# Patient Record
Sex: MALE | Race: WHITE | NOT HISPANIC OR LATINO | URBAN - METROPOLITAN AREA
[De-identification: names, ages, dates, MRNs, and addresses within clinical notes are randomized per-mention and may not be internally consistent; named-entity substitution may affect disease eponyms.]

---

## 2018-03-20 ENCOUNTER — OFFICE VISIT CONVERTED (OUTPATIENT)
Dept: INTERNAL MEDICINE | Facility: CLINIC | Age: 63
End: 2018-03-20
Attending: INTERNAL MEDICINE

## 2018-07-17 ENCOUNTER — OFFICE VISIT CONVERTED (OUTPATIENT)
Dept: INTERNAL MEDICINE | Facility: CLINIC | Age: 63
End: 2018-07-17
Attending: PHYSICIAN ASSISTANT

## 2019-02-21 ENCOUNTER — CONVERSION ENCOUNTER (OUTPATIENT)
Dept: GASTROENTEROLOGY | Facility: CLINIC | Age: 64
End: 2019-02-21

## 2019-02-21 ENCOUNTER — OFFICE VISIT CONVERTED (OUTPATIENT)
Dept: GASTROENTEROLOGY | Facility: CLINIC | Age: 64
End: 2019-02-21
Attending: NURSE PRACTITIONER

## 2019-03-07 ENCOUNTER — HOSPITAL ENCOUNTER (OUTPATIENT)
Dept: LAB | Facility: HOSPITAL | Age: 64
Discharge: HOME | End: 2019-03-07

## 2019-03-07 LAB
ALBUMIN SERPL-MCNC: 3.9 G/DL (ref 3.5–5)
ALBUMIN/GLOB SERPL: 0.9 {RATIO} (ref 1.4–2.6)
ALP SERPL-CCNC: 101 U/L (ref 56–155)
ALT SERPL-CCNC: 8 U/L (ref 10–40)
ANION GAP SERPL CALC-SCNC: 16 MMOL/L (ref 8–19)
AST SERPL-CCNC: 11 U/L (ref 15–50)
BASOPHILS # BLD AUTO: 0.07 10*3/UL (ref 0–0.2)
BASOPHILS NFR BLD AUTO: 0.9 % (ref 0–3)
BILIRUB SERPL-MCNC: 0.27 MG/DL (ref 0.2–1.3)
BUN SERPL-MCNC: 11 MG/DL (ref 5–25)
BUN/CREAT SERPL: 10 {RATIO} (ref 6–20)
CALCIUM SERPL-MCNC: 9.8 MG/DL (ref 8.7–10.4)
CHLORIDE SERPL-SCNC: 101 MMOL/L (ref 99–111)
CONV ABS IMM GRAN: 0.03 10*3/UL (ref 0–0.2)
CONV CO2: 27 MMOL/L (ref 22–32)
CONV IMMATURE GRAN: 0.4 % (ref 0–1.8)
CONV TOTAL PROTEIN: 8.2 G/DL (ref 6.3–8.2)
CREAT UR-MCNC: 1.05 MG/DL (ref 0.7–1.2)
DEPRECATED RDW RBC AUTO: 44 FL (ref 35.1–43.9)
EOSINOPHIL # BLD AUTO: 0.2 10*3/UL (ref 0–0.7)
EOSINOPHIL # BLD AUTO: 2.7 % (ref 0–7)
ERYTHROCYTE [DISTWIDTH] IN BLOOD BY AUTOMATED COUNT: 13.3 % (ref 11.6–14.4)
FOLATE SERPL-MCNC: 4.3 NG/ML (ref 4.8–20)
GFR SERPLBLD BASED ON 1.73 SQ M-ARVRAT: >60 ML/MIN/{1.73_M2}
GLOBULIN UR ELPH-MCNC: 4.3 G/DL (ref 2–3.5)
GLUCOSE SERPL-MCNC: 91 MG/DL (ref 70–99)
HBA1C MFR BLD: 14.8 G/DL (ref 14–18)
HCT VFR BLD AUTO: 46.9 % (ref 42–52)
INR PPP: 0.91 (ref 2–3)
IRON SATN MFR SERPL: 19 % (ref 20–55)
IRON SERPL-MCNC: 54 UG/DL (ref 70–180)
LYMPHOCYTES # BLD AUTO: 1.78 10*3/UL (ref 1–5)
MCH RBC QN AUTO: 28.3 PG (ref 27–31)
MCHC RBC AUTO-ENTMCNC: 31.6 G/DL (ref 33–37)
MCV RBC AUTO: 89.7 FL (ref 80–96)
MONOCYTES # BLD AUTO: 0.53 10*3/UL (ref 0.2–1.2)
MONOCYTES NFR BLD AUTO: 7.1 % (ref 3–10)
NEUTROPHILS # BLD AUTO: 4.9 10*3/UL (ref 2–8)
NEUTROPHILS NFR BLD AUTO: 65.2 % (ref 30–85)
NRBC CBCN: 0 % (ref 0–0.7)
OSMOLALITY SERPL CALC.SUM OF ELEC: 289 MOSM/KG (ref 273–304)
PLATELET # BLD AUTO: 283 10*3/UL (ref 130–400)
PMV BLD AUTO: 10.4 FL (ref 9.4–12.4)
POTASSIUM SERPL-SCNC: 4.3 MMOL/L (ref 3.5–5.3)
PROTHROMBIN TIME: 9.6 S (ref 9.4–12)
RBC # BLD AUTO: 5.23 10*6/UL (ref 4.7–6.1)
SODIUM SERPL-SCNC: 140 MMOL/L (ref 135–147)
TIBC SERPL-MCNC: 290 UG/DL (ref 245–450)
TRANSFERRIN SERPL-MCNC: 203 MG/DL (ref 215–365)
VARIANT LYMPHS NFR BLD MANUAL: 23.7 % (ref 20–45)
VIT B12 SERPL-MCNC: 311 PG/ML (ref 211–911)
WBC # BLD AUTO: 7.51 10*3/UL (ref 4.8–10.8)

## 2019-04-04 ENCOUNTER — HOSPITAL ENCOUNTER (OUTPATIENT)
Dept: GENERAL RADIOLOGY | Facility: HOSPITAL | Age: 64
Discharge: HOME | End: 2019-04-04
Attending: FAMILY MEDICINE

## 2021-05-15 VITALS
DIASTOLIC BLOOD PRESSURE: 85 MMHG | TEMPERATURE: 98 F | WEIGHT: 225 LBS | HEART RATE: 83 BPM | HEIGHT: 75 IN | BODY MASS INDEX: 27.98 KG/M2 | SYSTOLIC BLOOD PRESSURE: 149 MMHG

## 2021-05-16 VITALS
SYSTOLIC BLOOD PRESSURE: 122 MMHG | RESPIRATION RATE: 16 BRPM | HEART RATE: 108 BPM | BODY MASS INDEX: 30.23 KG/M2 | HEIGHT: 75 IN | OXYGEN SATURATION: 96 % | DIASTOLIC BLOOD PRESSURE: 82 MMHG | WEIGHT: 243.12 LBS | TEMPERATURE: 97.4 F

## 2021-05-16 VITALS
RESPIRATION RATE: 16 BRPM | HEART RATE: 100 BPM | TEMPERATURE: 98.1 F | SYSTOLIC BLOOD PRESSURE: 124 MMHG | OXYGEN SATURATION: 96 % | BODY MASS INDEX: 27.98 KG/M2 | HEIGHT: 75 IN | WEIGHT: 225 LBS | DIASTOLIC BLOOD PRESSURE: 76 MMHG

## 2021-10-12 ENCOUNTER — HOSPITAL ENCOUNTER (EMERGENCY)
Facility: HOSPITAL | Age: 66
Discharge: LEFT BEFORE DISCHARGE | End: 2021-10-12

## 2021-10-12 PROCEDURE — 99211 OFF/OP EST MAY X REQ PHY/QHP: CPT

## 2021-10-12 NOTE — ED NOTES
Patient arrived with EMS. Upon arrival patient is refusing treatment. Pt is alert and oriented x4.  Pt left without being assessed or seen by provider.       Anil Mejia RN  10/12/21 [id number]

## 2022-03-24 ENCOUNTER — LAB REQUISITION (OUTPATIENT)
Dept: LAB | Facility: HOSPITAL | Age: 67
End: 2022-03-24

## 2022-03-24 DIAGNOSIS — E55.9 VITAMIN D DEFICIENCY, UNSPECIFIED: ICD-10-CM

## 2022-03-24 DIAGNOSIS — E11.9 TYPE 2 DIABETES MELLITUS WITHOUT COMPLICATIONS: ICD-10-CM

## 2022-03-24 DIAGNOSIS — R53.1 WEAKNESS: ICD-10-CM

## 2022-03-24 LAB
25(OH)D3 SERPL-MCNC: 19.7 NG/ML (ref 30–100)
ALBUMIN SERPL-MCNC: 3.8 G/DL (ref 3.5–5.2)
ALBUMIN/GLOB SERPL: 1 G/DL
ALP SERPL-CCNC: 113 U/L (ref 39–117)
ALT SERPL W P-5'-P-CCNC: 8 U/L (ref 1–41)
ANION GAP SERPL CALCULATED.3IONS-SCNC: 10 MMOL/L (ref 5–15)
AST SERPL-CCNC: 11 U/L (ref 1–40)
BASOPHILS # BLD AUTO: 0.03 10*3/MM3 (ref 0–0.2)
BASOPHILS NFR BLD AUTO: 0.5 % (ref 0–1.5)
BILIRUB SERPL-MCNC: 0.4 MG/DL (ref 0–1.2)
BUN SERPL-MCNC: 13 MG/DL (ref 8–23)
BUN/CREAT SERPL: 14.3 (ref 7–25)
CALCIUM SPEC-SCNC: 9.1 MG/DL (ref 8.6–10.5)
CHLORIDE SERPL-SCNC: 103 MMOL/L (ref 98–107)
CHOLEST SERPL-MCNC: 141 MG/DL (ref 0–200)
CO2 SERPL-SCNC: 24 MMOL/L (ref 22–29)
CREAT SERPL-MCNC: 0.91 MG/DL (ref 0.76–1.27)
DEPRECATED RDW RBC AUTO: 41 FL (ref 37–54)
EGFRCR SERPLBLD CKD-EPI 2021: 93.5 ML/MIN/1.73
EOSINOPHIL # BLD AUTO: 0.18 10*3/MM3 (ref 0–0.4)
EOSINOPHIL NFR BLD AUTO: 2.9 % (ref 0.3–6.2)
ERYTHROCYTE [DISTWIDTH] IN BLOOD BY AUTOMATED COUNT: 14 % (ref 12.3–15.4)
FOLATE SERPL-MCNC: 5.22 NG/ML (ref 4.78–24.2)
GLOBULIN UR ELPH-MCNC: 3.7 GM/DL
GLUCOSE SERPL-MCNC: 134 MG/DL (ref 65–99)
HBA1C MFR BLD: 7.6 % (ref 4.8–5.6)
HCT VFR BLD AUTO: 46 % (ref 37.5–51)
HDLC SERPL-MCNC: 33 MG/DL (ref 40–60)
HGB BLD-MCNC: 15.2 G/DL (ref 13–17.7)
IMM GRANULOCYTES # BLD AUTO: 0.01 10*3/MM3 (ref 0–0.05)
IMM GRANULOCYTES NFR BLD AUTO: 0.2 % (ref 0–0.5)
LDLC SERPL CALC-MCNC: 69 MG/DL (ref 0–100)
LDLC/HDLC SERPL: 1.82 {RATIO}
LYMPHOCYTES # BLD AUTO: 2.62 10*3/MM3 (ref 0.7–3.1)
LYMPHOCYTES NFR BLD AUTO: 41.7 % (ref 19.6–45.3)
MAGNESIUM SERPL-MCNC: 1.9 MG/DL (ref 1.6–2.4)
MCH RBC QN AUTO: 27.1 PG (ref 26.6–33)
MCHC RBC AUTO-ENTMCNC: 33 G/DL (ref 31.5–35.7)
MCV RBC AUTO: 82.1 FL (ref 79–97)
MONOCYTES # BLD AUTO: 0.43 10*3/MM3 (ref 0.1–0.9)
MONOCYTES NFR BLD AUTO: 6.8 % (ref 5–12)
NEUTROPHILS NFR BLD AUTO: 3.02 10*3/MM3 (ref 1.7–7)
NEUTROPHILS NFR BLD AUTO: 47.9 % (ref 42.7–76)
NRBC BLD AUTO-RTO: 0 /100 WBC (ref 0–0.2)
PLATELET # BLD AUTO: 221 10*3/MM3 (ref 140–450)
PMV BLD AUTO: 10.3 FL (ref 6–12)
POTASSIUM SERPL-SCNC: 3.9 MMOL/L (ref 3.5–5.2)
PROT SERPL-MCNC: 7.5 G/DL (ref 6–8.5)
RBC # BLD AUTO: 5.6 10*6/MM3 (ref 4.14–5.8)
SODIUM SERPL-SCNC: 137 MMOL/L (ref 136–145)
TRIGL SERPL-MCNC: 240 MG/DL (ref 0–150)
VIT B12 BLD-MCNC: 265 PG/ML (ref 211–946)
VLDLC SERPL-MCNC: 39 MG/DL (ref 5–40)
WBC NRBC COR # BLD: 6.29 10*3/MM3 (ref 3.4–10.8)

## 2022-03-24 PROCEDURE — 85025 COMPLETE CBC W/AUTO DIFF WBC: CPT | Performed by: NURSE PRACTITIONER

## 2022-03-24 PROCEDURE — 80061 LIPID PANEL: CPT | Performed by: NURSE PRACTITIONER

## 2022-03-24 PROCEDURE — 80053 COMPREHEN METABOLIC PANEL: CPT | Performed by: NURSE PRACTITIONER

## 2022-03-24 PROCEDURE — 83036 HEMOGLOBIN GLYCOSYLATED A1C: CPT | Performed by: NURSE PRACTITIONER

## 2022-03-24 PROCEDURE — 82607 VITAMIN B-12: CPT | Performed by: NURSE PRACTITIONER

## 2022-03-24 PROCEDURE — 82746 ASSAY OF FOLIC ACID SERUM: CPT | Performed by: NURSE PRACTITIONER

## 2022-03-24 PROCEDURE — 82306 VITAMIN D 25 HYDROXY: CPT | Performed by: NURSE PRACTITIONER

## 2022-03-24 PROCEDURE — 83735 ASSAY OF MAGNESIUM: CPT | Performed by: NURSE PRACTITIONER

## 2023-07-18 PROBLEM — M25.551 HIP PAIN, BILATERAL: Status: ACTIVE | Noted: 2023-07-18

## 2023-07-18 PROBLEM — N28.9 KIDNEY DISORDER: Status: ACTIVE | Noted: 2023-07-18

## 2023-07-18 PROBLEM — I10 ESSENTIAL HYPERTENSION: Status: ACTIVE | Noted: 2023-07-18

## 2023-07-18 PROBLEM — E53.8 VITAMIN B12 DEFICIENCY: Status: ACTIVE | Noted: 2017-07-10

## 2023-07-18 PROBLEM — M25.552 HIP PAIN, BILATERAL: Status: ACTIVE | Noted: 2023-07-18

## 2023-07-18 PROBLEM — E78.00 HIGH CHOLESTEROL: Status: ACTIVE | Noted: 2023-07-18

## 2023-08-04 ENCOUNTER — TELEPHONE (OUTPATIENT)
Dept: FAMILY MEDICINE CLINIC | Facility: CLINIC | Age: 68
End: 2023-08-04
Payer: MEDICARE

## 2023-08-08 ENCOUNTER — OFFICE VISIT (OUTPATIENT)
Dept: UROLOGY | Facility: CLINIC | Age: 68
End: 2023-08-08
Payer: MEDICARE

## 2023-08-08 VITALS
SYSTOLIC BLOOD PRESSURE: 125 MMHG | HEIGHT: 75 IN | WEIGHT: 206 LBS | DIASTOLIC BLOOD PRESSURE: 77 MMHG | TEMPERATURE: 98.6 F | HEART RATE: 121 BPM | BODY MASS INDEX: 25.61 KG/M2

## 2023-08-08 DIAGNOSIS — N30.00 ACUTE CYSTITIS WITHOUT HEMATURIA: Primary | ICD-10-CM

## 2023-08-08 DIAGNOSIS — R32 URINARY INCONTINENCE, UNSPECIFIED TYPE: ICD-10-CM

## 2023-08-08 DIAGNOSIS — Z86.73 H/O: STROKE: ICD-10-CM

## 2023-08-08 DIAGNOSIS — N13.8 BPH WITH OBSTRUCTION/LOWER URINARY TRACT SYMPTOMS: ICD-10-CM

## 2023-08-08 DIAGNOSIS — N40.1 BPH WITH OBSTRUCTION/LOWER URINARY TRACT SYMPTOMS: ICD-10-CM

## 2023-08-08 LAB
BILIRUB BLD-MCNC: NEGATIVE MG/DL
CLARITY, POC: ABNORMAL
COLOR UR: YELLOW
EXPIRATION DATE: ABNORMAL
GLUCOSE UR STRIP-MCNC: ABNORMAL MG/DL
KETONES UR QL: NEGATIVE
LEUKOCYTE EST, POC: ABNORMAL
Lab: ABNORMAL
NITRITE UR-MCNC: POSITIVE MG/ML
PH UR: 6 [PH] (ref 5–8)
PROT UR STRIP-MCNC: ABNORMAL MG/DL
RBC # UR STRIP: ABNORMAL /UL
SP GR UR: 1.03 (ref 1–1.03)
UROBILINOGEN UR QL: ABNORMAL

## 2023-08-08 PROCEDURE — 84153 ASSAY OF PSA TOTAL: CPT | Performed by: UROLOGY

## 2023-08-08 PROCEDURE — 87086 URINE CULTURE/COLONY COUNT: CPT | Performed by: UROLOGY

## 2023-08-08 PROCEDURE — 87186 SC STD MICRODIL/AGAR DIL: CPT | Performed by: UROLOGY

## 2023-08-08 RX ORDER — CEPHALEXIN 250 MG/1
250 CAPSULE ORAL 4 TIMES DAILY
Qty: 28 CAPSULE | Refills: 0 | Status: SHIPPED | OUTPATIENT
Start: 2023-08-08 | End: 2023-08-15

## 2023-08-08 NOTE — PROGRESS NOTES
"Chief Complaint  Urinary Incontinence    BPH    History of stroke on the right side 6 years ago    Subjective patient is weak        Brian Mehta presents to [facility] UROLOGY  History of Present Illness    Patient has urinary incontinence for last 2 to 3 years.  He has no dysuria or gross hematuria.  He is not sure if his father had prostate cancer.  Patient has used diaper all the time.  Patient had a right-sided stroke 6 years ago and still has not recovered completely and has weakness of right  upper and lower extremities.    Patient has history of kidney stone and got septic with urinary tract infection about 5 years ago.    Patient continues to smoke and smokes cessation is discussed    Objective no acute distress  Vital Signs:   /77   Pulse (!) 121   Temp 98.6 øF (37 øC)   Ht 190.5 cm (75\")   Wt 93.4 kg (206 lb)   BMI 25.75 kg/mý     No Known Allergies   Past medical history:  has a past medical history of Diabetes mellitus and Hyperlipidemia.   Past surgical history:  has a past surgical history that includes Eye surgery and Appendectomy.  Personal history: family history includes No Known Problems in his father and mother.  Social history:  reports that he has been smoking cigarettes. He started smoking about 51 years ago. He has a 127.50 pack-year smoking history. He has been exposed to tobacco smoke. He has never used smokeless tobacco. He reports current alcohol use. He reports that he does not use drugs.    Review of Systems    Please see past medical and surgical history and rest of the system is negative    Physical Exam  Constitutional:       General: He is not in acute distress.     Appearance: He is normal weight. He is not ill-appearing or toxic-appearing.      Comments: Difficulty with ambulation because of weakness of upper and lower right extremities   HENT:      Head: Normocephalic and atraumatic.      Ears:      Comments: No loss of hearing  Cardiovascular:      " Rate and Rhythm: Tachycardia present.      Pulses: Normal pulses.      Heart sounds: Normal heart sounds. No murmur heard.  Pulmonary:      Effort: Pulmonary effort is normal. No respiratory distress.      Breath sounds: Normal breath sounds. No rhonchi or rales.   Musculoskeletal:      Cervical back: Normal range of motion and neck supple. No rigidity or tenderness.   Lymphadenopathy:      Cervical: No cervical adenopathy.   Neurological:      Mental Status: He is alert.      Result Review :                 Assessment and Plan    Diagnoses and all orders for this visit:    1. Acute cystitis without hematuria (Primary)  -     cephalexin (KEFLEX) 250 MG capsule; Take 1 capsule by mouth 4 (Four) Times a Day for 7 days.  Dispense: 28 capsule; Refill: 0    2. Urinary incontinence, unspecified type  -     POC Urinalysis Dipstick, Automated  -     PSA DIAGNOSTIC  -     cephalexin (KEFLEX) 250 MG capsule; Take 1 capsule by mouth 4 (Four) Times a Day for 7 days.  Dispense: 28 capsule; Refill: 0    3. BPH with obstruction/lower urinary tract symptoms  -     cephalexin (KEFLEX) 250 MG capsule; Take 1 capsule by mouth 4 (Four) Times a Day for 7 days.  Dispense: 28 capsule; Refill: 0    4. H/O: stroke    Ultrasound of bladder.  3.5 MHz transducer was applied in the suprapubic area and volume of urine in the urinary bladder and bladder was calculated and was 25.5 cubic cm.  Procedure was performed by me.    Patient has obvious urinary tract infection on urinalysis and I am going to urine culture on the patient and start him on Keflex 250 mg 4 times a day and see him next week for cystoscopy.  Brief Urine Lab Results  (Last result in the past 365 days)        Color   Clarity   Blood   Leuk Est   Nitrite   Protein   CREAT   Urine HCG        08/08/23 1404 Yellow   Cloudy   Moderate   Trace   Positive   100 mg/dL                    Follow Up   No follow-ups on file.  Patient was given instructions and counseling regarding his  condition or for health maintenance advice. Please see specific information pulled into the AVS if appropriate.     Gino Gil MD

## 2023-08-09 LAB — PSA SERPL-MCNC: 0.99 NG/ML (ref 0–4)

## 2023-08-11 ENCOUNTER — HOSPITAL ENCOUNTER (OUTPATIENT)
Dept: CT IMAGING | Facility: HOSPITAL | Age: 68
Discharge: HOME | End: 2023-08-11
Admitting: NURSE PRACTITIONER
Payer: MEDICARE

## 2023-08-11 DIAGNOSIS — F17.218 CIGARETTE NICOTINE DEPENDENCE WITH OTHER NICOTINE-INDUCED DISORDER: ICD-10-CM

## 2023-08-11 LAB — BACTERIA SPEC AEROBE CULT: ABNORMAL

## 2023-08-11 PROCEDURE — 71271 CT THORAX LUNG CANCER SCR C-: CPT

## 2023-08-15 ENCOUNTER — TELEPHONE (OUTPATIENT)
Dept: UROLOGY | Facility: CLINIC | Age: 68
End: 2023-08-15

## 2023-08-15 NOTE — TELEPHONE ENCOUNTER
Hub staff attempted to follow warm transfer process and was unsuccessful     Caller: FERDINAND CATALAN    Relationship to patient: Emergency Contact    Best call back number: [phone]    Patient is needing: RECEIVED A CALL FROM PT'S WIFE. SHE CALLED TO CANCEL AND R/S CYSTO. PT IS NOT FEELING WELL.

## 2023-08-16 ENCOUNTER — TELEPHONE (OUTPATIENT)
Dept: FAMILY MEDICINE CLINIC | Facility: CLINIC | Age: 68
End: 2023-08-16

## 2023-08-16 NOTE — TELEPHONE ENCOUNTER
Caller: FERDINAND CATALAN    Relationship: Emergency Contact    Best call back number: [phone]    What is the best time to reach you: ANYTIME    Who are you requesting to speak with (clinical staff, provider,  specific staff member): CLINICAL        What was the call regarding:          THE PATIENT IS CALLNG TO SEE IF PCP FAWAD RECEIVED ANY REFERRAL INFORMATON FROM THE OTHER PROVIDER FOR THE PATIENT  TO GO TO [address]     THE PATIENT IS REQUESTING A CALL BACK

## 2023-09-06 ENCOUNTER — TELEPHONE (OUTPATIENT)
Dept: FAMILY MEDICINE CLINIC | Facility: CLINIC | Age: 68
End: 2023-09-06
Payer: MEDICARE

## 2023-09-06 NOTE — TELEPHONE ENCOUNTER
Bharat w/ [facility] states that he was at patients home today for routine visit and wanted to let you know that patients heart rate at rest was elevated at 114 (jumped to 125 after strenuous activity).  Patient states that he had not taken his medication yet today.  Bharat also notes that patient was not experiencing any other alarming symptoms such as chest pain, SOA, etc.

## 2023-09-12 ENCOUNTER — TELEPHONE (OUTPATIENT)
Dept: FAMILY MEDICINE CLINIC | Facility: CLINIC | Age: 68
End: 2023-09-12
Payer: MEDICARE

## 2023-09-12 NOTE — TELEPHONE ENCOUNTER
[facility] calls to see if you could write patient orders for:    1. LT ankle wound care (had a small open wound of unknown cause on LT ankle.  Was evaluated in ER and diagnosed w/ cellulitis and started on antibiotic)  2. Social Work to come out to evaluate patient for community resources

## 2023-09-13 ENCOUNTER — APPOINTMENT (OUTPATIENT)
Dept: GENERAL RADIOLOGY | Facility: HOSPITAL | Age: 68
DRG: 194 | End: 2023-09-13
Payer: MEDICARE

## 2023-09-13 ENCOUNTER — HOSPITAL ENCOUNTER (INPATIENT)
Facility: HOSPITAL | Age: 68
LOS: 2 days | Discharge: HOME | DRG: 194 | End: 2023-09-16
Attending: EMERGENCY MEDICINE | Admitting: FAMILY MEDICINE
Payer: MEDICARE

## 2023-09-13 DIAGNOSIS — N39.0 URINARY TRACT INFECTION WITH HEMATURIA, SITE UNSPECIFIED: ICD-10-CM

## 2023-09-13 DIAGNOSIS — R31.9 URINARY TRACT INFECTION WITH HEMATURIA, SITE UNSPECIFIED: ICD-10-CM

## 2023-09-13 DIAGNOSIS — I50.23 SYSTOLIC CHF, ACUTE ON CHRONIC: ICD-10-CM

## 2023-09-13 DIAGNOSIS — Z78.9 DECREASED ACTIVITIES OF DAILY LIVING (ADL): ICD-10-CM

## 2023-09-13 DIAGNOSIS — A41.9 SEPSIS, DUE TO UNSPECIFIED ORGANISM, UNSPECIFIED WHETHER ACUTE ORGAN DYSFUNCTION PRESENT: Primary | ICD-10-CM

## 2023-09-13 DIAGNOSIS — L03.116 CELLULITIS OF LEFT LOWER EXTREMITY: ICD-10-CM

## 2023-09-13 DIAGNOSIS — J18.9 PNEUMONIA DUE TO INFECTIOUS ORGANISM, UNSPECIFIED LATERALITY, UNSPECIFIED PART OF LUNG: ICD-10-CM

## 2023-09-13 DIAGNOSIS — R26.2 DIFFICULTY WALKING: ICD-10-CM

## 2023-09-13 LAB
ALBUMIN SERPL-MCNC: 3.7 G/DL (ref 3.5–5.2)
ALBUMIN/GLOB SERPL: 1 G/DL
ALP SERPL-CCNC: 107 U/L (ref 39–117)
ALT SERPL W P-5'-P-CCNC: 5 U/L (ref 1–41)
ANION GAP SERPL CALCULATED.3IONS-SCNC: 9.1 MMOL/L (ref 5–15)
AST SERPL-CCNC: 8 U/L (ref 1–40)
BASOPHILS # BLD AUTO: 0.03 10*3/MM3 (ref 0–0.2)
BASOPHILS NFR BLD AUTO: 0.4 % (ref 0–1.5)
BILIRUB SERPL-MCNC: 0.5 MG/DL (ref 0–1.2)
BUN SERPL-MCNC: 13 MG/DL (ref 8–23)
BUN/CREAT SERPL: 15.5 (ref 7–25)
CALCIUM SPEC-SCNC: 8.7 MG/DL (ref 8.6–10.5)
CHLORIDE SERPL-SCNC: 101 MMOL/L (ref 98–107)
CO2 SERPL-SCNC: 25.9 MMOL/L (ref 22–29)
CREAT SERPL-MCNC: 0.84 MG/DL (ref 0.76–1.27)
D-LACTATE SERPL-SCNC: 1.2 MMOL/L (ref 0.5–2)
DEPRECATED RDW RBC AUTO: 45.6 FL (ref 37–54)
EGFRCR SERPLBLD CKD-EPI 2021: 96.2 ML/MIN/1.73
EOSINOPHIL # BLD AUTO: 0.04 10*3/MM3 (ref 0–0.4)
EOSINOPHIL NFR BLD AUTO: 0.6 % (ref 0.3–6.2)
ERYTHROCYTE [DISTWIDTH] IN BLOOD BY AUTOMATED COUNT: 16.3 % (ref 12.3–15.4)
GLOBULIN UR ELPH-MCNC: 3.6 GM/DL
GLUCOSE SERPL-MCNC: 170 MG/DL (ref 65–99)
HCT VFR BLD AUTO: 43.5 % (ref 37.5–51)
HGB BLD-MCNC: 13.6 G/DL (ref 13–17.7)
HOLD SPECIMEN: NORMAL
HOLD SPECIMEN: NORMAL
IMM GRANULOCYTES # BLD AUTO: 0.02 10*3/MM3 (ref 0–0.05)
IMM GRANULOCYTES NFR BLD AUTO: 0.3 % (ref 0–0.5)
LYMPHOCYTES # BLD AUTO: 2.07 10*3/MM3 (ref 0.7–3.1)
LYMPHOCYTES NFR BLD AUTO: 30 % (ref 19.6–45.3)
MCH RBC QN AUTO: 24.3 PG (ref 26.6–33)
MCHC RBC AUTO-ENTMCNC: 31.3 G/DL (ref 31.5–35.7)
MCV RBC AUTO: 77.7 FL (ref 79–97)
MONOCYTES # BLD AUTO: 0.61 10*3/MM3 (ref 0.1–0.9)
MONOCYTES NFR BLD AUTO: 8.9 % (ref 5–12)
NEUTROPHILS NFR BLD AUTO: 4.12 10*3/MM3 (ref 1.7–7)
NEUTROPHILS NFR BLD AUTO: 59.8 % (ref 42.7–76)
NRBC BLD AUTO-RTO: 0 /100 WBC (ref 0–0.2)
NT-PROBNP SERPL-MCNC: 495.7 PG/ML (ref 0–900)
PLATELET # BLD AUTO: 238 10*3/MM3 (ref 140–450)
PMV BLD AUTO: 9.6 FL (ref 6–12)
POTASSIUM SERPL-SCNC: 4.1 MMOL/L (ref 3.5–5.2)
PROT SERPL-MCNC: 7.3 G/DL (ref 6–8.5)
RBC # BLD AUTO: 5.6 10*6/MM3 (ref 4.14–5.8)
SODIUM SERPL-SCNC: 136 MMOL/L (ref 136–145)
WBC NRBC COR # BLD: 6.89 10*3/MM3 (ref 3.4–10.8)
WHOLE BLOOD HOLD COAG: NORMAL
WHOLE BLOOD HOLD SPECIMEN: NORMAL

## 2023-09-13 PROCEDURE — 0 CEFEPIME PER 500 MG: Performed by: EMERGENCY MEDICINE

## 2023-09-13 PROCEDURE — 80053 COMPREHEN METABOLIC PANEL: CPT | Performed by: EMERGENCY MEDICINE

## 2023-09-13 PROCEDURE — 36415 COLL VENOUS BLD VENIPUNCTURE: CPT

## 2023-09-13 PROCEDURE — 83880 ASSAY OF NATRIURETIC PEPTIDE: CPT | Performed by: EMERGENCY MEDICINE

## 2023-09-13 PROCEDURE — 87040 BLOOD CULTURE FOR BACTERIA: CPT

## 2023-09-13 PROCEDURE — 94640 AIRWAY INHALATION TREATMENT: CPT

## 2023-09-13 PROCEDURE — 73590 X-RAY EXAM OF LOWER LEG: CPT

## 2023-09-13 PROCEDURE — 73630 X-RAY EXAM OF FOOT: CPT

## 2023-09-13 PROCEDURE — 71045 X-RAY EXAM CHEST 1 VIEW: CPT

## 2023-09-13 PROCEDURE — 85025 COMPLETE CBC W/AUTO DIFF WBC: CPT

## 2023-09-13 PROCEDURE — 93005 ELECTROCARDIOGRAM TRACING: CPT | Performed by: EMERGENCY MEDICINE

## 2023-09-13 PROCEDURE — 83605 ASSAY OF LACTIC ACID: CPT | Performed by: EMERGENCY MEDICINE

## 2023-09-13 PROCEDURE — 86738 MYCOPLASMA ANTIBODY: CPT | Performed by: PHYSICIAN ASSISTANT

## 2023-09-13 PROCEDURE — 87635 SARS-COV-2 COVID-19 AMP PRB: CPT | Performed by: EMERGENCY MEDICINE

## 2023-09-13 PROCEDURE — 99285 EMERGENCY DEPT VISIT HI MDM: CPT

## 2023-09-13 PROCEDURE — 87804 INFLUENZA ASSAY W/OPTIC: CPT | Performed by: EMERGENCY MEDICINE

## 2023-09-13 PROCEDURE — 94799 UNLISTED PULMONARY SVC/PX: CPT

## 2023-09-13 RX ORDER — SODIUM CHLORIDE 0.9 % (FLUSH) 0.9 %
10 SYRINGE (ML) INJECTION AS NEEDED
Status: DISCONTINUED | OUTPATIENT
Start: 2023-09-13 | End: 2023-09-16 | Stop reason: HOSPADM

## 2023-09-13 RX ORDER — ACETAMINOPHEN 325 MG/1
975 TABLET ORAL ONCE
Status: COMPLETED | OUTPATIENT
Start: 2023-09-13 | End: 2023-09-13

## 2023-09-13 RX ORDER — DOXYCYCLINE HYCLATE 100 MG/1
100 CAPSULE ORAL 2 TIMES DAILY
COMMUNITY
End: 2023-09-16 | Stop reason: HOSPADM

## 2023-09-13 RX ORDER — SODIUM CHLORIDE 0.9 % (FLUSH) 0.9 %
10 SYRINGE (ML) INJECTION AS NEEDED
Status: DISCONTINUED | OUTPATIENT
Start: 2023-09-13 | End: 2023-09-14

## 2023-09-13 RX ORDER — CEPHALEXIN 500 MG/1
500 CAPSULE ORAL 4 TIMES DAILY
COMMUNITY
End: 2023-09-16 | Stop reason: HOSPADM

## 2023-09-13 RX ORDER — VANCOMYCIN 2 GRAM/500 ML IN 0.9 % SODIUM CHLORIDE INTRAVENOUS
20 ONCE
Status: COMPLETED | OUTPATIENT
Start: 2023-09-14 | End: 2023-09-14

## 2023-09-13 RX ORDER — IPRATROPIUM BROMIDE AND ALBUTEROL SULFATE 2.5; .5 MG/3ML; MG/3ML
3 SOLUTION RESPIRATORY (INHALATION) ONCE
Status: COMPLETED | OUTPATIENT
Start: 2023-09-14 | End: 2023-09-13

## 2023-09-13 RX ADMIN — IPRATROPIUM BROMIDE AND ALBUTEROL SULFATE 3 ML: .5; 3 SOLUTION RESPIRATORY (INHALATION) at 23:53

## 2023-09-13 RX ADMIN — CEFEPIME 2000 MG: 2 INJECTION, POWDER, FOR SOLUTION INTRAVENOUS at 23:38

## 2023-09-13 RX ADMIN — ACETAMINOPHEN 975 MG: 325 TABLET ORAL at 23:32

## 2023-09-13 RX ADMIN — SODIUM CHLORIDE 500 ML: 9 INJECTION, SOLUTION INTRAVENOUS at 23:38

## 2023-09-14 ENCOUNTER — APPOINTMENT (OUTPATIENT)
Dept: CARDIOLOGY | Facility: HOSPITAL | Age: 68
DRG: 194 | End: 2023-09-14
Payer: MEDICARE

## 2023-09-14 PROBLEM — A41.9 SEPSIS: Status: ACTIVE | Noted: 2023-09-14

## 2023-09-14 LAB
ANION GAP SERPL CALCULATED.3IONS-SCNC: 9 MMOL/L (ref 5–15)
ASCENDING AORTA: 3.8 CM
BACTERIA UR QL AUTO: ABNORMAL /HPF
BASOPHILS # BLD AUTO: 0.02 10*3/MM3 (ref 0–0.2)
BASOPHILS NFR BLD AUTO: 0.3 % (ref 0–1.5)
BH CV ECHO MEAS - AO MAX PG: 7 MMHG
BH CV ECHO MEAS - AO MEAN PG: 4 MMHG
BH CV ECHO MEAS - AO ROOT DIAM: 3.5 CM
BH CV ECHO MEAS - AO V2 MAX: 127 CM/SEC
BH CV ECHO MEAS - AO V2 VTI: 23.9 CM
BH CV ECHO MEAS - AVA(I,D): 2.05 CM2
BH CV ECHO MEAS - EDV(CUBED): 205.4 ML
BH CV ECHO MEAS - EDV(MOD-SP2): 112 ML
BH CV ECHO MEAS - EDV(MOD-SP4): 117 ML
BH CV ECHO MEAS - EF(MOD-SP2): 25.7 %
BH CV ECHO MEAS - EF(MOD-SP4): 43.4 %
BH CV ECHO MEAS - ESV(CUBED): 113.4 ML
BH CV ECHO MEAS - ESV(MOD-SP2): 83.2 ML
BH CV ECHO MEAS - ESV(MOD-SP4): 66.2 ML
BH CV ECHO MEAS - FS: 18 %
BH CV ECHO MEAS - IVS/LVPW: 0.91 CM
BH CV ECHO MEAS - IVSD: 1.2 CM
BH CV ECHO MEAS - LA DIMENSION: 3.8 CM
BH CV ECHO MEAS - LAT PEAK E' VEL: 9.1 CM/SEC
BH CV ECHO MEAS - LV DIASTOLIC VOL/BSA (35-75): 51 CM2
BH CV ECHO MEAS - LV MASS(C)D: 326.4 GRAMS
BH CV ECHO MEAS - LV MAX PG: 3.5 MMHG
BH CV ECHO MEAS - LV MEAN PG: 2 MMHG
BH CV ECHO MEAS - LV SYSTOLIC VOL/BSA (12-30): 28.9 CM2
BH CV ECHO MEAS - LV V1 MAX: 93.1 CM/SEC
BH CV ECHO MEAS - LV V1 VTI: 15.6 CM
BH CV ECHO MEAS - LVIDD: 5.9 CM
BH CV ECHO MEAS - LVIDS: 4.8 CM
BH CV ECHO MEAS - LVOT AREA: 3.1 CM2
BH CV ECHO MEAS - LVOT DIAM: 2 CM
BH CV ECHO MEAS - LVPWD: 1.32 CM
BH CV ECHO MEAS - MED PEAK E' VEL: 8.1 CM/SEC
BH CV ECHO MEAS - MV A MAX VEL: 91.9 CM/SEC
BH CV ECHO MEAS - MV DEC SLOPE: 452 CM/SEC2
BH CV ECHO MEAS - MV DEC TIME: 0.2 MSEC
BH CV ECHO MEAS - MV E MAX VEL: 70.6 CM/SEC
BH CV ECHO MEAS - MV E/A: 0.77
BH CV ECHO MEAS - MV MAX PG: 5 MMHG
BH CV ECHO MEAS - MV MEAN PG: 2 MMHG
BH CV ECHO MEAS - MV P1/2T: 54.9 MSEC
BH CV ECHO MEAS - MV V2 VTI: 15.8 CM
BH CV ECHO MEAS - MVA(P1/2T): 4 CM2
BH CV ECHO MEAS - MVA(VTI): 3.1 CM2
BH CV ECHO MEAS - RVDD: 2.6 CM
BH CV ECHO MEAS - SI(MOD-SP2): 12.6 ML/M2
BH CV ECHO MEAS - SI(MOD-SP4): 22.1 ML/M2
BH CV ECHO MEAS - SV(LVOT): 49 ML
BH CV ECHO MEAS - SV(MOD-SP2): 28.8 ML
BH CV ECHO MEAS - SV(MOD-SP4): 50.8 ML
BH CV ECHO MEASUREMENTS AVERAGE E/E' RATIO: 8.21
BILIRUB UR QL STRIP: NEGATIVE
BUN SERPL-MCNC: 10 MG/DL (ref 8–23)
BUN/CREAT SERPL: 14.3 (ref 7–25)
CALCIUM SPEC-SCNC: 8.6 MG/DL (ref 8.6–10.5)
CHLORIDE SERPL-SCNC: 103 MMOL/L (ref 98–107)
CLARITY UR: CLEAR
CO2 SERPL-SCNC: 24 MMOL/L (ref 22–29)
COLOR UR: YELLOW
CREAT SERPL-MCNC: 0.7 MG/DL (ref 0.76–1.27)
DEPRECATED RDW RBC AUTO: 45.8 FL (ref 37–54)
EGFRCR SERPLBLD CKD-EPI 2021: 101.6 ML/MIN/1.73
EOSINOPHIL # BLD AUTO: 0.04 10*3/MM3 (ref 0–0.4)
EOSINOPHIL NFR BLD AUTO: 0.7 % (ref 0.3–6.2)
ERYTHROCYTE [DISTWIDTH] IN BLOOD BY AUTOMATED COUNT: 16.1 % (ref 12.3–15.4)
FLUAV AG NPH QL: NEGATIVE
FLUBV AG NPH QL IA: NEGATIVE
GEN 5 2HR TROPONIN T REFLEX: 9 NG/L
GLUCOSE BLDC GLUCOMTR-MCNC: 136 MG/DL (ref 70–99)
GLUCOSE BLDC GLUCOMTR-MCNC: 157 MG/DL (ref 70–99)
GLUCOSE SERPL-MCNC: 156 MG/DL (ref 65–99)
GLUCOSE UR STRIP-MCNC: NEGATIVE MG/DL
HCT VFR BLD AUTO: 41.5 % (ref 37.5–51)
HGB BLD-MCNC: 12.9 G/DL (ref 13–17.7)
HGB UR QL STRIP.AUTO: NEGATIVE
HYALINE CASTS UR QL AUTO: ABNORMAL /LPF
IMM GRANULOCYTES # BLD AUTO: 0.02 10*3/MM3 (ref 0–0.05)
IMM GRANULOCYTES NFR BLD AUTO: 0.3 % (ref 0–0.5)
KETONES UR QL STRIP: NEGATIVE
L PNEUMO1 AG UR QL IA: NEGATIVE
LEFT ATRIUM VOLUME INDEX: 32.2 ML/M2
LEUKOCYTE ESTERASE UR QL STRIP.AUTO: ABNORMAL
LYMPHOCYTES # BLD AUTO: 1.97 10*3/MM3 (ref 0.7–3.1)
LYMPHOCYTES NFR BLD AUTO: 32.8 % (ref 19.6–45.3)
M PNEUMO IGM SER QL: NEGATIVE
MAGNESIUM SERPL-MCNC: 1.8 MG/DL (ref 1.6–2.4)
MCH RBC QN AUTO: 24.3 PG (ref 26.6–33)
MCHC RBC AUTO-ENTMCNC: 31.1 G/DL (ref 31.5–35.7)
MCV RBC AUTO: 78.3 FL (ref 79–97)
MONOCYTES # BLD AUTO: 0.52 10*3/MM3 (ref 0.1–0.9)
MONOCYTES NFR BLD AUTO: 8.7 % (ref 5–12)
NEUTROPHILS NFR BLD AUTO: 3.44 10*3/MM3 (ref 1.7–7)
NEUTROPHILS NFR BLD AUTO: 57.2 % (ref 42.7–76)
NITRITE UR QL STRIP: POSITIVE
NRBC BLD AUTO-RTO: 0 /100 WBC (ref 0–0.2)
PH UR STRIP.AUTO: 6 [PH] (ref 5–8)
PHOSPHATE SERPL-MCNC: 2.6 MG/DL (ref 2.5–4.5)
PLATELET # BLD AUTO: 221 10*3/MM3 (ref 140–450)
PMV BLD AUTO: 9.8 FL (ref 6–12)
POTASSIUM SERPL-SCNC: 4 MMOL/L (ref 3.5–5.2)
PROCALCITONIN SERPL-MCNC: 0.16 NG/ML (ref 0–0.25)
PROT UR QL STRIP: ABNORMAL
RBC # BLD AUTO: 5.3 10*6/MM3 (ref 4.14–5.8)
RBC # UR STRIP: ABNORMAL /HPF
REF LAB TEST METHOD: ABNORMAL
S PNEUM AG SPEC QL LA: NEGATIVE
SARS-COV-2 RNA RESP QL NAA+PROBE: NOT DETECTED
SODIUM SERPL-SCNC: 136 MMOL/L (ref 136–145)
SP GR UR STRIP: 1.02 (ref 1–1.03)
SQUAMOUS #/AREA URNS HPF: ABNORMAL /HPF
TROPONIN T DELTA: 1 NG/L
TROPONIN T SERPL HS-MCNC: 8 NG/L
UROBILINOGEN UR QL STRIP: ABNORMAL
WBC # UR STRIP: ABNORMAL /HPF
WBC NRBC COR # BLD: 6.01 10*3/MM3 (ref 3.4–10.8)

## 2023-09-14 PROCEDURE — 93306 TTE W/DOPPLER COMPLETE: CPT | Performed by: INTERNAL MEDICINE

## 2023-09-14 PROCEDURE — 84484 ASSAY OF TROPONIN QUANT: CPT | Performed by: EMERGENCY MEDICINE

## 2023-09-14 PROCEDURE — 93971 EXTREMITY STUDY: CPT

## 2023-09-14 PROCEDURE — 82948 REAGENT STRIP/BLOOD GLUCOSE: CPT

## 2023-09-14 PROCEDURE — 25010000002 ENOXAPARIN PER 10 MG: Performed by: FAMILY MEDICINE

## 2023-09-14 PROCEDURE — 81001 URINALYSIS AUTO W/SCOPE: CPT | Performed by: EMERGENCY MEDICINE

## 2023-09-14 PROCEDURE — 25010000002 VANCOMYCIN 5 G RECONSTITUTED SOLUTION: Performed by: EMERGENCY MEDICINE

## 2023-09-14 PROCEDURE — 87086 URINE CULTURE/COLONY COUNT: CPT | Performed by: EMERGENCY MEDICINE

## 2023-09-14 PROCEDURE — 87449 NOS EACH ORGANISM AG IA: CPT | Performed by: PHYSICIAN ASSISTANT

## 2023-09-14 PROCEDURE — 83735 ASSAY OF MAGNESIUM: CPT | Performed by: FAMILY MEDICINE

## 2023-09-14 PROCEDURE — 25010000002 AZITHROMYCIN PER 500 MG: Performed by: FAMILY MEDICINE

## 2023-09-14 PROCEDURE — 99223 1ST HOSP IP/OBS HIGH 75: CPT | Performed by: FAMILY MEDICINE

## 2023-09-14 PROCEDURE — 87070 CULTURE OTHR SPECIMN AEROBIC: CPT | Performed by: PHYSICIAN ASSISTANT

## 2023-09-14 PROCEDURE — 93010 ELECTROCARDIOGRAM REPORT: CPT | Performed by: INTERNAL MEDICINE

## 2023-09-14 PROCEDURE — 25010000002 CEFTRIAXONE PER 250 MG: Performed by: FAMILY MEDICINE

## 2023-09-14 PROCEDURE — 25010000002 FUROSEMIDE PER 20 MG: Performed by: PHYSICIAN ASSISTANT

## 2023-09-14 PROCEDURE — 85025 COMPLETE CBC W/AUTO DIFF WBC: CPT | Performed by: FAMILY MEDICINE

## 2023-09-14 PROCEDURE — 87205 SMEAR GRAM STAIN: CPT | Performed by: PHYSICIAN ASSISTANT

## 2023-09-14 PROCEDURE — 87899 AGENT NOS ASSAY W/OPTIC: CPT | Performed by: PHYSICIAN ASSISTANT

## 2023-09-14 PROCEDURE — 93971 EXTREMITY STUDY: CPT | Performed by: SURGERY

## 2023-09-14 PROCEDURE — 84145 PROCALCITONIN (PCT): CPT | Performed by: FAMILY MEDICINE

## 2023-09-14 PROCEDURE — 94799 UNLISTED PULMONARY SVC/PX: CPT

## 2023-09-14 PROCEDURE — 93306 TTE W/DOPPLER COMPLETE: CPT

## 2023-09-14 PROCEDURE — 63710000001 INSULIN LISPRO (HUMAN) PER 5 UNITS: Performed by: PHYSICIAN ASSISTANT

## 2023-09-14 PROCEDURE — 80048 BASIC METABOLIC PNL TOTAL CA: CPT | Performed by: FAMILY MEDICINE

## 2023-09-14 PROCEDURE — 84100 ASSAY OF PHOSPHORUS: CPT | Performed by: FAMILY MEDICINE

## 2023-09-14 RX ORDER — FAMOTIDINE 40 MG/1
40 TABLET, FILM COATED ORAL 2 TIMES DAILY
COMMUNITY

## 2023-09-14 RX ORDER — BUDESONIDE 0.5 MG/2ML
0.5 INHALANT ORAL
Status: DISCONTINUED | OUTPATIENT
Start: 2023-09-14 | End: 2023-09-16 | Stop reason: HOSPADM

## 2023-09-14 RX ORDER — DOXYCYCLINE 100 MG/1
100 CAPSULE ORAL EVERY 12 HOURS SCHEDULED
Status: DISCONTINUED | OUTPATIENT
Start: 2023-09-14 | End: 2023-09-16 | Stop reason: HOSPADM

## 2023-09-14 RX ORDER — CEFTRIAXONE SODIUM 1 G/50ML
1 INJECTION, SOLUTION INTRAVENOUS EVERY 24 HOURS
Status: DISCONTINUED | OUTPATIENT
Start: 2023-09-14 | End: 2023-09-15

## 2023-09-14 RX ORDER — BISACODYL 5 MG/1
5 TABLET, DELAYED RELEASE ORAL DAILY PRN
Status: DISCONTINUED | OUTPATIENT
Start: 2023-09-14 | End: 2023-09-16 | Stop reason: HOSPADM

## 2023-09-14 RX ORDER — BISACODYL 10 MG
10 SUPPOSITORY, RECTAL RECTAL DAILY PRN
Status: DISCONTINUED | OUTPATIENT
Start: 2023-09-14 | End: 2023-09-16 | Stop reason: HOSPADM

## 2023-09-14 RX ORDER — POLYETHYLENE GLYCOL 3350 17 G/17G
17 POWDER, FOR SOLUTION ORAL DAILY PRN
Status: DISCONTINUED | OUTPATIENT
Start: 2023-09-14 | End: 2023-09-16 | Stop reason: HOSPADM

## 2023-09-14 RX ORDER — ENOXAPARIN SODIUM 100 MG/ML
40 INJECTION SUBCUTANEOUS DAILY
Status: DISCONTINUED | OUTPATIENT
Start: 2023-09-14 | End: 2023-09-16 | Stop reason: HOSPADM

## 2023-09-14 RX ORDER — DEXTROSE MONOHYDRATE 25 G/50ML
25 INJECTION, SOLUTION INTRAVENOUS
Status: DISCONTINUED | OUTPATIENT
Start: 2023-09-14 | End: 2023-09-16 | Stop reason: HOSPADM

## 2023-09-14 RX ORDER — SODIUM CHLORIDE 0.9 % (FLUSH) 0.9 %
10 SYRINGE (ML) INJECTION AS NEEDED
Status: DISCONTINUED | OUTPATIENT
Start: 2023-09-14 | End: 2023-09-16 | Stop reason: HOSPADM

## 2023-09-14 RX ORDER — FUROSEMIDE 10 MG/ML
20 INJECTION INTRAMUSCULAR; INTRAVENOUS ONCE
Status: COMPLETED | OUTPATIENT
Start: 2023-09-14 | End: 2023-09-14

## 2023-09-14 RX ORDER — ACETAMINOPHEN 325 MG/1
650 TABLET ORAL EVERY 6 HOURS PRN
Status: DISCONTINUED | OUTPATIENT
Start: 2023-09-14 | End: 2023-09-16 | Stop reason: HOSPADM

## 2023-09-14 RX ORDER — IPRATROPIUM BROMIDE AND ALBUTEROL 20; 100 UG/1; UG/1
1 SPRAY, METERED RESPIRATORY (INHALATION)
COMMUNITY

## 2023-09-14 RX ORDER — NITROGLYCERIN 0.4 MG/1
0.4 TABLET SUBLINGUAL
Status: DISCONTINUED | OUTPATIENT
Start: 2023-09-14 | End: 2023-09-16 | Stop reason: HOSPADM

## 2023-09-14 RX ORDER — IPRATROPIUM BROMIDE AND ALBUTEROL SULFATE 2.5; .5 MG/3ML; MG/3ML
3 SOLUTION RESPIRATORY (INHALATION) EVERY 4 HOURS PRN
Status: DISCONTINUED | OUTPATIENT
Start: 2023-09-14 | End: 2023-09-16 | Stop reason: HOSPADM

## 2023-09-14 RX ORDER — SODIUM CHLORIDE 9 MG/ML
40 INJECTION, SOLUTION INTRAVENOUS AS NEEDED
Status: DISCONTINUED | OUTPATIENT
Start: 2023-09-14 | End: 2023-09-16 | Stop reason: HOSPADM

## 2023-09-14 RX ORDER — ARFORMOTEROL TARTRATE 15 UG/2ML
15 SOLUTION RESPIRATORY (INHALATION)
Status: DISCONTINUED | OUTPATIENT
Start: 2023-09-14 | End: 2023-09-16 | Stop reason: HOSPADM

## 2023-09-14 RX ORDER — INSULIN LISPRO 100 [IU]/ML
2-7 INJECTION, SOLUTION INTRAVENOUS; SUBCUTANEOUS
Status: DISCONTINUED | OUTPATIENT
Start: 2023-09-14 | End: 2023-09-16 | Stop reason: HOSPADM

## 2023-09-14 RX ORDER — HYDROCHLOROTHIAZIDE 12.5 MG/1
12.5 CAPSULE, GELATIN COATED ORAL DAILY PRN
COMMUNITY
Start: 2023-03-27 | End: 2023-09-16 | Stop reason: HOSPADM

## 2023-09-14 RX ORDER — AMOXICILLIN 250 MG
2 CAPSULE ORAL 2 TIMES DAILY
Status: DISCONTINUED | OUTPATIENT
Start: 2023-09-14 | End: 2023-09-16 | Stop reason: HOSPADM

## 2023-09-14 RX ORDER — NICOTINE POLACRILEX 4 MG
15 LOZENGE BUCCAL
Status: DISCONTINUED | OUTPATIENT
Start: 2023-09-14 | End: 2023-09-16 | Stop reason: HOSPADM

## 2023-09-14 RX ORDER — SODIUM CHLORIDE 0.9 % (FLUSH) 0.9 %
10 SYRINGE (ML) INJECTION EVERY 12 HOURS SCHEDULED
Status: DISCONTINUED | OUTPATIENT
Start: 2023-09-14 | End: 2023-09-16 | Stop reason: HOSPADM

## 2023-09-14 RX ADMIN — ENOXAPARIN SODIUM 40 MG: 100 INJECTION SUBCUTANEOUS at 09:06

## 2023-09-14 RX ADMIN — DOXYCYCLINE 100 MG: 100 CAPSULE ORAL at 10:36

## 2023-09-14 RX ADMIN — ACETAMINOPHEN 650 MG: 325 TABLET ORAL at 18:01

## 2023-09-14 RX ADMIN — SODIUM CHLORIDE 1000 ML: 9 INJECTION, SOLUTION INTRAVENOUS at 01:05

## 2023-09-14 RX ADMIN — Medication 10 ML: at 21:35

## 2023-09-14 RX ADMIN — VANCOMYCIN HYDROCHLORIDE 2000 MG: 5 INJECTION, POWDER, LYOPHILIZED, FOR SOLUTION INTRAVENOUS at 00:52

## 2023-09-14 RX ADMIN — BUDESONIDE 0.5 MG: 0.5 INHALANT RESPIRATORY (INHALATION) at 18:23

## 2023-09-14 RX ADMIN — ARFORMOTEROL TARTRATE 15 MCG: 15 SOLUTION RESPIRATORY (INHALATION) at 11:37

## 2023-09-14 RX ADMIN — FUROSEMIDE 20 MG: 10 INJECTION, SOLUTION INTRAMUSCULAR; INTRAVENOUS at 10:35

## 2023-09-14 RX ADMIN — CEFTRIAXONE SODIUM 1 G: 1 INJECTION, SOLUTION INTRAVENOUS at 09:07

## 2023-09-14 RX ADMIN — AZITHROMYCIN 500 MG: 500 INJECTION, POWDER, LYOPHILIZED, FOR SOLUTION INTRAVENOUS at 12:19

## 2023-09-14 RX ADMIN — INSULIN LISPRO 2 UNITS: 100 INJECTION, SOLUTION INTRAVENOUS; SUBCUTANEOUS at 18:00

## 2023-09-14 RX ADMIN — WHITE PETROLATUM 1 APPLICATION: 1.75 OINTMENT TOPICAL at 21:34

## 2023-09-14 RX ADMIN — BUDESONIDE 0.5 MG: 0.5 INHALANT RESPIRATORY (INHALATION) at 11:37

## 2023-09-14 RX ADMIN — DOXYCYCLINE 100 MG: 100 CAPSULE ORAL at 21:34

## 2023-09-14 RX ADMIN — ARFORMOTEROL TARTRATE 15 MCG: 15 SOLUTION RESPIRATORY (INHALATION) at 18:23

## 2023-09-14 NOTE — ED NOTES
Pt experienced spider bite x 5 days ago on left ankle. Pt c/o weakness x 1 wk. Pt has pitting pedal edema in both feet and ankles, worse in the left.

## 2023-09-14 NOTE — SIGNIFICANT NOTE
Wound Eval / Progress Noted    AFSHAN Funez     Patient Name: Brian Mehta  : 1955  MRN: [id number]  Today's Date: 2023                 Admit Date: 2023    Visit Dx:    ICD-10-CM ICD-9-CM   1. Sepsis, due to unspecified organism, unspecified whether acute organ dysfunction present  A41.9 038.9     995.91   2. Urinary tract infection with hematuria, site unspecified  N39.0 599.0    R31.9 599.70   3. Pneumonia due to infectious organism, unspecified laterality, unspecified part of lung  J18.9 486   4. Cellulitis of left lower extremity  L03.116 682.6         Sepsis        Past Medical History:   Diagnosis Date    Diabetes mellitus     Hyperlipidemia         Past Surgical History:   Procedure Laterality Date    APPENDECTOMY      EYE SURGERY           Physical Assessment:  Wound 23 Left anterior ankle (Active)   Wound Image   23 1104   Dressing Appearance open to air 23 1104   Closure None 23 1104   Base moist;red;yellow 23 1104   Periwound dry;intact;edematous;redness 23 1104   Periwound Temperature warm 23 1104   Periwound Skin Turgor soft 23 1104   Edges open 23 1104   Wound Length (cm) 0.2 cm 23 1104   Wound Width (cm) 0.3 cm 23 1104   Wound Depth (cm) 0.3 cm 23 1104   Wound Surface Area (cm^2) 0.06 cm^2 23 1104   Wound Volume (cm^3) 0.018 cm^3 23 1104   Drainage Characteristics/Odor serous 23 1104   Drainage Amount scant 23 1104   Care, Wound cleansed with;sterile normal saline 23 1104   Dressing Care dressing applied;hydrofiber;silver impregnated;silicone;border dressing 23 1104   Periwound Care absorptive dressing applied 23 1104      Wound Check / Follow-up:  Patient seen today for wound consult. Patient reports a bite to left lower leg/ankle. He suspects it was a spider bite. Edema and redness noted to this area with small open wound with moist yellow tissue. No drainage expressed  upon palpation. Cleansed with normal saline and gauze. Applied silver impregnated hydrofiber and secured with silicone border dressing. Recommending daily dressing changes. Bilateral lower legs and feet with dry, scaly skin. Recommending topical treatment with application of Aquaphor. Patient denies any other skin issues or wounds and the need for further assessment at this time.      Impression: Wound to left lower leg/ankle. Dry, scaly skin.      Short term goals: Regain skin integrity, daily dressing changes, skin care, topical treatment.      Alanna Morris RN    9/14/2023    12:58 EDT

## 2023-09-14 NOTE — PROGRESS NOTES
" [facility]   Hospitalist Progress Note  Date: 2023  Patient Name: Brian Mehta  : 1955  MRN: [id number]  Date of admission: 2023      Subjective   Subjective     Chief Complaint: Fever weakness leg pain    Summary: Brian Mehta is a 68 y.o. male brought to the emergency department for evaluation of fever, chills, cough, bilateral lower extremity edema and swelling.  Patient first reported pain in his left lower extremity on 2023, he stated that he was \"bit by something\".  Patient went to an urgent care, was given oral Keflex and doxycycline.  Patient taking those as prescribed, however they did not improve the swelling nor the erythema.  Patient reports swelling and erythema have progressively worsened.  Patient has worsening cough and congestion.  Patient reports a history of COPD, but does not require oxygen at home.  Patient states due to the swelling and pain of his lower extremities has been having difficulties ambulating.  Patient denies any recent sick contacts, recent travel history.     Interval Followup: Patient seen and examined resting comfortably complains of continued left lower extremity pain and swelling reports recent insect bite reports recently being placed on antibiotics because of this.  Respiratory status is improved fever curve improved continue with antibiotics we will give IV Lasix x1 check venous Doppler consult wound care team    Review of systems: All systems reviewed and negative except the following: Patient complains of generalized weakness fatigue complains of left lower extremity pain and swelling.    Objective   Objective     Vitals:   Temp:  [98.6 °F (37 °C)-103.1 °F (39.5 °C)] 98.6 °F (37 °C)  Heart Rate:  [] 108  Resp:  [16-26] 16  BP: (127-178)/(62-92) 133/85  Flow (L/min):  [2] 2    Physical Exam   Constitutional: Awake alert oriented no acute distress  Respiratory: Diminished  Cardiovascular: RRR  GI: Abdomen soft nontender bowel sounds " present  Extremities: Bilateral lower extremity swelling left greater than right small erythematous wound with necrotic center on left lower extremity    Result Review    Result Review:  I have personally reviewed the results from the time of this admission to 9/14/2023 14:33 EDT and agree with these findings:  []  Laboratory  []  Microbiology  []  Radiology  []  EKG/Telemetry   []  Cardiology/Vascular   []  Pathology  []  Old records  []  Other:    Assessment & Plan   Assessment / Plan   Assessment:    Community-acquired pneumonia  Acute exacerbation of COPD  Concern for underlying CHF with possible exacerbation  Left lower extremity wound/cellulitis  Lower extremity edema  Ongoing tobacco abuse with cigarettes  Diabetes  Hypertension  Hyperlipidemia    Plan:    Continue with antibiotics  IV Lasix x1  Check 2D echocardiogram  Check venous Doppler  Consult wound care team  Consult PT and OT  Nebulizer treatments  SSI per protocol  Discussed with RN     DVT prophylaxis:  Medical DVT prophylaxis orders are present.    CODE STATUS:   Level Of Support Discussed With: Patient  Code Status (Patient has no pulse and is not breathing): CPR (Attempt to Resuscitate)  Medical Interventions (Patient has pulse or is breathing): Full Support        Electronically signed by ASHLEY Santamaria, 09/14/23, 2:33 PM EDT.

## 2023-09-14 NOTE — ED PROVIDER NOTES
Time: 12:15 AM EDT  Date of encounter:  9/13/2023  Independent Historian/Clinical History and Information was obtained by:   Patient and Family    History is limited by: N/A    Chief Complaint: Weakness, fever, cough, leg pain      History of Present Illness:  Patient is a 68 y.o. year old male who presents to the emergency department for evaluation of Fever, chills, cough, left lower extremity pain and swelling.  Patient first noted pain and swelling to left lower extremity on 9/8/2023.  He was seen at urgent care and was given prescription for cephalexin and doxycycline.  Despite antibiotics erythema and swelling has gotten worse.  Patient also has worsening cough and congestion.  Family said he is so weak that he is unable to ambulate.  Does have a history of COPD as well as diabetes.    HPI    Patient Care Team  Primary Care Provider: Sophie Capps APRN    Past Medical History:     No Known Allergies  Past Medical History:   Diagnosis Date    Diabetes mellitus     Hyperlipidemia      Past Surgical History:   Procedure Laterality Date    APPENDECTOMY      EYE SURGERY       Family History   Problem Relation Age of Onset    No Known Problems Mother     No Known Problems Father        Home Medications:  Prior to Admission medications    Medication Sig Start Date End Date Taking? Authorizing Provider   aspirin 81 MG chewable tablet Chew 1 tablet Daily. 7/18/23   Sophie Capps APRN   atorvastatin (LIPITOR) 20 MG tablet Take 1 tablet by mouth Every Night. 7/18/23   Sophie Capps APRN   cephalexin (KEFLEX) 500 MG capsule Take 1 capsule by mouth 4 (Four) Times a Day.    Provider, MD Henrry   doxycycline (VIBRAMYCIN) 100 MG capsule Take 1 capsule by mouth 2 (Two) Times a Day.    Provider, MD Henrry   Jardiance 25 MG tablet tablet Take 1 tablet by mouth Daily. 7/18/23   Sophie Capps APRN   losartan (COZAAR) 25 MG tablet Take 1 tablet by mouth Daily. 7/18/23   Sophie Capps APRN   Symbicort  "160-4.5 MCG/ACT inhaler Inhale 2 puffs 2 (Two) Times a Day. 6/10/23   Provider, Henrry, MD        Social History:   Social History     Tobacco Use    Smoking status: Every Day     Packs/day: 2.50     Years: 51.00     Pack years: 127.50     Types: Cigarettes     Start date: 1972     Passive exposure: Current    Smokeless tobacco: Never   Vaping Use    Vaping Use: Never used   Substance Use Topics    Alcohol use: Yes    Drug use: Never         Review of Systems:  Review of Systems   Constitutional:  Positive for chills and fever.   HENT:  Negative for congestion, ear pain and sore throat.    Eyes:  Negative for pain.   Respiratory:  Positive for cough, shortness of breath and wheezing. Negative for chest tightness.    Cardiovascular:  Positive for leg swelling. Negative for chest pain.   Gastrointestinal:  Negative for abdominal pain, diarrhea, nausea and vomiting.   Genitourinary:  Negative for flank pain and hematuria.   Musculoskeletal:  Negative for joint swelling.   Skin:  Positive for color change and wound. Negative for pallor.   Neurological:  Positive for weakness. Negative for seizures and headaches.   All other systems reviewed and are negative.     Physical Exam:  /89   Pulse 111   Temp 99 °F (37.2 °C) (Oral)   Resp 26   Ht 190.5 cm (75\")   Wt 101 kg (222 lb 0.1 oz)   SpO2 93%   BMI 27.75 kg/m²     Physical Exam  Constitutional:       Appearance: Normal appearance. He is ill-appearing.   HENT:      Head: Normocephalic and atraumatic.      Nose: Nose normal.      Mouth/Throat:      Mouth: Mucous membranes are moist.   Eyes:      Extraocular Movements: Extraocular movements intact.      Conjunctiva/sclera: Conjunctivae normal.      Pupils: Pupils are equal, round, and reactive to light.   Cardiovascular:      Rate and Rhythm: Regular rhythm. Tachycardia present.      Pulses: Normal pulses.      Heart sounds: Normal heart sounds.   Pulmonary:      Effort: Pulmonary effort is normal.      " Breath sounds: Wheezing and rhonchi present.   Abdominal:      General: There is no distension.      Palpations: Abdomen is soft.      Tenderness: There is no abdominal tenderness.   Musculoskeletal:         General: Swelling present. Normal range of motion.      Cervical back: Normal range of motion.   Skin:     General: Skin is warm and dry.      Capillary Refill: Capillary refill takes less than 2 seconds.      Comments: Bilateral lower extreme edema left worse than right with erythema extending up to mid lower leg.  Patient also has a small wound to mid lower leg.   Neurological:      General: No focal deficit present.      Mental Status: He is alert and oriented to person, place, and time. Mental status is at baseline.   Psychiatric:         Mood and Affect: Mood normal.         Behavior: Behavior normal.                Procedures:  Procedures      Medical Decision Making:      Comorbidities that affect care:    COPD, Diabetes, Hypertension, Smoking    External Notes reviewed:    Previous Clinic Note: She was seen by urology on 8/8/2023 for urinary incontinence, acute cystitis with out hematuria, BPH.      The following orders were placed and all results were independently analyzed by me:  Orders Placed This Encounter   Procedures    Blood Culture - Blood,    Blood Culture - Blood,    COVID PRE-OP / PRE-PROCEDURE SCREENING ORDER (NO ISOLATION) - Swab, Nasopharynx    Influenza Antigen, Rapid - Swab, Nasopharynx    COVID-19,CEPHEID/HANS,COR/TRENTON/PAD/FLORES/MAD IN-HOUSE(OR EMERGENT/ADD-ON),NP SWAB IN TRANSPORT MEDIA 3-4 HR TAT, RT-PCR - Swab, Nasopharynx    Urine Culture - Urine,    XR Foot 3+ View Left    XR Tibia Fibula 2 View Left    XR Chest 1 View    Comprehensive Metabolic Panel    Lactic Acid, Plasma    Pointblank Draw    CBC Auto Differential    Urinalysis With Culture If Indicated - Urine, Clean Catch    BNP    High Sensitivity Troponin T    High Sensitivity Troponin T 2Hr    Urinalysis, Microscopic Only -  Urine, Clean Catch    Undress & Gown    Vital Signs    Undress & Gown    Continuous Pulse Oximetry    Vital Signs    Code Status and Medical Interventions:    Inpatient Hospitalist Consult    Oxygen Therapy- Nasal Cannula; Titrate 1-6 LPM Per SpO2; 90 - 95%    Oxygen Therapy- Nasal Cannula; Titrate 1-6 LPM Per SpO2; 90 - 95%    ECG 12 Lead Dyspnea    Insert Peripheral IV    Insert Peripheral IV    Inpatient Admission    CBC & Differential    Green Top (Gel)    Lavender Top    Gold Top - SST    Light Blue Top       Medications Given in the Emergency Department:  Medications   sodium chloride 0.9 % flush 10 mL (has no administration in time range)   sodium chloride 0.9 % flush 10 mL (has no administration in time range)   acetaminophen (TYLENOL) tablet 975 mg (975 mg Oral Given 9/13/23 2332)   vancomycin IVPB 2000 mg in 0.9% Sodium Chloride 500 mL (2,000 mg Intravenous New Bag 9/14/23 0052)   cefepime (MAXIPIME) 2000 mg/100 mL 0.9% NS (mbp) (0 mg Intravenous Stopped 9/14/23 0042)   sodium chloride 0.9 % bolus 500 mL (0 mL Intravenous Stopped 9/14/23 0042)   ipratropium-albuterol (DUO-NEB) nebulizer solution 3 mL (3 mL Nebulization Given 9/13/23 2353)   sodium chloride 0.9 % bolus 1,000 mL (0 mL Intravenous Stopped 9/14/23 0346)        ED Course:    ED Course as of 09/14/23 0419   u Sep 14, 2023   0022 ECG 12 Lead Dyspnea  Sinus tachycardia with rate of 119.  No acute ST elevation.  Normal MI and QTc interval.  EKG interpreted by me. [LD]      ED Course User Index  [LD] Nita Dukes MD       Labs:    Lab Results (last 24 hours)       Procedure Component Value Units Date/Time    CBC & Differential [[id number]]  (Abnormal) Collected: 09/13/23 2153    Specimen: Blood Updated: 09/13/23 2202    Narrative:      The following orders were created for panel order CBC & Differential.  Procedure                               Abnormality         Status                     ---------                                -----------         ------                     CBC Auto Differential[[id number]]        Abnormal            Final result                 Please view results for these tests on the individual orders.    Comprehensive Metabolic Panel [[id number]]  (Abnormal) Collected: 09/13/23 2153    Specimen: Blood Updated: 09/13/23 2225     Glucose 170 mg/dL      BUN 13 mg/dL      Creatinine 0.84 mg/dL      Sodium 136 mmol/L      Potassium 4.1 mmol/L      Chloride 101 mmol/L      CO2 25.9 mmol/L      Calcium 8.7 mg/dL      Total Protein 7.3 g/dL      Albumin 3.7 g/dL      ALT (SGPT) 5 U/L      AST (SGOT) 8 U/L      Alkaline Phosphatase 107 U/L      Total Bilirubin 0.5 mg/dL      Globulin 3.6 gm/dL      A/G Ratio 1.0 g/dL      BUN/Creatinine Ratio 15.5     Anion Gap 9.1 mmol/L      eGFR 96.2 mL/min/1.73     Narrative:      GFR Normal >60  Chronic Kidney Disease <60  Kidney Failure <15      Lactic Acid, Plasma [[id number]]  (Normal) Collected: 09/13/23 2153    Specimen: Blood Updated: 09/13/23 2221     Lactate 1.2 mmol/L     CBC Auto Differential [[id number]]  (Abnormal) Collected: 09/13/23 2153    Specimen: Blood Updated: 09/13/23 2202     WBC 6.89 10*3/mm3      RBC 5.60 10*6/mm3      Hemoglobin 13.6 g/dL      Hematocrit 43.5 %      MCV 77.7 fL      MCH 24.3 pg      MCHC 31.3 g/dL      RDW 16.3 %      RDW-SD 45.6 fl      MPV 9.6 fL      Platelets 238 10*3/mm3      Neutrophil % 59.8 %      Lymphocyte % 30.0 %      Monocyte % 8.9 %      Eosinophil % 0.6 %      Basophil % 0.4 %      Immature Grans % 0.3 %      Neutrophils, Absolute 4.12 10*3/mm3      Lymphocytes, Absolute 2.07 10*3/mm3      Monocytes, Absolute 0.61 10*3/mm3      Eosinophils, Absolute 0.04 10*3/mm3      Basophils, Absolute 0.03 10*3/mm3      Immature Grans, Absolute 0.02 10*3/mm3      nRBC 0.0 /100 WBC     Blood Culture - Blood, Arm, Left [[id number]] Collected: 09/13/23 2153    Specimen: Blood from Arm, Left Updated: 09/13/23 2158    Blood Culture - Blood, Arm, Left  [[id number]] Collected: 09/13/23 2153    Specimen: Blood from Arm, Left Updated: 09/13/23 2158    BNP [[id number]]  (Normal) Collected: 09/13/23 2153    Specimen: Blood Updated: 09/13/23 2347     proBNP 495.7 pg/mL     Narrative:      Among patients with dyspnea, NT-proBNP is highly sensitive for the detection of acute congestive heart failure. In addition NT-proBNP of <300 pg/ml effectively rules out acute congestive heart failure with 99% negative predictive value.      COVID PRE-OP / PRE-PROCEDURE SCREENING ORDER (NO ISOLATION) - Swab, Nasopharynx [[id number]]  (Normal) Collected: 09/13/23 2339    Specimen: Swab from Nasopharynx Updated: 09/14/23 0144    Narrative:      The following orders were created for panel order COVID PRE-OP / PRE-PROCEDURE SCREENING ORDER (NO ISOLATION) - Swab, Nasopharynx.  Procedure                               Abnormality         Status                     ---------                               -----------         ------                     COVID-19,CEPHEID/HANS,CO...[[id number]]  Normal              Final result                 Please view results for these tests on the individual orders.    Influenza Antigen, Rapid - Swab, Nasopharynx [[id number]]  (Normal) Collected: 09/13/23 2339    Specimen: Swab from Nasopharynx Updated: 09/14/23 0018     Influenza A Ag, EIA Negative     Influenza B Ag, EIA Negative    COVID-19,CEPHEID/HANS,COR/TRENTON/PAD/FLORES/MAD IN-HOUSE(OR EMERGENT/ADD-ON),NP SWAB IN TRANSPORT MEDIA 3-4 HR TAT, RT-PCR - Swab, Nasopharynx [[id number]]  (Normal) Collected: 09/13/23 2339    Specimen: Swab from Nasopharynx Updated: 09/14/23 0144     COVID19 Not Detected    Narrative:      Fact sheet for providers: https://www.fda.gov/media/398234/download     Fact sheet for patients: https://www.fda.gov/media/390618/download  Fact sheet for providers: https://www.fda.gov/media/055907/download     Fact sheet for patients: https://www.fda.gov/media/631943/download    High Sensitivity  Troponin T [[id number]]  (Normal) Collected: 09/14/23 0019    Specimen: Blood Updated: 09/14/23 0043     HS Troponin T 8 ng/L      Comment: Specimen hemolyzed.  Results may be affected.       Narrative:      High Sensitive Troponin T Reference Range:  <10.0 ng/L- Negative Female for AMI  <15.0 ng/L- Negative Male for AMI  >=10 - Abnormal Female indicating possible myocardial injury.  >=15 - Abnormal Male indicating possible myocardial injury.   Clinicians would have to utilize clinical acumen, EKG, Troponin, and serial changes to determine if it is an Acute Myocardial Infarction or myocardial injury due to an underlying chronic condition.         Urinalysis With Culture If Indicated - Urine, Clean Catch [[id number]]  (Abnormal) Collected: 09/14/23 0043    Specimen: Urine, Clean Catch Updated: 09/14/23 0056     Color, UA Yellow     Appearance, UA Clear     pH, UA 6.0     Specific Gravity, UA 1.017     Glucose, UA Negative     Ketones, UA Negative     Bilirubin, UA Negative     Blood, UA Negative     Protein, UA 30 mg/dL (1+)     Leuk Esterase, UA Moderate (2+)     Nitrite, UA Positive     Urobilinogen, UA 1.0 E.U./dL    Narrative:      In absence of clinical symptoms, the presence of pyuria, bacteria, and/or nitrites on the urinalysis result does not correlate with infection.    Urinalysis, Microscopic Only - Urine, Clean Catch [[id number]]  (Abnormal) Collected: 09/14/23 0043    Specimen: Urine, Clean Catch Updated: 09/14/23 0056     RBC, UA 3-5 /HPF      WBC, UA Too Numerous to Count /HPF      Bacteria, UA None Seen /HPF      Squamous Epithelial Cells, UA 0-2 /HPF      Hyaline Casts, UA 3-6 /LPF      Methodology Automated Microscopy    Urine Culture - Urine, Urine, Clean Catch [[id number]] Collected: 09/14/23 0043    Specimen: Urine, Clean Catch Updated: 09/14/23 0056    High Sensitivity Troponin T 2Hr [[id number]]  (Normal) Collected: 09/14/23 0239    Specimen: Blood Updated: 09/14/23 0306     HS Troponin T 9 ng/L       Troponin T Delta 1 ng/L     Narrative:      High Sensitive Troponin T Reference Range:  <10.0 ng/L- Negative Female for AMI  <15.0 ng/L- Negative Male for AMI  >=10 - Abnormal Female indicating possible myocardial injury.  >=15 - Abnormal Male indicating possible myocardial injury.   Clinicians would have to utilize clinical acumen, EKG, Troponin, and serial changes to determine if it is an Acute Myocardial Infarction or myocardial injury due to an underlying chronic condition.                  Imaging:    XR Tibia Fibula 2 View Left    Result Date: 9/14/2023  PROCEDURE: XR TIBIA FIBULA 2 VW LEFT  COMPARISON: None  INDICATIONS: LEFT LOWER LEG SWELLING/REDNESS NO KNOWN INJURY  FINDINGS: There is no evidence for displaced fracture or dislocation. No focal osseous abnormalities are seen.  Soft tissue swelling is noted surrounding the ankle.        1. No evidence for displaced fracture or dislocation. 2. Nonspecific soft tissue swelling is noted surrounding the ankle.      AYESHA FIGUEROA MD       Electronically Signed and Approved By: AYESHA FIGUEROA MD on 9/14/2023 at 0:02             XR Foot 3+ View Left    Result Date: 9/14/2023  PROCEDURE: XR FOOT 3+ VW LEFT  COMPARISON: None  INDICATIONS: LEFT FOOT SWELLING/REDNESS NO KNOWN INJURY  FINDINGS: There is no evidence for displaced fracture or dislocation. No focal osseous abnormalities are seen.  Diffuse nonspecific soft tissue swelling is observed.        1. No evidence for displaced fracture or dislocation. 2. Diffuse nonspecific soft tissue swelling is seen throughout the foot.  The findings may relate to vascular or inflammatory etiologies.  Soft tissue cellulitis could also be considered.     AYESHA FIGUEROA MD       Electronically Signed and Approved By: AYESHA FIGUEROA MD on 9/14/2023 at 0:03             XR Chest 1 View    Result Date: 9/14/2023  PROCEDURE: XR CHEST 1 VW  COMPARISON: [facility], CR, CXR PORTABLE, 11/26/2018, 9:15.  INDICATIONS: cough   FINDINGS:  There are ill-defined airspace opacities throughout the lungs bilaterally with associated interstitial changes.  There is evidence for left pleural effusion.  The cardiac silhouette and mediastinum are stable.  No acute osseous abnormalities are identified.        Ill-defined multifocal airspace infiltrates bilaterally with associated interstitial changes suggesting findings related to atypical/viral infection or multifocal pneumonia.  Changes of pulmonary edema could also be considered.  Recommend follow-up to ensure improvement/resolution.      AYESHA FIGUEROA MD       Electronically Signed and Approved By: AYESHA FIGUEROA MD on 9/14/2023 at 0:01                Differential Diagnosis and Discussion:    Cough: Differential diagnosis includes but is not limited to pneumonia, acute bronchitis, upper respiratory infection, ACE inhibitor use, allergic reaction, epiglottitis, seasonal allergies, chemical irritants, exercise-induced asthma, viral syndrome.  Dyspnea: Differential diagnosis includes but is not limited to metabolic acidosis, neurological disorders, psychogenic, asthma, pneumothorax, upper airway obstruction, COPD, pneumonia, noncardiogenic pulmonary edema, interstitial lung disease, anemia, congestive heart failure, and pulmonary embolism  Extremity Pain: Differential diagnosis includes but is not limited to soft tissue sprain, tendonitis, tendon injury, dislocation, fracture, deep vein thrombosis, arterial insufficiency, osteoarthritis, bursitis, and ligamentous damage.  Fever: Based on the complaint of fever, differential diagnosis includes but is not limited to meningitis, pneumonia, pyelonephritis, acute uti,  systemic immune response syndrome, sepsis, viral syndrome, fungal infection, tick born illness and other bacterial infections.    All labs were reviewed and interpreted by me.  All X-rays impressions were independently interpreted by me.  EKG was interpreted by me.      Sepsis criteria  was met in the emergency department and the Sepsis protocol (including antibiotic administration) was initiated.      SIRS criteria considered:   1.  Temperature > 100.4 or <98.6    2.  Heart Rate > 90    3.  Respiratory Rate > 22    4.  WBC > 12K or <4K.             Severe Sepsis:     Respiratory: Mechanical Ventilation or Bipap  Hypotension: SBP > 90 or MAP < 65  Renal: Creatinine > 2  Metabolic: Lactic Acid > 2  Hematologic: Platelets < 100K or INR > 1.5  Hepatic: BILI  >  2  CNS: Sudden AMS     Septic Shock:     Severe Sepsis + Persistent hypotension or Lactic Acid > 4     Normal saline bolus, Antibiotics, and final disposition was based on these definitions.        Sepsis was recognized at 2320    Antibiotics were ordered.     30 cc/kg bolus was not indicated.     Total Critical Care time of 35 minutes. Total critical care time documented does not include time spent on separately billed procedures for services of nurses or physician assistants. I personally saw and examined the patient. I have reviewed all diagnostic interpretations and treatment plans as written. I was present for the key portions of any procedures performed and the inclusive time noted in any critical care statement. Critical care time includes patient management by me, time spent at the patients bedside,  time to review lab and imaging results, discussing patient care, documentation in the medical record, and time spent with family or caregiver.     MDM  Number of Diagnoses or Management Options  Diagnosis management comments: Patient presents to the emergency department with cough, fever, left leg pain and swelling.  On arrival patient is tachycardic.  Febrile with temp 103.1.  Patient was given Tylenol.  He was given IV fluids.  He also reports cough and congestion.  Chest x-ray showed bilateral infiltrates.  O2 sat is dropping to mid to upper 80s and patient had to be placed on supplemental O2. Patient also has erythema of left lower  extremity concerning for cellulitis.  Patient is ill-appearing.  He was started on broad-spectrum antibiotics.  He was swabbed for COVID.  Flu was negative.  UA consistent with UTI. Discussed patient with hospitalist and he will be admitted for further care.       Amount and/or Complexity of Data Reviewed  Clinical lab tests: reviewed  Tests in the radiology section of CPT®: reviewed  Review and summarize past medical records: yes  Independent visualization of images, tracings, or specimens: yes    Risk of Complications, Morbidity, and/or Mortality  Presenting problems: moderate  Management options: moderate             Patient Care Considerations:    CT CHEST: I considered ordering a CT scan of the chest, however Patient alert oriented no focal deficits and no reported head trauma.      Consultants/Shared Management Plan:    Hospitalist: I have discussed the case with Dr Power who agrees to accept the patient for admission.    Social Determinants of Health:    Patient is independent, reliable, and has access to care.       Disposition and Care Coordination:    Admit:   Through independent evaluation of the patient's history, physical, and imperical data, the patient meets criteria for observation/admission to the hospital.        Final diagnoses:   Sepsis, due to unspecified organism, unspecified whether acute organ dysfunction present   Urinary tract infection with hematuria, site unspecified   Pneumonia due to infectious organism, unspecified laterality, unspecified part of lung   Cellulitis of left lower extremity        ED Disposition       ED Disposition   Decision to Admit    Condition   --    Comment   Level of Care: Telemetry [5]   Diagnosis: Sepsis [[id number]]   Admitting Physician: LIZZ POWER [[id number]]   Attending Physician: LIZZ POWER [[id number]]   Certification: I Certify That Inpatient Hospital Services Are Medically Necessary For Greater Than 2 Midnights                 This medical record created  using voice recognition software.             Nita Dukes MD  09/14/23 2718

## 2023-09-14 NOTE — H&P
"[facility] HISTORY AND PHYSICAL  Date: 2023   Patient Name: Brian Mehta  : 1955  MRN: [id number]  Primary Care Physician:  Sophie Capps APRN  Date of admission: 2023    Subjective   Subjective     Chief Complaint: Fever, weakness, leg pain    HPI:    Brian Mehta is a 68 y.o. male brought to the emergency department for evaluation of fever, chills, cough, bilateral lower extremity edema and swelling.  Patient first reported pain in his left lower extremity on 2023, he stated that he was \"bit by something\".  Patient went to an urgent care, was given oral Keflex and doxycycline.  Patient taking those as prescribed, however they did not improve the swelling nor the erythema.  Patient reports swelling and erythema have progressively worsened.  Patient has worsening cough and congestion.  Patient reports a history of COPD, but does not require oxygen at home.  Patient states due to the swelling and pain of his lower extremities has been having difficulties ambulating.  Patient denies any recent sick contacts, recent travel history.      Personal History     Past Medical History:  Past Medical History:   Diagnosis Date   • Diabetes mellitus    • Hyperlipidemia          Past Surgical History:  Past Surgical History:   Procedure Laterality Date   • APPENDECTOMY     • EYE SURGERY           Family History:   Family History   Problem Relation Age of Onset   • No Known Problems Mother    • No Known Problems Father          Social History:   Social History     Socioeconomic History   • Marital status:    Tobacco Use   • Smoking status: Every Day     Packs/day: 2.50     Years: 51.00     Pack years: 127.50     Types: Cigarettes     Start date:      Passive exposure: Current   • Smokeless tobacco: Never   Vaping Use   • Vaping Use: Never used   Substance and Sexual Activity   • Alcohol use: Yes   • Drug use: Never   • Sexual activity: Defer         Home " Medications:  aspirin, atorvastatin, budesonide-formoterol, cephalexin, doxycycline, empagliflozin, and losartan    Allergies:  No Known Allergies    Review of Systems   All systems were reviewed and negative except for: Fever, cough, progressive weakness, bilateral lower extremity edema,     Objective   Objective     Vitals:   Temp:  [103.1 °F (39.5 °C)] 103.1 °F (39.5 °C)  Heart Rate:  [108-131] 108  Resp:  [16-26] 26  BP: (127-155)/(62-90) 130/88  Flow (L/min):  [2] 2    Physical Exam    Constitutional: Awake, alert, no acute distress, chronically ill-appearing.   Eyes: Pupils equal, sclerae anicteric, no conjunctival injection   HENT: NCAT, mucous membranes moist   Neck: Supple, no thyromegaly, no lymphadenopathy, trachea midline   Respiratory: Diffuse wheezing, minimal rhonchi bilaterally, nonlabored respirations    Cardiovascular: RRR, no murmurs, rubs, or gallops, palpable pedal pulses bilaterally   Gastrointestinal: Positive bowel sounds, soft, nontender, nondistended   Musculoskeletal: No bilateral ankle edema, no clubbing or cyanosis to extremities   Psychiatric: Appropriate affect, cooperative   Neurologic: Oriented x 3, strength symmetric in all extremities, Cranial Nerves grossly intact to confrontation, speech clear   Skin: No rashes     Result Review    Result Review:  I have personally reviewed the results from the time of this admission to 9/14/2023 02:21 EDT and agree with these findings:  [x]  Laboratory  []  Microbiology  [x]  Radiology  [x]  EKG/Telemetry   []  Cardiology/Vascular   []  Pathology  [x]  Old records  []  Other:      Assessment & Plan   Assessment / Plan     Assessment/Plan:   Sepsis secondary to community-acquired pneumonia-IV Rocephin, IV doxycycline.  Patient has met sepsis criteria with tachycardia, tachypnea.  Source of infection pneumonia, additionally left lower extremity cellulitis.  30 mL/kg IV fluids will be held due to concern for fluid overload, rule out underlying CHF.   Supportive oxygen.  Left lower extremity cellulitis-plan as above.  Bilateral lower extremity edema-2D echo.  Diuresis.      DVT prophylaxis: Lovenox    CODE STATUS: Full code    Admission Status:  I believe this patient meets inpatient status.    Electronically signed by Durga Power DO, 09/14/23, 2:21 AM EDT.

## 2023-09-15 LAB
ALBUMIN SERPL-MCNC: 2.7 G/DL (ref 3.5–5.2)
ANION GAP SERPL CALCULATED.3IONS-SCNC: 9.1 MMOL/L (ref 5–15)
BACTERIA SPEC AEROBE CULT: NO GROWTH
BASOPHILS # BLD AUTO: 0.03 10*3/MM3 (ref 0–0.2)
BASOPHILS NFR BLD AUTO: 0.5 % (ref 0–1.5)
BH CV LOWER VASCULAR LEFT COMMON FEMORAL AUGMENT: NORMAL
BH CV LOWER VASCULAR LEFT COMMON FEMORAL COMPETENT: NORMAL
BH CV LOWER VASCULAR LEFT COMMON FEMORAL COMPRESS: NORMAL
BH CV LOWER VASCULAR LEFT COMMON FEMORAL PHASIC: NORMAL
BH CV LOWER VASCULAR LEFT COMMON FEMORAL SPONT: NORMAL
BH CV LOWER VASCULAR LEFT DISTAL FEMORAL AUGMENT: NORMAL
BH CV LOWER VASCULAR LEFT DISTAL FEMORAL COMPETENT: NORMAL
BH CV LOWER VASCULAR LEFT DISTAL FEMORAL COMPRESS: NORMAL
BH CV LOWER VASCULAR LEFT DISTAL FEMORAL PHASIC: NORMAL
BH CV LOWER VASCULAR LEFT DISTAL FEMORAL SPONT: NORMAL
BH CV LOWER VASCULAR LEFT GASTRONEMIUS COMPRESS: NORMAL
BH CV LOWER VASCULAR LEFT GREATER SAPH AK COMPRESS: NORMAL
BH CV LOWER VASCULAR LEFT GREATER SAPH BK COMPRESS: NORMAL
BH CV LOWER VASCULAR LEFT LESSER SAPH COMPRESS: NORMAL
BH CV LOWER VASCULAR LEFT MID FEMORAL COMPRESS: NORMAL
BH CV LOWER VASCULAR LEFT PERONEAL COMPRESS: NORMAL
BH CV LOWER VASCULAR LEFT POPLITEAL AUGMENT: NORMAL
BH CV LOWER VASCULAR LEFT POPLITEAL COMPETENT: NORMAL
BH CV LOWER VASCULAR LEFT POPLITEAL COMPRESS: NORMAL
BH CV LOWER VASCULAR LEFT POPLITEAL PHASIC: NORMAL
BH CV LOWER VASCULAR LEFT POPLITEAL SPONT: NORMAL
BH CV LOWER VASCULAR LEFT POSTERIOR TIBIAL AUGMENT: NORMAL
BH CV LOWER VASCULAR LEFT POSTERIOR TIBIAL COMPRESS: NORMAL
BH CV LOWER VASCULAR LEFT PROXIMAL FEMORAL COMPRESS: NORMAL
BH CV LOWER VASCULAR RIGHT COMMON FEMORAL AUGMENT: NORMAL
BH CV LOWER VASCULAR RIGHT COMMON FEMORAL COMPETENT: NORMAL
BH CV LOWER VASCULAR RIGHT COMMON FEMORAL COMPRESS: NORMAL
BH CV LOWER VASCULAR RIGHT COMMON FEMORAL PHASIC: NORMAL
BH CV LOWER VASCULAR RIGHT COMMON FEMORAL SPONT: NORMAL
BUN SERPL-MCNC: 11 MG/DL (ref 8–23)
BUN/CREAT SERPL: 13.4 (ref 7–25)
CALCIUM SPEC-SCNC: 8.5 MG/DL (ref 8.6–10.5)
CHLORIDE SERPL-SCNC: 103 MMOL/L (ref 98–107)
CO2 SERPL-SCNC: 24.9 MMOL/L (ref 22–29)
CREAT SERPL-MCNC: 0.82 MG/DL (ref 0.76–1.27)
DEPRECATED RDW RBC AUTO: 46 FL (ref 37–54)
EGFRCR SERPLBLD CKD-EPI 2021: 96.9 ML/MIN/1.73
EOSINOPHIL # BLD AUTO: 0.13 10*3/MM3 (ref 0–0.4)
EOSINOPHIL NFR BLD AUTO: 2.3 % (ref 0.3–6.2)
ERYTHROCYTE [DISTWIDTH] IN BLOOD BY AUTOMATED COUNT: 16.1 % (ref 12.3–15.4)
GLUCOSE BLDC GLUCOMTR-MCNC: 108 MG/DL (ref 70–99)
GLUCOSE BLDC GLUCOMTR-MCNC: 139 MG/DL (ref 70–99)
GLUCOSE BLDC GLUCOMTR-MCNC: 186 MG/DL (ref 70–99)
GLUCOSE BLDC GLUCOMTR-MCNC: 243 MG/DL (ref 70–99)
GLUCOSE SERPL-MCNC: 152 MG/DL (ref 65–99)
HCT VFR BLD AUTO: 39.4 % (ref 37.5–51)
HGB BLD-MCNC: 12 G/DL (ref 13–17.7)
IMM GRANULOCYTES # BLD AUTO: 0.01 10*3/MM3 (ref 0–0.05)
IMM GRANULOCYTES NFR BLD AUTO: 0.2 % (ref 0–0.5)
LYMPHOCYTES # BLD AUTO: 2.11 10*3/MM3 (ref 0.7–3.1)
LYMPHOCYTES NFR BLD AUTO: 37.5 % (ref 19.6–45.3)
MAGNESIUM SERPL-MCNC: 2.3 MG/DL (ref 1.6–2.4)
MCH RBC QN AUTO: 24 PG (ref 26.6–33)
MCHC RBC AUTO-ENTMCNC: 30.5 G/DL (ref 31.5–35.7)
MCV RBC AUTO: 79 FL (ref 79–97)
MONOCYTES # BLD AUTO: 0.48 10*3/MM3 (ref 0.1–0.9)
MONOCYTES NFR BLD AUTO: 8.5 % (ref 5–12)
NEUTROPHILS NFR BLD AUTO: 2.87 10*3/MM3 (ref 1.7–7)
NEUTROPHILS NFR BLD AUTO: 51 % (ref 42.7–76)
NRBC BLD AUTO-RTO: 0 /100 WBC (ref 0–0.2)
PHOSPHATE SERPL-MCNC: 3.1 MG/DL (ref 2.5–4.5)
PLATELET # BLD AUTO: 226 10*3/MM3 (ref 140–450)
PMV BLD AUTO: 9.4 FL (ref 6–12)
POTASSIUM SERPL-SCNC: 3.6 MMOL/L (ref 3.5–5.2)
RBC # BLD AUTO: 4.99 10*6/MM3 (ref 4.14–5.8)
SODIUM SERPL-SCNC: 137 MMOL/L (ref 136–145)
WBC NRBC COR # BLD: 5.63 10*3/MM3 (ref 3.4–10.8)

## 2023-09-15 PROCEDURE — 25010000002 ENOXAPARIN PER 10 MG: Performed by: FAMILY MEDICINE

## 2023-09-15 PROCEDURE — 94664 DEMO&/EVAL PT USE INHALER: CPT

## 2023-09-15 PROCEDURE — 85025 COMPLETE CBC W/AUTO DIFF WBC: CPT | Performed by: FAMILY MEDICINE

## 2023-09-15 PROCEDURE — 99233 SBSQ HOSP IP/OBS HIGH 50: CPT | Performed by: INTERNAL MEDICINE

## 2023-09-15 PROCEDURE — 80069 RENAL FUNCTION PANEL: CPT | Performed by: PHYSICIAN ASSISTANT

## 2023-09-15 PROCEDURE — 94799 UNLISTED PULMONARY SVC/PX: CPT

## 2023-09-15 PROCEDURE — 25010000002 AZITHROMYCIN PER 500 MG: Performed by: FAMILY MEDICINE

## 2023-09-15 PROCEDURE — 97166 OT EVAL MOD COMPLEX 45 MIN: CPT

## 2023-09-15 PROCEDURE — 63710000001 INSULIN LISPRO (HUMAN) PER 5 UNITS: Performed by: PHYSICIAN ASSISTANT

## 2023-09-15 PROCEDURE — 25010000002 CEFTRIAXONE PER 250 MG: Performed by: FAMILY MEDICINE

## 2023-09-15 PROCEDURE — 99222 1ST HOSP IP/OBS MODERATE 55: CPT | Performed by: INTERNAL MEDICINE

## 2023-09-15 PROCEDURE — 82948 REAGENT STRIP/BLOOD GLUCOSE: CPT

## 2023-09-15 PROCEDURE — 97161 PT EVAL LOW COMPLEX 20 MIN: CPT

## 2023-09-15 PROCEDURE — 83735 ASSAY OF MAGNESIUM: CPT | Performed by: FAMILY MEDICINE

## 2023-09-15 RX ORDER — METOPROLOL SUCCINATE 25 MG/1
12.5 TABLET, EXTENDED RELEASE ORAL
Status: DISCONTINUED | OUTPATIENT
Start: 2023-09-15 | End: 2023-09-16

## 2023-09-15 RX ORDER — AMOXICILLIN AND CLAVULANATE POTASSIUM 875; 125 MG/1; MG/1
1 TABLET, FILM COATED ORAL EVERY 12 HOURS SCHEDULED
Status: DISCONTINUED | OUTPATIENT
Start: 2023-09-16 | End: 2023-09-16 | Stop reason: HOSPADM

## 2023-09-15 RX ORDER — FUROSEMIDE 20 MG/1
20 TABLET ORAL DAILY
Status: DISCONTINUED | OUTPATIENT
Start: 2023-09-15 | End: 2023-09-16 | Stop reason: HOSPADM

## 2023-09-15 RX ORDER — FUROSEMIDE 10 MG/ML
20 INJECTION INTRAMUSCULAR; INTRAVENOUS
Status: DISCONTINUED | OUTPATIENT
Start: 2023-09-15 | End: 2023-09-15

## 2023-09-15 RX ADMIN — CEFTRIAXONE SODIUM 1 G: 1 INJECTION, SOLUTION INTRAVENOUS at 06:06

## 2023-09-15 RX ADMIN — ARFORMOTEROL TARTRATE 15 MCG: 15 SOLUTION RESPIRATORY (INHALATION) at 18:26

## 2023-09-15 RX ADMIN — WHITE PETROLATUM 1 APPLICATION: 1.75 OINTMENT TOPICAL at 11:02

## 2023-09-15 RX ADMIN — BUDESONIDE 0.5 MG: 0.5 INHALANT RESPIRATORY (INHALATION) at 06:41

## 2023-09-15 RX ADMIN — ACETAMINOPHEN 650 MG: 325 TABLET ORAL at 21:37

## 2023-09-15 RX ADMIN — EMPAGLIFLOZIN 10 MG: 10 TABLET, FILM COATED ORAL at 11:02

## 2023-09-15 RX ADMIN — INSULIN LISPRO 2 UNITS: 100 INJECTION, SOLUTION INTRAVENOUS; SUBCUTANEOUS at 21:36

## 2023-09-15 RX ADMIN — ARFORMOTEROL TARTRATE 15 MCG: 15 SOLUTION RESPIRATORY (INHALATION) at 06:41

## 2023-09-15 RX ADMIN — FUROSEMIDE 20 MG: 20 TABLET ORAL at 11:02

## 2023-09-15 RX ADMIN — WHITE PETROLATUM 1 APPLICATION: 1.75 OINTMENT TOPICAL at 21:37

## 2023-09-15 RX ADMIN — Medication 10 ML: at 11:02

## 2023-09-15 RX ADMIN — INSULIN LISPRO 3 UNITS: 100 INJECTION, SOLUTION INTRAVENOUS; SUBCUTANEOUS at 11:54

## 2023-09-15 RX ADMIN — SENNOSIDES AND DOCUSATE SODIUM 2 TABLET: 50; 8.6 TABLET ORAL at 11:02

## 2023-09-15 RX ADMIN — DOXYCYCLINE 100 MG: 100 CAPSULE ORAL at 11:02

## 2023-09-15 RX ADMIN — ENOXAPARIN SODIUM 40 MG: 100 INJECTION SUBCUTANEOUS at 11:02

## 2023-09-15 RX ADMIN — AZITHROMYCIN 500 MG: 500 INJECTION, POWDER, LYOPHILIZED, FOR SOLUTION INTRAVENOUS at 11:02

## 2023-09-15 RX ADMIN — BUDESONIDE 0.5 MG: 0.5 INHALANT RESPIRATORY (INHALATION) at 18:26

## 2023-09-15 RX ADMIN — METOPROLOL SUCCINATE 12.5 MG: 25 TABLET, EXTENDED RELEASE ORAL at 11:02

## 2023-09-15 NOTE — PLAN OF CARE
Goal Outcome Evaluation:  Plan of Care Reviewed With: patient        Progress: no change  Outcome Evaluation: Pt presents with decreased balance, strength, and activity tolerance decreasing independence and safety performing transfers and ambulation. Pt will benefit from PT services to address these impairments. Pt will benefit from rehab upon d/c for increased independence with functional mobility before returning home to decrease burden on spouse.      Anticipated Discharge Disposition (PT): sub acute care setting

## 2023-09-15 NOTE — PLAN OF CARE
Goal Outcome Evaluation:  Plan of Care Reviewed With: patient, spouse        Progress: no change (first session: evaluation)  Outcome Evaluation: Patient presents with limitations of balance, strength and endurance/ activity tolerance which are impacting ADL/ transfer independence. Skilled OT is indicated to remediate/compensate for deficits to maximize independence and safety with functional tasks.      Anticipated Discharge Disposition (OT): inpatient rehabilitation facility

## 2023-09-15 NOTE — THERAPY EVALUATION
Acute Care - Physical Therapy Initial Evaluation   [place]     Patient Name: Brian Mehta  : 1955  MRN: [id number]  Today's Date: 9/15/2023      Visit Dx:     ICD-10-CM ICD-9-CM   1. Sepsis, due to unspecified organism, unspecified whether acute organ dysfunction present  A41.9 038.9     995.91   2. Urinary tract infection with hematuria, site unspecified  N39.0 599.0    R31.9 599.70   3. Pneumonia due to infectious organism, unspecified laterality, unspecified part of lung  J18.9 486   4. Cellulitis of left lower extremity  L03.116 682.6   5. Decreased activities of daily living (ADL)  Z78.9 V49.89   6. Difficulty walking  R26.2 719.7     Patient Active Problem List   Diagnosis    CVA (cerebral vascular accident)    Essential hypertension    High cholesterol    Hip pain, bilateral    Right knee pain    Vitamin B12 deficiency    Kidney disorder    Chronic pain of both knees    Sepsis     Past Medical History:   Diagnosis Date    Diabetes mellitus     Hyperlipidemia      Past Surgical History:   Procedure Laterality Date    APPENDECTOMY      EYE SURGERY       PT Assessment (last 12 hours)       PT Evaluation and Treatment       Row Name 09/15/23 1100          Physical Therapy Time and Intention    Subjective Information complains of;weakness  -DP     Document Type evaluation  -DP     Mode of Treatment individual therapy;physical therapy  -DP     Patient Effort good  -DP     Symptoms Noted During/After Treatment none  -DP       Row Name 09/15/23 1100          General Information    Patient Profile Reviewed yes  -DP     Patient Observations alert;cooperative;agree to therapy  -DP     Prior Level of Function independent:;gait;transfer;bed mobility  Pt reports needing assistance from wife to perform sponge baths, dressing, cooking and cleaning  -DP     Equipment Currently Used at Home cane, quad;walker, rolling  cane primarily, Rw when needed  -DP     Existing Precautions/Restrictions no known  precautions/restrictions  -DP     Barriers to Rehab previous functional deficit  -DP       Row Name 09/15/23 1100          Living Environment    Current Living Arrangements home  -DP     Home Accessibility stairs to enter home  -DP     People in Home spouse  -DP     Primary Care Provided by self;spouse/significant other  -DP       Row Name 09/15/23 1100          Home Main Entrance    Number of Stairs, Main Entrance four  -DP     Landing, Stairs, Main Entrance railings present  -DP       Row Name 09/15/23 1100          Home Use of Assistive/Adaptive Equipment    Equipment Currently Used at Home cane, quad;walker, rolling  -DP       Row Name 09/15/23 1100          Cognition    Orientation Status (Cognition) oriented x 3  -DP       Row Name 09/15/23 1100          Range of Motion (ROM)    Range of Motion bilateral lower extremities;ROM is WFL  -DP       Row Name 09/15/23 1100          Strength (Manual Muscle Testing)    Strength (Manual Muscle Testing) bilateral lower extremities  R LE 3+/4, L LE 4-/5  -DP       Row Name 09/15/23 1100          Bed Mobility    Bed Mobility supine-sit;sit-supine  -DP     Supine-Sit Foard (Bed Mobility) minimum assist (75% patient effort)  -DP     Sit-Supine Foard (Bed Mobility) minimum assist (75% patient effort)  -DP     Assistive Device (Bed Mobility) head of bed elevated;bed rails  -DP       Row Name 09/15/23 1100          Transfers    Transfers sit-stand transfer;stand-sit transfer  -DP       Row Name 09/15/23 1100          Sit-Stand Transfer    Sit-Stand Foard (Transfers) minimum assist (75% patient effort)  -DP     Assistive Device (Sit-Stand Transfers) walker, front-wheeled  -DP       Row Name 09/15/23 1100          Stand-Sit Transfer    Stand-Sit [place] (Transfers) minimum assist (75% patient effort)  -DP     Assistive Device (Stand-Sit Transfers) walker, front-wheeled  -DP       Row Name 09/15/23 1100          Gait/Stairs (Locomotion)    Gait/Stairs  Locomotion gait/ambulation assistive device  -DP     [place] Level (Gait) contact guard  -DP     Assistive Device (Gait) walker, front-wheeled  -DP     Patient was able to Ambulate yes  -DP     Distance in Feet (Gait) 26  -DP     Deviations/Abnormal Patterns (Gait) chirag decreased;stride length decreased  -DP     Comment, (Gait/Stairs) Pt unsteady with ambulation  -DP       Row Name 09/15/23 1100          Safety Issues, Functional Mobility    Impairments Affecting Function (Mobility) balance;endurance/activity tolerance;strength  -DP       Row Name 09/15/23 1100          Balance    Balance Assessment standing dynamic balance  -DP     Dynamic Standing Balance contact guard  -DP     Position/Device Used, Standing Balance supported;walker, rolling  -DP       Row Name             Wound 09/14/23 Left anterior ankle    Wound - Properties Group Placement Date: 09/14/23  -NH Side: Left  -NH Orientation: anterior  -NH Location: ankle  -NH    Retired Wound - Properties Group Placement Date: 09/14/23  -NH Side: Left  -NH Orientation: anterior  -NH Location: ankle  -NH    Retired Wound - Properties Group Date first assessed: 09/14/23  -NH Side: Left  -NH Location: ankle  -NH      Row Name 09/15/23 1100          Plan of Care Review    Plan of Care Reviewed With patient  -DP     Progress no change  -DP     Outcome Evaluation Pt presents with decreased balance, strength, and activity tolerance decreasing independence and safety performing transfers and ambulation. Pt will benefit from PT services to address these impairments. Pt will benefit from rehab upon d/c for increased independence with functional mobility before returning home to decrease burden on spouse.  -DP       Row Name 09/15/23 1100          Therapy Assessment/Plan (PT)    Rehab Potential (PT) good, to achieve stated therapy goals  -DP     Criteria for Skilled Interventions Met (PT) yes;meets criteria  -DP     Therapy Frequency (PT) daily  -DP     Problem List  (PT) problems related to;balance;mobility;strength  -DP     Activity Limitations Related to Problem List (PT) unable to ambulate safely;unable to transfer safely  -DP       Row Name 09/15/23 1100          PT Evaluation Complexity    History, PT Evaluation Complexity no personal factors and/or comorbidities  -DP     Examination of Body Systems (PT Eval Complexity) total of 4 or more elements  -DP     Clinical Presentation (PT Evaluation Complexity) stable  -DP     Clinical Decision Making (PT Evaluation Complexity) low complexity  -DP     Overall Complexity (PT Evaluation Complexity) low complexity  -DP       Row Name 09/15/23 1100          Physical Therapy Goals    Bed Mobility Goal Selection (PT) bed mobility, PT goal 1  -DP     Transfer Goal Selection (PT) transfer, PT goal 1  -DP     Gait Training Goal Selection (PT) gait training, PT goal 1  -DP       Row Name 09/15/23 1100          Bed Mobility Goal 1 (PT)    Activity/Assistive Device (Bed Mobility Goal 1, PT) sit to supine;supine to sit  -DP     [place] Level/Cues Needed (Bed Mobility Goal 1, PT) contact guard required  -DP     Time Frame (Bed Mobility Goal 1, PT) 10 days  -DP       Row Name 09/15/23 1100          Transfer Goal 1 (PT)    Activity/Assistive Device (Transfer Goal 1, PT) sit-to-stand/stand-to-sit  -DP     [place] Level/Cues Needed (Transfer Goal 1, PT) contact guard required  -DP     Time Frame (Transfer Goal 1, PT) 10 days  -DP       Row Name 09/15/23 1100          Gait Training Goal 1 (PT)    Activity/Assistive Device (Gait Training Goal 1, PT) gait (walking locomotion);assistive device use;walker, rolling  -DP     [place] Level (Gait Training Goal 1, PT) standby assist  -DP     Distance (Gait Training Goal 1, PT) 150  -DP     Time Frame (Gait Training Goal 1, PT) 10 days  -DP               User Key  (r) = Recorded By, (t) = Taken By, (c) = Cosigned By      Initials Name Provider Type    Alanna Hernandez RN Registered Nurse     Cori Guillermo, PT Physical Therapist                      PT Recommendation and Plan  Anticipated Discharge Disposition (PT): sub acute care setting  Planned Therapy Interventions (PT): balance training, bed mobility training, gait training, neuromuscular re-education, stair training, strengthening, transfer training  Therapy Frequency (PT): daily  Plan of Care Reviewed With: patient  Progress: no change  Outcome Evaluation: Pt presents with decreased balance, strength, and activity tolerance decreasing independence and safety performing transfers and ambulation. Pt will benefit from PT services to address these impairments. Pt will benefit from rehab upon d/c for increased independence with functional mobility before returning home to decrease burden on spouse.   Outcome Measures       Row Name 09/15/23 1126             How much help from another person do you currently need...    Turning from your back to your side while in flat bed without using bedrails? 3  -DP      Moving from lying on back to sitting on the side of a flat bed without bedrails? 2  -DP      Moving to and from a bed to a chair (including a wheelchair)? 2  -DP      Standing up from a chair using your arms (e.g., wheelchair, bedside chair)? 2  -DP      Climbing 3-5 steps with a railing? 2  -DP      To walk in hospital room? 2  -DP      AM-PAC 6 Clicks Score (PT) 13  -DP                User Key  (r) = Recorded By, (t) = Taken By, (c) = Cosigned By      Initials Name Provider Type    Cori Guillermo, PT Physical Therapist                     Time Calculation:    PT Charges       Row Name 09/15/23 1132             Time Calculation    PT Received On 09/15/23  -DP      PT Goal Re-Cert Due Date 09/24/23  -DP         Untimed Charges    PT Eval/Re-eval Minutes 50  -DP         Total Minutes    Untimed Charges Total Minutes 50  -DP       Total Minutes 50  -DP                User Key  (r) = Recorded By, (t) = Taken By, (c) = Cosigned By       Initials Name Provider Type    DP Cori Martinez, PT Physical Therapist                  Therapy Charges for Today       Code Description Service Date Service Provider Modifiers Qty    72659665736 HC PT EVAL LOW COMPLEXITY 4 9/15/2023 Cori Martinez, PT GP 1            PT G-Codes  Outcome Measure Options: AM-PAC 6 Clicks Daily Activity (OT), Optimal Instrument  AM-PAC 6 Clicks Score (PT): 13  AM-PAC 6 Clicks Score (OT): 17    Cori Martinez, ROLAND  9/15/2023

## 2023-09-15 NOTE — PLAN OF CARE
Goal Outcome Evaluation:              Outcome Evaluation: Pt vss. Pt resting well. Wound care completed. Continue plan of care.

## 2023-09-15 NOTE — PROGRESS NOTES
" [facility]   Hospitalist Progress Note  Date: 9/15/2023  Patient Name: Brian Mehta  : 1955  MRN: [id number]  Date of admission: 2023      Subjective   Subjective     Chief Complaint: Fever weakness leg pain    Summary: Brian Mehta is a 68 y.o. male brought to the emergency department for evaluation of fever, chills, cough, bilateral lower extremity edema and swelling.  Patient first reported pain in his left lower extremity on 2023, he stated that he was \"bit by something\".  Patient went to an urgent care, was given oral Keflex and doxycycline.  Patient taking those as prescribed, however they did not improve the swelling nor the erythema.  Patient reports swelling and erythema have progressively worsened.  Patient has worsening cough and congestion.  Patient reports a history of COPD, but does not require oxygen at home.  Patient states due to the swelling and pain of his lower extremities has been having difficulties ambulating.  Patient denies any recent sick contacts, recent travel history.     Interval Followup: Patient seen and examined resting comfortably feeling better respiratory status improved lower extremity edema improving 2D echocardiogram reviewed estimated ejection fraction 40 to 45% with mild global hypokinesis adding beta-blocker adding Jardiance scheduling Lasix if creatinine stable tomorrow we will add ACE inhibitor/ARB patient will likely need to follow-up with cardiology outpatient setting.   Venous Doppler left lower extremity pending continuing antibiotics and breathing treatments.      Review of systems: All systems reviewed and negative except the following: Patient complains of generalized weakness fatigue complains of left lower extremity pain and swelling.    Objective   Objective     Vitals:   Temp:  [98 °F (36.7 °C)-99.2 °F (37.3 °C)] 99.1 °F (37.3 °C)  Heart Rate:  [80-97] 92  Resp:  [18-20] 18  BP: (136-153)/(67-77) 153/73    Physical " Exam   Constitutional: Awake alert oriented no acute distress  Respiratory: Diminished  Cardiovascular: RRR  GI: Abdomen soft nontender bowel sounds present  Extremities: Bilateral lower extremity swelling left greater than right small erythematous wound with necrotic center on left lower extremity    Result Review    Result Review:  I have reviewed the following:  [x]  Laboratory  CBC          9/13/2023    21:53 9/14/2023    06:30 9/15/2023    04:41   CBC   WBC 6.89  6.01  5.63    RBC 5.60  5.30  4.99    Hemoglobin 13.6  12.9  12.0    Hematocrit 43.5  41.5  39.4    MCV 77.7  78.3  79.0    MCH 24.3  24.3  24.0    MCHC 31.3  31.1  30.5    RDW 16.3  16.1  16.1    Platelets 238  221  226      CMP          9/13/2023    21:53 9/14/2023    06:30 9/15/2023    04:41   CMP   Glucose 170  156  152    BUN 13  10  11    Creatinine 0.84  0.70  0.82    EGFR 96.2  101.6  96.9    Sodium 136  136  137    Potassium 4.1  4.0  3.6    Chloride 101  103  103    Calcium 8.7  8.6  8.5    Total Protein 7.3      Albumin 3.7   2.7    Globulin 3.6      Total Bilirubin 0.5      Alkaline Phosphatase 107      AST (SGOT) 8      ALT (SGPT) 5      Albumin/Globulin Ratio 1.0      BUN/Creatinine Ratio 15.5  14.3  13.4    Anion Gap 9.1  9.0  9.1          []  Microbiology  []  Radiology  []  EKG/Telemetry   []  Cardiology/Vascular   []  Pathology  []  Old records  []  Other:    Assessment & Plan   Assessment / Plan   Assessment:    Community-acquired pneumonia  Acute exacerbation of COPD  Acute on chronic systolic congestive heart failure with ejection fraction 40 to 45%  Left lower extremity wound/cellulitis  Lower extremity edema  Ongoing tobacco abuse with cigarettes  Diabetes  Hypertension  Hyperlipidemia    Plan:    Continue with antibiotics  Start beta-blocker start Jardiance start Lasix  Will likely add ACE inhibitor or ARB tomorrow if serum creatinine stable  2D echocardiogram reviewed  Venous Doppler pending  Continue wound care  PT OT  consultations  Repeat a.m. labs  Will need cardiology consultation can likely be seen outpatient for ischemic work-up  Discussed with RN and wife at bedside     DVT prophylaxis:  Medical DVT prophylaxis orders are present.    CODE STATUS:   Level Of Support Discussed With: Patient  Code Status (Patient has no pulse and is not breathing): CPR (Attempt to Resuscitate)  Medical Interventions (Patient has pulse or is breathing): Full Support      Electronically signed by ASHLEY Santamaria, 09/15/23, 12:40 PM EDT.    Attending Documentation:  Patient independently seen and evaluated, above documentation reflects plan put forth during bedside rounds.  More than 51% of the time of this patient encounter was performed by me. I discussed the care plan with MERCEDES Duran PA-C, I agree with his findings and plan as documented, what I have added to the care plan and modified is as follows in my documentation and my medical decision making; 68-year-old male with history of stroke, diabetes hyperlipidemia, smoking, presented with pneumonia, possible bug bite on his leg.  Has leg swelling, did a 2D echocardiogram, does have systolic heart failure with EF of 40 to 45%.  Consulted cardiology.  He will need an ischemic work-up but this could be done as an outpatient.  Counseled on smoking cessation.  Patient says that his mother and father both  from smoking and he's not going to stop.  Advised he stop.  Electronically signed by Rodolfo Perez MD, 09/15/23, 6:20 PM EDT.

## 2023-09-15 NOTE — PLAN OF CARE
Problem: Adult Inpatient Plan of Care  Goal: Plan of Care Review  Outcome: Ongoing, Progressing  Goal: Patient-Specific Goal (Individualized)  Outcome: Ongoing, Progressing  Goal: Absence of Hospital-Acquired Illness or Injury  Outcome: Ongoing, Progressing  Intervention: Identify and Manage Fall Risk  Recent Flowsheet Documentation  Taken 9/15/2023 0400 by Delilah Hernandez RN  Safety Promotion/Fall Prevention: safety round/check completed  Taken 9/15/2023 0300 by Delilah Hernandez RN  Safety Promotion/Fall Prevention: safety round/check completed  Taken 9/15/2023 0200 by Delilah Hernandez RN  Safety Promotion/Fall Prevention: safety round/check completed  Taken 9/15/2023 0100 by Delilah Hernandez RN  Safety Promotion/Fall Prevention: safety round/check completed  Taken 9/15/2023 0000 by Delilah Hernandez RN  Safety Promotion/Fall Prevention: safety round/check completed  Taken 9/14/2023 2200 by Delilah Hernandez RN  Safety Promotion/Fall Prevention: safety round/check completed  Taken 9/14/2023 2000 by Delilah Hernandez RN  Safety Promotion/Fall Prevention: safety round/check completed  Taken 9/14/2023 1940 by Delilah Hernandez RN  Safety Promotion/Fall Prevention: safety round/check completed  Intervention: Prevent Skin Injury  Recent Flowsheet Documentation  Taken 9/15/2023 0400 by Delilah Hernandez RN  Body Position: position changed independently  Taken 9/15/2023 0300 by Delilah Hernandez RN  Body Position: position changed independently  Taken 9/15/2023 0200 by Delilah Hernandez RN  Body Position: position changed independently  Taken 9/15/2023 0100 by Delilah Hernandez RN  Body Position: position changed independently  Taken 9/15/2023 0000 by Delilah Hernandez RN  Body Position: position changed independently  Goal: Optimal Comfort and Wellbeing  Outcome: Ongoing, Progressing  Goal: Readiness for Transition of Care  Outcome: Ongoing, Progressing     Problem: Skin Injury Risk Increased  Goal: Skin Health and Integrity  Outcome: Ongoing,  Progressing  Intervention: Optimize Skin Protection  Recent Flowsheet Documentation  Taken 9/15/2023 0400 by Delilah Hernandez RN  Head of Bed (HOB) Positioning: HOB elevated  Taken 9/15/2023 0300 by Delilah Hernandez RN  Head of Bed (HOB) Positioning: HOB elevated  Taken 9/15/2023 0200 by Delilah Hernandez RN  Head of Bed (HOB) Positioning: HOB elevated  Taken 9/15/2023 0100 by Delilah Hernandez RN  Head of Bed (HOB) Positioning: HOB lowered     Problem: Adjustment to Illness (Sepsis/Septic Shock)  Goal: Optimal Coping  Outcome: Ongoing, Progressing     Problem: Bleeding (Sepsis/Septic Shock)  Goal: Absence of Bleeding  Outcome: Ongoing, Progressing     Problem: Glycemic Control Impaired (Sepsis/Septic Shock)  Goal: Blood Glucose Level Within Desired Range  Outcome: Ongoing, Progressing     Problem: Infection Progression (Sepsis/Septic Shock)  Goal: Absence of Infection Signs and Symptoms  Outcome: Ongoing, Progressing     Problem: Nutrition Impaired (Sepsis/Septic Shock)  Goal: Optimal Nutrition Intake  Outcome: Ongoing, Progressing   Goal Outcome Evaluation:      No change in status all vitals are with in normal limits patient doing well at this time

## 2023-09-15 NOTE — CONSULTS
Referring Provider: Rodolfo Perez MD    Reason for Consultation: Heart failure with preserved ejection fraction/heart failure with midrange ejection fraction.      Patient Care Team:  Sophie Capps APRN as PCP - General (Nurse Practitioner)      SUBJECTIVE     Chief Complaint: Fever, weakness and leg pain    History of present illness:  Brian Mehta is a 68 y.o. male with hypertension, hyperlipidemia, diabetes who presented to the hospital with fever, chills, cough and was diagnosed with community-acquired pneumonia.  He was also noted to have COPD exacerbation in addition to lower extremity edema.  An echocardiogram was obtained with concerns for heart failure.  Cardiology has been consulted due to abnormal echocardiogram with EF of 40 to 45%.  He currently denies any shortness of breath but does report chest tightness especially with stress.  His main concern is leg swelling and he thinks he has infection/cellulitis.  He would like to go home soon.  He is upset at his sister.  He is accompanied by his wife who tells me that patient had a stroke many years ago.  He currently smokes 2-1/2 packs/day.        Review of systems:    Constitutional: No weakness, fatigue, fever, rigors, chills   Eyes: No vision changes, eye pain   ENT/oropharynx: No difficulty swallowing, sore throat, epistaxis, changes in hearing   Cardiovascular: +chest pain, chest tightness, palpitations, paroxysmal nocturnal dyspnea, orthopnea, diaphoresis, dizziness / syncopal episode   Respiratory: No shortness of breath, dyspnea on exertion, cough, wheezing, hemoptysis   Gastrointestinal: No abdominal pain, nausea, vomiting, diarrhea, bloody stools   Genitourinary: No hematuria, dysuria   Neurological: No headache, tremors, numbness, one-sided weakness    Musculoskeletal: No cramps, myalgias, joint pain, joint swelling   Integument: No rash, cellulitis of lower extremity with edema left more than right.        Personal History:      Past  Medical History:   Diagnosis Date    Diabetes mellitus     Hyperlipidemia        Past Surgical History:   Procedure Laterality Date    APPENDECTOMY      EYE SURGERY         Family History   Problem Relation Age of Onset    No Known Problems Mother     No Known Problems Father        Social History     Tobacco Use    Smoking status: Every Day     Packs/day: 2.50     Years: 51.00     Pack years: 127.50     Types: Cigarettes     Start date: 1972     Passive exposure: Current    Smokeless tobacco: Never   Vaping Use    Vaping Use: Never used   Substance Use Topics    Alcohol use: Yes    Drug use: Never        Home meds:  Prior to Admission medications    Medication Sig Start Date End Date Taking? Authorizing Provider   aspirin 81 MG chewable tablet Chew 1 tablet Daily. 7/18/23  Yes Sophie Capps APRN   atorvastatin (LIPITOR) 20 MG tablet Take 1 tablet by mouth Every Night. 7/18/23  Yes Sophie Capps APRN   cephalexin (KEFLEX) 500 MG capsule Take 1 capsule by mouth 4 (Four) Times a Day.   Yes Henrry Camarillo MD   doxycycline (VIBRAMYCIN) 100 MG capsule Take 1 capsule by mouth 2 (Two) Times a Day.   Yes Henrry Camarillo MD   hydroCHLOROthiazide (MICROZIDE) 12.5 MG capsule Take 1 capsule by mouth Daily As Needed. 3/27/23  Yes Henrry Camarillo MD   Jardiance 25 MG tablet tablet Take 1 tablet by mouth Daily. 7/18/23  Yes Sophie Capps APRN   famotidine (PEPCID) 40 MG tablet Take 1 tablet by mouth 2 (Two) Times a Day.    Henrry Camarillo MD   ipratropium-albuterol (Combivent Respimat)  MCG/ACT inhaler Inhale 1 puff 4 (Four) Times a Day.    Henrry Camarillo MD   losartan (COZAAR) 25 MG tablet Take 1 tablet by mouth Daily. 7/18/23   Sophie Capps APRN   Symbicort 160-4.5 MCG/ACT inhaler Inhale 2 puffs 2 (Two) Times a Day. 6/10/23   Henrry Camarillo MD       Allergies:     Patient has no known allergies.    Scheduled Meds:[START ON 9/16/2023] amoxicillin-clavulanate, 1 tablet,  "Oral, Q12H  arformoterol, 15 mcg, Nebulization, BID - RT  budesonide, 0.5 mg, Nebulization, BID - RT  doxycycline, 100 mg, Oral, Q12H  empagliflozin, 10 mg, Oral, Daily  enoxaparin, 40 mg, Subcutaneous, Daily  furosemide, 20 mg, Oral, Daily  insulin lispro, 2-7 Units, Subcutaneous, 4x Daily AC & at Bedtime  metoprolol succinate XL, 12.5 mg, Oral, Q24H  mineral oil-hydrophilic petrolatum, 1 application , Topical, BID  senna-docusate sodium, 2 tablet, Oral, BID  sodium chloride, 10 mL, Intravenous, Q12H      Continuous Infusions:   PRN Meds:  acetaminophen    senna-docusate sodium **AND** polyethylene glycol **AND** bisacodyl **AND** bisacodyl    dextrose    dextrose    glucagon (human recombinant)    ipratropium-albuterol    nitroglycerin    sodium chloride    sodium chloride    sodium chloride      OBJECTIVE    Vital Signs  Vitals:    09/15/23 0646 09/15/23 0700 09/15/23 1100 09/15/23 1500   BP:  143/67 153/73    BP Location:  Right arm Left arm    Patient Position:  Lying Lying    Pulse: 90 93 92    Resp: 18 18 18    Temp:  98.9 °F (37.2 °C) 99.1 °F (37.3 °C) 99.5 °F (37.5 °C)   TempSrc:  Oral Oral Oral   SpO2: 97% 91% 91%    Weight:       Height:           Flowsheet Rows      Flowsheet Row First Filed Value   Admission Height 190.5 cm (75\") Documented at 09/13/2023 2144   Admission Weight 101 kg (222 lb 0.1 oz) Documented at 09/13/2023 2144              Intake/Output Summary (Last 24 hours) at 9/15/2023 1607  Last data filed at 9/15/2023 1405  Gross per 24 hour   Intake 240 ml   Output 600 ml   Net -360 ml        Telemetry: Sinus rhythm    Physical Exam:  The patient is alert, oriented and in no distress.  Vital signs as noted above.  Head and neck revealed no carotid bruits or jugular venous distention.  No thyromegaly or lymphadenopathy is present  Lungs clear.  No wheezing.  Breath sounds are normal bilaterally.  Heart: Normal first and second heart sounds. No murmur.  No precordial rub is present.  No gallop " is present.  Abdomen: Soft and nontender.  No organomegaly is present.  Extremities with good peripheral pulses with left more than right edema and cellulitis.  Skin: Warm and dry.  Cellulitis of the left lower extremity.  Musculoskeletal system is grossly normal.  CNS grossly normal.       Results Review:  I have personally reviewed the results from the time of this admission to 9/15/2023 16:07 EDT and agree with these findings:  []  Laboratory  []  Microbiology  []  Radiology  []  EKG/Telemetry   []  Cardiology/Vascular   []  Pathology  []  Old records  []  Other:    Most notable findings include:     Lab Results (last 24 hours)       Procedure Component Value Units Date/Time    Respiratory Culture - Sputum, Cough [[id number]] Collected: 09/14/23 1653    Specimen: Sputum from Cough Updated: 09/15/23 1258     Respiratory Culture Rare The culture consists of normal respiratory lita. This is a preliminary report; final report to follow.     Gram Stain Occasional Gram positive cocci in pairs      Moderate (3+) WBCs seen      Moderate (3+) Mucous strands      Rare (1+) Epithelial cells per low power field    POC Glucose Once [[id number]]  (Abnormal) Collected: 09/15/23 1127    Specimen: Blood Updated: 09/15/23 1129     Glucose 243 mg/dL      Comment: Serial Number: [id number]:  [id number]       Urine Culture - Urine, Urine, Clean Catch [[id number]]  (Normal) Collected: 09/14/23 0043    Specimen: Urine, Clean Catch Updated: 09/15/23 0857     Urine Culture No growth    POC Glucose Once [[id number]]  (Abnormal) Collected: 09/15/23 0743    Specimen: Blood Updated: 09/15/23 0744     Glucose 139 mg/dL      Comment: Serial Number: [id number]:  [id number]       Magnesium [[id number]]  (Normal) Collected: 09/15/23 0441    Specimen: Blood from Hand, Right Updated: 09/15/23 0534     Magnesium 2.3 mg/dL     Renal Function Panel [[id number]]  (Abnormal) Collected: 09/15/23 0441    Specimen: Blood from Hand, Right Updated:  09/15/23 0532     Glucose 152 mg/dL      BUN 11 mg/dL      Creatinine 0.82 mg/dL      Sodium 137 mmol/L      Potassium 3.6 mmol/L      Chloride 103 mmol/L      CO2 24.9 mmol/L      Calcium 8.5 mg/dL      Albumin 2.7 g/dL      Phosphorus 3.1 mg/dL      Anion Gap 9.1 mmol/L      BUN/Creatinine Ratio 13.4     eGFR 96.9 mL/min/1.73     Narrative:      GFR Normal >60  Chronic Kidney Disease <60  Kidney Failure <15      CBC & Differential [[id number]]  (Abnormal) Collected: 09/15/23 0441    Specimen: Blood from Hand, Right Updated: 09/15/23 0501    Narrative:      The following orders were created for panel order CBC & Differential.  Procedure                               Abnormality         Status                     ---------                               -----------         ------                     CBC Auto Differential[[id number]]        Abnormal            Final result                 Please view results for these tests on the individual orders.    CBC Auto Differential [[id number]]  (Abnormal) Collected: 09/15/23 0441    Specimen: Blood from Hand, Right Updated: 09/15/23 0501     WBC 5.63 10*3/mm3      RBC 4.99 10*6/mm3      Hemoglobin 12.0 g/dL      Hematocrit 39.4 %      MCV 79.0 fL      MCH 24.0 pg      MCHC 30.5 g/dL      RDW 16.1 %      RDW-SD 46.0 fl      MPV 9.4 fL      Platelets 226 10*3/mm3      Neutrophil % 51.0 %      Lymphocyte % 37.5 %      Monocyte % 8.5 %      Eosinophil % 2.3 %      Basophil % 0.5 %      Immature Grans % 0.2 %      Neutrophils, Absolute 2.87 10*3/mm3      Lymphocytes, Absolute 2.11 10*3/mm3      Monocytes, Absolute 0.48 10*3/mm3      Eosinophils, Absolute 0.13 10*3/mm3      Basophils, Absolute 0.03 10*3/mm3      Immature Grans, Absolute 0.01 10*3/mm3      nRBC 0.0 /100 WBC     Blood Culture - Blood, Arm, Left [[id number]]  (Normal) Collected: 09/13/23 2153    Specimen: Blood from Arm, Left Updated: 09/14/23 2200     Blood Culture No growth at 24 hours    Blood Culture - Blood, Arm,  Left [[id number]]  (Normal) Collected: 09/13/23 2153    Specimen: Blood from Arm, Left Updated: 09/14/23 2200     Blood Culture No growth at 24 hours    POC Glucose Once [[id number]]  (Abnormal) Collected: 09/14/23 1922    Specimen: Blood Updated: 09/14/23 1924     Glucose 136 mg/dL      Comment: Serial Number: [id number]:  [id number]       POC Glucose Once [[id number]]  (Abnormal) Collected: 09/14/23 1647    Specimen: Blood Updated: 09/14/23 1649     Glucose 157 mg/dL      Comment: Serial Number: [id number]:  [id number]               Imaging Results (Last 24 Hours)       ** No results found for the last 24 hours. **            LAB RESULTS (LAST 7 DAYS)    CBC  Results from last 7 days   Lab Units 09/15/23  0441 09/14/23  0630 09/13/23  2153   WBC 10*3/mm3 5.63 6.01 6.89   RBC 10*6/mm3 4.99 5.30 5.60   HEMOGLOBIN g/dL 12.0* 12.9* 13.6   HEMATOCRIT % 39.4 41.5 43.5   MCV fL 79.0 78.3* 77.7*   PLATELETS 10*3/mm3 226 221 238       BMP  Results from last 7 days   Lab Units 09/15/23  0441 09/14/23  0630 09/13/23  2153   SODIUM mmol/L 137 136 136   POTASSIUM mmol/L 3.6 4.0 4.1   CHLORIDE mmol/L 103 103 101   CO2 mmol/L 24.9 24.0 25.9   BUN mg/dL 11 10 13   CREATININE mg/dL 0.82 0.70* 0.84   GLUCOSE mg/dL 152* 156* 170*   MAGNESIUM mg/dL 2.3 1.8  --    PHOSPHORUS mg/dL 3.1 2.6  --        CMP   Results from last 7 days   Lab Units 09/15/23  0441 09/14/23  0630 09/13/23  2153   SODIUM mmol/L 137 136 136   POTASSIUM mmol/L 3.6 4.0 4.1   CHLORIDE mmol/L 103 103 101   CO2 mmol/L 24.9 24.0 25.9   BUN mg/dL 11 10 13   CREATININE mg/dL 0.82 0.70* 0.84   GLUCOSE mg/dL 152* 156* 170*   ALBUMIN g/dL 2.7*  --  3.7   BILIRUBIN mg/dL  --   --  0.5   ALK PHOS U/L  --   --  107   AST (SGOT) U/L  --   --  8   ALT (SGPT) U/L  --   --  5       BNP        TROPONIN  Results from last 7 days   Lab Units 09/14/23  0239   HSTROP T ng/L 9       CoAg        Creatinine Clearance  Estimated Creatinine Clearance: 126.6 mL/min (by C-G formula  based on SCr of 0.82 mg/dL).    ABG          Radiology  XR Tibia Fibula 2 View Left    Result Date: 9/14/2023    1. No evidence for displaced fracture or dislocation. 2. Nonspecific soft tissue swelling is noted surrounding the ankle.      AYESHA FIGUEROA MD       Electronically Signed and Approved By: AYESHA FIGUEROA MD on 9/14/2023 at 0:02             XR Foot 3+ View Left    Result Date: 9/14/2023    1. No evidence for displaced fracture or dislocation. 2. Diffuse nonspecific soft tissue swelling is seen throughout the foot.  The findings may relate to vascular or inflammatory etiologies.  Soft tissue cellulitis could also be considered.     AYESHA FIGUEROA MD       Electronically Signed and Approved By: AYESHA FIGUEROA MD on 9/14/2023 at 0:03             XR Chest 1 View    Result Date: 9/14/2023    Ill-defined multifocal airspace infiltrates bilaterally with associated interstitial changes suggesting findings related to atypical/viral infection or multifocal pneumonia.  Changes of pulmonary edema could also be considered.  Recommend follow-up to ensure improvement/resolution.      AYESHA FIGUEROA MD       Electronically Signed and Approved By: AYESHA FIGUEROA MD on 9/14/2023 at 0:01                EKG  I personally viewed and interpreted the patient's EKG/Telemetry data:  ECG 12 Lead Dyspnea   Preliminary Result   HEART RATE= 119  bpm   RR Interval= 504  ms   GA Interval= 142  ms   P Horizontal Axis=   deg   P Front Axis= 75  deg   QRSD Interval= 81  ms   QT Interval= 337  ms   QTcB= 475  ms   QRS Axis= 4  deg   T Wave Axis= 25  deg   - BORDERLINE ECG -   Sinus tachycardia   Probable left atrial enlargement   Electronically Signed By:    Date and Time of Study: 2023-09-14 00:07:41            Echocardiogram:    Results for orders placed during the hospital encounter of 09/13/23    Adult Transthoracic Echo Complete w/ Color, Spectral and Contrast if necessary per protocol    Interpretation Summary    There is mild global  hypokinesis of the left ventricle.  Estimated LV ejection fraction is 40 to 45%.    Left ventricular wall thickness is consistent with mild concentric hypertrophy.    Left ventricular diastolic function is consistent with (grade I) impaired relaxation.    There are no significant valvular abnormalities.    Estimated right ventricular systolic pressure from tricuspid regurgitation is normal (<35 mmHg).        Stress Test:        Cardiac Catheterization:  No results found for this or any previous visit.        Other:      ASSESSMENT & PLAN:    Principal Problem:    Sepsis    HFpEF  He has heart failure with midrange ejection fraction 40 to 45%.  He also has grade 1 diastolic dysfunction with no significant valvular abnormalities.  Of note the proBNP and troponin are normal.  He is currently on Jardiance and diuretics.  He is on a beta-blocker.  Consider starting ARB  Consider adding Aldactone if blood pressure allows  Closely monitor I's and O's and replace electrolytes as needed.  Given multiple cardiovascular risk factors including hypertension, hyperlipidemia, diabetes, previous stroke and ongoing smoking he should undergo elective ischemic work-up.  A nuclear stress test or coronary CTA would be a good first option for risk stratification.  Ischemic work-up can be performed as an outpatient.  His wife is a patient of Dr. Najera and he would like to see him as well.    Hypertension  Blood pressures currently elevated.  Consider resuming ARB and add Aldactone  He is on Toprol-XL    Hyperlipidemia  Lipid panel shows LDL 81, HDL 34, triglyceride 250 and total cholesterol 156.  He is on high intensity statin.  Goal LDL is less than 70    Diabetes  He needs improved diabetes control.  Most recent A1c 7.7    COPD exacerbation  He has community-acquired pneumonia.  Continue antibiotics    Smoking  Currently smokes 2-1/2 packs/day.  Smoking cessation counseling provided to the patient      Watson Rios MD  09/15/23  16:08  EDT

## 2023-09-15 NOTE — THERAPY EVALUATION
Patient Name: Brian Mehta  : 1955    MRN: [id number]                              Today's Date: 9/15/2023       Admit Date: 2023    Visit Dx:     ICD-10-CM ICD-9-CM   1. Sepsis, due to unspecified organism, unspecified whether acute organ dysfunction present  A41.9 038.9     995.91   2. Urinary tract infection with hematuria, site unspecified  N39.0 599.0    R31.9 599.70   3. Pneumonia due to infectious organism, unspecified laterality, unspecified part of lung  J18.9 486   4. Cellulitis of left lower extremity  L03.116 682.6   5. Decreased activities of daily living (ADL)  Z78.9 V49.89     Patient Active Problem List   Diagnosis    CVA (cerebral vascular accident)    Essential hypertension    High cholesterol    Hip pain, bilateral    Right knee pain    Vitamin B12 deficiency    Kidney disorder    Chronic pain of both knees    Sepsis     Past Medical History:   Diagnosis Date    Diabetes mellitus     Hyperlipidemia      Past Surgical History:   Procedure Laterality Date    APPENDECTOMY      EYE SURGERY        General Information       Row Name 09/15/23 1116          OT Time and Intention    Document Type evaluation  -AV     Mode of Treatment individual therapy;occupational therapy  -AV       Row Name 09/15/23 1116          General Information    Patient Profile Reviewed yes  -AV     Prior Level of Function independent:;min assist:;ADL's;transfer;all household mobility  Primarily independent with occasional assist for ADLs. both sponge bathes and uses tub chair. stands at sink to groom. uses standard commode as well as 3-1 commdoe. ambulates with RW. no home O2.  -AV     Existing Precautions/Restrictions oxygen therapy device and L/min  impaired skin integrity LLE  -AV     Barriers to Rehab none identified  -AV       Row Name 09/15/23 1116          Occupational Profile    Reason for Services/Referral (Occupational Profile) Patient is a 68 year old male admitted to [facility] on 23 with  fever, weakness and leg pain. He is currently on 3W/ on room air. OT consulted due to recent decline in ADL/transfer independence. No previous OT services for current condition.  -       Row Name 09/15/23 1116          Living Environment    People in Home spouse  -AV       [facility] Name 09/15/23 1116          Home Main Entrance    Number of Stairs, Main Entrance four  -AV       [facility] Name 09/15/23 1116          Stairs Within Home, Primary    Number of Stairs, Within Home, Primary none  -[facility] Name 09/15/23 1116          Cognition    Orientation Status (Cognition) --  alert, pleasant and cooperative. able to retain information and follow commands.  -       Row Name 09/15/23 1116          Safety Issues, Functional Mobility    Impairments Affecting Function (Mobility) balance;endurance/activity tolerance;strength  -AV               User Key  (r) = Recorded By, (t) = Taken By, (c) = Cosigned By      Initials Name Provider Type    Robert Ramirez, OT Occupational Therapist                     Mobility/ADL's       Row Name 09/15/23 1119          Transfers    Comment, (Transfers) assist 2-3 per nursing  -[facility] Name 09/15/23 1119          Activities of Daily Living    BADL Assessment/Intervention --  Independent feeding. Min assist grooming with setup in bed. Mod assist bathing and dressing. Min assist toilet hygiene (urinal in bed, BSC).  -AV               User Key  (r) = Recorded By, (t) = Taken By, (c) = Cosigned By      Initials Name Provider Type    Robert Ramirez, OT Occupational Therapist                   Obj/Interventions       Row Name 09/15/23 1120          Sensory Assessment (Somatosensory)    Sensory Assessment (Somatosensory) UE sensation intact  -[facility] Name 09/15/23 1120          Vision Assessment/Intervention    Visual Impairment/Limitations WFL;corrective lenses for reading  -[facility] Name 09/15/23 1120          Range of Motion Comprehensive    General Range of Motion upper  extremity range of motion deficits identified  -AV     Comment, General Range of Motion left active shoulder flexion~90. further BUE AROM WFL.  -AV       Row Name 09/15/23 1120          Strength Comprehensive (MMT)    Comment, General Manual Muscle Testing (MMT) Assessment 4/5 bilateral biceps, triceps and   -AV       Row Name 09/15/23 1120          Motor Skills    Motor Skills coordination;functional endurance  -AV     Coordination WFL  left dominant  -AV     Functional Endurance poor plus  -AV       Row Name 09/15/23 1120          Balance    Comment, Balance impaired  -AV               User Key  (r) = Recorded By, (t) = Taken By, (c) = Cosigned By      Initials Name Provider Type    AV Robert Urrutia OT Occupational Therapist                   Goals/Plan       Row Name 09/15/23 1123          Transfer Goal 1 (OT)    Activity/Assistive Device (Transfer Goal 1, OT) transfers, all;walker, rolling  -AV     [place] Level/Cues Needed (Transfer Goal 1, OT) modified independence  -AV     Time Frame (Transfer Goal 1, OT) long term goal (LTG);10 days  -AV       Row Name 09/15/23 1123          Bathing Goal 1 (OT)    Activity/Device (Bathing Goal 1, OT) bathing skills, all  -AV     [place] Level/Cues Needed (Bathing Goal 1, OT) modified independence;minimum assist (75% or more patient effort);set-up required;verbal cues required  -AV     Time Frame (Bathing Goal 1, OT) long term goal (LTG);10 days  -AV       Row Name 09/15/23 1123          Dressing Goal 1 (OT)    Activity/Device (Dressing Goal 1, OT) dressing skills, all  -AV     [place]/Cues Needed (Dressing Goal 1, OT) modified independence;minimum assist (75% or more patient effort);set-up required;verbal cues required  -AV     Time Frame (Dressing Goal 1, OT) long term goal (LTG);10 days  -AV       Row Name 09/15/23 1123          Toileting Goal 1 (OT)    Activity/Device (Toileting Goal 1, OT) toileting skills, all;raised toilet seat  -AV      [place] Level/Cues Needed (Toileting Goal 1, OT) modified independence;set-up required;verbal cues required  -AV     Time Frame (Toileting Goal 1, OT) long term goal (LTG);10 days  -AV       Row Name 09/15/23 1123          Grooming Goal 1 (OT)    Activity/Device (Grooming Goal 1, OT) grooming skills, all  -AV     [place] (Grooming Goal 1, OT) modified independence  standing at sink  -AV     Time Frame (Grooming Goal 1, OT) long term goal (LTG);10 days  -AV       Row Name 09/15/23 1123          Strength Goal 1 (OT)    Strength Goal 1 (OT) Patient will demonstrate 4+/5 bilateral upper extremities to increase ADL/ transfer independence.  -AV     Time Frame (Strength Goal 1, OT) long term goal (LTG);10 days  -AV       Row Name 09/15/23 1123          Problem Specific Goal 1 (OT)    Problem Specific Goal 1 (OT) Patient will demonstrate fair plus endurance/ activity tolerance needed to support ADLs.  -AV     Time Frame (Problem Specific Goal 1, OT) long term goal (LTG);10 days  -AV       Row Name 09/15/23 1123          Therapy Assessment/Plan (OT)    Planned Therapy Interventions (OT) activity tolerance training;BADL retraining;functional balance retraining;occupation/activity based interventions;patient/caregiver education/training;strengthening exercise;transfer/mobility retraining  -AV               User Key  (r) = Recorded By, (t) = Taken By, (c) = Cosigned By      Initials Name Provider Type    AV Robert Urrutia OT Occupational Therapist                   Clinical Impression       Row Name 09/15/23 1121          Pain Assessment    Additional Documentation Pain Scale: FACES Pre/Post-Treatment (Group)  -AV       Row Name 09/15/23 1121          Pain Scale: FACES Pre/Post-Treatment    Pain: FACES Scale, Pretreatment 2-->hurts little bit  -AV     Posttreatment Pain Rating 2-->hurts little bit  -AV       Row Name 09/15/23 1121          Plan of Care Review    Plan of Care Reviewed With patient;spouse  -AV      Progress no change  first session: evaluation  -AV     Outcome Evaluation Patient presents with limitations of balance, strength and endurance/ activity tolerance which are impacting ADL/ transfer independence. Skilled OT is indicated to remediate/compensate for deficits to maximize independence and safety with functional tasks.  -AV       Row Name 09/15/23 1121          Therapy Assessment/Plan (OT)    Patient/Family Therapy Goal Statement (OT) regain independence  -AV     Rehab Potential (OT) good, to achieve stated therapy goals  -AV     Criteria for Skilled Therapeutic Interventions Met (OT) yes;meets criteria;skilled treatment is necessary  -AV     Therapy Frequency (OT) 5 times/wk  -AV       Row Name 09/15/23 1121          Therapy Plan Review/Discharge Plan (OT)    Anticipated Discharge Disposition (OT) inpatient rehabilitation facility  -AV       Row Name 09/15/23 1121          Vital Signs    O2 Delivery Pre Treatment room air  -AV     O2 Delivery Intra Treatment room air  -AV     O2 Delivery Post Treatment room air  -AV               User Key  (r) = Recorded By, (t) = Taken By, (c) = Cosigned By      Initials Name Provider Type    Robert Ramirez, OT Occupational Therapist                   Outcome Measures       Row Name 09/15/23 1126          How much help from another is currently needed...    Putting on and taking off regular lower body clothing? 2  -AV     Bathing (including washing, rinsing, and drying) 2  -AV     Toileting (which includes using toilet bed pan or urinal) 3  -AV     Putting on and taking off regular upper body clothing 3  -AV     Taking care of personal grooming (such as brushing teeth) 3  -AV     Eating meals 4  -AV     AM-PAC 6 Clicks Score (OT) 17  -AV       Row Name 09/15/23 1126          Functional Assessment    Outcome Measure Options AM-PAC 6 Clicks Daily Activity (OT);Optimal Instrument  -AV       Row Name 09/15/23 1126          Optimal Instrument    Optimal Instrument Optimal  - 3  -AV     Bending/Stooping 3  -AV     Standing 4  -AV     Reaching 2  -AV     From the list, choose the 3 activities you would most like to be able to do without any difficulty Bending/stooping;Standing;Reaching  -AV     Total Score Optimal - 3 9  -AV               User Key  (r) = Recorded By, (t) = Taken By, (c) = Cosigned By      Initials Name Provider Type    Robert Ramirez OT Occupational Therapist                    Occupational Therapy Education       Title: PT OT SLP Therapies (Done)       Topic: Occupational Therapy (Done)       Point: ADL training (Done)       Description:   Instruct learner(s) on proper safety adaptation and remediation techniques during self care or transfers.   Instruct in proper use of assistive devices.                  Learning Progress Summary             Patient Acceptance, E, VU by AV at 9/15/2023 1127   Significant Other Acceptance, E, VU by AV at 9/15/2023 1127                         Point: Home exercise program (Done)       Description:   Instruct learner(s) on appropriate technique for monitoring, assisting and/or progressing therapeutic exercises/activities.                  Learning Progress Summary             Patient Acceptance, E, VU by AV at 9/15/2023 1127   Significant Other Acceptance, E, VU by AV at 9/15/2023 1127                         Point: Precautions (Done)       Description:   Instruct learner(s) on prescribed precautions during self-care and functional transfers.                  Learning Progress Summary             Patient Acceptance, E, VU by AV at 9/15/2023 1127   Significant Other Acceptance, E, VU by AV at 9/15/2023 1127                         Point: Body mechanics (Done)       Description:   Instruct learner(s) on proper positioning and spine alignment during self-care, functional mobility activities and/or exercises.                  Learning Progress Summary             Patient Acceptance, E, VU by AV at 9/15/2023 1127   Significant Other  Acceptance, E, VU by AV at 9/15/2023 1127                                         User Key       Initials Effective Dates Name Provider Type Discipline     06/16/21 -  Robert Urrutia OT Occupational Therapist OT                  OT Recommendation and Plan  Planned Therapy Interventions (OT): activity tolerance training, BADL retraining, functional balance retraining, occupation/activity based interventions, patient/caregiver education/training, strengthening exercise, transfer/mobility retraining  Therapy Frequency (OT): 5 times/wk  Plan of Care Review  Plan of Care Reviewed With: patient, spouse  Progress: no change (first session: evaluation)  Outcome Evaluation: Patient presents with limitations of balance, strength and endurance/ activity tolerance which are impacting ADL/ transfer independence. Skilled OT is indicated to remediate/compensate for deficits to maximize independence and safety with functional tasks.     Time Calculation:   Evaluation Complexity (OT)  Review Occupational Profile/Medical/Therapy History Complexity: expanded/moderate complexity  Assessment, Occupational Performance/Identification of Deficit Complexity: 3-5 performance deficits  Clinical Decision Making Complexity (OT): detailed assessment/moderate complexity  Overall Complexity of Evaluation (OT): moderate complexity     Time Calculation- OT       Row Name 09/15/23 1127             Time Calculation- OT    OT Received On 09/15/23  -AV      OT Goal Re-Cert Due Date 09/24/23  -AV         Untimed Charges    OT Eval/Re-eval Minutes 35  -AV         Total Minutes    Untimed Charges Total Minutes 35  -AV       Total Minutes 35  -AV                User Key  (r) = Recorded By, (t) = Taken By, (c) = Cosigned By      Initials Name Provider Type    AV Robert Urrutia OT Occupational Therapist                  Therapy Charges for Today       Code Description Service Date Service Provider Modifiers Qty    02359627570 HC OT EVAL MOD COMPLEXITY 3  9/15/2023 Robert Urrutia, OT GO 1                 Robert Urrutia, OT  9/15/2023

## 2023-09-16 ENCOUNTER — READMISSION MANAGEMENT (OUTPATIENT)
Dept: CALL CENTER | Facility: HOSPITAL | Age: 68
End: 2023-09-16
Payer: MEDICARE

## 2023-09-16 VITALS
TEMPERATURE: 97.2 F | RESPIRATION RATE: 18 BRPM | BODY MASS INDEX: 27.6 KG/M2 | HEART RATE: 80 BPM | HEIGHT: 75 IN | WEIGHT: 222 LBS | SYSTOLIC BLOOD PRESSURE: 152 MMHG | DIASTOLIC BLOOD PRESSURE: 77 MMHG | OXYGEN SATURATION: 95 %

## 2023-09-16 LAB
ANION GAP SERPL CALCULATED.3IONS-SCNC: 9.5 MMOL/L (ref 5–15)
BACTERIA SPEC RESP CULT: NORMAL
BASOPHILS # BLD AUTO: 0.03 10*3/MM3 (ref 0–0.2)
BASOPHILS NFR BLD AUTO: 0.6 % (ref 0–1.5)
BUN SERPL-MCNC: 10 MG/DL (ref 8–23)
BUN/CREAT SERPL: 12.5 (ref 7–25)
CALCIUM SPEC-SCNC: 9.1 MG/DL (ref 8.6–10.5)
CHLORIDE SERPL-SCNC: 104 MMOL/L (ref 98–107)
CO2 SERPL-SCNC: 22.5 MMOL/L (ref 22–29)
CREAT SERPL-MCNC: 0.8 MG/DL (ref 0.76–1.27)
DEPRECATED RDW RBC AUTO: 45.5 FL (ref 37–54)
EGFRCR SERPLBLD CKD-EPI 2021: 97.6 ML/MIN/1.73
EOSINOPHIL # BLD AUTO: 0.1 10*3/MM3 (ref 0–0.4)
EOSINOPHIL NFR BLD AUTO: 1.8 % (ref 0.3–6.2)
ERYTHROCYTE [DISTWIDTH] IN BLOOD BY AUTOMATED COUNT: 15.9 % (ref 12.3–15.4)
GLUCOSE BLDC GLUCOMTR-MCNC: 123 MG/DL (ref 70–99)
GLUCOSE BLDC GLUCOMTR-MCNC: 190 MG/DL (ref 70–99)
GLUCOSE SERPL-MCNC: 113 MG/DL (ref 65–99)
GRAM STN SPEC: NORMAL
HCT VFR BLD AUTO: 42.5 % (ref 37.5–51)
HGB BLD-MCNC: 13.1 G/DL (ref 13–17.7)
IMM GRANULOCYTES # BLD AUTO: 0.01 10*3/MM3 (ref 0–0.05)
IMM GRANULOCYTES NFR BLD AUTO: 0.2 % (ref 0–0.5)
LYMPHOCYTES # BLD AUTO: 2.27 10*3/MM3 (ref 0.7–3.1)
LYMPHOCYTES NFR BLD AUTO: 41.7 % (ref 19.6–45.3)
MAGNESIUM SERPL-MCNC: 2 MG/DL (ref 1.6–2.4)
MCH RBC QN AUTO: 24.1 PG (ref 26.6–33)
MCHC RBC AUTO-ENTMCNC: 30.8 G/DL (ref 31.5–35.7)
MCV RBC AUTO: 78.3 FL (ref 79–97)
MONOCYTES # BLD AUTO: 0.43 10*3/MM3 (ref 0.1–0.9)
MONOCYTES NFR BLD AUTO: 7.9 % (ref 5–12)
NEUTROPHILS NFR BLD AUTO: 2.61 10*3/MM3 (ref 1.7–7)
NEUTROPHILS NFR BLD AUTO: 47.8 % (ref 42.7–76)
NRBC BLD AUTO-RTO: 0 /100 WBC (ref 0–0.2)
PHOSPHATE SERPL-MCNC: 3.9 MG/DL (ref 2.5–4.5)
PLATELET # BLD AUTO: 287 10*3/MM3 (ref 140–450)
PMV BLD AUTO: 9.8 FL (ref 6–12)
POTASSIUM SERPL-SCNC: 3.8 MMOL/L (ref 3.5–5.2)
RBC # BLD AUTO: 5.43 10*6/MM3 (ref 4.14–5.8)
SODIUM SERPL-SCNC: 136 MMOL/L (ref 136–145)
WBC NRBC COR # BLD: 5.45 10*3/MM3 (ref 3.4–10.8)

## 2023-09-16 PROCEDURE — 99239 HOSP IP/OBS DSCHRG MGMT >30: CPT | Performed by: INTERNAL MEDICINE

## 2023-09-16 PROCEDURE — 25010000002 ENOXAPARIN PER 10 MG: Performed by: FAMILY MEDICINE

## 2023-09-16 PROCEDURE — 83735 ASSAY OF MAGNESIUM: CPT | Performed by: FAMILY MEDICINE

## 2023-09-16 PROCEDURE — 85025 COMPLETE CBC W/AUTO DIFF WBC: CPT | Performed by: FAMILY MEDICINE

## 2023-09-16 PROCEDURE — 63710000001 INSULIN LISPRO (HUMAN) PER 5 UNITS: Performed by: PHYSICIAN ASSISTANT

## 2023-09-16 PROCEDURE — 80048 BASIC METABOLIC PNL TOTAL CA: CPT | Performed by: FAMILY MEDICINE

## 2023-09-16 PROCEDURE — 82948 REAGENT STRIP/BLOOD GLUCOSE: CPT

## 2023-09-16 PROCEDURE — 84100 ASSAY OF PHOSPHORUS: CPT | Performed by: FAMILY MEDICINE

## 2023-09-16 PROCEDURE — 94664 DEMO&/EVAL PT USE INHALER: CPT

## 2023-09-16 PROCEDURE — 99232 SBSQ HOSP IP/OBS MODERATE 35: CPT | Performed by: INTERNAL MEDICINE

## 2023-09-16 PROCEDURE — 94799 UNLISTED PULMONARY SVC/PX: CPT

## 2023-09-16 RX ORDER — METOPROLOL SUCCINATE 25 MG/1
25 TABLET, EXTENDED RELEASE ORAL
Qty: 30 TABLET | Refills: 0 | Status: SHIPPED | OUTPATIENT
Start: 2023-09-17 | End: 2023-10-17

## 2023-09-16 RX ORDER — AMOXICILLIN AND CLAVULANATE POTASSIUM 875; 125 MG/1; MG/1
1 TABLET, FILM COATED ORAL EVERY 12 HOURS SCHEDULED
Qty: 5 TABLET | Refills: 0 | Status: SHIPPED | OUTPATIENT
Start: 2023-09-16 | End: 2023-09-16 | Stop reason: SDUPTHER

## 2023-09-16 RX ORDER — METOPROLOL SUCCINATE 25 MG/1
25 TABLET, EXTENDED RELEASE ORAL
Qty: 30 TABLET | Refills: 0 | Status: SHIPPED | OUTPATIENT
Start: 2023-09-17 | End: 2023-09-16 | Stop reason: SDUPTHER

## 2023-09-16 RX ORDER — SACCHAROMYCES BOULARDII 250 MG
250 CAPSULE ORAL 2 TIMES DAILY
Qty: 20 CAPSULE | Refills: 0 | Status: SHIPPED | OUTPATIENT
Start: 2023-09-16 | End: 2023-09-26

## 2023-09-16 RX ORDER — FUROSEMIDE 20 MG/1
20 TABLET ORAL DAILY
Qty: 30 TABLET | Refills: 0 | Status: SHIPPED | OUTPATIENT
Start: 2023-09-17 | End: 2023-10-17

## 2023-09-16 RX ORDER — FUROSEMIDE 20 MG/1
20 TABLET ORAL DAILY
Qty: 30 TABLET | Refills: 0 | Status: SHIPPED | OUTPATIENT
Start: 2023-09-17 | End: 2023-09-16 | Stop reason: SDUPTHER

## 2023-09-16 RX ORDER — DOXYCYCLINE 100 MG/1
100 CAPSULE ORAL EVERY 12 HOURS SCHEDULED
Qty: 5 CAPSULE | Refills: 0 | Status: SHIPPED | OUTPATIENT
Start: 2023-09-16 | End: 2023-09-16 | Stop reason: SDUPTHER

## 2023-09-16 RX ORDER — METOPROLOL SUCCINATE 25 MG/1
25 TABLET, EXTENDED RELEASE ORAL
Status: DISCONTINUED | OUTPATIENT
Start: 2023-09-16 | End: 2023-09-16 | Stop reason: HOSPADM

## 2023-09-16 RX ORDER — DOXYCYCLINE 100 MG/1
100 CAPSULE ORAL EVERY 12 HOURS SCHEDULED
Qty: 5 CAPSULE | Refills: 0 | Status: SHIPPED | OUTPATIENT
Start: 2023-09-16 | End: 2023-09-19

## 2023-09-16 RX ORDER — SACCHAROMYCES BOULARDII 250 MG
250 CAPSULE ORAL 2 TIMES DAILY
Qty: 20 CAPSULE | Refills: 0 | Status: SHIPPED | OUTPATIENT
Start: 2023-09-16 | End: 2023-09-16 | Stop reason: SDUPTHER

## 2023-09-16 RX ORDER — AMOXICILLIN AND CLAVULANATE POTASSIUM 875; 125 MG/1; MG/1
1 TABLET, FILM COATED ORAL EVERY 12 HOURS SCHEDULED
Qty: 5 TABLET | Refills: 0 | Status: SHIPPED | OUTPATIENT
Start: 2023-09-16 | End: 2023-09-19

## 2023-09-16 RX ADMIN — WHITE PETROLATUM 1 APPLICATION: 1.75 OINTMENT TOPICAL at 08:12

## 2023-09-16 RX ADMIN — ARFORMOTEROL TARTRATE 15 MCG: 15 SOLUTION RESPIRATORY (INHALATION) at 06:25

## 2023-09-16 RX ADMIN — EMPAGLIFLOZIN 10 MG: 10 TABLET, FILM COATED ORAL at 08:12

## 2023-09-16 RX ADMIN — DOXYCYCLINE 100 MG: 100 CAPSULE ORAL at 08:12

## 2023-09-16 RX ADMIN — BUDESONIDE 0.5 MG: 0.5 INHALANT RESPIRATORY (INHALATION) at 06:25

## 2023-09-16 RX ADMIN — Medication 10 ML: at 08:13

## 2023-09-16 RX ADMIN — METOPROLOL SUCCINATE 25 MG: 25 TABLET, EXTENDED RELEASE ORAL at 08:12

## 2023-09-16 RX ADMIN — AMOXICILLIN AND CLAVULANATE POTASSIUM 1 TABLET: 875; 125 TABLET, FILM COATED ORAL at 08:12

## 2023-09-16 RX ADMIN — INSULIN LISPRO 2 UNITS: 100 INJECTION, SOLUTION INTRAVENOUS; SUBCUTANEOUS at 08:11

## 2023-09-16 RX ADMIN — FUROSEMIDE 20 MG: 20 TABLET ORAL at 08:12

## 2023-09-16 RX ADMIN — ACETAMINOPHEN 650 MG: 325 TABLET ORAL at 07:27

## 2023-09-16 RX ADMIN — ENOXAPARIN SODIUM 40 MG: 100 INJECTION SUBCUTANEOUS at 08:12

## 2023-09-16 NOTE — OUTREACH NOTE
Prep Survey      Flowsheet Row Responses   [facility] patient discharged from? [facility]   Is LACE score < 7 ? No   Eligibility [facility] Rodolfo Bailey MD  Last attending  Treatment team   Sepsis  Principal problem   Date of Admission 09/13/23   Date of Discharge 09/16/23   Discharge Disposition Home or Self Care   Discharge diagnosis Community-acquired pneumoniaAcute exacerbation of COPD/chf/Sepsis   Does the patient have one of the following disease processes/diagnoses(primary or secondary)? Sepsis   Does the patient have Home health ordered? No   Is there a DME ordered? No   Prep survey completed? Yes            ZHENG JACOBO - Registered Nurse

## 2023-09-16 NOTE — PLAN OF CARE
Problem: Adult Inpatient Plan of Care  Goal: Plan of Care Review  Outcome: Ongoing, Progressing  Goal: Patient-Specific Goal (Individualized)  Outcome: Ongoing, Progressing  Goal: Absence of Hospital-Acquired Illness or Injury  Outcome: Ongoing, Progressing  Intervention: Identify and Manage Fall Risk  Recent Flowsheet Documentation  Taken 9/16/2023 0100 by Delilah Hernandez RN  Safety Promotion/Fall Prevention: safety round/check completed  Taken 9/15/2023 2200 by Delilah Hernandez RN  Safety Promotion/Fall Prevention: safety round/check completed  Taken 9/15/2023 2000 by Delilah Hernandez RN  Safety Promotion/Fall Prevention: safety round/check completed  Intervention: Prevent Skin Injury  Recent Flowsheet Documentation  Taken 9/15/2023 2300 by Delilah Hernandez RN  Body Position: position changed independently  Taken 9/15/2023 2200 by Delilah Hernandez RN  Body Position: position changed independently  Taken 9/15/2023 2100 by Delilah Hernandez RN  Body Position: position changed independently  Taken 9/15/2023 2000 by Delilah Hernandez RN  Body Position: position changed independently  Intervention: Prevent and Manage VTE (Venous Thromboembolism) Risk  Recent Flowsheet Documentation  Taken 9/15/2023 2000 by Delilah Hernandez RN  Activity Management: activity encouraged  VTE Prevention/Management: bilateral  Intervention: Prevent Infection  Recent Flowsheet Documentation  Taken 9/15/2023 2000 by Delilah Hernandez RN  Infection Prevention:   other (see comments)   visitors restricted/screened   single patient room provided   rest/sleep promoted   personal protective equipment utilized   hand hygiene promoted  Goal: Optimal Comfort and Wellbeing  Outcome: Ongoing, Progressing  Intervention: Monitor Pain and Promote Comfort  Recent Flowsheet Documentation  Taken 9/15/2023 2000 by Delilah Hernandez RN  Pain Management Interventions:   see MAR   quiet environment facilitated   position adjusted   pillow support provided  Intervention: Provide  Person-Centered Care  Recent Flowsheet Documentation  Taken 9/15/2023 2000 by Delilah Hernandez RN  Trust Relationship/Rapport:   care explained   choices provided   emotional support provided   empathic listening provided   questions answered   questions encouraged   reassurance provided   thoughts/feelings acknowledged  Goal: Readiness for Transition of Care  Outcome: Ongoing, Progressing     Problem: Skin Injury Risk Increased  Goal: Skin Health and Integrity  Outcome: Ongoing, Progressing  Intervention: Optimize Skin Protection  Recent Flowsheet Documentation  Taken 9/15/2023 2300 by Delilah Hernandez RN  Head of Bed (HOB) Positioning: HOB elevated  Taken 9/15/2023 2200 by Delilah Hernandez RN  Head of Bed (HOB) Positioning: HOB elevated  Taken 9/15/2023 2100 by Delilah Hernandez RN  Head of Bed (HOB) Positioning: HOB elevated  Taken 9/15/2023 2000 by Delilah Hernandez RN  Head of Bed (HOB) Positioning: HOB elevated     Problem: Adjustment to Illness (Sepsis/Septic Shock)  Goal: Optimal Coping  Outcome: Ongoing, Progressing  Intervention: Optimize Psychosocial Adjustment to Illness  Recent Flowsheet Documentation  Taken 9/15/2023 2000 by Delilah Hernandez RN  Family/Support System Care: support provided     Problem: Bleeding (Sepsis/Septic Shock)  Goal: Absence of Bleeding  Outcome: Ongoing, Progressing     Problem: Glycemic Control Impaired (Sepsis/Septic Shock)  Goal: Blood Glucose Level Within Desired Range  Outcome: Ongoing, Progressing  Intervention: Optimize Glycemic Control  Recent Flowsheet Documentation  Taken 9/15/2023 2000 by Delilah Hernandez RN  Glycemic Management: blood glucose monitored     Problem: Infection Progression (Sepsis/Septic Shock)  Goal: Absence of Infection Signs and Symptoms  Outcome: Ongoing, Progressing  Intervention: Initiate Sepsis Management  Recent Flowsheet Documentation  Taken 9/15/2023 2000 by Delilah Hernandez RN  Infection Prevention:   other (see comments)   visitors restricted/screened   single  patient room provided   rest/sleep promoted   personal protective equipment utilized   hand hygiene promoted  Intervention: Promote Recovery  Recent Flowsheet Documentation  Taken 9/15/2023 2000 by Delilah Hernandez, RN  Activity Management: activity encouraged     Problem: Nutrition Impaired (Sepsis/Septic Shock)  Goal: Optimal Nutrition Intake  Outcome: Ongoing, Progressing   Goal Outcome Evaluation:         Patient doing well at this time

## 2023-09-16 NOTE — PROGRESS NOTES
Referring Provider: Rodolfo Perez MD    Reason for follow-up: HFpEF, heart failure with midrange ejection fraction     Patient Care Team:  Sophie Capps APRN as PCP - General (Nurse Practitioner)      SUBJECTIVE    Resting comfortably in bed.  Wants to go home.     ROS  Review of all systems negative except as indicated.    Since I have last seen, the patient has been without any chest discomfort, shortness of breath, palpitations, dizziness or syncope.  Denies having any headache, abdominal pain, nausea, vomiting, diarrhea, constipation, loss of weight or loss of appetite.  Denies having any excessive bruising, hematuria or blood in the stool.  ROS      Personal History:    Past Medical History:   Diagnosis Date    Diabetes mellitus     Hyperlipidemia        Past Surgical History:   Procedure Laterality Date    APPENDECTOMY      EYE SURGERY         Family History   Problem Relation Age of Onset    No Known Problems Mother     No Known Problems Father        Social History     Tobacco Use    Smoking status: Every Day     Packs/day: 2.50     Years: 51.00     Pack years: 127.50     Types: Cigarettes     Start date: 1972     Passive exposure: Current    Smokeless tobacco: Never   Vaping Use    Vaping Use: Never used   Substance Use Topics    Alcohol use: Yes    Drug use: Never        Home meds:  Prior to Admission medications    Medication Sig Start Date End Date Taking? Authorizing Provider   aspirin 81 MG chewable tablet Chew 1 tablet Daily. 7/18/23  Yes Sophie Capps APRN   atorvastatin (LIPITOR) 20 MG tablet Take 1 tablet by mouth Every Night. 7/18/23  Yes Sophie Capps APRN   cephalexin (KEFLEX) 500 MG capsule Take 1 capsule by mouth 4 (Four) Times a Day.   Yes Provider, MD Henrry   doxycycline (VIBRAMYCIN) 100 MG capsule Take 1 capsule by mouth 2 (Two) Times a Day.   Yes ProviderHenrry MD   hydroCHLOROthiazide (MICROZIDE) 12.5 MG capsule Take 1 capsule by mouth Daily As Needed. 3/27/23   "Yes Henrry Camarillo MD   Jardiance 25 MG tablet tablet Take 1 tablet by mouth Daily. 7/18/23  Yes Sophie Capps APRN   famotidine (PEPCID) 40 MG tablet Take 1 tablet by mouth 2 (Two) Times a Day.    Henrry Camarillo MD   ipratropium-albuterol (Combivent Respimat)  MCG/ACT inhaler Inhale 1 puff 4 (Four) Times a Day.    Henrry Camarillo MD   losartan (COZAAR) 25 MG tablet Take 1 tablet by mouth Daily. 7/18/23   Sophie Capps APRN   Symbicort 160-4.5 MCG/ACT inhaler Inhale 2 puffs 2 (Two) Times a Day. 6/10/23   Henrry Camarillo MD       Allergies:  Patient has no known allergies.    Scheduled Meds:amoxicillin-clavulanate, 1 tablet, Oral, Q12H  arformoterol, 15 mcg, Nebulization, BID - RT  budesonide, 0.5 mg, Nebulization, BID - RT  doxycycline, 100 mg, Oral, Q12H  empagliflozin, 10 mg, Oral, Daily  enoxaparin, 40 mg, Subcutaneous, Daily  furosemide, 20 mg, Oral, Daily  insulin lispro, 2-7 Units, Subcutaneous, 4x Daily AC & at Bedtime  metoprolol succinate XL, 25 mg, Oral, Q24H  mineral oil-hydrophilic petrolatum, 1 application , Topical, BID  senna-docusate sodium, 2 tablet, Oral, BID  sodium chloride, 10 mL, Intravenous, Q12H      Continuous Infusions:   PRN Meds:.  acetaminophen    senna-docusate sodium **AND** polyethylene glycol **AND** bisacodyl **AND** bisacodyl    dextrose    dextrose    glucagon (human recombinant)    ipratropium-albuterol    nitroglycerin    sodium chloride    sodium chloride    sodium chloride      OBJECTIVE    Vital Signs  Vitals:    09/15/23 2300 09/16/23 0300 09/16/23 0625 09/16/23 0718   BP: 140/77 157/96  149/77   BP Location: Right arm Right arm  Right arm   Patient Position: Lying Lying  Lying   Pulse: 88 93 92 90   Resp: 15 15 16 18   Temp:    98.1 °F (36.7 °C)   TempSrc:    Oral   SpO2: 94% 92% 93% 95%   Weight:       Height:           Flowsheet Rows      Flowsheet Row First Filed Value   Admission Height 190.5 cm (75\") Documented at 09/13/2023 2144 "   Admission Weight 101 kg (222 lb 0.1 oz) Documented at 09/13/2023 2144              Intake/Output Summary (Last 24 hours) at 9/16/2023 0859  Last data filed at 9/15/2023 1405  Gross per 24 hour   Intake 240 ml   Output 400 ml   Net -160 ml          Telemetry: Sinus rhythm    Physical Exam:  The patient is alert, oriented and in no distress.  Vital signs as noted above.  Head and neck revealed no carotid bruits or jugular venous distention.  No thyromegaly or lymphadenopathy is present  Lungs clear.  No wheezing.  Breath sounds are normal bilaterally.  Heart: Normal first and second heart sounds. No murmur.  No precordial rub is present.  No gallop is present.  Abdomen: Soft and nontender.  No organomegaly is present.  Extremities with good peripheral pulses with left more than right edema and cellulitis.  Skin: Warm and dry.  Cellulitis of the left lower extremity.  Musculoskeletal system is grossly normal.  CNS grossly normal.       Results Review:  I have personally reviewed the results from the time of this admission to 9/16/2023 08:59 EDT and agree with these findings:  []  Laboratory  []  Microbiology  []  Radiology  []  EKG/Telemetry   []  Cardiology/Vascular   []  Pathology  []  Old records  []  Other:    Most notable findings include:    Lab Results (last 24 hours)       Procedure Component Value Units Date/Time    POC Glucose Once [[id number]]  (Abnormal) Collected: 09/16/23 0740    Specimen: Blood Updated: 09/16/23 0741     Glucose 190 mg/dL      Comment: Serial Number: [id number]:  [id number]       Phosphorus [[id number]]  (Normal) Collected: 09/16/23 0514    Specimen: Blood from Arm, Left Updated: 09/16/23 0651     Phosphorus 3.9 mg/dL     Magnesium [[id number]]  (Normal) Collected: 09/16/23 0514    Specimen: Blood from Arm, Left Updated: 09/16/23 0640     Magnesium 2.0 mg/dL     Basic Metabolic Panel [[id number]]  (Abnormal) Collected: 09/16/23 0514    Specimen: Blood from Arm, Left Updated: 09/16/23  0640     Glucose 113 mg/dL      BUN 10 mg/dL      Creatinine 0.80 mg/dL      Sodium 136 mmol/L      Potassium 3.8 mmol/L      Chloride 104 mmol/L      CO2 22.5 mmol/L      Calcium 9.1 mg/dL      BUN/Creatinine Ratio 12.5     Anion Gap 9.5 mmol/L      eGFR 97.6 mL/min/1.73     Narrative:      GFR Normal >60  Chronic Kidney Disease <60  Kidney Failure <15      CBC & Differential [[id number]]  (Abnormal) Collected: 09/16/23 0514    Specimen: Blood from Arm, Left Updated: 09/16/23 0618    Narrative:      The following orders were created for panel order CBC & Differential.  Procedure                               Abnormality         Status                     ---------                               -----------         ------                     CBC Auto Differential[[id number]]        Abnormal            Final result                 Please view results for these tests on the individual orders.    CBC Auto Differential [[id number]]  (Abnormal) Collected: 09/16/23 0514    Specimen: Blood from Arm, Left Updated: 09/16/23 0618     WBC 5.45 10*3/mm3      RBC 5.43 10*6/mm3      Hemoglobin 13.1 g/dL      Hematocrit 42.5 %      MCV 78.3 fL      MCH 24.1 pg      MCHC 30.8 g/dL      RDW 15.9 %      RDW-SD 45.5 fl      MPV 9.8 fL      Platelets 287 10*3/mm3      Neutrophil % 47.8 %      Lymphocyte % 41.7 %      Monocyte % 7.9 %      Eosinophil % 1.8 %      Basophil % 0.6 %      Immature Grans % 0.2 %      Neutrophils, Absolute 2.61 10*3/mm3      Lymphocytes, Absolute 2.27 10*3/mm3      Monocytes, Absolute 0.43 10*3/mm3      Eosinophils, Absolute 0.10 10*3/mm3      Basophils, Absolute 0.03 10*3/mm3      Immature Grans, Absolute 0.01 10*3/mm3      nRBC 0.0 /100 WBC     Blood Culture - Blood, Arm, Left [[id number]]  (Normal) Collected: 09/13/23 2153    Specimen: Blood from Arm, Left Updated: 09/15/23 2200     Blood Culture No growth at 2 days    Blood Culture - Blood, Arm, Left [[id number]]  (Normal) Collected: 09/13/23 2153    Specimen:  Blood from Arm, Left Updated: 09/15/23 2200     Blood Culture No growth at 2 days    POC Glucose Once [[id number]]  (Abnormal) Collected: 09/15/23 1851    Specimen: Blood Updated: 09/15/23 1853     Glucose 186 mg/dL      Comment: Serial Number: [id number]:  [id number]       POC Glucose Once [[id number]]  (Abnormal) Collected: 09/15/23 1711    Specimen: Blood Updated: 09/15/23 1712     Glucose 108 mg/dL      Comment: Serial Number: [id number]:  [id number]       Respiratory Culture - Sputum, Cough [[id number]] Collected: 09/14/23 1653    Specimen: Sputum from Cough Updated: 09/15/23 1258     Respiratory Culture Rare The culture consists of normal respiratory lita. This is a preliminary report; final report to follow.     Gram Stain Occasional Gram positive cocci in pairs      Moderate (3+) WBCs seen      Moderate (3+) Mucous strands      Rare (1+) Epithelial cells per low power field    POC Glucose Once [[id number]]  (Abnormal) Collected: 09/15/23 1127    Specimen: Blood Updated: 09/15/23 1129     Glucose 243 mg/dL      Comment: Serial Number: [id number]:  [id number]               Imaging Results (Last 24 Hours)       ** No results found for the last 24 hours. **            LAB RESULTS (LAST 7 DAYS)    CBC  Results from last 7 days   Lab Units 09/16/23  0514 09/15/23  0441 09/14/23  0630 09/13/23  2153   WBC 10*3/mm3 5.45 5.63 6.01 6.89   RBC 10*6/mm3 5.43 4.99 5.30 5.60   HEMOGLOBIN g/dL 13.1 12.0* 12.9* 13.6   HEMATOCRIT % 42.5 39.4 41.5 43.5   MCV fL 78.3* 79.0 78.3* 77.7*   PLATELETS 10*3/mm3 287 226 221 238       BMP  Results from last 7 days   Lab Units 09/16/23  0514 09/15/23  0441 09/14/23  0630 09/13/23  2153   SODIUM mmol/L 136 137 136 136   POTASSIUM mmol/L 3.8 3.6 4.0 4.1   CHLORIDE mmol/L 104 103 103 101   CO2 mmol/L 22.5 24.9 24.0 25.9   BUN mg/dL 10 11 10 13   CREATININE mg/dL 0.80 0.82 0.70* 0.84   GLUCOSE mg/dL 113* 152* 156* 170*   MAGNESIUM mg/dL 2.0 2.3 1.8  --    PHOSPHORUS mg/dL  3.9 3.1 2.6  --        CMP   Results from last 7 days   Lab Units 09/16/23  0514 09/15/23  0441 09/14/23  0630 09/13/23  2153   SODIUM mmol/L 136 137 136 136   POTASSIUM mmol/L 3.8 3.6 4.0 4.1   CHLORIDE mmol/L 104 103 103 101   CO2 mmol/L 22.5 24.9 24.0 25.9   BUN mg/dL 10 11 10 13   CREATININE mg/dL 0.80 0.82 0.70* 0.84   GLUCOSE mg/dL 113* 152* 156* 170*   ALBUMIN g/dL  --  2.7*  --  3.7   BILIRUBIN mg/dL  --   --   --  0.5   ALK PHOS U/L  --   --   --  107   AST (SGOT) U/L  --   --   --  8   ALT (SGPT) U/L  --   --   --  5       BNP        TROPONIN  Results from last 7 days   Lab Units 09/14/23  0239   HSTROP T ng/L 9       CoAg        Creatinine Clearance  Estimated Creatinine Clearance: 129.8 mL/min (by C-G formula based on SCr of 0.8 mg/dL).    ABG        Radiology  No radiology results for the last day      EKG  I personally viewed and interpreted the patient's EKG/Telemetry data:  ECG 12 Lead Dyspnea   Preliminary Result   HEART RATE= 119  bpm   RR Interval= 504  ms   ME Interval= 142  ms   P Horizontal Axis=   deg   P Front Axis= 75  deg   QRSD Interval= 81  ms   QT Interval= 337  ms   QTcB= 475  ms   QRS Axis= 4  deg   T Wave Axis= 25  deg   - BORDERLINE ECG -   Sinus tachycardia   Probable left atrial enlargement   Electronically Signed By:    Date and Time of Study: 2023-09-14 00:07:41            Echocardiogram:    Results for orders placed during the hospital encounter of 09/13/23    Adult Transthoracic Echo Complete w/ Color, Spectral and Contrast if necessary per protocol    Interpretation Summary    There is mild global hypokinesis of the left ventricle.  Estimated LV ejection fraction is 40 to 45%.    Left ventricular wall thickness is consistent with mild concentric hypertrophy.    Left ventricular diastolic function is consistent with (grade I) impaired relaxation.    There are no significant valvular abnormalities.    Estimated right ventricular systolic pressure from tricuspid regurgitation is  normal (<35 mmHg).        Stress Test:         Cardiac Catheterization:  No results found for this or any previous visit.         Other:         ASSESSMENT & PLAN:    Principal Problem:    Sepsis      HFpEF  He has heart failure with midrange ejection fraction 40 to 45%.  He also has grade 1 diastolic dysfunction with no significant valvular abnormalities.  Of note the proBNP and troponin are normal.  He is currently on Jardiance and diuretics.  He is on a beta-blocker.  Consider starting ARB  Aldactone can be added if blood pressure allows  Closely monitor I's and O's and replace electrolytes as needed.  Given multiple cardiovascular risk factors including hypertension, hyperlipidemia, diabetes, previous stroke and ongoing smoking he should undergo elective ischemic work-up.  A nuclear stress test or coronary CTA would be a good first option for risk stratification.  Ischemic work-up can be performed as an outpatient.  His wife is a patient of Dr. Najera and he would like to see him as well.  Patient is reluctant to undergo stress testing during this hospital stay.     Hypertension  Blood pressures currently elevated.  Consider resuming ARB and add Aldactone.  Renal function is normal  He is on Toprol-XL     Hyperlipidemia  Lipid panel shows LDL 81, HDL 34, triglyceride 250 and total cholesterol 156.  He is on high intensity statin.  Goal LDL is less than 70     Diabetes  He needs improved diabetes control.  Most recent A1c 7.7     COPD exacerbation  He has community-acquired pneumonia.  Continue antibiotics     Smoking  Currently smokes 2-1/2 packs/day.  Smoking cessation counseling provided to the patient.    Okay to discharge from cardiac standpoint with plans to follow-up with cardiology, Dr. Najera as an outpatient.    Watson Rios MD  09/16/23  08:59 EDT

## 2023-09-16 NOTE — PLAN OF CARE
Goal Outcome Evaluation:  Plan of Care Reviewed With: patient, spouse        Progress: improving  Outcome Evaluation: Patient discharging home with self care. discharge education complete.

## 2023-09-16 NOTE — DISCHARGE SUMMARY
[facility]         HOSPITALIST  DISCHARGE SUMMARY    Patient Name: Brian Mehta  : 1955  MRN: [id number]    Date of Admission: 2023  Date of Discharge: 2023  Primary Care Physician: Sophie Capps APRN  Reason for admission:  Shortness of air fevers    Final diagnosis:  Community-acquired pneumonia  Acute exacerbation of COPD  Acute on chronic systolic congestive heart failure with ejection fraction 40 to 45%  Left lower extremity wound/cellulitis  Lower extremity edema  Ongoing tobacco abuse with cigarettes  Diabetes  Hypertension  Hyperlipidemia    Consults       Date and Time Order Name Status Description    9/15/2023  3:57 PM Inpatient Cardiology Consult Completed     2023 12:15 AM Inpatient Hospitalist Consult              Active and Resolved Hospital Problems:  Active Hospital Problems    Diagnosis POA   • **Sepsis [A41.9] Yes      Resolved Hospital Problems   No resolved problems to display.       Hospital Course     Hospital Course:  Brian Mehta is a 68 y.o. male multiple medical problems as documented above who presents emergency department with chief complaint of shortness of air and recent leg wound patient evaluated emergency department was noted to have radiologic evidence of pneumonia also concern for underlying CHF patient was admitted to the hospitalist service.  Patient started on IV antibiotics nebulizer treatments Spot dosed IV Lasix 2D echocardiogram obtained was noted to have ejection fraction of 40 to 45% consistent with new onset congestive heart failure cardiology consulted have recommended ischemic work-up to be done in the outpatient setting goal-directed therapy initiated.  Patient currently hemodynamically stable with improved respiratory status will be discharged home with orders to follow-up with his PCP in 1 week would recommend repeat chemistries at that time additionally patient will follow-up with cardiology outpatient for new onset CHF      DISCHARGE Follow Up Recommendations for labs and diagnostics: As above      Day of Discharge     Vital Signs:  Temp:  [97.2 °F (36.2 °C)-99.5 °F (37.5 °C)] 97.2 °F (36.2 °C)  Heart Rate:  [] 80  Resp:  [15-18] 18  BP: (140-157)/(77-96) 152/77  Physical Exam:   Constitutional: Awake alert oriented  Respiratory: Clear  Cardiovascular: RRR  GI: Abdomen soft nontender bowel sounds present      Discharge Details        Discharge Medications        New Medications        Instructions Start Date   amoxicillin-clavulanate 875-125 MG per tablet  Commonly known as: AUGMENTIN   1 tablet, Oral, Every 12 Hours Scheduled      doxycycline 100 MG capsule  Commonly known as: MONODOX   100 mg, Oral, Every 12 Hours Scheduled      furosemide 20 MG tablet  Commonly known as: LASIX   20 mg, Oral, Daily   Start Date: September 17, 2023     metoprolol succinate XL 25 MG 24 hr tablet  Commonly known as: TOPROL-XL   25 mg, Oral, Every 24 Hours Scheduled   Start Date: September 17, 2023     saccharomyces boulardii 250 MG capsule  Commonly known as: Florastor   250 mg, Oral, 2 Times Daily             Continue These Medications        Instructions Start Date   aspirin 81 MG chewable tablet   81 mg, Oral, Daily      atorvastatin 20 MG tablet  Commonly known as: LIPITOR   20 mg, Oral, Nightly      Combivent Respimat  MCG/ACT inhaler  Generic drug: ipratropium-albuterol   1 puff, Inhalation, 4 Times Daily - RT      famotidine 40 MG tablet  Commonly known as: PEPCID   40 mg, Oral, 2 Times Daily      Jardiance 25 MG tablet tablet  Generic drug: empagliflozin   25 mg, Oral, Daily      losartan 25 MG tablet  Commonly known as: COZAAR   25 mg, Oral, Daily      Symbicort 160-4.5 MCG/ACT inhaler  Generic drug: budesonide-formoterol   2 puffs, Inhalation, 2 Times Daily - RT             Stop These Medications      cephalexin 500 MG capsule  Commonly known as: KEFLEX     doxycycline 100 MG capsule  Commonly known as: VIBRAMYCIN      hydroCHLOROthiazide 12.5 MG capsule  Commonly known as: MICROZIDE              No Known Allergies    Discharge Disposition:  Home or Self Care    Diet:  Hospital:  Diet Order   Procedures   • Diet: Cardiac Diets; Healthy Heart (2-3 Na+); Texture: Regular Texture (IDDSI 7); Fluid Consistency: Thin (IDDSI 0)       Discharge Activity: Slowly as tolerated      CODE STATUS:  Code Status and Medical Interventions:   Ordered at: 09/14/23 0230     Level Of Support Discussed With:    Patient     Code Status (Patient has no pulse and is not breathing):    CPR (Attempt to Resuscitate)     Medical Interventions (Patient has pulse or is breathing):    Full Support         Future Appointments   Date Time Provider Department Center   10/18/2023  2:15 PM Sophie Capps APRN [facility]       Additional Instructions for the Follow-ups that You Need to Schedule       Discharge Follow-up with PCP   As directed       Currently Documented PCP:    Sophie Capps APRN    PCP Phone Number:    [phone]     Follow Up Details: one week                Pertinent  and/or Most Recent Results     PROCEDURES:   None    LAB RESULTS:      Lab 09/16/23  0514 09/15/23  0441 09/14/23  0630 09/13/23  2153   WBC 5.45 5.63 6.01 6.89   HEMOGLOBIN 13.1 12.0* 12.9* 13.6   HEMATOCRIT 42.5 39.4 41.5 43.5   PLATELETS 287 226 221 238   NEUTROS ABS 2.61 2.87 3.44 4.12   IMMATURE GRANS (ABS) 0.01 0.01 0.02 0.02   LYMPHS ABS 2.27 2.11 1.97 2.07   MONOS ABS 0.43 0.48 0.52 0.61   EOS ABS 0.10 0.13 0.04 0.04   MCV 78.3* 79.0 78.3* 77.7*   PROCALCITONIN  --   --  0.16  --    LACTATE  --   --   --  1.2         Lab 09/16/23  0514 09/15/23  0441 09/14/23  0630 09/13/23  2153   SODIUM 136 137 136 136   POTASSIUM 3.8 3.6 4.0 4.1   CHLORIDE 104 103 103 101   CO2 22.5 24.9 24.0 25.9   ANION GAP 9.5 9.1 9.0 9.1   BUN 10 11 10 13   CREATININE 0.80 0.82 0.70* 0.84   EGFR 97.6 96.9 101.6 96.2   GLUCOSE 113* 152* 156* 170*   CALCIUM 9.1 8.5* 8.6 8.7   MAGNESIUM 2.0  2.3 1.8  --    PHOSPHORUS 3.9 3.1 2.6  --          Lab 09/15/23  0441 09/13/23 2153   TOTAL PROTEIN  --  7.3   ALBUMIN 2.7* 3.7   GLOBULIN  --  3.6   ALT (SGPT)  --  5   AST (SGOT)  --  8   BILIRUBIN  --  0.5   ALK PHOS  --  107         Lab 09/14/23  0239 09/14/23  0019 09/13/23 2153   PROBNP  --   --  495.7   HSTROP T 9 8  --                  Brief Urine Lab Results  (Last result in the past 365 days)        Color   Clarity   Blood   Leuk Est   Nitrite   Protein   CREAT   Urine HCG        09/14/23 0043 Yellow   Clear   Negative   Moderate (2+)   Positive   30 mg/dL (1+)                 Microbiology Results (last 10 days)       Procedure Component Value - Date/Time    Respiratory Culture - Sputum, Cough [[id number]] Collected: 09/14/23 1653    Lab Status: Edited Result - FINAL Specimen: Sputum from Cough Updated: 09/16/23 0932     Respiratory Culture Rare Normal respiratory lita. No S. aureus or Pseudomonas aeruginosa detected. Final report.     Comment:   Corrected result. Previously Reported Organism Changed. Previous result was The culture consists of normal respiratory lita. This is a preliminary report; final report to follow. on 9/16/2023 at 0931 EDT.        Gram Stain Occasional Gram positive cocci in pairs      Moderate (3+) WBCs seen      Moderate (3+) Mucous strands      Rare (1+) Epithelial cells per low power field    Urine Culture - Urine, Urine, Clean Catch [[id number]]  (Normal) Collected: 09/14/23 0043    Lab Status: Final result Specimen: Urine, Clean Catch Updated: 09/15/23 0857     Urine Culture No growth    S. Pneumo Ag Urine or CSF - Urine, Urine, Clean Catch [[id number]]  (Normal) Collected: 09/14/23 0043    Lab Status: Final result Specimen: Urine, Clean Catch Updated: 09/14/23 0944     Strep Pneumo Ag Negative    Legionella Antigen, Urine - Urine, Urine, Clean Catch [[id number]]  (Normal) Collected: 09/14/23 0043    Lab Status: Final result Specimen: Urine, Clean Catch Updated: 09/14/23 0945      LEGIONELLA ANTIGEN, URINE Negative    COVID PRE-OP / PRE-PROCEDURE SCREENING ORDER (NO ISOLATION) - Swab, Nasopharynx [[id number]]  (Normal) Collected: 09/13/23 2339    Lab Status: Final result Specimen: Swab from Nasopharynx Updated: 09/14/23 0144    Narrative:      The following orders were created for panel order COVID PRE-OP / PRE-PROCEDURE SCREENING ORDER (NO ISOLATION) - Swab, Nasopharynx.  Procedure                               Abnormality         Status                     ---------                               -----------         ------                     COVID-19,CEPHEID/HANS,CO...[[id number]]  Normal              Final result                 Please view results for these tests on the individual orders.    Influenza Antigen, Rapid - Swab, Nasopharynx [[id number]]  (Normal) Collected: 09/13/23 2339    Lab Status: Final result Specimen: Swab from Nasopharynx Updated: 09/14/23 0018     Influenza A Ag, EIA Negative     Influenza B Ag, EIA Negative    COVID-19,CEPHEID/HANS,COR/[facility]/PAD/[facility]/MAD IN-HOUSE(OR EMERGENT/ADD-ON),NP SWAB IN TRANSPORT MEDIA 3-4 HR TAT, RT-PCR - Swab, Nasopharynx [[id number]]  (Normal) Collected: 09/13/23 2339    Lab Status: Final result Specimen: Swab from Nasopharynx Updated: 09/14/23 0144     COVID19 Not Detected    Narrative:      Fact sheet for providers: https://www.fda.gov/media/676481/download     Fact sheet for patients: https://www.fda.gov/media/709242/download  Fact sheet for providers: https://www.fda.gov/media/509795/download     Fact sheet for patients: https://www.fda.gov/media/536599/download    Blood Culture - Blood, Arm, Left [[id number]]  (Normal) Collected: 09/13/23 2153    Lab Status: Preliminary result Specimen: Blood from Arm, Left Updated: 09/15/23 2200     Blood Culture No growth at 2 days    Blood Culture - Blood, Arm, Left [[id number]]  (Normal) Collected: 09/13/23 2153    Lab Status: Preliminary result Specimen: Blood from Arm, Left Updated: 09/15/23 3759      Blood Culture No growth at 2 days    Mycoplasma Pneumoniae Antibody, IgM - Blood, [[id number]]  (Normal) Collected: 09/13/23 2153    Lab Status: Final result Specimen: Blood Updated: 09/14/23 0922     Mycoplasma pneumo IgM Negative            XR Tibia Fibula 2 View Left    Result Date: 9/14/2023  Impression:   1. No evidence for displaced fracture or dislocation. 2. Nonspecific soft tissue swelling is noted surrounding the ankle.      AYESHA FIGUEROA MD       Electronically Signed and Approved By: AYESHA FIGUEROA MD on 9/14/2023 at 0:02             XR Foot 3+ View Left    Result Date: 9/14/2023  Impression:   1. No evidence for displaced fracture or dislocation. 2. Diffuse nonspecific soft tissue swelling is seen throughout the foot.  The findings may relate to vascular or inflammatory etiologies.  Soft tissue cellulitis could also be considered.     AYESHA FIGUEROA MD       Electronically Signed and Approved By: AYESHA FIGUEROA MD on 9/14/2023 at 0:03             XR Chest 1 View    Result Date: 9/14/2023  Impression:   Ill-defined multifocal airspace infiltrates bilaterally with associated interstitial changes suggesting findings related to atypical/viral infection or multifocal pneumonia.  Changes of pulmonary edema could also be considered.  Recommend follow-up to ensure improvement/resolution.      AYESHA FIGUEROA MD       Electronically Signed and Approved By: AYESHA FIGUEROA MD on 9/14/2023 at 0:01              Results for orders placed during the hospital encounter of 09/13/23    Duplex Venous Lower Extremity - Left CV-READ    Interpretation Summary  •  Normal left lower extremity venous duplex scan.      Results for orders placed during the hospital encounter of 09/13/23    Duplex Venous Lower Extremity - Left CV-READ    Interpretation Summary  •  Normal left lower extremity venous duplex scan.      Results for orders placed during the hospital encounter of 09/13/23    Adult Transthoracic Echo Complete w/ Color,  Spectral and Contrast if necessary per protocol    Interpretation Summary  •  There is mild global hypokinesis of the left ventricle.  Estimated LV ejection fraction is 40 to 45%.  •  Left ventricular wall thickness is consistent with mild concentric hypertrophy.  •  Left ventricular diastolic function is consistent with (grade I) impaired relaxation.  •  There are no significant valvular abnormalities.  •  Estimated right ventricular systolic pressure from tricuspid regurgitation is normal (<35 mmHg).      Labs Pending at Discharge:  Pending Labs       Order Current Status    Blood Culture - Blood, Arm, Left Preliminary result    Blood Culture - Blood, Arm, Left Preliminary result              Time spent on Discharge including face to face service: 35 minutes    Electronically signed by ASHLEY Santamaria, 09/16/23, 12:57 PM EDT.    Attending Documentation:  Patient independently seen and evaluated, above documentation reflects plan put forth during bedside rounds.  More than 51% of the time of this patient encounter was performed by me. I discussed the care plan with MERCEDES Duran PA-C, I agree with his findings and plan as documented, what I have added to the care plan and modified is as follows in my documentation and my medical decision making; 68-year-old male presented with shortness of breath, pneumonia.  Diagnosed with new systolic heart failure, ejection 40 to 45%.  Seen by cardiology.  Patient prefers to do outpatient work-up.  He will discharge home today on cardiac medications and will see cardiology as an outpatient.  Electronically signed by Rodolfo Perez MD, 09/16/23, 1:34 PM EDT.

## 2023-09-17 LAB
QT INTERVAL: 337 MS
QTC INTERVAL: 475 MS

## 2023-09-18 ENCOUNTER — TRANSITIONAL CARE MANAGEMENT TELEPHONE ENCOUNTER (OUTPATIENT)
Dept: CALL CENTER | Facility: HOSPITAL | Age: 68
End: 2023-09-18
Payer: MEDICARE

## 2023-09-18 ENCOUNTER — PATIENT OUTREACH (OUTPATIENT)
Dept: CASE MANAGEMENT | Facility: OTHER | Age: 68
End: 2023-09-18
Payer: MEDICARE

## 2023-09-18 DIAGNOSIS — I10 ESSENTIAL HYPERTENSION: Primary | ICD-10-CM

## 2023-09-18 DIAGNOSIS — I63.9 CEREBROVASCULAR ACCIDENT (CVA), UNSPECIFIED MECHANISM: ICD-10-CM

## 2023-09-18 LAB
BACTERIA SPEC AEROBE CULT: NORMAL
BACTERIA SPEC AEROBE CULT: NORMAL

## 2023-09-18 NOTE — OUTREACH NOTE
Call Center TCM Note      Flowsheet Row Responses   [facility] patient discharged from? [facility]   Does the patient have one of the following disease processes/diagnoses(primary or secondary)? Sepsis   TCM attempt successful? Yes   Call start time 1432   Call end time 1443   Discharge diagnosis Community-acquired pneumoniaAcute exacerbation of COPD/chf/Sepsis   Person spoke with today (if not patient) and relationship patient   Meds reviewed with patient/caregiver? Yes   Is the patient having any side effects they believe may be caused by any medication additions or changes? Yes   Side effects comments  diarrhea--from abx.   Does the patient have all medications related to this admission filled (includes all antibiotics, inhalers, nebulizers,steroids,etc.) Yes   Is the patient taking all medications as directed (includes completed medication regime)? Yes   Comments PCP hospital f/u appt on 9/21/23 at 3pm with MORRO Guevara   Does the patient have an appointment with their PCP within 7-14 days of discharge? Yes   Has home health visited the patient within 72 hours of discharge? N/A   Psychosocial issues? No   Comments wife states pt is doing better. swelling is down in legs, no shortness of breath, no fevers.   Did the patient receive a copy of their discharge instructions? Yes   Nursing interventions Reviewed instructions with patient   What is the patient's perception of their health status since discharge? Improving   Nursing interventions Nurse provided patient education   Is the patient/caregiver able to teach back TIME? T emperature - higher or lower than normal, I nfection - may have signs and symptoms of an infection, M ental Decline - confused, sleepy, difficult to arouse, E xtremely Ill - severe pain, discomfort, shortness of breath   Nursing interventions Nurse provided reassurance to patient, Nurse provided patient education   Is patient/caregiver able to teach back steps to recovery at home?  Set small, achievable goals for return to baseline health, Rest and regain strength, Eat a balanced diet, Make a list of questions for PCP appoinment   Is the patient/caregiver able to teach back signs and symptoms of worsening condition: Fever, Hyperthermia, Rapid heart rate (>90), Shortness of breath/rapid respiratory rate, Altered mental status(confusion/coma), Edema   Is the patient/caregiver able to teach back the hierarchy of who to call/visit for symptoms/problems? PCP, Specialist, Home health nurse, Urgent Care, ED, 911 Yes   TCM call completed? Yes   Wrap up additional comments wife states no concerns or issues   Call end time 1443   Would this patient benefit from a Referral to Amb Social Work? No   Is the patient interested in additional calls from an ambulatory ? No            Cady Llanos RN    9/18/2023, 14:44 EDT

## 2023-09-18 NOTE — OUTREACH NOTE
AMBULATORY CASE MANAGEMENT NOTE    Name and Relationship of Patient/Support Person: JUMANA CATALAN - Emergency Contact    CCM Interim Update    Spoke to patients spouse, Jumana, and patient while he was on speaker phone. He is interested in CCM services. Informed verbal consent received with the knowledge that co-pay could be possible and that they can withdrawal consent for services at any time.    Patient recently discharged from hospital for pneumonia, new onset CHF, COPD exacerbation, and lower extremity cellulitis.     Goals of obtaining hospital bed for CHF and dyspnea and mobility difficulties set. Patient with appointment on 9/21 with PCP. Will request PCP to add required documentation for hospital bed and coordinate with DME once signed.        Education Documentation  No documentation found.        YANDEL GONZALEZ  Ambulatory Case Management    9/18/2023, 15:28 EDT

## 2023-09-21 ENCOUNTER — OFFICE VISIT (OUTPATIENT)
Dept: FAMILY MEDICINE CLINIC | Facility: CLINIC | Age: 68
End: 2023-09-21
Payer: MEDICARE

## 2023-09-21 VITALS
OXYGEN SATURATION: 98 % | DIASTOLIC BLOOD PRESSURE: 76 MMHG | SYSTOLIC BLOOD PRESSURE: 124 MMHG | TEMPERATURE: 97.8 F | HEART RATE: 105 BPM

## 2023-09-21 DIAGNOSIS — E11.9 ENCOUNTER FOR DIABETIC FOOT EXAM: ICD-10-CM

## 2023-09-21 DIAGNOSIS — E11.9 TYPE 2 DIABETES MELLITUS WITHOUT COMPLICATION, WITHOUT LONG-TERM CURRENT USE OF INSULIN: ICD-10-CM

## 2023-09-21 DIAGNOSIS — Z09 HOSPITAL DISCHARGE FOLLOW-UP: Primary | ICD-10-CM

## 2023-09-21 DIAGNOSIS — I50.9 CONGESTIVE HEART FAILURE, UNSPECIFIED HF CHRONICITY, UNSPECIFIED HEART FAILURE TYPE: ICD-10-CM

## 2023-09-21 DIAGNOSIS — A41.9 SEPSIS WITHOUT ACUTE ORGAN DYSFUNCTION, DUE TO UNSPECIFIED ORGANISM: ICD-10-CM

## 2023-09-21 DIAGNOSIS — R60.9 PERIPHERAL EDEMA: ICD-10-CM

## 2023-09-21 LAB
ALBUMIN SERPL-MCNC: 3.3 G/DL (ref 3.5–5.2)
ALBUMIN/GLOB SERPL: 0.8 G/DL
ALP SERPL-CCNC: 99 U/L (ref 39–117)
ALT SERPL W P-5'-P-CCNC: 9 U/L (ref 1–41)
ANION GAP SERPL CALCULATED.3IONS-SCNC: 10 MMOL/L (ref 5–15)
AST SERPL-CCNC: 12 U/L (ref 1–40)
BILIRUB SERPL-MCNC: 0.2 MG/DL (ref 0–1.2)
BUN SERPL-MCNC: 13 MG/DL (ref 8–23)
BUN/CREAT SERPL: 16.9 (ref 7–25)
CALCIUM SPEC-SCNC: 9.1 MG/DL (ref 8.6–10.5)
CHLORIDE SERPL-SCNC: 100 MMOL/L (ref 98–107)
CO2 SERPL-SCNC: 28 MMOL/L (ref 22–29)
CREAT SERPL-MCNC: 0.77 MG/DL (ref 0.76–1.27)
EGFRCR SERPLBLD CKD-EPI 2021: 98.7 ML/MIN/1.73
GLOBULIN UR ELPH-MCNC: 4.2 GM/DL
GLUCOSE SERPL-MCNC: 129 MG/DL (ref 65–99)
POTASSIUM SERPL-SCNC: 4.4 MMOL/L (ref 3.5–5.2)
PROT SERPL-MCNC: 7.5 G/DL (ref 6–8.5)
SODIUM SERPL-SCNC: 138 MMOL/L (ref 136–145)

## 2023-09-21 PROCEDURE — 80053 COMPREHEN METABOLIC PANEL: CPT | Performed by: NURSE PRACTITIONER

## 2023-09-21 RX ORDER — FUROSEMIDE 20 MG/1
20 TABLET ORAL DAILY
Qty: 30 TABLET | Refills: 0 | Status: SHIPPED | OUTPATIENT
Start: 2023-09-21 | End: 2023-10-21

## 2023-09-21 NOTE — PROGRESS NOTES
Transitional Care Follow Up Visit  Subjective     Brian Mehta is a 68 y.o. male who presents for a transitional care management visit.    Within 48 business hours after discharge our office contacted him via telephone to coordinate his care and needs.      I reviewed and discussed the details of that call along with the discharge summary, hospital problems, inpatient lab results, inpatient diagnostic studies, and consultation reports with Brian.     Current outpatient and discharge medications have been reconciled for the patient.  Reviewed by: MORRO Vasquez          9/16/2023     3:50 PM   Date of TCM Phone Call   [facility]  Last attending  Treatment team   Sepsis  Principal problem   Date of Admission 9/13/2023   Date of Discharge 9/16/2023   Discharge Disposition Home or Self Care     Risk for Readmission (LACE) Score: 7 (9/16/2023  6:00 AM)      History of Present Illness   Course During Hospital Stay:  pt states he was bite by a spider and went to  and given abx and cleaned the wound then then a couple days later he was very weak and EMS was called to transport to hospital. He was admitted from the ER for pneumonia with sepsis and diagnosed with CHF. He was told he had EF of 40 and want to do a stress test on him. Bite is healing well according to pt and wife. Home health is coming out and he is getting a standard wheelchair. He is having a regular bed delivered tomorrow. He does not want a hospital bed. Pt notes he is feeling a lot better since being in the hospital. He noted some diarrhea while in the hospital and an episode this morning.     He is smoking a little less than 2.5ppd currently.     Pt states his glucose was fluctuating while in the hospital. Does not monitor glucose at home.      The following portions of the patient's history were reviewed and updated as appropriate: allergies, current medications, past family history, past medical history,  past social history, past surgical history, and problem list.    Review of Systems   Respiratory:  Positive for cough (with smoking). Negative for shortness of breath and wheezing.    Cardiovascular:  Positive for leg swelling. Negative for chest pain and palpitations.   Neurological:  Positive for dizziness (when got out of van). Negative for headaches.     Objective   Vitals:    23 1459   BP: 124/76   Pulse: 105   Temp: 97.8 °F (36.6 °C)   SpO2: 98%        Physical Exam  Vitals reviewed.   Constitutional:       General: He is not in acute distress.     Appearance: Normal appearance. He is well-developed.   HENT:      Head: Normocephalic and atraumatic.      Right Ear: External ear normal.      Left Ear: External ear normal.   Eyes:      Conjunctiva/sclera: Conjunctivae normal.      Pupils: Pupils are equal, round, and reactive to light.   Cardiovascular:      Rate and Rhythm: Regular rhythm. Tachycardia present.      Pulses:           Dorsalis pedis pulses are 2+ on the right side and 2+ on the left side.      Heart sounds: Normal heart sounds.   Pulmonary:      Effort: Pulmonary effort is normal.      Breath sounds: Normal breath sounds.   Musculoskeletal:      Cervical back: Neck supple.      Right lower le+ Edema present.      Left lower le+ Edema present.      Right foot: No bunion.      Left foot: No bunion.        Feet:    Feet:      Right foot:      Protective Sensation: 9 sites tested.  9 sites sensed.      Skin integrity: Skin integrity normal. Dry skin present. No ulcer, blister or callus.      Toenail Condition: Right toenails are normal.      Left foot:      Protective Sensation: 9 sites tested.  8 sites sensed.      Skin integrity: Skin integrity normal. Dry skin present. No ulcer, blister or callus.      Toenail Condition: Left toenails are normal.      Comments:    Thickened skin around the toes  Skin:     General: Skin is warm and dry.   Neurological:      Mental Status: He is alert  and oriented to person, place, and time.   Psychiatric:         Mood and Affect: Mood and affect normal.         Behavior: Behavior normal.         Thought Content: Thought content normal.         Judgment: Judgment normal.       Assessment & Plan   Diagnoses and all orders for this visit:    1. Hospital discharge follow-up (Primary)    2. Sepsis without acute organ dysfunction, due to unspecified organism    3. Congestive heart failure, unspecified HF chronicity, unspecified heart failure type  -     Comprehensive Metabolic Panel  -     furosemide (LASIX) 20 MG tablet; Take 1 tablet by mouth Daily for 30 days.  Dispense: 30 tablet; Refill: 0    4. Type 2 diabetes mellitus without complication, without long-term current use of insulin    5. Encounter for diabetic foot exam    6. Peripheral edema  -     Comprehensive Metabolic Panel  -     furosemide (LASIX) 20 MG tablet; Take 1 tablet by mouth Daily for 30 days.  Dispense: 30 tablet; Refill: 0      Pt to follow up in 1 month for medicare wellness and will give Pneumonia vaccine and flu vaccine if not already received.     Pt is planning to follow up with Dr. Najera.     Pt to continue Lasix daily, CMP today.        Return in about 4 weeks (around 10/19/2023) for Medicare Wellness.

## 2023-09-21 NOTE — PROGRESS NOTES
Venipuncture Blood Specimen Collection  Venipuncture performed in left arm  by Miryam Grier with good hemostasis. Patient tolerated the procedure well without complications.   09/21/23   Miryam Grier

## 2023-09-26 ENCOUNTER — READMISSION MANAGEMENT (OUTPATIENT)
Dept: CALL CENTER | Facility: HOSPITAL | Age: 68
End: 2023-09-26
Payer: MEDICARE

## 2023-09-26 NOTE — OUTREACH NOTE
Sepsis Week 2 Survey      Flowsheet Row Responses   [facility] patient discharged from? [facility]   Does the patient have one of the following disease processes/diagnoses(primary or secondary)? Sepsis   Week 2 attempt successful? No   Unsuccessful attempts Attempt 1            Irina SHOEMAKER - Licensed Nurse

## 2023-09-29 ENCOUNTER — PATIENT OUTREACH (OUTPATIENT)
Dept: CASE MANAGEMENT | Facility: OTHER | Age: 68
End: 2023-09-29
Payer: MEDICARE

## 2023-09-29 DIAGNOSIS — I10 ESSENTIAL HYPERTENSION: Primary | ICD-10-CM

## 2023-09-29 DIAGNOSIS — I63.9 CEREBROVASCULAR ACCIDENT (CVA), UNSPECIFIED MECHANISM: ICD-10-CM

## 2023-09-29 NOTE — OUTREACH NOTE
[facility] End of Month Documentation    This Chronic Medical Management Care Plan for Brian Mehta, 68 y.o. male, has been a new plan of care implemented; established and a new plan of care implemented for the month of September.  A cumulative time of 21  minutes was spent on this patient record this month, including phone call with relative; phone call with patient; electronic communication with primary care provider.    Regarding the patient's problems: has CVA (cerebral vascular accident); Essential hypertension; High cholesterol; Hip pain, bilateral; Right knee pain; Vitamin B12 deficiency; Kidney disorder; Chronic pain of both knees; and Sepsis on their problem list., the following items were addressed: medical records; medications and any changes can be found within the plan section of the note.  A detailed listing of time spent for chronic care management is tracked within each outreach encounter.  Current medications include:  has a current medication list which includes the following prescription(s): aspirin, atorvastatin, furosemide, combivent respimat, jardiance, losartan, metoprolol succinate xl, and symbicort. and the patient is reported to be patient is compliant with medication protocol,  Medications are reported to be effective.  Regarding these diagnoses, referrals were made to the following provider(s):  PCP.  All notes on chart for PCP to review.    The patient was monitored remotely for activity level; medications.    The patient's physical needs include:  DME supplies; physical healthcare; needs assistance with ADLs.     The patient's mental support needs include:  continued support    The patient's cognitive support needs include:  not applicable    The patient's psychosocial support needs include:  continued support    The patient's functional needs include: DME; physical healthcare    The patient's environmental needs include:  not applicable    Care Plan overall comments:  No data  recorded    Refer to previous outreach notes for more information on the areas listed above.    Monthly Billing Diagnoses  (I10) Essential hypertension    (I63.9) Cerebrovascular accident (CVA), unspecified mechanism    Medications   Medications have been reconciled    Care Plan progress this month:      Recently Modified Care Plans Updates made since 8/29/2023 12:00 AM       Heart Failure (Adult)           Problem Priority Last Modified     Symptom Exacerbation (Heart Failure) --  9/18/2023  3:25 PM by Juana Mehta RN              Goal Recent Progress Last Modified     Symptom Exacerbation Prevented or Minimized --  9/18/2023  3:25 PM by Juana Mehta RN     Evidence-based guidance:   Perform or review cognitive and/or health literacy screening.   Assess understanding of adherence and barriers to treatment plan, as well as lifestyle changes; develop strategies to address barriers.   Establish a [id number] early intervention process to communicate with primary care provider when signs/symptoms worsen.   Facilitate timely posthospital discharge or emergency department treatment that includes intensive follow-up via telephone calls, home visit, telehealth monitoring and care at multidisciplinary heart failure clinic.   Adjust frequency and intensity of follow-up based on presentation, number of emergency department visits, hospital admissions and frequency and severity of symptom exacerbation.   Facilitate timely visit, usually within 1 week, with primary care provider following hospital discharge.   Collaborate with clinical pharmacist to address adverse drug reactions, drug interactions, subtherapeutic dosage, patient and family education.   Regularly screen for presence of depressive symptoms using a validated tool; consider pharmacologic therapy and/or referral for cognitive behavioral therapy when present.   Refer to community-based services, such as a heart failure support group, community health worker  or peer support program.   Review immunization status; arrange receipt of needed vaccinations.   Prepare patient for home oxygen use based on signs/symptoms.    Notes:            Task Due Date Last Modified     Identify and Minimize Risk of Heart Failure Exacerbation --  9/18/2023  3:26 PM by Juana Mehta RN     Care Management Activities:      - medication-adherence assessment completed  - self-awareness of signs/symptoms of worsening disease encouraged      Notes:                Problem Priority Last Modified     Disease Progression (Heart Failure) --  9/18/2023  3:25 PM by Juana Mehta RN              Goal Recent Progress Last Modified     Comorbidities Identified and Managed --  9/18/2023  3:25 PM by Juana Mehta RN     Evidence-based guidance:   Assess and address signs/symptoms of comorbidity, including dyslipidemia, diabetes, iron deficiency, gout, arthritis, dysrhythmia, hypertension, cachexia, coronary artery disease, kidney dysfunction and lung disease.   Prepare patient for laboratory and diagnostic exams based on risk and presentation.   Prepare for use of pharmacologic therapy that may include statin, angiotensin converting enzyme (ACE) inhibitor, angiotensin receptor blocker (ARB), beta-blocker, digoxin, antidysrhythmic, diuretic or omega-3 fatty acid.   Monitor side effects and anticipate need for periodic adjustments.   Prepare patient for potential invasive treatment, such as implantable cardioverter-defibrillator, cardiac resynchronization therapy or heart transplant as disease progresses.    Notes:            Task Due Date Last Modified     Identify and Manage Comorbidities --  9/18/2023  3:26 PM by Juana Mehta RN     Care Management Activities:      - response to pharmacologic therapy monitored      Notes:              Goal Recent Progress Last Modified     Health Optimized --  9/18/2023  3:25 PM by Juana Mehta RN     Evidence-based guidance:   Use brief intervention, such as 5 A's (Ask,  Advise, Assess, Assist, Arrange) to encourage smoking cessation; refer to smoking cessation program, if ready for more intensive intervention.   Perform or refer to a registered dietitian for a nutrition assessment and nutrition-focused physical exam.    Identify potential micronutrient deficiencies, such as iron, vitamin D and thiamin.   Assess need for potential diet and fluid modification, such as reduced sodium or fluid intake.   Minimize unnecessary dietary restrictions to increase oral intake. Note: Sodium restriction should be individualized to the patient and clinical status.   Facilitate home monitoring of weight.    Notes:            Task Due Date Last Modified     Optimize Health --  9/18/2023  3:27 PM by Juana Mehta RN     Care Management Activities:      - not discussed during this outreach      Notes:                Problem Priority Last Modified     Activity Tolerance (Heart Failure) --  9/18/2023  3:25 PM by Juana Mehta RN              Goal Recent Progress Last Modified     Activity Tolerance Optimized --  9/18/2023  3:25 PM by Juana Mehta RN     Evidence-based guidance:   Promote daily physical activity that improves functional ability, cognition and quality of life.   Encourage reduction in sedentary time.    Encourage optimal, safe functional mobility and self-care performance based on ability and tolerance.    Promote breathing and energy conservation techniques, such as pursed-lip breathing, preplanning and pacing of activity, balancing activity and rest.   Encourage participation in cardiac rehabilitation services.    Notes:            Task Due Date Last Modified     Maintain Strength and Functional Ability --  9/18/2023  3:27 PM by Juana Mehta RN     Care Management Activities:      - not discussed during this outreach      Notes:                Problem Priority Last Modified     Obstructive Sleep Apnea (Heart Failure) --  9/18/2023  3:25 PM by Juana Mehta, RN                           Current Specialty Plan of Care Status signed by both patient and provider    Instructions   Patient was provided an electronic copy of care plan  CCM services were explained and offered and patient has accepted these services.  Patient has given their written consent to receive CCM services and understands that this includes the authorization of electronic communication of medical information with the other treating providers.  Patient understands that they may stop CCM services at any time and these changes will be effective at the end of the calendar month and will effectively revocate the agreement of CCM services.  Patient understands that only one practitioner can furnish and be paid for CCM services during one calendar month.  Patient also understands that there may be co-payment or deductible fees in association with CCM services.  Patient will continue with at least monthly follow-up calls with the Ambulatory .    YANDEL GONZALEZ  Ambulatory Case Management    9/29/2023, 09:43 EDT

## 2023-09-29 NOTE — OUTREACH NOTE
AMBULATORY CASE MANAGEMENT NOTE    Name and Relationship of Patient/Support Person:  -     Chart review performed for hospital bed. Patient no longer requesting hospital bed per PCP documentation.    Education Documentation  No documentation found.        YANDEL GONZALEZ  Ambulatory Case Management    9/29/2023, 09:04 EDT

## 2023-09-30 DIAGNOSIS — I10 ESSENTIAL HYPERTENSION: ICD-10-CM

## 2023-10-02 RX ORDER — LOSARTAN POTASSIUM 25 MG/1
TABLET ORAL
Qty: 90 TABLET | Refills: 0 | OUTPATIENT
Start: 2023-10-02

## 2023-10-06 ENCOUNTER — TELEPHONE (OUTPATIENT)
Dept: FAMILY MEDICINE CLINIC | Facility: CLINIC | Age: 68
End: 2023-10-06

## 2023-10-06 NOTE — TELEPHONE ENCOUNTER
"  Caller: Brian Mehta \"Gio\"    Relationship: Self    Best call back number: [phone]     What was the call regarding: PATIENT STATES HE WOULD LIKE TO KNOW IF HE CAN TAKE 50 MG - 2000 IU VITAMIN D GUMMIES ONCE PER DAY.  "

## 2023-10-09 ENCOUNTER — PATIENT OUTREACH (OUTPATIENT)
Dept: CASE MANAGEMENT | Facility: OTHER | Age: 68
End: 2023-10-09
Payer: MEDICARE

## 2023-10-09 DIAGNOSIS — I10 ESSENTIAL HYPERTENSION: Primary | ICD-10-CM

## 2023-10-09 DIAGNOSIS — I63.9 CEREBROVASCULAR ACCIDENT (CVA), UNSPECIFIED MECHANISM: ICD-10-CM

## 2023-10-09 NOTE — OUTREACH NOTE
AMBULATORY CASE MANAGEMENT NOTE    Name and Relationship of Patient/Support Person: FERDINAND CATALAN - Emergency Contact    [facility] Interim Update    Spoke to patients wife. She is inquiring on getting a Dexcom G7 for patient. He does not currently take insulin and does not check blood sugar. Unfortunately, he would not qualify through insurance for Dexcom. Advised wife that should he ever  need to start insulin therapy, we can revisit order for Dexcom at that time. I did let her know that we could send in a standard blood sugar monitor, but patient does not want to prick fingers to check blood sugar.     Patients wife is requesting to move his appointment to 10/30 so that they can be seen in office at the same time.    Care Coordination    Spoke to scheduling Genevieve fraser. She rescheduled his appointment for 10/30 at 11:00 with PCP.     [facility] Interim Update    Called and informed patients wife of new appointment date and time. She verbalized understanding and will call [employer] to schedule rides.            Education Documentation  No documentation found.        YANDEL GONZALEZ  Ambulatory Case Management    10/9/2023, 10:12 EDT

## 2023-10-26 DIAGNOSIS — I10 ESSENTIAL HYPERTENSION: ICD-10-CM

## 2023-10-26 DIAGNOSIS — E78.00 HIGH CHOLESTEROL: ICD-10-CM

## 2023-10-26 RX ORDER — LOSARTAN POTASSIUM 25 MG/1
25 TABLET ORAL DAILY
Qty: 90 TABLET | Refills: 0 | Status: SHIPPED | OUTPATIENT
Start: 2023-10-26

## 2023-10-26 RX ORDER — ATORVASTATIN CALCIUM 20 MG/1
20 TABLET, FILM COATED ORAL NIGHTLY
Qty: 90 TABLET | Refills: 0 | Status: SHIPPED | OUTPATIENT
Start: 2023-10-26

## 2023-10-26 NOTE — TELEPHONE ENCOUNTER
Caller: FERDINAND CATALAN    Relationship: Emergency Contact    Best call back number:     [phone] (Mobile)       Requested Prescriptions:   Requested Prescriptions     Pending Prescriptions Disp Refills    losartan (COZAAR) 25 MG tablet 90 tablet 0     Sig: Take 1 tablet by mouth Daily.    atorvastatin (LIPITOR) 20 MG tablet 90 tablet 0     Sig: Take 1 tablet by mouth Every Night.    HYDROCHLOROTHIAZIDE 12.5 ONCE DAILY PER WIFE (NOT IN LIST)    Pharmacy where request should be sent:  [facility] - [address] - [phone]  - [phone]  [phone]     Last office visit with prescribing clinician: 9/21/2023   Last telemedicine visit with prescribing clinician: Visit date not found   Next office visit with prescribing clinician: 10/30/2023     Additional details provided by patient: HCTZ NOT IN LIST    Does the patient have less than a 3 day supply:  [] Yes  [x] No    Would you like a call back once the refill request has been completed: [] Yes [] No    If the office needs to give you a call back, can they leave a voicemail: [] Yes [] No    Bailey Rhodes Rep   10/26/23 10:53 EDT

## 2023-10-27 ENCOUNTER — TELEPHONE (OUTPATIENT)
Dept: FAMILY MEDICINE CLINIC | Facility: CLINIC | Age: 68
End: 2023-10-27
Payer: MEDICARE

## 2023-11-10 ENCOUNTER — TELEPHONE (OUTPATIENT)
Dept: FAMILY MEDICINE CLINIC | Facility: CLINIC | Age: 68
End: 2023-11-10
Payer: MEDICARE

## 2023-11-10 NOTE — TELEPHONE ENCOUNTER
Caller: FERDINAND CATALAN    Relationship: Emergency Contact    Best call back number: [phone]    What form or medical record are you requesting: DOCUMENTATION THAT PATIENT REQUIRES HIS WHEELCHAIR WHEN BEING TRANSPORTED FOR THE COMPANY THAT PROVIDES TRANSPORTATION    Who is requesting this form or medical record from you: TRANSPORTATION COMPANY     How would you like to receive the form or medical records (pick-up, mail, fax):   If fax, what is the fax number:       Timeframe paperwork needed: BEFORE HIS NEXT APPOINTMENT ON 11/21/2023    Additional notes:  PATIENT DOES NOT HAVE THE FAX NUMBER, [employer] CAN BE CONTACTED -[phone]  PLEASE CALL IF THERE ARE ANY QUESTIONS

## 2023-11-10 NOTE — TELEPHONE ENCOUNTER
Called [employer] transportation to get a fax number, they did not have one available.  Was transferred to 2 different people who didn't know who or where to send letter to.  Will mail copy to patient.  Attempted to notify patient no answer and no voicemail (hub to read)

## 2023-11-21 ENCOUNTER — OFFICE VISIT (OUTPATIENT)
Dept: FAMILY MEDICINE CLINIC | Facility: CLINIC | Age: 68
End: 2023-11-21
Payer: MEDICARE

## 2023-11-21 VITALS
SYSTOLIC BLOOD PRESSURE: 112 MMHG | HEART RATE: 112 BPM | OXYGEN SATURATION: 95 % | TEMPERATURE: 97.3 F | HEIGHT: 75 IN | WEIGHT: 242.29 LBS | BODY MASS INDEX: 30.13 KG/M2 | DIASTOLIC BLOOD PRESSURE: 70 MMHG

## 2023-11-21 DIAGNOSIS — Z00.00 ENCOUNTER FOR ANNUAL WELLNESS EXAM IN MEDICARE PATIENT: Primary | ICD-10-CM

## 2023-11-21 DIAGNOSIS — Z23 IMMUNIZATION DUE: ICD-10-CM

## 2023-11-21 DIAGNOSIS — I50.9 CONGESTIVE HEART FAILURE, UNSPECIFIED HF CHRONICITY, UNSPECIFIED HEART FAILURE TYPE: ICD-10-CM

## 2023-11-21 DIAGNOSIS — R60.9 PERIPHERAL EDEMA: ICD-10-CM

## 2023-11-21 DIAGNOSIS — H61.91 LESION OF SKIN OF RIGHT EAR: ICD-10-CM

## 2023-11-21 RX ORDER — HYDROCHLOROTHIAZIDE 25 MG/1
25 TABLET ORAL DAILY
Qty: 90 TABLET | Refills: 0 | Status: SHIPPED | OUTPATIENT
Start: 2023-11-21

## 2023-11-21 RX ORDER — HYDROCHLOROTHIAZIDE 12.5 MG/1
12.5 TABLET ORAL DAILY
COMMUNITY
End: 2023-11-21 | Stop reason: SDUPTHER

## 2023-11-21 NOTE — PROGRESS NOTES
The ABCs of the Annual Wellness Visit  Subsequent Medicare Wellness Visit    Subjective    Brian Mehta is a 68 y.o. male who presents for a Subsequent Medicare Wellness Visit.    The following portions of the patient's history were reviewed and   updated as appropriate: allergies, current medications, past family history, past medical history, past social history, past surgical history, and problem list.    Compared to one year ago, the patient feels his physical   health is better.    Compared to one year ago, the patient feels his mental   health is better.    Recent Hospitalizations:  This patient has had a [facility] admission record on file within the last 365 days.    Current Medical Providers:  Patient Care Team:  Sophie Capps APRN as PCP - General (Nurse Practitioner)  Juana Mehta RN as Ambulatory  ([facility])    Outpatient Medications Prior to Visit   Medication Sig Dispense Refill    aspirin 81 MG chewable tablet Chew 1 tablet Daily. 90 tablet 1    atorvastatin (LIPITOR) 20 MG tablet Take 1 tablet by mouth Every Night. 90 tablet 0    Cholecalciferol (VITAMIN D-3 PO) Take  by mouth.      ipratropium-albuterol (Combivent Respimat)  MCG/ACT inhaler Inhale 1 puff 4 (Four) Times a Day.      Jardiance 25 MG tablet tablet Take 1 tablet by mouth Daily. 90 tablet 0    losartan (COZAAR) 25 MG tablet Take 1 tablet by mouth Daily. 90 tablet 0    hydroCHLOROthiazide (HYDRODIURIL) 12.5 MG tablet Take 1 tablet by mouth Daily.      furosemide (LASIX) 20 MG tablet Take 1 tablet by mouth Daily for 30 days. 30 tablet 0    metoprolol succinate XL (TOPROL-XL) 25 MG 24 hr tablet Take 1 tablet by mouth Daily for 30 days. 30 tablet 0    Symbicort 160-4.5 MCG/ACT inhaler Inhale 2 puffs 2 (Two) Times a Day.       No facility-administered medications prior to visit.       No opioid medication identified on active medication list. I have reviewed chart for other potential  high risk medication/s  "and harmful drug interactions in the elderly.        Aspirin is on active medication list. Aspirin use is indicated based on review of current medical condition/s. Pros and cons of this therapy have been discussed today. Benefits of this medication outweigh potential harm.  Patient has been encouraged to continue taking this medication.  .      Patient Active Problem List   Diagnosis    CVA (cerebral vascular accident)    Essential hypertension    High cholesterol    Hip pain, bilateral    Right knee pain    Vitamin B12 deficiency    Kidney disorder    Chronic pain of both knees    Sepsis     Advance Care Planning   Advance Care Planning     Advance Directive is not on file.  ACP discussion was held with the patient during this visit. Patient does not have an advance directive, information provided.     Objective    Vitals:    11/21/23 1136   BP: 112/70   BP Location: Left arm   Pulse: 112   Temp: 97.3 °F (36.3 °C)   SpO2: 95%   Weight: 110 kg (242 lb 4.6 oz)   Height: 190.5 cm (75\")     Estimated body mass index is 30.28 kg/m² as calculated from the following:    Height as of this encounter: 190.5 cm (75\").    Weight as of this encounter: 110 kg (242 lb 4.6 oz).           Does the patient have evidence of cognitive impairment? No - pt with a hx of stroke without any memory changes or cognitive impairments. He has been released from Home Health nurse and PT.           HEALTH RISK ASSESSMENT    Smoking Status:  Social History     Tobacco Use   Smoking Status Every Day    Packs/day: 2.50    Years: 51.00    Additional pack years: 0.00    Total pack years: 127.50    Types: Cigarettes    Start date: 1972    Passive exposure: Current   Smokeless Tobacco Never     Alcohol Consumption:  Social History     Substance and Sexual Activity   Alcohol Use Yes     Fall Risk Screen:    STEADI Fall Risk Assessment was completed, and patient is at MODERATE risk for falls. Assessment completed on:11/21/2023    Depression Screening:     "  2023    11:43 AM   PHQ-2/PHQ-9 Depression Screening   Little Interest or Pleasure in Doing Things 0-->not at all   Feeling Down, Depressed or Hopeless 0-->not at all   PHQ-9: Brief Depression Severity Measure Score 0       Health Habits and Functional and Cognitive Screenin/21/2023    11:40 AM   Functional & Cognitive Status   Do you have difficulty preparing food and eating? No   Do you have difficulty bathing yourself, getting dressed or grooming yourself? Yes   Do you have difficulty using the toilet? No   Do you have difficulty moving around from place to place? No   Do you have trouble with steps or getting out of a bed or a chair? No   Current Diet Diabetic Diet   Dental Exam Not up to date   Eye Exam Up to date   Exercise (times per week) 0 times per week   Current Exercises Include No Regular Exercise   Do you need help using the phone?  No   Are you deaf or do you have serious difficulty hearing?  No   Do you need help to go to places out of walking distance? Yes   Do you need help shopping? No   Do you need help preparing meals?  Yes   Do you need help with housework?  Yes   Do you need help with laundry? No   Do you need help taking your medications? No   Do you need help managing money? No   Do you ever drive or ride in a car without wearing a seat belt? No   Have you felt unusual stress, anger or loneliness in the last month? No   Who do you live with? Spouse   If you need help, do you have trouble finding someone available to you? Yes   Do you have difficulty concentrating, remembering or making decisions? No       Age-appropriate Screening Schedule:  Refer to the list below for future screening recommendations based on patient's age, sex and/or medical conditions. Orders for these recommended tests are listed in the plan section. The patient has been provided with a written plan.    Health Maintenance   Topic Date Due    URINE MICROALBUMIN  Never done    ZOSTER VACCINE (1 of 2) Never  done    ANNUAL WELLNESS VISIT  Never done    DIABETIC EYE EXAM  Never done    COVID-19 Vaccine (2 - 2023-24 season) 09/01/2023    HEMOGLOBIN A1C  01/18/2024    LIPID PANEL  07/18/2024    BMI FOLLOWUP  07/18/2024    LUNG CANCER SCREENING  08/11/2024    DIABETIC FOOT EXAM  09/21/2024    COLORECTAL CANCER SCREENING  12/02/2024    TDAP/TD VACCINES (2 - Td or Tdap) 01/11/2027    HEPATITIS C SCREENING  Completed    INFLUENZA VACCINE  Completed    Pneumococcal Vaccine 65+  Completed    AAA SCREEN (ONE-TIME)  Completed                  CMS Preventative Services Quick Reference  Risk Factors Identified During Encounter  Fall Risk-High or Moderate:  Pt had home PT and does home exercises.   Hearing Problem:  issues with Right ear will evaluate today.   Immunizations Discussed/Encouraged: Influenza and Pneumococcal 23  Inactivity/Sedentary:  increase activity as tolerated.   Tobacco Use/Dependance Risk (use dotphrase .tobaccocessation for documentation)  The above risks/problems have been discussed with the patient.  Pertinent information has been shared with the patient in the After Visit Summary.  An After Visit Summary and PPPS were made available to the patient.    Follow Up:   Next Medicare Wellness visit to be scheduled in 1 year.       Additional E&M Note during same encounter follows:  Patient has multiple medical problems which are significant and separately identifiable that require additional work above and beyond the Medicare Wellness Visit.      Chief Complaint  Medicare Wellness-subsequent (Annual Medicare Wellness)    Subjective        HPI  Brian Mehta is also being seen today for refill of medication.     Peripheral edema and HTN: Pt has a script bottle for HCTZ 12.5mg and states he is taking that and not Lasix.     Right ear discomfort - pt thinks it is from neighbor blowing leaf blower.     DM: unknown last eye exam.   Review of Systems   Cardiovascular:  Negative for chest pain and palpitations.  "      Objective   Vital Signs:  /70 (BP Location: Left arm)   Pulse 112   Temp 97.3 °F (36.3 °C)   Ht 190.5 cm (75\")   Wt 110 kg (242 lb 4.6 oz)   SpO2 95%   BMI 30.28 kg/m²     Physical Exam  Vitals reviewed.   Constitutional:       General: He is not in acute distress.     Appearance: Normal appearance. He is well-developed.   HENT:      Head: Normocephalic and atraumatic.        Right Ear: Tympanic membrane and ear canal normal.      Left Ear: Tympanic membrane and ear canal normal.   Eyes:      Conjunctiva/sclera: Conjunctivae normal.      Pupils: Pupils are equal, round, and reactive to light.   Cardiovascular:      Rate and Rhythm: Normal rate and regular rhythm.      Heart sounds: Normal heart sounds.   Pulmonary:      Effort: Pulmonary effort is normal.      Breath sounds: Normal breath sounds.   Musculoskeletal:      Cervical back: Neck supple.      Right lower le+ Pitting Edema present.      Left lower le+ Pitting Edema present.      Comments: Sitting in wheelchair   Skin:     General: Skin is warm and dry.   Neurological:      Mental Status: He is alert and oriented to person, place, and time.   Psychiatric:         Mood and Affect: Mood and affect normal.         Behavior: Behavior normal.         Thought Content: Thought content normal.         Judgment: Judgment normal.          The following data was reviewed by: MORRO Sam on 2023:  Common labs          9/15/2023    04:41 2023    05:14 2023    16:04   Common Labs   Glucose 152  113  129    BUN 11  10  13    Creatinine 0.82  0.80  0.77    Sodium 137  136  138    Potassium 3.6  3.8  4.4    Chloride 103  104  100    Calcium 8.5  9.1  9.1    Albumin 2.7   3.3    Total Bilirubin   0.2    Alkaline Phosphatase   99    AST (SGOT)   12    ALT (SGPT)   9    WBC 5.63  5.45     Hemoglobin 12.0  13.1     Hematocrit 39.4  42.5     Platelets 226  287       TSH          2023    14:34   TSH   TSH 1.740          "        Assessment and Plan   Diagnoses and all orders for this visit:    1. Encounter for annual wellness exam in Medicare patient (Primary)    2. Immunization due  -     Fluzone High-Dose 65+yrs  -     Pneumococcal Conjugate Vaccine 20-Valent All    3. Congestive heart failure, unspecified HF chronicity, unspecified heart failure type    4. Peripheral edema  -     hydroCHLOROthiazide (HYDRODIURIL) 25 MG tablet; Take 1 tablet by mouth Daily.  Dispense: 90 tablet; Refill: 0    5. Lesion of skin of right ear  -     mupirocin (BACTROBAN) 2 % ointment; Apply 1 application  topically to the appropriate area as directed 3 (Three) Times a Day.  Dispense: 22 g; Refill: 0           Follow Up   No follow-ups on file.  Patient was given instructions and counseling regarding his condition or for health maintenance advice. Please see specific information pulled into the AVS if appropriate.

## 2023-11-29 DIAGNOSIS — E11.9 TYPE 2 DIABETES MELLITUS WITHOUT COMPLICATION, WITHOUT LONG-TERM CURRENT USE OF INSULIN: ICD-10-CM

## 2023-11-29 RX ORDER — EMPAGLIFLOZIN 25 MG/1
25 TABLET, FILM COATED ORAL DAILY
Qty: 90 TABLET | Refills: 0 | Status: SHIPPED | OUTPATIENT
Start: 2023-11-29

## 2023-12-07 ENCOUNTER — OFFICE VISIT (OUTPATIENT)
Dept: CARDIOLOGY | Facility: CLINIC | Age: 68
End: 2023-12-07
Payer: MEDICARE

## 2023-12-07 VITALS
DIASTOLIC BLOOD PRESSURE: 69 MMHG | BODY MASS INDEX: 26.86 KG/M2 | HEART RATE: 97 BPM | HEIGHT: 75 IN | WEIGHT: 216 LBS | SYSTOLIC BLOOD PRESSURE: 117 MMHG

## 2023-12-07 DIAGNOSIS — I10 ESSENTIAL HYPERTENSION: ICD-10-CM

## 2023-12-07 DIAGNOSIS — E78.00 HIGH CHOLESTEROL: ICD-10-CM

## 2023-12-07 DIAGNOSIS — I51.7 LVH (LEFT VENTRICULAR HYPERTROPHY): ICD-10-CM

## 2023-12-07 DIAGNOSIS — I50.22 CHRONIC HFREF (HEART FAILURE WITH REDUCED EJECTION FRACTION): Primary | ICD-10-CM

## 2023-12-07 NOTE — ASSESSMENT & PLAN NOTE
Seen incidentally on EKG could be related to hypertension now blood pressure currently well-controlled we will continue with his current regimen will monitor further in the future

## 2023-12-07 NOTE — PROGRESS NOTES
Chief Complaint  Establish Care, Congestive Heart Failure, Hypertension, and Hyperlipidemia      History of Present Illness  Brian Mehta presents to [facility] CARDIOLOGY  Patient is a 68-year-old with a prior history of CVA, essential hypertension, dyslipidemia, and diabetes who was recently found to have new onset of congestive heart failure after an admission on 9/23 for pneumonia as well as COPD exacerbation.  The patient reports persistent dyspnea exertion and has a very limited mobility usually gets around with a walker or cane.  He reports lower extremity edema which is chronic and not worsened.  No PND orthopnea issues.  He denies any anginal chest discomfort issues.  He still is an active smoker    Past Medical History:   Diagnosis Date    Diabetes mellitus     Hyperlipidemia          Current Outpatient Medications:     aspirin 81 MG chewable tablet, Chew 1 tablet Daily., Disp: 90 tablet, Rfl: 1    atorvastatin (LIPITOR) 20 MG tablet, Take 1 tablet by mouth Every Night., Disp: 90 tablet, Rfl: 0    Cholecalciferol (VITAMIN D-3 PO), Take  by mouth., Disp: , Rfl:     hydroCHLOROthiazide (HYDRODIURIL) 25 MG tablet, Take 1 tablet by mouth Daily., Disp: 90 tablet, Rfl: 0    ipratropium-albuterol (Combivent Respimat)  MCG/ACT inhaler, Inhale 1 puff 4 (Four) Times a Day., Disp: , Rfl:     Jardiance 25 MG tablet tablet, TAKE 1 TABLET EVERY DAY, Disp: 90 tablet, Rfl: 0    losartan (COZAAR) 25 MG tablet, Take 1 tablet by mouth Daily., Disp: 90 tablet, Rfl: 0    mupirocin (BACTROBAN) 2 % ointment, Apply 1 application  topically to the appropriate area as directed 3 (Three) Times a Day., Disp: 22 g, Rfl: 0    metoprolol succinate XL (TOPROL-XL) 25 MG 24 hr tablet, Take 1 tablet by mouth Daily for 30 days., Disp: 30 tablet, Rfl: 0    Medications Discontinued During This Encounter   Medication Reason    furosemide (LASIX) 20 MG tablet Alternate therapy     No Known Allergies     Social History  "    Tobacco Use    Smoking status: Every Day     Packs/day: 2.50     Years: 51.00     Additional pack years: 0.00     Total pack years: 127.50     Types: Cigarettes     Start date: 1972     Passive exposure: Current    Smokeless tobacco: Never   Vaping Use    Vaping Use: Never used   Substance Use Topics    Alcohol use: Yes    Drug use: Never       Family History   Problem Relation Age of Onset    No Known Problems Mother     No Known Problems Father         Objective     /69   Pulse 97   Ht 190.5 cm (75\")   Wt 98 kg (216 lb)   BMI 27.00 kg/m²       Physical Exam    General Appearance:   no acute distress  Alert and oriented x3  HENT:   lips not cyanotic  Atraumatic  Neck:  No jvd   supple  Respiratory:  no respiratory distress  normal breath sounds  no rales  Cardiovascular:  Regular rate and rhythm  no S3, no S4   no murmur  no rub  Extremities  No cyanosis  lower extremity edema: +1 skin:   warm, dry  No rashes    Result Review :     proBNP   Date Value Ref Range Status   09/13/2023 495.7 0.0 - 900.0 pg/mL Final     CMP          9/15/2023    04:41 9/16/2023    05:14 9/21/2023    16:04   CMP   Glucose 152  113  129    BUN 11  10  13    Creatinine 0.82  0.80  0.77    EGFR 96.9  97.6  98.7    Sodium 137  136  138    Potassium 3.6  3.8  4.4    Chloride 103  104  100    Calcium 8.5  9.1  9.1    Total Protein   7.5    Albumin 2.7   3.3    Globulin   4.2    Total Bilirubin   0.2    Alkaline Phosphatase   99    AST (SGOT)   12    ALT (SGPT)   9    Albumin/Globulin Ratio   0.8    BUN/Creatinine Ratio 13.4  12.5  16.9    Anion Gap 9.1  9.5  10.0      CBC w/diff          9/14/2023    06:30 9/15/2023    04:41 9/16/2023    05:14   CBC w/Diff   WBC 6.01  5.63  5.45    RBC 5.30  4.99  5.43    Hemoglobin 12.9  12.0  13.1    Hematocrit 41.5  39.4  42.5    MCV 78.3  79.0  78.3    MCH 24.3  24.0  24.1    MCHC 31.1  30.5  30.8    RDW 16.1  16.1  15.9    Platelets 221  226  287    Neutrophil Rel % 57.2  51.0  47.8  " "  Immature Granulocyte Rel % 0.3  0.2  0.2    Lymphocyte Rel % 32.8  37.5  41.7    Monocyte Rel % 8.7  8.5  7.9    Eosinophil Rel % 0.7  2.3  1.8    Basophil Rel % 0.3  0.5  0.6       Lab Results   Component Value Date    TSH 1.740 07/18/2023      No results found for: \"FREET4\"   No results found for: \"DDIMERQUANT\"  Magnesium   Date Value Ref Range Status   09/16/2023 2.0 1.6 - 2.4 mg/dL Final      No results found for: \"DIGOXIN\"   Lab Results   Component Value Date    TROPONINT 9 09/14/2023      No results found for: \"POCTROP\"(       Lipid Panel          7/18/2023    14:34   Lipid Panel   Total Cholesterol 156    Triglycerides 250    HDL Cholesterol 34    VLDL Cholesterol 41    LDL Cholesterol  81    LDL/HDL Ratio 2.12      Results for orders placed during the hospital encounter of 09/13/23    Adult Transthoracic Echo Complete w/ Color, Spectral and Contrast if necessary per protocol    Interpretation Summary    There is mild global hypokinesis of the left ventricle.  Estimated LV ejection fraction is 40 to 45%.    Left ventricular wall thickness is consistent with mild concentric hypertrophy.    Left ventricular diastolic function is consistent with (grade I) impaired relaxation.    There are no significant valvular abnormalities.    Estimated right ventricular systolic pressure from tricuspid regurgitation is normal (<35 mmHg).         No results found for this or any previous visit.           Results for orders placed during the hospital encounter of 09/13/23    Duplex Venous Lower Extremity - Left CV-READ    Interpretation Summary    Normal left lower extremity venous duplex scan.    The ASCVD Risk score ([place] DK, et al., 2019) failed to calculate for the following reasons:    The patient has a prior MI or stroke diagnosis     Diagnoses and all orders for this visit:    1. Chronic HFrEF (heart failure with reduced ejection fraction) (Primary)  Assessment & Plan:  New systolic heart failure unknown etiology in " the ventricle certainly ischemia is possible given his risk factors do recommend a noninvasive stress test to evaluate for any occlusive disease.  Also given his risk factors recommend continue with preventatively aspirin 81 mg as well as optimizing his Lipitor to achieve a goal LDL less than 70.  Patient stable from a volume standpoint at this point we will continue with his Toprol 25 daily and losartan 25 daily dosing.  Did encourage patient on low-sodium diet as well as keep a daily weight log patient is on HCTZ 25 mg for diuretic    Orders:  -     Stress Test With Myocardial Perfusion One Day; Future    2. LVH (left ventricular hypertrophy)  Assessment & Plan:  Seen incidentally on EKG could be related to hypertension now blood pressure currently well-controlled we will continue with his current regimen will monitor further in the future      3. High cholesterol  Assessment & Plan:  Continue Lipitor 20 nightly if LDL remains above 70 will uptitrate on follow-up visit      4. Essential hypertension  Assessment & Plan:  Continue with losartan 25, Toprol 25 daily and HCTZ 25 daily dosing              Follow Up     Return in about 6 months (around 6/7/2024) for Follow with Radha Wolf, EKG with F/U.          Patient was given instructions and counseling regarding his condition or for health maintenance advice. Please see specific information pulled into the AVS if appropriate.

## 2023-12-07 NOTE — ASSESSMENT & PLAN NOTE
New systolic heart failure unknown etiology in the ventricle certainly ischemia is possible given his risk factors do recommend a noninvasive stress test to evaluate for any occlusive disease.  Also given his risk factors recommend continue with preventatively aspirin 81 mg as well as optimizing his Lipitor to achieve a goal LDL less than 70.  Patient stable from a volume standpoint at this point we will continue with his Toprol 25 daily and losartan 25 daily dosing.  Did encourage patient on low-sodium diet as well as keep a daily weight log patient is on HCTZ 25 mg for diuretic

## 2023-12-14 ENCOUNTER — PATIENT OUTREACH (OUTPATIENT)
Dept: CASE MANAGEMENT | Facility: OTHER | Age: 68
End: 2023-12-14
Payer: MEDICARE

## 2023-12-14 ENCOUNTER — TELEPHONE (OUTPATIENT)
Dept: CASE MANAGEMENT | Facility: OTHER | Age: 68
End: 2023-12-14
Payer: MEDICARE

## 2023-12-14 DIAGNOSIS — I10 ESSENTIAL HYPERTENSION: Primary | ICD-10-CM

## 2023-12-14 DIAGNOSIS — I63.9 CEREBROVASCULAR ACCIDENT (CVA), UNSPECIFIED MECHANISM: ICD-10-CM

## 2023-12-14 NOTE — OUTREACH NOTE
"AMBULATORY CASE MANAGEMENT NOTE    Name and Relationship of Patient/Support Person: SAIDA CATALANI - Emergency Contact    [facility] Interim Update    Received call from patient spouse who unfortunately is experiencing a great amount of caregiver strain. She performs many physical activities for the patient, including helping to dress him, change him, and transport him with transport wheelchair. Patients spouse reports that \"he probably could get dressed on his own, but he won't do it\". She states that he ambulates using a walker or wheelchair. She reports that she is really stressed and \"just needs a break\". I did inquire with patients spouse if she has looked in to Respite services. She states that she has thought about it before, but did not know how to move forward with it.    Patients spouse reports that patient has recently completed physical and occupational therapies and had home health nursing come out as well. He has graduated from these programs. She states that when the weather was warmer, patient was getting around well with walker, and even going on walks. But since it is \"too cold for him to be outside\", he is using his walker less and his wheelchair more often. States that they have had a difficult time with [employer] being agreeable to use his wheelchair, because it is just a transport chair. I did ask spouse if she would like for me to look in to a standard wheelchair for him, but she politely declines and states that they like the one he has because of the ease in which they are able to get around with it.     Patient recently saw Dr. Najera and has been diagnosed with new onset congestive heart failure. At time of appointment last week, volume load was appropriate. He is to continue current medications of Toprol 25 mg daily, Losartan 25 mg daily, and HCTZ 25 mg daily. Will plan to get daily weights on next outreach.    Care Coordination    Placed referral for MSW.    [facility] updated and reviewed with the patient " during this program:  Financial Resource Strain: Medium Risk (12/14/2023)    Overall Financial Resource Strain (CARDIA)     Difficulty of Paying Living Expenses: Somewhat hard      Food Insecurity: No Food Insecurity (9/15/2023)    Hunger Vital Sign     Worried About Running Out of Food in the Last Year: Never true     Ran Out of Food in the Last Year: Never true      Transportation Needs: No Transportation Needs (12/14/2023)    PRAPARE - Transportation     Lack of Transportation (Medical): No     Lack of Transportation (Non-Medical): No      Housing Stability: High Risk (12/14/2023)    Housing Stability Vital Sign     Unable to Pay for Housing in the Last Year: Yes     Number of Places Lived in the Last Year: 1     Unstable Housing in the Last Year: No       Education Documentation  No documentation found.        YANDEL GONZALEZ  Ambulatory Case Management    12/14/2023, 15:39 EST

## 2023-12-15 ENCOUNTER — PATIENT OUTREACH (OUTPATIENT)
Dept: CASE MANAGEMENT | Facility: OTHER | Age: 68
End: 2023-12-15
Payer: MEDICARE

## 2023-12-15 NOTE — OUTREACH NOTE
Social Work Assessment  Questions/Answers      Flowsheet Row Most Recent Value   Referral Source nursing   Reason for Consult community resources   Preferred Language English   People in Home spouse   Current Living Arrangements home   Primary Care Provided by spouse/significant other   Source of Income social security   Application for Public Assistance obtained public assistance pending number          SDOH updated and reviewed with the patient during this program:  Financial Resource Strain: Medium Risk (12/14/2023)    Overall Financial Resource Strain (CARDIA)     Difficulty of Paying Living Expenses: Somewhat hard      Food Insecurity: No Food Insecurity (9/15/2023)    Hunger Vital Sign     Worried About Running Out of Food in the Last Year: Never true     Ran Out of Food in the Last Year: Never true      Transportation Needs: No Transportation Needs (12/14/2023)    PRAPARE - Transportation     Lack of Transportation (Medical): No     Lack of Transportation (Non-Medical): No      Housing Stability: Not At Risk (12/15/2023)    Housing Stability     Current Living Arrangements: home     Potentially Unsafe Housing Conditions: none   Recent Concern: Housing Stability - High Risk (12/14/2023)    Housing Stability Vital Sign     Unable to Pay for Housing in the Last Year: Yes     Number of Places Lived in the Last Year: 1     Unstable Housing in the Last Year: No     Care Coordination    MSW received new referral from [facility]. MSW has worked with patient in the past. MSW emailed Michelle at [facility] to follow up on past referrals and check on status of any services they have provided. MSW to follow up regarding services.    Luisana LIU -   Ambulatory Case Management    12/15/2023, 11:46 EST

## 2023-12-18 ENCOUNTER — PATIENT OUTREACH (OUTPATIENT)
Dept: CASE MANAGEMENT | Facility: OTHER | Age: 68
End: 2023-12-18
Payer: MEDICARE

## 2023-12-18 NOTE — OUTREACH NOTE
Care Coordination    MSW spoke with Michelle at [facility] and patient has  currently through homemaker program services. Patient is receiving home delivered meals and on waitlist for homemaker services. Michelle has relayed patient/spouse interest in respite and personal care services for additional supports to their CM through [facility].    Luisana LIU -   Ambulatory Case Management    12/18/2023, 15:36 EST

## 2023-12-29 ENCOUNTER — PATIENT OUTREACH (OUTPATIENT)
Dept: CASE MANAGEMENT | Facility: OTHER | Age: 68
End: 2023-12-29
Payer: MEDICARE

## 2023-12-29 DIAGNOSIS — I63.9 CEREBROVASCULAR ACCIDENT (CVA), UNSPECIFIED MECHANISM: ICD-10-CM

## 2023-12-29 DIAGNOSIS — I10 ESSENTIAL HYPERTENSION: Primary | ICD-10-CM

## 2023-12-29 NOTE — OUTREACH NOTE
[facility] End of Month Documentation    This Chronic Medical Management Care Plan for Brian Mehta, 68 y.o. male, has been monitored and managed; reviewed and a new plan of care implemented for the month of December.  A cumulative time of 21  minutes was spent on this patient record this month, including phone call with relative; phone call with patient, Referral to MSW.    Regarding the patient's problems: has CVA (cerebral vascular accident); Essential hypertension; High cholesterol; Hip pain, bilateral; Right knee pain; Vitamin B12 deficiency; Kidney disorder; Chronic pain of both knees; Sepsis; Chronic HFrEF (heart failure with reduced ejection fraction); and LVH (left ventricular hypertrophy) on their problem list., the following items were addressed: medical records; medications and any changes can be found within the plan section of the note.  A detailed listing of time spent for chronic care management is tracked within each outreach encounter.  Current medications include:  has a current medication list which includes the following prescription(s): aspirin, atorvastatin, cholecalciferol, hydrochlorothiazide, combivent respimat, jardiance, losartan, metoprolol succinate xl, and mupirocin. and the patient is reported to be patient is compliant with medication protocol,  Medications are reported to be effective.  Regarding these diagnoses, referrals were made to the following provider(s):  MSW.  All notes on chart for PCP to review.    The patient was monitored remotely for activity level; medications.    The patient's physical needs include:  DME supplies; physical healthcare; needs assistance with ADLs.     The patient's mental support needs include:  continued support    The patient's cognitive support needs include:  needs assistance with ADLs; requires supervision; household care; health care    The patient's psychosocial support needs include:  continued support    The patient's functional needs include: DME;  physical healthcare    The patient's environmental needs include:  not applicable    Care Plan overall comments:  No data recorded    Refer to previous outreach notes for more information on the areas listed above.    Monthly Billing Diagnoses  (I10) Essential hypertension    (I63.9) Cerebrovascular accident (CVA), unspecified mechanism    Medications   Medications have been reconciled    Care Plan progress this month:      Recently Modified Care Plans Updates made since 11/28/2023 12:00 AM      No recently modified care plans.            Instructions   Patient was provided an electronic copy of care plan  CCM services were explained and offered and patient has accepted these services.  Patient has given their written consent to receive CCM services and understands that this includes the authorization of electronic communication of medical information with the other treating providers.  Patient understands that they may stop CCM services at any time and these changes will be effective at the end of the calendar month and will effectively revocate the agreement of CCM services.  Patient understands that only one practitioner can furnish and be paid for CCM services during one calendar month.  Patient also understands that there may be co-payment or deductible fees in association with CCM services.  Patient will continue with at least monthly follow-up calls with the Ambulatory .    YANDEL GONZALEZ  Ambulatory Case Management    12/29/2023, 09:30 EST

## 2024-01-23 ENCOUNTER — TELEPHONE (OUTPATIENT)
Dept: CASE MANAGEMENT | Facility: OTHER | Age: 69
End: 2024-01-23
Payer: MEDICAID

## 2024-03-04 ENCOUNTER — HOSPITAL ENCOUNTER (OUTPATIENT)
Dept: NUCLEAR MEDICINE | Facility: HOSPITAL | Age: 69
Discharge: HOME | End: 2024-03-04
Admitting: INTERNAL MEDICINE
Payer: MEDICARE

## 2024-03-04 DIAGNOSIS — I50.22 CHRONIC HFREF (HEART FAILURE WITH REDUCED EJECTION FRACTION): ICD-10-CM

## 2024-03-04 PROCEDURE — 0 TECHNETIUM TETROFOSMIN KIT: Performed by: INTERNAL MEDICINE

## 2024-03-04 PROCEDURE — 93017 CV STRESS TEST TRACING ONLY: CPT

## 2024-03-04 PROCEDURE — 25010000002 REGADENOSON 0.4 MG/5ML SOLUTION: Performed by: INTERNAL MEDICINE

## 2024-03-04 PROCEDURE — A9502 TC99M TETROFOSMIN: HCPCS | Performed by: INTERNAL MEDICINE

## 2024-03-04 PROCEDURE — 78452 HT MUSCLE IMAGE SPECT MULT: CPT

## 2024-03-04 RX ORDER — REGADENOSON 0.08 MG/ML
0.4 INJECTION, SOLUTION INTRAVENOUS
Status: COMPLETED | OUTPATIENT
Start: 2024-03-04 | End: 2024-03-04

## 2024-03-04 RX ADMIN — TETROFOSMIN 1 DOSE: 1.38 INJECTION, POWDER, LYOPHILIZED, FOR SOLUTION INTRAVENOUS at 11:14

## 2024-03-04 RX ADMIN — REGADENOSON 0.4 MG: 0.08 INJECTION, SOLUTION INTRAVENOUS at 11:14

## 2024-03-04 RX ADMIN — TETROFOSMIN 1 DOSE: 1.38 INJECTION, POWDER, LYOPHILIZED, FOR SOLUTION INTRAVENOUS at 10:05

## 2024-03-05 ENCOUNTER — TELEPHONE (OUTPATIENT)
Dept: CASE MANAGEMENT | Facility: OTHER | Age: 69
End: 2024-03-05
Payer: MEDICARE

## 2024-03-07 ENCOUNTER — TELEPHONE (OUTPATIENT)
Dept: CARDIOLOGY | Facility: CLINIC | Age: 69
End: 2024-03-07
Payer: MEDICARE

## 2024-03-07 LAB
BH CV IMMEDIATE POST TECH DATA BLOOD PRESSURE: NORMAL MMHG
BH CV IMMEDIATE POST TECH DATA HEART RATE: 124 BPM
BH CV IMMEDIATE POST TECH DATA OXYGEN SATS: 97 %
BH CV REST NUCLEAR ISOTOPE DOSE: 10.2 MCI
BH CV SIX MINUTE RECOVERY TECH DATA BLOOD PRESSURE: NORMAL
BH CV SIX MINUTE RECOVERY TECH DATA HEART RATE: 111 BPM
BH CV SIX MINUTE RECOVERY TECH DATA OXYGEN SATURATION: 95 %
BH CV STRESS BP STAGE 1: NORMAL
BH CV STRESS COMMENTS STAGE 1: NORMAL
BH CV STRESS DOSE REGADENOSON STAGE 1: 0.4
BH CV STRESS DURATION MIN STAGE 1: 0
BH CV STRESS DURATION SEC STAGE 1: 10
BH CV STRESS HR STAGE 1: 113
BH CV STRESS NUCLEAR ISOTOPE DOSE: 37.9 MCI
BH CV STRESS O2 STAGE 1: 95
BH CV STRESS PROTOCOL 1: NORMAL
BH CV STRESS RECOVERY BP: NORMAL MMHG
BH CV STRESS RECOVERY HR: 111 BPM
BH CV STRESS RECOVERY O2: 95 %
BH CV STRESS STAGE 1: 1
BH CV THREE MINUTE POST TECH DATA BLOOD PRESSURE: NORMAL MMHG
BH CV THREE MINUTE POST TECH DATA HEART RATE: 115 BPM
BH CV THREE MINUTE POST TECH DATA OXYGEN SATURATION: 96 %
LV EF NUC BP: 42 %
MAXIMAL PREDICTED HEART RATE: 154 BPM
PERCENT MAX PREDICTED HR: 80.52 %
STRESS BASELINE BP: NORMAL MMHG
STRESS BASELINE HR: 106 BPM
STRESS O2 SAT REST: 94 %
STRESS PERCENT HR: 95 %
STRESS POST O2 SAT PEAK: 97 %
STRESS POST PEAK BP: NORMAL MMHG
STRESS POST PEAK HR: 124 BPM
STRESS TARGET HR: 131 BPM

## 2024-03-07 NOTE — TELEPHONE ENCOUNTER
DAVID patient. Patient denies any anginal symptoms. Advised if developed symptoms to notify office.

## 2024-03-07 NOTE — TELEPHONE ENCOUNTER
----- Message from MORRO Wolf sent at 3/7/2024  3:37 PM EST -----  Stress test shows mild inferior apical defect which is mild to moderate in size partially fixed but with some ischemia seen on the stress portion, find out if he is experiencing anginal symptoms, continue aspirin, metoprolol, and atorvastatin as prescribed

## 2024-03-08 ENCOUNTER — TELEPHONE (OUTPATIENT)
Dept: CARDIOLOGY | Facility: CLINIC | Age: 69
End: 2024-03-08
Payer: MEDICARE

## 2024-03-08 NOTE — TELEPHONE ENCOUNTER
Received VM from patient wife wanting to go over stress test results    Attempted to call patient wife. No answer. Unable to leave VM

## 2024-03-10 ENCOUNTER — APPOINTMENT (OUTPATIENT)
Dept: GENERAL RADIOLOGY | Facility: HOSPITAL | Age: 69
End: 2024-03-10
Payer: MEDICARE

## 2024-03-10 ENCOUNTER — HOSPITAL ENCOUNTER (INPATIENT)
Facility: HOSPITAL | Age: 69
LOS: 2 days | Discharge: HOME | End: 2024-03-13
Attending: EMERGENCY MEDICINE | Admitting: FAMILY MEDICINE
Payer: MEDICARE

## 2024-03-10 DIAGNOSIS — R53.1 WEAKNESS: ICD-10-CM

## 2024-03-10 DIAGNOSIS — J44.9 CHRONIC OBSTRUCTIVE PULMONARY DISEASE, UNSPECIFIED COPD TYPE: ICD-10-CM

## 2024-03-10 DIAGNOSIS — A41.9 SEPSIS WITHOUT ACUTE ORGAN DYSFUNCTION, DUE TO UNSPECIFIED ORGANISM: ICD-10-CM

## 2024-03-10 DIAGNOSIS — R26.2 DIFFICULTY IN WALKING: ICD-10-CM

## 2024-03-10 DIAGNOSIS — Z78.9 DECREASED ACTIVITIES OF DAILY LIVING (ADL): ICD-10-CM

## 2024-03-10 DIAGNOSIS — N39.0 URINARY TRACT INFECTION WITHOUT HEMATURIA, SITE UNSPECIFIED: Primary | ICD-10-CM

## 2024-03-10 LAB
ALBUMIN SERPL-MCNC: 3.5 G/DL (ref 3.5–5.2)
ALBUMIN/GLOB SERPL: 0.8 G/DL
ALP SERPL-CCNC: 110 U/L (ref 39–117)
ALT SERPL W P-5'-P-CCNC: 9 U/L (ref 1–41)
ANION GAP SERPL CALCULATED.3IONS-SCNC: 12.2 MMOL/L (ref 5–15)
AST SERPL-CCNC: 15 U/L (ref 1–40)
BACTERIA UR QL AUTO: ABNORMAL /HPF
BASOPHILS # BLD AUTO: 0.02 10*3/MM3 (ref 0–0.2)
BASOPHILS NFR BLD AUTO: 0.2 % (ref 0–1.5)
BILIRUB SERPL-MCNC: 0.6 MG/DL (ref 0–1.2)
BILIRUB UR QL STRIP: NEGATIVE
BUN SERPL-MCNC: 13 MG/DL (ref 8–23)
BUN/CREAT SERPL: 13.1 (ref 7–25)
CALCIUM SPEC-SCNC: 8.9 MG/DL (ref 8.6–10.5)
CHLORIDE SERPL-SCNC: 95 MMOL/L (ref 98–107)
CLARITY UR: ABNORMAL
CO2 SERPL-SCNC: 25.8 MMOL/L (ref 22–29)
COLOR UR: YELLOW
CREAT SERPL-MCNC: 0.99 MG/DL (ref 0.76–1.27)
D-LACTATE SERPL-SCNC: 1.8 MMOL/L (ref 0.5–2)
DEPRECATED RDW RBC AUTO: 43.8 FL (ref 37–54)
EGFRCR SERPLBLD CKD-EPI 2021: 83.5 ML/MIN/1.73
EOSINOPHIL # BLD AUTO: 0 10*3/MM3 (ref 0–0.4)
EOSINOPHIL NFR BLD AUTO: 0 % (ref 0.3–6.2)
ERYTHROCYTE [DISTWIDTH] IN BLOOD BY AUTOMATED COUNT: 15.4 % (ref 12.3–15.4)
GLOBULIN UR ELPH-MCNC: 4.5 GM/DL
GLUCOSE BLDC GLUCOMTR-MCNC: 218 MG/DL (ref 70–99)
GLUCOSE SERPL-MCNC: 219 MG/DL (ref 65–99)
GLUCOSE UR STRIP-MCNC: ABNORMAL MG/DL
HCT VFR BLD AUTO: 46.5 % (ref 37.5–51)
HGB BLD-MCNC: 15.4 G/DL (ref 13–17.7)
HGB UR QL STRIP.AUTO: ABNORMAL
HOLD SPECIMEN: NORMAL
HOLD SPECIMEN: NORMAL
HYALINE CASTS UR QL AUTO: ABNORMAL /LPF
IMM GRANULOCYTES # BLD AUTO: 0.03 10*3/MM3 (ref 0–0.05)
IMM GRANULOCYTES NFR BLD AUTO: 0.3 % (ref 0–0.5)
KETONES UR QL STRIP: NEGATIVE
LEUKOCYTE ESTERASE UR QL STRIP.AUTO: ABNORMAL
LYMPHOCYTES # BLD AUTO: 2.48 10*3/MM3 (ref 0.7–3.1)
LYMPHOCYTES NFR BLD AUTO: 25.7 % (ref 19.6–45.3)
MAGNESIUM SERPL-MCNC: 1.8 MG/DL (ref 1.6–2.4)
MCH RBC QN AUTO: 26.3 PG (ref 26.6–33)
MCHC RBC AUTO-ENTMCNC: 33.1 G/DL (ref 31.5–35.7)
MCV RBC AUTO: 79.5 FL (ref 79–97)
MONOCYTES # BLD AUTO: 0.91 10*3/MM3 (ref 0.1–0.9)
MONOCYTES NFR BLD AUTO: 9.4 % (ref 5–12)
NEUTROPHILS NFR BLD AUTO: 6.2 10*3/MM3 (ref 1.7–7)
NEUTROPHILS NFR BLD AUTO: 64.4 % (ref 42.7–76)
NITRITE UR QL STRIP: POSITIVE
NRBC BLD AUTO-RTO: 0 /100 WBC (ref 0–0.2)
PH UR STRIP.AUTO: 5.5 [PH] (ref 5–8)
PLATELET # BLD AUTO: 220 10*3/MM3 (ref 140–450)
PMV BLD AUTO: 9.8 FL (ref 6–12)
POTASSIUM SERPL-SCNC: 3.6 MMOL/L (ref 3.5–5.2)
PROT SERPL-MCNC: 8 G/DL (ref 6–8.5)
PROT UR QL STRIP: ABNORMAL
RBC # BLD AUTO: 5.85 10*6/MM3 (ref 4.14–5.8)
RBC # UR STRIP: ABNORMAL /HPF
REF LAB TEST METHOD: ABNORMAL
SODIUM SERPL-SCNC: 133 MMOL/L (ref 136–145)
SP GR UR STRIP: >=1.03 (ref 1–1.03)
SQUAMOUS #/AREA URNS HPF: ABNORMAL /HPF
TROPONIN T SERPL HS-MCNC: 10 NG/L
UROBILINOGEN UR QL STRIP: ABNORMAL
WBC # UR STRIP: ABNORMAL /HPF
WBC CLUMPS # UR AUTO: ABNORMAL /HPF
WBC NRBC COR # BLD AUTO: 9.64 10*3/MM3 (ref 3.4–10.8)
WHOLE BLOOD HOLD COAG: NORMAL
WHOLE BLOOD HOLD SPECIMEN: NORMAL

## 2024-03-10 PROCEDURE — 85025 COMPLETE CBC W/AUTO DIFF WBC: CPT | Performed by: EMERGENCY MEDICINE

## 2024-03-10 PROCEDURE — 87040 BLOOD CULTURE FOR BACTERIA: CPT | Performed by: EMERGENCY MEDICINE

## 2024-03-10 PROCEDURE — 99285 EMERGENCY DEPT VISIT HI MDM: CPT

## 2024-03-10 PROCEDURE — 93005 ELECTROCARDIOGRAM TRACING: CPT

## 2024-03-10 PROCEDURE — 87077 CULTURE AEROBIC IDENTIFY: CPT | Performed by: EMERGENCY MEDICINE

## 2024-03-10 PROCEDURE — 93005 ELECTROCARDIOGRAM TRACING: CPT | Performed by: EMERGENCY MEDICINE

## 2024-03-10 PROCEDURE — 93010 ELECTROCARDIOGRAM REPORT: CPT | Performed by: INTERNAL MEDICINE

## 2024-03-10 PROCEDURE — 82948 REAGENT STRIP/BLOOD GLUCOSE: CPT

## 2024-03-10 PROCEDURE — 83605 ASSAY OF LACTIC ACID: CPT | Performed by: EMERGENCY MEDICINE

## 2024-03-10 PROCEDURE — 83036 HEMOGLOBIN GLYCOSYLATED A1C: CPT | Performed by: INTERNAL MEDICINE

## 2024-03-10 PROCEDURE — 84484 ASSAY OF TROPONIN QUANT: CPT | Performed by: EMERGENCY MEDICINE

## 2024-03-10 PROCEDURE — 81001 URINALYSIS AUTO W/SCOPE: CPT | Performed by: EMERGENCY MEDICINE

## 2024-03-10 PROCEDURE — 80053 COMPREHEN METABOLIC PANEL: CPT | Performed by: EMERGENCY MEDICINE

## 2024-03-10 PROCEDURE — 83735 ASSAY OF MAGNESIUM: CPT | Performed by: EMERGENCY MEDICINE

## 2024-03-10 PROCEDURE — 36415 COLL VENOUS BLD VENIPUNCTURE: CPT

## 2024-03-10 PROCEDURE — 87086 URINE CULTURE/COLONY COUNT: CPT | Performed by: EMERGENCY MEDICINE

## 2024-03-10 PROCEDURE — 87186 SC STD MICRODIL/AGAR DIL: CPT | Performed by: EMERGENCY MEDICINE

## 2024-03-10 PROCEDURE — 87637 SARSCOV2&INF A&B&RSV AMP PRB: CPT | Performed by: EMERGENCY MEDICINE

## 2024-03-10 PROCEDURE — 71045 X-RAY EXAM CHEST 1 VIEW: CPT

## 2024-03-10 RX ORDER — SODIUM CHLORIDE 0.9 % (FLUSH) 0.9 %
10 SYRINGE (ML) INJECTION AS NEEDED
Status: DISCONTINUED | OUTPATIENT
Start: 2024-03-10 | End: 2024-03-13 | Stop reason: HOSPADM

## 2024-03-10 RX ORDER — ACETAMINOPHEN 325 MG/1
650 TABLET ORAL ONCE
Status: COMPLETED | OUTPATIENT
Start: 2024-03-10 | End: 2024-03-10

## 2024-03-10 RX ADMIN — ACETAMINOPHEN 650 MG: 325 TABLET ORAL at 23:25

## 2024-03-11 ENCOUNTER — APPOINTMENT (OUTPATIENT)
Dept: GENERAL RADIOLOGY | Facility: HOSPITAL | Age: 69
End: 2024-03-11
Payer: MEDICARE

## 2024-03-11 PROBLEM — R53.1 WEAKNESS: Status: ACTIVE | Noted: 2024-03-11

## 2024-03-11 PROBLEM — E11.9 DM2 (DIABETES MELLITUS, TYPE 2): Status: ACTIVE | Noted: 2024-03-11

## 2024-03-11 PROBLEM — N39.0 SEPSIS SECONDARY TO UTI: Status: ACTIVE | Noted: 2024-03-11

## 2024-03-11 PROBLEM — A41.9 SEPSIS SECONDARY TO UTI: Status: ACTIVE | Noted: 2024-03-11

## 2024-03-11 PROBLEM — J44.9 COPD (CHRONIC OBSTRUCTIVE PULMONARY DISEASE): Status: ACTIVE | Noted: 2024-03-11

## 2024-03-11 LAB
FLUAV SUBTYP SPEC NAA+PROBE: NOT DETECTED
FLUBV RNA ISLT QL NAA+PROBE: NOT DETECTED
GLUCOSE BLDC GLUCOMTR-MCNC: 180 MG/DL (ref 70–99)
GLUCOSE BLDC GLUCOMTR-MCNC: 250 MG/DL (ref 70–99)
GLUCOSE BLDC GLUCOMTR-MCNC: 372 MG/DL (ref 70–99)
GLUCOSE BLDC GLUCOMTR-MCNC: 415 MG/DL (ref 70–99)
GLUCOSE BLDC GLUCOMTR-MCNC: 448 MG/DL (ref 70–99)
GLUCOSE BLDC GLUCOMTR-MCNC: 506 MG/DL (ref 70–99)
HBA1C MFR BLD: 8.1 % (ref 4.8–5.6)
QT INTERVAL: 338 MS
QTC INTERVAL: 482 MS
RSV RNA NPH QL NAA+NON-PROBE: NOT DETECTED
SARS-COV-2 RNA RESP QL NAA+PROBE: NOT DETECTED

## 2024-03-11 PROCEDURE — 25810000003 SODIUM CHLORIDE 0.9 % SOLUTION: Performed by: FAMILY MEDICINE

## 2024-03-11 PROCEDURE — 25810000003 SODIUM CHLORIDE 0.9 % SOLUTION: Performed by: EMERGENCY MEDICINE

## 2024-03-11 PROCEDURE — 94799 UNLISTED PULMONARY SVC/PX: CPT

## 2024-03-11 PROCEDURE — 25010000002 ENOXAPARIN PER 10 MG: Performed by: FAMILY MEDICINE

## 2024-03-11 PROCEDURE — 63710000001 INSULIN LISPRO (HUMAN) PER 5 UNITS: Performed by: FAMILY MEDICINE

## 2024-03-11 PROCEDURE — 97165 OT EVAL LOW COMPLEX 30 MIN: CPT

## 2024-03-11 PROCEDURE — 99223 1ST HOSP IP/OBS HIGH 75: CPT | Performed by: FAMILY MEDICINE

## 2024-03-11 PROCEDURE — 94640 AIRWAY INHALATION TREATMENT: CPT

## 2024-03-11 PROCEDURE — 63710000001 INSULIN LISPRO (HUMAN) PER 5 UNITS: Performed by: PHYSICIAN ASSISTANT

## 2024-03-11 PROCEDURE — 97161 PT EVAL LOW COMPLEX 20 MIN: CPT

## 2024-03-11 PROCEDURE — 63710000001 INSULIN LISPRO (HUMAN) PER 5 UNITS: Performed by: INTERNAL MEDICINE

## 2024-03-11 PROCEDURE — 82948 REAGENT STRIP/BLOOD GLUCOSE: CPT | Performed by: FAMILY MEDICINE

## 2024-03-11 PROCEDURE — 25010000002 CEFTRIAXONE PER 250 MG: Performed by: EMERGENCY MEDICINE

## 2024-03-11 PROCEDURE — 73030 X-RAY EXAM OF SHOULDER: CPT

## 2024-03-11 PROCEDURE — 25010000002 METHYLPREDNISOLONE PER 40 MG: Performed by: FAMILY MEDICINE

## 2024-03-11 PROCEDURE — 82948 REAGENT STRIP/BLOOD GLUCOSE: CPT

## 2024-03-11 RX ORDER — ONDANSETRON 2 MG/ML
4 INJECTION INTRAMUSCULAR; INTRAVENOUS EVERY 6 HOURS PRN
Status: DISCONTINUED | OUTPATIENT
Start: 2024-03-11 | End: 2024-03-12 | Stop reason: SDUPTHER

## 2024-03-11 RX ORDER — LIDOCAINE 4 G/G
1 PATCH TOPICAL
Status: DISCONTINUED | OUTPATIENT
Start: 2024-03-11 | End: 2024-03-13 | Stop reason: HOSPADM

## 2024-03-11 RX ORDER — BISACODYL 5 MG/1
5 TABLET, DELAYED RELEASE ORAL DAILY PRN
Status: DISCONTINUED | OUTPATIENT
Start: 2024-03-11 | End: 2024-03-13 | Stop reason: HOSPADM

## 2024-03-11 RX ORDER — INSULIN LISPRO 100 [IU]/ML
2-9 INJECTION, SOLUTION INTRAVENOUS; SUBCUTANEOUS
Status: DISCONTINUED | OUTPATIENT
Start: 2024-03-11 | End: 2024-03-12

## 2024-03-11 RX ORDER — IPRATROPIUM BROMIDE AND ALBUTEROL SULFATE 2.5; .5 MG/3ML; MG/3ML
3 SOLUTION RESPIRATORY (INHALATION) 3 TIMES DAILY
Status: DISCONTINUED | OUTPATIENT
Start: 2024-03-11 | End: 2024-03-12

## 2024-03-11 RX ORDER — DEXTROSE MONOHYDRATE 25 G/50ML
25 INJECTION, SOLUTION INTRAVENOUS
Status: DISCONTINUED | OUTPATIENT
Start: 2024-03-11 | End: 2024-03-12

## 2024-03-11 RX ORDER — IBUPROFEN 600 MG/1
1 TABLET ORAL
Status: DISCONTINUED | OUTPATIENT
Start: 2024-03-11 | End: 2024-03-12

## 2024-03-11 RX ORDER — IPRATROPIUM BROMIDE AND ALBUTEROL SULFATE 2.5; .5 MG/3ML; MG/3ML
3 SOLUTION RESPIRATORY (INHALATION) EVERY 4 HOURS PRN
Status: DISCONTINUED | OUTPATIENT
Start: 2024-03-11 | End: 2024-03-12 | Stop reason: SDUPTHER

## 2024-03-11 RX ORDER — ACETAMINOPHEN 500 MG
1000 TABLET ORAL EVERY 6 HOURS PRN
Status: DISCONTINUED | OUTPATIENT
Start: 2024-03-11 | End: 2024-03-13 | Stop reason: HOSPADM

## 2024-03-11 RX ORDER — ASPIRIN 81 MG/1
81 TABLET, CHEWABLE ORAL DAILY
Status: DISCONTINUED | OUTPATIENT
Start: 2024-03-11 | End: 2024-03-13 | Stop reason: HOSPADM

## 2024-03-11 RX ORDER — NICOTINE POLACRILEX 4 MG
15 LOZENGE BUCCAL
Status: DISCONTINUED | OUTPATIENT
Start: 2024-03-11 | End: 2024-03-12

## 2024-03-11 RX ORDER — METOPROLOL SUCCINATE 25 MG/1
25 TABLET, EXTENDED RELEASE ORAL
Status: DISCONTINUED | OUTPATIENT
Start: 2024-03-11 | End: 2024-03-13 | Stop reason: HOSPADM

## 2024-03-11 RX ORDER — ATORVASTATIN CALCIUM 20 MG/1
20 TABLET, FILM COATED ORAL NIGHTLY
Status: DISCONTINUED | OUTPATIENT
Start: 2024-03-11 | End: 2024-03-13 | Stop reason: HOSPADM

## 2024-03-11 RX ORDER — BISACODYL 10 MG
10 SUPPOSITORY, RECTAL RECTAL DAILY PRN
Status: DISCONTINUED | OUTPATIENT
Start: 2024-03-11 | End: 2024-03-13 | Stop reason: HOSPADM

## 2024-03-11 RX ORDER — SODIUM CHLORIDE 0.9 % (FLUSH) 0.9 %
10 SYRINGE (ML) INJECTION EVERY 12 HOURS SCHEDULED
Status: DISCONTINUED | OUTPATIENT
Start: 2024-03-11 | End: 2024-03-13 | Stop reason: HOSPADM

## 2024-03-11 RX ORDER — METHYLPREDNISOLONE SODIUM SUCCINATE 40 MG/ML
40 INJECTION, POWDER, LYOPHILIZED, FOR SOLUTION INTRAMUSCULAR; INTRAVENOUS DAILY
Status: DISCONTINUED | OUTPATIENT
Start: 2024-03-11 | End: 2024-03-11

## 2024-03-11 RX ORDER — POLYETHYLENE GLYCOL 3350 17 G/17G
17 POWDER, FOR SOLUTION ORAL DAILY PRN
Status: DISCONTINUED | OUTPATIENT
Start: 2024-03-11 | End: 2024-03-13 | Stop reason: HOSPADM

## 2024-03-11 RX ORDER — BENZONATATE 100 MG/1
100 CAPSULE ORAL 3 TIMES DAILY PRN
Status: DISCONTINUED | OUTPATIENT
Start: 2024-03-11 | End: 2024-03-13 | Stop reason: HOSPADM

## 2024-03-11 RX ORDER — NICOTINE 21 MG/24HR
1 PATCH, TRANSDERMAL 24 HOURS TRANSDERMAL
Status: DISCONTINUED | OUTPATIENT
Start: 2024-03-11 | End: 2024-03-13 | Stop reason: HOSPADM

## 2024-03-11 RX ORDER — SODIUM CHLORIDE 0.9 % (FLUSH) 0.9 %
10 SYRINGE (ML) INJECTION AS NEEDED
Status: DISCONTINUED | OUTPATIENT
Start: 2024-03-11 | End: 2024-03-13 | Stop reason: HOSPADM

## 2024-03-11 RX ORDER — INSULIN LISPRO 100 [IU]/ML
5 INJECTION, SOLUTION INTRAVENOUS; SUBCUTANEOUS ONCE
Status: COMPLETED | OUTPATIENT
Start: 2024-03-11 | End: 2024-03-11

## 2024-03-11 RX ORDER — INSULIN LISPRO 100 [IU]/ML
2-9 INJECTION, SOLUTION INTRAVENOUS; SUBCUTANEOUS
Status: DISCONTINUED | OUTPATIENT
Start: 2024-03-11 | End: 2024-03-11

## 2024-03-11 RX ORDER — ENOXAPARIN SODIUM 100 MG/ML
40 INJECTION SUBCUTANEOUS DAILY
Status: DISCONTINUED | OUTPATIENT
Start: 2024-03-11 | End: 2024-03-13 | Stop reason: HOSPADM

## 2024-03-11 RX ORDER — SODIUM CHLORIDE 9 MG/ML
40 INJECTION, SOLUTION INTRAVENOUS AS NEEDED
Status: DISCONTINUED | OUTPATIENT
Start: 2024-03-11 | End: 2024-03-13 | Stop reason: HOSPADM

## 2024-03-11 RX ORDER — AMOXICILLIN 250 MG
2 CAPSULE ORAL 2 TIMES DAILY PRN
Status: DISCONTINUED | OUTPATIENT
Start: 2024-03-11 | End: 2024-03-13 | Stop reason: HOSPADM

## 2024-03-11 RX ORDER — SODIUM CHLORIDE 9 MG/ML
100 INJECTION, SOLUTION INTRAVENOUS CONTINUOUS
Status: ACTIVE | OUTPATIENT
Start: 2024-03-11 | End: 2024-03-11

## 2024-03-11 RX ORDER — GUAIFENESIN 600 MG/1
600 TABLET, EXTENDED RELEASE ORAL EVERY 12 HOURS SCHEDULED
Status: DISCONTINUED | OUTPATIENT
Start: 2024-03-11 | End: 2024-03-13 | Stop reason: HOSPADM

## 2024-03-11 RX ORDER — LOSARTAN POTASSIUM 25 MG/1
25 TABLET ORAL DAILY
Status: DISCONTINUED | OUTPATIENT
Start: 2024-03-11 | End: 2024-03-13 | Stop reason: HOSPADM

## 2024-03-11 RX ORDER — HYDROCHLOROTHIAZIDE 25 MG/1
25 TABLET ORAL DAILY
Status: DISCONTINUED | OUTPATIENT
Start: 2024-03-11 | End: 2024-03-13 | Stop reason: HOSPADM

## 2024-03-11 RX ADMIN — INSULIN LISPRO 9 UNITS: 100 INJECTION, SOLUTION INTRAVENOUS; SUBCUTANEOUS at 17:30

## 2024-03-11 RX ADMIN — ASPIRIN 81 MG: 81 TABLET, CHEWABLE ORAL at 08:25

## 2024-03-11 RX ADMIN — ENOXAPARIN SODIUM 40 MG: 100 INJECTION SUBCUTANEOUS at 08:25

## 2024-03-11 RX ADMIN — IPRATROPIUM BROMIDE AND ALBUTEROL SULFATE 3 ML: .5; 3 SOLUTION RESPIRATORY (INHALATION) at 15:30

## 2024-03-11 RX ADMIN — HYDROCHLOROTHIAZIDE 25 MG: 25 TABLET ORAL at 08:28

## 2024-03-11 RX ADMIN — ATORVASTATIN CALCIUM 20 MG: 20 TABLET, FILM COATED ORAL at 21:30

## 2024-03-11 RX ADMIN — LOSARTAN POTASSIUM 25 MG: 25 TABLET, FILM COATED ORAL at 08:28

## 2024-03-11 RX ADMIN — SODIUM CHLORIDE 1000 ML: 9 INJECTION, SOLUTION INTRAVENOUS at 00:25

## 2024-03-11 RX ADMIN — INSULIN LISPRO 8 UNITS: 100 INJECTION, SOLUTION INTRAVENOUS; SUBCUTANEOUS at 23:13

## 2024-03-11 RX ADMIN — NICOTINE 1 PATCH: 21 PATCH, EXTENDED RELEASE TRANSDERMAL at 23:14

## 2024-03-11 RX ADMIN — Medication 10 ML: at 08:26

## 2024-03-11 RX ADMIN — SODIUM CHLORIDE 100 ML/HR: 9 INJECTION, SOLUTION INTRAVENOUS at 03:13

## 2024-03-11 RX ADMIN — METOPROLOL SUCCINATE 25 MG: 25 TABLET, EXTENDED RELEASE ORAL at 08:25

## 2024-03-11 RX ADMIN — ACETAMINOPHEN 1000 MG: 500 TABLET ORAL at 20:05

## 2024-03-11 RX ADMIN — INSULIN LISPRO 6 UNITS: 100 INJECTION, SOLUTION INTRAVENOUS; SUBCUTANEOUS at 12:18

## 2024-03-11 RX ADMIN — INSULIN LISPRO 9 UNITS: 100 INJECTION, SOLUTION INTRAVENOUS; SUBCUTANEOUS at 21:30

## 2024-03-11 RX ADMIN — IPRATROPIUM BROMIDE AND ALBUTEROL SULFATE 3 ML: .5; 3 SOLUTION RESPIRATORY (INHALATION) at 18:56

## 2024-03-11 RX ADMIN — Medication 10 ML: at 23:20

## 2024-03-11 RX ADMIN — INSULIN LISPRO 5 UNITS: 100 INJECTION, SOLUTION INTRAVENOUS; SUBCUTANEOUS at 17:52

## 2024-03-11 RX ADMIN — INSULIN LISPRO 2 UNITS: 100 INJECTION, SOLUTION INTRAVENOUS; SUBCUTANEOUS at 08:25

## 2024-03-11 RX ADMIN — BENZONATATE 100 MG: 100 CAPSULE ORAL at 08:26

## 2024-03-11 RX ADMIN — CEFTRIAXONE SODIUM 1000 MG: 1 INJECTION, POWDER, FOR SOLUTION INTRAMUSCULAR; INTRAVENOUS at 00:25

## 2024-03-11 RX ADMIN — LIDOCAINE 1 PATCH: 4 PATCH TOPICAL at 20:12

## 2024-03-11 RX ADMIN — METHYLPREDNISOLONE SODIUM SUCCINATE 40 MG: 40 INJECTION INTRAMUSCULAR; INTRAVENOUS at 08:25

## 2024-03-11 RX ADMIN — GUAIFENESIN 600 MG: 600 TABLET ORAL at 08:25

## 2024-03-11 RX ADMIN — IPRATROPIUM BROMIDE AND ALBUTEROL SULFATE 3 ML: .5; 3 SOLUTION RESPIRATORY (INHALATION) at 08:43

## 2024-03-11 NOTE — PLAN OF CARE
Goal Outcome Evaluation:  Plan of Care Reviewed With: patient        Progress: no change  Outcome Evaluation: Pt. VSS, remains A & O X 4. Wife updated on patient status this shift. Pt. remains on 2 L per NC. Pt. unable to stand on own power. Per spouse, worsening weakness occured over the past 2 weeks.

## 2024-03-11 NOTE — THERAPY EVALUATION
Patient Name: Brian Mehta  : 1955    MRN: [id number]                              Today's Date: 3/11/2024       Admit Date: 3/10/2024    Visit Dx:     ICD-10-CM ICD-9-CM   1. Urinary tract infection without hematuria, site unspecified  N39.0 599.0   2. Sepsis without acute organ dysfunction, due to unspecified organism  A41.9 038.9     995.91   3. Weakness  R53.1 780.79   4. Chronic obstructive pulmonary disease, unspecified COPD type  J44.9 496   5. Difficulty in walking  R26.2 719.7   6. Decreased activities of daily living (ADL)  Z78.9 V49.89     Patient Active Problem List   Diagnosis    CVA (cerebral vascular accident)    Essential hypertension    High cholesterol    Hip pain, bilateral    Right knee pain    Vitamin B12 deficiency    Kidney disorder    Chronic pain of both knees    Sepsis    Chronic HFrEF (heart failure with reduced ejection fraction)    LVH (left ventricular hypertrophy)    Sepsis secondary to UTI    Weakness    COPD (chronic obstructive pulmonary disease)    DM2 (diabetes mellitus, type 2)     Past Medical History:   Diagnosis Date    COPD (chronic obstructive pulmonary disease)     Diabetes mellitus     Hyperlipidemia     Stroke     7 years ago per wife     Past Surgical History:   Procedure Laterality Date    APPENDECTOMY      EYE SURGERY        General Information       Row Name 24 1438          OT Time and Intention    Document Type evaluation  -ES     Mode of Treatment individual therapy;occupational therapy  -ES       Row Name 24 1438          General Information    Patient Profile Reviewed yes  -ES     Prior Level of Function independent:;ADL's;all household mobility  Patient reports independent with ADLs at baseline, spouse can assist if needed. Cane, RW for functional mobility. Tub/shower with shower chair. [place] in stance. No home O2 use. Denies recent falls.  -ES     Existing Precautions/Restrictions fall  -ES     Barriers to Rehab none identified  -ES       Row  Name 03/11/24 1438          Occupational Profile    Reason for Services/Referral (Occupational Profile) Patient is 69 yr old male admitted to [facility] on [facility]/10/2024 with reports of increased weakness, falls at home. Patient PMH significant for prior CVA with residual right sided deficits. OT evaluation and treatment ordered d/t recent decline in ADLs/transfer ability and discharge planning recommendations. No previous OT services for current condition.  -ES       Row Name 03/11/24 1438          Living Environment    People in Home spouse  -ES       Row Name 03/11/24 1438          Home Main Entrance    Number of Stairs, Main Entrance four  -ES       Row Name 03/11/24 1438          Stairs Within Home, Primary    Number of Stairs, Within Home, Primary none  -ES       Row Name 03/11/24 1438          Cognition    Orientation Status (Cognition) oriented to;person;situation  Patient pleasant, agreeable to therapy evaluation. Patient may be pleasantly confused, no family present to verify prior level reporting.  -ES       Row Name 03/11/24 1438          Safety Issues, Functional Mobility    Impairments Affecting Function (Mobility) balance;strength;endurance/activity tolerance  -ES               User Key  (r) = Recorded By, (t) = Taken By, (c) = Cosigned By      Initials Name Provider Type    Nikki Richmond, OTR/L, CSRS Occupational Therapist                     Mobility/ADL's       Row Name 03/11/24 1445          Bed Mobility    Bed Mobility supine-sit;sit-supine  -ES     Supine-Sit [place] (Bed Mobility) minimum assist (75% patient effort);1 person assist  -ES     Sit-Supine [place] (Bed Mobility) minimum assist (75% patient effort);1 person assist  -ES     Bed Mobility, Safety Issues decreased use of arms for pushing/pulling;decreased use of legs for bridging/pushing  -ES       Row Name 03/11/24 1445          Transfers    Transfers sit-stand transfer;stand-sit transfer  -ES       Row Name 03/11/24  1445          Sit-Stand Transfer    Sit-Stand [place] (Transfers) minimum assist (75% patient effort);1 person assist  -ES     Assistive Device (Sit-Stand Transfers) walker, front-wheeled  -ES       Row Name 03/11/24 1445          Stand-Sit Transfer    Stand-Sit [place] (Transfers) minimum assist (75% patient effort);1 person assist  -ES     Assistive Device (Stand-Sit Transfers) walker, front-wheeled  -ES       Row Name 03/11/24 1445          Functional Mobility    Functional Mobility- Ind. Level minimum assist (75% patient effort);1 person  -ES     Functional Mobility- Device walker, front-wheeled  -ES       Row Name 03/11/24 1445          Activities of Daily Living    BADL Assessment/Intervention bathing;upper body dressing;lower body dressing;grooming;feeding;toileting  -ES       Row Name 03/11/24 1445          Bathing Assessment/Intervention    [place] Level (Bathing) bathing skills;moderate assist (50% patient effort)  -ES       Row Name 03/11/24 1445          Upper Body Dressing Assessment/Training    [place] Level (Upper Body Dressing) upper body dressing skills;set up  -ES       Row Name 03/11/24 1445          Lower Body Dressing Assessment/Training    [place] Level (Lower Body Dressing) lower body dressing skills;moderate assist (50% patient effort)  -ES       Row Name 03/11/24 1445          Grooming Assessment/Training    [place] Level (Grooming) grooming skills;set up  -ES       Row Name 03/11/24 1445          Self-Feeding Assessment/Training    [place] Level (Feeding) feeding skills;set up  -ES       Row Name 03/11/24 1445          Toileting Assessment/Training    [place] Level (Toileting) toileting skills;maximum assist (25% patient effort)  -ES               User Key  (r) = Recorded By, (t) = Taken By, (c) = Cosigned By      Initials Name Provider Type    ES Marcelo, Nikki, OTR/L, CSRS Occupational Therapist                   Obj/Interventions       Row Name  03/11/24 1446          Vision Assessment/Intervention    Visual Impairment/Limitations WFL  -ES       Row Name 03/11/24 1446          Range of Motion Comprehensive    Comment, General Range of Motion RUE AROM WFL. LUE shoulder AROM to 90°, do not assess AAROM for protection of glenohumeral joint  -ES       Row Name 03/11/24 1446          Strength Comprehensive (MMT)    General Manual Muscle Testing (MMT) Assessment upper extremity strength deficits identified  -ES     Comment, General Manual Muscle Testing (MMT) Assessment LUE shoulder 3/5, bicep/tricep 4-/5, wrist and grasp 4-/5. RUE 4/5. Patient chart states residual R sided strength deficits from prior CVA however all deficits present on left side at initial assessment.  -ES       Row Name 03/11/24 1446          Motor Skills    Motor Skills functional endurance  -ES     Functional Endurance fair minus  -ES       Row Name 03/11/24 1446          Balance    Balance Assessment sitting dynamic balance;standing dynamic balance  -ES     Dynamic Sitting Balance standby assist  -ES     Position, Sitting Balance unsupported;sitting edge of bed  -ES     Dynamic Standing Balance minimal assist;1-person assist  -ES     Position/Device Used, Standing Balance supported;walker, front-wheeled  -ES               User Key  (r) = Recorded By, (t) = Taken By, (c) = Cosigned By      Initials Name Provider Type    Nikki Richmond, OTR/L, CSRS Occupational Therapist                   Goals/Plan       Row Name 03/11/24 1455          Bed Mobility Goal 1 (OT)    Activity/Assistive Device (Bed Mobility Goal 1, OT) bed mobility activities, all  -ES     [place] Level/Cues Needed (Bed Mobility Goal 1, OT) modified independence  -ES     Time Frame (Bed Mobility Goal 1, OT) long term goal (LTG);10 days  -ES       Row Name 03/11/24 1452          Transfer Goal 1 (OT)    Activity/Assistive Device (Transfer Goal 1, OT) transfers, all  -ES     [place] Level/Cues Needed (Transfer Goal 1,  OT) modified independence  -ES     Time Frame (Transfer Goal 1, OT) long term goal (LTG);10 days  -ES       Row Name 03/11/24 1455          Bathing Goal 1 (OT)    Activity/Device (Bathing Goal 1, OT) bathing skills, all  -ES     [place] Level/Cues Needed (Bathing Goal 1, OT) modified independence  -ES     Time Frame (Bathing Goal 1, OT) long term goal (LTG);10 days  -ES       Row Name 03/11/24 1455          Dressing Goal 1 (OT)    Activity/Device (Dressing Goal 1, OT) dressing skills, all  -ES     [place]/Cues Needed (Dressing Goal 1, OT) modified independence  -ES     Time Frame (Dressing Goal 1, OT) long term goal (LTG);10 days  -ES       Row Name 03/11/24 1455          Toileting Goal 1 (OT)    Activity/Device (Toileting Goal 1, OT) toileting skills, all  -ES     [place] Level/Cues Needed (Toileting Goal 1, OT) modified independence  -ES     Time Frame (Toileting Goal 1, OT) long term goal (LTG);10 days  -ES       Row Name 03/11/24 1455          Grooming Goal 1 (OT)    Activity/Device (Grooming Goal 1, OT) grooming skills, all  -ES     [place] (Grooming Goal 1, OT) modified independence  -ES     Time Frame (Grooming Goal 1, OT) long term goal (LTG);10 days  -ES       Row Name 03/11/24 1455          Strength Goal 1 (OT)    Strength Goal 1 (OT) Pt will improve BUE strength to 4/5 muscle grade for increased UE strength required for ADL transfers and task completion  -ES     Time Frame (Strength Goal 1, OT) long term goal (LTG);10 days  -ES       Row Name 03/11/24 1455          Therapy Assessment/Plan (OT)    Planned Therapy Interventions (OT) activity tolerance training;BADL retraining;functional balance retraining;occupation/activity based interventions;patient/caregiver education/training;strengthening exercise;transfer/mobility retraining  -ES               User Key  (r) = Recorded By, (t) = Taken By, (c) = Cosigned By      Initials Name Provider Type    ES Marcelo, Nikki, OTR/L, CSRS  Occupational Therapist                   Clinical Impression       Row Name 03/11/24 1455          Plan of Care Review    Plan of Care Reviewed With patient  -ES     Outcome Evaluation Patient has experienced decline in function from baseline status, presenting w/ deficits related to balance, strength, tolerance, transfers and mobility that impede patient independence with activities of daily living.  Patient would benefit from skilled Occupational Therapy intervention to maxamize patient safety, and promote return to baseline independence.  -ES       Row Name 03/11/24 1455          Therapy Assessment/Plan (OT)    Rehab Potential (OT) good, to achieve stated therapy goals  -ES     Criteria for Skilled Therapeutic Interventions Met (OT) yes;meets criteria;skilled treatment is necessary  -ES     Therapy Frequency (OT) 5 times/wk  -ES       Row Name 03/11/24 1455          Therapy Plan Review/Discharge Plan (OT)    Anticipated Discharge Disposition (OT) inpatient rehabilitation facility  -ES       Row Name 03/11/24 1459          Positioning and Restraints    Pre-Treatment Position in bed  -ES     Post Treatment Position bed  -ES               User Key  (r) = Recorded By, (t) = Taken By, (c) = Cosigned By      Initials Name Provider Type    ES Nikki Robertson, OTR/L, CSRS Occupational Therapist                   Outcome Measures       Row Name 03/11/24 1456          How much help from another is currently needed...    Putting on and taking off regular lower body clothing? 2  -ES     Bathing (including washing, rinsing, and drying) 2  -ES     Toileting (which includes using toilet bed pan or urinal) 2  -ES     Putting on and taking off regular upper body clothing 3  -ES     Taking care of personal grooming (such as brushing teeth) 3  -ES     Eating meals 4  -ES     AM-PAC 6 Clicks Score (OT) 16  -ES       Row Name 03/11/24 1000 03/11/24 0818       How much help from another person do you currently need...    Turning from  your back to your side while in flat bed without using bedrails? 3  -PRAKASH 3  -AR    Moving from lying on back to sitting on the side of a flat bed without bedrails? 3  -PRAKASH 3  -AR    Moving to and from a bed to a chair (including a wheelchair)? 3  -PRAKASH 3  -AR    Standing up from a chair using your arms (e.g., wheelchair, bedside chair)? 3  -PRAKASH 3  -AR    Climbing 3-5 steps with a railing? 2  -PRAKASH 3  -AR    To walk in hospital room? 3  -PRAKASH 3  -AR    AM-PAC 6 Clicks Score (PT) 17  -PRAKASH 18  -AR    Highest Level of Mobility Goal 5 --> Static standing  -PRAKASH 6 --> Walk 10 steps or more  -AR      Row Name 03/11/24 0352 03/11/24 0325       How much help from another person do you currently need...    Turning from your back to your side while in flat bed without using bedrails? 2  -JH 2  -JH    Moving from lying on back to sitting on the side of a flat bed without bedrails? 2  -JH 2  -JH    Moving to and from a bed to a chair (including a wheelchair)? 1  -JH 1  -JH    Standing up from a chair using your arms (e.g., wheelchair, bedside chair)? 1  -JH 1  -JH    Climbing 3-5 steps with a railing? 1  -JH 1  -JH    To walk in hospital room? 1  -JH 1  -JH    AM-PAC 6 Clicks Score (PT) 8  -JH 8  -JH    Highest Level of Mobility Goal 3 --> Sit at edge of bed  -JH 3 --> Sit at edge of bed  -      Row Name 03/11/24 1456          Functional Assessment    Outcome Measure Options AM-PAC 6 Clicks Daily Activity (OT)  -ES               User Key  (r) = Recorded By, (t) = Taken By, (c) = Cosigned By      Initials Name Provider Type    Royce Murphy, PT Physical Therapist    Nikki Richmond, OTR/L, CSRS Occupational Therapist    Tran Mendes, RN Registered Nurse    Hamilton Ascencio RN Registered Nurse                      OT Recommendation and Plan  Planned Therapy Interventions (OT): activity tolerance training, BADL retraining, functional balance retraining, occupation/activity based interventions, patient/caregiver  education/training, strengthening exercise, transfer/mobility retraining  Therapy Frequency (OT): 5 times/wk  Plan of Care Review  Plan of Care Reviewed With: patient  Outcome Evaluation: Patient has experienced decline in function from baseline status, presenting w/ deficits related to balance, strength, tolerance, transfers and mobility that impede patient independence with activities of daily living.  Patient would benefit from skilled Occupational Therapy intervention to maxamize patient safety, and promote return to baseline independence.     Time Calculation:   Evaluation Complexity (OT)  Review Occupational Profile/Medical/Therapy History Complexity: brief/low complexity  Assessment, Occupational Performance/Identification of Deficit Complexity: 3-5 performance deficits  Clinical Decision Making Complexity (OT): problem focused assessment/low complexity  Overall Complexity of Evaluation (OT): low complexity     Time Calculation- OT       Row Name 03/11/24 1459             Time Calculation- OT    OT Received On 03/11/24  -ES      OT Goal Re-Cert Due Date 03/20/24  -ES         Untimed Charges    OT Eval/Re-eval Minutes 34  -ES         Total Minutes    Untimed Charges Total Minutes 34  -ES       Total Minutes 34  -ES                User Key  (r) = Recorded By, (t) = Taken By, (c) = Cosigned By      Initials Name Provider Type    ES Nikki Robertson, OTR/L, CSRS Occupational Therapist                  Therapy Charges for Today       Code Description Service Date Service Provider Modifiers Qty    83396749596 HC OT EVAL LOW COMPLEXITY 3 3/11/2024 Nikki Robertson, OTR/L, CSRS GO 1                 TORIE George/L, CSRS  3/11/2024

## 2024-03-11 NOTE — H&P
[facility]   HISTORY AND PHYSICAL    Patient Name: Brian Mehta  : 1955  MRN: [id number]  Primary Care Physician:  Sophie Capps APRN  Date of admission: 3/10/2024    Subjective   Subjective     Chief Complaint: Generalized weakness and fall    HPI:    Brian Mehta is a 69 y.o. male with past medical history of diabetes, hypertension, COPD, CVA with right-sided deficits, anxiety, GERD presented to the ED with complaints of generalized weakness and fall.  Patient states that for the last week he has been having worsening weakness to where he can barely get up along with shortness of breath and a productive cough.  Patient does have history of CVA with right-sided deficits but is able to get around with the aid of a walker or a scooter but over the last week has been lethargic mostly bedbound and needs his wife to fully assist him in getting around which resulted in a fall which resulted in significant right shoulder pain.  EMS was then called and patient was brought to the ED.  In the ED patient was febrile and hypoxic on room air with remaining vitals being within normal limits.  UA showed that he did have a UTI with remaining labs being unremarkable including normal WBC and lactic acid and negative COVID flu RSV.  Chest x-ray was negative for any acute findings and shoulder x-ray was negative for any fractures or subluxations.  When asked she denied any focal weakness, chest pain, palpitation, abdominal pain, nausea, vomiting, diarrhea, constipation, dysuria, hematuria, hematochezia, melena, or anxiety.      Review of Systems   All systems were reviewed and negative except for: As per HPI    Personal History     Past Medical History:   Diagnosis Date    COPD (chronic obstructive pulmonary disease)     Diabetes mellitus     Hyperlipidemia        Past Surgical History:   Procedure Laterality Date    APPENDECTOMY      EYE SURGERY         Family History: family history includes No Known Problems in his  father and mother. Otherwise pertinent FHx was reviewed and not pertinent to current issue.    Social History:  reports that he has been smoking cigarettes. He started smoking about 52 years ago. He has a 130.5 pack-year smoking history. He has been exposed to tobacco smoke. He has never used smokeless tobacco. He reports current alcohol use. He reports that he does not use drugs.    Home Medications:  Cholecalciferol, aspirin, atorvastatin, empagliflozin, hydroCHLOROthiazide, ipratropium-albuterol, losartan, metoprolol succinate XL, and mupirocin      Allergies:  No Known Allergies    Objective   Objective     Vitals:   Temp:  [98.2 °F (36.8 °C)-100.4 °F (38 °C)] 98.2 °F (36.8 °C)  Heart Rate:  [] 96  Resp:  [16-18] 18  BP: (107-151)/(74-88) 142/88  Flow (L/min):  [2] 2  Physical Exam    Constitutional: Awake, alert   Eyes: PERRLA, sclerae anicteric, no conjunctival injection   HENT: NCAT, mucous membranes moist   Neck: Supple, no thyromegaly, no lymphadenopathy, trachea midline   Respiratory: Rhonchi, normal effort, on nasal cannula   Cardiovascular: RRR, no murmurs, rubs, or gallops, palpable pedal pulses bilaterally   Gastrointestinal: Positive bowel sounds, soft, nontender, nondistended   Musculoskeletal: No bilateral ankle edema, no clubbing or cyanosis to extremities   Psychiatric: Appropriate affect, cooperative   Neurologic: Oriented x 3, strength symmetric in all extremities, Cranial Nerves grossly intact to confrontation, speech clear   Skin: No rashes     Result Review    Result Review:  I have personally reviewed the results from the time of this admission to 3/11/2024 05:16 EDT and agree with these findings:  [x]  Laboratory list / accordion  []  Microbiology  [x]  Radiology  [x]  EKG/Telemetry   []  Cardiology/Vascular   []  Pathology  []  Old records  []  Other:  Most notable findings include: UTI on UA, normal lactic acid level, UA negative COVID flu RSV, x-ray unremarkable      Assessment &  Plan   Assessment / Plan     Brief Patient Summary:  Brian Mehta is a 69 y.o. male with past medical history of diabetes, hypertension, COPD, CVA with right-sided deficits, anxiety, GERD presented to the ED with complaints of generalized weakness and fall.     Active Hospital Problems:  Active Hospital Problems    Diagnosis     **Sepsis secondary to UTI     Weakness     COPD (chronic obstructive pulmonary disease)     DM2 (diabetes mellitus, type 2)     Chronic HFrEF (heart failure with reduced ejection fraction)     Kidney disorder     Essential hypertension     CVA (cerebral vascular accident)      Plan: .    Sepsis secondary to UTI  Admitted telemetry  -Status post fluid bolus  -Resume maintenance  -Patient febrile and tachycardic on arrival  -UA positive for UTI  -Lactic acid within normal limits  -Empiric antibiotics  -Blood and urine cultures  -Will follow along    Generalized weakness  -Secondary to above  -History of CVA with right-sided deficits  -Fall precautions  -PT OT      COPD exacerbation  -Chest x-ray unremarkable  -Supplemental oxygen as needed, wean down as tolerated  -IV Solu-Medrol  -DuoNebs  -Empiric antibiotics  -Mucinex, Tessalon Perles  -Incentive spirometry  -Adjust home meds if warranted  -Consult pulmonology if warranted  -Supportive care    Diabetes  -Insulin sliding scale  -Levemir at bedtime  -Titrate as needed    HTN  -Currently well controlled  -PRN BP meds  -Resume home meds when available  -Titrate if needed    Hx CVA    GI ppx  DVT ppx        CODE STATUS: Full code     Admission Status:  I believe this patient meets inpatient status.      Electronically signed by Robbie Maya MD, 03/11/24, 5:16 AM EDT.

## 2024-03-11 NOTE — PLAN OF CARE
Goal Outcome Evaluation:  Plan of Care Reviewed With: patient           Outcome Evaluation: Patient has experienced decline in function from baseline status, presenting w/ deficits related to balance, strength, tolerance, transfers and mobility that impede patient independence with activities of daily living.  Patient would benefit from skilled Occupational Therapy intervention to maxamize patient safety, and promote return to baseline independence.      Anticipated Discharge Disposition (OT): inpatient rehabilitation facility

## 2024-03-11 NOTE — ED PROVIDER NOTES
Time: 11:41 PM EDT  Date of encounter:  3/10/2024  Independent Historian/Clinical History and Information was obtained by:   Patient  Chief Complaint: Weakness and fall    History is limited by: N/A    History of Present Illness:  Patient is a 69 y.o. year old male who presents to the emergency department for evaluation of weakness and fall.  The patient notes he was try to get out of bed and he fell to the ground.  The patient states he got stuck on the bed and was unable to get up.  EMS was thus called due to weakness.  Patient complains of generalized weakness.  Patient notes right shoulder pain.  Denies a headache.  The patient denies any neck or back pain.  The patient denies any shortness of breath.  The patient has no cough.  The patient was unaware that he had a fever.  The patient denies any abdominal pain.  The patient denies any diarrhea.  The patient denies any rash.  The patient does note that he continues to smoke.    HPI    Patient Care Team  Primary Care Provider: Sophie Capps APRN    Past Medical History:     No Known Allergies  Past Medical History:   Diagnosis Date    COPD (chronic obstructive pulmonary disease)     Diabetes mellitus     Hyperlipidemia     Stroke     7 years ago per wife     Past Surgical History:   Procedure Laterality Date    APPENDECTOMY      EYE SURGERY       Family History   Problem Relation Age of Onset    No Known Problems Mother     No Known Problems Father        Home Medications:  Prior to Admission medications    Medication Sig Start Date End Date Taking? Authorizing Provider   aspirin 81 MG chewable tablet Chew 1 tablet Daily. 7/18/23   Sophie Capps APRN   atorvastatin (LIPITOR) 20 MG tablet Take 1 tablet by mouth Every Night. 10/26/23   Sophie Capps APRN   Cholecalciferol (VITAMIN D-3 PO) Take  by mouth.    Provider, MD Henrry   hydroCHLOROthiazide (HYDRODIURIL) 25 MG tablet Take 1 tablet by mouth Daily. 11/21/23   Sophie Capps APRN    ipratropium-albuterol (Combivent Respimat)  MCG/ACT inhaler Inhale 1 puff 4 (Four) Times a Day.    Provider, Historical, MD   Jardiance 25 MG tablet tablet TAKE 1 TABLET EVERY DAY 11/29/23   Sophie Capps APRN   losartan (COZAAR) 25 MG tablet Take 1 tablet by mouth Daily. 10/26/23   Sophie Capps APRN   metoprolol succinate XL (TOPROL-XL) 25 MG 24 hr tablet Take 1 tablet by mouth Daily for 30 days. 9/17/23 10/17/23  Darrick Duran PA   mupirocin (BACTROBAN) 2 % ointment Apply 1 application  topically to the appropriate area as directed 3 (Three) Times a Day. 11/21/23   Sophie Capps APRN        Social History:   Social History     Tobacco Use    Smoking status: Every Day     Current packs/day: 2.50     Average packs/day: 2.5 packs/day for 52.2 years (130.5 ttl pk-yrs)     Types: Cigarettes     Start date: 1972     Passive exposure: Current    Smokeless tobacco: Never   Vaping Use    Vaping status: Never Used   Substance Use Topics    Alcohol use: Yes    Drug use: Never         Review of Systems:  Review of Systems   Reason unable to perform ROS: The patient is a very poor historian.   Constitutional:  Positive for fatigue. Negative for chills, diaphoresis and fever.   HENT:  Negative for congestion, postnasal drip, rhinorrhea and sore throat.    Eyes:  Negative for photophobia.   Respiratory:  Negative for cough, chest tightness and shortness of breath.    Cardiovascular:  Negative for chest pain, palpitations and leg swelling.   Gastrointestinal:  Negative for abdominal pain, diarrhea, nausea and vomiting.   Genitourinary:  Negative for difficulty urinating, dysuria, flank pain, frequency, hematuria and urgency.   Musculoskeletal:  Positive for arthralgias. Negative for neck pain and neck stiffness.   Skin:  Negative for pallor and rash.   Neurological:  Positive for weakness. Negative for dizziness, syncope, numbness and headaches.   Hematological:  Negative for adenopathy. Does not  "bruise/bleed easily.   Psychiatric/Behavioral: Negative.          Physical Exam:  /77 (BP Location: Left arm, Patient Position: Lying)   Pulse 86   Temp 98.2 °F (36.8 °C) (Oral)   Resp 16   Ht 190.5 cm (75\")   Wt 99.2 kg (218 lb 11.1 oz)   SpO2 91%   BMI 27.34 kg/m²     Physical Exam  Vitals and nursing note reviewed.   Constitutional:       General: He is not in acute distress.     Appearance: He is obese. He is ill-appearing. He is not toxic-appearing or diaphoretic.   HENT:      Head: Normocephalic and atraumatic.      Mouth/Throat:      Mouth: Mucous membranes are moist.   Eyes:      Pupils: Pupils are equal, round, and reactive to light.   Cardiovascular:      Rate and Rhythm: Normal rate and regular rhythm.      Pulses: Normal pulses.           Carotid pulses are 2+ on the right side and 2+ on the left side.       Radial pulses are 2+ on the right side and 2+ on the left side.        Femoral pulses are 2+ on the right side and 2+ on the left side.       Popliteal pulses are 2+ on the right side and 2+ on the left side.        Dorsalis pedis pulses are 2+ on the right side and 2+ on the left side.        Posterior tibial pulses are 2+ on the right side and 2+ on the left side.      Heart sounds: Normal heart sounds. No murmur heard.  Pulmonary:      Effort: Pulmonary effort is normal. No accessory muscle usage, respiratory distress or retractions.      Breath sounds: Decreased air movement present. Examination of the right-upper field reveals decreased breath sounds. Examination of the left-upper field reveals decreased breath sounds. Examination of the right-middle field reveals decreased breath sounds. Examination of the left-middle field reveals decreased breath sounds. Examination of the right-lower field reveals decreased breath sounds. Examination of the left-lower field reveals decreased breath sounds. Decreased breath sounds present. No wheezing, rhonchi or rales.   Chest:      Chest wall: " Tenderness present. No mass, crepitus or edema.          Comments: The patient has bruising of the upper anterior chest wall  Abdominal:      General: Abdomen is flat. There is no distension.      Palpations: Abdomen is soft. There is no mass or pulsatile mass.      Tenderness: There is no abdominal tenderness. There is no right CVA tenderness, left CVA tenderness, guarding or rebound.      Comments: No rigidity   Musculoskeletal:         General: Swelling and tenderness present. No deformity.      Right shoulder: Tenderness and bony tenderness present. Decreased range of motion.      Left shoulder: Normal.        Arms:       Cervical back: Neck supple. No tenderness.      Right lower leg: No edema.      Left lower leg: No edema.      Comments: The patient has tenderness and redness of the anterior aspect of the right shoulder and AC joint   Skin:     General: Skin is warm and dry.      Capillary Refill: Capillary refill takes less than 2 seconds.      Coloration: Skin is not jaundiced or pale.      Findings: No erythema.   Neurological:      General: No focal deficit present.      Mental Status: He is alert and oriented to person, place, and time. Mental status is at baseline.      Cranial Nerves: Cranial nerves 2-12 are intact. No cranial nerve deficit or facial asymmetry.      Sensory: Sensation is intact. No sensory deficit.      Motor: Motor function is intact. Weakness present.      Comments: The patient has generalized weakness.  He is unable to sit up in bed.  There is no focal unilateral weakness   Psychiatric:         Mood and Affect: Mood normal.         Behavior: Behavior normal.                  Procedures:  Procedures      Medical Decision Making:      Comorbidities that affect care:    Hyperlipidemia, COPD, diabetes, current smoker    External Notes reviewed:    None      The following orders were placed and all results were independently analyzed by me:  Orders Placed This Encounter   Procedures     Blood Culture - Blood,    Blood Culture - Blood,    COVID-19, FLU A/B, RSV PCR 1 HR TAT - Swab, Nasopharynx    Urine Culture - Urine, Urine, Clean Catch    XR Chest 1 View    XR Shoulder 2+ View Right    Harvey Draw    Comprehensive Metabolic Panel    Single High Sensitivity Troponin T    Magnesium    Urinalysis With Microscopic If Indicated (No Culture) - Urine, Clean Catch    CBC Auto Differential    Lactic Acid, Plasma    Urinalysis, Microscopic Only - Urine, Clean Catch    CBC (No Diff)    Basic Metabolic Panel    Hemoglobin A1c    Diet: Cardiac, Diabetic; Healthy Heart (2-3 Na+); Consistent Carbohydrate; Fluid Consistency: Thin (IDDSI 0)    Undress & Gown    Continuous Pulse Oximetry    Vital Signs    Orthostatic Blood Pressure    Vital Signs    Intake & Output    Weigh Patient    Oral Care    Saline Lock & Maintain IV Access    Code Status and Medical Interventions:    Inpatient Hospitalist Consult    Inpatient Case Management  Consult    OT Consult: Eval & Treat ADL Performance Below Baseline, Discharge Placement Assessment    PT Consult: Eval & Treat Discharge Placement Assessment, Functional Mobility Below Baseline    Oxygen Therapy- Nasal Cannula; Titrate 1-6 LPM Per SpO2; 90 - 95%    Incentive Spirometry    POC Glucose Once    POC Glucose Once    POC Glucose Once    POC Glucose Once    POC Glucose Q3H    POC Glucose Once    POC Glucose Once    ECG 12 Lead ED Triage Standing Order; Weak / Dizzy / AMS    Insert Peripheral IV    Insert Peripheral IV    Inpatient Admission    Fall Precautions    CBC & Differential    Green Top (Gel)    Lavender Top    Gold Top - SST    Light Blue Top       Medications Given in the Emergency Department:  Medications   sodium chloride 0.9 % flush 10 mL (has no administration in time range)   sodium chloride 0.9 % flush 10 mL (10 mL Intravenous Given 3/11/24 2110)   sodium chloride 0.9 % flush 10 mL (has no administration in time range)   sodium chloride 0.9 %  infusion 40 mL (has no administration in time range)   Enoxaparin Sodium (LOVENOX) syringe 40 mg (40 mg Subcutaneous Given 3/11/24 0825)   sodium chloride 0.9 % infusion (100 mL/hr Intravenous New Bag 3/11/24 0313)   sennosides-docusate (PERICOLACE) 8.6-50 MG per tablet 2 tablet (has no administration in time range)     And   polyethylene glycol (MIRALAX) packet 17 g (has no administration in time range)     And   bisacodyl (DULCOLAX) EC tablet 5 mg (has no administration in time range)     And   bisacodyl (DULCOLAX) suppository 10 mg (has no administration in time range)   ondansetron (ZOFRAN) injection 4 mg (has no administration in time range)   dextrose (GLUTOSE) oral gel 15 g (has no administration in time range)   dextrose (D50W) (25 g/50 mL) IV injection 25 g (has no administration in time range)   glucagon (GLUCAGEN) injection 1 mg (has no administration in time range)   aspirin chewable tablet 81 mg (81 mg Oral Given 3/11/24 0825)   atorvastatin (LIPITOR) tablet 20 mg (20 mg Oral Given 3/11/24 2130)   hydroCHLOROthiazide tablet 25 mg (25 mg Oral Given 3/11/24 0828)   ipratropium-albuterol (DUO-NEB) nebulizer solution 3 mL (has no administration in time range)   losartan (COZAAR) tablet 25 mg (25 mg Oral Given 3/11/24 0828)   metoprolol succinate XL (TOPROL-XL) 24 hr tablet 25 mg (25 mg Oral Given 3/11/24 0825)   ipratropium-albuterol (DUO-NEB) nebulizer solution 3 mL ( Nebulization Canceled Entry 3/11/24 2100)   guaiFENesin (MUCINEX) 12 hr tablet 600 mg (600 mg Oral Not Given 3/11/24 2219)   benzonatate (TESSALON) capsule 100 mg (100 mg Oral Given 3/11/24 0826)   cefTRIAXone (ROCEPHIN) 1000 mg/100 mL 0.9% NS (MBP) (has no administration in time range)   acetaminophen (TYLENOL) tablet 1,000 mg (1,000 mg Oral Given 3/11/24 2005)   Lidocaine 4 % 1 patch (1 patch Transdermal Medication Applied 3/11/24 2012)   Insulin Lispro (humaLOG) injection 2-9 Units (8 Units Subcutaneous Given 3/12/24 0120)   nicotine  (NICODERM CQ) 21 MG/24HR patch 1 patch (1 patch Transdermal Medication Applied 3/11/24 2314)   acetaminophen (TYLENOL) tablet 650 mg (650 mg Oral Given 3/10/24 2325)   sodium chloride 0.9 % bolus 1,000 mL (1,000 mL Intravenous New Bag 3/11/24 0025)   cefTRIAXone (ROCEPHIN) 1000 mg/100 mL 0.9% NS (MBP) (1,000 mg Intravenous New Bag 3/11/24 0025)   Insulin Lispro (humaLOG) injection 5 Units (5 Units Subcutaneous Given 3/11/24 1752)        ED Course:    ED Course as of 03/12/24 0238   Sun Mar 10, 2024   2748 EKG:    Rhythm: Sinus tachycardia  Rate: 122  Intervals: Normal TN and QT interval  T-wave: No obvious pathological T waves  ST Segment: Baseline artifact obscures detail,, there is nonspecific ST segments.  There is no obvious pathological ST elevation and reciprocal ST depression to suggest STEMI    EKG Comparison: No EKG available for comparison    Interpreted by me   [SD]      ED Course User Index  [SD] Bobby Barrientos DO       Labs:    Lab Results (last 24 hours)       Procedure Component Value Units Date/Time    POC Glucose 4x Daily Before Meals & at Bedtime [[id number]]  (Abnormal) Collected: 03/11/24 0804    Specimen: Blood Updated: 03/11/24 0805     Glucose 180 mg/dL      Comment: Serial Number: [id number]:  [id number]       POC Glucose 4x Daily Before Meals & at Bedtime [[id number]]  (Abnormal) Collected: 03/11/24 1200    Specimen: Blood Updated: 03/11/24 1202     Glucose 250 mg/dL      Comment: Serial Number: [id number]:  [id number]       POC Glucose 4x Daily Before Meals & at Bedtime [[id number]]  (Abnormal) Collected: 03/11/24 1715    Specimen: Blood Updated: 03/11/24 1717     Glucose 506 mg/dL      Comment: Serial Number: [id number]:  [id number]       POC Glucose Once [[id number]]  (Abnormal) Collected: 03/11/24 1721    Specimen: Blood Updated: 03/11/24 1723     Glucose 448 mg/dL      Comment: Serial Number: [id number]:  [id number]       POC Glucose Once [[id number]]  (Abnormal)  Collected: 03/11/24 2114    Specimen: Blood Updated: 03/11/24 2118     Glucose 415 mg/dL      Comment: Serial Number: [id number]:  [id number]       POC Glucose Once [[id number]]  (Abnormal) Collected: 03/11/24 2308    Specimen: Blood Updated: 03/11/24 2310     Glucose 372 mg/dL      Comment: Serial Number: [id number]:  [id number]       POC Glucose Once [[id number]]  (Abnormal) Collected: 03/12/24 0107    Specimen: Blood Updated: 03/12/24 0109     Glucose 356 mg/dL      Comment: Serial Number: [id number]:  [id number]                Imaging:    No Radiology Exams Resulted Within Past 24 Hours      Differential Diagnosis and Discussion:    Weakness: Based on the patient's history, signs, and symptoms, the diffential diagnosis includes but is not limited to meningitis, stroke, sepsis, subarachnoid hemorrhage, intracranial bleeding, encephalitis, acute uti, dehydration, MS, myasthenia gravis, Guillan Wilton, migraine variant, neuromuscular disorders vertigo, electrolyte imbalance, and metabolic disorders.    All labs were reviewed and interpreted by me.  All X-rays impressions were independently interpreted by me.    MDM  Number of Diagnoses or Management Options  Chronic obstructive pulmonary disease, unspecified COPD type  Sepsis without acute organ dysfunction, due to unspecified organism  Urinary tract infection without hematuria, site unspecified  Weakness  Diagnosis management comments: 23:55 EDT  I have evaluated and seen this patient for the first time at 11:41 PM.  I did feel at this time that the patient met SIRS criteria.  The patient's urine resulted at 1155.  I do feel that the patient has a bacterial source in the urine.  Sepsis was identified at this time.  The patient was given broad-spectrum antibiotics and IV fluids    Sepsis criteria was met in the emergency department and the Sepsis protocol (including antibiotic administration) was initiated.      SIRS criteria considered:   1.                      Temperature > 100.4 or <98.6    2.                     Heart Rate > 90    3.                     Respiratory Rate > 22    4.                     WBC > 12K or <4K.             Severe Sepsis:     Respiratory: Mechanical Ventilation or Bipap  Hypotension: SBP > 90 or MAP < 65  Renal: Creatinine > 2  Metabolic: Lactic Acid > 2  Hematologic: Platelets < 100K or INR > 1.5  Hepatic: BILI  >  2  CNS: Sudden AMS     Septic Shock:     Severe Sepsis + Persistent hypotension or Lactic Acid > 4     Normal saline bolus, Antibiotics, and final disposition was based on these definitions.     Patient's urinalysis was positive for infection.  The patient had positive nitrate, moderate leuk esterase too numerous white blood cells and 1+ bacteria.  Urine cultures were ordered but pending at the time of admission    2 blood cultures were ordered.  The results are pending at the time of disposition    The patient's Covid swab was negative   The patient's Influenza swab was negative     The patient's CMP was reviewed and shows no abnormalities of critical concern.  Of note, the patient's sodium and potassium are acceptable.  The patient's liver enzymes are unremarkable.  The patient's renal function including creatinine is preserved.  The patient has a normal anion gap.    The patient's CBC was reviewed and shows no abnormalities of critical concern.  Of note, there is no anemia requiring a blood transfusion and the platelet count is acceptable    Chest x-ray was performed while in the emergency room.  The chest x-ray demonstrated no acute cardiopulmonary process    X-ray of the right shoulder demonstrates no acute fracture or malalignment         Amount and/or Complexity of Data Reviewed  Clinical lab tests: reviewed  Tests in the radiology section of CPT®: reviewed  Tests in the medicine section of CPT®: reviewed  Discuss the patient with other providers: yes (00:01 EDT  I discussed the case with the hospitalist.  We have  discussed the patient's presenting symptoms, laboratory values, imaging and condition at the time of admission.  They will evaluate the patient in the emergency room and admit the patient to the hospital)           Social Determinants of Health:    Patient is independent, reliable, and has access to care.       Disposition and Care Coordination:    Admit:   Through independent evaluation of the patient's history, physical, and imperical data, the patient meets criteria for inpatient admission to the hospital.        Final diagnoses:   Urinary tract infection without hematuria, site unspecified   Sepsis without acute organ dysfunction, due to unspecified organism   Weakness   Chronic obstructive pulmonary disease, unspecified COPD type        ED Disposition       ED Disposition   Decision to Admit    Condition   --    Comment   Level of Care: Remote Telemetry [26]   Diagnosis: Sepsis secondary to UTI [668181]   Admitting Physician: MORGAN MELGOZA [[id number]]   Certification: I Certify That Inpatient Hospital Services Are Medically Necessary For Greater Than 2 Midnights                 This medical record created using voice recognition software.             Bobby Barrientos DO  03/12/24 0238

## 2024-03-11 NOTE — PLAN OF CARE
Goal Outcome Evaluation:  AAO X4. TOLERATING DIET, ROOM AIR, & ACTIVTY WITH STAND-BY ASSIST OR AD FELIPE.     ENCOURAGED PATIENT & WIFE TO FOLLOW DOCTOR ORDERED DIET AFTER NOTING REGULAR SODAS, CANDY BARS, APPLE TURNOVERS, & 4 OTHER GROCERY BAGS OF SNACK-LIKE FOOD BEING EATEN BY PATIENT & BS >500. WIFE DECLINES EDUCATION & STATES REGULAR DOUGLAS RHOADES HELPS HER DIABETES. ASKED HER TO REMOVE PATIENT'S REGULAR DR. PEPPER FROM HIS TABLE & PUT UP FOR A LATER TIME.     PATIENT REQUESTING PUDDING A FEW MINUTES AFTER DIET DISCUSSION & DM EDUCATION. EXPLAINED MD ORDERED DIET & STAFF INABILITY TO BRING HIM MORE CALORIES/CARBS THAN MD ORDERED.

## 2024-03-11 NOTE — THERAPY EVALUATION
Acute Care - Physical Therapy Initial Evaluation  [facility] Funez     Patient Name: Brian Mehta  : 1955  MRN: [id number]  Today's Date: 3/11/2024   Admit date: 3/10/2024     Referring Physician: Meliton Monroy DO     Surgery Date:* No surgery found *           Visit Dx:     ICD-10-CM ICD-9-CM   1. Urinary tract infection without hematuria, site unspecified  N39.0 599.0   2. Sepsis without acute organ dysfunction, due to unspecified organism  A41.9 038.9     995.91   3. Weakness  R53.1 780.79   4. Chronic obstructive pulmonary disease, unspecified COPD type  J44.9 496   5. Difficulty in walking  R26.2 719.7     Patient Active Problem List   Diagnosis    CVA (cerebral vascular accident)    Essential hypertension    High cholesterol    Hip pain, bilateral    Right knee pain    Vitamin B12 deficiency    Kidney disorder    Chronic pain of both knees    Sepsis    Chronic HFrEF (heart failure with reduced ejection fraction)    LVH (left ventricular hypertrophy)    Sepsis secondary to UTI    Weakness    COPD (chronic obstructive pulmonary disease)    DM2 (diabetes mellitus, type 2)     Past Medical History:   Diagnosis Date    COPD (chronic obstructive pulmonary disease)     Diabetes mellitus     Hyperlipidemia     Stroke     7 years ago per wife     Past Surgical History:   Procedure Laterality Date    APPENDECTOMY      EYE SURGERY       PT Assessment (Last 12 Hours)       PT Evaluation and Treatment       Row Name 24 1024          Physical Therapy Time and Intention    Subjective Information complains of;weakness;fatigue;pain  -PRAKASH     Document Type evaluation  -PRAKASH     Mode of Treatment individual therapy;physical therapy  -PRAKASH     Patient Effort good  -PRAKASH       Row Name 24 1024          General Information    Patient Profile Reviewed yes  -PRAKASH     Patient Observations cooperative;alert;agree to therapy  -PRAKASH     Prior Level of Function mod assist:;grooming;bathing  -PRAKASH     Equipment Currently Used at Home  walker, rolling  -PRAKASH     Benefits Reviewed patient:;improve function;increase independence;increase strength  -PRAKASH       Row Name 03/11/24 1024          Living Environment    Current Living Arrangements home  -PRAKASH     Home Accessibility stairs to enter home  -PRAKASH     People in Home spouse  -PRAKASH     Primary Care Provided by self;spouse/significant other  -PRAKASH       Row Name 03/11/24 1024          Home Main Entrance    Number of Stairs, Main Entrance four  -PRAKASH     Surface of Stairs, Main Entrance concrete  -PRAKASH     Stair Railings, Main Entrance railing on right side (ascending)  -PRAKASH       Row Name 03/11/24 1024          Home Use of Assistive/Adaptive Equipment    Equipment Currently Used at Home walker, rolling  in community  -PRAKASH       Row Name 03/11/24 1024          Range of Motion (ROM)    Range of Motion ROM is WFL  -PRAKASH       Row Name 03/11/24 1024          Strength (Manual Muscle Testing)    Strength (Manual Muscle Testing) bilateral lower extremities  3/5 L knee ext, 3+/5 ankle dorsi bilateral, all other MMT performed 4/5  -PRAKASH       Row Name 03/11/24 1024          Mobility    Extremity Weight-bearing Status left lower extremity;right lower extremity  -PRAKASH     Left Lower Extremity (Weight-bearing Status) full weight-bearing (FWB)  -PRAKASH     Right Lower Extremity (Weight-bearing Status) full weight-bearing (FWB)  -PRAKASH       Row Name 03/11/24 1024          Bed Mobility    Bed Mobility bed mobility (all) activities  -PRAKASH     All Activities, [place] (Bed Mobility) minimum assist (75% patient effort)  -PRAKASH     Assistive Device (Bed Mobility) bed rails;draw sheet  -PRAKASH     Comment, (Bed Mobility) Pt requires help with lifting legs into and out of bed during transfer.  -PRAKASH       Row Name 03/11/24 1024          Transfers    Transfers sit-stand transfer;stand-sit transfer  -PRAKASH     Maintains Weight-bearing Status (Transfers) able to maintain  -PRAKASH       Row Name 03/11/24 1024          Sit-Stand Transfer    Sit-Stand [place]  (Transfers) minimum assist (75% patient effort)  -PRAKASH     Assistive Device (Sit-Stand Transfers) walker, front-wheeled  -PRAKASH       Row Name 03/11/24 1024          Stand-Sit Transfer    Stand-Sit [place] (Transfers) minimum assist (75% patient effort)  -PRAKASH     Assistive Device (Stand-Sit Transfers) walker, front-wheeled  -PRAKASH       Row Name 03/11/24 1024          Gait/Stairs (Locomotion)    Gait/Stairs Locomotion gait/ambulation assistive device  -PRAKASH     [place] Level (Gait) contact guard  -PRAKASH     Assistive Device (Gait) walker, front-wheeled  -PRAKASH     Patient was able to Ambulate yes  -PRAKASH     Distance in Feet (Gait) 50  -PRAKASH     Pattern (Gait) 3-point  -PRAKASH     Deviations/Abnormal Patterns (Gait) gait speed decreased;stride length decreased;weight shifting decreased  -PRAKASH     Left Sided Gait Deviations decreased knee extension  -PRAKASH     Comment, (Gait/Stairs) Pt demonstrates slow but consistent gait mechanics, low self selected pace of gait.  -PRAKASH       Row Name 03/11/24 1024          Plan of Care Review    Plan of Care Reviewed With patient  -PRAKASH     Progress improving  -PRAKASH     Outcome Evaluation Pt presents to PT with deficits related to Sepsis. Pt demonstrates reduced strength, bed mobility and functional mobility due to fatigue and pain. Skilled services needed to improve endurance and transfer mechanics. Plan to DC to IRF.  -PRAKASH       Row Name 03/11/24 1024          Therapy Assessment/Plan (PT)    Patient/Family Therapy Goals Statement (PT) Go home  -PRAKASH     Rehab Potential (PT) good, to achieve stated therapy goals  -PRAKASH     Criteria for Skilled Interventions Met (PT) meets criteria  -PRAKASH     Therapy Frequency (PT) daily  -PRAKASH     Predicted Duration of Therapy Intervention (PT) 10 days  -PRAKASH     Problem List (PT) problems related to;balance;strength;mobility  -PRAKASH     Activity Limitations Related to Problem List (PT) unable to ambulate safely;unable to transfer safely  -PRAKASH       Row Name 03/11/24 1024          PT  Evaluation Complexity    History, PT Evaluation Complexity 1-2 personal factors and/or comorbidities  -PRAKASH     Examination of Body Systems (PT Eval Complexity) total of 3 or more elements  -PRAKASH     Clinical Presentation (PT Evaluation Complexity) stable  -PRAKASH     Clinical Decision Making (PT Evaluation Complexity) low complexity  -PRAKASH     Overall Complexity (PT Evaluation Complexity) low complexity  -PRAKASH       Row Name 03/11/24 1024          Therapy Plan Review/Discharge Plan (PT)    Therapy Plan Review (PT) evaluation/treatment results reviewed  -PRAKASH       Row Name 03/11/24 1024          Physical Therapy Goals    Bed Mobility Goal Selection (PT) bed mobility, PT goal 1  -PRAKASH     Transfer Goal Selection (PT) transfer, PT goal 1  -PRAKASH     Gait Training Goal Selection (PT) gait training, PT goal 1  -PRAKASH       Row Name 03/11/24 1024          Bed Mobility Goal 1 (PT)    Activity/Assistive Device (Bed Mobility Goal 1, PT) bed mobility activities, all  -PRAKASH     [place] Level/Cues Needed (Bed Mobility Goal 1, PT) independent  -PRAKASH     Time Frame (Bed Mobility Goal 1, PT) 10 days  -PRAKASH     Progress/Outcomes (Bed Mobility Goal 1, PT) new goal  -PRAKASH       Row Name 03/11/24 1024          Transfer Goal 1 (PT)    Activity/Assistive Device (Transfer Goal 1, PT) sit-to-stand/stand-to-sit  -PRAKASH     [place] Level/Cues Needed (Transfer Goal 1, PT) minimum assist (75% or more patient effort)  -PRAKASH     Time Frame (Transfer Goal 1, PT) 10 days  -PRAKASH     Progress/Outcome (Transfer Goal 1, PT) new goal  -PRAKASH       Row Name 03/11/24 1024          Gait Training Goal 1 (PT)    Activity/Assistive Device (Gait Training Goal 1, PT) gait (walking locomotion);improve balance and speed;increase endurance/gait distance  -PRAKASH     [place] Level (Gait Training Goal 1, PT) independent  -PRAKASH     Distance (Gait Training Goal 1, PT) 150  -PRAKASH     Time Frame (Gait Training Goal 1, PT) 10 days  -PRAKASH     Progress/Outcome (Gait Training Goal 1, PT) new goal  -PRAKASH                User Key  (r) = Recorded By, (t) = Taken By, (c) = Cosigned By      Initials Name Provider Type    Royce Murphy PT Physical Therapist                    Physical Therapy Education       Title: PT OT SLP Therapies (Done)       Topic: Physical Therapy (Done)       Point: Mobility training (Done)       Learning Progress Summary             Patient Acceptance, E,TB, VU by PRAKASH at 3/11/2024 1039                                         User Key       Initials Effective Dates Name Provider Type Discipline    PRAKASH 06/03/21 -  Royce Carias PT Physical Therapist PT                  PT Recommendation and Plan  Anticipated Discharge Disposition (PT): inpatient rehabilitation facility  Planned Therapy Interventions (PT): balance training, bed mobility training, gait training, patient/family education, stair training, strengthening, transfer training  Therapy Frequency (PT): daily  Plan of Care Reviewed With: patient  Progress: improving  Outcome Evaluation: Pt presents to PT with deficits related to Sepsis. Pt demonstrates reduced strength, bed mobility and functional mobility due to fatigue and pain. Skilled services needed to improve endurance and transfer mechanics. Plan to DC to IRF.   Outcome Measures       Row Name 03/11/24 1000             How much help from another person do you currently need...    Turning from your back to your side while in flat bed without using bedrails? 3  -PRAKASH      Moving from lying on back to sitting on the side of a flat bed without bedrails? 3  -PRAKASH      Moving to and from a bed to a chair (including a wheelchair)? 3  -PRAKASH      Standing up from a chair using your arms (e.g., wheelchair, bedside chair)? 3  -PRAKASH      Climbing 3-5 steps with a railing? 2  -PRAKASH      To walk in hospital room? 3  -PRAKASH      AM-PAC 6 Clicks Score (PT) 17  -PRAKASH      Highest Level of Mobility Goal 5 --> Static standing  -PRAKASH                User Key  (r) = Recorded By, (t) = Taken By, (c) = Cosigned By       Initials Name Provider Type    Royce Murphy, PT Physical Therapist                     Time Calculation:    PT Charges       Row Name 03/11/24 1021             Time Calculation    PT Received On 03/11/24  -PRAKASH      PT Goal Re-Cert Due Date 03/20/24  -PRAKASH         Untimed Charges    PT Eval/Re-eval Minutes 25  -PRAKASH         Total Minutes    Untimed Charges Total Minutes 25  -PRAKASH       Total Minutes 25  -PRAKASH                User Key  (r) = Recorded By, (t) = Taken By, (c) = Cosigned By      Initials Name Provider Type    Royce Murphy, PT Physical Therapist                  Therapy Charges for Today       Code Description Service Date Service Provider Modifiers Qty    [id number] HC PT EVAL LOW COMPLEXITY 2 3/11/2024 Royce Craias, PT GP 1            PT G-Codes  AM-PAC 6 Clicks Score (PT): 17    Royce Carias, PT  3/11/2024

## 2024-03-11 NOTE — PAYOR COMM NOTE
"Brian Mehta \"Gio\" (69 y.o. Male)     PATIENT INFORMATION  Name:  Brian Mehta  MRN#:     [id number]  :  1955       ADMISSION INFORMATION  CLASS: Inpatient   DOS:  3/11/24      CURRENT ATTENDING PROVIDER INFORMATION  Name/NPI: Meliton Monroy DO [id number]  Phone: Phone: [phone]      REQUESTING PROVIDER and RENDERING FACILITY  Name:  [facility]   NPI:  [id number]  TID:  [id number]  Address:      [facility] [address] [facility]  Phone  [phone]      UTILIZATION REVIEW CONTACT INFORMATION  Phone:      [phone]  Fax:           [phone]      ADMISSION DIAGNOSIS  Weakness [R53.1]  Sepsis secondary to UTI [A41.9, N39.0]  Urinary tract infection without hematuria, site unspecified [N39.0]  Chronic obstructive pulmonary disease, unspecified COPD type [J44.9]  Sepsis without acute organ dysfunction, due to unspecified organism [A41.9]  Tachycardia R00.0  Hypoxia  R09.02    ++++++++++++++++++++++++++++++++++++++++++++++++++++++++++++++++++++++++++++++++        Date of Birth   1955    Social Security Number       Address   [address] [place], [place] [address]    Home Phone   [phone]    MRN   [id number]       Jew   None    Marital Status                               Admission Date   3/10/24    Admission Type   Emergency    Admitting Provider   Robbie Maya MD    Attending Provider   Meliton Monroy DO    Department, Room/Bed   [address], /       Discharge Date       Discharge Disposition       Discharge Destination                                 Attending Provider: Meliton Monroy DO    Allergies: No Known Allergies    Isolation: None   Infection: None   Code Status: CPR    Ht: 190.5 cm (75\")   Wt: 99.2 kg (218 lb 11.1 oz)    Admission Cmt: None   Principal Problem: Sepsis secondary to UTI [A41.9,N39.0]                   Active Insurance as of 3/10/2024       Primary Coverage       Payor Plan " Insurance Group Employer/Plan Group    ANTHEM MEDICARE REPLACEMENT ANTHEM MEDICARE ADVANTAGE [id number]       Payor Plan Address Payor Plan Phone Number Payor Plan Fax Number Effective Dates    [address] [phone]  3/1/2024 - None Entered    [facility] [address]         Subscriber Name Subscriber Birth Date Member ID       BRANDON CATALAN P 1955 [id number]               Secondary Coverage       Payor Plan Insurance Group Employer/Plan Group    ANTHEM MEDICAID ANTHEM MEDICAID [id number]       Payor Plan Address Payor Plan Phone Number Payor Plan Fax Number Effective Dates    [address] [address] [phone]  7/2/2019 - None Entered    [facility] [id number]         Subscriber Name Subscriber Birth Date Member ID       BRANDON CATALAN P 1955 [id number]                    Urinary Tract Infection (UTI) [facility]       Indications Met   Last updated by Chela Jackson, RN on 3/11/2024 0828     Review Status Created By   Primary Completed Chela Jackson, RN      Criteria Review   Urinary Tract Infection (UTI) [facility]     Overall Determination: Indications Met     Criteria:  [×] Admission is indicated for  1 or more  of the following :      [×] Hemodynamic instability          3/11/2024  8:28 AM              -- 3/11/2024  8:28 AM by Chela Jackson, RN --                  , 113, 114     Notes:  -- 3/11/2024  8:28 AM by Chela Jackson, RN --      Plan: .            Sepsis secondary to UTI      Admitted telemetry      -Status post fluid bolus      -Resume maintenance      -Patient febrile and tachycardic on arrival      -UA positive for UTI      -Lactic acid within normal limits      -Empiric antibiotics      -Blood and urine cultures      -Will follow along            Generalized weakness      -Secondary to above      -History of CVA with right-sided deficits      -Fall precautions      -PT OT                  COPD exacerbation      -Chest x-ray unremarkable      -Supplemental oxygen as needed, wean  down as tolerated      -IV Solu-Medrol      -DuoNebs      -Empiric antibiotics      -Mucinex, Tessalon Perles      -Incentive spirometry      -Adjust home meds if warranted      -Consult pulmonology if warranted      -Supportive care            Diabetes      -Insulin sliding scale      -Levemir at bedtime      -Titrate as needed            HTN      -Currently well controlled      -PRN BP meds      -Resume home meds when available      -Titrate if needed                            History & Physical        Robbie Maya MD at [address] [place] [address]   HISTORY AND PHYSICAL    Patient Name: Brian Mehta  : 1955  MRN: [id number]  Primary Care Physician:  Sophie Capps APRN  Date of admission: 3/10/2024    Subjective  Subjective     Chief Complaint: Generalized weakness and fall    HPI:    Brian Mehta is a 69 y.o. male with past medical history of diabetes, hypertension, COPD, CVA with right-sided deficits, anxiety, GERD presented to the ED with complaints of generalized weakness and fall.  Patient states that for the last week he has been having worsening weakness to where he can barely get up along with shortness of breath and a productive cough.  Patient does have history of CVA with right-sided deficits but is able to get around with the aid of a walker or a scooter but over the last week has been lethargic mostly bedbound and needs his wife to fully assist him in getting around which resulted in a fall which resulted in significant right shoulder pain.  EMS was then called and patient was brought to the ED.  In the ED patient was febrile and hypoxic on room air with remaining vitals being within normal limits.  UA showed that he did have a UTI with remaining labs being unremarkable including normal WBC and lactic acid and negative COVID flu RSV.  Chest x-ray was negative for any acute findings and shoulder x-ray was negative for any fractures or subluxations.  When asked she denied any  focal weakness, chest pain, palpitation, abdominal pain, nausea, vomiting, diarrhea, constipation, dysuria, hematuria, hematochezia, melena, or anxiety.      Review of Systems   All systems were reviewed and negative except for: As per HPI    Personal History     Past Medical History:   Diagnosis Date    COPD (chronic obstructive pulmonary disease)     Diabetes mellitus     Hyperlipidemia        Past Surgical History:   Procedure Laterality Date    APPENDECTOMY      EYE SURGERY         Family History: family history includes No Known Problems in his father and mother. Otherwise pertinent FHx was reviewed and not pertinent to current issue.    Social History:  reports that he has been smoking cigarettes. He started smoking about 52 years ago. He has a 130.5 pack-year smoking history. He has been exposed to tobacco smoke. He has never used smokeless tobacco. He reports current alcohol use. He reports that he does not use drugs.    Home Medications:  Cholecalciferol, aspirin, atorvastatin, empagliflozin, hydroCHLOROthiazide, ipratropium-albuterol, losartan, metoprolol succinate XL, and mupirocin      Allergies:  No Known Allergies    Objective  Objective     Vitals:   Temp:  [98.2 °F (36.8 °C)-100.4 °F (38 °C)] 98.2 °F (36.8 °C)  Heart Rate:  [] 96  Resp:  [16-18] 18  BP: (107-151)/(74-88) 142/88  Flow (L/min):  [2] 2  Physical Exam    Constitutional: Awake, alert   Eyes: PERRLA, sclerae anicteric, no conjunctival injection   HENT: NCAT, mucous membranes moist   Neck: Supple, no thyromegaly, no lymphadenopathy, trachea midline   Respiratory: Rhonchi, normal effort, on nasal cannula   Cardiovascular: RRR, no murmurs, rubs, or gallops, palpable pedal pulses bilaterally   Gastrointestinal: Positive bowel sounds, soft, nontender, nondistended   Musculoskeletal: No bilateral ankle edema, no clubbing or cyanosis to extremities   Psychiatric: Appropriate affect, cooperative   Neurologic: Oriented x 3, strength symmetric  in all extremities, Cranial Nerves grossly intact to confrontation, speech clear   Skin: No rashes     Result Review   Result Review:  I have personally reviewed the results from the time of this admission to 3/11/2024 05:16 EDT and agree with these findings:  [x]  Laboratory list / accordion  []  Microbiology  [x]  Radiology  [x]  EKG/Telemetry   []  Cardiology/Vascular   []  Pathology  []  Old records  []  Other:  Most notable findings include: UTI on UA, normal lactic acid level, UA negative COVID flu RSV, x-ray unremarkable      Assessment & Plan  Assessment / Plan     Brief Patient Summary:  Brian Mehta is a 69 y.o. male with past medical history of diabetes, hypertension, COPD, CVA with right-sided deficits, anxiety, GERD presented to the ED with complaints of generalized weakness and fall.     Active Hospital Problems:  Active Hospital Problems    Diagnosis     **Sepsis secondary to UTI     Weakness     COPD (chronic obstructive pulmonary disease)     DM2 (diabetes mellitus, type 2)     Chronic HFrEF (heart failure with reduced ejection fraction)     Kidney disorder     Essential hypertension     CVA (cerebral vascular accident)      Plan: .    Sepsis secondary to UTI  Admitted telemetry  -Status post fluid bolus  -Resume maintenance  -Patient febrile and tachycardic on arrival  -UA positive for UTI  -Lactic acid within normal limits  -Empiric antibiotics  -Blood and urine cultures  -Will follow along    Generalized weakness  -Secondary to above  -History of CVA with right-sided deficits  -Fall precautions  -PT OT      COPD exacerbation  -Chest x-ray unremarkable  -Supplemental oxygen as needed, wean down as tolerated  -IV Solu-Medrol  -DuoNebs  -Empiric antibiotics  -Mucinex, Tessalon Perles  -Incentive spirometry  -Adjust home meds if warranted  -Consult pulmonology if warranted  -Supportive care    Diabetes  -Insulin sliding scale  -Levemir at bedtime  -Titrate as needed    HTN  -Currently well  controlled  -PRN BP meds  -Resume home meds when available  -Titrate if needed    Hx CVA    GI ppx  DVT ppx        CODE STATUS: Full code     Admission Status:  I believe this patient meets inpatient status.      Electronically signed by Robbie Maya MD, 03/11/24, 5:16 AM EDT.    Electronically signed by Robbie Maya MD at 03/11/24 0531       Vital Signs (last day)       Date/Time Temp Temp src Pulse Resp BP Patient Position SpO2    03/11/24 0300 98.2 (36.8) Oral 90 18 133/83 Lying 94    03/11/24 0150 98.9 (37.2) Oral 94 18 120/74 Lying 95    03/11/24 0030 99.7 (37.6) -- 108 16 107/79 -- 94    03/10/24 2345 -- -- 114 -- 126/86 -- 96    03/10/24 2330 -- -- 113 -- 151/86 -- 97    03/10/24 2304 100.4 (38) Oral 120 18 129/77 Sitting 89          Current Facility-Administered Medications   Medication Dose Route Frequency Provider Last Rate Last Admin    aspirin chewable tablet 81 mg  81 mg Oral Daily Robbie Maya MD   81 mg at 03/11/24 0825    atorvastatin (LIPITOR) tablet 20 mg  20 mg Oral Nightly Robbie Maya MD        benzonatate (TESSALON) capsule 100 mg  100 mg Oral TID PRN Robbie Maya MD   100 mg at 03/11/24 0826    sennosides-docusate (PERICOLACE) 8.6-50 MG per tablet 2 tablet  2 tablet Oral BID PRN Robbie Maya MD        And    polyethylene glycol (MIRALAX) packet 17 g  17 g Oral Daily PRN Robbie Maya MD        And    bisacodyl (DULCOLAX) EC tablet 5 mg  5 mg Oral Daily PRN Robbie Maya MD        And    bisacodyl (DULCOLAX) suppository 10 mg  10 mg Rectal Daily PRN Robbie Maya MD        dextrose (D50W) (25 g/50 mL) IV injection 25 g  25 g Intravenous Q15 Min PRN Robbie Maya MD        dextrose (GLUTOSE) oral gel 15 g  15 g Oral Q15 Min PRN Robbie Maya MD        Enoxaparin Sodium (LOVENOX) syringe 40 mg  40 mg Subcutaneous Daily Robbie Maya MD   40 mg at 03/11/24 0825    glucagon (GLUCAGEN) injection 1 mg  1 mg Intramuscular Q15 Min PRRobbie Niño MD         guaiFENesin (MUCINEX) 12 hr tablet 600 mg  600 mg Oral Q12H Robbie Maya MD   600 mg at 03/11/24 0825    hydroCHLOROthiazide tablet 25 mg  25 mg Oral Daily Robbie Maya MD   25 mg at 03/11/24 0828    Insulin Lispro (humaLOG) injection 2-9 Units  2-9 Units Subcutaneous 4x Daily AC & at Bedtime Robbie Maya MD   2 Units at 03/11/24 0825    ipratropium-albuterol (DUO-NEB) nebulizer solution 3 mL  3 mL Nebulization Q4H PRN Robbie Maya MD        ipratropium-albuterol (DUO-NEB) nebulizer solution 3 mL  3 mL Nebulization TID Robbie Maya MD   3 mL at 03/11/24 0843    losartan (COZAAR) tablet 25 mg  25 mg Oral Daily Robbie Maya MD   25 mg at 03/11/24 0828    methylPREDNISolone sodium succinate (SOLU-Medrol) injection 40 mg  40 mg Intravenous Daily Robbie Maya MD   40 mg at 03/11/24 0825    metoprolol succinate XL (TOPROL-XL) 24 hr tablet 25 mg  25 mg Oral Q24H Robbie Maya MD   25 mg at 03/11/24 0825    ondansetron (ZOFRAN) injection 4 mg  4 mg Intravenous Q6H PRN Robbie Maya MD        sodium chloride 0.9 % flush 10 mL  10 mL Intravenous PRN Bobby Barrientos DO        sodium chloride 0.9 % flush 10 mL  10 mL Intravenous Q12H Robbie Maya MD   10 mL at 03/11/24 0826    sodium chloride 0.9 % flush 10 mL  10 mL Intravenous PRN Robbie Maya MD        sodium chloride 0.9 % infusion 40 mL  40 mL Intravenous PRN Robbie Maya MD        sodium chloride 0.9 % infusion  100 mL/hr Intravenous Continuous Robbie Maya  mL/hr at 03/11/24 0313 100 mL/hr at 03/11/24 0313     Lab Results (last 24 hours)       Procedure Component Value Units Date/Time    POC Glucose 4x Daily Before Meals & at Bedtime [[id number]]  (Abnormal) Collected: 03/11/24 0804    Specimen: Blood Updated: 03/11/24 0805     Glucose 180 mg/dL      Comment: Serial Number: [id number]:  [id number]       COVID-19, FLU A/B, RSV PCR 1 HR TAT - Swab, Nasopharynx [[id number]]  (Normal) Collected: 03/10/24 3510     Specimen: Swab from Nasopharynx Updated: 03/11/24 0001     COVID19 Not Detected     Influenza A PCR Not Detected     Influenza B PCR Not Detected     RSV, PCR Not Detected    Narrative:      Fact sheet for providers: https://www.fda.gov/media/030683/download    Fact sheet for patients: https://www.fda.gov/media/886107/download    Test performed by PCR.    Urine Culture - Urine, Urine, Clean Catch [[id number]] Collected: 03/10/24 2321    Specimen: Urine, Clean Catch Updated: 03/11/24 0000    Urinalysis, Microscopic Only - Urine, Clean Catch [[id number]]  (Abnormal) Collected: 03/10/24 2321    Specimen: Urine, Clean Catch Updated: 03/10/24 2348     RBC, UA 11-20 /HPF      WBC, UA Too Numerous to Count /HPF      Bacteria, UA 1+ /HPF      Squamous Epithelial Cells, UA 0-2 /HPF      Hyaline Casts, UA 0-2 /LPF      WBC Clumps, UA Small/1+ /HPF      Methodology Manual Light Microscopy    [place] Draw [[id number]] Collected: 03/10/24 2300    Specimen: Blood from Arm, Left Updated: 03/10/24 2348    Narrative:      The following orders were created for panel order [place] Draw.  Procedure                               Abnormality         Status                     ---------                               -----------         ------                     Green Top (Gel)[[id number]]                                  Final result               Lavender Top[[id number]]                                     Final result               Gold Top - SST[[id number]]                                   Final result               Light Blue Top[[id number]]                                   Final result                 Please view results for these tests on the individual orders.    Light Blue Top [[id number]] Collected: 03/10/24 2300    Specimen: Blood from Arm, Left Updated: 03/10/24 2348     Extra Tube Hold for add-ons.     Comment: Auto resulted       Gold Top - SST [[id number]] Collected: 03/10/24 2300    Specimen: Blood from Arm, Left Updated:  03/10/24 2347     Extra Tube Hold for add-ons.     Comment: Auto resulted.       Green Top (Gel) [[id number]] Collected: 03/10/24 2300    Specimen: Blood from Arm, Left Updated: 03/10/24 2342     Extra Tube Hold for add-ons.     Comment: Auto resulted.       Lavender Top [[id number]] Collected: 03/10/24 2300    Specimen: Blood from Arm, Left Updated: 03/10/24 2342     Extra Tube hold for add-on     Comment: Auto resulted       Comprehensive Metabolic Panel [[id number]]  (Abnormal) Collected: 03/10/24 2300    Specimen: Blood from Arm, Left Updated: 03/10/24 2336     Glucose 219 mg/dL      BUN 13 mg/dL      Creatinine 0.99 mg/dL      Sodium 133 mmol/L      Potassium 3.6 mmol/L      Chloride 95 mmol/L      CO2 25.8 mmol/L      Calcium 8.9 mg/dL      Total Protein 8.0 g/dL      Albumin 3.5 g/dL      ALT (SGPT) 9 U/L      AST (SGOT) 15 U/L      Alkaline Phosphatase 110 U/L      Total Bilirubin 0.6 mg/dL      Globulin 4.5 gm/dL      A/G Ratio 0.8 g/dL      BUN/Creatinine Ratio 13.1     Anion Gap 12.2 mmol/L      eGFR 83.5 mL/min/1.73     Narrative:      GFR Normal >60  Chronic Kidney Disease <60  Kidney Failure <15      Single High Sensitivity Troponin T [[id number]]  (Normal) Collected: 03/10/24 2300    Specimen: Blood from Arm, Left Updated: 03/10/24 2336     HS Troponin T 10 ng/L     Narrative:      High Sensitive Troponin T Reference Range:  <14.0 ng/L- Negative Female for AMI  <22.0 ng/L- Negative Male for AMI  >=14 - Abnormal Female indicating possible myocardial injury.  >=22 - Abnormal Male indicating possible myocardial injury.   Clinicians would have to utilize clinical acumen, EKG, Troponin, and serial changes to determine if it is an Acute Myocardial Infarction or myocardial injury due to an underlying chronic condition.         Magnesium [[id number]]  (Normal) Collected: 03/10/24 2300    Specimen: Blood from Arm, Left Updated: 03/10/24 2336     Magnesium 1.8 mg/dL     Urinalysis With Microscopic If Indicated (No  Culture) - Urine, Clean Catch [[id number]]  (Abnormal) Collected: 03/10/24 2321    Specimen: Urine, Clean Catch Updated: 03/10/24 2334     Color, UA Yellow     Appearance, UA Cloudy     pH, UA 5.5     Specific Gravity, UA >=1.030     Glucose, UA >=1000 mg/dL (3+)     Ketones, UA Negative     Bilirubin, UA Negative     Blood, UA Large (3+)     Protein, UA 30 mg/dL (1+)     Leuk Esterase, UA Moderate (2+)     Nitrite, UA Positive     Urobilinogen, UA 0.2 E.U./dL    Lactic Acid, Plasma [[id number]]  (Normal) Collected: 03/10/24 2310    Specimen: Blood Updated: 03/10/24 2332     Lactate 1.8 mmol/L     CBC & Differential [[id number]]  (Abnormal) Collected: 03/10/24 2300    Specimen: Blood from Arm, Left Updated: 03/10/24 2317    Narrative:      The following orders were created for panel order CBC & Differential.  Procedure                               Abnormality         Status                     ---------                               -----------         ------                     CBC Auto Differential[[id number]]        Abnormal            Final result                 Please view results for these tests on the individual orders.    CBC Auto Differential [[id number]]  (Abnormal) Collected: 03/10/24 2300    Specimen: Blood from Arm, Left Updated: 03/10/24 2317     WBC 9.64 10*3/mm3      RBC 5.85 10*6/mm3      Hemoglobin 15.4 g/dL      Hematocrit 46.5 %      MCV 79.5 fL      MCH 26.3 pg      MCHC 33.1 g/dL      RDW 15.4 %      RDW-SD 43.8 fl      MPV 9.8 fL      Platelets 220 10*3/mm3      Neutrophil % 64.4 %      Lymphocyte % 25.7 %      Monocyte % 9.4 %      Eosinophil % 0.0 %      Basophil % 0.2 %      Immature Grans % 0.3 %      Neutrophils, Absolute 6.20 10*3/mm3      Lymphocytes, Absolute 2.48 10*3/mm3      Monocytes, Absolute 0.91 10*3/mm3      Eosinophils, Absolute 0.00 10*3/mm3      Basophils, Absolute 0.02 10*3/mm3      Immature Grans, Absolute 0.03 10*3/mm3      nRBC 0.0 /100 WBC     Blood Culture - Blood, Arm,  Left [[id number]] Collected: 03/10/24 2310    Specimen: Blood from Arm, Left Updated: 03/10/24 2314    Blood Culture - Blood, Arm, Right [[id number]] Collected: 03/10/24 2310    Specimen: Blood from Arm, Right Updated: 03/10/24 2314    POC Glucose Once [[id number]]  (Abnormal) Collected: 03/10/24 2302    Specimen: Blood Updated: 03/10/24 2304     Glucose 218 mg/dL      Comment: Serial Number: [id number]:  [id number]             Imaging Results (Last 24 Hours)       Procedure Component Value Units Date/Time    XR Shoulder 2+ View Right [[id number]] Collected: 03/11/24 0059     Updated: 03/11/24 0102    Narrative:      PROCEDURE: XR SHOULDER 2+ VW RIGHT     COMPARISON: 11/6/2017.     INDICATIONS: Fall; right shoulder pain.     FINDINGS: 4 views were obtained.  No acute fracture or acute malalignment is identified.    Degenerative and enthesopathic changes involve the right shoulder.  There is suspected generalized   osteopenia.  External artifacts obscure detail.  If symptoms or clinical concerns persist, consider   imaging follow-up.       Impression:       No acute fracture or acute malalignment is identified.              Please note that portions of this note were completed with a voice recognition program.     COLT HU JR, MD         Electronically Signed and Approved By: COLT HU JR, MD on 3/11/2024 at 0:59                        XR Chest 1 View [[id number]] Collected: 03/10/24 2345     Updated: 03/10/24 2348    Narrative:      PROCEDURE: XR CHEST 1 VW     COMPARISON: 9/13/2023.     INDICATIONS: WEAK/DIZZY/AMS TRIAGE PROTOCOL.     FINDINGS:  Two views (AP upright portable projection) of the chest reveal no cardiac enlargement   and no acute infiltrate.  No pleural effusion or pneumothorax is seen.  There is emphysematous   contour of the lungs.  There may be pleural-parenchymal scarring in the inferior left thorax.  The   thoracic aorta is atherosclerotic.  External artifacts obscure detail.   Chronic calcified   granulomatous disease involves the chest.         Impression:        Probably no acute infiltrate is appreciated.                 Please note that portions of this note were completed with a voice recognition program.     COLT HU JR, MD         Electronically Signed and Approved By: COLT HU JR, MD on 3/10/2024 at 23:45                              Orders (last 24 hrs)        Start     Ordered    03/12/24 0600  CBC (No Diff)  Morning Draw         03/11/24 0207    03/12/24 0600  Basic Metabolic Panel  Morning Draw         03/11/24 0207    03/11/24 2100  atorvastatin (LIPITOR) tablet 20 mg  Nightly         03/11/24 0242    03/11/24 0900  sodium chloride 0.9 % flush 10 mL  Every 12 Hours Scheduled         03/11/24 0207    03/11/24 0900  Enoxaparin Sodium (LOVENOX) syringe 40 mg  Daily         03/11/24 0207    03/11/24 0900  aspirin chewable tablet 81 mg  Daily         03/11/24 0242    03/11/24 0900  hydroCHLOROthiazide tablet 25 mg  Daily         03/11/24 0242    03/11/24 0900  losartan (COZAAR) tablet 25 mg  Daily         03/11/24 0242    03/11/24 0900  metoprolol succinate XL (TOPROL-XL) 24 hr tablet 25 mg  Every 24 Hours Scheduled         03/11/24 0242    03/11/24 0900  ipratropium-albuterol (DUO-NEB) nebulizer solution 3 mL  3 Times Daily         03/11/24 0521    03/11/24 0900  guaiFENesin (MUCINEX) 12 hr tablet 600 mg  Every 12 Hours Scheduled         03/11/24 0521    03/11/24 0900  methylPREDNISolone sodium succinate (SOLU-Medrol) injection 40 mg  Daily         03/11/24 0521    03/11/24 0800  Oral Care  2 Times Daily       03/11/24 0207    03/11/24 0730  Insulin Lispro (humaLOG) injection 2-9 Units  4 Times Daily Before Meals & Nightly         03/11/24 0207    03/11/24 0700  POC Glucose 4x Daily Before Meals & at Bedtime  4 Times Daily Before Meals & at Bedtime      Comments: Complete no more than 45 minutes prior to patient eating      03/11/24 0207    03/11/24 0600  Incentive  "Spirometry  Every 4 Hours While Awake       03/11/24 0528    03/11/24 0521  Code Status and Medical Interventions:  Continuous         03/11/24 0521    03/11/24 0520  benzonatate (TESSALON) capsule 100 mg  3 Times Daily PRN         03/11/24 0521    03/11/24 0517  PT Consult: Eval & Treat Discharge Placement Assessment, Functional Mobility Below Baseline  Once         03/11/24 0516    03/11/24 0517  OT Consult: Eval & Treat ADL Performance Below Baseline, Discharge Placement Assessment  Once         03/11/24 0516    03/11/24 0400  Vital Signs  Every 4 Hours       03/11/24 0207    03/11/24 0336  Inpatient Case Management  Consult  Once        Provider:  (Not yet assigned)    03/11/24 0337    03/11/24 0300  sodium chloride 0.9 % infusion  Continuous         03/11/24 0207    03/11/24 0242  ipratropium-albuterol (DUO-NEB) nebulizer solution 3 mL  Every 4 Hours PRN         03/11/24 0242    03/11/24 0208  Intake & Output  Every Shift       03/11/24 0207    03/11/24 0208  Weigh Patient  Once         03/11/24 0207    03/11/24 0208  Insert Peripheral IV  Once         03/11/24 0207    03/11/24 0208  Saline Lock & Maintain IV Access  Continuous         03/11/24 0207    03/11/24 0208  Diet: Cardiac, Diabetic; Healthy Heart (2-3 Na+); Consistent Carbohydrate; Fluid Consistency: Thin (IDDSI 0)  Diet Effective Now         03/11/24 0207    03/11/24 0207  sennosides-docusate (PERICOLACE) 8.6-50 MG per tablet 2 tablet  2 Times Daily PRN        Placed in \"And\" Linked Group    03/11/24 0207    03/11/24 0207  polyethylene glycol (MIRALAX) packet 17 g  Daily PRN        Placed in \"And\" Linked Group    03/11/24 0207    03/11/24 0207  bisacodyl (DULCOLAX) EC tablet 5 mg  Daily PRN        Placed in \"And\" Linked Group    03/11/24 0207    03/11/24 0207  bisacodyl (DULCOLAX) suppository 10 mg  Daily PRN        Placed in \"And\" Linked Group    03/11/24 0207 03/11/24 0207  ondansetron (ZOFRAN) injection 4 mg  Every 6 Hours PRN     " "    03/11/24 0207    03/11/24 0207  dextrose (GLUTOSE) oral gel 15 g  Every 15 Minutes PRN         03/11/24 0207    03/11/24 0207  dextrose (D50W) (25 g/50 mL) IV injection 25 g  Every 15 Minutes PRN         03/11/24 0207    03/11/24 0207  glucagon (GLUCAGEN) injection 1 mg  Every 15 Minutes PRN         03/11/24 0207    03/11/24 0207  sodium chloride 0.9 % flush 10 mL  As Needed         03/11/24 0207    03/11/24 0207  sodium chloride 0.9 % infusion 40 mL  As Needed         03/11/24 0207    03/11/24 0015  sodium chloride 0.9 % bolus 1,000 mL  Once         03/10/24 2354 03/11/24 0015  cefTRIAXone (ROCEPHIN) 1000 mg/100 mL 0.9% NS (MBP)  Once         03/10/24 2354 03/11/24 0011  Inpatient Admission  Once         03/11/24 0010    03/11/24 0002  XR Shoulder 2+ View Right  1 Time Imaging         03/11/24 0001    03/11/24 0002  Inpatient Hospitalist Consult  Once        Specialty:  Hospitalist  Provider:  Robbie Maya MD    03/11/24 0001    03/11/24 0000  Urine Culture - Urine, Urine, Clean Catch  Once         03/10/24 2359    03/10/24 2355  Urinalysis With Culture If Indicated -  Once,   Status:  Canceled         03/10/24 2354    03/10/24 2330  acetaminophen (TYLENOL) tablet 650 mg  Once         03/10/24 2309    03/10/24 2329  Urinalysis, Microscopic Only - Urine, Clean Catch  Once         03/10/24 2328    03/10/24 2309  Blood Culture - Blood, Arm, Left  Once        Placed in \"And\" Linked Group    03/10/24 2309    03/10/24 2309  Blood Culture - Blood, Arm, Right  Once        Comments: From a different site than #1.     Placed in \"And\" Linked Group    03/10/24 2309    03/10/24 2309  Lactic Acid, Plasma  STAT         03/10/24 2309    03/10/24 2309  COVID-19, FLU A/B, RSV PCR 1 HR TAT - Swab, Nasopharynx  Once         03/10/24 2309    03/10/24 2305  POC Glucose Once  PROCEDURE ONCE        Comments: Complete no more than 45 minutes prior to patient eating      03/10/24 2302    03/10/24 2255  NPO Diet NPO Type: " Strict NPO  Diet Effective Now,   Status:  Canceled         03/10/24 2254    03/10/24 2255  Undress & Gown  Once         03/10/24 2254    03/10/24 2255  Cardiac Monitoring  Continuous        Comments: Follow Standing Orders As Outlined in Process Instructions (Open Order Report to View Full Instructions)    03/10/24 2254    03/10/24 2255  Continuous Pulse Oximetry  Continuous         03/10/24 2254    03/10/24 2255  Vital Signs  Per Hospital Policy         03/10/24 2254    03/10/24 2255  Orthostatic Blood Pressure  Once         03/10/24 2254    03/10/24 2255  Fall Precautions  Continuous         03/10/24 2254    03/10/24 2255  XR Chest 1 View  1 Time Imaging         03/10/24 2254    03/10/24 2255  ECG 12 Lead ED Triage Standing Order; Weak / Dizzy / AMS  Once         03/10/24 2254    03/10/24 2255  POC Glucose Once  Once        Comments: Complete no more than 45 minutes prior to patient eating      03/10/24 2254    03/10/24 2255  Insert Peripheral IV  Once         03/10/24 2254    03/10/24 2255  [place] Draw  Once         03/10/24 2254    03/10/24 2255  CBC & Differential  Once         03/10/24 2254    03/10/24 2255  Comprehensive Metabolic Panel  Once         03/10/24 2254    03/10/24 2255  Single High Sensitivity Troponin T  Once         03/10/24 2254    03/10/24 2255  Magnesium  Once         03/10/24 2254    03/10/24 2255  Urinalysis With Microscopic If Indicated (No Culture) - Urine, Clean Catch  Once         03/10/24 2254    03/10/24 2255  Green Top (Gel)  PROCEDURE ONCE         03/10/24 2254    03/10/24 2255  Lavender Top  PROCEDURE ONCE         03/10/24 2254    03/10/24 2255  Gold Top - SST  PROCEDURE ONCE         03/10/24 2254    03/10/24 2255  Light Blue Top  PROCEDURE ONCE         03/10/24 2254    03/10/24 2255  CBC Auto Differential  PROCEDURE ONCE         03/10/24 2254    03/10/24 2254  sodium chloride 0.9 % flush 10 mL  As Needed         03/10/24 2254    Unscheduled  Oxygen Therapy- Nasal Cannula;  Titrate 1-6 LPM Per SpO2; 90 - 95%  Continuous PRN       03/10/24 8622    Unscheduled  Follow Hypoglycemia Standing Orders For Blood Glucose <70 & Notify Provider of Treatment  As Needed      Comments: Follow Hypoglycemia Orders As Outlined in Process Instructions (Open Order Report to View Full Instructions)  Notify Provider Any Time Hypoglycemia Treatment is Administered    03/11/24 4106

## 2024-03-11 NOTE — PLAN OF CARE
Goal Outcome Evaluation:  Plan of Care Reviewed With: patient        Progress: improving  Outcome Evaluation: Pt presents to PT with deficits related to Sepsis. Pt demonstrates reduced strength, bed mobility and functional mobility due to fatigue and pain. Skilled services needed to improve endurance and transfer mechanics. Plan to DC to IRF.      Anticipated Discharge Disposition (PT): inpatient rehabilitation facility

## 2024-03-12 ENCOUNTER — TELEPHONE (OUTPATIENT)
Dept: FAMILY MEDICINE CLINIC | Facility: CLINIC | Age: 69
End: 2024-03-12
Payer: MEDICARE

## 2024-03-12 LAB
ANION GAP SERPL CALCULATED.3IONS-SCNC: 12.4 MMOL/L (ref 5–15)
BUN SERPL-MCNC: 23 MG/DL (ref 8–23)
BUN/CREAT SERPL: 24.7 (ref 7–25)
CALCIUM SPEC-SCNC: 9 MG/DL (ref 8.6–10.5)
CHLORIDE SERPL-SCNC: 101 MMOL/L (ref 98–107)
CO2 SERPL-SCNC: 24.6 MMOL/L (ref 22–29)
CREAT SERPL-MCNC: 0.93 MG/DL (ref 0.76–1.27)
DEPRECATED RDW RBC AUTO: 43.5 FL (ref 37–54)
EGFRCR SERPLBLD CKD-EPI 2021: 90 ML/MIN/1.73
ERYTHROCYTE [DISTWIDTH] IN BLOOD BY AUTOMATED COUNT: 14.9 % (ref 12.3–15.4)
GLUCOSE BLDC GLUCOMTR-MCNC: 151 MG/DL (ref 70–99)
GLUCOSE BLDC GLUCOMTR-MCNC: 176 MG/DL (ref 70–99)
GLUCOSE BLDC GLUCOMTR-MCNC: 180 MG/DL (ref 70–99)
GLUCOSE BLDC GLUCOMTR-MCNC: 206 MG/DL (ref 70–99)
GLUCOSE BLDC GLUCOMTR-MCNC: 240 MG/DL (ref 70–99)
GLUCOSE BLDC GLUCOMTR-MCNC: 269 MG/DL (ref 70–99)
GLUCOSE BLDC GLUCOMTR-MCNC: 356 MG/DL (ref 70–99)
GLUCOSE SERPL-MCNC: 166 MG/DL (ref 65–99)
HCT VFR BLD AUTO: 43.2 % (ref 37.5–51)
HGB BLD-MCNC: 14.1 G/DL (ref 13–17.7)
MCH RBC QN AUTO: 26.4 PG (ref 26.6–33)
MCHC RBC AUTO-ENTMCNC: 32.6 G/DL (ref 31.5–35.7)
MCV RBC AUTO: 80.7 FL (ref 79–97)
PLATELET # BLD AUTO: 231 10*3/MM3 (ref 140–450)
PMV BLD AUTO: 9.8 FL (ref 6–12)
POTASSIUM SERPL-SCNC: 4 MMOL/L (ref 3.5–5.2)
RBC # BLD AUTO: 5.35 10*6/MM3 (ref 4.14–5.8)
SODIUM SERPL-SCNC: 138 MMOL/L (ref 136–145)
WBC NRBC COR # BLD AUTO: 8.39 10*3/MM3 (ref 3.4–10.8)

## 2024-03-12 PROCEDURE — 63710000001 INSULIN LISPRO (HUMAN) PER 5 UNITS: Performed by: INTERNAL MEDICINE

## 2024-03-12 PROCEDURE — 94799 UNLISTED PULMONARY SVC/PX: CPT

## 2024-03-12 PROCEDURE — 82948 REAGENT STRIP/BLOOD GLUCOSE: CPT | Performed by: PHYSICIAN ASSISTANT

## 2024-03-12 PROCEDURE — 94664 DEMO&/EVAL PT USE INHALER: CPT

## 2024-03-12 PROCEDURE — 25010000002 ENOXAPARIN PER 10 MG: Performed by: FAMILY MEDICINE

## 2024-03-12 PROCEDURE — 82948 REAGENT STRIP/BLOOD GLUCOSE: CPT | Performed by: INTERNAL MEDICINE

## 2024-03-12 PROCEDURE — 25010000002 CEFTRIAXONE PER 250 MG: Performed by: INTERNAL MEDICINE

## 2024-03-12 PROCEDURE — 99232 SBSQ HOSP IP/OBS MODERATE 35: CPT | Performed by: INTERNAL MEDICINE

## 2024-03-12 PROCEDURE — 80048 BASIC METABOLIC PNL TOTAL CA: CPT | Performed by: FAMILY MEDICINE

## 2024-03-12 PROCEDURE — 82948 REAGENT STRIP/BLOOD GLUCOSE: CPT

## 2024-03-12 PROCEDURE — 85027 COMPLETE CBC AUTOMATED: CPT | Performed by: FAMILY MEDICINE

## 2024-03-12 PROCEDURE — 63710000001 INSULIN LISPRO (HUMAN) PER 5 UNITS: Performed by: PHYSICIAN ASSISTANT

## 2024-03-12 RX ORDER — FAMOTIDINE 20 MG/1
20 TABLET, FILM COATED ORAL NIGHTLY
Status: DISCONTINUED | OUTPATIENT
Start: 2024-03-12 | End: 2024-03-13 | Stop reason: HOSPADM

## 2024-03-12 RX ORDER — ONDANSETRON 2 MG/ML
4 INJECTION INTRAMUSCULAR; INTRAVENOUS EVERY 6 HOURS PRN
Status: DISCONTINUED | OUTPATIENT
Start: 2024-03-12 | End: 2024-03-13 | Stop reason: HOSPADM

## 2024-03-12 RX ORDER — NICOTINE POLACRILEX 4 MG
15 LOZENGE BUCCAL
Status: DISCONTINUED | OUTPATIENT
Start: 2024-03-12 | End: 2024-03-13 | Stop reason: HOSPADM

## 2024-03-12 RX ORDER — INSULIN LISPRO 100 [IU]/ML
3-14 INJECTION, SOLUTION INTRAVENOUS; SUBCUTANEOUS
Status: DISCONTINUED | OUTPATIENT
Start: 2024-03-12 | End: 2024-03-13 | Stop reason: HOSPADM

## 2024-03-12 RX ORDER — IBUPROFEN 600 MG/1
1 TABLET ORAL
Status: DISCONTINUED | OUTPATIENT
Start: 2024-03-12 | End: 2024-03-13 | Stop reason: HOSPADM

## 2024-03-12 RX ORDER — DEXTROSE MONOHYDRATE 25 G/50ML
25 INJECTION, SOLUTION INTRAVENOUS
Status: DISCONTINUED | OUTPATIENT
Start: 2024-03-12 | End: 2024-03-13 | Stop reason: HOSPADM

## 2024-03-12 RX ORDER — IPRATROPIUM BROMIDE AND ALBUTEROL SULFATE 2.5; .5 MG/3ML; MG/3ML
3 SOLUTION RESPIRATORY (INHALATION) EVERY 4 HOURS PRN
Status: DISCONTINUED | OUTPATIENT
Start: 2024-03-12 | End: 2024-03-13 | Stop reason: HOSPADM

## 2024-03-12 RX ADMIN — Medication 10 ML: at 22:00

## 2024-03-12 RX ADMIN — INSULIN LISPRO 5 UNITS: 100 INJECTION, SOLUTION INTRAVENOUS; SUBCUTANEOUS at 22:07

## 2024-03-12 RX ADMIN — INSULIN LISPRO 2 UNITS: 100 INJECTION, SOLUTION INTRAVENOUS; SUBCUTANEOUS at 04:08

## 2024-03-12 RX ADMIN — ENOXAPARIN SODIUM 40 MG: 100 INJECTION SUBCUTANEOUS at 08:23

## 2024-03-12 RX ADMIN — LOSARTAN POTASSIUM 25 MG: 25 TABLET, FILM COATED ORAL at 08:27

## 2024-03-12 RX ADMIN — INSULIN LISPRO 2 UNITS: 100 INJECTION, SOLUTION INTRAVENOUS; SUBCUTANEOUS at 06:40

## 2024-03-12 RX ADMIN — ACETAMINOPHEN 1000 MG: 500 TABLET ORAL at 19:19

## 2024-03-12 RX ADMIN — Medication 10 ML: at 08:27

## 2024-03-12 RX ADMIN — ATORVASTATIN CALCIUM 20 MG: 20 TABLET, FILM COATED ORAL at 21:59

## 2024-03-12 RX ADMIN — GUAIFENESIN 600 MG: 600 TABLET ORAL at 21:59

## 2024-03-12 RX ADMIN — CEFTRIAXONE SODIUM 1000 MG: 1 INJECTION, POWDER, FOR SOLUTION INTRAMUSCULAR; INTRAVENOUS at 08:38

## 2024-03-12 RX ADMIN — IPRATROPIUM BROMIDE AND ALBUTEROL SULFATE 3 ML: .5; 3 SOLUTION RESPIRATORY (INHALATION) at 07:29

## 2024-03-12 RX ADMIN — ASPIRIN 81 MG: 81 TABLET, CHEWABLE ORAL at 08:26

## 2024-03-12 RX ADMIN — FAMOTIDINE 20 MG: 20 TABLET ORAL at 21:59

## 2024-03-12 RX ADMIN — INSULIN LISPRO 5 UNITS: 100 INJECTION, SOLUTION INTRAVENOUS; SUBCUTANEOUS at 12:33

## 2024-03-12 RX ADMIN — GUAIFENESIN 600 MG: 600 TABLET ORAL at 08:26

## 2024-03-12 RX ADMIN — INSULIN LISPRO 8 UNITS: 100 INJECTION, SOLUTION INTRAVENOUS; SUBCUTANEOUS at 01:20

## 2024-03-12 RX ADMIN — LIDOCAINE 1 PATCH: 4 PATCH TOPICAL at 08:24

## 2024-03-12 RX ADMIN — METOPROLOL SUCCINATE 25 MG: 25 TABLET, EXTENDED RELEASE ORAL at 08:26

## 2024-03-12 RX ADMIN — HYDROCHLOROTHIAZIDE 25 MG: 25 TABLET ORAL at 08:27

## 2024-03-12 RX ADMIN — INSULIN LISPRO 8 UNITS: 100 INJECTION, SOLUTION INTRAVENOUS; SUBCUTANEOUS at 17:29

## 2024-03-12 NOTE — PROGRESS NOTES
[facility]   Hospitalist Progress Note  Date: 3/12/2024  Patient Name: Brian Mehta  : 1955  MRN: [id number]  Date of admission: 3/10/2024      Subjective   Subjective     Chief Complaint: Generalized weakness and fall    Summary:   Brian Mehta is a 69 y.o. male with past medical history of diabetes, hypertension, COPD, CVA with right-sided deficits, anxiety, GERD presented to the ED with complaints of generalized weakness and fall.  Patient states that for the last week he has been having worsening weakness to where he can barely get up along with shortness of breath and a productive cough.  Patient does have history of CVA with right-sided deficits but is able to get around with the aid of a walker or a scooter but over the last week has been lethargic mostly bedbound and needs his wife to fully assist him in getting around which resulted in a fall which resulted in significant right shoulder pain.  EMS was then called and patient was brought to the ED.  In the ED patient was febrile and hypoxic on room air with remaining vitals being within normal limits.  UA showed that he did have a UTI with remaining labs being unremarkable including normal WBC and lactic acid and negative COVID flu RSV.  Chest x-ray was negative for any acute findings and shoulder x-ray was negative for any fractures or subluxations.  When asked she denied any focal weakness, chest pain, palpitation, abdominal pain, nausea, vomiting, diarrhea, constipation, dysuria, hematuria, hematochezia, melena, or anxiety.      Interval Followup:   Vital signs stable on room air.  No fever.  Patient found to be snacking on sugary stuff.  Patient does not like hospital food.  Patient asking when he will be able to go home.  Patient wants to smoke.  States right shoulder pain is better.  No falls since he has been in hospital.  Denies any urinary complaints.  Cough and shortness of air is better also.    Review of Systems   All systems were  reviewed and negative except for:     Objective   Objective     Vitals:   Temp:  [97.5 °F (36.4 °C)-98.2 °F (36.8 °C)] 97.7 °F (36.5 °C)  Heart Rate:  [52-94] 82  Resp:  [15-18] 16  BP: (119-138)/(70-81) 119/70  Physical Exam        Constitutional: Awake, alert              Eyes: PERRLA, sclerae anicteric, no conjunctival injection              HENT: NCAT, mucous membranes moist              Neck: Supple, no thyromegaly, no lymphadenopathy, trachea midline              Respiratory: Rhonchi, normal effort, on nasal cannula              Cardiovascular: RRR, no murmurs, rubs, or gallops, palpable pedal pulses bilaterally              Gastrointestinal: Positive bowel sounds, soft, nontender, nondistended              Musculoskeletal: No bilateral ankle edema, no clubbing or cyanosis to extremities              Psychiatric: Appropriate affect, cooperative              Neurologic: Oriented x 3, strength symmetric in all extremities, right side weaker than left side, cranial Nerves grossly intact to confrontation, speech clear              Skin: Bruises and abrasion right elbow    Result Review    Result Review:  I have personally reviewed the results for the past 24 hours and agree with these findings:  [x]  Laboratory  [x]  Microbiology  [x]  Radiology  []  EKG/Telemetry   []  Cardiology/Vascular   []  Pathology  [x]  Old records  [x]  Other: Medications    Assessment & Plan   Assessment / Plan     Assessment:  Recurrent falls recently  UTI.  Acute COPD exacerbation.  History of chronic systolic CHF stable.  History of CVA with  residual right-sided weakness patient on ambulates with walker/scooter.  Right shoulder pain due to fall.  NIDDM.  Hemoglobin A1c of 8.1%.  Tobacco use disorder.  Noncompliance.     Plan:  IV Rocephin.  Await urine culture data.  Wean off oxygen.  Continue home metoprolol, losartan and HCTZ.  Continue aspirin and Lipitor.  Diabetic nurse educator input appreciated.  Dietitian consulted.  Nicotine  patch.  PT OT.  Flutter valve and incentive spirometry.  Bronchodilator and bronchopulmonary hygiene protocol.  COVID and flu negative.  Chest x-ray negative.  Right shoulder x-ray negative.  Sliding-scale insulin.  DC telemetry  The pt requires the HOB to be elevated more than 30 degrees due to their COPD diagnosis. Pt requires frequent and immediate changes in body position. The patient has a medical conditional which requires positioning of the body in ways not feasible with an ordinary bed.  Patient will benefit from hospital bed.  Discussed plan with RN and .  Discharge home in a day or so    DVT prophylaxis:  Medical DVT prophylaxis orders are present.        CODE STATUS:   Level Of Support Discussed With: Patient  Code Status (Patient has no pulse and is not breathing): CPR (Attempt to Resuscitate)  Medical Interventions (Patient has pulse or is breathing): Full Support      Part of this note may be an electronic transcription/translation of spoken language to printed text using the Dragon Dictation System.     Electronically signed by Andrew Quiñones MD, 03/12/24, 3:18 PM EDT.

## 2024-03-12 NOTE — TELEPHONE ENCOUNTER
Caller: FERDINAND CATALAN    Relationship to patient: Emergency Contact    Best call back number: [phone]    Patient is needing: PATIENT'S WIFE  CALLED IN AND IS REQUESTING AN ORDER FOR A HOSPITAL BED FOR HER . PATIENT SAID THAT HER  IS IN THE HOSPITAL CURRENTLY BUT IS EXPECTED TO COME HOME 3/13/2024. PATIENT'S WIFE WOULD ALSO LIKE TO DISCUSS THERAPY FOR . PATIENT'S WIFE IS REQUESTING A CALL BACK PLEASE.

## 2024-03-12 NOTE — PLAN OF CARE
Goal Outcome Evaluation:  Plan of Care Reviewed With: patient, spouse        Progress: improving  Outcome Evaluation: Transfer from [address] [place] [address] this morning to [facility]. VSS throught shift. Pt is A+Ox4. Room air. New IV 22GLAC per pt request. Alarms in place due to recent fall hx. Incont to bladder (baseline). Blood sugars elevated, treated per MAR. Wife at bedside. He has been pleasant. Will cont POC

## 2024-03-12 NOTE — PROGRESS NOTES
Respiratory Therapist Broncho-Pulmonary Hygiene Progress Note      Patient Name:  Brian Mehta  YOB: 1955    Brian Mehta meets the qualification for Level 1 of the Bronco-Pulmonary Hygiene Protocol. This was based on my daily patient assessment and includes review of chest x-ray results, cough ability and quality, oxygenation, secretions or risk for secretion development and patient mobility.     Broncho-Pulmonary Hygiene Assessment:    Level of Movement: Actively changing positions-requires assistance  Disoriented/Follows Commands    Breath Sounds: Clear to slightly diminished    Cough: Strong, effective    Chest X-Ray: Possible signs of consolidation and/or atelectasis or clear.     Sputum Productions: None or small amount of thin or watery secretions with effective cough    History and Physical: None    SpO2 to Oxygen Need: greater than 92% on room air or  less than 3L nasal canula    Current SpO2 is: 94% on Room Air    Based on this information, I have completed the following interventions: Teach/Instruct patient on cough and deep breathe      Electronically signed by Yulisa Ramírez RRT, 03/12/24, 9:20 AM EDT.

## 2024-03-12 NOTE — CONSULTS
Patient with a current HbA1c of 8.1% and an estimated average glucose of 186 mg/dL. Patient states that he does not check his glucose with any regularity, does not like needles, and will not allow his wife to check his blood glucose. Patients wife expresses an interest in acquiring a DEXCOM G7 for her  to monitor blood glucose as she uses the same device. Explained that most insurance plans will not cover CGM devices for patients not currently prescribed insulin.    Visitors arrived to visit with patient and his wife and patient became extremely emotional. Will follow up with patient at another time. Patient states his only want at this time is to go outside to smoke.

## 2024-03-13 ENCOUNTER — READMISSION MANAGEMENT (OUTPATIENT)
Dept: CALL CENTER | Facility: HOSPITAL | Age: 69
End: 2024-03-13
Payer: MEDICARE

## 2024-03-13 VITALS
WEIGHT: 218.7 LBS | SYSTOLIC BLOOD PRESSURE: 109 MMHG | OXYGEN SATURATION: 94 % | HEIGHT: 75 IN | TEMPERATURE: 98.2 F | HEART RATE: 92 BPM | RESPIRATION RATE: 16 BRPM | BODY MASS INDEX: 27.19 KG/M2 | DIASTOLIC BLOOD PRESSURE: 78 MMHG

## 2024-03-13 PROBLEM — R53.1 WEAKNESS: Status: RESOLVED | Noted: 2024-03-11 | Resolved: 2024-03-13

## 2024-03-13 PROBLEM — A41.9 SEPSIS SECONDARY TO UTI: Status: RESOLVED | Noted: 2024-03-11 | Resolved: 2024-03-13

## 2024-03-13 PROBLEM — N39.0 URINARY TRACT INFECTION WITHOUT HEMATURIA: Status: ACTIVE | Noted: 2024-03-13

## 2024-03-13 PROBLEM — N39.0 SEPSIS SECONDARY TO UTI: Status: RESOLVED | Noted: 2024-03-11 | Resolved: 2024-03-13

## 2024-03-13 LAB
BACTERIA SPEC AEROBE CULT: ABNORMAL
GLUCOSE BLDC GLUCOMTR-MCNC: 188 MG/DL (ref 70–99)
GLUCOSE BLDC GLUCOMTR-MCNC: 267 MG/DL (ref 70–99)

## 2024-03-13 PROCEDURE — 25010000002 ENOXAPARIN PER 10 MG: Performed by: FAMILY MEDICINE

## 2024-03-13 PROCEDURE — 63710000001 INSULIN LISPRO (HUMAN) PER 5 UNITS: Performed by: INTERNAL MEDICINE

## 2024-03-13 PROCEDURE — 25010000002 CEFTRIAXONE PER 250 MG: Performed by: INTERNAL MEDICINE

## 2024-03-13 PROCEDURE — 82948 REAGENT STRIP/BLOOD GLUCOSE: CPT | Performed by: INTERNAL MEDICINE

## 2024-03-13 PROCEDURE — 99239 HOSP IP/OBS DSCHRG MGMT >30: CPT | Performed by: INTERNAL MEDICINE

## 2024-03-13 RX ORDER — CEPHALEXIN 500 MG/1
500 CAPSULE ORAL 3 TIMES DAILY
Qty: 15 CAPSULE | Refills: 0 | Status: SHIPPED | OUTPATIENT
Start: 2024-03-14 | End: 2024-03-20

## 2024-03-13 RX ORDER — NICOTINE 21 MG/24HR
1 PATCH, TRANSDERMAL 24 HOURS TRANSDERMAL
Qty: 14 PATCH | Refills: 0 | Status: SHIPPED | OUTPATIENT
Start: 2024-03-14 | End: 2024-03-28

## 2024-03-13 RX ADMIN — METOPROLOL SUCCINATE 25 MG: 25 TABLET, EXTENDED RELEASE ORAL at 09:09

## 2024-03-13 RX ADMIN — Medication 10 ML: at 09:14

## 2024-03-13 RX ADMIN — LOSARTAN POTASSIUM 25 MG: 25 TABLET, FILM COATED ORAL at 09:09

## 2024-03-13 RX ADMIN — CEFTRIAXONE SODIUM 1000 MG: 1 INJECTION, POWDER, FOR SOLUTION INTRAMUSCULAR; INTRAVENOUS at 09:05

## 2024-03-13 RX ADMIN — NICOTINE 1 PATCH: 21 PATCH, EXTENDED RELEASE TRANSDERMAL at 09:09

## 2024-03-13 RX ADMIN — ENOXAPARIN SODIUM 40 MG: 100 INJECTION SUBCUTANEOUS at 09:09

## 2024-03-13 RX ADMIN — INSULIN LISPRO 8 UNITS: 100 INJECTION, SOLUTION INTRAVENOUS; SUBCUTANEOUS at 13:08

## 2024-03-13 RX ADMIN — GUAIFENESIN 600 MG: 600 TABLET ORAL at 09:09

## 2024-03-13 RX ADMIN — ASPIRIN 81 MG: 81 TABLET, CHEWABLE ORAL at 09:10

## 2024-03-13 RX ADMIN — INSULIN LISPRO 3 UNITS: 100 INJECTION, SOLUTION INTRAVENOUS; SUBCUTANEOUS at 09:09

## 2024-03-13 RX ADMIN — HYDROCHLOROTHIAZIDE 25 MG: 25 TABLET ORAL at 09:09

## 2024-03-13 RX ADMIN — ACETAMINOPHEN 1000 MG: 500 TABLET ORAL at 04:54

## 2024-03-13 RX ADMIN — LIDOCAINE 1 PATCH: 4 PATCH TOPICAL at 09:09

## 2024-03-13 NOTE — OUTREACH NOTE
Prep Survey      Flowsheet Row Responses   [facility] facility patient discharged from? [facility]   Is LACE score < 7 ? No   Eligibility [facility]   Date of Admission 03/10/24   Date of Discharge 03/13/24   Discharge Disposition Home or Self Care   Discharge diagnosis Sepsis due to UTI   Does the patient have one of the following disease processes/diagnoses(primary or secondary)? Other   Does the patient have Home health ordered? No   Is there a DME ordered? No   Prep survey completed? Yes            NICOLE A - Registered Nurse          
Abdominal Pain, N/V/D

## 2024-03-13 NOTE — PLAN OF CARE
Problem: Adult Inpatient Plan of Care  Goal: Plan of Care Review  Outcome: Met  Goal: Patient-Specific Goal (Individualized)  Outcome: Met  Goal: Absence of Hospital-Acquired Illness or Injury  Outcome: Met  Intervention: Identify and Manage Fall Risk  Recent Flowsheet Documentation  Taken 3/13/2024 0903 by Rufina Sofia RN  Safety Promotion/Fall Prevention: safety round/check completed  Intervention: Prevent and Manage VTE (Venous Thromboembolism) Risk  Recent Flowsheet Documentation  Taken 3/13/2024 0903 by Rufina Sofia RN  Range of Motion: active ROM (range of motion) encouraged  Goal: Optimal Comfort and Wellbeing  Outcome: Met  Intervention: Provide Person-Centered Care  Recent Flowsheet Documentation  Taken 3/13/2024 0903 by Rufina Sofia RN  Trust Relationship/Rapport:   care explained   choices provided   emotional support provided   empathic listening provided   questions answered   questions encouraged  Goal: Readiness for Transition of Care  Outcome: Met     Problem: COPD (Chronic Obstructive Pulmonary Disease) Comorbidity  Goal: Maintenance of COPD Symptom Control  Outcome: Met     Problem: Skin Injury Risk Increased  Goal: Skin Health and Integrity  Outcome: Met     Problem: Fall Injury Risk  Goal: Absence of Fall and Fall-Related Injury  Outcome: Met  Intervention: Promote Injury-Free Environment  Recent Flowsheet Documentation  Taken 3/13/2024 0903 by Rufina Sofia RN  Safety Promotion/Fall Prevention: safety round/check completed

## 2024-03-13 NOTE — DISCHARGE SUMMARY
[facility]        HOSPITALIST  DISCHARGE SUMMARY    Patient Name: Brian Mehta  : 1955  MRN: [id number]    Date of Admission: 3/10/2024  Date of Discharge:  3/13/2024  Primary Care Physician: Sophie Capps APRN    Consults       Date and Time Order Name Status Description    3/11/2024 12:01 AM Inpatient Hospitalist Consult              Active and Resolved Hospital Problems:  Active Hospital Problems    Diagnosis POA   • Urinary tract infection without hematuria [N39.0] Yes   • COPD (chronic obstructive pulmonary disease) [J44.9] Yes   • DM2 (diabetes mellitus, type 2) [E11.9] Yes   • Chronic HFrEF (heart failure with reduced ejection fraction) [I50.22] Yes   • Kidney disorder [N28.9] Yes   • Essential hypertension [I10] Yes      Resolved Hospital Problems    Diagnosis POA   • **Sepsis secondary to UTI [A41.9, N39.0] Yes   • Weakness [R53.1] Yes   • CVA (cerebral vascular accident) [I63.9] Yes   Recurrent falls recently  UTI.  Acute COPD exacerbation.  History of chronic systolic CHF stable.  History of CVA with  residual right-sided weakness patient on ambulates with walker/scooter.  Right shoulder pain due to fall.  NIDDM.  Hemoglobin A1c of 8.1%.  Tobacco use disorder.  Noncompliance.    Hospital Course     Hospital Course:  Brian Mehta is a 69 y.o. male  with past medical history of diabetes, hypertension, COPD, CVA with right-sided deficits, anxiety, GERD presented to the ED with complaints of generalized weakness and fall.  Patient states that for the last week he has been having worsening weakness to where he can barely get up along with shortness of breath and a productive cough.  Patient does have history of CVA with right-sided deficits but is able to get around with the aid of a walker or a scooter but over the last week has been lethargic mostly bedbound and needs his wife to fully assist him in getting around which resulted in a fall which resulted in significant right  shoulder pain.  EMS was then called and patient was brought to the ED.  In the ED patient was febrile and hypoxic on room air with remaining vitals being within normal limits.  UA showed that he did have a UTI with remaining labs being unremarkable including normal WBC and lactic acid and negative COVID flu RSV.  Chest x-ray was negative for any acute findings and shoulder x-ray was negative for any fractures or subluxations.  When asked she denied any focal weakness, chest pain, palpitation, abdominal pain, nausea, vomiting, diarrhea, constipation, dysuria, hematuria, hematochezia, melena, or anxiety.       Interval Followup:   Vital signs stable on room air.  No fever.  Patient found to be snacking on sugary stuff.  Patient does not like hospital food.  Patient asking when he will be able to go home.  Patient wants to smoke.  States right shoulder pain is better.  No falls since he has been in hospital.  Denies any urinary complaints.  Cough and shortness of air is better also  Urine culture grew E. coli pansensitive.  Social work arranging for hospital bed at home.  Contacting [facility] for transportation home  Smoking cessation discussed with patient.  DISCHARGE Follow Up Recommendations for labs and diagnostics: PCP for outpatient physical therapy.      Day of Discharge     Vital Signs:  Temp:  [97.7 °F (36.5 °C)-99.1 °F (37.3 °C)] 98.2 °F (36.8 °C)  Heart Rate:  [] 92  Resp:  [16] 16  BP: (109-143)/(70-99) 109/78    Physical Exam:   Constitutional: Awake, alert              Eyes: PERRLA, sclerae anicteric, no conjunctival injection              HENT: NCAT, mucous membranes moist              Neck: Supple, no thyromegaly, no lymphadenopathy, trachea midline              Respiratory: Rhonchi, normal effort, on nasal cannula              Cardiovascular: RRR, no murmurs, rubs, or gallops, palpable pedal pulses bilaterally              Gastrointestinal: Positive bowel sounds, soft, nontender, nondistended               Musculoskeletal: Painful right shoulder range of motion especially abduction, no bilateral ankle edema, no clubbing or cyanosis to extremities              Psychiatric: Appropriate affect, cooperative              Neurologic: Oriented x 3, strength symmetric in all extremities, right side weaker than left side, cranial Nerves grossly intact to confrontation, speech clear              Skin: Bruises and abrasion right elbow    Discharge Details        Discharge Medications        New Medications        Instructions Start Date   cephalexin 500 MG capsule  Commonly known as: KEFLEX   500 mg, Oral, 3 Times Daily   Start Date: March 14, 2024     nicotine 21 MG/24HR patch  Commonly known as: NICODERM CQ   1 patch, Transdermal, Every 24 Hours Scheduled   Start Date: March 14, 2024            Continue These Medications        Instructions Start Date   aspirin 81 MG chewable tablet   81 mg, Oral, Daily      atorvastatin 20 MG tablet  Commonly known as: LIPITOR   20 mg, Oral, Nightly      hydroCHLOROthiazide 25 MG tablet   25 mg, Oral, Daily      Jardiance 25 MG tablet tablet  Generic drug: empagliflozin   25 mg, Oral, Daily      losartan 25 MG tablet  Commonly known as: COZAAR   25 mg, Oral, Daily               No Known Allergies    Discharge Disposition:  Home or Self Care via tack    Diet:  Diet Instructions       Diet: Diabetic Diets; Consistent Carbohydrate; Thin (IDDSI 0)      Discharge Diet: Diabetic Diets    Diabetic Diet: Consistent Carbohydrate    Fluid Consistency: Thin (IDDSI 0)            Discharge Activity:   Activity Instructions       Activity as Tolerated              CODE STATUS:  Code Status and Medical Interventions:   Ordered at: 03/11/24 0520     Level Of Support Discussed With:    Patient     Code Status (Patient has no pulse and is not breathing):    CPR (Attempt to Resuscitate)     Medical Interventions (Patient has pulse or is breathing):    Full Support         Future Appointments   Date Time  Provider Department Center   3/15/2024  9:30 AM Sophie Capps APRN [facility]   6/10/2024  3:00 PM Radha Wolf APRN [facility]       Additional Instructions for the Follow-ups that You Need to Schedule       Discharge Follow-up with PCP   As directed       Currently Documented PCP:    Sophie Capps APRN    PCP Phone Number:    [phone]     Follow Up Details: 1 week                Pertinent  and/or Most Recent Results     PROCEDURES:   * Cannot find OR case *     LAB RESULTS:      Lab 03/12/24  0429 03/10/24  2310 03/10/24  2300   WBC 8.39  --  9.64   HEMOGLOBIN 14.1  --  15.4   HEMATOCRIT 43.2  --  46.5   PLATELETS 231  --  220   NEUTROS ABS  --   --  6.20   IMMATURE GRANS (ABS)  --   --  0.03   LYMPHS ABS  --   --  2.48   MONOS ABS  --   --  0.91*   EOS ABS  --   --  0.00   MCV 80.7  --  79.5   LACTATE  --  1.8  --          Lab 03/12/24  0429 03/10/24  2300   SODIUM 138 133*   POTASSIUM 4.0 3.6   CHLORIDE 101 95*   CO2 24.6 25.8   ANION GAP 12.4 12.2   BUN 23 13   CREATININE 0.93 0.99   EGFR 90.0 83.5   GLUCOSE 166* 219*   CALCIUM 9.0 8.9   MAGNESIUM  --  1.8   HEMOGLOBIN A1C  --  8.10*         Lab 03/10/24  2300   TOTAL PROTEIN 8.0   ALBUMIN 3.5   GLOBULIN 4.5   ALT (SGPT) 9   AST (SGOT) 15   BILIRUBIN 0.6   ALK PHOS 110         Lab 03/10/24  2300   HSTROP T 10                 Brief Urine Lab Results  (Last result in the past 365 days)        Color   Clarity   Blood   Leuk Est   Nitrite   Protein   CREAT   Urine HCG        03/10/24 2321 Yellow   Cloudy   Large (3+)   Moderate (2+)   Positive   30 mg/dL (1+)                 Microbiology Results (last 10 days)       Procedure Component Value - Date/Time    Urine Culture - Urine, Urine, Clean Catch [[id number]]  (Abnormal)  (Susceptibility) Collected: 03/10/24 2321    Lab Status: Final result Specimen: Urine, Clean Catch Updated: 03/13/24 0950     Urine Culture >100,000 CFU/mL Escherichia coli    Narrative:      Colonization of the  urinary tract without infection is common. Treatment is discouraged unless the patient is symptomatic, pregnant, or undergoing an invasive urologic procedure.    Susceptibility        Escherichia coli      GILBERT      Amoxicillin + Clavulanate Susceptible      Ampicillin Resistant      Ampicillin + Sulbactam Susceptible      Cefazolin Susceptible      Cefepime Susceptible      Ceftazidime Susceptible      Ceftriaxone Susceptible      Gentamicin Susceptible      Levofloxacin Susceptible      Nitrofurantoin Susceptible      Piperacillin + Tazobactam Susceptible      Trimethoprim + Sulfamethoxazole Resistant                           Blood Culture - Blood, Arm, Left [[id number]]  (Normal) Collected: 03/10/24 2310    Lab Status: Preliminary result Specimen: Blood from Arm, Left Updated: 03/12/24 2316     Blood Culture No growth at 2 days    Blood Culture - Blood, Arm, Right [[id number]]  (Normal) Collected: 03/10/24 2310    Lab Status: Preliminary result Specimen: Blood from Arm, Right Updated: 03/12/24 2316     Blood Culture No growth at 2 days    COVID-19, FLU A/B, RSV PCR 1 HR TAT - Swab, Nasopharynx [[id number]]  (Normal) Collected: 03/10/24 2310    Lab Status: Final result Specimen: Swab from Nasopharynx Updated: 03/11/24 0001     COVID19 Not Detected     Influenza A PCR Not Detected     Influenza B PCR Not Detected     RSV, PCR Not Detected    Narrative:      Fact sheet for providers: https://www.fda.gov/media/012680/download    Fact sheet for patients: https://www.fda.gov/media/517169/download    Test performed by PCR.            XR Shoulder 2+ View Right    Result Date: 3/11/2024  Impression:  No acute fracture or acute malalignment is identified.     Please note that portions of this note were completed with a voice recognition program.  COLT HU JR, MD       Electronically Signed and Approved By: COLT HU JR, MD on 3/11/2024 at 0:59              XR Chest 1 View    Result Date: 3/10/2024  Impression:    Probably no acute infiltrate is appreciated.      Please note that portions of this note were completed with a voice recognition program.  COLT HU JR, MD       Electronically Signed and Approved By: COLT HU JR, MD on 3/10/2024 at 23:45               Results for orders placed during the hospital encounter of 09/13/23    Duplex Venous Lower Extremity - Left CV-READ    Interpretation Summary  •  Normal left lower extremity venous duplex scan.      Results for orders placed during the hospital encounter of 09/13/23    Duplex Venous Lower Extremity - Left CV-READ    Interpretation Summary  •  Normal left lower extremity venous duplex scan.      Results for orders placed during the hospital encounter of 09/13/23    Adult Transthoracic Echo Complete w/ Color, Spectral and Contrast if necessary per protocol    Interpretation Summary  •  There is mild global hypokinesis of the left ventricle.  Estimated LV ejection fraction is 40 to 45%.  •  Left ventricular wall thickness is consistent with mild concentric hypertrophy.  •  Left ventricular diastolic function is consistent with (grade I) impaired relaxation.  •  There are no significant valvular abnormalities.  •  Estimated right ventricular systolic pressure from tricuspid regurgitation is normal (<35 mmHg).      Imaging Results (All)       Procedure Component Value Units Date/Time    XR Shoulder 2+ View Right [[id number]] Collected: 03/11/24 0059     Updated: 03/11/24 0102    Narrative:      PROCEDURE: XR SHOULDER 2+ VW RIGHT     COMPARISON: 11/6/2017.     INDICATIONS: Fall; right shoulder pain.     FINDINGS: 4 views were obtained.  No acute fracture or acute malalignment is identified.    Degenerative and enthesopathic changes involve the right shoulder.  There is suspected generalized   osteopenia.  External artifacts obscure detail.  If symptoms or clinical concerns persist, consider   imaging follow-up.       Impression:       No acute fracture or acute  malalignment is identified.              Please note that portions of this note were completed with a voice recognition program.     COLT HU JR, MD         Electronically Signed and Approved By: COLT HU JR, MD on 3/11/2024 at 0:59                        XR Chest 1 View [[id number]] Collected: 03/10/24 2345     Updated: 03/10/24 2348    Narrative:      PROCEDURE: XR CHEST 1 VW     COMPARISON: 9/13/2023.     INDICATIONS: WEAK/DIZZY/AMS TRIAGE PROTOCOL.     FINDINGS:  Two views (AP upright portable projection) of the chest reveal no cardiac enlargement   and no acute infiltrate.  No pleural effusion or pneumothorax is seen.  There is emphysematous   contour of the lungs.  There may be pleural-parenchymal scarring in the inferior left thorax.  The   thoracic aorta is atherosclerotic.  External artifacts obscure detail.  Chronic calcified   granulomatous disease involves the chest.         Impression:        Probably no acute infiltrate is appreciated.                 Please note that portions of this note were completed with a voice recognition program.     COLT HU JR, MD         Electronically Signed and Approved By: COLT HU JR, MD on 3/10/2024 at 23:45                                 Labs Pending at Discharge:  Pending Labs       Order Current Status    Blood Culture - Blood, Arm, Left Preliminary result    Blood Culture - Blood, Arm, Right Preliminary result                Time spent on Discharge including face to face service: 31 minutes  Part of this note may be an electronic transcription/translation of spoken language to printed text using the Dragon Dictation System.     TElectronically signed by Andrew Quiñones MD, 03/13/24, 2:10 PM EDT.

## 2024-03-13 NOTE — CONSULTS
"Nutrition Services    Patient Name: Brian Mehta  YOB: 1955  MRN: [id number]  Admission date: 3/10/2024      CLINICAL NUTRITION ASSESSMENT      Reason for Assessment   Diet education      H&P:  Past Medical History:   Diagnosis Date    COPD (chronic obstructive pulmonary disease)     Diabetes mellitus     Hyperlipidemia     Stroke     7 years ago per wife        Current Problems:   Active Hospital Problems    Diagnosis     **Sepsis secondary to UTI     Weakness     COPD (chronic obstructive pulmonary disease)     DM2 (diabetes mellitus, type 2)     Chronic HFrEF (heart failure with reduced ejection fraction)     Kidney disorder     Essential hypertension     CVA (cerebral vascular accident)         Nutrition/Diet History         Narrative   Upon my visit pt reports a good appetite/intake. Pt denies recent weight loss. No nutrition risk factors identified. NFPE: no signs of fat or muscle wasting.     Diet education provided to pt and wife. Discussed consuming 3 meals daily and avoiding skipping dinner. Discussed examples of snacks that pt can consume at night time. Pt is not currently checking his blood glucoses throughout the day. Pt reports that he plans to begin now. Discussed cutting back on fruit juice throughout the day unless blood glucose becomes low. Discussed which foods contain carbohydrates and portion sizes. Pt was given written materials to take home.      Anthropometrics        Current Height, Weight Height: 190.5 cm (75\")  Weight: 99.2 kg (218 lb 11.1 oz)   Current BMI Body mass index is 27.34 kg/m².   BMI Classification Overweight   % %    Adjusted Body Weight (ABW)    Weight Hx  Wt Readings from Last 30 Encounters:   03/11/24 0150 99.2 kg (218 lb 11.1 oz)   03/10/24 2305 98 kg (216 lb)   12/07/23 1314 98 kg (216 lb)   11/21/23 1136 110 kg (242 lb 4.6 oz)   09/13/23 2144 101 kg (222 lb 0.1 oz)   08/08/23 1351 93.4 kg (206 lb)   07/18/23 1347 99.8 kg (220 lb)   02/21/19 0000 102 " kg (225 lb)   07/17/18 0000 102 kg (225 lb)   03/20/18 0000 110 kg (243 lb 2 oz)          Wt Change Observation 1% loss over 8 months      Estimated/Assessed Needs  Estimated Needs based on: Current Body Weight       Energy Requirements 25 kcal/kg    EST Needs (kcal/day) 2475 kcal/day       Protein Requirements 1.1 g/kg   EST Daily Needs (g/day) 109 g/day        Fluid Requirements 1 ml/kcal    Estimated Needs (mL/day) 2475 ml/day      Labs/Medications         Pertinent Labs Reviewed.   Results from last 7 days   Lab Units 03/12/24  0429 03/10/24  2300   SODIUM mmol/L 138 133*   POTASSIUM mmol/L 4.0 3.6   CHLORIDE mmol/L 101 95*   CO2 mmol/L 24.6 25.8   BUN mg/dL 23 13   CREATININE mg/dL 0.93 0.99   CALCIUM mg/dL 9.0 8.9   BILIRUBIN mg/dL  --  0.6   ALK PHOS U/L  --  110   ALT (SGPT) U/L  --  9   AST (SGOT) U/L  --  15   GLUCOSE mg/dL 166* 219*     Results from last 7 days   Lab Units 03/12/24  0429 03/10/24  2300   MAGNESIUM mg/dL  --  1.8   HEMOGLOBIN g/dL 14.1 15.4   HEMATOCRIT % 43.2 46.5     COVID19   Date Value Ref Range Status   03/10/2024 Not Detected Not Detected - Ref. Range Final     Lab Results   Component Value Date    HGBA1C 8.10 (H) 03/10/2024         Pertinent Medications Reviewed.     Malnutrition Severity Assessment              Nutrition Diagnosis         Nutrition Dx Problem 1 Altered nutrition related lab values related to  uncontrolled diabetes  as evidenced by  hemoglobin A1C of 8.1%      Nutrition Intervention           Current Nutrition Orders & Evaluation of Intake       Current PO Diet Diet: Cardiac, Diabetic; Healthy Heart (2-3 Na+); Consistent Carbohydrate; Fluid Consistency: Thin (IDDSI 0)   Supplement No active supplement orders           Nutrition Intervention/Prescription        Provide diet education as above         Medical Nutrition Therapy/Nutrition Education          Learner     Readiness Patient and Family  Acceptance     Method     Response Explanation  Verbalizes understanding      Monitor/Evaluation        Monitor Pertinent labs     Nutrition Discharge Plan         Pt was given written materials for discharge      Electronically signed by:  Leslie Ching RD  03/13/24 08:20 EDT

## 2024-03-13 NOTE — PLAN OF CARE
Goal Outcome Evaluation:  Plan of Care Reviewed With: patient, spouse        Progress: improving  Outcome Evaluation: Patient resting in bed, pt c/o rib pain with movement, pt is alert and oriented, wife at bedside, call light within reach.

## 2024-03-13 NOTE — CONSULTS
"Met with patient and wife at bedside. Patient expressed concern for finding a ride home if he is discharged today. Patient's wife requested two hospital beds for the home. Will discuss with social work regarding these requests. Patient states he would also like to \"go outside and smoke a cigarette.\" No questions regarding diabetes at this time. Will continue to support and assist as needed.   "

## 2024-03-14 ENCOUNTER — TRANSITIONAL CARE MANAGEMENT TELEPHONE ENCOUNTER (OUTPATIENT)
Dept: CALL CENTER | Facility: HOSPITAL | Age: 69
End: 2024-03-14
Payer: MEDICARE

## 2024-03-14 ENCOUNTER — PATIENT OUTREACH (OUTPATIENT)
Dept: CASE MANAGEMENT | Facility: OTHER | Age: 69
End: 2024-03-14
Payer: MEDICARE

## 2024-03-14 ENCOUNTER — TELEPHONE (OUTPATIENT)
Dept: CASE MANAGEMENT | Facility: OTHER | Age: 69
End: 2024-03-14
Payer: MEDICARE

## 2024-03-14 DIAGNOSIS — I10 ESSENTIAL HYPERTENSION: Primary | ICD-10-CM

## 2024-03-14 DIAGNOSIS — I63.9 CEREBROVASCULAR ACCIDENT (CVA), UNSPECIFIED MECHANISM: ICD-10-CM

## 2024-03-14 DIAGNOSIS — E11.69 TYPE 2 DIABETES MELLITUS WITH OTHER SPECIFIED COMPLICATION, UNSPECIFIED WHETHER LONG TERM INSULIN USE: ICD-10-CM

## 2024-03-14 DIAGNOSIS — G89.29 CHRONIC PAIN OF BOTH KNEES: ICD-10-CM

## 2024-03-14 DIAGNOSIS — A41.9 SEPSIS WITHOUT ACUTE ORGAN DYSFUNCTION, DUE TO UNSPECIFIED ORGANISM: ICD-10-CM

## 2024-03-14 DIAGNOSIS — M25.561 CHRONIC PAIN OF BOTH KNEES: ICD-10-CM

## 2024-03-14 DIAGNOSIS — I50.22 CHRONIC HFREF (HEART FAILURE WITH REDUCED EJECTION FRACTION): ICD-10-CM

## 2024-03-14 DIAGNOSIS — I63.9 CEREBROVASCULAR ACCIDENT (CVA), UNSPECIFIED MECHANISM: Primary | ICD-10-CM

## 2024-03-14 DIAGNOSIS — I51.7 LVH (LEFT VENTRICULAR HYPERTROPHY): ICD-10-CM

## 2024-03-14 DIAGNOSIS — M25.562 CHRONIC PAIN OF BOTH KNEES: ICD-10-CM

## 2024-03-14 NOTE — OUTREACH NOTE
Call Center TCM Note      Flowsheet Row Responses   [facility] patient discharged from? [facility]   Does the patient have one of the following disease processes/diagnoses(primary or secondary)? Other   TCM attempt successful? No   Unsuccessful attempts Attempt 2            Sue García RN    3/14/2024, 15:51 EDT

## 2024-03-14 NOTE — OUTREACH NOTE
Call Center TCM Note      Flowsheet Row Responses   [facility] patient discharged from? [facility]   Does the patient have one of the following disease processes/diagnoses(primary or secondary)? Other   TCM attempt successful? No   Unsuccessful attempts Attempt 1            Sue García RN    3/14/2024, 12:53 EDT

## 2024-03-14 NOTE — TELEPHONE ENCOUNTER
Sophie,  I spoke to patients wife. He was recently discharged from the hospital. He is needing home health physical therapy and nursing services.    Also - he has a transport wheelchair but is needing a standard wheelchair due to TACK requirements during transportation.     If agreeable, can you please sign orders?    Thank you!  MYA Arias

## 2024-03-14 NOTE — OUTREACH NOTE
AMBULATORY CASE MANAGEMENT NOTE    Name and Relationship of Patient/Support Person: FERDINAND CATALAN - Emergency Contact    CCM Interim Update    Called and spoke with patients wife for CCM update. Patient was recently discharged from the hospital for falls, weakness, and sepsis secondary to UTI with COPD exacerbation as well. Patient had been declining in regards to ambulation and finally got to the point that the patients wife could no longer care for him so she called EMS and he was admitted.    She states that he is home now and he had a hospital bed delivered yesterday. She states that he fell last night after sliding out of the bed. The fire department had to come help get him up and wife states that they filed a complaint stating that there was an unsafe living condition within the home. Patients wife is afraid they will get evicted now due to this, and also her furnace would not stop running so she had to make several complaints to landlord regarding this as well. There have been several eviction notices handed out to neighbors of theirs this week. SW referral placed under wife's chart.     Patients wife states that she is interested in senior living facility for her and her  both, but states that he was not even agreeable to go to rehab upon discharge from the hospital.     Patient is in need of nursing, and physical therapy services through home health.    He also is unable to be transported to appointments because he only has a transport chair and [facility] will not let him sit in this in their van. He has a letter from PCP  stating that he needs a wheelchair to be taken to appointments but [facility] would not accept because it is not the right kind of wheelchair. I advised spouse that I can place an order for the PCP to sign for a standard wheelchair for him to use when being transported to PCP office. She is agreeable to this.     Care Coordination    Sent telephone note to PCP regarding orders for HH and   standard wheelchair.    Education Documentation  No documentation found.        YANDEL GONZALEZ  Ambulatory Case Management    3/14/2024, 14:33 EDT

## 2024-03-15 ENCOUNTER — TRANSITIONAL CARE MANAGEMENT TELEPHONE ENCOUNTER (OUTPATIENT)
Dept: CALL CENTER | Facility: HOSPITAL | Age: 69
End: 2024-03-15
Payer: MEDICARE

## 2024-03-15 ENCOUNTER — PATIENT OUTREACH (OUTPATIENT)
Dept: CASE MANAGEMENT | Facility: OTHER | Age: 69
End: 2024-03-15
Payer: MEDICARE

## 2024-03-15 DIAGNOSIS — A41.9 SEPSIS WITHOUT ACUTE ORGAN DYSFUNCTION, DUE TO UNSPECIFIED ORGANISM: ICD-10-CM

## 2024-03-15 DIAGNOSIS — I63.9 CEREBROVASCULAR ACCIDENT (CVA), UNSPECIFIED MECHANISM: Primary | ICD-10-CM

## 2024-03-15 LAB
BACTERIA SPEC AEROBE CULT: NORMAL
BACTERIA SPEC AEROBE CULT: NORMAL

## 2024-03-15 NOTE — OUTREACH NOTE
AMBULATORY CASE MANAGEMENT NOTE    Name and Relationship of Patient/Support Person: [facility] - Other    Care Coordination    PCP signed orders for Home Health Nursing and PT, and standard wheelchair.    Faxed HH orders to [facility].    Faxed wheelchair orders to [facility].    Education Documentation  No documentation found.        YANDEL GONZALEZ  Ambulatory Case Management    3/15/2024, 09:52 EDT

## 2024-03-15 NOTE — OUTREACH NOTE
Call Center TCM Note      Flowsheet Row Responses   [facility] patient discharged from? [facility]   Does the patient have one of the following disease processes/diagnoses(primary or secondary)? Other   TCM attempt successful? Yes   Call start time 1352   Call end time 1403   Discharge diagnosis Sepsis due to UTI   Person spoke with today (if not patient) and relationship spouse   Meds reviewed with patient/caregiver? Yes   Is the patient having any side effects they believe may be caused by any medication additions or changes? No   Does the patient have all medications ordered at discharge? Yes   Is the patient taking all medications as directed (includes completed medication regime)? Yes   Does the patient have an appointment with their PCP within 7-14 days of discharge? Yes  [3/20/2024 at 1:45 PM]   Psychosocial issues? No   Did the patient receive a copy of their discharge instructions? Yes   Nursing interventions Reviewed instructions with patient   What is the patient's perception of their health status since discharge? Improving   Is the patient/caregiver able to teach back signs and symptoms related to disease process for when to call PCP? Yes   Is the patient/caregiver able to teach back signs and symptoms related to disease process for when to call 911? Yes   Is the patient/caregiver able to teach back the hierarchy of who to call/visit for symptoms/problems? PCP, Specialist, Home health nurse, Urgent Care, ED, 911 Yes   If the patient is a current smoker, are they able to teach back resources for cessation? Not a smoker   TCM call completed? Yes   Wrap up additional comments Spouse states pt is not wanting any help, and having a hard time convincing pt to go to rehab. Spouse reports pt is not able to stand on his own. Pt now has a hospital bed to help with positioning in bed. Spouse shares she needs help taking of pt, and was advised to see what PCP suggests since they are helping pt. Spouse shares PCP  office knows pt needs help, and is waiting for them to come up with a soluton. Reviewed AVS/meds with spouse. Spouse verified PCP fu appt   Call end time 1403   Would this patient benefit from a Referral to [facility] Social Work? No   Is the patient interested in additional calls from an ambulatory ? No            Abigail Hughes RN    3/15/2024, 14:04 EDT

## 2024-03-18 ENCOUNTER — PREP FOR SURGERY (OUTPATIENT)
Dept: OTHER | Facility: HOSPITAL | Age: 69
End: 2024-03-18
Payer: MEDICARE

## 2024-03-18 ENCOUNTER — TELEPHONE (OUTPATIENT)
Dept: FAMILY MEDICINE CLINIC | Facility: CLINIC | Age: 69
End: 2024-03-18
Payer: MEDICARE

## 2024-03-18 DIAGNOSIS — E11.9 TYPE 2 DIABETES MELLITUS WITHOUT COMPLICATION, WITHOUT LONG-TERM CURRENT USE OF INSULIN: Primary | ICD-10-CM

## 2024-03-18 DIAGNOSIS — I50.22 CHRONIC HFREF (HEART FAILURE WITH REDUCED EJECTION FRACTION): Primary | ICD-10-CM

## 2024-03-18 DIAGNOSIS — R94.39 ABNORMAL NUCLEAR STRESS TEST: ICD-10-CM

## 2024-03-18 RX ORDER — SODIUM CHLORIDE 9 MG/ML
40 INJECTION, SOLUTION INTRAVENOUS AS NEEDED
OUTPATIENT
Start: 2024-03-18

## 2024-03-18 RX ORDER — SODIUM CHLORIDE 0.9 % (FLUSH) 0.9 %
10 SYRINGE (ML) INJECTION AS NEEDED
OUTPATIENT
Start: 2024-03-18

## 2024-03-18 RX ORDER — SODIUM CHLORIDE 0.9 % (FLUSH) 0.9 %
10 SYRINGE (ML) INJECTION EVERY 12 HOURS SCHEDULED
OUTPATIENT
Start: 2024-03-18

## 2024-03-18 NOTE — TELEPHONE ENCOUNTER
Hamilton with [facility] called to just FYI you about the patient.  She saw him today and did skilled nursing and added PT and OT.  She was concerned about his blood sugar.  She did a random spot check while she was there around 1:30-2:00 and it was 364.  Per pt and wife he hadn't ate anything since around 6 this morning.   Hamilton feels like he is not checking his sugars and is poorly managing his blood sugars.  She said he has a new machine there at that home and she instructed them how to us the machine.  She wanted to just give you a heads up prior to his appt on the 20th of this month.   Her number is [phone]

## 2024-03-18 NOTE — PROGRESS NOTES
"Enter Query Response Below      Query Response: There is no other additional clinical indicators.    Electronically signed by Andrew Quiñones MD, 24, 7:51 PM EDT.               If applicable, please update the problem list.     Patient: Brian Mehta \"Gio\"        : 1955  Account: [id number]           Admit Date: 3/10/2024        How to Respond to this query:       a. Click New Note     b. Answer query within the yellow box.                c. Update the Problem List, if applicable.      If you have any questions about this query contact me at: [email or web address]     ,     69-year-old male who presented with complaints of generalized weakness and fall. H&P & discharge summary documents \"Sepsis secondary to UTI.\" The patient had the following clinical findings on admission temperature 100.4, heart rate 120, respiratory rate 18, blood pressure 129/77 (MAP 94), WBC 9.64, platelets 220, glucose 218, creatinine 0.99, bilirubin 0.6 and lactic acid 1.8. Urine culture showed >100,000 CFU/mL Escherichia coli. The patient was treated with 1000ml NS bolus in ER, Tylenol, Rocephin IV x3 days and discharging on Keflex PO.     After study, was sepsis clinically supported during this admission?    - Sepsis was supported with additional clinical indicators:____________  - Sepsis was not supported  - Other- specify_____________  - Unable to determine    By submitting this query, we are merely seeking further clarification of documentation to accurately reflect all conditions that you are monitoring, evaluating, treating or that extend the hospitalization or utilize additional resources of care. Please utilize your independent clinical judgment when addressing the question(s) above.     This query and your response, once completed, will be entered into the legal medical record.    Sincerely,  Belkis Meek RN   Clinical Documentation Integrity Program   "

## 2024-03-19 ENCOUNTER — PATIENT OUTREACH (OUTPATIENT)
Dept: CASE MANAGEMENT | Facility: OTHER | Age: 69
End: 2024-03-19
Payer: MEDICARE

## 2024-03-19 ENCOUNTER — HOSPITAL ENCOUNTER (OUTPATIENT)
Facility: HOSPITAL | Age: 69
Setting detail: HOSPITAL OUTPATIENT SURGERY
End: 2024-03-19
Attending: INTERNAL MEDICINE | Admitting: INTERNAL MEDICINE
Payer: MEDICARE

## 2024-03-19 DIAGNOSIS — A41.9 SEPSIS WITHOUT ACUTE ORGAN DYSFUNCTION, DUE TO UNSPECIFIED ORGANISM: ICD-10-CM

## 2024-03-19 DIAGNOSIS — I50.22 CHRONIC HFREF (HEART FAILURE WITH REDUCED EJECTION FRACTION): ICD-10-CM

## 2024-03-19 DIAGNOSIS — R94.39 ABNORMAL NUCLEAR STRESS TEST: ICD-10-CM

## 2024-03-19 DIAGNOSIS — I63.9 CEREBROVASCULAR ACCIDENT (CVA), UNSPECIFIED MECHANISM: Primary | ICD-10-CM

## 2024-03-19 NOTE — OUTREACH NOTE
AMBULATORY CASE MANAGEMENT NOTE    Name and Relationship of Patient/Support Person: FERDINAND CATALAN - Emergency Contact    Care Coordination    Called and spoke with [facility] to inquire on status of wheelchair order. [facility] states that patient received a standard 22 inch w/c in 2022. This was not a transfer chair as the patients wife states, per [facility] at [facility].    CCM Interim Update    Attempted to call patients wife to discuss above. Left message to call back.         Education Documentation  No documentation found.        YANDEL GONZALEZ  Ambulatory Case Management    3/19/2024, 10:02 EDT

## 2024-03-20 ENCOUNTER — OFFICE VISIT (OUTPATIENT)
Dept: FAMILY MEDICINE CLINIC | Facility: CLINIC | Age: 69
End: 2024-03-20
Payer: MEDICARE

## 2024-03-20 ENCOUNTER — TELEPHONE (OUTPATIENT)
Dept: FAMILY MEDICINE CLINIC | Facility: CLINIC | Age: 69
End: 2024-03-20

## 2024-03-20 VITALS
OXYGEN SATURATION: 98 % | DIASTOLIC BLOOD PRESSURE: 70 MMHG | HEART RATE: 111 BPM | SYSTOLIC BLOOD PRESSURE: 112 MMHG | TEMPERATURE: 97.8 F

## 2024-03-20 DIAGNOSIS — N39.0 URINARY TRACT INFECTION WITH HEMATURIA, SITE UNSPECIFIED: ICD-10-CM

## 2024-03-20 DIAGNOSIS — R31.9 URINARY TRACT INFECTION WITH HEMATURIA, SITE UNSPECIFIED: ICD-10-CM

## 2024-03-20 DIAGNOSIS — E11.9 TYPE 2 DIABETES MELLITUS WITHOUT COMPLICATION, WITHOUT LONG-TERM CURRENT USE OF INSULIN: ICD-10-CM

## 2024-03-20 DIAGNOSIS — Z09 HOSPITAL DISCHARGE FOLLOW-UP: Primary | ICD-10-CM

## 2024-03-20 LAB
ALBUMIN SERPL-MCNC: 3.7 G/DL (ref 3.5–5.2)
ALBUMIN UR-MCNC: 3.4 MG/DL
ALBUMIN/GLOB SERPL: 0.9 G/DL
ALP SERPL-CCNC: 118 U/L (ref 39–117)
ALT SERPL W P-5'-P-CCNC: 13 U/L (ref 1–41)
ANION GAP SERPL CALCULATED.3IONS-SCNC: 11.1 MMOL/L (ref 5–15)
AST SERPL-CCNC: 15 U/L (ref 1–40)
BACTERIA UR QL AUTO: ABNORMAL /HPF
BASOPHILS # BLD AUTO: 0.04 10*3/MM3 (ref 0–0.2)
BASOPHILS NFR BLD AUTO: 0.5 % (ref 0–1.5)
BILIRUB SERPL-MCNC: 0.4 MG/DL (ref 0–1.2)
BILIRUB UR QL STRIP: NEGATIVE
BUN SERPL-MCNC: 19 MG/DL (ref 8–23)
BUN/CREAT SERPL: 19.2 (ref 7–25)
CALCIUM SPEC-SCNC: 9.3 MG/DL (ref 8.6–10.5)
CHLORIDE SERPL-SCNC: 95 MMOL/L (ref 98–107)
CLARITY UR: CLEAR
CO2 SERPL-SCNC: 25.9 MMOL/L (ref 22–29)
COLOR UR: YELLOW
CREAT SERPL-MCNC: 0.99 MG/DL (ref 0.76–1.27)
DEPRECATED RDW RBC AUTO: 43.4 FL (ref 37–54)
EGFRCR SERPLBLD CKD-EPI 2021: 83.5 ML/MIN/1.73
EOSINOPHIL # BLD AUTO: 0.07 10*3/MM3 (ref 0–0.4)
EOSINOPHIL NFR BLD AUTO: 0.9 % (ref 0.3–6.2)
ERYTHROCYTE [DISTWIDTH] IN BLOOD BY AUTOMATED COUNT: 14.9 % (ref 12.3–15.4)
GLOBULIN UR ELPH-MCNC: 3.9 GM/DL
GLUCOSE SERPL-MCNC: 321 MG/DL (ref 65–99)
GLUCOSE UR STRIP-MCNC: ABNORMAL MG/DL
HCT VFR BLD AUTO: 47.4 % (ref 37.5–51)
HGB BLD-MCNC: 15.8 G/DL (ref 13–17.7)
HGB UR QL STRIP.AUTO: ABNORMAL
HYALINE CASTS UR QL AUTO: ABNORMAL /LPF
IMM GRANULOCYTES # BLD AUTO: 0.02 10*3/MM3 (ref 0–0.05)
IMM GRANULOCYTES NFR BLD AUTO: 0.3 % (ref 0–0.5)
KETONES UR QL STRIP: NEGATIVE
LEUKOCYTE ESTERASE UR QL STRIP.AUTO: ABNORMAL
LYMPHOCYTES # BLD AUTO: 3.39 10*3/MM3 (ref 0.7–3.1)
LYMPHOCYTES NFR BLD AUTO: 45.1 % (ref 19.6–45.3)
MCH RBC QN AUTO: 27.1 PG (ref 26.6–33)
MCHC RBC AUTO-ENTMCNC: 33.3 G/DL (ref 31.5–35.7)
MCV RBC AUTO: 81.4 FL (ref 79–97)
MONOCYTES # BLD AUTO: 0.61 10*3/MM3 (ref 0.1–0.9)
MONOCYTES NFR BLD AUTO: 8.1 % (ref 5–12)
NEUTROPHILS NFR BLD AUTO: 3.38 10*3/MM3 (ref 1.7–7)
NEUTROPHILS NFR BLD AUTO: 45.1 % (ref 42.7–76)
NITRITE UR QL STRIP: NEGATIVE
NRBC BLD AUTO-RTO: 0 /100 WBC (ref 0–0.2)
PH UR STRIP.AUTO: 6 [PH] (ref 5–8)
PLATELET # BLD AUTO: 267 10*3/MM3 (ref 140–450)
PMV BLD AUTO: 10 FL (ref 6–12)
POTASSIUM SERPL-SCNC: 4.2 MMOL/L (ref 3.5–5.2)
PROT SERPL-MCNC: 7.6 G/DL (ref 6–8.5)
PROT UR QL STRIP: NEGATIVE
RBC # BLD AUTO: 5.82 10*6/MM3 (ref 4.14–5.8)
RBC # UR STRIP: ABNORMAL /HPF
REF LAB TEST METHOD: ABNORMAL
SODIUM SERPL-SCNC: 132 MMOL/L (ref 136–145)
SP GR UR STRIP: >=1.03 (ref 1–1.03)
SQUAMOUS #/AREA URNS HPF: ABNORMAL /HPF
UROBILINOGEN UR QL STRIP: ABNORMAL
WBC # UR STRIP: ABNORMAL /HPF
WBC NRBC COR # BLD AUTO: 7.51 10*3/MM3 (ref 3.4–10.8)
YEAST URNS QL MICRO: ABNORMAL /HPF

## 2024-03-20 PROCEDURE — 87086 URINE CULTURE/COLONY COUNT: CPT | Performed by: NURSE PRACTITIONER

## 2024-03-20 PROCEDURE — 85025 COMPLETE CBC W/AUTO DIFF WBC: CPT | Performed by: NURSE PRACTITIONER

## 2024-03-20 PROCEDURE — 82043 UR ALBUMIN QUANTITATIVE: CPT | Performed by: NURSE PRACTITIONER

## 2024-03-20 PROCEDURE — 80053 COMPREHEN METABOLIC PANEL: CPT | Performed by: NURSE PRACTITIONER

## 2024-03-20 PROCEDURE — 87077 CULTURE AEROBIC IDENTIFY: CPT | Performed by: NURSE PRACTITIONER

## 2024-03-20 PROCEDURE — 87186 SC STD MICRODIL/AGAR DIL: CPT | Performed by: NURSE PRACTITIONER

## 2024-03-20 PROCEDURE — 81001 URINALYSIS AUTO W/SCOPE: CPT | Performed by: NURSE PRACTITIONER

## 2024-03-20 NOTE — PROGRESS NOTES
Venipuncture Blood Specimen Collection  Venipuncture performed in left arm  by Miryam Grier with good hemostasis. Patient tolerated the procedure well without complications.   03/20/24   Miryam Grier

## 2024-03-20 NOTE — PROGRESS NOTES
Transitional Care Follow Up Visit  Subjective     Brian Mehta is a 69 y.o. male who presents for a transitional care management visit.    Within 48 business hours after discharge our office contacted him via telephone to coordinate his care and needs.      I reviewed and discussed the details of that call along with the discharge summary, hospital problems, inpatient lab results, inpatient diagnostic studies, and consultation reports with Brian.     Current outpatient and discharge medications have been reconciled for the patient.  Reviewed by: MORRO Vasquez          3/13/2024     5:24 PM   Date of TCM Phone Call   [facility]   Date of Admission 3/10/2024   Date of Discharge 3/13/2024   Discharge Disposition Home or Self Care     Risk for Readmission (LACE) Score: 13 (3/13/2024  6:00 AM)      History of Present Illness   Course During Hospital Stay:  Pt fell from bed 3x and his wife called EMS with each fall and after the 3rd fall they made him go to the ER. When in the ER he was found to have concerns for sepsis from unknown source according to pt and wife. Pt was then admitted and given IV antibiotics. And checked glucose every 3 hours and given insulin. He completed the Keflex that was prescribed at discharge.     Pt was able to get a hospital bed upon discharge and he has not had any falls since being home. Pts wife monitors him closely and will use the side rail at times.     Since discharge pt has home health nursing and PT coming into the home. Pt would like a different PT.     Pt is requesting an order for a new wheelchair that can be used for transport and order sent to [facility].     Pt reports he has been smoking less since being discharged. Pt notes pain in the right ribs with coughing. Pt states he fell and hit the right side but was told no fracture when admitted.     In office assisted with use of [employer] glucometer and checked glucose in office and glucose was 457. Pt states he feels  fine.     He has a follow up with Cardiology on April 9.     Patient does note that he has some concerns for memory changes.  Discussed with patient that some of his memory may be related to high blood sugars but we will continue to monitor.     The following portions of the patient's history were reviewed and updated as appropriate: allergies, current medications, past family history, past medical history, past social history, past surgical history, and problem list.    Review of Systems   Respiratory:  Negative for shortness of breath.    Cardiovascular:  Negative for chest pain and palpitations.       Objective   Vitals:    03/20/24 1401   BP: 112/70   BP Location: Left arm   Pulse: 111   Temp: 97.8 °F (36.6 °C)   SpO2: 98%      Physical Exam  Vitals reviewed.   Constitutional:       General: He is not in acute distress.     Appearance: Normal appearance. He is well-developed.   Eyes:      Conjunctiva/sclera: Conjunctivae normal.      Pupils: Pupils are equal, round, and reactive to light.   Cardiovascular:      Rate and Rhythm: Normal rate and regular rhythm.      Heart sounds: Normal heart sounds.   Pulmonary:      Effort: Pulmonary effort is normal.      Breath sounds: Normal breath sounds.   Skin:     General: Skin is warm and dry.   Neurological:      Mental Status: He is alert and oriented to person, place, and time.   Psychiatric:         Mood and Affect: Mood and affect normal.         Behavior: Behavior normal.         Thought Content: Thought content normal.         Judgment: Judgment normal.         Assessment & Plan   Diagnoses and all orders for this visit:    1. Hospital discharge follow-up (Primary)    2. Type 2 diabetes mellitus without complication, without long-term current use of insulin  -     Insulin Glargine (LANTUS SOLOSTAR) 100 UNIT/ML injection pen; Inject 10 Units under the skin into the appropriate area as directed Every Night.  Dispense: 15 mL; Refill: 0  -     Comprehensive Metabolic  Panel  -     MicroAlbumin, Urine, Random - Urine, Clean Catch    3. Urinary tract infection with hematuria, site unspecified  -     Urine Culture - Urine, Urine, Random Void  -     Urinalysis With Microscopic - Urine, Clean Catch  -     CBC & Differential    Patient to start on nighttime insulin and notify of glucose readings in 5 days.  Patient to go to ER with glucose readings greater than 500.  Patient and wife state understanding.    Urine culture pending to evaluate for clearance of UTI that was likely because of sepsis and falls.  Review of chest x-ray was negative for rib fractures.  Continue to monitor.  Breath sounds normal.    Will contact [place]ana with chronic care management to discuss wheelchair order.    Return in about 3 months (around 6/20/2024) for Refills and fasting labs.

## 2024-03-21 ENCOUNTER — PATIENT OUTREACH (OUTPATIENT)
Dept: CASE MANAGEMENT | Facility: OTHER | Age: 69
End: 2024-03-21
Payer: MEDICARE

## 2024-03-21 DIAGNOSIS — I63.9 CEREBROVASCULAR ACCIDENT (CVA), UNSPECIFIED MECHANISM: Primary | ICD-10-CM

## 2024-03-21 DIAGNOSIS — E11.69 TYPE 2 DIABETES MELLITUS WITH OTHER SPECIFIED COMPLICATION, UNSPECIFIED WHETHER LONG TERM INSULIN USE: ICD-10-CM

## 2024-03-21 NOTE — OUTREACH NOTE
AMBULATORY CASE MANAGEMENT NOTE    Name and Relationship of Patient/Support Person: FERDINAND CATALAN - Emergency Contact    CCM Interim Update    [facility] had [facility] and this was quite a severe case of them. Unfortunately, they will be unable to return to the home unless these are treated. I spoke to the patients spouse and gave her the number for [employer] to see if they had resources to assist with this. The patients spouse states that they cannot afford to have an  come out. They do rent, but they are in a rent-to-own agreement, so the landlord will not assist with repairs or pests.     Patient would like to see if another physical therapist could come to the home. They do not feel that a therapeutic relationship can be established with the therapist that was initially in the home due to conflicting personalities and learning types.     Patients spouse is having a hard time checking his blood sugar with his [employer] meter. She states that PCP and home health nurse were both able to do this successfully. I will contact the home health nurse and ask her for meter education for the spouse so that she can check it between home health visits.        Education Documentation  No documentation found.        YANDEL GONZALEZ  Ambulatory Case Management    3/21/2024, 16:02 EDT

## 2024-03-22 ENCOUNTER — TELEPHONE (OUTPATIENT)
Dept: CASE MANAGEMENT | Facility: OTHER | Age: 69
End: 2024-03-22
Payer: MEDICARE

## 2024-03-22 DIAGNOSIS — N39.0 URINARY TRACT INFECTION WITH HEMATURIA, SITE UNSPECIFIED: Primary | ICD-10-CM

## 2024-03-22 DIAGNOSIS — R31.9 URINARY TRACT INFECTION WITH HEMATURIA, SITE UNSPECIFIED: Primary | ICD-10-CM

## 2024-03-22 LAB — BACTERIA SPEC AEROBE CULT: ABNORMAL

## 2024-03-22 RX ORDER — SULFAMETHOXAZOLE AND TRIMETHOPRIM 800; 160 MG/1; MG/1
1 TABLET ORAL 2 TIMES DAILY
Qty: 14 TABLET | Refills: 0 | Status: SHIPPED | OUTPATIENT
Start: 2024-03-22 | End: 2024-03-29

## 2024-03-25 ENCOUNTER — READMISSION MANAGEMENT (OUTPATIENT)
Dept: CALL CENTER | Facility: HOSPITAL | Age: 69
End: 2024-03-25
Payer: MEDICARE

## 2024-03-25 ENCOUNTER — PATIENT OUTREACH (OUTPATIENT)
Dept: CASE MANAGEMENT | Facility: OTHER | Age: 69
End: 2024-03-25
Payer: MEDICARE

## 2024-03-25 DIAGNOSIS — E11.69 TYPE 2 DIABETES MELLITUS WITH OTHER SPECIFIED COMPLICATION, UNSPECIFIED WHETHER LONG TERM INSULIN USE: ICD-10-CM

## 2024-03-25 DIAGNOSIS — I63.9 CEREBROVASCULAR ACCIDENT (CVA), UNSPECIFIED MECHANISM: Primary | ICD-10-CM

## 2024-03-25 NOTE — OUTREACH NOTE
Medical Week 2 Survey      Flowsheet Row Responses   [facility] facility patient discharged from? [facility]   Does the patient have one of the following disease processes/diagnoses(primary or secondary)? Other   Week 2 attempt successful? No   Unsuccessful attempts Attempt 1   Revoke Other  [CCM program]            Gaby MARTIN - Registered Nurse

## 2024-03-25 NOTE — OUTREACH NOTE
AMBULATORY CASE MANAGEMENT NOTE    Name and Relationship of Patient/Support Person: FERDINAND CATALAN - Emergency Contact    CCM Interim Update    Called and spoke to patients wife. She is inquiring on the status of the wheel chair. Advised that I had spoken to [employer] and they state that it is a standard wheelchair. Confirmed that [facility] is transportation agency.  In relation to the home health agency not coming out due to the safety concerns of bugs in the home, patient and spouse are aware that the recommendation of home health, Ana, was to complete an APS referral so that they can come assess and hopefully provide assistance to the family in getting an  for the bugs. Patient and spouse are both aware and spouse gives verbal okay for APS referral. PCP is also aware. Zaida with [facility] will complete referral.       Care Coordination    Called and spoke with [facility]. They state that they have the patients status as a walk on. I advised that patient does need to be able to transport in wheelchair. She will fax over a special carrier form to be filled out by PCP office and faxed back.      Education Documentation  No documentation found.        YANDEL GONZALEZ  Ambulatory Case Management    3/25/2024, 11:49 EDT

## 2024-03-26 ENCOUNTER — TELEPHONE (OUTPATIENT)
Dept: CARDIOLOGY | Facility: CLINIC | Age: 69
End: 2024-03-26
Payer: MEDICARE

## 2024-03-26 NOTE — TELEPHONE ENCOUNTER
I spoke to patient and gave an arrival time of 7:30 on 04/09/24 for [facility]. Patient was instructed to have a  for the day of the procedure and to arrive at the main entrance/registration area. Patient was educated about stent placement and informed that in the event of stent placement, the patient will be required to stay overnight for observation. Patient was instructed to continue all medications as usual. Patient was instructed to be NPO after midnight with sips of water as needed. Patient was instructed to have labs completed on the morning of the procedure due to location. Patient is agreeable with no other questions or concerns.

## 2024-03-26 NOTE — TELEPHONE ENCOUNTER
Caller: FERDINAND CATALAN    Relationship: Emergency Contact    Best call back number: [phone]    What is the best time to reach you: ANYTIME    Who are you requesting to speak with (clinical staff, provider,  specific staff member): CLINICAL       What was the call regarding: PATIENT'S WIFE WOULD LIKE TO KNOW THE PROCEDURES AND TIME OF HEART CATH ON 4/9/24.SO SHE CAN ARRANGE FOR TRANSPORTATION.     Is it okay if the provider responds through MyChart: NO

## 2024-03-27 ENCOUNTER — PATIENT OUTREACH (OUTPATIENT)
Age: 69
End: 2024-03-27
Payer: MEDICARE

## 2024-03-27 NOTE — OUTREACH NOTE
Care Coordination     DAVID spoke with [facility] and obtained a APS report reference number of [id number]. DAVID updated Ana that she and her supervisor were working on pt task of getting help with the bed bug barriers. DAVID verbalized that she would keep Ana updated with the progress made with pt.      Patient Outreach     DAVID spoke with pt spouse re his entry into the ASW program due to further social needs onset. Pt mobile phone has been disconnected, a bed bug infestation has become a barrier to HH providing services, and [facility] with the cabinet. DAVID verbalized to pt spouse that she and her supervisor were working on the task of exploring any bed bug assistance resources. Pt spouse expressed thanks for this. DAVID also provided her with [facility]'s Services hotline to inquire about their free smart phone benefits, and reviewed the process for applying for waiver Medicaid. DAVID provided the spouse with the phone number to the Medicaid hotline again. Pt spouse is agreeable to the plan.     Vadim MANZANO -   Ambulatory Case Management    3/27/2024, 10:45 EDT

## 2024-03-28 ENCOUNTER — PATIENT OUTREACH (OUTPATIENT)
Age: 69
End: 2024-03-28
Payer: MEDICARE

## 2024-03-28 NOTE — OUTREACH NOTE
UTR to pt spouse. Pt mobile phone disconnected and no voicemail option on home phone.     Vadim MANZANO -   Ambulatory Case Management    3/28/2024, 09:25 EDT

## 2024-03-29 ENCOUNTER — PATIENT OUTREACH (OUTPATIENT)
Age: 69
End: 2024-03-29
Payer: MEDICARE

## 2024-03-29 ENCOUNTER — PATIENT OUTREACH (OUTPATIENT)
Dept: CASE MANAGEMENT | Facility: OTHER | Age: 69
End: 2024-03-29
Payer: MEDICARE

## 2024-03-29 DIAGNOSIS — I63.9 CEREBROVASCULAR ACCIDENT (CVA), UNSPECIFIED MECHANISM: Primary | ICD-10-CM

## 2024-03-29 DIAGNOSIS — E11.69 TYPE 2 DIABETES MELLITUS WITH OTHER SPECIFIED COMPLICATION, UNSPECIFIED WHETHER LONG TERM INSULIN USE: ICD-10-CM

## 2024-03-29 NOTE — OUTREACH NOTE
[facility] End of Month Documentation    This Chronic Medical Management Care Plan for Brian Mehta, 69 y.o. male, has been monitored and managed; reviewed and a new plan of care implemented for the month of March.  A cumulative time of 162  minutes was spent on this patient record this month, including phone call with relative; electronic communication with primary care provider, Referral to MSW.    Regarding the patient's problems: has Essential hypertension; High cholesterol; Hip pain, bilateral; Right knee pain; Vitamin B12 deficiency; Kidney disorder; Chronic pain of both knees; Sepsis; Chronic HFrEF (heart failure with reduced ejection fraction); LVH (left ventricular hypertrophy); COPD (chronic obstructive pulmonary disease); DM2 (diabetes mellitus, type 2); Urinary tract infection without hematuria; and Abnormal nuclear stress test on their problem list., the following items were addressed: medical records; medications and any changes can be found within the plan section of the note.  A detailed listing of time spent for chronic care management is tracked within each outreach encounter.  Current medications include:  has a current medication list which includes the following prescription(s): aspirin, atorvastatin, hydrochlorothiazide, insulin glargine, jardiance, losartan, and sulfamethoxazole-trimethoprim. and the patient is reported to be patient is compliant with medication protocol,  Medications are reported to be effective.  Regarding these diagnoses, referrals were made to the following provider(s):  Home Health.  All notes on chart for PCP to review.    The patient was monitored remotely for activity level; medications.    The patient's physical needs include:  DME supplies; physical healthcare; needs assistance with ADLs.     The patient's mental support needs include:  continued support    The patient's cognitive support needs include:  needs assistance with ADLs; requires supervision; household care;  health care    The patient's psychosocial support needs include:  continued support    The patient's functional needs include: DME; physical healthcare    The patient's environmental needs include:  an unsafe living environment    Care Plan overall comments:  No data recorded    Refer to previous outreach notes for more information on the areas listed above.    Monthly Billing Diagnoses  (I63.9) Cerebrovascular accident (CVA), unspecified mechanism    (E11.69) Type 2 diabetes mellitus with other specified complication, unspecified whether long term insulin use    Medications   Medications have been reconciled    Care Plan progress this month:      Recently Modified Care Plans Updates made since 2/27/2024 12:00 AM      No recently modified care plans.            Instructions   Patient was provided an electronic copy of care plan  CCM services were explained and offered and patient has accepted these services.  Patient has given their written consent to receive CCM services and understands that this includes the authorization of electronic communication of medical information with the other treating providers.  Patient understands that they may stop CCM services at any time and these changes will be effective at the end of the calendar month and will effectively revocate the agreement of CCM services.  Patient understands that only one practitioner can furnish and be paid for CCM services during one calendar month.  Patient also understands that there may be co-payment or deductible fees in association with CCM services.  Patient will continue with at least monthly follow-up calls with the Ambulatory .    YANDEL GONZALEZ  Ambulatory Case Management    3/29/2024, 09:26 EDT

## 2024-03-29 NOTE — OUTREACH NOTE
SW spoke with pt  spouse to continue to coordinate on addressing her needs. Pt  spouse stated she has called [employer] and that there is no smart phone benefit that she is eligible for. SW verbalized that this may be due to the type of Medicaid pt has, and on if it is supplemental. Additionally, SW and pt spouse discussed the [employer] company and getting a quote from them for services, to move forward on inquiring about assistance with local agencies. Pt spouse agreed to call SW back once she has a quote to provide.SW offered to also send the number to the Bed Bug company to pt My Chart, which she expressed thanks for. SW will continue to coordinate on this.     Vadim MANZANO -   Ambulatory Case Management    3/29/2024, 10:00 EDT

## 2024-04-01 ENCOUNTER — PATIENT OUTREACH (OUTPATIENT)
Age: 69
End: 2024-04-01
Payer: MEDICARE

## 2024-04-01 NOTE — OUTREACH NOTE
Care Coordination     SW placed one two separate calls to community [employer], who denied having any resources to help with pt pest control issues. SW called [facility] twice, and they denied having resources to help with pest control. SW called [facility], and the director denied having present resources to help with this. SW already got a no from [facility] on Friday about not being able to help with the pest control costs. DAVID is still waiting for calls back from [facility] and [facility]. DAVID notified supervisor.         Vadim MANZANO -   Ambulatory Case Management    4/1/2024, 13:28 EDT    Patient Outreach    UTR. DAVID attempted to contact pt spouse, but line was busy. Will attempt again tomorrow.     Vadim MANZANO -   Ambulatory Case Management    4/1/2024, 13:28 EDT

## 2024-04-04 ENCOUNTER — PATIENT OUTREACH (OUTPATIENT)
Dept: CASE MANAGEMENT | Facility: OTHER | Age: 69
End: 2024-04-04
Payer: MEDICARE

## 2024-04-04 ENCOUNTER — PATIENT OUTREACH (OUTPATIENT)
Age: 69
End: 2024-04-04
Payer: MEDICARE

## 2024-04-04 DIAGNOSIS — I63.9 CEREBROVASCULAR ACCIDENT (CVA), UNSPECIFIED MECHANISM: Primary | ICD-10-CM

## 2024-04-04 DIAGNOSIS — E11.69 TYPE 2 DIABETES MELLITUS WITH OTHER SPECIFIED COMPLICATION, UNSPECIFIED WHETHER LONG TERM INSULIN USE: ICD-10-CM

## 2024-04-04 NOTE — OUTREACH NOTE
SW attempted to contact pt a second time, and scheduled a third call for one week out. SW will attempt again in a week.     Vadim MANZANO -   Ambulatory Case Management    4/4/2024, 10:39 EDT

## 2024-04-04 NOTE — OUTREACH NOTE
"AMBULATORY CASE MANAGEMENT NOTE    Name and Relationship of Patient/Support Person: FERDINAND CATALAN - Emergency Contact    CCM Interim Update    Called patients wife. She states that they no longer have wheelchair that they received in 2022 because it was \"too big\" for the patient.     They are interested in moving forward with the self-pay option through [employer].     Care Coordination    Called [employer], left message to return call regarding self pay wheel chair options and order.        Education Documentation  No documentation found.        YANDEL GONZALEZ  Ambulatory Case Management    4/4/2024, 14:45 EDT  "

## 2024-04-05 ENCOUNTER — PATIENT OUTREACH (OUTPATIENT)
Age: 69
End: 2024-04-05
Payer: MEDICARE

## 2024-04-05 ENCOUNTER — PATIENT OUTREACH (OUTPATIENT)
Dept: CASE MANAGEMENT | Facility: OTHER | Age: 69
End: 2024-04-05
Payer: MEDICARE

## 2024-04-05 VITALS — BODY MASS INDEX: 27.1 KG/M2 | WEIGHT: 218 LBS | HEIGHT: 75 IN

## 2024-04-05 DIAGNOSIS — I63.9 CEREBROVASCULAR ACCIDENT (CVA), UNSPECIFIED MECHANISM: Primary | ICD-10-CM

## 2024-04-05 DIAGNOSIS — E11.69 TYPE 2 DIABETES MELLITUS WITH OTHER SPECIFIED COMPLICATION, UNSPECIFIED WHETHER LONG TERM INSULIN USE: ICD-10-CM

## 2024-04-05 NOTE — PRE-PROCEDURE INSTRUCTIONS
PATIENT AND PATIENT'S WIFE (FERDINAND) INSTRUCTED FOR PATIENT TO BE:    - NPO AFTER MIDNIGHT EXCEPT CAN HAVE SIPS OF WATER WITH HIS MEDICATION PRIOR TO PROCEDURE    -  INSTRUCTED NO LOTIONS, JEWELRY, PIERCINGS, OR DEODORANT DAY OF THE PROCEDURE    - INSTRUCTED TO TAKE THE FOLLOWING MEDICATIONS THE DAY OF SURGERY:        ASA, HCTZ, JARDIANCE, LOSARTAN    - INSTRUCTED PT TO FOLLOW ANY INSTRUCTIONS GIVEN BY HIS CARDIOLOGIST.    - INFORMED PT THEY ATTEMPT RADIAL APPROACH FIRST IF UNABLE TO PERFORM CATH WITH THAT APPROACH THEY WILL DO A FEMORAL APPROACH.  ALSO, INFORMED PT THEY WILL BE ON BEDREST POSTOP.  IF THE SURGEON FEELS  AN INTERVENTION OR STENTS NEEDS TO BE PLACED, HE/SHE WILL STAY OVER NIGHT FOR OBSERVATION AND MONITORING.     - INFORMED THE PATIENT HE CAN HAVE TWO VISITORS WITH HIM THE DAY OF THE PROCEDURE    - INSTRUCTED PT TO PARK IN THE PARKING GARAGE, ENTER THE HOSPITAL THRU ENTRANCE A AND  CHECK IN AT THE MAIN REGISTRATION DESK, AFTER REGISTRATION PT WILL BE TAKEN THE THE PREOP AREA FOR HIS PROCEDURE.    -ARRIVAL TIME GIVEN BY CARDIOLOGIST OFFICE, INFORMED PT IF ARRIVAL TIME CHANGES OR ADJUSTMENTS NEED TO BE MADE IN THEIR ARRIVAL TIME, HE/SHE WOULD RECEIVE A CALL.    -INSTRUCTED PT TO COME TO [facility] TO GET THEIR LABS/ EKG DONE PRIOR TO PROCEDURE  - PER DR STRICKLNAD OFFICE ([place]) CAN DO LABS IN AM OF PROCEDURE DUE TO TRANSPORTATION     - PATIENT AND PATIENT'S WIFE VERBALIZED UNDERSTANDING

## 2024-04-05 NOTE — OUTREACH NOTE
AMBULATORY CASE MANAGEMENT NOTE    Name and Relationship of Patient/Support Person: FERDINAND CATALAN - Emergency Contact    Care Coordination    Called and spoke with [employer] regarding self-pay pricing on a wheelchair. They state for a 22 inch wheelchair, it would be $90 per month to rent, or $425 to purchase it.     Spoke to MSW who states that [facility] [place], [facility], and [facility] often get them and they may be significantly more affordable at these stores.    CCM Interim Update    Called and advised wife of above. She is aware and verbalizes understanding. I will send her a [employer] message with contact and location info of above resources. She will call to see if they have any wheelchairs available and what their price is.         Education Documentation  No documentation found.        YANDEL GONZALEZ  Ambulatory Case Management    4/5/2024, 11:47 EDT

## 2024-04-05 NOTE — OUTREACH NOTE
Patient Outreach    Sw spoke with pt spouse, who stated she is stressed. Pt spouse stated she got the wrong amount in paid benefits from [facility], and SW encouraged her to contact [employer] to rectify the situation. Additionally, SW reminded her to contact her utility service and inquire about getting a budget payment plan for up to six months, to help with the high utility bill she received. SW also instructed her to inquire about a medical certificate form for the company being sent to pt, should she need it in the future to extend their service. Pt spouse expressed thanks for the support. SW reminded pt spouse she needs to apply for waiver Medicaid to help with some of the coverage and support issues she is dealing with. Pt spouse is agreeable to do this. SW and pt discussed that the five places SW has spoken with so far did not have resources to help with the bed bug situation. SW stated the plan with her supervisor is to attempt the last couple contacts not heard from next week. Pt spouse agreeable to F/u next week.     Vadim MANZANO -   Ambulatory Case Management    4/5/2024, 10:41 EDT

## 2024-04-10 ENCOUNTER — PREP FOR SURGERY (OUTPATIENT)
Dept: OTHER | Facility: HOSPITAL | Age: 69
End: 2024-04-10
Payer: MEDICARE

## 2024-04-10 ENCOUNTER — HOSPITAL ENCOUNTER (OUTPATIENT)
Facility: HOSPITAL | Age: 69
Setting detail: HOSPITAL OUTPATIENT SURGERY
End: 2024-04-10
Attending: INTERNAL MEDICINE | Admitting: INTERNAL MEDICINE
Payer: MEDICARE

## 2024-04-10 ENCOUNTER — PATIENT OUTREACH (OUTPATIENT)
Age: 69
End: 2024-04-10
Payer: MEDICARE

## 2024-04-10 DIAGNOSIS — R94.39 ABNORMAL NUCLEAR STRESS TEST: ICD-10-CM

## 2024-04-10 DIAGNOSIS — I50.22 CHRONIC HFREF (HEART FAILURE WITH REDUCED EJECTION FRACTION): ICD-10-CM

## 2024-04-10 DIAGNOSIS — I50.22 CHRONIC HFREF (HEART FAILURE WITH REDUCED EJECTION FRACTION): Primary | ICD-10-CM

## 2024-04-10 RX ORDER — SODIUM CHLORIDE 0.9 % (FLUSH) 0.9 %
10 SYRINGE (ML) INJECTION EVERY 12 HOURS SCHEDULED
OUTPATIENT
Start: 2024-04-10

## 2024-04-10 RX ORDER — SODIUM CHLORIDE 0.9 % (FLUSH) 0.9 %
10 SYRINGE (ML) INJECTION AS NEEDED
OUTPATIENT
Start: 2024-04-10

## 2024-04-10 RX ORDER — SODIUM CHLORIDE 9 MG/ML
40 INJECTION, SOLUTION INTRAVENOUS AS NEEDED
OUTPATIENT
Start: 2024-04-10

## 2024-04-10 NOTE — OUTREACH NOTE
Patient Outreach    SW spoke with pt spouse re his wheelchair issues, and coverage benefits for DME. Additionally, SW and pt spouse discussed waiting to hear back from the foundation re the bed bug infestation. Pt spouse expressed thanks. DAVID will F/u once the [facility] has notified her of the decision.    Vadim MANZANO -   Ambulatory Case Management    4/10/2024, 15:29 EDT

## 2024-04-12 ENCOUNTER — PATIENT OUTREACH (OUTPATIENT)
Dept: CASE MANAGEMENT | Facility: OTHER | Age: 69
End: 2024-04-12
Payer: MEDICARE

## 2024-04-12 DIAGNOSIS — E11.69 TYPE 2 DIABETES MELLITUS WITH OTHER SPECIFIED COMPLICATION, UNSPECIFIED WHETHER LONG TERM INSULIN USE: ICD-10-CM

## 2024-04-12 DIAGNOSIS — I63.9 CEREBROVASCULAR ACCIDENT (CVA), UNSPECIFIED MECHANISM: Primary | ICD-10-CM

## 2024-04-12 NOTE — OUTREACH NOTE
AMBULATORY CASE MANAGEMENT NOTE    Name and Relationship of Patient/Support Person: FERDINAND CATALAN - Emergency Contact    CCM Interim Update    Returned spouses call. She advises that patient did not get to go to his cardiology appointment because the TACK  would not wait for him to get out of the house. Spouse has already rescheduled apt, scheduled apt with PCP, and spoken to transportation to schedule rides for this.     Care Coordination    Unfortunately, home health PT has discontinued patient from services due to bed bugs. Social work is still working on funding for this.        Education Documentation  No documentation found.        YANDEL GONZALEZ  Ambulatory Case Management    4/12/2024, 14:33 EDT

## 2024-04-19 ENCOUNTER — PATIENT OUTREACH (OUTPATIENT)
Age: 69
End: 2024-04-19
Payer: MEDICARE

## 2024-04-19 NOTE — OUTREACH NOTE
Patient Outreach    SW spoke with pt spouse to update her on the status of funding requests for bed bug treatment. SW and pt spouse discussed that no funds were secured to help towards the treatments. SW and pt spouse discussed how she has [employer] switching over at the beginning of next month, and will be relieved to have their extra benefits of the allowance card to help with groceries and bills. Additionally, SW completed a referral to the [employer] network for the portable RAMP program. Pt spouse expressed thanks for this. SW to monitor on the receipt of the insurance cards and assist on any enrollment needs. Pt spouse was agreeable to this and expressed thanks.     Vadim MANZANO -   Ambulatory Case Management    4/19/2024, 15:29 EDT

## 2024-04-23 ENCOUNTER — TELEPHONE (OUTPATIENT)
Dept: CASE MANAGEMENT | Facility: OTHER | Age: 69
End: 2024-04-23
Payer: MEDICARE

## 2024-04-25 ENCOUNTER — TELEPHONE (OUTPATIENT)
Dept: CARDIOLOGY | Facility: CLINIC | Age: 69
End: 2024-04-25
Payer: MEDICARE

## 2024-04-25 VITALS — HEIGHT: 75 IN | WEIGHT: 218 LBS | BODY MASS INDEX: 27.1 KG/M2

## 2024-04-25 NOTE — PRE-PROCEDURE INSTRUCTIONS
PATIENT INSTRUCTED TO BE:    - NPO AFTER MIDNIGHT EXCEPT CAN HAVE SIPS OF WATER WITH HIS MEDICATION PRIOR TO PROCEDURE    -  INSTRUCTED NO LOTIONS, JEWELRY, PIERCINGS, OR DEODORANT DAY OF THE PROCEDURE    - INSTRUCTED TO TAKE THE FOLLOWING MEDICATIONS THE DAY OF SURGERY:         ASA, HCTZ, JARDIANCE, LOSARTAN     - INSTRUCTED PT TO FOLLOW ANY INSTRUCTIONS GIVEN BY HIS CARDIOLOGIST.  PER DR STRICKLAND OFFICE ([place]) OK TO CONTINUE TAKING ALL MEDICATIONS DAY OF PROCEDURE    - INFORMED PT THEY ATTEMPT RADIAL APPROACH FIRST IF UNABLE TO PERFORM CATH WITH THAT APPROACH THEY WILL DO A FEMORAL APPROACH.  ALSO, INFORMED PT THEY WILL BE ON BEDREST POSTOP.  IF THE SURGEON FEELS  AN INTERVENTION OR STENTS NEEDS TO BE PLACED, HE/SHE WILL STAY OVER NIGHT FOR OBSERVATION AND MONITORING.     - INFORMED THE PATIENT HE CAN HAVE TWO VISITORS WITH HIM THE DAY OF THE PROCEDURE    - INSTRUCTED PT TO PARK IN THE PARKING GARAGE, ENTER THE HOSPITAL THRU ENTRANCE A AND  CHECK IN AT THE MAIN REGISTRATION DESK, AFTER REGISTRATION PT WILL BE TAKEN THE THE PREOP AREA FOR HIS PROCEDURE.    -ARRIVAL TIME GIVEN BY CARDIOLOGIST OFFICE, INFORMED PT IF ARRIVAL TIME CHANGES OR ADJUSTMENTS NEED TO BE MADE IN THEIR ARRIVAL TIME, HE/SHE WOULD RECEIVE A CALL.    -INSTRUCTED PT TO COME TO [facility] TO GET THEIR LABS/ EKG DONE PRIOR TO PROCEDURE - PER DR STRICKLAND OFFICE OK TO DO LABS DAY OF SURGERY     - PATIENT VERBALIZED UNDERSTANDING

## 2024-04-26 ENCOUNTER — OFFICE VISIT (OUTPATIENT)
Dept: FAMILY MEDICINE CLINIC | Facility: CLINIC | Age: 69
End: 2024-04-26
Payer: MEDICARE

## 2024-04-26 ENCOUNTER — PATIENT OUTREACH (OUTPATIENT)
Age: 69
End: 2024-04-26
Payer: MEDICARE

## 2024-04-26 VITALS
OXYGEN SATURATION: 95 % | SYSTOLIC BLOOD PRESSURE: 128 MMHG | DIASTOLIC BLOOD PRESSURE: 90 MMHG | TEMPERATURE: 98.2 F | HEART RATE: 85 BPM

## 2024-04-26 DIAGNOSIS — R53.1 WEAKNESS: ICD-10-CM

## 2024-04-26 DIAGNOSIS — I10 ESSENTIAL HYPERTENSION: ICD-10-CM

## 2024-04-26 DIAGNOSIS — E11.9 TYPE 2 DIABETES MELLITUS WITHOUT COMPLICATION, WITHOUT LONG-TERM CURRENT USE OF INSULIN: ICD-10-CM

## 2024-04-26 DIAGNOSIS — R60.0 PERIPHERAL EDEMA: ICD-10-CM

## 2024-04-26 DIAGNOSIS — Z86.73 HISTORY OF STROKE: ICD-10-CM

## 2024-04-26 DIAGNOSIS — G89.29 CHRONIC PAIN OF RIGHT KNEE: ICD-10-CM

## 2024-04-26 DIAGNOSIS — M25.561 CHRONIC PAIN OF RIGHT KNEE: ICD-10-CM

## 2024-04-26 DIAGNOSIS — E78.00 HIGH CHOLESTEROL: ICD-10-CM

## 2024-04-26 DIAGNOSIS — M25.551 HIP PAIN, BILATERAL: Primary | ICD-10-CM

## 2024-04-26 DIAGNOSIS — M25.552 HIP PAIN, BILATERAL: Primary | ICD-10-CM

## 2024-04-26 RX ORDER — PROCHLORPERAZINE 25 MG/1
SUPPOSITORY RECTAL
Qty: 9 EACH | Refills: 1 | Status: SHIPPED | OUTPATIENT
Start: 2024-04-26

## 2024-04-26 RX ORDER — ATORVASTATIN CALCIUM 20 MG/1
20 TABLET, FILM COATED ORAL NIGHTLY
Qty: 90 TABLET | Refills: 0 | Status: SHIPPED | OUTPATIENT
Start: 2024-04-26

## 2024-04-26 RX ORDER — PROCHLORPERAZINE 25 MG/1
1 SUPPOSITORY RECTAL
Qty: 1 EACH | Refills: 3 | Status: SHIPPED | OUTPATIENT
Start: 2024-04-26

## 2024-04-26 RX ORDER — PROCHLORPERAZINE 25 MG/1
1 SUPPOSITORY RECTAL
Qty: 1 EACH | Refills: 0 | Status: SHIPPED | OUTPATIENT
Start: 2024-04-26

## 2024-04-26 RX ORDER — HYDROCHLOROTHIAZIDE 25 MG/1
25 TABLET ORAL DAILY
Qty: 90 TABLET | Refills: 0 | Status: SHIPPED | OUTPATIENT
Start: 2024-04-26

## 2024-04-26 RX ORDER — EMPAGLIFLOZIN 25 MG/1
25 TABLET, FILM COATED ORAL DAILY
Qty: 90 TABLET | Refills: 0 | Status: SHIPPED | OUTPATIENT
Start: 2024-04-26

## 2024-04-26 RX ORDER — ASPIRIN 81 MG/1
81 TABLET, CHEWABLE ORAL DAILY
Qty: 90 TABLET | Refills: 1 | Status: SHIPPED | OUTPATIENT
Start: 2024-04-26

## 2024-04-26 RX ORDER — LOSARTAN POTASSIUM 25 MG/1
25 TABLET ORAL DAILY
Qty: 90 TABLET | Refills: 0 | Status: SHIPPED | OUTPATIENT
Start: 2024-04-26

## 2024-04-26 NOTE — PROGRESS NOTES
Chief Complaint  Diabetes, Hypertension, and Hyperlipidemia (Refills and labs )    Subjective        Past Medical History:   Diagnosis Date    CHF (congestive heart failure)     SEE'S DR STRICKLAND    COPD (chronic obstructive pulmonary disease)     Diabetes mellitus     DOESN'T CHECK BG OFTEN AT HOME    Hyperlipidemia     Hypertension     Stroke     7 years ago per wife     Social History     Tobacco Use    Smoking status: Every Day     Current packs/day: 2.50     Average packs/day: 2.5 packs/day for 52.3 years (130.8 ttl pk-yrs)     Types: Cigarettes     Start date: 1972     Passive exposure: Current    Smokeless tobacco: Never    Tobacco comments:     INSTRUCTED NO SMOKING 24 HOUR PRIOR TO SURGERY    Vaping Use    Vaping status: Never Used   Substance Use Topics    Alcohol use: Yes    Drug use: Never      Current Outpatient Medications on File Prior to Visit   Medication Sig    [DISCONTINUED] aspirin 81 MG chewable tablet Chew 1 tablet Daily. (Patient taking differently: Chew 1 tablet Daily. PER DR STRICKLAND OFFICE OK TO STAY ON MEDICATION FOR PROCEDURE)    [DISCONTINUED] atorvastatin (LIPITOR) 20 MG tablet Take 1 tablet by mouth Every Night.    [DISCONTINUED] hydroCHLOROthiazide (HYDRODIURIL) 25 MG tablet Take 1 tablet by mouth Daily.    [DISCONTINUED] Insulin Glargine (LANTUS SOLOSTAR) 100 UNIT/ML injection pen Inject 10 Units under the skin into the appropriate area as directed Every Night.    [DISCONTINUED] Jardiance 25 MG tablet tablet TAKE 1 TABLET EVERY DAY    [DISCONTINUED] losartan (COZAAR) 25 MG tablet Take 1 tablet by mouth Daily.     No current facility-administered medications on file prior to visit.      No Known Allergies   Health Maintenance Due   Topic Date Due    DIABETIC EYE EXAM  Never done    ZOSTER VACCINE (1 of 2) Never done    RSV Vaccine - Adults (1 - 1-dose 60+ series) Never done    COVID-19 Vaccine (3 - 2023-24 season) 09/01/2023      Brian Mehta presents to [facility]  History of Present Illness    Patient presents the office today to follow-up on chronic health conditions.  Patient is not fasting today and does not need labs.    DM: wife is having issues with machine. He is currently taking 10units nightly. Pt denies getting ligthheaded, dizziness. Wife does his care for him.     Pt is unable to walk and uses a wheelchair. Pt would like to go to PT. Home health is no longer coming to the house.    HTN: slightly elevated diastolic BP in office.     HLD: no issues with current medications.    Objective   Vital Signs:   /90   Pulse 85   Temp 98.2 °F (36.8 °C)   SpO2 95%     Review of Systems   Physical Exam  Vitals reviewed.   Constitutional:       General: He is not in acute distress.     Appearance: Normal appearance. He is well-developed.   HENT:      Head: Normocephalic and atraumatic.   Eyes:      Conjunctiva/sclera: Conjunctivae normal.      Pupils: Pupils are equal, round, and reactive to light.   Cardiovascular:      Rate and Rhythm: Normal rate and regular rhythm.      Heart sounds: Normal heart sounds.   Pulmonary:      Effort: Pulmonary effort is normal.      Breath sounds: Normal breath sounds.   Musculoskeletal:      Cervical back: Neck supple.      Right lower leg: No edema.      Left lower leg: No edema.   Skin:     General: Skin is warm and dry.   Neurological:      Mental Status: He is alert and oriented to person, place, and time.   Psychiatric:         Mood and Affect: Mood and affect normal.         Behavior: Behavior normal.         Thought Content: Thought content normal.         Judgment: Judgment normal.        Result Review :                 Assessment and Plan    Diagnoses and all orders for this visit:    1. Hip pain, bilateral (Primary)  -     Ambulatory Referral to Physical Therapy Evaluate and treat    2. History of stroke  -     aspirin 81 MG chewable tablet; Chew 1 tablet Daily.  Dispense: 90 tablet; Refill: 1  -     Ambulatory  Referral to Physical Therapy Evaluate and treat    3. High cholesterol  -     atorvastatin (LIPITOR) 20 MG tablet; Take 1 tablet by mouth Every Night.  Dispense: 90 tablet; Refill: 0    4. Peripheral edema  -     hydroCHLOROthiazide 25 MG tablet; Take 1 tablet by mouth Daily.  Dispense: 90 tablet; Refill: 0    5. Type 2 diabetes mellitus without complication, without long-term current use of insulin  -     Insulin Glargine (LANTUS SOLOSTAR) 100 UNIT/ML injection pen; Inject 10 Units under the skin into the appropriate area as directed Every Night.  Dispense: 15 mL; Refill: 0  -     Jardiance 25 MG tablet tablet; Take 1 tablet by mouth Daily.  Dispense: 90 tablet; Refill: 0  -     Continuous Glucose Sensor (Dexcom G6 Sensor); Use Every 10 (Ten) Days.  Dispense: 9 each; Refill: 1  -     Continuous Glucose Transmitter (Dexcom G6 Transmitter) misc; Use 1 each Every 3 (Three) Months.  Dispense: 1 each; Refill: 3  -     Continuous Glucose  (Dexcom G6 ) device; Use 1 each 4 (Four) Times a Day Before Meals & at Bedtime As Needed (glucose).  Dispense: 1 each; Refill: 0    6. Essential hypertension  -     losartan (COZAAR) 25 MG tablet; Take 1 tablet by mouth Daily.  Dispense: 90 tablet; Refill: 0    7. Chronic pain of right knee  -     Ambulatory Referral to Physical Therapy Evaluate and treat    8. Weakness  -     Ambulatory Referral to Physical Therapy Evaluate and treat                  Follow Up   Return in about 3 months (around 7/26/2024) for Refills and fasting labs.  Patient was given instructions and counseling regarding his condition or for health maintenance advice. Please see specific information pulled into the AVS if appropriate.

## 2024-04-26 NOTE — TELEPHONE ENCOUNTER
I spoke to patient and gave an arrival time of 7:00 on 04/30/24 for [facility]. Patient was instructed to have a  for the day of the procedure and to arrive at the main entrance/registration area. Patient was educated about stent placement and informed that in the event of stent placement, the patient will be required to stay overnight for observation. Patient was instructed to continue all medications as usual. Patient was instructed to be NPO after midnight with sips of water as needed. Patient was instructed to have labs completed on the morning of the procedure due to transportation. Patient is agreeable with no other questions or concerns.

## 2024-04-26 NOTE — OUTREACH NOTE
OUT. Phone still not working. SW will attempt again next week.    Vadim MANZANO -   Ambulatory Case Management    4/26/2024, 10:07 EDT

## 2024-04-30 ENCOUNTER — PATIENT OUTREACH (OUTPATIENT)
Dept: CASE MANAGEMENT | Facility: OTHER | Age: 69
End: 2024-04-30
Payer: MEDICARE

## 2024-04-30 DIAGNOSIS — I50.22 CHRONIC HFREF (HEART FAILURE WITH REDUCED EJECTION FRACTION): ICD-10-CM

## 2024-04-30 DIAGNOSIS — E11.69 TYPE 2 DIABETES MELLITUS WITH OTHER SPECIFIED COMPLICATION, UNSPECIFIED WHETHER LONG TERM INSULIN USE: Primary | ICD-10-CM

## 2024-04-30 NOTE — OUTREACH NOTE
[facility] End of Month Documentation    This Chronic Medical Management Care Plan for Brian Mehta, 69 y.o. male, has been monitored and managed; reviewed and a new plan of care implemented for the month of April.  A cumulative time of 26  minutes was spent on this patient record this month, including phone call with relative; chart review.    Regarding the patient's problems: has Essential hypertension; High cholesterol; Hip pain, bilateral; Right knee pain; Vitamin B12 deficiency; Kidney disorder; Chronic pain of both knees; Sepsis; Chronic HFrEF (heart failure with reduced ejection fraction); LVH (left ventricular hypertrophy); COPD (chronic obstructive pulmonary disease); DM2 (diabetes mellitus, type 2); Urinary tract infection without hematuria; and Abnormal nuclear stress test on their problem list., the following items were addressed: medical records; medications and any changes can be found within the plan section of the note.  A detailed listing of time spent for chronic care management is tracked within each outreach encounter.  Current medications include:  has a current medication list which includes the following prescription(s): aspirin, atorvastatin, dexcom g6 , dexcom g6 sensor, dexcom g6 transmitter, hydrochlorothiazide, insulin glargine, jardiance, and losartan. and the patient is reported to be patient is compliant with medication protocol,  Medications are reported to be effective.  Regarding these diagnoses, referrals were made to the following provider(s):  n/a.  All notes on chart for PCP to review.    The patient was monitored remotely for activity level; medications.    The patient's physical needs include:  DME supplies; physical healthcare; needs assistance with ADLs.     The patient's mental support needs include:  continued support    The patient's cognitive support needs include:  needs assistance with ADLs; requires supervision; household care; health care    The patient's  psychosocial support needs include:  continued support    The patient's functional needs include: DME; physical healthcare    The patient's environmental needs include:  an unsafe living environment    Care Plan overall comments:  No data recorded    Refer to previous outreach notes for more information on the areas listed above.    Monthly Billing Diagnoses  (E11.69) Type 2 diabetes mellitus with other specified complication, unspecified whether long term insulin use    (I50.22) Chronic HFrEF (heart failure with reduced ejection fraction)    Medications   Medications have been reconciled    Care Plan progress this month:      Recently Modified Care Plans Updates made since 3/30/2024 12:00 AM      No recently modified care plans.            Instructions   Patient was provided an electronic copy of care plan  CCM services were explained and offered and patient has accepted these services.  Patient has given their written consent to receive CCM services and understands that this includes the authorization of electronic communication of medical information with the other treating providers.  Patient understands that they may stop CCM services at any time and these changes will be effective at the end of the calendar month and will effectively revocate the agreement of CCM services.  Patient understands that only one practitioner can furnish and be paid for CCM services during one calendar month.  Patient also understands that there may be co-payment or deductible fees in association with CCM services.  Patient will continue with at least monthly follow-up calls with the Ambulatory .    YANDEL GONZALEZ  Ambulatory Case Management    4/30/2024, 09:49 EDT

## 2024-05-01 ENCOUNTER — PREP FOR SURGERY (OUTPATIENT)
Dept: OTHER | Facility: HOSPITAL | Age: 69
End: 2024-05-01
Payer: MEDICARE

## 2024-05-01 ENCOUNTER — PATIENT OUTREACH (OUTPATIENT)
Age: 69
End: 2024-05-01
Payer: MEDICARE

## 2024-05-01 DIAGNOSIS — I50.22 CHRONIC HFREF (HEART FAILURE WITH REDUCED EJECTION FRACTION): Primary | ICD-10-CM

## 2024-05-01 RX ORDER — SODIUM CHLORIDE 0.9 % (FLUSH) 0.9 %
10 SYRINGE (ML) INJECTION AS NEEDED
OUTPATIENT
Start: 2024-05-01

## 2024-05-01 RX ORDER — SODIUM CHLORIDE 9 MG/ML
40 INJECTION, SOLUTION INTRAVENOUS AS NEEDED
OUTPATIENT
Start: 2024-05-01

## 2024-05-01 RX ORDER — SODIUM CHLORIDE 0.9 % (FLUSH) 0.9 %
10 SYRINGE (ML) INJECTION EVERY 12 HOURS SCHEDULED
OUTPATIENT
Start: 2024-05-01

## 2024-05-01 NOTE — OUTREACH NOTE
UTR. Phone is still D/c. SW will attempt to call again on the 3rd.     Vadim MANZANO -   Ambulatory Case Management    5/1/2024, 10:09 EDT

## 2024-05-03 ENCOUNTER — PATIENT OUTREACH (OUTPATIENT)
Age: 69
End: 2024-05-03
Payer: MEDICARE

## 2024-05-03 NOTE — OUTREACH NOTE
UTR. Pt spouse number is still out of service.     Vadim MANZANO -   Ambulatory Case Management    5/3/2024, 11:28 EDT

## 2024-05-06 ENCOUNTER — TELEPHONE (OUTPATIENT)
Dept: FAMILY MEDICINE CLINIC | Facility: CLINIC | Age: 69
End: 2024-05-06
Payer: MEDICARE

## 2024-05-07 ENCOUNTER — EDUCATION (OUTPATIENT)
Dept: DIABETES SERVICES | Facility: HOSPITAL | Age: 69
End: 2024-05-07
Payer: MEDICARE

## 2024-05-08 ENCOUNTER — PATIENT OUTREACH (OUTPATIENT)
Age: 69
End: 2024-05-08
Payer: MEDICARE

## 2024-05-08 ENCOUNTER — TELEPHONE (OUTPATIENT)
Dept: CARDIOLOGY | Facility: CLINIC | Age: 69
End: 2024-05-08
Payer: MEDICARE

## 2024-05-15 ENCOUNTER — PATIENT OUTREACH (OUTPATIENT)
Age: 69
End: 2024-05-15
Payer: MEDICARE

## 2024-05-15 NOTE — OUTREACH NOTE
Patient Outreach         SW spoke with pt spouse re her receipt of the [facility] affordable [facility] resources. Spousestated she had received the list and verbalized the one's she was interested in. SW tried to find the applications online to send to pt, but they applications were not available online. SW helped pt spouse identify the top two places to start at. Pt spouse stated that they have not heard from the [employer] program.SW gave her the phone number for this program. SW will F/u further with her next week.     Vadim MANZANO -   Ambulatory Case Management    5/15/2024, 15:51 EDT

## 2024-05-16 ENCOUNTER — PATIENT OUTREACH (OUTPATIENT)
Dept: CASE MANAGEMENT | Facility: OTHER | Age: 69
End: 2024-05-16
Payer: MEDICARE

## 2024-05-16 DIAGNOSIS — E11.69 TYPE 2 DIABETES MELLITUS WITH OTHER SPECIFIED COMPLICATION, UNSPECIFIED WHETHER LONG TERM INSULIN USE: Primary | ICD-10-CM

## 2024-05-16 DIAGNOSIS — I50.22 CHRONIC HFREF (HEART FAILURE WITH REDUCED EJECTION FRACTION): ICD-10-CM

## 2024-05-16 NOTE — OUTREACH NOTE
AMBULATORY CASE MANAGEMENT NOTE    Names and Relationships of Patient/Support Persons: Caller: FERDINAND CATALAN; Relationship: Emergency Contact  Caller: FERDINAND CATALAN; Relationship: Emergency Contact  Call[facility]: [facility] [place], [place] - [address] [place] [address] - [phone] [facility] [phone] FX; Relationship: Pharmacy  Caller: FERDINAND CATALAN; Relationship: Emergency Contact -     [facility] Interim Update    Called and spoke with patients wife. She states that he has not received his medications from [facility] that PCP sent in back in April. She also ordered a Dexcom G6 at this time and they have not received any information on this either.    Care Coordination    Called and spoke with pharmacy. They state that they did receive the medications but because this is a new pharmacy for the patient, they do have to complete a phone call assessment which has not been done yet.    They supplied phone number [phone] for the patient or spouse to call to complete assessment.    [facility] Interim Update    Called and spoke to patients spouse. She wrote down the phone number and verbalized understanding to call to complete the assessment.        YANDEL GONZALEZ  Ambulatory Case Management    5/16/2024, 11:08 EDT            Education Documentation  No documentation found.        YANDEL GONZALEZ  Ambulatory Case Management    5/16/2024, 11:06 EDT

## 2024-05-20 ENCOUNTER — PREP FOR SURGERY (OUTPATIENT)
Dept: OTHER | Facility: HOSPITAL | Age: 69
End: 2024-05-20
Payer: MEDICARE

## 2024-05-20 DIAGNOSIS — I50.22 CHRONIC HFREF (HEART FAILURE WITH REDUCED EJECTION FRACTION): Primary | ICD-10-CM

## 2024-05-20 RX ORDER — SODIUM CHLORIDE 9 MG/ML
40 INJECTION, SOLUTION INTRAVENOUS AS NEEDED
OUTPATIENT
Start: 2024-05-20

## 2024-05-20 RX ORDER — SODIUM CHLORIDE 0.9 % (FLUSH) 0.9 %
10 SYRINGE (ML) INJECTION AS NEEDED
OUTPATIENT
Start: 2024-05-20

## 2024-05-20 RX ORDER — SODIUM CHLORIDE 0.9 % (FLUSH) 0.9 %
10 SYRINGE (ML) INJECTION EVERY 12 HOURS SCHEDULED
OUTPATIENT
Start: 2024-05-20

## 2024-05-28 ENCOUNTER — TELEPHONE (OUTPATIENT)
Dept: CARDIOLOGY | Facility: CLINIC | Age: 69
End: 2024-05-28
Payer: MEDICARE

## 2024-05-28 NOTE — TELEPHONE ENCOUNTER
Caller: FERDINAND CATALAN    Relationship: Emergency Contact    Best call back number: [phone]    What is the best time to reach you: ANYTIME     Who are you requesting to speak with (clinical staff, provider,  specific staff member):        What was the call regarding: PATIENT NEEDS TO KNOW WHAT TIME TO ARRIVE ON 6/4/24 FOR PROCEDURE.

## 2024-05-29 ENCOUNTER — PATIENT OUTREACH (OUTPATIENT)
Age: 69
End: 2024-05-29
Payer: MEDICARE

## 2024-05-29 NOTE — OUTREACH NOTE
SW attempted contact with [employer]. SW will attempt again in a couple of business days.     Vadim AMNZANO -   Ambulatory Case Management    5/29/2024, 14:01 EDT    Patient Outreach    DAVID spoke with pt spouse to F/u on her linkages to RAMP UP and housing. Pt spouse  is actively working on contacting housing options for income-based housing, and she stated she needed to provide her updated phone number to [employer]. SW will D/c pt due to no further needs at this time for linkages.

## 2024-05-29 NOTE — TELEPHONE ENCOUNTER
I spoke to patient and gave an arrival time of 10:00 on 06/04/24 for [facility]. Patient was instructed to have a  for the day of the procedure and to arrive at the [facility] registration area. Patient was educated about stent placement and informed that in the event of stent placement, the patient will be required to stay overnight for observation. Patient was instructed to continue all medications as usual. Patient was instructed to be NPO after midnight with sips of water as needed. Patient was instructed to have labs completed on the morning of the procedure due to transportation. Patient is agreeable with no other questions or concerns.

## 2024-05-30 ENCOUNTER — TELEPHONE (OUTPATIENT)
Dept: CASE MANAGEMENT | Facility: OTHER | Age: 69
End: 2024-05-30
Payer: MEDICARE

## 2024-05-31 ENCOUNTER — PATIENT OUTREACH (OUTPATIENT)
Dept: CASE MANAGEMENT | Facility: OTHER | Age: 69
End: 2024-05-31
Payer: MEDICARE

## 2024-05-31 DIAGNOSIS — I50.22 CHRONIC HFREF (HEART FAILURE WITH REDUCED EJECTION FRACTION): ICD-10-CM

## 2024-05-31 DIAGNOSIS — E11.69 TYPE 2 DIABETES MELLITUS WITH OTHER SPECIFIED COMPLICATION, UNSPECIFIED WHETHER LONG TERM INSULIN USE: Primary | ICD-10-CM

## 2024-05-31 NOTE — OUTREACH NOTE
[facility] End of Month Documentation    This Chronic Medical Management Care Plan for Brian Mehta, 69 y.o. male, has been monitored and managed; reviewed; a new plan of care implemented and a new plan of care implemented for the month of May.  A cumulative time of 27  minutes was spent on this patient record this month, including phone call with relative; chart review; phone call with pharmacist.    Regarding the patient's problems: has Essential hypertension; High cholesterol; Hip pain, bilateral; Right knee pain; Vitamin B12 deficiency; Kidney disorder; Chronic pain of both knees; Sepsis; Chronic HFrEF (heart failure with reduced ejection fraction); LVH (left ventricular hypertrophy); COPD (chronic obstructive pulmonary disease); DM2 (diabetes mellitus, type 2); Urinary tract infection without hematuria; and Abnormal nuclear stress test on their problem list., the following items were addressed: medical records; medications and any changes can be found within the plan section of the note.  A detailed listing of time spent for chronic care management is tracked within each outreach encounter.  Current medications include:  has a current medication list which includes the following prescription(s): aspirin, atorvastatin, budesonide-formoterol, dexcom g6 , dexcom g6 sensor, dexcom g6 transmitter, insulin glargine, jardiance, and losartan. and the patient is reported to be patient is compliant with medication protocol,  Medications are reported to be effective.  Regarding these diagnoses, referrals were made to the following provider(s):  n/a.  All notes on chart for PCP to review.    The patient was monitored remotely for activity level; medications; blood glucose.    The patient's physical needs include:  DME supplies; physical healthcare; needs assistance with ADLs.     The patient's mental support needs include:  continued support    The patient's cognitive support needs include:  needs assistance with ADLs;  requires supervision; household care; health care    The patient's psychosocial support needs include:  continued support    The patient's functional needs include: DME; physical healthcare    The patient's environmental needs include:  an unsafe living environment    Care Plan overall comments:  No data recorded    Refer to previous outreach notes for more information on the areas listed above.    Monthly Billing Diagnoses  (E11.69) Type 2 diabetes mellitus with other specified complication, unspecified whether long term insulin use    (I50.22) Chronic HFrEF (heart failure with reduced ejection fraction)    Medications   Medications have been reconciled    Care Plan progress this month:      Recently Modified Care Plans Updates made since 4/30/2024 12:00 AM       Diabetes Type 2 (Adult)           Problem Priority Last Modified     Glycemic Management (Diabetes, Type 2) --  5/16/2024 11:04 AM by Juana Mehta RN              Goal Recent Progress Last Modified     Glycemic Management Optimized --  5/16/2024 11:04 AM by Juana Mehta RN     Evidence-based guidance:   Anticipate A1C testing (point-of-care) every 3 to 6 months based on goal attainment.   Review mutually-set A1C goal or target range.   Anticipate use of antihyperglycemic with or without insulin and periodic adjustments; consider active involvement of pharmacist.   Provide medical nutrition therapy and development of individualized eating.   Compare self-reported symptoms of hypo or hyperglycemia to blood glucose levels, diet and fluid intake, current medications, psychosocial and physiologic stressors, change in activity and barriers to care adherence.   Promote self-monitoring of blood glucose levels.   Assess and address barriers to management plan, such as food insecurity, age, developmental ability, depression, anxiety, fear of hypoglycemia or weight gain, as well as medication cost, side effects and complicated regimen.   Consider referral to  community-based diabetes education program, visiting nurse, community health worker or health .   Encourage regular dental care for treatment of periodontal disease; refer to dental provider when needed.    Notes:            Task Due Date Last Modified     Alleviate Barriers to Glycemic Management --  5/16/2024 11:05 AM by Juana Mehta RN     Care Management Activities:      - barriers to adherence to treatment plan identified  - blood glucose monitoring encouraged  - encourage participation in diabetes education classes  - resources required to improve adherence to care identified  - use of blood glucose monitoring log promoted      Notes:                Problem Priority Last Modified     Disease Progression (Diabetes, Type 2) --  5/16/2024 11:04 AM by Juana Mehta RN              Goal Recent Progress Last Modified     Disease Progression Prevented or Minimized --  5/16/2024 11:04 AM by Juana Mehta RN     Evidence-based guidance:   Prepare patient for laboratory and diagnostic exams based on risk and presentation.   Encourage lifestyle changes, such as increased intake of plant-based foods, stress reduction, consistent physical activity and smoking cessation to prevent long-term complications and chronic disease.    Individualize activity and exercise recommendations while considering potential limitations, such as neuropathy, retinopathy or the ability to prevent hyperglycemia or hypoglycemia.    Prepare patient for use of pharmacologic therapy that may include antihypertensive, analgesic, prostaglandin E1 with periodic adjustments, based on presenting chronic condition and laboratory results.   Assess signs/symptoms and risk factors for hypertension, sleep-disordered breathing, neuropathy (including changes in gait and balance), retinopathy, nephropathy and sexual dysfunction.   Address pregnancy planning and contraceptive choice, especially when prescribing antihypertensive or statin.   Ensure completion  of annual comprehensive foot exam and dilated eye exam.    Implement additional individualized goals and interventions based on identified risk factors.   Prepare patient for consultation or referral for specialist care, such as ophthalmology, neurology, cardiology, podiatry, nephrology or perinatology.    Notes:            Task Due Date Last Modified     Monitor and Manage Follow-up for Comorbidities --  5/16/2024 11:05 AM by Juana Mehta RN     Care Management Activities:      - response to pharmacologic therapy monitored      Notes:                          Instructions   Patient was provided an electronic copy of care plan  CCM services were explained and offered and patient has accepted these services.  Patient has given their written consent to receive CCM services and understands that this includes the authorization of electronic communication of medical information with the other treating providers.  Patient understands that they may stop CCM services at any time and these changes will be effective at the end of the calendar month and will effectively revocate the agreement of CCM services.  Patient understands that only one practitioner can furnish and be paid for CCM services during one calendar month.  Patient also understands that there may be co-payment or deductible fees in association with CCM services.  Patient will continue with at least monthly follow-up calls with the Ambulatory .    JUANA GONZALEZ  Ambulatory Case Management    5/31/2024, 11:00 EDT

## 2024-06-13 ENCOUNTER — TELEPHONE (OUTPATIENT)
Dept: CASE MANAGEMENT | Facility: OTHER | Age: 69
End: 2024-06-13
Payer: MEDICARE

## 2024-06-13 ENCOUNTER — TELEPHONE (OUTPATIENT)
Dept: FAMILY MEDICINE CLINIC | Facility: CLINIC | Age: 69
End: 2024-06-13
Payer: MEDICARE

## 2024-06-13 NOTE — TELEPHONE ENCOUNTER
Caller: [facility] [employer]    Relationship: Other    Best call back number: [phone]  EXTENSION 259 FOR BERTHA  EXTENSION 208 FOR SONAL    What form or medical record are you requesting: POWER WHEELCHAIR    Who is requesting this form or medical record from you: [employer]    How would you like to receive the form or medical records (pick-up, mail, fax): FAX  If fax, what is the fax number: ON FORM    Timeframe paperwork needed: AT APPOINTMENT ON 6.17.2024    Additional notes: FAXED ON 6.12.2024 AND WILL NEED TO BE FILLED OUT DURING APPOINTMENT WITH PROVIDER NANCY ON 6.17.2024.    IF YOU DO NOT HAVE THE FORM THAT WAS FAXED, PLEASE CONTACT BERTHA OR SONAL TO COORDINATE GETTING THAT FORM RE-FAXED.

## 2024-06-14 DIAGNOSIS — E11.9 TYPE 2 DIABETES MELLITUS WITHOUT COMPLICATION, WITHOUT LONG-TERM CURRENT USE OF INSULIN: ICD-10-CM

## 2024-06-14 RX ORDER — INSULIN GLARGINE 100 [IU]/ML
INJECTION, SOLUTION SUBCUTANEOUS
Qty: 15 ML | Refills: 0 | Status: SHIPPED | OUTPATIENT
Start: 2024-06-14

## 2024-06-17 ENCOUNTER — OFFICE VISIT (OUTPATIENT)
Dept: FAMILY MEDICINE CLINIC | Facility: CLINIC | Age: 69
End: 2024-06-17
Payer: MEDICARE

## 2024-06-17 ENCOUNTER — PATIENT OUTREACH (OUTPATIENT)
Dept: CASE MANAGEMENT | Facility: OTHER | Age: 69
End: 2024-06-17
Payer: MEDICARE

## 2024-06-17 VITALS
HEART RATE: 119 BPM | BODY MASS INDEX: 27.85 KG/M2 | TEMPERATURE: 97.7 F | WEIGHT: 217 LBS | OXYGEN SATURATION: 93 % | HEIGHT: 74 IN

## 2024-06-17 DIAGNOSIS — R53.1 WEAKNESS: ICD-10-CM

## 2024-06-17 DIAGNOSIS — Z74.09 POOR MOBILITY: Primary | ICD-10-CM

## 2024-06-17 DIAGNOSIS — E11.69 TYPE 2 DIABETES MELLITUS WITH OTHER SPECIFIED COMPLICATION, UNSPECIFIED WHETHER LONG TERM INSULIN USE: ICD-10-CM

## 2024-06-17 DIAGNOSIS — I50.22 CHRONIC HFREF (HEART FAILURE WITH REDUCED EJECTION FRACTION): ICD-10-CM

## 2024-06-17 DIAGNOSIS — N28.9 KIDNEY DISORDER: ICD-10-CM

## 2024-06-17 DIAGNOSIS — E11.69 TYPE 2 DIABETES MELLITUS WITH OTHER SPECIFIED COMPLICATION, UNSPECIFIED WHETHER LONG TERM INSULIN USE: Primary | ICD-10-CM

## 2024-06-17 DIAGNOSIS — I10 ESSENTIAL HYPERTENSION: ICD-10-CM

## 2024-06-17 DIAGNOSIS — E78.00 HIGH CHOLESTEROL: ICD-10-CM

## 2024-06-17 DIAGNOSIS — N39.0 URINARY TRACT INFECTION WITHOUT HEMATURIA, SITE UNSPECIFIED: ICD-10-CM

## 2024-06-17 LAB
ANION GAP SERPL CALCULATED.3IONS-SCNC: 10.7 MMOL/L (ref 5–15)
BACTERIA UR QL AUTO: ABNORMAL /HPF
BILIRUB UR QL STRIP: NEGATIVE
BUN SERPL-MCNC: 13 MG/DL (ref 8–23)
BUN/CREAT SERPL: 15.3 (ref 7–25)
CALCIUM SPEC-SCNC: 9.1 MG/DL (ref 8.6–10.5)
CHLORIDE SERPL-SCNC: 100 MMOL/L (ref 98–107)
CLARITY UR: ABNORMAL
CO2 SERPL-SCNC: 25.3 MMOL/L (ref 22–29)
COLOR UR: YELLOW
CREAT SERPL-MCNC: 0.85 MG/DL (ref 0.76–1.27)
DEPRECATED RDW RBC AUTO: 46.7 FL (ref 37–54)
EGFRCR SERPLBLD CKD-EPI 2021: 95.2 ML/MIN/1.73
ERYTHROCYTE [DISTWIDTH] IN BLOOD BY AUTOMATED COUNT: 15.6 % (ref 12.3–15.4)
GLUCOSE SERPL-MCNC: 185 MG/DL (ref 65–99)
GLUCOSE UR STRIP-MCNC: ABNORMAL MG/DL
HCT VFR BLD AUTO: 47.9 % (ref 37.5–51)
HGB BLD-MCNC: 15.3 G/DL (ref 13–17.7)
HGB UR QL STRIP.AUTO: ABNORMAL
HOLD SPECIMEN: NORMAL
HYALINE CASTS UR QL AUTO: ABNORMAL /LPF
INR PPP: 1 (ref 0.86–1.15)
KETONES UR QL STRIP: ABNORMAL
LEUKOCYTE ESTERASE UR QL STRIP.AUTO: ABNORMAL
MCH RBC QN AUTO: 26.4 PG (ref 26.6–33)
MCHC RBC AUTO-ENTMCNC: 31.9 G/DL (ref 31.5–35.7)
MCV RBC AUTO: 82.7 FL (ref 79–97)
NITRITE UR QL STRIP: NEGATIVE
PH UR STRIP.AUTO: 7 [PH] (ref 5–8)
PLATELET # BLD AUTO: 231 10*3/MM3 (ref 140–450)
PMV BLD AUTO: 10.1 FL (ref 6–12)
POTASSIUM SERPL-SCNC: 4.6 MMOL/L (ref 3.5–5.2)
PROT UR QL STRIP: ABNORMAL
PROTHROMBIN TIME: 13.4 SECONDS (ref 11.8–14.9)
RBC # BLD AUTO: 5.79 10*6/MM3 (ref 4.14–5.8)
RBC # UR STRIP: ABNORMAL /HPF
REF LAB TEST METHOD: ABNORMAL
SODIUM SERPL-SCNC: 136 MMOL/L (ref 136–145)
SP GR UR STRIP: >1.03 (ref 1–1.03)
SQUAMOUS #/AREA URNS HPF: ABNORMAL /HPF
UROBILINOGEN UR QL STRIP: ABNORMAL
WBC # UR STRIP: ABNORMAL /HPF
WBC NRBC COR # BLD AUTO: 6.39 10*3/MM3 (ref 3.4–10.8)

## 2024-06-17 PROCEDURE — 80048 BASIC METABOLIC PNL TOTAL CA: CPT | Performed by: NURSE PRACTITIONER

## 2024-06-17 PROCEDURE — 85610 PROTHROMBIN TIME: CPT | Performed by: NURSE PRACTITIONER

## 2024-06-17 PROCEDURE — 81001 URINALYSIS AUTO W/SCOPE: CPT | Performed by: NURSE PRACTITIONER

## 2024-06-17 PROCEDURE — 1160F RVW MEDS BY RX/DR IN RCRD: CPT | Performed by: NURSE PRACTITIONER

## 2024-06-17 PROCEDURE — 36415 COLL VENOUS BLD VENIPUNCTURE: CPT | Performed by: NURSE PRACTITIONER

## 2024-06-17 PROCEDURE — 85027 COMPLETE CBC AUTOMATED: CPT | Performed by: NURSE PRACTITIONER

## 2024-06-17 PROCEDURE — 3052F HG A1C>EQUAL 8.0%<EQUAL 9.0%: CPT | Performed by: NURSE PRACTITIONER

## 2024-06-17 PROCEDURE — 1159F MED LIST DOCD IN RCRD: CPT | Performed by: NURSE PRACTITIONER

## 2024-06-17 PROCEDURE — 87086 URINE CULTURE/COLONY COUNT: CPT | Performed by: NURSE PRACTITIONER

## 2024-06-17 PROCEDURE — 99213 OFFICE O/P EST LOW 20 MIN: CPT | Performed by: NURSE PRACTITIONER

## 2024-06-17 RX ORDER — HYDROCHLOROTHIAZIDE 25 MG/1
1 TABLET ORAL DAILY
COMMUNITY
Start: 2024-06-13

## 2024-06-17 NOTE — PROGRESS NOTES
Venipuncture Blood Specimen Collection  Venipuncture performed in left arm  by Miryam Grier with good hemostasis. Patient tolerated the procedure well without complications.   06/17/24   Miryam Grier

## 2024-06-17 NOTE — PROGRESS NOTES
"Chief Complaint  Immobility (Needs paperwork for wheelchair)    History of Present Illness  Brian Mehta is a 69 y.o. male who presents to [facility] with a past medical history of  Past Medical History:   Diagnosis Date    CHF (congestive heart failure)     SEE'S DR [place] NO CURRENT S/S    COPD (chronic obstructive pulmonary disease)     INHALER    Diabetes mellitus     DOESN'T CHECK BG OFTEN AT HOME    Hyperlipidemia     Hypertension     Stroke 2017    RIGHT SIDE WEAKNESS       HPI  Patient presents to the office today for face-to-face evaluation for motorized wheelchair. Pt is currently using a standard wheelchair but has difficulty getting around due to leg weakness, most notably the right leg. Pt has a history of a stroke in 2018. Pt denies difficulty with transfers but his wife usually assist. Pt has a hx of bladder and bowel incontinence. Pt has some appointments with other providers and has some difficulty due to not having a motorized wheelchair. Pt is requires a lot of assistance with going up and down steps. Pt reports that he has fallen 4x in the past 6 months. Pt also reports that his right knee will give out.     He has an evaluation at [facility] in a few days for F2F wheelchair evaluation.     Pt is working to get a Dexcom G7.     Objective   Vital Signs:   Vitals:    06/17/24 1421   Pulse: 119   Temp: 97.7 °F (36.5 °C)   SpO2: 93%   Weight: 98.4 kg (217 lb)   Height: 188 cm (74\")     Body mass index is 27.86 kg/m².    Wt Readings from Last 3 Encounters:   06/17/24 98.4 kg (217 lb)   03/11/24 99.2 kg (218 lb 11.1 oz)   12/07/23 98 kg (216 lb)     BP Readings from Last 3 Encounters:   04/26/24 128/90   03/20/24 112/70   03/13/24 109/78       Health Maintenance   Topic Date Due    DIABETIC EYE EXAM  Never done    ZOSTER VACCINE (1 of 2) Never done    RSV Vaccine - Adults (1 - 1-dose 60+ series) Never done    COVID-19 Vaccine (3 - 2023-24 season) 09/01/2023    LUNG " CANCER SCREENING  08/11/2024    HEMOGLOBIN A1C  09/10/2024    COLORECTAL CANCER SCREENING  12/02/2024    LIPID PANEL  07/18/2024    INFLUENZA VACCINE  08/01/2024    DIABETIC FOOT EXAM  09/21/2024    ANNUAL WELLNESS VISIT  11/21/2024    URINE MICROALBUMIN  03/20/2025    BMI FOLLOWUP  04/26/2025    TDAP/TD VACCINES (2 - Td or Tdap) 01/11/2027    HEPATITIS C SCREENING  Completed    Pneumococcal Vaccine 65+  Completed    AAA SCREEN (ONE-TIME)  Completed       Physical Exam  Vitals reviewed.   Constitutional:       General: He is not in acute distress.     Appearance: Normal appearance. He is well-developed.   HENT:      Head: Normocephalic and atraumatic.   Eyes:      Conjunctiva/sclera: Conjunctivae normal.      Pupils: Pupils are equal, round, and reactive to light.   Cardiovascular:      Rate and Rhythm: Normal rate and regular rhythm.      Heart sounds: Normal heart sounds.   Pulmonary:      Effort: Pulmonary effort is normal.      Breath sounds: Normal breath sounds.   Musculoskeletal:      Right shoulder: Decreased range of motion.      Left shoulder: Decreased range of motion.      Cervical back: Neck supple.      Right knee: Decreased range of motion.      Left knee: Normal range of motion.      Right lower leg: No edema.      Left lower leg: No edema.   Skin:     General: Skin is warm and dry.   Neurological:      Mental Status: He is alert and oriented to person, place, and time.   Psychiatric:         Mood and Affect: Mood and affect normal.         Behavior: Behavior normal.         Thought Content: Thought content normal.         Judgment: Judgment normal.            Result Review :  The following data was reviewed by: MORRO Sam on 06/17/2024:      Procedures        Assessment and Plan   Diagnoses and all orders for this visit:    1. Poor mobility (Primary)    2. Weakness    3. Essential hypertension  -     Urinalysis With Culture If Indicated -; Future  -     Urinalysis With Culture If  Indicated - Urine, Clean Catch    4. High cholesterol  -     Urinalysis With Culture If Indicated -; Future  -     Urinalysis With Culture If Indicated - Urine, Clean Catch    5. Urinary tract infection without hematuria, site unspecified  -     Urinalysis With Culture If Indicated -; Future  -     Urinalysis With Culture If Indicated - Urine, Clean Catch    6. Kidney disorder  -     Urinalysis With Culture If Indicated -; Future  -     Urinalysis With Culture If Indicated - Urine, Clean Catch    7. Type 2 diabetes mellitus with other specified complication, unspecified whether long term insulin use  -     Urinalysis With Culture If Indicated -; Future  -     Urinalysis With Culture If Indicated - Urine, Clean Catch      Will complete order for mobility wheelchair with accessories.  Patient to follow-up with physical therapy.     FOLLOW UP  Return if symptoms worsen or fail to improve, for Next scheduled follow up.    Patient was given instructions and counseling regarding his condition or for health maintenance advice. Please see specific information pulled into the AVS if appropriate.       Sophie Capps, MORRO  06/17/24  16:46 EDT    CURRENT & DISCONTINUED MEDICATIONS  Current Outpatient Medications   Medication Instructions    aspirin 81 mg, Oral, Daily    atorvastatin (LIPITOR) 20 mg, Oral, Nightly    budesonide-formoterol (SYMBICORT) 160-4.5 MCG/ACT inhaler 2 puffs, Inhalation, 2 Times Daily PRN    Continuous Glucose  (Dexcom G6 ) device 1 each, Does not apply, 4 Times Daily Before Meals & Nightly PRN    Continuous Glucose Sensor (Dexcom G6 Sensor) Does not apply, Every 10 Days    Continuous Glucose Transmitter (Dexcom G6 Transmitter) misc 1 each, Does not apply, Every 3 Months    hydroCHLOROthiazide 25 MG tablet 1 tablet, Oral, Daily    Insulin Glargine (Lantus SoloStar) 100 UNIT/ML injection pen INJECT 10 UNITS SUBCUTANEOUSLY INTO THE APPROPRIATE AREA EVERY NIGHT AS DIRECTED    Jardiance 25  mg, Oral, Daily    losartan (COZAAR) 25 mg, Oral, Daily       There are no discontinued medications.

## 2024-06-17 NOTE — OUTREACH NOTE
"AMBULATORY CASE MANAGEMENT NOTE    Names and Relationships of Patient/Support Persons: Caller: Brian Mehta P \"Gio\"; Relationship: Self  Caller: Brian Mehta P \"Gio\"; Relationship: Self  Caller: Tyson Brian P \"Gio\"; Relationship: Self -     CCM Interim Update    Met with patient and spouse in PCP office.    Per patients spouse, they still have not received Dexcom supplies. Spouse is adamant that they are supposed to receive Dexcom G7 supplies, but G6 is what is in the chart.    Care Coordination    Called and spoke with Peg at [facility] pharmacy. She states that Transmitter & Sensors are on their way to the patients home with FedEx. They are Dexcom G6 as this is what insurance would approve.     She states that the Dexcom  still needs a PA approval.    I have sent a message to Tonya MARTIN at PCP office to inform her of this.                     Education Documentation  No documentation found.        YANDEL GONZALEZ  Ambulatory Case Management    6/17/2024, 15:31 EDT  "

## 2024-06-19 LAB — BACTERIA SPEC AEROBE CULT: NORMAL

## 2024-06-20 ENCOUNTER — PATIENT OUTREACH (OUTPATIENT)
Dept: CASE MANAGEMENT | Facility: OTHER | Age: 69
End: 2024-06-20
Payer: MEDICARE

## 2024-06-20 ENCOUNTER — TELEPHONE (OUTPATIENT)
Dept: FAMILY MEDICINE CLINIC | Facility: CLINIC | Age: 69
End: 2024-06-20
Payer: MEDICARE

## 2024-06-20 DIAGNOSIS — E11.69 TYPE 2 DIABETES MELLITUS WITH OTHER SPECIFIED COMPLICATION, UNSPECIFIED WHETHER LONG TERM INSULIN USE: ICD-10-CM

## 2024-06-20 DIAGNOSIS — I50.22 CHRONIC HFREF (HEART FAILURE WITH REDUCED EJECTION FRACTION): Primary | ICD-10-CM

## 2024-06-20 NOTE — TELEPHONE ENCOUNTER
Caller: [employer]    Relationship:     Best call back number:     What is the best time to reach you: ANYTIME     Who are you requesting to speak with (clinical staff, provider,  specific staff member):  CLINICAL       What was the call regarding: [facility], IS CALLING REQUESTING FOR VISIT ON 06/17/2024, AND ORDER DOCUMENTS.

## 2024-06-20 NOTE — OUTREACH NOTE
AMBULATORY CASE MANAGEMENT NOTE    Names and Relationships of Patient/Support Persons: Caller: FERDINAND CATALAN; Relationship: Emergency Contact -     [facility] Interim Update    Called and spoke with patients wife regarding Dexcom supplies. She understands that Sensor and Transmitter are on their way to the patients home. I explained that the  was still going through the PA process with insurance, but if they choose to use the devon with the Dexcom instead of the , they can begin use once the sensors and transmitters arrive. She will call with any questions or concerns.         Education Documentation  No documentation found.        YANDEL GONZALEZ  Ambulatory Case Management    6/20/2024, 10:21 EDT

## 2024-06-24 ENCOUNTER — TELEPHONE (OUTPATIENT)
Dept: FAMILY MEDICINE CLINIC | Facility: CLINIC | Age: 69
End: 2024-06-24

## 2024-06-24 ENCOUNTER — TELEPHONE (OUTPATIENT)
Dept: CASE MANAGEMENT | Facility: OTHER | Age: 69
End: 2024-06-24
Payer: MEDICARE

## 2024-06-24 NOTE — TELEPHONE ENCOUNTER
Caller: FERDINAND CATALAN    Relationship: Emergency Contact    Best call back number: [phone]    What is the best time to reach you: ANYTIME    Who are you requesting to speak with (clinical staff, provider,  specific staff member): NURSE     What was the call regarding: PATIENT IS WANTING TO SEE IF HE CAN GO SOMEWHERE ELSE TO GET PHYSICAL THERAPY.  WIFE STATES THAT THEY WHERE REFUSED FROM PREVIOUS PLACE.

## 2024-06-24 NOTE — TELEPHONE ENCOUNTER
Pt hasn't went to physical therapy yet, he has rescheduled several times. Currently not scheduled. I spoke with wife and they are rescheduling.

## 2024-06-25 ENCOUNTER — TELEPHONE (OUTPATIENT)
Dept: FAMILY MEDICINE CLINIC | Facility: CLINIC | Age: 69
End: 2024-06-25

## 2024-06-25 DIAGNOSIS — R53.1 WEAKNESS: Primary | ICD-10-CM

## 2024-06-25 DIAGNOSIS — Z74.09 POOR MOBILITY: ICD-10-CM

## 2024-06-25 DIAGNOSIS — E11.69 TYPE 2 DIABETES MELLITUS WITH OTHER SPECIFIED COMPLICATION, UNSPECIFIED WHETHER LONG TERM INSULIN USE: ICD-10-CM

## 2024-06-25 DIAGNOSIS — F17.210 CIGARETTE NICOTINE DEPENDENCE WITHOUT COMPLICATION: ICD-10-CM

## 2024-06-25 DIAGNOSIS — Z86.73 HISTORY OF STROKE: ICD-10-CM

## 2024-06-25 NOTE — TELEPHONE ENCOUNTER
Caller: FERDINAND CATALAN    Relationship to patient: Emergency Contact    Best call back number:     [phone] (Mobile)       Patient is needing: PATIENT'S WIFE CALLED IN AND IS REQUESTING A CALL BACK REGARDING NEW REFERRAL FOR PHYSICAL THERAPY FOR PATIENT AND WHEELCHAIR REQUEST

## 2024-06-26 RX ORDER — NICOTINE 21 MG/24HR
1 PATCH, TRANSDERMAL 24 HOURS TRANSDERMAL EVERY 24 HOURS
Qty: 28 PATCH | Refills: 0 | Status: SHIPPED | OUTPATIENT
Start: 2024-06-26

## 2024-06-27 ENCOUNTER — TELEPHONE (OUTPATIENT)
Dept: FAMILY MEDICINE CLINIC | Facility: CLINIC | Age: 69
End: 2024-06-27

## 2024-06-27 NOTE — TELEPHONE ENCOUNTER
Caller: FERDINAND CATALAN    Relationship: Emergency Contact    Best call back number: [phone]     Caller requesting test results: Yes     What test was performed: Labs     When was the test performed: 06/17/2024     Where was the test performed: In office     Additional notes: Patient spouse is calling requesting for test results.

## 2024-06-27 NOTE — TELEPHONE ENCOUNTER
HUB TO RELAY: should contact cardiology, that is who ordered those labs, therefore they should have gotten results sent back to them

## 2024-06-28 ENCOUNTER — PATIENT OUTREACH (OUTPATIENT)
Dept: CASE MANAGEMENT | Facility: OTHER | Age: 69
End: 2024-06-28
Payer: MEDICARE

## 2024-06-28 DIAGNOSIS — E11.69 TYPE 2 DIABETES MELLITUS WITH OTHER SPECIFIED COMPLICATION, UNSPECIFIED WHETHER LONG TERM INSULIN USE: ICD-10-CM

## 2024-06-28 DIAGNOSIS — I50.22 CHRONIC HFREF (HEART FAILURE WITH REDUCED EJECTION FRACTION): Primary | ICD-10-CM

## 2024-06-28 NOTE — OUTREACH NOTE
[facility] End of Month Documentation    This Chronic Medical Management Care Plan for Brian Mehta, 69 y.o. male, has been monitored and managed; reviewed and a new plan of care implemented for the month of June.  A cumulative time of 28  minutes was spent on this patient record this month, including phone call with relative; chart review; phone call with pharmacist.    Regarding the patient's problems: has CVA (cerebral vascular accident); Essential hypertension; High cholesterol; Hip pain, bilateral; Right knee pain; Vitamin B12 deficiency; Kidney disorder; Chronic pain of both knees; Sepsis; Chronic HFrEF (heart failure with reduced ejection fraction); LVH (left ventricular hypertrophy); COPD (chronic obstructive pulmonary disease); DM2 (diabetes mellitus, type 2); Urinary tract infection without hematuria; and Abnormal nuclear stress test on their problem list., the following items were addressed: medical records; medications and any changes can be found within the plan section of the note.  A detailed listing of time spent for chronic care management is tracked within each outreach encounter.  Current medications include:  has a current medication list which includes the following prescription(s): aspirin, atorvastatin, budesonide-formoterol, dexcom g6 , dexcom g6 sensor, dexcom g6 transmitter, hydrochlorothiazide, lantus solostar, insulin pen needle, jardiance, losartan, nicotine, nicotine, and nicotine. and the patient is reported to be patient is compliant with medication protocol,  Medications are reported to be effective.  Regarding these diagnoses, referrals were made to the following provider(s):  n/a.  All notes on chart for PCP to review.    The patient was monitored remotely for activity level; medications; blood glucose.    The patient's physical needs include:  DME supplies; physical healthcare; needs assistance with ADLs.     The patient's mental support needs include:  continued support    The  patient's cognitive support needs include:  needs assistance with ADLs; requires supervision; household care; health care    The patient's psychosocial support needs include:  continued support    The patient's functional needs include: DME; physical healthcare    The patient's environmental needs include:  an unsafe living environment    Care Plan overall comments:  No data recorded    Refer to previous outreach notes for more information on the areas listed above.    Monthly Billing Diagnoses  (I50.22) Chronic HFrEF (heart failure with reduced ejection fraction)    (E11.69) Type 2 diabetes mellitus with other specified complication, unspecified whether long term insulin use    Medications   Medications have been reconciled    Care Plan progress this month:      Recently Modified Care Plans Updates made since 5/28/2024 12:00 AM      No recently modified care plans.            Instructions   Patient was provided an electronic copy of care plan  CCM services were explained and offered and patient has accepted these services.  Patient has given their written consent to receive CCM services and understands that this includes the authorization of electronic communication of medical information with the other treating providers.  Patient understands that they may stop CCM services at any time and these changes will be effective at the end of the calendar month and will effectively revocate the agreement of CCM services.  Patient understands that only one practitioner can furnish and be paid for CCM services during one calendar month.  Patient also understands that there may be co-payment or deductible fees in association with CCM services.  Patient will continue with at least monthly follow-up calls with the Ambulatory .    YANDEL GONZALEZ  Ambulatory Case Management    6/28/2024, 08:53 EDT

## 2024-07-01 NOTE — TELEPHONE ENCOUNTER
Caller: [facility]    Best call back number: [phone]    What was the call regarding: S STATES THEY ONLY WANT THE FACE TO FACE NOTES FROM THE PATIENTS VISIT WITH JESSICA CRUZ ON 6/17/24.    FAX: [phone]

## 2024-07-08 ENCOUNTER — PATIENT OUTREACH (OUTPATIENT)
Dept: CASE MANAGEMENT | Facility: OTHER | Age: 69
End: 2024-07-08
Payer: MEDICARE

## 2024-07-08 ENCOUNTER — OFFICE VISIT (OUTPATIENT)
Dept: CARDIOLOGY | Facility: CLINIC | Age: 69
End: 2024-07-08
Payer: MEDICARE

## 2024-07-08 ENCOUNTER — TELEPHONE (OUTPATIENT)
Dept: CASE MANAGEMENT | Facility: OTHER | Age: 69
End: 2024-07-08
Payer: MEDICARE

## 2024-07-08 VITALS
SYSTOLIC BLOOD PRESSURE: 128 MMHG | WEIGHT: 217 LBS | HEART RATE: 100 BPM | BODY MASS INDEX: 27.85 KG/M2 | DIASTOLIC BLOOD PRESSURE: 74 MMHG | HEIGHT: 74 IN

## 2024-07-08 DIAGNOSIS — R94.39 ABNORMAL NUCLEAR STRESS TEST: Primary | ICD-10-CM

## 2024-07-08 DIAGNOSIS — M25.561 CHRONIC PAIN OF BOTH KNEES: ICD-10-CM

## 2024-07-08 DIAGNOSIS — R53.1 WEAKNESS: ICD-10-CM

## 2024-07-08 DIAGNOSIS — I50.22 CHRONIC HFREF (HEART FAILURE WITH REDUCED EJECTION FRACTION): ICD-10-CM

## 2024-07-08 DIAGNOSIS — G89.29 CHRONIC PAIN OF BOTH KNEES: ICD-10-CM

## 2024-07-08 DIAGNOSIS — I63.9 CEREBROVASCULAR ACCIDENT (CVA), UNSPECIFIED MECHANISM: Primary | ICD-10-CM

## 2024-07-08 DIAGNOSIS — M25.562 CHRONIC PAIN OF BOTH KNEES: ICD-10-CM

## 2024-07-08 DIAGNOSIS — I10 ESSENTIAL HYPERTENSION: ICD-10-CM

## 2024-07-08 PROBLEM — A41.9 SEPSIS: Status: RESOLVED | Noted: 2023-09-14 | Resolved: 2024-07-08

## 2024-07-08 PROBLEM — M25.552 HIP PAIN, BILATERAL: Status: RESOLVED | Noted: 2023-07-18 | Resolved: 2024-07-08

## 2024-07-08 PROBLEM — M25.551 HIP PAIN, BILATERAL: Status: RESOLVED | Noted: 2023-07-18 | Resolved: 2024-07-08

## 2024-07-08 RX ORDER — ISOSORBIDE MONONITRATE 30 MG/1
30 TABLET, EXTENDED RELEASE ORAL DAILY
Qty: 30 TABLET | Refills: 3 | Status: SHIPPED | OUTPATIENT
Start: 2024-07-08

## 2024-07-08 NOTE — PROGRESS NOTES
Chief Complaint  Follow-up    Subjective            History of Present Illness  Brian Mehta is a 69-year-old male patient who presents to the office today for follow-up.  He had abnormal stress test which showed concern for possible ischemia/injury.  He has chronic HFrEF and hypertension.  He has been rescheduled 4 different times for left heart catheterization due to fear of procedure.  He is unsure that he really needs it.  He does have exertional angina and refuses to go to the ER.    PMH  Past Medical History:   Diagnosis Date    CHF (congestive heart failure)     SEE'S DR STRICKLAND NO CURRENT S/S    COPD (chronic obstructive pulmonary disease)     INHALER    Diabetes mellitus     DOESN'T CHECK BG OFTEN AT HOME    Hyperlipidemia     Hypertension     Sepsis 09/14/2023    Stroke 2017    RIGHT SIDE WEAKNESS         ALLERGY  No Known Allergies       SURGICALHX  Past Surgical History:   Procedure Laterality Date    APPENDECTOMY      EYE SURGERY Bilateral     MISTY CATARACT          SOC  Social History     Socioeconomic History    Marital status:    Tobacco Use    Smoking status: Every Day     Current packs/day: 2.50     Average packs/day: 2.5 packs/day for 52.5 years (131.3 ttl pk-yrs)     Types: Cigarettes     Start date: 1972     Passive exposure: Current    Smokeless tobacco: Never    Tobacco comments:     INSTRUCTED NO SMOKING 24 HOUR PRIOR TO SURGERY    Vaping Use    Vaping status: Never Used   Substance and Sexual Activity    Alcohol use: Yes    Drug use: Never    Sexual activity: Defer         FAMHX  Family History   Problem Relation Age of Onset    No Known Problems Mother     No Known Problems Father     Malig Hyperthermia Neg Hx           MEDSIGONLY  Current Outpatient Medications on File Prior to Visit   Medication Sig    aspirin 81 MG chewable tablet Chew 1 tablet Daily.    atorvastatin (LIPITOR) 20 MG tablet Take 1 tablet by mouth Every Night.    budesonide-formoterol (SYMBICORT) 160-4.5 MCG/ACT inhaler  "Inhale 2 puffs 2 (Two) Times a Day As Needed.    Continuous Glucose  (Dexcom G6 ) device Use 1 each 4 (Four) Times a Day Before Meals & at Bedtime As Needed (glucose).    Continuous Glucose Sensor (Dexcom G6 Sensor) Use Every 10 (Ten) Days.    Continuous Glucose Transmitter (Dexcom G6 Transmitter) misc Use 1 each Every 3 (Three) Months.    hydroCHLOROthiazide 25 MG tablet Take 1 tablet by mouth Daily.    Insulin Glargine (Lantus SoloStar) 100 UNIT/ML injection pen INJECT 10 UNITS SUBCUTANEOUSLY INTO THE APPROPRIATE AREA EVERY NIGHT AS DIRECTED    Insulin Pen Needle 32G X 6 MM misc Use 1 each Daily.    Jardiance 25 MG tablet tablet Take 1 tablet by mouth Daily.    losartan (COZAAR) 25 MG tablet Take 1 tablet by mouth Daily.    nicotine (Nicoderm CQ) 14 MG/24HR patch Place 1 patch on the skin as directed by provider Daily.    nicotine (Nicoderm CQ) 21 MG/24HR patch Place 1 patch on the skin as directed by provider Daily.    nicotine (Nicoderm CQ) 7 MG/24HR patch Place 1 patch on the skin as directed by provider Daily.     No current facility-administered medications on file prior to visit.         Objective   /74   Pulse 100   Ht 188 cm (74\")   Wt 98.4 kg (217 lb)   BMI 27.86 kg/m²       Physical Exam  HENT:      Head: Normocephalic.   Neck:      Vascular: No carotid bruit.   Cardiovascular:      Rate and Rhythm: Regular rhythm. Tachycardia present.      Pulses: Normal pulses.      Heart sounds: Normal heart sounds. No murmur heard.  Pulmonary:      Effort: Pulmonary effort is normal.      Breath sounds: Normal breath sounds.   Musculoskeletal:      Cervical back: Neck supple.      Right lower leg: No edema.      Left lower leg: No edema.   Skin:     General: Skin is dry.   Neurological:      Mental Status: He is alert and oriented to person, place, and time.   Psychiatric:         Behavior: Behavior normal.       ECG 12 Lead    Date/Time: 7/8/2024 12:30 PM  Performed by: Radha Wolf " "MORRO Walls    Authorized by: Radha Wolf APRN  Comparison: compared with previous ECG from 7/8/2024  Similar to previous ECG  Rhythm: sinus tachycardia  Rate: tachycardic  BPM: 105  ST Elevation: I, aVL, V5 and V6  QRS axis: normal  Other findings: poor R wave progression    Clinical impression: abnormal EKG      Result Review :   The following data was reviewed by: MORRO Luke on 07/08/2024:  proBNP   Date Value Ref Range Status   09/13/2023 495.7 0.0 - 900.0 pg/mL Final         6/17/2024    15:01   CMP   Glucose 185    BUN 13    Creatinine 0.85    EGFR 95.2    Sodium 136    Potassium 4.6    Chloride 100    Calcium 9.1    Total Protein    Albumin    Globulin    Total Bilirubin    Alkaline Phosphatase    AST (SGOT)    ALT (SGPT)    Albumin/Globulin Ratio    BUN/Creatinine Ratio 15.3    Anion Gap 10.7          6/17/2024    15:01   CBC w/Diff   WBC 6.39    RBC 5.79    Hemoglobin 15.3    Hematocrit 47.9    MCV 82.7    MCH 26.4    MCHC 31.9    RDW 15.6    Platelets 231    Neutrophil Rel %    Immature Granulocyte Rel %    Lymphocyte Rel %    Monocyte Rel %    Eosinophil Rel %    Basophil Rel %       Lab Results   Component Value Date    TSH 1.740 07/18/2023      No results found for: \"FREET4\"   No results found for: \"DDIMERQUANT\"  Magnesium   Date Value Ref Range Status   03/10/2024 1.8 1.6 - 2.4 mg/dL Final      No results found for: \"DIGOXIN\"   Lab Results   Component Value Date    TROPONINT 10 03/10/2024               7/18/2023    14:34   Lipid Panel   Total Cholesterol 156    Triglycerides 250    HDL Cholesterol 34    VLDL Cholesterol 41    LDL Cholesterol  81    LDL/HDL Ratio 2.12        Results for orders placed during the hospital encounter of 09/13/23    Adult Transthoracic Echo Complete w/ Color, Spectral and Contrast if necessary per protocol    Interpretation Summary    There is mild global hypokinesis of the left ventricle.  Estimated LV ejection fraction is 40 to 45%.    Left ventricular " wall thickness is consistent with mild concentric hypertrophy.    Left ventricular diastolic function is consistent with (grade I) impaired relaxation.    There are no significant valvular abnormalities.    Estimated right ventricular systolic pressure from tricuspid regurgitation is normal (<35 mmHg).           Assessment and Plan    Diagnoses and all orders for this visit:    1. Abnormal nuclear stress test (Primary)  EKG in office today shows concern for myocardial injury.  I explained the importance of following through with left heart catheterization in the setting of possible reversible ischemia.  The risks and benefits were discussed regarding left heart catheterization.  He verbalizes understanding and all of his questions have been answered today.  He was still like to take some time and think about if he wants to go through with the procedure or not.  I am going to send in a prescription for isosorbide 30 mg daily for him to take in the meantime to be able to obtain symptom relief.  We also discussed the benefit in smoking cessation.  -     ECG 12 Lead    2. Chronic HFrEF (heart failure with reduced ejection fraction)  Symptomatically he is having some shortness of breath.  He is euvolemic on exam today, continue hydrochlorothiazide 25 mg daily and losartan 25 mg daily.    3. Essential hypertension  Currently controlled without adverse effects from medication, continue hydrochlorothiazide 25 mg daily and losartan 25 mg daily.  Low-sodium diet discussed.      Other orders  -     isosorbide mononitrate (IMDUR) 30 MG 24 hr tablet; Take 1 tablet by mouth Daily.  Dispense: 30 tablet; Refill: 3        Follow Up   Return in about 6 months (around 1/8/2025) for Follow up with Dr Najera.    Patient was given instructions and counseling regarding his condition or for health maintenance advice. Please see specific information pulled into the AVS if appropriate.                MORRO Luke  07/08/24  13:26  EDT    Dictated Utilizing Dragon Dictation

## 2024-07-08 NOTE — OUTREACH NOTE
"AMBULATORY CASE MANAGEMENT NOTE    Names and Relationships of Patient/Support Persons: Caller: TysonBrian P \"Gio\"; Relationship: Self  Caller: TysonBrian P \"Gio\"; Relationship: Self -     [facility] Interim Update    Called and spoke with patients wife. Wife states that they received Dexcom G6 supplies but she is unsure how this system works. I advised that I could print instructions for her and mail to her home address due to lack of transportation. Or I could meet them at PCP office on 7/22 for their appointments and provide education at that time. She would prefer to meet at office on 7/22 for this.    Wife & patient (on speaker phone) are requesting a gait transfer belt to assist wife in ambulating patient.    Unfortunately their home is very hot. They have one very small air conditioner but it only cools one room. They sleep in separate bedrooms and patient does not have a cool room to sleep in. They are requesting MSW assistance with this. MSW attempted to call patient last week, but I advised that I would give MSW this number to attempt outreach.    Care Coordination    Sent telephone note to PCP regarding gait transfer belt.    Sent MSW epic chat message with this phone number to call.         Education Documentation  No documentation found.        YANDEL GONZALEZ  Ambulatory Case Management    7/8/2024, 15:27 EDT  "

## 2024-07-08 NOTE — TELEPHONE ENCOUNTER
Sophie,  Patient and wife would like a gait transfer belt to assist her in helping him stand.    If agreeable, could you please sign order?    Thank you!  MYA Arias

## 2024-07-09 ENCOUNTER — PATIENT OUTREACH (OUTPATIENT)
Dept: CASE MANAGEMENT | Facility: OTHER | Age: 69
End: 2024-07-09
Payer: MEDICARE

## 2024-07-09 DIAGNOSIS — G89.29 CHRONIC PAIN OF BOTH KNEES: ICD-10-CM

## 2024-07-09 DIAGNOSIS — M25.562 CHRONIC PAIN OF BOTH KNEES: ICD-10-CM

## 2024-07-09 DIAGNOSIS — M25.561 CHRONIC PAIN OF BOTH KNEES: ICD-10-CM

## 2024-07-09 DIAGNOSIS — I63.9 CEREBROVASCULAR ACCIDENT (CVA), UNSPECIFIED MECHANISM: Primary | ICD-10-CM

## 2024-07-09 NOTE — OUTREACH NOTE
AMBULATORY CASE MANAGEMENT NOTE    Names and Relationships of Patient/Support Persons: Caller: [facility]; Relationship: Other -     Care Coordination    Faxed gait belt order to [facility].    Education Documentation  No documentation found.        YANDEL GONZALEZ  Ambulatory Case Management    7/9/2024, 08:32 EDT

## 2024-07-11 ENCOUNTER — PATIENT OUTREACH (OUTPATIENT)
Dept: CASE MANAGEMENT | Facility: OTHER | Age: 69
End: 2024-07-11
Payer: MEDICARE

## 2024-07-11 DIAGNOSIS — M25.561 CHRONIC PAIN OF BOTH KNEES: ICD-10-CM

## 2024-07-11 DIAGNOSIS — M25.562 CHRONIC PAIN OF BOTH KNEES: ICD-10-CM

## 2024-07-11 DIAGNOSIS — I63.9 CEREBROVASCULAR ACCIDENT (CVA), UNSPECIFIED MECHANISM: Primary | ICD-10-CM

## 2024-07-11 DIAGNOSIS — G89.29 CHRONIC PAIN OF BOTH KNEES: ICD-10-CM

## 2024-07-11 NOTE — OUTREACH NOTE
"AMBULATORY CASE MANAGEMENT NOTE    Names and Relationships of Patient/Support Persons: Contact: Brian Mehta P \"Gio\"; Relationship: Self -     CCM Interim Update    Per PCP, she is needing to know if patient has had PT eval yet for wheel chair.    Called and spoke to patient. He has not had his PT eval. He is unsure if this is scheduled. He states that his wife would know, but she is at an appointment right now. He requests that I call her tomorrow to discuss this. Advised PCP that I will speak with spouse tomorrow.        Education Documentation  No documentation found.        YANDEL GONZALEZ  Ambulatory Case Management    7/11/2024, 14:43 EDT  "

## 2024-07-12 ENCOUNTER — PATIENT OUTREACH (OUTPATIENT)
Dept: CASE MANAGEMENT | Facility: OTHER | Age: 69
End: 2024-07-12
Payer: MEDICARE

## 2024-07-12 DIAGNOSIS — G89.29 CHRONIC PAIN OF BOTH KNEES: ICD-10-CM

## 2024-07-12 DIAGNOSIS — M25.561 CHRONIC PAIN OF BOTH KNEES: ICD-10-CM

## 2024-07-12 DIAGNOSIS — M25.562 CHRONIC PAIN OF BOTH KNEES: ICD-10-CM

## 2024-07-12 DIAGNOSIS — I63.9 CEREBROVASCULAR ACCIDENT (CVA), UNSPECIFIED MECHANISM: Primary | ICD-10-CM

## 2024-07-12 NOTE — OUTREACH NOTE
AMBULATORY CASE MANAGEMENT NOTE    Names and Relationships of Patient/Support Persons: Contact: Paperwork; Relationship:  -     Care Coordination    Reviewed paperwork received by PCP for wheelchair.    Completed paperwork and placed on PCP desk for signatures. Once scanned, I will need to get addendum to PCP for her to make to office notes for insurance coverage of wheelchair.    Education Documentation  No documentation found.        YANDEL GONZALEZ  Ambulatory Case Management    7/12/2024, 15:33 EDT

## 2024-07-15 ENCOUNTER — TELEPHONE (OUTPATIENT)
Dept: CASE MANAGEMENT | Facility: OTHER | Age: 69
End: 2024-07-15
Payer: MEDICARE

## 2024-07-16 ENCOUNTER — TELEPHONE (OUTPATIENT)
Dept: CASE MANAGEMENT | Facility: OTHER | Age: 69
End: 2024-07-16
Payer: MEDICARE

## 2024-07-18 ENCOUNTER — TELEPHONE (OUTPATIENT)
Dept: CASE MANAGEMENT | Facility: OTHER | Age: 69
End: 2024-07-18
Payer: MEDICARE

## 2024-07-23 ENCOUNTER — TELEPHONE (OUTPATIENT)
Dept: FAMILY MEDICINE CLINIC | Facility: CLINIC | Age: 69
End: 2024-07-23
Payer: MEDICARE

## 2024-07-23 ENCOUNTER — TELEPHONE (OUTPATIENT)
Dept: CASE MANAGEMENT | Facility: OTHER | Age: 69
End: 2024-07-23
Payer: MEDICARE

## 2024-07-23 NOTE — TELEPHONE ENCOUNTER
Caller: [employer]    Relationship:     Best call back number: [phone] ,      What is the best time to reach you: ANYTIME     Who are you requesting to speak with (clinical staff, provider,  specific staff member): CLINICAL     What was the call regarding: [employer] IS CALLING TO RECEIVE A FAX, IN REGARDS VISIT NOTES FROM VISIT 06/17/2024, FOR POWER WHEELCHAIR.

## 2024-07-25 ENCOUNTER — PATIENT OUTREACH (OUTPATIENT)
Dept: CASE MANAGEMENT | Facility: OTHER | Age: 69
End: 2024-07-25
Payer: MEDICARE

## 2024-07-25 DIAGNOSIS — G89.29 CHRONIC PAIN OF BOTH KNEES: ICD-10-CM

## 2024-07-25 DIAGNOSIS — I63.9 CEREBROVASCULAR ACCIDENT (CVA), UNSPECIFIED MECHANISM: Primary | ICD-10-CM

## 2024-07-25 DIAGNOSIS — M25.561 CHRONIC PAIN OF BOTH KNEES: ICD-10-CM

## 2024-07-25 DIAGNOSIS — M25.562 CHRONIC PAIN OF BOTH KNEES: ICD-10-CM

## 2024-07-25 NOTE — OUTREACH NOTE
AMBULATORY CASE MANAGEMENT NOTE    Names and Relationships of Patient/Support Persons: Contact: FERDINAND CATALAN; Relationship: Emergency Contact -     [facility] Interim Update    Office continues to receive calls from [employer] requesting office notes for power wheelchair. Called to speak to patients spouse. Patient has not been eval'd by PT yet, so office notes cannot be addended for [employer] supply yet.    Patients spouse states that she did not ever hear from PT, however, they have had their phones disconnected and she now has a new number.    Care Coordination    Rescheduled patients PCP appointment. Patients spouse verbalizes understanding of date and time of apt.    Called and spoke with PT associates. [place] is out of office for the afternoon but will return my call in the morning.    Education Documentation  No documentation found.        YANDEL GONZALEZ  Ambulatory Case Management    7/25/2024, 15:40 EDT

## 2024-07-30 ENCOUNTER — PATIENT OUTREACH (OUTPATIENT)
Dept: CASE MANAGEMENT | Facility: OTHER | Age: 69
End: 2024-07-30
Payer: MEDICARE

## 2024-07-30 DIAGNOSIS — M25.561 CHRONIC PAIN OF BOTH KNEES: ICD-10-CM

## 2024-07-30 DIAGNOSIS — M25.562 CHRONIC PAIN OF BOTH KNEES: ICD-10-CM

## 2024-07-30 DIAGNOSIS — I63.9 CEREBROVASCULAR ACCIDENT (CVA), UNSPECIFIED MECHANISM: Primary | ICD-10-CM

## 2024-07-30 DIAGNOSIS — G89.29 CHRONIC PAIN OF BOTH KNEES: ICD-10-CM

## 2024-07-30 NOTE — OUTREACH NOTE
[facility] End of Month Documentation    This Chronic Medical Management Care Plan for Brian Mehta, 69 y.o. male, has been monitored and managed; reviewed and a new plan of care implemented for the month of July.  A cumulative time of 42  minutes was spent on this patient record this month, including phone call with relative; chart review; phone call with patient; electronic communication with primary care provider; electronic communication with other providers.    Regarding the patient's problems: has CVA (cerebral vascular accident); Essential hypertension; High cholesterol; Vitamin B12 deficiency; Kidney disorder; Chronic pain of both knees; Chronic HFrEF (heart failure with reduced ejection fraction); LVH (left ventricular hypertrophy); COPD (chronic obstructive pulmonary disease); DM2 (diabetes mellitus, type 2); Urinary tract infection without hematuria; and Abnormal nuclear stress test on their problem list., the following items were addressed: medical records; medications and any changes can be found within the plan section of the note.  A detailed listing of time spent for chronic care management is tracked within each outreach encounter.  Current medications include:  has a current medication list which includes the following prescription(s): aspirin, atorvastatin, budesonide-formoterol, dexcom g6 , dexcom g6 sensor, dexcom g6 transmitter, hydrochlorothiazide, lantus solostar, insulin pen needle, isosorbide mononitrate, jardiance, losartan, nicotine, nicotine, and nicotine. and the patient is reported to be patient is compliant with medication protocol,  Medications are reported to be effective.  Regarding these diagnoses, referrals were made to the following provider(s):  n/a.  All notes on chart for PCP to review.    The patient was monitored remotely for activity level; medications; blood glucose.    The patient's physical needs include:  DME supplies; physical healthcare; needs assistance with  ADLs.     The patient's mental support needs include:  continued support    The patient's cognitive support needs include:  needs assistance with ADLs; requires supervision; household care; health care    The patient's psychosocial support needs include:  continued support    The patient's functional needs include: DME; physical healthcare    The patient's environmental needs include:  an unsafe living environment    Care Plan overall comments:  No data recorded    Refer to previous outreach notes for more information on the areas listed above.    Monthly Billing Diagnoses  (I63.9) Cerebrovascular accident (CVA), unspecified mechanism    (M25.561,  M25.562,  G89.29) Chronic pain of both knees    Medications   Medications have been reconciled    Care Plan progress this month:      Recently Modified Care Plans Updates made since 6/29/2024 12:00 AM       Heart Failure (Adult)           Problem Priority Last Modified     Symptom Exacerbation (Heart Failure) --  9/18/2023  3:25 PM by Jauna Mehta RN              Goal Recent Progress Last Modified     Symptom Exacerbation Prevented or Minimized --  9/18/2023  3:25 PM by Juana Mehta RN     Evidence-based guidance:   Perform or review cognitive and/or health literacy screening.   Assess understanding of adherence and barriers to treatment plan, as well as lifestyle changes; develop strategies to address barriers.   Establish a [id number] early intervention process to communicate with primary care provider when signs/symptoms worsen.   Facilitate timely posthospital discharge or emergency department treatment that includes intensive follow-up via telephone calls, home visit, telehealth monitoring and care at multidisciplinary heart failure clinic.   Adjust frequency and intensity of follow-up based on presentation, number of emergency department visits, hospital admissions and frequency and severity of symptom exacerbation.   Facilitate timely visit, usually  within 1 week, with primary care provider following hospital discharge.   Collaborate with clinical pharmacist to address adverse drug reactions, drug interactions, subtherapeutic dosage, patient and family education.   Regularly screen for presence of depressive symptoms using a validated tool; consider pharmacologic therapy and/or referral for cognitive behavioral therapy when present.   Refer to community-based services, such as a heart failure support group, community health worker or peer support program.   Review immunization status; arrange receipt of needed vaccinations.   Prepare patient for home oxygen use based on signs/symptoms.    Notes:            Task Due Date Last Modified     Identify and Minimize Risk of Heart Failure Exacerbation --  7/8/2024  3:25 PM by Juana Mehta RN     Care Management Activities:      - medication-adherence assessment completed  - self-awareness of signs/symptoms of worsening disease encouraged      Notes:                Problem Priority Last Modified     Disease Progression (Heart Failure) --  9/18/2023  3:25 PM by Juana Mehta RN              Goal Recent Progress Last Modified     Comorbidities Identified and Managed --  9/18/2023  3:25 PM by Juana Mehta RN     Evidence-based guidance:   Assess and address signs/symptoms of comorbidity, including dyslipidemia, diabetes, iron deficiency, gout, arthritis, dysrhythmia, hypertension, cachexia, coronary artery disease, kidney dysfunction and lung disease.   Prepare patient for laboratory and diagnostic exams based on risk and presentation.   Prepare for use of pharmacologic therapy that may include statin, angiotensin converting enzyme (ACE) inhibitor, angiotensin receptor blocker (ARB), beta-blocker, digoxin, antidysrhythmic, diuretic or omega-3 fatty acid.   Monitor side effects and anticipate need for periodic adjustments.   Prepare patient for potential invasive treatment, such as implantable cardioverter-defibrillator,  cardiac resynchronization therapy or heart transplant as disease progresses.    Notes:            Task Due Date Last Modified     Identify and Manage Comorbidities --  7/8/2024  3:26 PM by Juana Mehta RN     Care Management Activities:      - response to pharmacologic therapy monitored  - signs/symptoms of comorbidities identified      Notes:              Goal Recent Progress Last Modified     Health Optimized --  9/18/2023  3:25 PM by Juana Mehta, RN     Evidence-based guidance:   Use brief intervention, such as 5 A's (Ask, Advise, Assess, Assist, Arrange) to encourage smoking cessation; refer to smoking cessation program, if ready for more intensive intervention.   Perform or refer to a registered dietitian for a nutrition assessment and nutrition-focused physical exam.    Identify potential micronutrient deficiencies, such as iron, vitamin D and thiamin.   Assess need for potential diet and fluid modification, such as reduced sodium or fluid intake.   Minimize unnecessary dietary restrictions to increase oral intake. Note: Sodium restriction should be individualized to the patient and clinical status.   Facilitate home monitoring of weight.    Notes:            Task Due Date Last Modified     Optimize Health --  7/8/2024  3:26 PM by Juana Mehta RN     Care Management Activities:      - not discussed during this outreach      Notes:                Problem Priority Last Modified     Activity Tolerance (Heart Failure) --  9/18/2023  3:25 PM by Juana Mehta RN              Goal Recent Progress Last Modified     Activity Tolerance Optimized --  9/18/2023  3:25 PM by Juana Mehta RN     Evidence-based guidance:   Promote daily physical activity that improves functional ability, cognition and quality of life.   Encourage reduction in sedentary time.    Encourage optimal, safe functional mobility and self-care performance based on ability and tolerance.    Promote breathing and energy conservation techniques, such as  pursed-lip breathing, preplanning and pacing of activity, balancing activity and rest.   Encourage participation in cardiac rehabilitation services.    Notes:            Task Due Date Last Modified     Maintain Strength and Functional Ability --  7/8/2024  3:26 PM by Juana Mehta RN     Care Management Activities:      - barriers to activities addressed      Notes:                          Instructions   Patient was provided an electronic copy of care plan  CCM services were explained and offered and patient has accepted these services.  Patient has given their written consent to receive CCM services and understands that this includes the authorization of electronic communication of medical information with the other treating providers.  Patient understands that they may stop CCM services at any time and these changes will be effective at the end of the calendar month and will effectively revocate the agreement of CCM services.  Patient understands that only one practitioner can furnish and be paid for CCM services during one calendar month.  Patient also understands that there may be co-payment or deductible fees in association with CCM services.  Patient will continue with at least monthly follow-up calls with the Ambulatory .    JUANA GONZALEZ  Ambulatory Case Management    7/30/2024, 14:39 EDT

## 2024-08-05 ENCOUNTER — PATIENT OUTREACH (OUTPATIENT)
Dept: CASE MANAGEMENT | Facility: OTHER | Age: 69
End: 2024-08-05
Payer: MEDICARE

## 2024-08-05 DIAGNOSIS — E11.9 TYPE 2 DIABETES MELLITUS WITHOUT COMPLICATION, WITHOUT LONG-TERM CURRENT USE OF INSULIN: ICD-10-CM

## 2024-08-05 DIAGNOSIS — I10 ESSENTIAL HYPERTENSION: ICD-10-CM

## 2024-08-05 DIAGNOSIS — R53.1 WEAKNESS: ICD-10-CM

## 2024-08-05 DIAGNOSIS — I63.9 CEREBROVASCULAR ACCIDENT (CVA), UNSPECIFIED MECHANISM: Primary | ICD-10-CM

## 2024-08-05 DIAGNOSIS — E78.00 HIGH CHOLESTEROL: ICD-10-CM

## 2024-08-05 NOTE — OUTREACH NOTE
AMBULATORY CASE MANAGEMENT NOTE    Names and Relationships of Patient/Support Persons: Contact: PTA; Relationship: Other  Contact: PCP - Genevieve MINER; Relationship: Other -     Care Coordination    Called to check status of PT referral with PTA in [place]. Unfortunately, PTA was unable to accept the referral due to past unsanitary conditions with the family.     Sent chat to scheduling rep to re-route order to [facility] in [place].    Education Documentation  No documentation found.        YANDEL GONZALEZ  Ambulatory Case Management    8/5/2024, 10:51 EDT

## 2024-08-06 ENCOUNTER — PATIENT OUTREACH (OUTPATIENT)
Dept: CASE MANAGEMENT | Facility: OTHER | Age: 69
End: 2024-08-06
Payer: MEDICARE

## 2024-08-06 DIAGNOSIS — R53.1 WEAKNESS: ICD-10-CM

## 2024-08-06 DIAGNOSIS — I63.9 CEREBROVASCULAR ACCIDENT (CVA), UNSPECIFIED MECHANISM: Primary | ICD-10-CM

## 2024-08-06 NOTE — OUTREACH NOTE
AMBULATORY CASE MANAGEMENT NOTE    Names and Relationships of Patient/Support Persons: Contact: Rafa; Relationship: Other  Contact: Andre[place]ak; Relationship: Other -     [facility] Interim Update    Met with patients spouse in PCP office. Patient did have an appointment today but spouse states that his feet were too swollen and painful for him to come in to the office. PCP is aware and patient appointment has been rescheduled.    Patients spouse states that they have not received the gait belt requested yet.     She is requesting an update on PT/OT. Advised that the order was redirected to [facility] in [facility] today to see if they would accept patient as [facility] and [facility] in [place] have both declined. Patient is needing wheelchair assessment and PT as well.     Care Coordination    Called to speak with [employer] on status of gait belt order. [facility]ja states they only do self pay for gait belts and they do not have any available at this time.    Called and spoke with [facility]a. Rep states that they will try to bill insurance with a provider order.    Faxed order to [facility].         Education Documentation  No documentation found.        YANDEL GONZALEZ  Ambulatory Case Management    8/6/2024, 15:53 EDT

## 2024-08-07 RX ORDER — LOSARTAN POTASSIUM 25 MG/1
25 TABLET ORAL DAILY
Qty: 30 TABLET | Refills: 0 | Status: SHIPPED | OUTPATIENT
Start: 2024-08-07

## 2024-08-07 RX ORDER — EMPAGLIFLOZIN 25 MG/1
25 TABLET, FILM COATED ORAL DAILY
Qty: 30 TABLET | Refills: 0 | Status: SHIPPED | OUTPATIENT
Start: 2024-08-07

## 2024-08-07 RX ORDER — HYDROCHLOROTHIAZIDE 25 MG/1
TABLET ORAL DAILY
Qty: 30 TABLET | Refills: 0 | Status: SHIPPED | OUTPATIENT
Start: 2024-08-07

## 2024-08-07 RX ORDER — ATORVASTATIN CALCIUM 20 MG/1
20 TABLET, FILM COATED ORAL NIGHTLY
Qty: 30 TABLET | Refills: 0 | Status: SHIPPED | OUTPATIENT
Start: 2024-08-07

## 2024-08-09 ENCOUNTER — TELEPHONE (OUTPATIENT)
Dept: FAMILY MEDICINE CLINIC | Facility: CLINIC | Age: 69
End: 2024-08-09

## 2024-08-09 NOTE — TELEPHONE ENCOUNTER
Caller: HOME CARE DELIVERED    Relationship: Other    Best call back number: [phone]    What form or medical record are you requesting: CERTIFICATE OF MEDICAL NECESSITY     Who is requesting this form or medical record from you: [employer]ICAL [employer] COMPANY FOR INSURANCE PURPOSES     How would you like to receive the form or medical records (pick-up, mail, fax):   If fax, what is the fax number: [phone]      Timeframe paperwork needed: AS SOON AS POSSIBLE     Additional notes: FORM WAS FAXED ON 07/26/2024 REQUESTING THIS INFORMATION IN ORDER FOR INCONTINENCE SUPPLIES TO BE COVERED BY INSURANCE  PLEASE CONTACT AND ADVISE IF FORM WAS NOT RECEIVED

## 2024-08-12 ENCOUNTER — PATIENT OUTREACH (OUTPATIENT)
Dept: CASE MANAGEMENT | Facility: OTHER | Age: 69
End: 2024-08-12
Payer: MEDICARE

## 2024-08-12 DIAGNOSIS — R53.1 WEAKNESS: ICD-10-CM

## 2024-08-12 DIAGNOSIS — I63.9 CEREBROVASCULAR ACCIDENT (CVA), UNSPECIFIED MECHANISM: Primary | ICD-10-CM

## 2024-08-12 NOTE — OUTREACH NOTE
AMBULATORY CASE MANAGEMENT NOTE    Names and Relationships of Patient/Support Persons: Contact: FERDINAND CATALAN; Relationship: Emergency Contact -       Care Coordination    Called and spoke with [facility] in [facility]. They state that they could not accept patient referral for  PT due to insurance and they do not do wheel chair evals.    Spoke to scheduling rep at PCP office. We will try for PTA in [place]. I have faxed referral to PTA.     Called and spoke  with [employer], they state that insurance will not pay for gait belt, but patient can self pay for $13.48.     [facility] Interim Update    Called to speak to patient regarding above. Spouse states they are grocery shopping right now. I will attempt to call back this afternoon.          Education Documentation  No documentation found.        YANDEL GONZALEZ  Ambulatory Case Management    8/12/2024, 10:43 EDT

## 2024-08-14 ENCOUNTER — OFFICE VISIT (OUTPATIENT)
Dept: FAMILY MEDICINE CLINIC | Facility: CLINIC | Age: 69
End: 2024-08-14
Payer: MEDICARE

## 2024-08-14 VITALS
SYSTOLIC BLOOD PRESSURE: 108 MMHG | DIASTOLIC BLOOD PRESSURE: 58 MMHG | TEMPERATURE: 97.7 F | OXYGEN SATURATION: 95 % | WEIGHT: 220.9 LBS | BODY MASS INDEX: 28.36 KG/M2 | HEART RATE: 115 BPM

## 2024-08-14 DIAGNOSIS — R60.0 PERIPHERAL EDEMA: Primary | ICD-10-CM

## 2024-08-14 DIAGNOSIS — M25.562 CHRONIC PAIN OF BOTH KNEES: ICD-10-CM

## 2024-08-14 DIAGNOSIS — G89.29 CHRONIC PAIN OF BOTH KNEES: ICD-10-CM

## 2024-08-14 DIAGNOSIS — M25.561 CHRONIC PAIN OF BOTH KNEES: ICD-10-CM

## 2024-08-14 LAB
ALBUMIN SERPL-MCNC: 3.7 G/DL (ref 3.5–5.2)
ALBUMIN/GLOB SERPL: 1 G/DL
ALP SERPL-CCNC: 115 U/L (ref 39–117)
ALT SERPL W P-5'-P-CCNC: 12 U/L (ref 1–41)
ANION GAP SERPL CALCULATED.3IONS-SCNC: 10.4 MMOL/L (ref 5–15)
AST SERPL-CCNC: 11 U/L (ref 1–40)
BASOPHILS # BLD MANUAL: 0 10*3/MM3 (ref 0–0.2)
BASOPHILS NFR BLD MANUAL: 0 % (ref 0–1.5)
BILIRUB SERPL-MCNC: 0.3 MG/DL (ref 0–1.2)
BUN SERPL-MCNC: 15 MG/DL (ref 8–23)
BUN/CREAT SERPL: 15.3 (ref 7–25)
CALCIUM SPEC-SCNC: 9.3 MG/DL (ref 8.6–10.5)
CHLORIDE SERPL-SCNC: 97 MMOL/L (ref 98–107)
CO2 SERPL-SCNC: 27.6 MMOL/L (ref 22–29)
CREAT SERPL-MCNC: 0.98 MG/DL (ref 0.76–1.27)
DEPRECATED RDW RBC AUTO: 43.2 FL (ref 37–54)
EGFRCR SERPLBLD CKD-EPI 2021: 84.5 ML/MIN/1.73
EOSINOPHIL # BLD MANUAL: 0 10*3/MM3 (ref 0–0.4)
EOSINOPHIL NFR BLD MANUAL: 0 % (ref 0.3–6.2)
ERYTHROCYTE [DISTWIDTH] IN BLOOD BY AUTOMATED COUNT: 14.8 % (ref 12.3–15.4)
GLOBULIN UR ELPH-MCNC: 3.8 GM/DL
GLUCOSE SERPL-MCNC: 260 MG/DL (ref 65–99)
HCT VFR BLD AUTO: 45.3 % (ref 37.5–51)
HGB BLD-MCNC: 14.8 G/DL (ref 13–17.7)
LYMPHOCYTES # BLD MANUAL: 3.46 10*3/MM3 (ref 0.7–3.1)
LYMPHOCYTES NFR BLD MANUAL: 6.1 % (ref 5–12)
MAGNESIUM SERPL-MCNC: 2.1 MG/DL (ref 1.6–2.4)
MCH RBC QN AUTO: 26.6 PG (ref 26.6–33)
MCHC RBC AUTO-ENTMCNC: 32.7 G/DL (ref 31.5–35.7)
MCV RBC AUTO: 81.3 FL (ref 79–97)
MICROCYTES BLD QL: ABNORMAL
MONOCYTES # BLD: 0.4 10*3/MM3 (ref 0.1–0.9)
NEUTROPHILS # BLD AUTO: 2.66 10*3/MM3 (ref 1.7–7)
NEUTROPHILS NFR BLD MANUAL: 40.8 % (ref 42.7–76)
NRBC BLD AUTO-RTO: 0 /100 WBC (ref 0–0.2)
PLAT MORPH BLD: NORMAL
PLATELET # BLD AUTO: 228 10*3/MM3 (ref 140–450)
PMV BLD AUTO: 9.6 FL (ref 6–12)
POIKILOCYTOSIS BLD QL SMEAR: ABNORMAL
POTASSIUM SERPL-SCNC: 4.3 MMOL/L (ref 3.5–5.2)
PROT SERPL-MCNC: 7.5 G/DL (ref 6–8.5)
RBC # BLD AUTO: 5.57 10*6/MM3 (ref 4.14–5.8)
SMUDGE CELLS BLD QL SMEAR: ABNORMAL
SODIUM SERPL-SCNC: 135 MMOL/L (ref 136–145)
VARIANT LYMPHS NFR BLD MANUAL: 53.1 % (ref 19.6–45.3)
WBC NRBC COR # BLD AUTO: 6.51 10*3/MM3 (ref 3.4–10.8)

## 2024-08-14 PROCEDURE — 36415 COLL VENOUS BLD VENIPUNCTURE: CPT | Performed by: NURSE PRACTITIONER

## 2024-08-14 PROCEDURE — 1125F AMNT PAIN NOTED PAIN PRSNT: CPT | Performed by: NURSE PRACTITIONER

## 2024-08-14 PROCEDURE — 1159F MED LIST DOCD IN RCRD: CPT | Performed by: NURSE PRACTITIONER

## 2024-08-14 PROCEDURE — 3074F SYST BP LT 130 MM HG: CPT | Performed by: NURSE PRACTITIONER

## 2024-08-14 PROCEDURE — 80053 COMPREHEN METABOLIC PANEL: CPT | Performed by: NURSE PRACTITIONER

## 2024-08-14 PROCEDURE — 99214 OFFICE O/P EST MOD 30 MIN: CPT | Performed by: NURSE PRACTITIONER

## 2024-08-14 PROCEDURE — 1160F RVW MEDS BY RX/DR IN RCRD: CPT | Performed by: NURSE PRACTITIONER

## 2024-08-14 PROCEDURE — 85025 COMPLETE CBC W/AUTO DIFF WBC: CPT | Performed by: NURSE PRACTITIONER

## 2024-08-14 PROCEDURE — 3078F DIAST BP <80 MM HG: CPT | Performed by: NURSE PRACTITIONER

## 2024-08-14 PROCEDURE — 83735 ASSAY OF MAGNESIUM: CPT | Performed by: NURSE PRACTITIONER

## 2024-08-14 PROCEDURE — 3052F HG A1C>EQUAL 8.0%<EQUAL 9.0%: CPT | Performed by: NURSE PRACTITIONER

## 2024-08-14 PROCEDURE — 85007 BL SMEAR W/DIFF WBC COUNT: CPT | Performed by: NURSE PRACTITIONER

## 2024-08-14 RX ORDER — POTASSIUM CHLORIDE 750 MG/1
10 TABLET, FILM COATED, EXTENDED RELEASE ORAL 2 TIMES DAILY
Qty: 30 TABLET | Refills: 0 | Status: ON HOLD | OUTPATIENT
Start: 2024-08-14

## 2024-08-14 RX ORDER — FUROSEMIDE 20 MG/1
20 TABLET ORAL 2 TIMES DAILY
Qty: 60 TABLET | Refills: 0 | Status: ON HOLD | OUTPATIENT
Start: 2024-08-14

## 2024-08-14 NOTE — PROGRESS NOTES
Chief Complaint  Edema (In both feet )    PHQ-2 Total Score: 0     History of Present Illness  Brian Mehta is a 69 y.o. male who presents to [facility] with a past medical history of  Past Medical History:   Diagnosis Date    CHF (congestive heart failure)     SEE'S DR [place] NO CURRENT S/S    COPD (chronic obstructive pulmonary disease)     INHALER    Diabetes mellitus     DOESN'T CHECK BG OFTEN AT HOME    Hyperlipidemia     Hypertension     Sepsis 09/14/2023    Stroke 2017    RIGHT SIDE WEAKNESS       HPI  Patient presents to the office today for peripheral edema for about 1 week. Pt notes he was put on a hear medication, Imdur prescribed by Cardiology. Pt denies changes in diet or activity. Pt stays within the home or on the porch. Denies CP, palpitations, or SOB. Swelling will improve after rest and keeps feet propped through the night.     Pt also asking about a knee brace that fits well; XL is too small. Pt would like a 2XL brace.     Objective   Vital Signs:   Vitals:    08/14/24 1314 08/14/24 1323   BP: 108/58    BP Location: Left arm    Patient Position: Sitting    Pulse: 115    Temp: 97.7 °F (36.5 °C)    TempSrc: Temporal    SpO2: 95%    Weight: Comment: pt unable to weigh 100 kg (220 lb 14.4 oz)   PainSc:   9    PainLoc: Back      Body mass index is 28.36 kg/m².    Wt Readings from Last 3 Encounters:   08/14/24 100 kg (220 lb 14.4 oz)   07/08/24 98.4 kg (217 lb)   06/17/24 98.4 kg (217 lb)     BP Readings from Last 3 Encounters:   08/14/24 108/58   07/08/24 128/74   04/26/24 128/90       Health Maintenance   Topic Date Due    DIABETIC EYE EXAM  Never done    ZOSTER VACCINE (1 of 2) Never done    COVID-19 Vaccine (3 - 2023-24 season) 09/01/2023    LIPID PANEL  07/18/2024    LUNG CANCER SCREENING  08/11/2024    INFLUENZA VACCINE  08/01/2024    HEMOGLOBIN A1C  09/10/2024    COLORECTAL CANCER SCREENING  12/02/2024    DIABETIC FOOT EXAM  09/21/2024    ANNUAL WELLNESS VISIT   2024    URINE MICROALBUMIN  2025    BMI FOLLOWUP  2025    TDAP/TD VACCINES (2 - Td or Tdap) 2027    HEPATITIS C SCREENING  Completed    Pneumococcal Vaccine 65+  Completed    AAA SCREEN (ONE-TIME)  Completed       Physical Exam  Vitals reviewed.   Constitutional:       General: He is not in acute distress.     Appearance: Normal appearance. He is well-developed.   HENT:      Head: Normocephalic and atraumatic.   Eyes:      Conjunctiva/sclera: Conjunctivae normal.      Pupils: Pupils are equal, round, and reactive to light.   Cardiovascular:      Rate and Rhythm: Normal rate and regular rhythm.      Heart sounds: Normal heart sounds.   Pulmonary:      Effort: Pulmonary effort is normal.      Breath sounds: Normal breath sounds.   Musculoskeletal:      Cervical back: Neck supple.      Right lower le+ Edema present.      Left lower leg: 3+ Edema present.   Skin:     General: Skin is warm and dry.   Neurological:      Mental Status: He is alert and oriented to person, place, and time.   Psychiatric:         Mood and Affect: Mood and affect normal.         Behavior: Behavior normal.         Thought Content: Thought content normal.         Judgment: Judgment normal.            Result Review :  The following data was reviewed by: MORRO Sam on 2024:  Common labs          3/12/2024    04:29 3/20/2024    14:42 2024    15:01   Common Labs   Glucose 166  321  185    BUN 23  19  13    Creatinine 0.93  0.99  0.85    Sodium 138  132  136    Potassium 4.0  4.2  4.6    Chloride 101  95  100    Calcium 9.0  9.3  9.1    Albumin  3.7     Total Bilirubin  0.4     Alkaline Phosphatase  118     AST (SGOT)  15     ALT (SGPT)  13     WBC 8.39  7.51  6.39    Hemoglobin 14.1  15.8  15.3    Hematocrit 43.2  47.4  47.9    Platelets 231  267  231    Microalbumin, Urine  3.4           Procedures        Assessment and Plan   Diagnoses and all orders for this visit:    1. Peripheral edema  (Primary)  -     furosemide (Lasix) 20 MG tablet; Take 1 tablet by mouth 2 (Two) Times a Day.  Dispense: 60 tablet; Refill: 0  -     CBC Auto Differential  -     Comprehensive Metabolic Panel  -     Magnesium  -     potassium chloride 10 MEQ CR tablet; Take 1 tablet by mouth 2 (Two) Times a Day.  Dispense: 30 tablet; Refill: 0    2. Chronic pain of both knees    Start Lasix twice daily along with potassium supplementation. Follow up in 2 weeks to reevaluate swelling and labs.     FOLLOW UP  Return in about 2 weeks (around 8/28/2024) for Recheck.    Patient was given instructions and counseling regarding his condition or for health maintenance advice. Please see specific information pulled into the AVS if appropriate.       Sophie Capps, APRN  08/14/24  16:51 EDT    CURRENT & DISCONTINUED MEDICATIONS  Current Outpatient Medications   Medication Instructions    aspirin 81 mg, Oral, Daily    atorvastatin (LIPITOR) 20 mg, Oral, Nightly    Continuous Glucose  (Dexcom G6 ) device 1 each, Does not apply, 4 Times Daily Before Meals & Nightly PRN    Continuous Glucose Sensor (Dexcom G6 Sensor) Does not apply, Every 10 Days    Continuous Glucose Transmitter (Dexcom G6 Transmitter) misc 1 each, Does not apply, Every 3 Months    furosemide (LASIX) 20 mg, Oral, 2 Times Daily    Insulin Glargine (Lantus SoloStar) 100 UNIT/ML injection pen INJECT 10 UNITS SUBCUTANEOUSLY INTO THE APPROPRIATE AREA EVERY NIGHT AS DIRECTED    Insulin Pen Needle 32G X 6 MM misc 1 each, Does not apply, Daily    isosorbide mononitrate (IMDUR) 30 mg, Oral, Daily    Jardiance 25 mg, Oral, Daily    losartan (COZAAR) 25 mg, Oral, Daily    potassium chloride 10 MEQ CR tablet 10 mEq, Oral, 2 Times Daily       Medications Discontinued During This Encounter   Medication Reason    hydroCHLOROthiazide 25 MG tablet Alternate therapy    nicotine (Nicoderm CQ) 21 MG/24HR patch *Therapy completed    nicotine (Nicoderm CQ) 14 MG/24HR patch *Therapy  completed    nicotine (Nicoderm CQ) 7 MG/24HR patch *Therapy completed    budesonide-formoterol (SYMBICORT) 160-4.5 MCG/ACT inhaler *Therapy completed

## 2024-08-14 NOTE — PROGRESS NOTES
..  Venipuncture Blood Specimen Collection  Venipuncture performed in LT arm by Dulce Dodge MA with good hemostasis. Patient tolerated the procedure well without complications.   08/14/24   Dulce Dodge MA

## 2024-08-15 ENCOUNTER — PATIENT OUTREACH (OUTPATIENT)
Dept: CASE MANAGEMENT | Facility: OTHER | Age: 69
End: 2024-08-15
Payer: MEDICARE

## 2024-08-15 ENCOUNTER — TELEPHONE (OUTPATIENT)
Dept: FAMILY MEDICINE CLINIC | Facility: CLINIC | Age: 69
End: 2024-08-15

## 2024-08-15 DIAGNOSIS — I63.9 CEREBROVASCULAR ACCIDENT (CVA), UNSPECIFIED MECHANISM: Primary | ICD-10-CM

## 2024-08-15 DIAGNOSIS — R53.1 WEAKNESS: ICD-10-CM

## 2024-08-15 NOTE — TELEPHONE ENCOUNTER
Caller: FERDINAND CATALAN    Relationship: Emergency Contact    Best call back number: [phone]     What is the best time to reach you: ANY    Who are you requesting to speak with (clinical staff, provider,  specific staff member): CLINICAL    What was the call regarding: CALLER SEEKING CLARIFICATION ON THE SPECIFIC MEDICATION THAT THE DOCTOR ADVISED PATIENT TO STOP TAKING. PLEASE ADVISE.

## 2024-08-15 NOTE — OUTREACH NOTE
AMBULATORY CASE MANAGEMENT NOTE    Names and Relationships of Patient/Support Persons: Contact: [facility] [place]; Relationship: Other  Contact: [facility]; Relationship: Other  Contact: [facility]; Relationship: Other  Contact: [employer]; Relationship: Other -     Care Coordination    Called [facility]TA in [place]. They denied patient due to PTA in [place] denying for bed bugs.    Called [facility] for fax number.    Faxed order to [facility].    Called and spoke with [employer] to get order processed for gait belt after receiving patients spouse phone message requesting to order gait belt at self pay price of $13.48 through [employer]. [employer] has pended the order and will process and ship once they receive payment information from the patient.    [facility] Interim Update    Called patients spouse and advised of above. She requested that I email her [employer] contact information, as she can no longer access her [employer] at this time. I have given her [employer] help desk information also.    Emailed patient information.        Education Documentation  No documentation found.        YANDEL GONZALEZ  Ambulatory Case Management    8/15/2024, 14:07 EDT

## 2024-08-19 ENCOUNTER — HOSPITAL ENCOUNTER (INPATIENT)
Facility: HOSPITAL | Age: 69
LOS: 3 days | Discharge: HOME HEALTH | DRG: 178 | End: 2024-08-22
Attending: EMERGENCY MEDICINE | Admitting: STUDENT IN AN ORGANIZED HEALTH CARE EDUCATION/TRAINING PROGRAM
Payer: MEDICARE

## 2024-08-19 ENCOUNTER — APPOINTMENT (OUTPATIENT)
Dept: GENERAL RADIOLOGY | Facility: HOSPITAL | Age: 69
DRG: 178 | End: 2024-08-19
Payer: MEDICARE

## 2024-08-19 ENCOUNTER — APPOINTMENT (OUTPATIENT)
Facility: HOSPITAL | Age: 69
DRG: 178 | End: 2024-08-19
Payer: MEDICARE

## 2024-08-19 DIAGNOSIS — R26.2 DIFFICULTY WALKING: ICD-10-CM

## 2024-08-19 DIAGNOSIS — B34.2 CORONAVIRUS INFECTION: Primary | ICD-10-CM

## 2024-08-19 DIAGNOSIS — E78.00 HIGH CHOLESTEROL: ICD-10-CM

## 2024-08-19 DIAGNOSIS — N39.0 ACUTE UTI: ICD-10-CM

## 2024-08-19 DIAGNOSIS — A41.9 SEPSIS, DUE TO UNSPECIFIED ORGANISM, UNSPECIFIED WHETHER ACUTE ORGAN DYSFUNCTION PRESENT: ICD-10-CM

## 2024-08-19 LAB
ALBUMIN SERPL-MCNC: 3.4 G/DL (ref 3.5–5.2)
ALBUMIN/GLOB SERPL: 0.9 G/DL
ALP SERPL-CCNC: 95 U/L (ref 39–117)
ALT SERPL W P-5'-P-CCNC: 10 U/L (ref 1–41)
ANION GAP SERPL CALCULATED.3IONS-SCNC: 9.6 MMOL/L (ref 5–15)
AST SERPL-CCNC: 20 U/L (ref 1–40)
BACTERIA UR QL AUTO: ABNORMAL /HPF
BASOPHILS # BLD AUTO: 0.01 10*3/MM3 (ref 0–0.2)
BASOPHILS NFR BLD AUTO: 0.2 % (ref 0–1.5)
BH CV LOWER VASCULAR LEFT DISTAL FEMORAL COMPRESS: NORMAL
BH CV LOWER VASCULAR LEFT GASTRONEMIUS COMPRESS: NORMAL
BH CV LOWER VASCULAR LEFT LESSER SAPH COMPRESS: NORMAL
BH CV LOWER VASCULAR LEFT MID FEMORAL AUGMENT: NORMAL
BH CV LOWER VASCULAR LEFT MID FEMORAL COMPETENT: NORMAL
BH CV LOWER VASCULAR LEFT MID FEMORAL COMPRESS: NORMAL
BH CV LOWER VASCULAR LEFT MID FEMORAL PHASIC: NORMAL
BH CV LOWER VASCULAR LEFT MID FEMORAL SPONT: NORMAL
BH CV LOWER VASCULAR LEFT PERONEAL AUGMENT: NORMAL
BH CV LOWER VASCULAR LEFT PERONEAL COMPETENT: NORMAL
BH CV LOWER VASCULAR LEFT PERONEAL COMPRESS: NORMAL
BH CV LOWER VASCULAR LEFT PERONEAL PHASIC: NORMAL
BH CV LOWER VASCULAR LEFT PERONEAL SPONT: NORMAL
BH CV LOWER VASCULAR LEFT POPLITEAL AUGMENT: NORMAL
BH CV LOWER VASCULAR LEFT POPLITEAL COMPETENT: NORMAL
BH CV LOWER VASCULAR LEFT POPLITEAL COMPRESS: NORMAL
BH CV LOWER VASCULAR LEFT POPLITEAL PHASIC: NORMAL
BH CV LOWER VASCULAR LEFT POPLITEAL SPONT: NORMAL
BH CV LOWER VASCULAR LEFT POSTERIOR TIBIAL AUGMENT: NORMAL
BH CV LOWER VASCULAR LEFT POSTERIOR TIBIAL COMPETENT: NORMAL
BH CV LOWER VASCULAR LEFT POSTERIOR TIBIAL COMPRESS: NORMAL
BH CV LOWER VASCULAR LEFT POSTERIOR TIBIAL PHASIC: NORMAL
BH CV LOWER VASCULAR LEFT POSTERIOR TIBIAL SPONT: NORMAL
BH CV LOWER VASCULAR LEFT PROXIMAL FEMORAL COMPRESS: NORMAL
BILIRUB SERPL-MCNC: 0.4 MG/DL (ref 0–1.2)
BILIRUB UR QL STRIP: NEGATIVE
BUN SERPL-MCNC: 19 MG/DL (ref 8–23)
BUN/CREAT SERPL: 17.3 (ref 7–25)
CALCIUM SPEC-SCNC: 8.4 MG/DL (ref 8.6–10.5)
CHLORIDE SERPL-SCNC: 99 MMOL/L (ref 98–107)
CK SERPL-CCNC: 76 U/L (ref 20–200)
CLARITY UR: ABNORMAL
CO2 SERPL-SCNC: 27.4 MMOL/L (ref 22–29)
COLOR UR: YELLOW
CREAT SERPL-MCNC: 1.1 MG/DL (ref 0.76–1.27)
D-LACTATE SERPL-SCNC: 1.3 MMOL/L (ref 0.5–2)
DEPRECATED RDW RBC AUTO: 45.6 FL (ref 37–54)
EGFRCR SERPLBLD CKD-EPI 2021: 73.6 ML/MIN/1.73
EOSINOPHIL # BLD AUTO: 0 10*3/MM3 (ref 0–0.4)
EOSINOPHIL NFR BLD AUTO: 0 % (ref 0.3–6.2)
ERYTHROCYTE [DISTWIDTH] IN BLOOD BY AUTOMATED COUNT: 15.8 % (ref 12.3–15.4)
FLUAV SUBTYP SPEC NAA+PROBE: NOT DETECTED
FLUBV RNA ISLT QL NAA+PROBE: NOT DETECTED
GLOBULIN UR ELPH-MCNC: 4 GM/DL
GLUCOSE BLDC GLUCOMTR-MCNC: 102 MG/DL (ref 70–99)
GLUCOSE BLDC GLUCOMTR-MCNC: 144 MG/DL (ref 70–99)
GLUCOSE SERPL-MCNC: 192 MG/DL (ref 65–99)
GLUCOSE UR STRIP-MCNC: NEGATIVE MG/DL
HCT VFR BLD AUTO: 43.1 % (ref 37.5–51)
HGB BLD-MCNC: 14.1 G/DL (ref 13–17.7)
HGB UR QL STRIP.AUTO: ABNORMAL
HOLD SPECIMEN: NORMAL
HOLD SPECIMEN: NORMAL
HYALINE CASTS UR QL AUTO: ABNORMAL /LPF
IMM GRANULOCYTES # BLD AUTO: 0.01 10*3/MM3 (ref 0–0.05)
IMM GRANULOCYTES NFR BLD AUTO: 0.2 % (ref 0–0.5)
KETONES UR QL STRIP: NEGATIVE
LARGE PLATELETS: ABNORMAL
LEUKOCYTE ESTERASE UR QL STRIP.AUTO: ABNORMAL
LYMPHOCYTES # BLD AUTO: 3.05 10*3/MM3 (ref 0.7–3.1)
LYMPHOCYTES # BLD MANUAL: 2.65 10*3/MM3 (ref 0.7–3.1)
LYMPHOCYTES NFR BLD AUTO: 59.9 % (ref 19.6–45.3)
LYMPHOCYTES NFR BLD MANUAL: 14 % (ref 5–12)
MAGNESIUM SERPL-MCNC: 1.8 MG/DL (ref 1.6–2.4)
MCH RBC QN AUTO: 26.5 PG (ref 26.6–33)
MCHC RBC AUTO-ENTMCNC: 32.7 G/DL (ref 31.5–35.7)
MCV RBC AUTO: 80.9 FL (ref 79–97)
MONOCYTES # BLD AUTO: 0.73 10*3/MM3 (ref 0.1–0.9)
MONOCYTES # BLD: 0.71 10*3/MM3 (ref 0.1–0.9)
MONOCYTES NFR BLD AUTO: 14.3 % (ref 5–12)
NEUTROPHILS # BLD AUTO: 1.58 10*3/MM3 (ref 1.7–7)
NEUTROPHILS NFR BLD AUTO: 1.29 10*3/MM3 (ref 1.7–7)
NEUTROPHILS NFR BLD AUTO: 25.4 % (ref 42.7–76)
NEUTROPHILS NFR BLD MANUAL: 29 % (ref 42.7–76)
NEUTS BAND NFR BLD MANUAL: 2 % (ref 0–5)
NITRITE UR QL STRIP: POSITIVE
NRBC BLD AUTO-RTO: 0 /100 WBC (ref 0–0.2)
NT-PROBNP SERPL-MCNC: 634.3 PG/ML (ref 0–900)
PH UR STRIP.AUTO: 5.5 [PH] (ref 5–8)
PHOSPHATE SERPL-MCNC: 2.8 MG/DL (ref 2.5–4.5)
PLATELET # BLD AUTO: 172 10*3/MM3 (ref 140–450)
PMV BLD AUTO: 9.7 FL (ref 6–12)
POTASSIUM SERPL-SCNC: 4.5 MMOL/L (ref 3.5–5.2)
PROMYELOCYTES NFR BLD MANUAL: 3 % (ref 0–0)
PROT SERPL-MCNC: 7.4 G/DL (ref 6–8.5)
PROT UR QL STRIP: ABNORMAL
QT INTERVAL: 343 MS
QTC INTERVAL: 458 MS
RBC # BLD AUTO: 5.33 10*6/MM3 (ref 4.14–5.8)
RBC # UR STRIP: ABNORMAL /HPF
RBC MORPH BLD: NORMAL
REF LAB TEST METHOD: ABNORMAL
RSV RNA NPH QL NAA+NON-PROBE: NOT DETECTED
SARS-COV-2 RNA RESP QL NAA+PROBE: DETECTED
SMALL PLATELETS BLD QL SMEAR: ADEQUATE
SODIUM SERPL-SCNC: 136 MMOL/L (ref 136–145)
SP GR UR STRIP: 1.02 (ref 1–1.03)
SQUAMOUS #/AREA URNS HPF: ABNORMAL /HPF
T4 FREE SERPL-MCNC: 1.16 NG/DL (ref 0.92–1.68)
TROPONIN T SERPL HS-MCNC: 10 NG/L
TSH SERPL DL<=0.05 MIU/L-ACNC: 1.08 UIU/ML (ref 0.27–4.2)
UROBILINOGEN UR QL STRIP: ABNORMAL
VARIANT LYMPHS NFR BLD MANUAL: 11 % (ref 0–5)
VARIANT LYMPHS NFR BLD MANUAL: 41 % (ref 19.6–45.3)
WBC # UR STRIP: ABNORMAL /HPF
WBC MORPH BLD: NORMAL
WBC NRBC COR # BLD AUTO: 5.09 10*3/MM3 (ref 3.4–10.8)
WHOLE BLOOD HOLD COAG: NORMAL
WHOLE BLOOD HOLD SPECIMEN: NORMAL

## 2024-08-19 PROCEDURE — 71045 X-RAY EXAM CHEST 1 VIEW: CPT

## 2024-08-19 PROCEDURE — 25010000002 HEPARIN (PORCINE) PER 1000 UNITS: Performed by: STUDENT IN AN ORGANIZED HEALTH CARE EDUCATION/TRAINING PROGRAM

## 2024-08-19 PROCEDURE — 25810000003 SEPSIS FLUID NS 0.9 % SOLUTION: Performed by: EMERGENCY MEDICINE

## 2024-08-19 PROCEDURE — 93005 ELECTROCARDIOGRAM TRACING: CPT | Performed by: EMERGENCY MEDICINE

## 2024-08-19 PROCEDURE — 25010000002 CEFTRIAXONE PER 250 MG: Performed by: EMERGENCY MEDICINE

## 2024-08-19 PROCEDURE — 84439 ASSAY OF FREE THYROXINE: CPT | Performed by: EMERGENCY MEDICINE

## 2024-08-19 PROCEDURE — 80053 COMPREHEN METABOLIC PANEL: CPT | Performed by: EMERGENCY MEDICINE

## 2024-08-19 PROCEDURE — 84100 ASSAY OF PHOSPHORUS: CPT | Performed by: STUDENT IN AN ORGANIZED HEALTH CARE EDUCATION/TRAINING PROGRAM

## 2024-08-19 PROCEDURE — 87040 BLOOD CULTURE FOR BACTERIA: CPT | Performed by: EMERGENCY MEDICINE

## 2024-08-19 PROCEDURE — 81001 URINALYSIS AUTO W/SCOPE: CPT | Performed by: EMERGENCY MEDICINE

## 2024-08-19 PROCEDURE — 93005 ELECTROCARDIOGRAM TRACING: CPT

## 2024-08-19 PROCEDURE — 87186 SC STD MICRODIL/AGAR DIL: CPT | Performed by: EMERGENCY MEDICINE

## 2024-08-19 PROCEDURE — 84443 ASSAY THYROID STIM HORMONE: CPT | Performed by: EMERGENCY MEDICINE

## 2024-08-19 PROCEDURE — 83735 ASSAY OF MAGNESIUM: CPT | Performed by: EMERGENCY MEDICINE

## 2024-08-19 PROCEDURE — 87086 URINE CULTURE/COLONY COUNT: CPT | Performed by: EMERGENCY MEDICINE

## 2024-08-19 PROCEDURE — 83880 ASSAY OF NATRIURETIC PEPTIDE: CPT | Performed by: EMERGENCY MEDICINE

## 2024-08-19 PROCEDURE — 83605 ASSAY OF LACTIC ACID: CPT | Performed by: EMERGENCY MEDICINE

## 2024-08-19 PROCEDURE — 93970 EXTREMITY STUDY: CPT | Performed by: SURGERY

## 2024-08-19 PROCEDURE — 82948 REAGENT STRIP/BLOOD GLUCOSE: CPT

## 2024-08-19 PROCEDURE — 99291 CRITICAL CARE FIRST HOUR: CPT

## 2024-08-19 PROCEDURE — 36415 COLL VENOUS BLD VENIPUNCTURE: CPT

## 2024-08-19 PROCEDURE — 87077 CULTURE AEROBIC IDENTIFY: CPT | Performed by: EMERGENCY MEDICINE

## 2024-08-19 PROCEDURE — 87637 SARSCOV2&INF A&B&RSV AMP PRB: CPT | Performed by: EMERGENCY MEDICINE

## 2024-08-19 PROCEDURE — 85025 COMPLETE CBC W/AUTO DIFF WBC: CPT | Performed by: EMERGENCY MEDICINE

## 2024-08-19 PROCEDURE — 82550 ASSAY OF CK (CPK): CPT | Performed by: EMERGENCY MEDICINE

## 2024-08-19 PROCEDURE — 99223 1ST HOSP IP/OBS HIGH 75: CPT | Performed by: STUDENT IN AN ORGANIZED HEALTH CARE EDUCATION/TRAINING PROGRAM

## 2024-08-19 PROCEDURE — 84484 ASSAY OF TROPONIN QUANT: CPT | Performed by: EMERGENCY MEDICINE

## 2024-08-19 PROCEDURE — 85007 BL SMEAR W/DIFF WBC COUNT: CPT | Performed by: EMERGENCY MEDICINE

## 2024-08-19 PROCEDURE — 93970 EXTREMITY STUDY: CPT

## 2024-08-19 RX ORDER — SODIUM CHLORIDE 0.9 % (FLUSH) 0.9 %
10 SYRINGE (ML) INJECTION EVERY 12 HOURS SCHEDULED
Status: DISCONTINUED | OUTPATIENT
Start: 2024-08-19 | End: 2024-08-22 | Stop reason: HOSPADM

## 2024-08-19 RX ORDER — POLYETHYLENE GLYCOL 3350 17 G/17G
17 POWDER, FOR SOLUTION ORAL DAILY PRN
Status: DISCONTINUED | OUTPATIENT
Start: 2024-08-19 | End: 2024-08-22 | Stop reason: HOSPADM

## 2024-08-19 RX ORDER — IBUPROFEN 600 MG/1
1 TABLET ORAL
Status: DISCONTINUED | OUTPATIENT
Start: 2024-08-19 | End: 2024-08-22 | Stop reason: HOSPADM

## 2024-08-19 RX ORDER — BISACODYL 5 MG/1
5 TABLET, DELAYED RELEASE ORAL DAILY PRN
Status: DISCONTINUED | OUTPATIENT
Start: 2024-08-19 | End: 2024-08-22 | Stop reason: HOSPADM

## 2024-08-19 RX ORDER — HEPARIN SODIUM 5000 [USP'U]/ML
5000 INJECTION, SOLUTION INTRAVENOUS; SUBCUTANEOUS EVERY 8 HOURS SCHEDULED
Status: DISCONTINUED | OUTPATIENT
Start: 2024-08-19 | End: 2024-08-22 | Stop reason: HOSPADM

## 2024-08-19 RX ORDER — NICOTINE POLACRILEX 4 MG
15 LOZENGE BUCCAL
Status: DISCONTINUED | OUTPATIENT
Start: 2024-08-19 | End: 2024-08-22 | Stop reason: HOSPADM

## 2024-08-19 RX ORDER — SODIUM CHLORIDE 0.9 % (FLUSH) 0.9 %
10 SYRINGE (ML) INJECTION AS NEEDED
Status: DISCONTINUED | OUTPATIENT
Start: 2024-08-19 | End: 2024-08-22 | Stop reason: HOSPADM

## 2024-08-19 RX ORDER — DEXTROSE MONOHYDRATE 25 G/50ML
25 INJECTION, SOLUTION INTRAVENOUS
Status: DISCONTINUED | OUTPATIENT
Start: 2024-08-19 | End: 2024-08-22 | Stop reason: HOSPADM

## 2024-08-19 RX ORDER — BISACODYL 10 MG
10 SUPPOSITORY, RECTAL RECTAL DAILY PRN
Status: DISCONTINUED | OUTPATIENT
Start: 2024-08-19 | End: 2024-08-22 | Stop reason: HOSPADM

## 2024-08-19 RX ORDER — SODIUM CHLORIDE 9 MG/ML
40 INJECTION, SOLUTION INTRAVENOUS AS NEEDED
Status: DISCONTINUED | OUTPATIENT
Start: 2024-08-19 | End: 2024-08-22 | Stop reason: HOSPADM

## 2024-08-19 RX ORDER — IPRATROPIUM BROMIDE AND ALBUTEROL SULFATE 2.5; .5 MG/3ML; MG/3ML
3 SOLUTION RESPIRATORY (INHALATION) EVERY 6 HOURS PRN
Status: DISCONTINUED | OUTPATIENT
Start: 2024-08-19 | End: 2024-08-22 | Stop reason: HOSPADM

## 2024-08-19 RX ORDER — ARFORMOTEROL TARTRATE 15 UG/2ML
15 SOLUTION RESPIRATORY (INHALATION)
Status: DISCONTINUED | OUTPATIENT
Start: 2024-08-19 | End: 2024-08-19

## 2024-08-19 RX ORDER — AMOXICILLIN 250 MG
2 CAPSULE ORAL 2 TIMES DAILY PRN
Status: DISCONTINUED | OUTPATIENT
Start: 2024-08-19 | End: 2024-08-22 | Stop reason: HOSPADM

## 2024-08-19 RX ORDER — INSULIN LISPRO 100 [IU]/ML
2-7 INJECTION, SOLUTION INTRAVENOUS; SUBCUTANEOUS
Status: DISCONTINUED | OUTPATIENT
Start: 2024-08-19 | End: 2024-08-22 | Stop reason: HOSPADM

## 2024-08-19 RX ORDER — ACETAMINOPHEN 325 MG/1
650 TABLET ORAL EVERY 6 HOURS PRN
Status: DISCONTINUED | OUTPATIENT
Start: 2024-08-19 | End: 2024-08-22 | Stop reason: HOSPADM

## 2024-08-19 RX ORDER — ACETAMINOPHEN 325 MG/1
650 TABLET ORAL ONCE
Status: COMPLETED | OUTPATIENT
Start: 2024-08-19 | End: 2024-08-19

## 2024-08-19 RX ORDER — ARFORMOTEROL TARTRATE 15 UG/2ML
15 SOLUTION RESPIRATORY (INHALATION)
Status: DISCONTINUED | OUTPATIENT
Start: 2024-08-20 | End: 2024-08-22 | Stop reason: HOSPADM

## 2024-08-19 RX ADMIN — CEFTRIAXONE SODIUM 2000 MG: 2 INJECTION, POWDER, FOR SOLUTION INTRAMUSCULAR; INTRAVENOUS at 09:16

## 2024-08-19 RX ADMIN — Medication 10 ML: at 22:45

## 2024-08-19 RX ADMIN — HEPARIN SODIUM 5000 UNITS: 5000 INJECTION INTRAVENOUS; SUBCUTANEOUS at 22:45

## 2024-08-19 RX ADMIN — SODIUM CHLORIDE 3030 ML: 9 INJECTION, SOLUTION INTRAVENOUS at 07:01

## 2024-08-19 RX ADMIN — ACETAMINOPHEN 650 MG: 325 TABLET ORAL at 22:44

## 2024-08-19 RX ADMIN — ACETAMINOPHEN 650 MG: 325 TABLET ORAL at 09:32

## 2024-08-19 RX ADMIN — NIRMATRELVIR AND RITONAVIR 3 TABLET: KIT at 14:02

## 2024-08-19 RX ADMIN — ARFORMOTEROL TARTRATE 15 MCG: 15 SOLUTION RESPIRATORY (INHALATION) at 21:10

## 2024-08-19 RX ADMIN — HEPARIN SODIUM 5000 UNITS: 5000 INJECTION INTRAVENOUS; SUBCUTANEOUS at 14:02

## 2024-08-19 NOTE — PLAN OF CARE
Goal Outcome Evaluation:  Plan of Care Reviewed With: patient        Progress: no change  Outcome Evaluation: patient arrived today from ER after several falls at home. Patient is covid positive. Isolation precautions in place. No complaint of pain or discomfort at this time.

## 2024-08-19 NOTE — PAYOR COMM NOTE
"Tyson Brandon P \"Gio\" (69 y.o. Male)       Date of Birth   1955    Social Security Number       Address   [address] [place] [address] [address] [place] [id number]    Home Phone   [phone]    MRN   [id number]       Uatsdin   None    Marital Status                               Admission Date   8/19/24    Admission Type   Emergency    Admitting Provider   Makeda Atkinson MD    Attending Provider   Makeda Atkinson MD    Department, Room/Bed   [address], 4019/1       Discharge Date       Discharge Disposition       Discharge Destination                                 Attending Provider: Makeda Atkinson MD    Allergies: No Known Allergies    Isolation: Contact Air   Infection: COVID (confirmed) (08/19/24)   Code Status: CPR    Ht: 190.5 cm (75\")   Wt: 101 kg (221 lb 9 oz)    Admission Cmt: None   Principal Problem: UTI (urinary tract infection) [N39.0]                   Active Insurance as of 8/19/2024       Primary Coverage       Payor Plan Insurance Group Employer/Plan Group    AETNA MEDICARE REPLACEMENT AETNA MEDICARE REPLACEMENT [id number]       Payor Plan Address Payor Plan Phone Number Payor Plan Fax Number Effective Dates    [address] [phone]  5/1/2024 - None Entered    [facility] [id number]         Subscriber Name Subscriber Birth Date Member ID       BRANDON CATALAN P 1955 [id number]               Secondary Coverage       Payor Plan Insurance Group Employer/Plan Group    ANTHEM MEDICAID AN[place]M MEDICAID [id number]       Payor Plan Address Payor Plan Phone Number Payor Plan Fax Number Effective Dates    [address] [address] [phone]  7/2/2019 - None Entered    [facility] [id number]         Subscriber Name Subscriber Birth Date Member ID       BRANDON CATALAN P 1955 [id number]                     Emergency Contacts        (Rel.) Home Phone Work Phone Mobile Phone    TYSONFERDINAND (Spouse) [phone] -- [phone]                 History & " Physical        Makeda Atkinson MD at 24 0839           [facility]  Date: [facility]   Patient Name: Brian Mehta  : 1955  MRN: [id number]  Primary Care Physician:  Sophie Capps APRN  Date of admission: 2024    Subjective  Subjective     Chief Complaint: Generalized weakness, recurrent falls    HPI:    Brian Mehta is a 69 y.o. male  with a past medical history of hypertension, hyperlipidemia, type 2 diabetes mellitus, HFrEF, COPD presented to the ED for evaluation of generalized weakness, repeated falls, difficulty taking care of self.  EMS was called by his wife as she was not able to take care of him at home due to his recurrent falls and generalized weakness.  History has been collected from the patient as well as from the patient's wife on phone.  Patient has history of CVA with residual right-sided weakness but usually walks with the help of walker and cane but for the past 2 days patient has been weak and difficulty getting out of the bed and has been falling and slipping out onto the floor when he tried to get out of the bed.  Did not hit his head.  Associated with increased urinary frequency and incontinence.  Denies any worsening weakness on the right side.  Denies any chest pain, shortness of breath, abdominal pain, nausea, vomiting, focal weakness, numbness, tingling.    In the ED, vital signs temperature 98.7, pulse 104, respiratory 22, blood pressure 149/81 on room air saturating around 92%.  Labs showed normal troponin, proBNP, calcium 8.4, albumin 3.4, rest of the CMP with no significant findings, normal TSH and free T4, normal lactic acid, normal WBC, hemoglobin, platelets.  Urinalysis showed 4+ bacteria, too numerous to count WBC, positive nitrates.  COVID-positive.  Urine cultures and blood cultures in process.  Chest x-ray showed mild vascular congestion with chronic scarring in the left lung base.  Received IV fluids and  ceftriaxone in the ED.  Patient has been admitted for further evaluation and management of UTI, COVID-19 infection, generalized weakness, recurrent falls    Personal History     Past Medical History:   Diagnosis Date    CHF (congestive heart failure)     SEE'S DR STRICKLAND NO CURRENT S/S    COPD (chronic obstructive pulmonary disease)     INHALER    Diabetes mellitus     DOESN'T CHECK BG OFTEN AT HOME    Hyperlipidemia     Hypertension     Sepsis 09/14/2023    Stroke 2017    RIGHT SIDE WEAKNESS         Past Surgical History:   Procedure Laterality Date    APPENDECTOMY      EYE SURGERY Bilateral     MISTY CATARACT         Family History   Problem Relation Age of Onset    No Known Problems Mother     No Known Problems Father     Malig Hyperthermia Neg Hx          Social History     Socioeconomic History    Marital status:    Tobacco Use    Smoking status: Every Day     Current packs/day: 2.50     Average packs/day: 2.5 packs/day for 52.6 years (131.6 ttl pk-yrs)     Types: Cigarettes     Start date: 1972     Passive exposure: Current    Smokeless tobacco: Never    Tobacco comments:     INSTRUCTED NO SMOKING 24 HOUR PRIOR TO SURGERY    Vaping Use    Vaping status: Never Used   Substance and Sexual Activity    Alcohol use: Yes    Drug use: Never    Sexual activity: Defer         Home Medications:  Dexcom G6 , Dexcom G6 Sensor, Dexcom G6 Transmitter, Insulin Glargine, Insulin Pen Needle, aspirin, atorvastatin, empagliflozin, furosemide, isosorbide mononitrate, losartan, and potassium chloride    Allergies:  No Known Allergies    Review of Systems   All other systems reviewed and negative except as mentioned above in the HPI    Objective  Objective     Vitals:   Temp:  [100.5 °F (38.1 °C)-100.7 °F (38.2 °C)] 100.7 °F (38.2 °C)  Heart Rate:  [] 97  Resp:  [14-22] 14  BP: (129-152)/(78-81) 152/78    Physical Exam    Constitutional: Awake, alert, no acute distress   Eyes: Pupils equal, sclerae anicteric, no  conjunctival injection   HENT: NCAT, mucous membranes dry   Neck: Supple, no thyromegaly, no lymphadenopathy, trachea midline   Respiratory: Bilateral air entry present, mild rhonchi on the lower lobes bilaterally, nonlabored respirations    Cardiovascular: RRR, no murmurs   Gastrointestinal: Positive bowel sounds, soft, nontender, nondistended   Musculoskeletal: 1+ edema right lower extremity, 2+ edema left lower extremity with significant swelling in the foot and calf region.  No clubbing or cyanosis to extremities   Psychiatric: Appropriate affect, cooperative   Neurologic: Oriented x 3, strength 4/5 right upper and right lower extremity, 5/5 left upper and left lower extremity cranial Nerves grossly intact to confrontation, speech clear      Result Review   Result Review:  I have personally reviewed the results from the time of this admission to 8/19/2024 09:16 EDT and agree with these findings:  [x]  Laboratory  []  Microbiology  [x]  Radiology  [x]  EKG/Telemetry   []  Cardiology/Vascular   []  Pathology  []  Old records  []  Other:        Imaging Results (Last 24 Hours)       Procedure Component Value Units Date/Time    XR Chest 1 View [[id number]] Collected: 08/19/24 0553     Updated: 08/19/24 0556    Narrative:      XR CHEST 1 VW    Date of Exam: 8/19/2024 5:50 AM EDT    Indication: Weak/Dizzy/AMS triage protocol    Comparison: 3/10/2024    Findings:  Heart size is normal. There is some vascular congestion. There is chronic scarring at the left lung base. The lungs are otherwise clear except for granulomatous calcification. No pneumothorax.      Impression:      Mild vascular congestion with chronic scarring at the left lung base.      Electronically Signed: Durga Zurita MD    8/19/2024 5:54 AM EDT    Workstation ID: [id number]             acetaminophen, 650 mg, Oral, Once  cefTRIAXone, 2,000 mg, Intravenous, Once         Assessment & Plan  Assessment / Plan     Assessment/Plan:   UTI  COVID-19  infection  Generalized weakness  Recent recurrent falls  Left lower extremity swelling, rule out DVT  Hypertension  Hyperlipidemia  Type 2 diabetes mellitus  HFrEF, not in exacerbation  COPD  History of CVA with residual right-sided weakness    Plan  Admit to inpatient, remote telemetry  Continue ceftriaxone  Follow-up on blood cultures, urine cultures  Pharmacy consult for Paxlovid  Fall precautions  PT OT consult  Nursing dysphagia screen  Follow-up on ultrasound venous Doppler bilateral lower extremity to evaluate for DVT  Heart healthy, consistent carb diet  Low-dose sliding scale insulin  Brovana  Bronchodilator protocol  DuoNebs every 6 hours as needed  Resume other appropriate home medications once reconciled      VTE Prophylaxis:  Pharmacologic VTE prophylaxis orders are signed & held.      CODE STATUS:    Level Of Support Discussed With: Patient  Code Status (Patient has no pulse and is not breathing): CPR (Attempt to Resuscitate)  Medical Interventions (Patient has pulse or is breathing): Full Support      Admission Status:  I believe this patient meets inpatient status.    Part of this note may be an electronic transcription/translation of spoken language to printed text using the Dragon Dictation System    Makeda Atkinson MD               Electronically signed by Makeda Atkinson MD at 08/19/24 0916          Emergency Department Notes        Dulce Moody RN at 08/19/24 0713          Report received from MYA Nunes     Electronically signed by Dulce Moody RN at 08/19/24 0713       Ronnell Gan DO at 08/19/24 0604          Time: 6:04 AM EDT  Date of encounter:  8/19/2024  Independent Historian/Clinical History and Information was obtained by:   Patient and EMS    History is limited by: N/A    Chief Complaint: Generalized weakness, repeated falls, difficulty taking care of self.      History of Present Illness:  Patient is a 69 y.o. year old male who presents to the emergency  department for evaluation of generalized weakness, repeated falls, difficulty taking care of self.  This patient presents from home after his wife called EMS because she cannot take care of him.  The patient keeps falling according to the wife and according to the patient.  The patient denies any pain or trauma secondary to her falls however he states that he has had increasing weakness.  The patient has had a previous stroke with resultant right-sided weakness.  The patient presented covered with feces which appeared to be caked on an old.  In addition he has some developing bedsores in the gluteal area.      Patient Care Team  Primary Care Provider: Sophie Capps APRN    Past Medical History:     No Known Allergies  Past Medical History:   Diagnosis Date    CHF (congestive heart failure)     SEE'S DR STRICKLAND NO CURRENT S/S    COPD (chronic obstructive pulmonary disease)     INHALER    Diabetes mellitus     DOESN'T CHECK BG OFTEN AT HOME    Hyperlipidemia     Hypertension     Sepsis 09/14/2023    Stroke 2017    RIGHT SIDE WEAKNESS     Past Surgical History:   Procedure Laterality Date    APPENDECTOMY      EYE SURGERY Bilateral     MISTY CATARACT     Family History   Problem Relation Age of Onset    No Known Problems Mother     No Known Problems Father     Malig Hyperthermia Neg Hx        Home Medications:  Prior to Admission medications    Medication Sig Start Date End Date Taking? Authorizing Provider   aspirin 81 MG chewable tablet Chew 1 tablet Daily. 4/26/24   Sophie Capps APRN   atorvastatin (LIPITOR) 20 MG tablet TAKE 1 TABLET BY MOUTH EVERY NIGHT 8/7/24   Sophie Capps APRN   Continuous Glucose  (Dexcom G6 ) device Use 1 each 4 (Four) Times a Day Before Meals & at Bedtime As Needed (glucose). 4/26/24   Sophie Capps APRN   Continuous Glucose Sensor (Dexcom G6 Sensor) Use Every 10 (Ten) Days. 4/26/24   Sophie Capps APRN   Continuous Glucose Transmitter (Dexcom G6 Transmitter)  misc Use 1 each Every 3 (Three) Months. 4/26/24   Sophie Capps APRN   furosemide (Lasix) 20 MG tablet Take 1 tablet by mouth 2 (Two) Times a Day. 8/14/24   Sophie Capps APRN   Insulin Glargine (Lantus SoloStar) 100 UNIT/ML injection pen INJECT 10 UNITS SUBCUTANEOUSLY INTO THE APPROPRIATE AREA EVERY NIGHT AS DIRECTED 6/14/24   Sophie Capps APRN   Insulin Pen Needle 32G X 6 MM misc Use 1 each Daily. 6/26/24   Sophie Capps APRN   isosorbide mononitrate (IMDUR) 30 MG 24 hr tablet Take 1 tablet by mouth Daily. 7/8/24   Radha Wolf APRN   Jardiance 25 MG tablet tablet TAKE 1 TABLET BY MOUTH DAILY 8/7/24   Sophie Capps APRN   losartan (COZAAR) 25 MG tablet TAKE 1 TABLET BY MOUTH DAILY 8/7/24   Sophie Capps APRN   potassium chloride 10 MEQ CR tablet Take 1 tablet by mouth 2 (Two) Times a Day. 8/14/24   Sophie Capps APRN        Social History:   Social History     Tobacco Use    Smoking status: Every Day     Current packs/day: 2.50     Average packs/day: 2.5 packs/day for 52.6 years (131.6 ttl pk-yrs)     Types: Cigarettes     Start date: 1972     Passive exposure: Current    Smokeless tobacco: Never    Tobacco comments:     INSTRUCTED NO SMOKING 24 HOUR PRIOR TO SURGERY    Vaping Use    Vaping status: Never Used   Substance Use Topics    Alcohol use: Yes    Drug use: Never         Review of Systems:  Review of Systems   Constitutional:  Positive for activity change, appetite change, chills, fatigue and fever.   HENT:  Negative for congestion, ear pain and sore throat.    Eyes:  Negative for pain.   Respiratory:  Negative for cough, chest tightness and shortness of breath.    Cardiovascular:  Negative for chest pain.   Gastrointestinal:  Negative for abdominal pain, diarrhea, nausea and vomiting.   Genitourinary:  Negative for flank pain and hematuria.   Musculoskeletal:  Negative for joint swelling.   Skin:  Positive for rash. Negative for pallor.        Bedsores on gluteus  "  Neurological:  Positive for weakness. Negative for seizures and headaches.   All other systems reviewed and are negative.       Physical Exam:  /58 (BP Location: Left arm, Patient Position: Lying)   Pulse 92   Temp 99.3 °F (37.4 °C) (Oral)   Resp 18   Ht 190.5 cm (75\")   Wt 101 kg (221 lb 9 oz)   SpO2 98%   BMI 27.69 kg/m²     Physical Exam  Vitals and nursing note reviewed.   Constitutional:       General: He is not in acute distress.     Appearance: Normal appearance. He is not toxic-appearing.   HENT:      Head: Normocephalic and atraumatic.      Right Ear: External ear normal.      Left Ear: External ear normal.      Nose: Nose normal.      Mouth/Throat:      Mouth: Mucous membranes are moist.   Eyes:      General: No scleral icterus.     Extraocular Movements: Extraocular movements intact.      Pupils: Pupils are equal, round, and reactive to light.   Cardiovascular:      Rate and Rhythm: Normal rate and regular rhythm. Tachycardia present.      Pulses: Normal pulses.      Heart sounds: Normal heart sounds.   Pulmonary:      Effort: Pulmonary effort is normal. No respiratory distress.      Breath sounds: Normal breath sounds.   Abdominal:      General: Abdomen is flat. Bowel sounds are normal.      Palpations: Abdomen is soft.      Tenderness: There is no abdominal tenderness.   Musculoskeletal:         General: Normal range of motion.      Cervical back: Normal range of motion and neck supple.      Right lower leg: Edema present.      Left lower leg: Edema present.   Skin:     General: Skin is warm and dry.      Capillary Refill: Capillary refill takes less than 2 seconds.      Comments: There is diffuse erythema with skin breakdown and noted to the buttocks bilaterally.  There is erythema to the right lower extremity   Neurological:      General: No focal deficit present.      Mental Status: He is alert and oriented to person, place, and time. Mental status is at baseline.      Motor: Weakness " present.      Comments: Patient has generalized weakness superimposed upon right sided hemiparesis.   Psychiatric:         Mood and Affect: Mood normal.         Behavior: Behavior normal.                  Procedures:  Procedures      Medical Decision Making:      Comorbidities that affect care:    Prior stroke and hypertension    External Notes reviewed:    Previous Clinic Note: Office visit for hypertension.      The following orders were placed and all results were independently analyzed by me:  Orders Placed This Encounter   Procedures    Blood Culture - Blood,    Blood Culture - Blood,    COVID-19, FLU A/B, RSV PCR 1 HR TAT - Swab, Nasopharynx    Urine Culture - Urine,    XR Chest 1 View    [place] Draw    Comprehensive Metabolic Panel    Single High Sensitivity Troponin T    Magnesium    CBC Auto Differential    Manual Differential    Urinalysis With Culture If Indicated - Urine, Clean Catch    CK    TSH    T4, Free    Lactic Acid, Plasma    BNP    Urinalysis, Microscopic Only - Urine, Clean Catch    Phosphorus    Magnesium    Comprehensive Metabolic Panel    Phosphorus    Diet: Cardiac, Diabetic; Healthy Heart (2-3 Na+); Consistent Carbohydrate; Fluid Consistency: Thin (IDDSI 0)    Undress & Gown    Continuous Pulse Oximetry    Vital Signs    Orthostatic Blood Pressure    Nursing Dysphagia Screening (Complete Prior to Giving Anything By Mouth)    Vital Signs    Intake & Output    Weigh Patient    Oral Care    Saline Lock & Maintain IV Access    Code Status and Medical Interventions: CPR (Attempt to Resuscitate); Full Support    Hospitalist (on-call MD unless specified)    Inpatient Case Management  Consult    Inpatient Diabetes Educator Consult    OT Consult: Eval & Treat ADL Performance Below Baseline, Discharge Placement Assessment    PT Consult: Eval & Treat Discharge Placement Assessment, Functional Mobility Below Baseline    Oxygen Therapy- Nasal Cannula; Titrate 1-6 LPM Per SpO2; 90 - 95%     RT to Initiate Bronchodilator Protocol    Incentive Spirometry    POC Glucose Once    POC Glucose 4x Daily Before Meals & at Bedtime    ECG 12 Lead ED Triage Standing Order; Weak / Dizzy / AMS    Insert Peripheral IV    Insert Peripheral IV    Inpatient Admission    Fall Precautions    Fall Precautions    CBC & Differential    Green Top (Gel)    Lavender Top    Gold Top - SST    Light Blue Top    CBC & Differential       Medications Given in the Emergency Department:  Medications   sodium chloride 0.9 % flush 10 mL (has no administration in time range)   sodium chloride 0.9 % flush 10 mL (10 mL Intravenous Not Given 8/19/24 1252)   sodium chloride 0.9 % flush 10 mL (has no administration in time range)   sodium chloride 0.9 % infusion 40 mL (has no administration in time range)   heparin (porcine) 5000 UNIT/ML injection 5,000 Units (5,000 Units Subcutaneous Given 8/19/24 1402)   sennosides-docusate (PERICOLACE) 8.6-50 MG per tablet 2 tablet (has no administration in time range)     And   polyethylene glycol (MIRALAX) packet 17 g (has no administration in time range)     And   bisacodyl (DULCOLAX) EC tablet 5 mg (has no administration in time range)     And   bisacodyl (DULCOLAX) suppository 10 mg (has no administration in time range)   cefTRIAXone (ROCEPHIN) 1,000 mg in sodium chloride 0.9 % 100 mL IVPB-VTB (has no administration in time range)   arformoterol (BROVANA) nebulizer solution 15 mcg (has no administration in time range)   ipratropium-albuterol (DUO-NEB) nebulizer solution 3 mL (has no administration in time range)   dextrose (GLUTOSE) oral gel 15 g (has no administration in time range)   dextrose (D50W) (25 g/50 mL) IV injection 25 g (has no administration in time range)   glucagon (GLUCAGEN) injection 1 mg (has no administration in time range)   Insulin Lispro (humaLOG) injection 2-7 Units ( Subcutaneous Not Given 8/19/24 1252)   acetaminophen (TYLENOL) tablet 650 mg (has no administration in time  range)   nirmatrelvir and ritonavir (300mg/100mg)(PAXLOVID) 3 tablet pack (3 tablets Oral Given 8/19/24 1402)   sepsis fluid NS 0.9 % bolus 3,030 mL (3,030 mL Intravenous New Bag 8/19/24 0701)   cefTRIAXone (ROCEPHIN) 2,000 mg in sodium chloride 0.9 % 100 mL IVPB-VTB (0 mg Intravenous Stopped 8/19/24 0946)   acetaminophen (TYLENOL) tablet 650 mg (650 mg Oral Given 8/19/24 0932)        ED Course:       EKG:  Sinus tachycardia with rate of 107 bpm  No acute ischemic changes.      Labs:    Lab Results (last 24 hours)       Procedure Component Value Units Date/Time    Comprehensive Metabolic Panel [[id number]]  (Abnormal) Collected: 08/19/24 0549    Specimen: Blood Updated: 08/19/24 0616     Glucose 192 mg/dL      BUN 19 mg/dL      Creatinine 1.10 mg/dL      Sodium 136 mmol/L      Potassium 4.5 mmol/L      Chloride 99 mmol/L      CO2 27.4 mmol/L      Calcium 8.4 mg/dL      Total Protein 7.4 g/dL      Albumin 3.4 g/dL      ALT (SGPT) 10 U/L      AST (SGOT) 20 U/L      Alkaline Phosphatase 95 U/L      Total Bilirubin 0.4 mg/dL      Globulin 4.0 gm/dL      A/G Ratio 0.9 g/dL      BUN/Creatinine Ratio 17.3     Anion Gap 9.6 mmol/L      eGFR 73.6 mL/min/1.73     Narrative:      GFR Normal >60  Chronic Kidney Disease <60  Kidney Failure <15      Single High Sensitivity Troponin T [[id number]]  (Normal) Collected: 08/19/24 0549    Specimen: Blood Updated: 08/19/24 0616     HS Troponin T 10 ng/L     Narrative:      High Sensitive Troponin T Reference Range:  <14.0 ng/L- Negative Female for AMI  <22.0 ng/L- Negative Male for AMI  >=14 - Abnormal Female indicating possible myocardial injury.  >=22 - Abnormal Male indicating possible myocardial injury.   Clinicians would have to utilize clinical acumen, EKG, Troponin, and serial changes to determine if it is an Acute Myocardial Infarction or myocardial injury due to an underlying chronic condition.         Magnesium [[id number]]  (Normal) Collected: 08/19/24 0549    Specimen: Blood  Updated: 08/19/24 0616     Magnesium 1.8 mg/dL     CK [[id number]]  (Normal) Collected: 08/19/24 0549    Specimen: Blood Updated: 08/19/24 0707     Creatine Kinase 76 U/L     TSH [[id number]]  (Normal) Collected: 08/19/24 0549    Specimen: Blood Updated: 08/19/24 0723     TSH 1.080 uIU/mL     T4, Free [[id number]]  (Normal) Collected: 08/19/24 0549    Specimen: Blood Updated: 08/19/24 0723     Free T4 1.16 ng/dL     BNP [[id number]]  (Normal) Collected: 08/19/24 0549    Specimen: Blood Updated: 08/19/24 0723     proBNP 634.3 pg/mL     Narrative:      This assay is used as an aid in the diagnosis of individuals suspected of having heart failure. It can be used as an aid in the diagnosis of acute decompensated heart failure (ADHF) in patients presenting with signs and symptoms of ADHF to the emergency department (ED). In addition, NT-proBNP of <300 pg/mL indicates ADHF is not likely.    Age Range Result Interpretation  NT-proBNP Concentration (pg/mL:      <50             Positive            >450                   Gray                 300-450                    Negative             <300    50-75           Positive            >900                  Gray                300-900                  Negative            <300      >75             Positive            >1800                  Gray                300-1800                  Negative            <300    Phosphorus [[id number]]  (Normal) Collected: 08/19/24 0549    Specimen: Blood Updated: 08/19/24 1313     Phosphorus 2.8 mg/dL     CBC & Differential [[id number]]  (Abnormal) Collected: 08/19/24 0550    Specimen: Blood Updated: 08/19/24 0644    Narrative:      The following orders were created for panel order CBC & Differential.  Procedure                               Abnormality         Status                     ---------                               -----------         ------                     CBC Auto Differential[[id number]]        Abnormal            Final result                Scan Slide[[id number]]                                                                    Please view results for these tests on the individual orders.    CBC Auto Differential [[id number]]  (Abnormal) Collected: 08/19/24 0550    Specimen: Blood Updated: 08/19/24 0617     WBC 5.09 10*3/mm3      RBC 5.33 10*6/mm3      Hemoglobin 14.1 g/dL      Hematocrit 43.1 %      MCV 80.9 fL      MCH 26.5 pg      MCHC 32.7 g/dL      RDW 15.8 %      RDW-SD 45.6 fl      MPV 9.7 fL      Platelets 172 10*3/mm3      Neutrophil % 25.4 %      Lymphocyte % 59.9 %      Monocyte % 14.3 %      Eosinophil % 0.0 %      Basophil % 0.2 %      Immature Grans % 0.2 %      Neutrophils, Absolute 1.29 10*3/mm3      Lymphocytes, Absolute 3.05 10*3/mm3      Monocytes, Absolute 0.73 10*3/mm3      Eosinophils, Absolute 0.00 10*3/mm3      Basophils, Absolute 0.01 10*3/mm3      Immature Grans, Absolute 0.01 10*3/mm3      nRBC 0.0 /100 WBC     Manual Differential [[id number]]  (Abnormal) Collected: 08/19/24 0550    Specimen: Blood Updated: 08/19/24 0654     Neutrophil % 29.0 %      Lymphocyte % 41.0 %      Monocyte % 14.0 %      Bands %  2.0 %      Promyelocyte % 3.0 %      Atypical Lymphocyte % 11.0 %      Neutrophils Absolute 1.58 10*3/mm3      Lymphocytes Absolute 2.65 10*3/mm3      Monocytes Absolute 0.71 10*3/mm3      RBC Morphology Normal     WBC Morphology Normal     Platelet Estimate Adequate     Large Platelets Slight/1+    COVID-19, FLU A/B, RSV PCR 1 HR TAT - Swab, Nasopharynx [[id number]]  (Abnormal) Collected: 08/19/24 0658    Specimen: Swab from Nasopharynx Updated: 08/19/24 0813     COVID19 Detected     Influenza A PCR Not Detected     Influenza B PCR Not Detected     RSV, PCR Not Detected    Narrative:      Fact sheet for providers: https://www.fda.gov/media/838720/download    Fact sheet for patients: https://www.fda.gov/media/018454/download    Test performed by PCR.    Blood Culture - Blood, Arm, Right [[id number]] Collected: 08/19/24  0701    Specimen: Blood from Arm, Right Updated: 08/19/24 0706    Blood Culture - Blood, Arm, Left [[id number]] Collected: 08/19/24 0701    Specimen: Blood from Arm, Left Updated: 08/19/24 0707    Lactic Acid, Plasma [[id number]]  (Normal) Collected: 08/19/24 0701    Specimen: Blood Updated: 08/19/24 0726     Lactate 1.3 mmol/L     Urinalysis With Culture If Indicated - Urine, Clean Catch [[id number]]  (Abnormal) Collected: 08/19/24 0800    Specimen: Urine, Clean Catch Updated: 08/19/24 0815     Color, UA Yellow     Appearance, UA Cloudy     pH, UA 5.5     Specific Gravity, UA 1.018     Glucose, UA Negative     Ketones, UA Negative     Bilirubin, UA Negative     Blood, UA Large (3+)     Protein, UA 30 mg/dL (1+)     Leuk Esterase, UA Moderate (2+)     Nitrite, UA Positive     Urobilinogen, UA 1.0 E.U./dL    Narrative:      In absence of clinical symptoms, the presence of pyuria, bacteria, and/or nitrites on the urinalysis result does not correlate with infection.    Urinalysis, Microscopic Only - Urine, Clean Catch [[id number]]  (Abnormal) Collected: 08/19/24 0800    Specimen: Urine, Clean Catch Updated: 08/19/24 0815     RBC, UA Too Numerous to Count /HPF      WBC, UA Too Numerous to Count /HPF      Bacteria, UA 4+ /HPF      Squamous Epithelial Cells, UA 0-2 /HPF      Hyaline Casts, UA 3-6 /LPF      Methodology Automated Microscopy    Urine Culture - Urine, Urine, Clean Catch [[id number]] Collected: 08/19/24 0800    Specimen: Urine, Clean Catch Updated: 08/19/24 0810             Imaging:    XR Chest 1 View    Result Date: 8/19/2024  XR CHEST 1 VW Date of Exam: 8/19/2024 5:50 AM EDT Indication: Weak/Dizzy/AMS triage protocol Comparison: 3/10/2024 Findings: Heart size is normal. There is some vascular congestion. There is chronic scarring at the left lung base. The lungs are otherwise clear except for granulomatous calcification. No pneumothorax.     Mild vascular congestion with chronic scarring at the left lung base.  Electronically Signed: Durga Zurita MD  8/19/2024 5:54 AM EDT  Workstation ID: [id number]       Differential Diagnosis and Discussion:    Fever: Based on the complaint of fever, differential diagnosis includes but is not limited to meningitis, pneumonia, pyelonephritis, acute uti,  systemic immune response syndrome, sepsis, viral syndrome, fungal infection, tick born illness and other bacterial infections.  Weakness: Based on the patient's history, signs, and symptoms, the diffential diagnosis includes but is not limited to meningitis, stroke, sepsis, subarachnoid hemorrhage, intracranial bleeding, encephalitis, acute uti, dehydration, MS, myasthenia gravis, Guillan [place], migraine variant, neuromuscular disorders vertigo, electrolyte imbalance, and metabolic disorders.    All labs were reviewed and interpreted by me.  All X-rays impressions were independently interpreted by me.  EKG was interpreted by me.    MDM     Amount and/or Complexity of Data Reviewed  Clinical lab tests: reviewed  Tests in the radiology section of CPT®: reviewed  Tests in the medicine section of CPT®: reviewed         Critical Care Note: Total Critical Care time of 45 minutes. Total critical care time documented does not include time spent on separately billed procedures for services of nurses or physician assistants. I personally saw and examined the patient. I have reviewed all diagnostic interpretations and treatment plans as written. I was present for the key portions of any procedures performed and the inclusive time noted in any critical care statement. Critical care time includes patient management by me, time spent at the patients bedside,  time to review lab and imaging results, discussing patient care, documentation in the medical record, and time spent with family or caregiver.    Sepsis criteria was met in the emergency department and the Sepsis protocol (including antibiotic administration) was initiated.      SIRS criteria  considered:   1.  Temperature > 100.4 or <96.8    2.  Heart Rate > 90    3.  Respiratory Rate > 22    4.  WBC > 12K or <4K.             Severe Sepsis:     Respiratory: Mechanical Ventilation or Bipap  Hypotension: SBP > 90 or MAP < 65  Renal: Creatinine > 2  Metabolic: Lactic Acid > 2  Hematologic: Platelets < 100K or INR > 1.5  Hepatic: BILI  >  2  CNS: Sudden AMS     Septic Shock:     Severe Sepsis + Persistent hypotension or Lactic Acid > 4     Normal saline bolus, Antibiotics, and final disposition was based on these definitions.        Sepsis was recognized at 0800    Antibiotics were ordered.     30 cc/kg bolus was  indicated.           The patient was ordered 3030 ml  of fluids.    The patient presents with 2 out of the 4 SIRS criteria and a suspected source for sepsis.  Patient was evaluated and placed on a cardiac monitor for fear of worsening tachycardia and life-threatening hypotension.  Patient was monitored for shock and signs of end-organ damage.  Mental status was repeatedly checked throughout the ED stay.  Medications were ordered by me which includes Rocephin.  The case was discussed at length with admitting physician.    Total Critical Care time of 45 minutes. Total critical care time documented does not include time spent on separately billed procedures for services of nurses or physician assistants. I personally saw and examined the patient. I have reviewed all diagnostic interpretations and treatment plans as written. I was present for the key portions of any procedures performed and the inclusive time noted in any critical care statement. Critical care time includes patient management by me, time spent at the patients bedside,  time to review lab and imaging results, discussing patient care, documentation in the medical record, and time spent with family or caregiver.    Patient Care Considerations:    CT CHEST: I considered ordering a CT scan of the chest, however the patient has a coronavirus  infection to explain his symptoms      Consultants/Shared Management Plan:    Hospitalist: I have discussed the case with hospitalist who agrees to accept the patient for admission.    Social Determinants of Health:    Patient is unable to carry out activities of daily life. Escalation of care is necessary.       Disposition and Care Coordination:    Admit:   Through independent evaluation of the patient's history, physical, and imperical data, the patient meets criteria for inpatient admission to the hospital.        Final diagnoses:   Coronavirus infection   Acute UTI   Sepsis, due to unspecified organism, unspecified whether acute organ dysfunction present        ED Disposition       ED Disposition   Decision to Admit    Condition   --    Comment   Level of Care: Remote Telemetry [26]   Diagnosis: UTI (urinary tract infection) [470645]   Certification: I Certify That Inpatient Hospital Services Are Medically Necessary For Greater Than 2 Midnights                 This medical record created using voice recognition software.             Ronnell Gan DO  08/19/24 1624      Electronically signed by Ronnell Gan DO at 08/19/24 1624       Facility-Administered Medications as of 8/19/2024   Medication Dose Route Frequency Provider Last Rate Last Admin    [COMPLETED] acetaminophen (TYLENOL) tablet 650 mg  650 mg Oral Once Makeda Atkinson MD   650 mg at 08/19/24 0932    acetaminophen (TYLENOL) tablet 650 mg  650 mg Oral Q6H PRN Makeda Atkinson MD        arformoterol (BROVANA) nebulizer solution 15 mcg  15 mcg Nebulization BID - RT Makeda Atkinson MD        sennosides-docusate (PERICOLACE) 8.6-50 MG per tablet 2 tablet  2 tablet Oral BID PRN Makeda Atkinson MD        And    polyethylene glycol (MIRALAX) packet 17 g  17 g Oral Daily PRN Makeda Atkinson MD        And    bisacodyl (DULCOLAX) EC tablet 5 mg  5 mg Oral Daily PRN Makeda Atkinson MD        And    bisacodyl (DULCOLAX)  suppository 10 mg  10 mg Rectal Daily PRN Makeda Atkinson MD        [START ON 8/20/2024] cefTRIAXone (ROCEPHIN) 1,000 mg in sodium chloride 0.9 % 100 mL IVPB-VTB  1,000 mg Intravenous Q24H Makeda Atkinson MD        [COMPLETED] cefTRIAXone (ROCEPHIN) 2,000 mg in sodium chloride 0.9 % 100 mL IVPB-VTB  2,000 mg Intravenous Once Ronnell Gan, DO   Stopped at 08/19/24 0946    dextrose (D50W) (25 g/50 mL) IV injection 25 g  25 g Intravenous Q15 Min PRN Makeda Atkinson MD        dextrose (GLUTOSE) oral gel 15 g  15 g Oral Q15 Min PRN Makeda Atkinson MD        glucagon (GLUCAGEN) injection 1 mg  1 mg Intramuscular Q15 Min PRN Makeda Atkinson MD        heparin (porcine) 5000 UNIT/ML injection 5,000 Units  5,000 Units Subcutaneous Q8H Makeda Atkinson MD   5,000 Units at 08/19/24 1402    Insulin Lispro (humaLOG) injection 2-7 Units  2-7 Units Subcutaneous 4x Daily AC & at Bedtime Makeda Atkinson MD        ipratropium-albuterol (DUO-NEB) nebulizer solution 3 mL  3 mL Nebulization Q6H PRN Makeda Atkinson MD        nirmatrelvir and ritonavir (300mg/100mg)(PAXLOVID) 3 tablet pack  3 tablet Oral BID Makeda Atkinson MD   3 tablet at 08/19/24 1402    [COMPLETED] sepsis fluid NS 0.9 % bolus 3,030 mL  30 mL/kg Intravenous Once Ronnell Gan DO   3,030 mL at 08/19/24 0701    sodium chloride 0.9 % flush 10 mL  10 mL Intravenous PRN Makeda Atkinson MD        sodium chloride 0.9 % flush 10 mL  10 mL Intravenous Q12H Makeda Atkinson MD        sodium chloride 0.9 % flush 10 mL  10 mL Intravenous PRN Makeda Atkinson MD        sodium chloride 0.9 % infusion 40 mL  40 mL Intravenous PRN Makeda Atkinson MD         Orders (active)        Start     Ordered    08/20/24 0900  cefTRIAXone (ROCEPHIN) 1,000 mg in sodium chloride 0.9 % 100 mL IVPB-VTB  Every 24 Hours         08/19/24 1249    08/20/24 0600  Phosphorus  Morning Draw         08/19/24 1249    08/20/24 0600  Magnesium  Morning  Draw         08/19/24 1249    08/20/24 0600  Comprehensive Metabolic Panel  Morning Draw         08/19/24 1249    08/20/24 0600  CBC & Differential  Morning Draw         08/19/24 1249    08/19/24 1800  Oral Care  2 Times Daily       08/19/24 1249    08/19/24 1700  POC Glucose 4x Daily Before Meals & at Bedtime  4 Times Daily Before Meals & at Bedtime      Comments: Complete no more than 45 minutes prior to patient eating      08/19/24 1249    08/19/24 1600  Vital Signs  Every 4 Hours       08/19/24 1249    08/19/24 1400  heparin (porcine) 5000 UNIT/ML injection 5,000 Units  Every 8 Hours Scheduled         08/19/24 1249    08/19/24 1400  Incentive Spirometry  Every 4 Hours While Awake       08/19/24 1249    08/19/24 1400  nirmatrelvir and ritonavir (300mg/100mg)(PAXLOVID) 3 tablet pack  2 Times Daily        Note to Pharmacy: Not authorized for use in patients younger than 12 years.    08/19/24 1327    08/19/24 1340  Inpatient Diabetes Educator Consult  Once        Provider:  (Not yet assigned)    08/19/24 1339    08/19/24 1339  Inpatient Case Management  Consult  Once        Provider:  (Not yet assigned)    08/19/24 1339    08/19/24 1315  sodium chloride 0.9 % flush 10 mL  Every 12 Hours Scheduled         08/19/24 1249    08/19/24 1315  arformoterol (BROVANA) nebulizer solution 15 mcg  2 Times Daily - RT         08/19/24 1249    08/19/24 1315  Insulin Lispro (humaLOG) injection 2-7 Units  4 Times Daily Before Meals & Nightly         08/19/24 1249    08/19/24 1250  Intake & Output  Every Shift       08/19/24 1249    08/19/24 1250  Insert Peripheral IV  Once         08/19/24 1249    08/19/24 1250  Fall Precautions  Continuous         08/19/24 1249    08/19/24 1250  PT Consult: Eval & Treat Discharge Placement Assessment, Functional Mobility Below Baseline  Once         08/19/24 1249    08/19/24 1250  OT Consult: Eval & Treat ADL Performance Below Baseline, Discharge Placement Assessment  Once          "08/19/24 1249    08/19/24 1250  RT to Initiate Bronchodilator Protocol  Once         08/19/24 1249    08/19/24 1249  sodium chloride 0.9 % flush 10 mL  As Needed         08/19/24 1249    08/19/24 1249  sodium chloride 0.9 % infusion 40 mL  As Needed         08/19/24 1249    08/19/24 1249  sennosides-docusate (PERICOLACE) 8.6-50 MG per tablet 2 tablet  2 Times Daily PRN        Placed in \"And\" Linked Group    08/19/24 1249    08/19/24 1249  polyethylene glycol (MIRALAX) packet 17 g  Daily PRN        Placed in \"And\" Linked Group    08/19/24 1249    08/19/24 1249  bisacodyl (DULCOLAX) EC tablet 5 mg  Daily PRN        Placed in \"And\" Linked Group    08/19/24 1249    08/19/24 1249  bisacodyl (DULCOLAX) suppository 10 mg  Daily PRN        Placed in \"And\" Linked Group    08/19/24 1249    08/19/24 1249  ipratropium-albuterol (DUO-NEB) nebulizer solution 3 mL  Every 6 Hours PRN         08/19/24 1249    08/19/24 1249  dextrose (GLUTOSE) oral gel 15 g  Every 15 Minutes PRN         08/19/24 1249    08/19/24 1249  dextrose (D50W) (25 g/50 mL) IV injection 25 g  Every 15 Minutes PRN         08/19/24 1249    08/19/24 1249  glucagon (GLUCAGEN) injection 1 mg  Every 15 Minutes PRN         08/19/24 1249    08/19/24 1249  acetaminophen (TYLENOL) tablet 650 mg  Every 6 Hours PRN         08/19/24 1249    08/19/24 1052  Duplex Venous Lower Extremity - Bilateral CV-READ  Once         08/19/24 1052    08/19/24 0913  Code Status and Medical Interventions: CPR (Attempt to Resuscitate); Full Support  Continuous         08/19/24 0916    08/19/24 0909  Diet: Cardiac, Diabetic; Healthy Heart (2-3 Na+); Consistent Carbohydrate; Fluid Consistency: Thin (IDDSI 0)  Diet Effective Now         08/19/24 0908    08/19/24 0819  Hospitalist (on-call MD unless specified)  Once        Specialty:  Hospitalist  Provider:  Makeda Atkinson MD    08/19/24 0819    08/19/24 0811  Urine Culture - Urine, Urine, Clean Catch  Once         08/19/24 0810    08/19/24 " 0650  Blood Culture - Blood, Arm, Right  Once         08/19/24 0650    08/19/24 0650  Blood Culture - Blood, Arm, Left  Once         08/19/24 0650    08/19/24 0538  Cardiac Monitoring  Continuous        Comments: Follow Standing Orders As Outlined in Process Instructions (Open Order Report to View Full Instructions)    08/19/24 0537    08/19/24 0538  Fall Precautions  Continuous         08/19/24 0537    08/19/24 0538  Insert Peripheral IV  Once         08/19/24 0537    08/19/24 0537  sodium chloride 0.9 % flush 10 mL  As Needed         08/19/24 0537    Unscheduled  Oxygen Therapy- Nasal Cannula; Titrate 1-6 LPM Per SpO2; 90 - 95%  Continuous PRN       08/19/24 0537    Unscheduled  Follow Hypoglycemia Standing Orders For Blood Glucose <70 & Notify Provider of Treatment  As Needed      Comments: Follow Hypoglycemia Orders As Outlined in Process Instructions (Open Order Report to View Full Instructions)  Notify Provider Any Time Hypoglycemia Treatment is Administered    08/19/24 9564

## 2024-08-19 NOTE — ED PROVIDER NOTES
Time: 6:04 AM EDT  Date of encounter:  8/19/2024  Independent Historian/Clinical History and Information was obtained by:   Patient and EMS    History is limited by: N/A    Chief Complaint: Generalized weakness, repeated falls, difficulty taking care of self.      History of Present Illness:  Patient is a 69 y.o. year old male who presents to the emergency department for evaluation of generalized weakness, repeated falls, difficulty taking care of self.  This patient presents from home after his wife called EMS because she cannot take care of him.  The patient keeps falling according to the wife and according to the patient.  The patient denies any pain or trauma secondary to her falls however he states that he has had increasing weakness.  The patient has had a previous stroke with resultant right-sided weakness.  The patient presented covered with feces which appeared to be caked on an old.  In addition he has some developing bedsores in the gluteal area.      Patient Care Team  Primary Care Provider: Sophie Capps APRN    Past Medical History:     No Known Allergies  Past Medical History:   Diagnosis Date    CHF (congestive heart failure)     SEE'S DR [place] NO CURRENT S/S    COPD (chronic obstructive pulmonary disease)     INHALER    Diabetes mellitus     DOESN'T CHECK BG OFTEN AT HOME    Hyperlipidemia     Hypertension     Sepsis 09/14/2023    Stroke 2017    RIGHT SIDE WEAKNESS     Past Surgical History:   Procedure Laterality Date    APPENDECTOMY      EYE SURGERY Bilateral     MISTY CATARACT     Family History   Problem Relation Age of Onset    No Known Problems Mother     No Known Problems Father     Malig Hyperthermia Neg Hx        Home Medications:  Prior to Admission medications    Medication Sig Start Date End Date Taking? Authorizing Provider   aspirin 81 MG chewable tablet Chew 1 tablet Daily. 4/26/24   Sophie Capps APRN   atorvastatin (LIPITOR) 20 MG tablet TAKE 1 TABLET BY MOUTH EVERY NIGHT  8/7/24   Sophie Capps APRN   Continuous Glucose  (Dexcom G6 ) device Use 1 each 4 (Four) Times a Day Before Meals & at Bedtime As Needed (glucose). 4/26/24   Sophie Capps APRN   Continuous Glucose Sensor (Dexcom G6 Sensor) Use Every 10 (Ten) Days. 4/26/24   Sophie Capps APRN   Continuous Glucose Transmitter (Dexcom G6 Transmitter) misc Use 1 each Every 3 (Three) Months. 4/26/24   Sophie Capps APRN   furosemide (Lasix) 20 MG tablet Take 1 tablet by mouth 2 (Two) Times a Day. 8/14/24   Sophie Capps APRN   Insulin Glargine (Lantus SoloStar) 100 UNIT/ML injection pen INJECT 10 UNITS SUBCUTANEOUSLY INTO THE APPROPRIATE AREA EVERY NIGHT AS DIRECTED 6/14/24   Sophie Capps APRN   Insulin Pen Needle 32G X 6 MM misc Use 1 each Daily. 6/26/24   Sophie Capps APRN   isosorbide mononitrate (IMDUR) 30 MG 24 hr tablet Take 1 tablet by mouth Daily. 7/8/24   Radha Wolf APRN   Jardiance 25 MG tablet tablet TAKE 1 TABLET BY MOUTH DAILY 8/7/24   Sophie Capps APRN   losartan (COZAAR) 25 MG tablet TAKE 1 TABLET BY MOUTH DAILY 8/7/24   Sophie Capps APRN   potassium chloride 10 MEQ CR tablet Take 1 tablet by mouth 2 (Two) Times a Day. 8/14/24   Sophie Capps APRN        Social History:   Social History     Tobacco Use    Smoking status: Every Day     Current packs/day: 2.50     Average packs/day: 2.5 packs/day for 52.6 years (131.6 ttl pk-yrs)     Types: Cigarettes     Start date: 1972     Passive exposure: Current    Smokeless tobacco: Never    Tobacco comments:     INSTRUCTED NO SMOKING 24 HOUR PRIOR TO SURGERY    Vaping Use    Vaping status: Never Used   Substance Use Topics    Alcohol use: Yes    Drug use: Never         Review of Systems:  Review of Systems   Constitutional:  Positive for activity change, appetite change, chills, fatigue and fever.   HENT:  Negative for congestion, ear pain and sore throat.    Eyes:  Negative for pain.   Respiratory:  Negative  "for cough, chest tightness and shortness of breath.    Cardiovascular:  Negative for chest pain.   Gastrointestinal:  Negative for abdominal pain, diarrhea, nausea and vomiting.   Genitourinary:  Negative for flank pain and hematuria.   Musculoskeletal:  Negative for joint swelling.   Skin:  Positive for rash. Negative for pallor.        Bedsores on gluteus   Neurological:  Positive for weakness. Negative for seizures and headaches.   All other systems reviewed and are negative.       Physical Exam:  /58 (BP Location: Left arm, Patient Position: Lying)   Pulse 92   Temp 99.3 °F (37.4 °C) (Oral)   Resp 18   Ht 190.5 cm (75\")   Wt 101 kg (221 lb 9 oz)   SpO2 98%   BMI 27.69 kg/m²     Physical Exam  Vitals and nursing note reviewed.   Constitutional:       General: He is not in acute distress.     Appearance: Normal appearance. He is not toxic-appearing.   HENT:      Head: Normocephalic and atraumatic.      Right Ear: External ear normal.      Left Ear: External ear normal.      Nose: Nose normal.      Mouth/Throat:      Mouth: Mucous membranes are moist.   Eyes:      General: No scleral icterus.     Extraocular Movements: Extraocular movements intact.      Pupils: Pupils are equal, round, and reactive to light.   Cardiovascular:      Rate and Rhythm: Normal rate and regular rhythm. Tachycardia present.      Pulses: Normal pulses.      Heart sounds: Normal heart sounds.   Pulmonary:      Effort: Pulmonary effort is normal. No respiratory distress.      Breath sounds: Normal breath sounds.   Abdominal:      General: Abdomen is flat. Bowel sounds are normal.      Palpations: Abdomen is soft.      Tenderness: There is no abdominal tenderness.   Musculoskeletal:         General: Normal range of motion.      Cervical back: Normal range of motion and neck supple.      Right lower leg: Edema present.      Left lower leg: Edema present.   Skin:     General: Skin is warm and dry.      Capillary Refill: Capillary " refill takes less than 2 seconds.      Comments: There is diffuse erythema with skin breakdown and noted to the buttocks bilaterally.  There is erythema to the right lower extremity   Neurological:      General: No focal deficit present.      Mental Status: He is alert and oriented to person, place, and time. Mental status is at baseline.      Motor: Weakness present.      Comments: Patient has generalized weakness superimposed upon right sided hemiparesis.   Psychiatric:         Mood and Affect: Mood normal.         Behavior: Behavior normal.                  Procedures:  Procedures      Medical Decision Making:      Comorbidities that affect care:    Prior stroke and hypertension    External Notes reviewed:    Previous Clinic Note: Office visit for hypertension.      The following orders were placed and all results were independently analyzed by me:  Orders Placed This Encounter   Procedures    Blood Culture - Blood,    Blood Culture - Blood,    COVID-19, FLU A/B, RSV PCR 1 HR TAT - Swab, Nasopharynx    Urine Culture - Urine,    XR Chest 1 View    [place] Draw    Comprehensive Metabolic Panel    Single High Sensitivity Troponin T    Magnesium    CBC Auto Differential    Manual Differential    Urinalysis With Culture If Indicated - Urine, Clean Catch    CK    TSH    T4, Free    Lactic Acid, Plasma    BNP    Urinalysis, Microscopic Only - Urine, Clean Catch    Phosphorus    Magnesium    Comprehensive Metabolic Panel    Phosphorus    Diet: Cardiac, Diabetic; Healthy Heart (2-3 Na+); Consistent Carbohydrate; Fluid Consistency: Thin (IDDSI 0)    Undress & Gown    Continuous Pulse Oximetry    Vital Signs    Orthostatic Blood Pressure    Nursing Dysphagia Screening (Complete Prior to Giving Anything By Mouth)    Vital Signs    Intake & Output    Weigh Patient    Oral Care    Saline Lock & Maintain IV Access    Code Status and Medical Interventions: CPR (Attempt to Resuscitate); Full Support    Hospitalist (on-call MD  unless specified)    Inpatient Case Management  Consult    Inpatient Diabetes Educator Consult    OT Consult: Eval & Treat ADL Performance Below Baseline, Discharge Placement Assessment    PT Consult: Eval & Treat Discharge Placement Assessment, Functional Mobility Below Baseline    Oxygen Therapy- Nasal Cannula; Titrate 1-6 LPM Per SpO2; 90 - 95%    RT to Initiate Bronchodilator Protocol    Incentive Spirometry    POC Glucose Once    POC Glucose 4x Daily Before Meals & at Bedtime    ECG 12 Lead ED Triage Standing Order; Weak / Dizzy / AMS    Insert Peripheral IV    Insert Peripheral IV    Inpatient Admission    Fall Precautions    Fall Precautions    CBC & Differential    Green Top (Gel)    Lavender Top    Gold Top - SST    Light Blue Top    CBC & Differential       Medications Given in the Emergency Department:  Medications   sodium chloride 0.9 % flush 10 mL (has no administration in time range)   sodium chloride 0.9 % flush 10 mL (10 mL Intravenous Not Given 8/19/24 1252)   sodium chloride 0.9 % flush 10 mL (has no administration in time range)   sodium chloride 0.9 % infusion 40 mL (has no administration in time range)   heparin (porcine) 5000 UNIT/ML injection 5,000 Units (5,000 Units Subcutaneous Given 8/19/24 1402)   sennosides-docusate (PERICOLACE) 8.6-50 MG per tablet 2 tablet (has no administration in time range)     And   polyethylene glycol (MIRALAX) packet 17 g (has no administration in time range)     And   bisacodyl (DULCOLAX) EC tablet 5 mg (has no administration in time range)     And   bisacodyl (DULCOLAX) suppository 10 mg (has no administration in time range)   cefTRIAXone (ROCEPHIN) 1,000 mg in sodium chloride 0.9 % 100 mL IVPB-VTB (has no administration in time range)   arformoterol (BROVANA) nebulizer solution 15 mcg (has no administration in time range)   ipratropium-albuterol (DUO-NEB) nebulizer solution 3 mL (has no administration in time range)   dextrose (GLUTOSE) oral gel  15 g (has no administration in time range)   dextrose (D50W) (25 g/50 mL) IV injection 25 g (has no administration in time range)   glucagon (GLUCAGEN) injection 1 mg (has no administration in time range)   Insulin Lispro (humaLOG) injection 2-7 Units ( Subcutaneous Not Given 8/19/24 1252)   acetaminophen (TYLENOL) tablet 650 mg (has no administration in time range)   nirmatrelvir and ritonavir (300mg/100mg)(PAXLOVID) 3 tablet pack (3 tablets Oral Given 8/19/24 1402)   sepsis fluid NS 0.9 % bolus 3,030 mL (3,030 mL Intravenous New Bag 8/19/24 0701)   cefTRIAXone (ROCEPHIN) 2,000 mg in sodium chloride 0.9 % 100 mL IVPB-VTB (0 mg Intravenous Stopped 8/19/24 0946)   acetaminophen (TYLENOL) tablet 650 mg (650 mg Oral Given 8/19/24 0932)        ED Course:       EKG:  Sinus tachycardia with rate of 107 bpm  No acute ischemic changes.      Labs:    Lab Results (last 24 hours)       Procedure Component Value Units Date/Time    Comprehensive Metabolic Panel [[id number]]  (Abnormal) Collected: 08/19/24 0549    Specimen: Blood Updated: 08/19/24 0616     Glucose 192 mg/dL      BUN 19 mg/dL      Creatinine 1.10 mg/dL      Sodium 136 mmol/L      Potassium 4.5 mmol/L      Chloride 99 mmol/L      CO2 27.4 mmol/L      Calcium 8.4 mg/dL      Total Protein 7.4 g/dL      Albumin 3.4 g/dL      ALT (SGPT) 10 U/L      AST (SGOT) 20 U/L      Alkaline Phosphatase 95 U/L      Total Bilirubin 0.4 mg/dL      Globulin 4.0 gm/dL      A/G Ratio 0.9 g/dL      BUN/Creatinine Ratio 17.3     Anion Gap 9.6 mmol/L      eGFR 73.6 mL/min/1.73     Narrative:      GFR Normal >60  Chronic Kidney Disease <60  Kidney Failure <15      Single High Sensitivity Troponin T [[id number]]  (Normal) Collected: 08/19/24 0549    Specimen: Blood Updated: 08/19/24 0616     HS Troponin T 10 ng/L     Narrative:      High Sensitive Troponin T Reference Range:  <14.0 ng/L- Negative Female for AMI  <22.0 ng/L- Negative Male for AMI  >=14 - Abnormal Female indicating possible  myocardial injury.  >=22 - Abnormal Male indicating possible myocardial injury.   Clinicians would have to utilize clinical acumen, EKG, Troponin, and serial changes to determine if it is an Acute Myocardial Infarction or myocardial injury due to an underlying chronic condition.         Magnesium [[id number]]  (Normal) Collected: 08/19/24 0549    Specimen: Blood Updated: 08/19/24 0616     Magnesium 1.8 mg/dL     CK [[id number]]  (Normal) Collected: 08/19/24 0549    Specimen: Blood Updated: 08/19/24 0707     Creatine Kinase 76 U/L     TSH [[id number]]  (Normal) Collected: 08/19/24 0549    Specimen: Blood Updated: 08/19/24 0723     TSH 1.080 uIU/mL     T4, Free [[id number]]  (Normal) Collected: 08/19/24 0549    Specimen: Blood Updated: 08/19/24 0723     Free T4 1.16 ng/dL     BNP [[id number]]  (Normal) Collected: 08/19/24 0549    Specimen: Blood Updated: 08/19/24 0723     proBNP 634.3 pg/mL     Narrative:      This assay is used as an aid in the diagnosis of individuals suspected of having heart failure. It can be used as an aid in the diagnosis of acute decompensated heart failure (ADHF) in patients presenting with signs and symptoms of ADHF to the emergency department (ED). In addition, NT-proBNP of <300 pg/mL indicates ADHF is not likely.    Age Range Result Interpretation  NT-proBNP Concentration (pg/mL:      <50             Positive            >450                   Gray                 300-450                    Negative             <300    50-75           Positive            >900                  Gray                300-900                  Negative            <300      >75             Positive            >1800                  Gray                300-1800                  Negative            <300    Phosphorus [[id number]]  (Normal) Collected: 08/19/24 0549    Specimen: Blood Updated: 08/19/24 1313     Phosphorus 2.8 mg/dL     CBC & Differential [[id number]]  (Abnormal) Collected: 08/19/24 0550    Specimen: Blood  Updated: 08/19/24 0644    Narrative:      The following orders were created for panel order CBC & Differential.  Procedure                               Abnormality         Status                     ---------                               -----------         ------                     CBC Auto Differential[[id number]]        Abnormal            Final result               Scan Slide[[id number]]                                                                    Please view results for these tests on the individual orders.    CBC Auto Differential [[id number]]  (Abnormal) Collected: 08/19/24 0550    Specimen: Blood Updated: 08/19/24 0617     WBC 5.09 10*3/mm3      RBC 5.33 10*6/mm3      Hemoglobin 14.1 g/dL      Hematocrit 43.1 %      MCV 80.9 fL      MCH 26.5 pg      MCHC 32.7 g/dL      RDW 15.8 %      RDW-SD 45.6 fl      MPV 9.7 fL      Platelets 172 10*3/mm3      Neutrophil % 25.4 %      Lymphocyte % 59.9 %      Monocyte % 14.3 %      Eosinophil % 0.0 %      Basophil % 0.2 %      Immature Grans % 0.2 %      Neutrophils, Absolute 1.29 10*3/mm3      Lymphocytes, Absolute 3.05 10*3/mm3      Monocytes, Absolute 0.73 10*3/mm3      Eosinophils, Absolute 0.00 10*3/mm3      Basophils, Absolute 0.01 10*3/mm3      Immature Grans, Absolute 0.01 10*3/mm3      nRBC 0.0 /100 WBC     Manual Differential [[id number]]  (Abnormal) Collected: 08/19/24 0550    Specimen: Blood Updated: 08/19/24 0654     Neutrophil % 29.0 %      Lymphocyte % 41.0 %      Monocyte % 14.0 %      Bands %  2.0 %      Promyelocyte % 3.0 %      Atypical Lymphocyte % 11.0 %      Neutrophils Absolute 1.58 10*3/mm3      Lymphocytes Absolute 2.65 10*3/mm3      Monocytes Absolute 0.71 10*3/mm3      RBC Morphology Normal     WBC Morphology Normal     Platelet Estimate Adequate     Large Platelets Slight/1+    COVID-19, FLU A/B, RSV PCR 1 HR TAT - Swab, Nasopharynx [[id number]]  (Abnormal) Collected: 08/19/24 0658    Specimen: Swab from Nasopharynx Updated: 08/19/24 0813      COVID19 Detected     Influenza A PCR Not Detected     Influenza B PCR Not Detected     RSV, PCR Not Detected    Narrative:      Fact sheet for providers: https://www.fda.gov/media/521720/download    Fact sheet for patients: https://www.fda.gov/media/016575/download    Test performed by PCR.    Blood Culture - Blood, Arm, Right [[id number]] Collected: 08/19/24 0701    Specimen: Blood from Arm, Right Updated: 08/19/24 0706    Blood Culture - Blood, Arm, Left [[id number]] Collected: 08/19/24 0701    Specimen: Blood from Arm, Left Updated: 08/19/24 0707    Lactic Acid, Plasma [[id number]]  (Normal) Collected: 08/19/24 0701    Specimen: Blood Updated: 08/19/24 0726     Lactate 1.3 mmol/L     Urinalysis With Culture If Indicated - Urine, Clean Catch [[id number]]  (Abnormal) Collected: 08/19/24 0800    Specimen: Urine, Clean Catch Updated: 08/19/24 0815     Color, UA Yellow     Appearance, UA Cloudy     pH, UA 5.5     Specific Gravity, UA 1.018     Glucose, UA Negative     Ketones, UA Negative     Bilirubin, UA Negative     Blood, UA Large (3+)     Protein, UA 30 mg/dL (1+)     Leuk Esterase, UA Moderate (2+)     Nitrite, UA Positive     Urobilinogen, UA 1.0 E.U./dL    Narrative:      In absence of clinical symptoms, the presence of pyuria, bacteria, and/or nitrites on the urinalysis result does not correlate with infection.    Urinalysis, Microscopic Only - Urine, Clean Catch [[id number]]  (Abnormal) Collected: 08/19/24 0800    Specimen: Urine, Clean Catch Updated: 08/19/24 0815     RBC, UA Too Numerous to Count /HPF      WBC, UA Too Numerous to Count /HPF      Bacteria, UA 4+ /HPF      Squamous Epithelial Cells, UA 0-2 /HPF      Hyaline Casts, UA 3-6 /LPF      Methodology Automated Microscopy    Urine Culture - Urine, Urine, Clean Catch [[id number]] Collected: 08/19/24 0800    Specimen: Urine, Clean Catch Updated: 08/19/24 0810             Imaging:    XR Chest 1 View    Result Date: 8/19/2024  XR CHEST 1 VW Date of Exam:  8/19/2024 5:50 AM EDT Indication: Weak/Dizzy/AMS triage protocol Comparison: 3/10/2024 Findings: Heart size is normal. There is some vascular congestion. There is chronic scarring at the left lung base. The lungs are otherwise clear except for granulomatous calcification. No pneumothorax.     Mild vascular congestion with chronic scarring at the left lung base. Electronically Signed: Durga Zurita MD  8/19/2024 5:54 AM EDT  Workstation ID: [id number]       Differential Diagnosis and Discussion:    Fever: Based on the complaint of fever, differential diagnosis includes but is not limited to meningitis, pneumonia, pyelonephritis, acute uti,  systemic immune response syndrome, sepsis, viral syndrome, fungal infection, tick born illness and other bacterial infections.  Weakness: Based on the patient's history, signs, and symptoms, the diffential diagnosis includes but is not limited to meningitis, stroke, sepsis, subarachnoid hemorrhage, intracranial bleeding, encephalitis, acute uti, dehydration, MS, myasthenia gravis, Guillan [place], migraine variant, neuromuscular disorders vertigo, electrolyte imbalance, and metabolic disorders.    All labs were reviewed and interpreted by me.  All X-rays impressions were independently interpreted by me.  EKG was interpreted by me.    MDM     Amount and/or Complexity of Data Reviewed  Clinical lab tests: reviewed  Tests in the radiology section of CPT®: reviewed  Tests in the medicine section of CPT®: reviewed         Critical Care Note: Total Critical Care time of 45 minutes. Total critical care time documented does not include time spent on separately billed procedures for services of nurses or physician assistants. I personally saw and examined the patient. I have reviewed all diagnostic interpretations and treatment plans as written. I was present for the key portions of any procedures performed and the inclusive time noted in any critical care statement. Critical care time  includes patient management by me, time spent at the patients bedside,  time to review lab and imaging results, discussing patient care, documentation in the medical record, and time spent with family or caregiver.    Sepsis criteria was met in the emergency department and the Sepsis protocol (including antibiotic administration) was initiated.      SIRS criteria considered:   1.  Temperature > 100.4 or <96.8    2.  Heart Rate > 90    3.  Respiratory Rate > 22    4.  WBC > 12K or <4K.             Severe Sepsis:     Respiratory: Mechanical Ventilation or Bipap  Hypotension: SBP > 90 or MAP < 65  Renal: Creatinine > 2  Metabolic: Lactic Acid > 2  Hematologic: Platelets < 100K or INR > 1.5  Hepatic: BILI  >  2  CNS: Sudden AMS     Septic Shock:     Severe Sepsis + Persistent hypotension or Lactic Acid > 4     Normal saline bolus, Antibiotics, and final disposition was based on these definitions.        Sepsis was recognized at 0800    Antibiotics were ordered.     30 cc/kg bolus was  indicated.           The patient was ordered 3030 ml  of fluids.    The patient presents with 2 out of the 4 SIRS criteria and a suspected source for sepsis.  Patient was evaluated and placed on a cardiac monitor for fear of worsening tachycardia and life-threatening hypotension.  Patient was monitored for shock and signs of end-organ damage.  Mental status was repeatedly checked throughout the ED stay.  Medications were ordered by me which includes Rocephin.  The case was discussed at length with admitting physician.    Total Critical Care time of 45 minutes. Total critical care time documented does not include time spent on separately billed procedures for services of nurses or physician assistants. I personally saw and examined the patient. I have reviewed all diagnostic interpretations and treatment plans as written. I was present for the key portions of any procedures performed and the inclusive time noted in any critical care  statement. Critical care time includes patient management by me, time spent at the patients bedside,  time to review lab and imaging results, discussing patient care, documentation in the medical record, and time spent with family or caregiver.    Patient Care Considerations:    CT CHEST: I considered ordering a CT scan of the chest, however the patient has a coronavirus infection to explain his symptoms      Consultants/Shared Management Plan:    Hospitalist: I have discussed the case with hospitalist who agrees to accept the patient for admission.    Social Determinants of Health:    Patient is unable to carry out activities of daily life. Escalation of care is necessary.       Disposition and Care Coordination:    Admit:   Through independent evaluation of the patient's history, physical, and imperical data, the patient meets criteria for inpatient admission to the hospital.        Final diagnoses:   Coronavirus infection   Acute UTI   Sepsis, due to unspecified organism, unspecified whether acute organ dysfunction present        ED Disposition       ED Disposition   Decision to Admit    Condition   --    Comment   Level of Care: Remote Telemetry [26]   Diagnosis: UTI (urinary tract infection) [[id number]]   Certification: I Certify That Inpatient Hospital Services Are Medically Necessary For Greater Than 2 Midnights                 This medical record created using voice recognition software.             Ronnell Gan,   08/19/24 [id number]

## 2024-08-19 NOTE — H&P
[facility]  Date[facility]   Patient Name: Brian Mehta  : 1955  MRN: [id number]  Primary Care Physician:  Sophie Capps APRN  Date of admission: 2024    Subjective   Subjective     Chief Complaint: Generalized weakness, recurrent falls    HPI:    Brian Mehta is a 69 y.o. male  with a past medical history of hypertension, hyperlipidemia, type 2 diabetes mellitus, HFrEF, COPD presented to the ED for evaluation of generalized weakness, repeated falls, difficulty taking care of self.  EMS was called by his wife as she was not able to take care of him at home due to his recurrent falls and generalized weakness.  History has been collected from the patient as well as from the patient's wife on phone.  Patient has history of CVA with residual right-sided weakness but usually walks with the help of walker and cane but for the past 2 days patient has been weak and difficulty getting out of the bed and has been falling and slipping out onto the floor when he tried to get out of the bed.  Did not hit his head.  Associated with increased urinary frequency and incontinence.  Denies any worsening weakness on the right side.  Denies any chest pain, shortness of breath, abdominal pain, nausea, vomiting, focal weakness, numbness, tingling.    In the ED, vital signs temperature 98.7, pulse 104, respiratory 22, blood pressure 149/81 on room air saturating around 92%.  Labs showed normal troponin, proBNP, calcium 8.4, albumin 3.4, rest of the CMP with no significant findings, normal TSH and free T4, normal lactic acid, normal WBC, hemoglobin, platelets.  Urinalysis showed 4+ bacteria, too numerous to count WBC, positive nitrates.  COVID-positive.  Urine cultures and blood cultures in process.  Chest x-ray showed mild vascular congestion with chronic scarring in the left lung base.  Received IV fluids and ceftriaxone in the ED.  Patient has been admitted for further evaluation  and management of UTI, COVID-19 infection, generalized weakness, recurrent falls    Personal History     Past Medical History:   Diagnosis Date    CHF (congestive heart failure)     SEE'S DR [place]ICK[place] NO CURRENT S/S    COPD (chronic obstructive pulmonary disease)     INHALER    Diabetes mellitus     DOESN'T CHECK BG OFTEN AT HOME    Hyperlipidemia     Hypertension     Sepsis 09/14/2023    Stroke 2017    RIGHT SIDE WEAKNESS         Past Surgical History:   Procedure Laterality Date    APPENDECTOMY      EYE SURGERY Bilateral     MISTY CATARACT         Family History   Problem Relation Age of Onset    No Known Problems Mother     No Known Problems Father     Malig Hyperthermia Neg Hx          Social History     Socioeconomic History    Marital status:    Tobacco Use    Smoking status: Every Day     Current packs/day: 2.50     Average packs/day: 2.5 packs/day for 52.6 years (131.6 ttl pk-yrs)     Types: Cigarettes     Start date: 1972     Passive exposure: Current    Smokeless tobacco: Never    Tobacco comments:     INSTRUCTED NO SMOKING 24 HOUR PRIOR TO SURGERY    Vaping Use    Vaping status: Never Used   Substance and Sexual Activity    Alcohol use: Yes    Drug use: Never    Sexual activity: Defer         Home Medications:  Dexcom G6 , Dexcom G6 Sensor, Dexcom G6 Transmitter, Insulin Glargine, Insulin Pen Needle, aspirin, atorvastatin, empagliflozin, furosemide, isosorbide mononitrate, losartan, and potassium chloride    Allergies:  No Known Allergies    Review of Systems   All other systems reviewed and negative except as mentioned above in the HPI    Objective   Objective     Vitals:   Temp:  [100.5 °F (38.1 °C)-100.7 °F (38.2 °C)] 100.7 °F (38.2 °C)  Heart Rate:  [] 97  Resp:  [14-22] 14  BP: (129-152)/(78-81) 152/78    Physical Exam    Constitutional: Awake, alert, no acute distress   Eyes: Pupils equal, sclerae anicteric, no conjunctival injection   HENT: NCAT, mucous membranes dry   Neck: Supple,  no thyromegaly, no lymphadenopathy, trachea midline   Respiratory: Bilateral air entry present, mild rhonchi on the lower lobes bilaterally, nonlabored respirations    Cardiovascular: RRR, no murmurs   Gastrointestinal: Positive bowel sounds, soft, nontender, nondistended   Musculoskeletal: 1+ edema right lower extremity, 2+ edema left lower extremity with significant swelling in the foot and calf region.  No clubbing or cyanosis to extremities   Psychiatric: Appropriate affect, cooperative   Neurologic: Oriented x 3, strength 4/5 right upper and right lower extremity, 5/5 left upper and left lower extremity cranial Nerves grossly intact to confrontation, speech clear      Result Review    Result Review:  I have personally reviewed the results from the time of this admission to 8/19/2024 09:16 EDT and agree with these findings:  [x]  Laboratory  []  Microbiology  [x]  Radiology  [x]  EKG/Telemetry   []  Cardiology/Vascular   []  Pathology  []  Old records  []  Other:        Imaging Results (Last 24 Hours)       Procedure Component Value Units Date/Time    XR Chest 1 View [[id number]] Collected: 08/19/24 0553     Updated: 08/19/24 0556    Narrative:      XR CHEST 1 VW    Date of Exam: 8/19/2024 5:50 AM EDT    Indication: Weak/Dizzy/AMS triage protocol    Comparison: 3/10/2024    Findings:  Heart size is normal. There is some vascular congestion. There is chronic scarring at the left lung base. The lungs are otherwise clear except for granulomatous calcification. No pneumothorax.      Impression:      Mild vascular congestion with chronic scarring at the left lung base.      Electronically Signed: Durga Zurita MD    8/19/2024 5:54 AM EDT    Workstation ID: [id number]             acetaminophen, 650 mg, Oral, Once  cefTRIAXone, 2,000 mg, Intravenous, Once         Assessment & Plan   Assessment / Plan     Assessment/Plan:   UTI  COVID-19 infection  Generalized weakness  Recent recurrent falls  Left lower extremity  swelling, rule out DVT  Hypertension  Hyperlipidemia  Type 2 diabetes mellitus  HFrEF, not in exacerbation  COPD  History of CVA with residual right-sided weakness    Plan  Admit to inpatient, remote telemetry  Continue ceftriaxone  Follow-up on blood cultures, urine cultures  Pharmacy consult for Paxlovid  Fall precautions  PT OT consult  Nursing dysphagia screen  Follow-up on ultrasound venous Doppler bilateral lower extremity to evaluate for DVT  Heart healthy, consistent carb diet  Low-dose sliding scale insulin  Brovana  Bronchodilator protocol  DuoNebs every 6 hours as needed  Resume other appropriate home medications once reconciled      VTE Prophylaxis:  Pharmacologic VTE prophylaxis orders are signed & held.      CODE STATUS:    Level Of Support Discussed With: Patient  Code Status (Patient has no pulse and is not breathing): CPR (Attempt to Resuscitate)  Medical Interventions (Patient has pulse or is breathing): Full Support      Admission Status:  I believe this patient meets inpatient status.    Part of this note may be an electronic transcription/translation of spoken language to printed text using the Dragon Dictation System    Makeda Atkinson MD

## 2024-08-19 NOTE — PROGRESS NOTES
Pharmacy to Dose Paxlovid    Assessment  Symptom onset ~ 2 days ago  COVID test positive on 8/19  Patient meets inclusion criteria for Paxlovid therapy  Patient does not meet any exclusion criteria  Home medications were reviewed -> atorvastatin is only medication with interaction concerns  Renal function reviewed    Plan  Initiate Nirmatrelvir 300 mg/Ritonavir 100 mg tablets PO BID   Continue therapy for 5 days (8/19-8/24)  Hold atorvastatin for duration of treatment  Follow renal function and clinical progression    Darlin Osborne, [facility] 8/19 2823

## 2024-08-20 LAB
ALBUMIN SERPL-MCNC: 3.1 G/DL (ref 3.5–5.2)
ALBUMIN/GLOB SERPL: 0.9 G/DL
ALP SERPL-CCNC: 85 U/L (ref 39–117)
ALT SERPL W P-5'-P-CCNC: 11 U/L (ref 1–41)
ANION GAP SERPL CALCULATED.3IONS-SCNC: 9.9 MMOL/L (ref 5–15)
AST SERPL-CCNC: 20 U/L (ref 1–40)
BASOPHILS # BLD AUTO: 0.01 10*3/MM3 (ref 0–0.2)
BASOPHILS NFR BLD AUTO: 0.2 % (ref 0–1.5)
BILIRUB SERPL-MCNC: 0.4 MG/DL (ref 0–1.2)
BUN SERPL-MCNC: 10 MG/DL (ref 8–23)
BUN/CREAT SERPL: 11.2 (ref 7–25)
CALCIUM SPEC-SCNC: 8.1 MG/DL (ref 8.6–10.5)
CHLORIDE SERPL-SCNC: 99 MMOL/L (ref 98–107)
CO2 SERPL-SCNC: 24.1 MMOL/L (ref 22–29)
CREAT SERPL-MCNC: 0.89 MG/DL (ref 0.76–1.27)
DEPRECATED RDW RBC AUTO: 47.3 FL (ref 37–54)
EGFRCR SERPLBLD CKD-EPI 2021: 93.9 ML/MIN/1.73
EOSINOPHIL # BLD AUTO: 0 10*3/MM3 (ref 0–0.4)
EOSINOPHIL NFR BLD AUTO: 0 % (ref 0.3–6.2)
ERYTHROCYTE [DISTWIDTH] IN BLOOD BY AUTOMATED COUNT: 15.8 % (ref 12.3–15.4)
GLOBULIN UR ELPH-MCNC: 3.6 GM/DL
GLUCOSE BLDC GLUCOMTR-MCNC: 101 MG/DL (ref 70–99)
GLUCOSE BLDC GLUCOMTR-MCNC: 104 MG/DL (ref 70–99)
GLUCOSE BLDC GLUCOMTR-MCNC: 121 MG/DL (ref 70–99)
GLUCOSE BLDC GLUCOMTR-MCNC: 191 MG/DL (ref 70–99)
GLUCOSE SERPL-MCNC: 104 MG/DL (ref 65–99)
HCT VFR BLD AUTO: 43.1 % (ref 37.5–51)
HGB BLD-MCNC: 13.9 G/DL (ref 13–17.7)
IMM GRANULOCYTES # BLD AUTO: 0.01 10*3/MM3 (ref 0–0.05)
IMM GRANULOCYTES NFR BLD AUTO: 0.2 % (ref 0–0.5)
LYMPHOCYTES # BLD AUTO: 3.46 10*3/MM3 (ref 0.7–3.1)
LYMPHOCYTES NFR BLD AUTO: 68.8 % (ref 19.6–45.3)
MAGNESIUM SERPL-MCNC: 1.8 MG/DL (ref 1.6–2.4)
MCH RBC QN AUTO: 26.4 PG (ref 26.6–33)
MCHC RBC AUTO-ENTMCNC: 32.3 G/DL (ref 31.5–35.7)
MCV RBC AUTO: 81.8 FL (ref 79–97)
MONOCYTES # BLD AUTO: 0.51 10*3/MM3 (ref 0.1–0.9)
MONOCYTES NFR BLD AUTO: 10.1 % (ref 5–12)
NEUTROPHILS NFR BLD AUTO: 1.04 10*3/MM3 (ref 1.7–7)
NEUTROPHILS NFR BLD AUTO: 20.7 % (ref 42.7–76)
NRBC BLD AUTO-RTO: 0 /100 WBC (ref 0–0.2)
PHOSPHATE SERPL-MCNC: 2.5 MG/DL (ref 2.5–4.5)
PLATELET # BLD AUTO: 157 10*3/MM3 (ref 140–450)
PMV BLD AUTO: 9.8 FL (ref 6–12)
POTASSIUM SERPL-SCNC: 3.6 MMOL/L (ref 3.5–5.2)
PROT SERPL-MCNC: 6.7 G/DL (ref 6–8.5)
RBC # BLD AUTO: 5.27 10*6/MM3 (ref 4.14–5.8)
RBC MORPH BLD: NORMAL
SMALL PLATELETS BLD QL SMEAR: ADEQUATE
SODIUM SERPL-SCNC: 133 MMOL/L (ref 136–145)
WBC MORPH BLD: NORMAL
WBC NRBC COR # BLD AUTO: 5.03 10*3/MM3 (ref 3.4–10.8)

## 2024-08-20 PROCEDURE — 83735 ASSAY OF MAGNESIUM: CPT | Performed by: STUDENT IN AN ORGANIZED HEALTH CARE EDUCATION/TRAINING PROGRAM

## 2024-08-20 PROCEDURE — 82948 REAGENT STRIP/BLOOD GLUCOSE: CPT

## 2024-08-20 PROCEDURE — 25010000002 CEFTRIAXONE PER 250 MG: Performed by: STUDENT IN AN ORGANIZED HEALTH CARE EDUCATION/TRAINING PROGRAM

## 2024-08-20 PROCEDURE — 25010000002 HEPARIN (PORCINE) PER 1000 UNITS: Performed by: STUDENT IN AN ORGANIZED HEALTH CARE EDUCATION/TRAINING PROGRAM

## 2024-08-20 PROCEDURE — 82948 REAGENT STRIP/BLOOD GLUCOSE: CPT | Performed by: STUDENT IN AN ORGANIZED HEALTH CARE EDUCATION/TRAINING PROGRAM

## 2024-08-20 PROCEDURE — 94799 UNLISTED PULMONARY SVC/PX: CPT

## 2024-08-20 PROCEDURE — 97161 PT EVAL LOW COMPLEX 20 MIN: CPT

## 2024-08-20 PROCEDURE — 85025 COMPLETE CBC W/AUTO DIFF WBC: CPT | Performed by: STUDENT IN AN ORGANIZED HEALTH CARE EDUCATION/TRAINING PROGRAM

## 2024-08-20 PROCEDURE — 80053 COMPREHEN METABOLIC PANEL: CPT | Performed by: STUDENT IN AN ORGANIZED HEALTH CARE EDUCATION/TRAINING PROGRAM

## 2024-08-20 PROCEDURE — 97165 OT EVAL LOW COMPLEX 30 MIN: CPT

## 2024-08-20 PROCEDURE — 36415 COLL VENOUS BLD VENIPUNCTURE: CPT | Performed by: STUDENT IN AN ORGANIZED HEALTH CARE EDUCATION/TRAINING PROGRAM

## 2024-08-20 PROCEDURE — 84100 ASSAY OF PHOSPHORUS: CPT | Performed by: STUDENT IN AN ORGANIZED HEALTH CARE EDUCATION/TRAINING PROGRAM

## 2024-08-20 PROCEDURE — 94640 AIRWAY INHALATION TREATMENT: CPT

## 2024-08-20 PROCEDURE — 63710000001 INSULIN LISPRO (HUMAN) PER 5 UNITS: Performed by: STUDENT IN AN ORGANIZED HEALTH CARE EDUCATION/TRAINING PROGRAM

## 2024-08-20 PROCEDURE — 85007 BL SMEAR W/DIFF WBC COUNT: CPT | Performed by: STUDENT IN AN ORGANIZED HEALTH CARE EDUCATION/TRAINING PROGRAM

## 2024-08-20 PROCEDURE — 99232 SBSQ HOSP IP/OBS MODERATE 35: CPT | Performed by: INTERNAL MEDICINE

## 2024-08-20 RX ORDER — ISOSORBIDE MONONITRATE 30 MG/1
30 TABLET, EXTENDED RELEASE ORAL DAILY
Status: DISCONTINUED | OUTPATIENT
Start: 2024-08-20 | End: 2024-08-22 | Stop reason: HOSPADM

## 2024-08-20 RX ORDER — ATORVASTATIN CALCIUM 20 MG/1
20 TABLET, FILM COATED ORAL NIGHTLY
Status: DISCONTINUED | OUTPATIENT
Start: 2024-08-20 | End: 2024-08-20

## 2024-08-20 RX ORDER — POLYETHYLENE GLYCOL 3350 17 G
4 POWDER IN PACKET (EA) ORAL
Status: DISCONTINUED | OUTPATIENT
Start: 2024-08-20 | End: 2024-08-22 | Stop reason: HOSPADM

## 2024-08-20 RX ORDER — ASPIRIN 81 MG/1
81 TABLET, CHEWABLE ORAL DAILY
Status: DISCONTINUED | OUTPATIENT
Start: 2024-08-20 | End: 2024-08-22 | Stop reason: HOSPADM

## 2024-08-20 RX ORDER — BUDESONIDE 0.5 MG/2ML
0.5 INHALANT ORAL
Status: DISCONTINUED | OUTPATIENT
Start: 2024-08-20 | End: 2024-08-22 | Stop reason: HOSPADM

## 2024-08-20 RX ORDER — NICOTINE 21 MG/24HR
1 PATCH, TRANSDERMAL 24 HOURS TRANSDERMAL
Status: DISCONTINUED | OUTPATIENT
Start: 2024-08-20 | End: 2024-08-22 | Stop reason: HOSPADM

## 2024-08-20 RX ADMIN — NIRMATRELVIR AND RITONAVIR 3 TABLET: KIT at 10:38

## 2024-08-20 RX ADMIN — HEPARIN SODIUM 5000 UNITS: 5000 INJECTION INTRAVENOUS; SUBCUTANEOUS at 13:20

## 2024-08-20 RX ADMIN — ACETAMINOPHEN 650 MG: 325 TABLET ORAL at 06:40

## 2024-08-20 RX ADMIN — ARFORMOTEROL TARTRATE 15 MCG: 15 SOLUTION RESPIRATORY (INHALATION) at 18:37

## 2024-08-20 RX ADMIN — ISOSORBIDE MONONITRATE 30 MG: 30 TABLET, EXTENDED RELEASE ORAL at 11:43

## 2024-08-20 RX ADMIN — BUDESONIDE 0.5 MG: 0.5 SUSPENSION RESPIRATORY (INHALATION) at 18:37

## 2024-08-20 RX ADMIN — INSULIN LISPRO 2 UNITS: 100 INJECTION, SOLUTION INTRAVENOUS; SUBCUTANEOUS at 13:20

## 2024-08-20 RX ADMIN — NICOTINE 1 PATCH: 21 PATCH, EXTENDED RELEASE TRANSDERMAL at 10:41

## 2024-08-20 RX ADMIN — Medication 10 ML: at 10:39

## 2024-08-20 RX ADMIN — BUDESONIDE 0.5 MG: 0.5 SUSPENSION RESPIRATORY (INHALATION) at 12:10

## 2024-08-20 RX ADMIN — EMPAGLIFLOZIN 25 MG: 25 TABLET, FILM COATED ORAL at 11:43

## 2024-08-20 RX ADMIN — CEFTRIAXONE SODIUM 1000 MG: 1 INJECTION, POWDER, FOR SOLUTION INTRAMUSCULAR; INTRAVENOUS at 10:39

## 2024-08-20 RX ADMIN — ASPIRIN 81 MG: 81 TABLET, CHEWABLE ORAL at 11:43

## 2024-08-20 RX ADMIN — NIRMATRELVIR AND RITONAVIR 3 TABLET: KIT at 00:16

## 2024-08-20 NOTE — THERAPY EVALUATION
Patient Name: Brian Mehta  : 1955    MRN: [id number]                              Today's Date: 2024       Admit Date: 2024    Visit Dx:     ICD-10-CM ICD-9-CM   1. Coronavirus infection  B34.2 079.89   2. Acute UTI  N39.0 599.0   3. Sepsis, due to unspecified organism, unspecified whether acute organ dysfunction present  A41.9 038.9     995.91     Patient Active Problem List   Diagnosis    CVA (cerebral vascular accident)    Essential hypertension    High cholesterol    Vitamin B12 deficiency    Kidney disorder    Chronic pain of both knees    Chronic HFrEF (heart failure with reduced ejection fraction)    LVH (left ventricular hypertrophy)    COPD (chronic obstructive pulmonary disease)    DM2 (diabetes mellitus, type 2)    Urinary tract infection without hematuria    Abnormal nuclear stress test    UTI (urinary tract infection)     Past Medical History:   Diagnosis Date    CHF (congestive heart failure)     SEE'S DR [place]ICK[place] NO CURRENT S/S    COPD (chronic obstructive pulmonary disease)     INHALER    Diabetes mellitus     DOESN'T CHECK BG OFTEN AT HOME    Hyperlipidemia     Hypertension     Sepsis 2023    Stroke 2017    RIGHT SIDE WEAKNESS     Past Surgical History:   Procedure Laterality Date    APPENDECTOMY      EYE SURGERY Bilateral     MISTY CATARACT      General Information       Row Name 24 1052          OT Time and Intention    Document Type evaluation  -PG     Mode of Treatment individual therapy;occupational therapy  -PG       Row Name 24 1055          General Information    Patient Profile Reviewed yes  Mildly confused and poor historian.  Reports lives with wife and uses a walker at home.  Wife assists him with ADLs, sponge bathes at sink.  Recent fall next to his bed  -PG     Prior Level of Function independent:;transfer;ADL's  -PG     Existing Precautions/Restrictions fall  -PG     Barriers to Rehab none identified  -PG       Row Name 24 1052          Occupational  Profile    Reason for Services/Referral (Occupational Profile) Patient is a 69-year-old male admitted for sepsis/UTI and recent falls.  Patient is being evaluated by Occupational Therapy due to recent decline in ADL function.  No previous OT services identified  -       Row Name 08/20/24 1052          Living Environment    People in Home spouse  -       Row Name 08/20/24 1052          Cognition    Orientation Status (Cognition) oriented to;person;verbal cues/prompts needed for orientation  -       Row Name 08/20/24 1052          Safety Issues, Functional Mobility    Impairments Affecting Function (Mobility) balance;endurance/activity tolerance;strength;cognition  -     Cognitive Impairments, Mobility Safety/Performance judgment;sequencing abilities;problem-solving/reasoning  -               User Key  (r) = Recorded By, (t) = Taken By, (c) = Cosigned By      Initials Name Provider Type    PG Gio Minaya, OT Occupational Therapist                     Mobility/ADL's       Row Name 08/20/24 1054          Bed Mobility    Bed Mobility supine-sit  -     Supine-Sit [place] (Bed Mobility) moderate assist (50% patient effort);maximum assist (25% patient effort)  -       Row Name 08/20/24 1054          Transfers    Transfers bed-chair transfer  -       Row Name 08/20/24 1054          Bed-Chair Transfer    Bed-Chair [place] (Transfers) minimum assist (75% patient effort);verbal cues  -     Assistive Device (Bed-Chair Transfers) walker, front-wheeled  -[facility] Name 08/20/24 1054          Activities of Daily Living    BADL Assessment/Intervention upper body dressing;bathing;lower body dressing;grooming;toileting  -       Row Name 08/20/24 1054          Upper Body Dressing Assessment/Training    [place] Level (Upper Body Dressing) upper body dressing skills;minimum assist (75% patient effort)  -       Row Name 08/20/24 1054          Bathing Assessment/Intervention    [place] Level  (Bathing) bathing skills;moderate assist (50% patient effort)  -PG       Row Name 08/20/24 1054          Lower Body Dressing Assessment/Training    [place] Level (Lower Body Dressing) lower body dressing skills;dependent (less than 25% patient effort)  -PG       Row Name 08/20/24 1054          Grooming Assessment/Training    [place] Level (Grooming) grooming skills;set up  -PG       Row Name 08/20/24 1054          Toileting Assessment/Training    [place] Level (Toileting) toileting skills;dependent (less than 25% patient effort)  -PG               User Key  (r) = Recorded By, (t) = Taken By, (c) = Cosigned By      Initials Name Provider Type    PG Gio Minaya OT Occupational Therapist                   Obj/Interventions       Row Name 08/20/24 1055          Sensory Assessment (Somatosensory)    Sensory Assessment (Somatosensory) sensation intact  -PG       Row Name 08/20/24 1055          Vision Assessment/Intervention    Visual Impairment/Limitations WFL  -PG       Row Name 08/20/24 1055          Range of Motion Comprehensive    General Range of Motion no range of motion deficits identified  -PG       Row Name 08/20/24 1055          Strength Comprehensive (MMT)    Comment, General Manual Muscle Testing (MMT) Assessment 4/5 BUE  -PG       Row Name 08/20/24 1055          Motor Skills    Motor Skills coordination;functional endurance  -PG     Coordination WFL  -PG     Functional Endurance Fair minus  -PG               User Key  (r) = Recorded By, (t) = Taken By, (c) = Cosigned By      Initials Name Provider Type    PG Gio Minaya OT Occupational Therapist                   Goals/Plan       Row Name 08/20/24 1056          Transfer Goal 1 (OT)    Activity/Assistive Device (Transfer Goal 1, OT) transfers, all  -PG     [place] Level/Cues Needed (Transfer Goal 1, OT) modified independence  -PG     Time Frame (Transfer Goal 1, OT) long term goal (LTG);10 days  -PG       Row Name 08/20/24 1056           Bathing Goal 1 (OT)    Activity/Device (Bathing Goal 1, OT) bathing skills, all  -PG     [place] Level/Cues Needed (Bathing Goal 1, OT) modified independence  -PG     Time Frame (Bathing Goal 1, OT) long term goal (LTG);10 days  -PG       Row Name 08/20/24 1056          Dressing Goal 1 (OT)    Activity/Device (Dressing Goal 1, OT) dressing skills, all  -PG     [place]/Cues Needed (Dressing Goal 1, OT) modified independence  -PG     Time Frame (Dressing Goal 1, OT) long term goal (LTG);10 days  -PG       Row Name 08/20/24 1056          Toileting Goal 1 (OT)    Activity/Device (Toileting Goal 1, OT) toileting skills, all  -PG     [place] Level/Cues Needed (Toileting Goal 1, OT) modified independence  -PG     Time Frame (Toileting Goal 1, OT) long term goal (LTG);10 days  -PG       Row Name 08/20/24 1056          Grooming Goal 1 (OT)    Activity/Device (Grooming Goal 1, OT) grooming skills, all  -PG     [place] (Grooming Goal 1, OT) modified independence  -PG     Time Frame (Grooming Goal 1, OT) long term goal (LTG);10 days  -PG       Row Name 08/20/24 1056          Problem Specific Goal 1 (OT)    Problem Specific Goal 1 (OT) Patient will improve activity tolerance to good minus to support independence with self-care activity and functional transfers  -PG     Time Frame (Problem Specific Goal 1, OT) long term goal (LTG);10 days  -PG       Row Name 08/20/24 1056          Therapy Assessment/Plan (OT)    Planned Therapy Interventions (OT) activity tolerance training;BADL retraining;strengthening exercise;transfer/mobility retraining;patient/caregiver education/training;occupation/activity based interventions  -PG               User Key  (r) = Recorded By, (t) = Taken By, (c) = Cosigned By      Initials Name Provider Type    PG Gio Minaya, KALEB Occupational Therapist                   Clinical Impression       Row Name 08/20/24 1055          Pain Assessment    Pretreatment Pain Rating 0/10 - no pain   -PG     Posttreatment Pain Rating 0/10 - no pain  -PG       Row Name 08/20/24 1055          Plan of Care Review    Plan of Care Reviewed With patient  -PG     Progress no change  -PG     Outcome Evaluation Patient presents with limitations affecting strength, activity tolerance, and balance impacting patient's ability to return home safely and independently.  The skills of a therapist will be required to safely and effectively implement the following treatment plan to restore maximal level of function  -PG       Row Name 08/20/24 1055          Therapy Assessment/Plan (OT)    Patient/Family Therapy Goal Statement (OT) Get stronger and return home independently  -PG     Rehab Potential (OT) good, to achieve stated therapy goals  -PG     Criteria for Skilled Therapeutic Interventions Met (OT) yes;meets criteria;skilled treatment is necessary  -PG     Therapy Frequency (OT) 5 times/wk  -PG       Row Name 08/20/24 1055          Therapy Plan Review/Discharge Plan (OT)    Anticipated Discharge Disposition (OT) sub acute care setting  -PG               User Key  (r) = Recorded By, (t) = Taken By, (c) = Cosigned By      Initials Name Provider Type    PG Gio Minaya, OT Occupational Therapist                   Outcome Measures       Row Name 08/20/24 1057          How much help from another is currently needed...    Putting on and taking off regular lower body clothing? 1  -PG     Bathing (including washing, rinsing, and drying) 2  -PG     Toileting (which includes using toilet bed pan or urinal) 1  -PG     Putting on and taking off regular upper body clothing 3  -PG     Taking care of personal grooming (such as brushing teeth) 3  -PG     Eating meals 4  -PG     AM-PAC 6 Clicks Score (OT) 14  -PG       Row Name 08/20/24 1057          Functional Assessment    Outcome Measure Options AM-PAC 6 Clicks Daily Activity (OT);Optimal Instrument  -PG       Row Name 08/20/24 1057          Optimal Instrument    Optimal Instrument  Optimal - 3  -PG     Bending/Stooping 3  -PG     Standing 2  -PG     Reaching 2  -PG     From the list, choose the 3 activities you would most like to be able to do without any difficulty Bending/stooping;Standing;Reaching  -PG     Total Score Optimal - 3 7  -PG               User Key  (r) = Recorded By, (t) = Taken By, (c) = Cosigned By      Initials Name Provider Type    PG Gio Minaya OT Occupational Therapist                    Occupational Therapy Education       Title: PT OT SLP Therapies (In Progress)       Topic: Occupational Therapy (In Progress)       Point: ADL training (In Progress)       Description:   Instruct learner(s) on proper safety adaptation and remediation techniques during self care or transfers.   Instruct in proper use of assistive devices.                  Learning Progress Summary             Patient Acceptance, D,E, NR by PG at 8/20/2024 1058                         Point: Home exercise program (In Progress)       Description:   Instruct learner(s) on appropriate technique for monitoring, assisting and/or progressing therapeutic exercises/activities.                  Learning Progress Summary             Patient Acceptance, D,E, NR by PG at 8/20/2024 1058                         Point: Precautions (In Progress)       Description:   Instruct learner(s) on prescribed precautions during self-care and functional transfers.                  Learning Progress Summary             Patient Acceptance, D,E, NR by PG at 8/20/2024 1058                         Point: Body mechanics (In Progress)       Description:   Instruct learner(s) on proper positioning and spine alignment during self-care, functional mobility activities and/or exercises.                  Learning Progress Summary             Patient Acceptance, D,E, NR by PG at 8/20/2024 1058                                         User Key       Initials Effective Dates Name Provider Type [facility]    PG 06/16/21 -  Gio Minaya OT  Occupational Therapist OT                  OT Recommendation and Plan  Planned Therapy Interventions (OT): activity tolerance training, BADL retraining, strengthening exercise, transfer/mobility retraining, patient/caregiver education/training, occupation/activity based interventions  Therapy Frequency (OT): 5 times/wk  Plan of Care Review  Plan of Care Reviewed With: patient  Progress: no change  Outcome Evaluation: Patient presents with limitations affecting strength, activity tolerance, and balance impacting patient's ability to return home safely and independently.  The skills of a therapist will be required to safely and effectively implement the following treatment plan to restore maximal level of function     Time Calculation:   Evaluation Complexity (OT)  Review Occupational Profile/Medical/Therapy History Complexity: brief/low complexity  Assessment, Occupational Performance/Identification of Deficit Complexity: 3-5 performance deficits  Clinical Decision Making Complexity (OT): problem focused assessment/low complexity  Overall Complexity of Evaluation (OT): low complexity     Time Calculation- OT       Row Name 08/20/24 1059             Time Calculation- OT    OT Received On 08/20/24  -PG      OT Goal Re-Cert Due Date 08/29/24  -PG         Untimed Charges    OT Eval/Re-eval Minutes 35  -PG         Total Minutes    Untimed Charges Total Minutes 35  -PG       Total Minutes 35  -PG                User Key  (r) = Recorded By, (t) = Taken By, (c) = Cosigned By      Initials Name Provider Type    PG Gio Minaya OT Occupational Therapist                  Therapy Charges for Today       Code Description Service Date Service Provider Modifiers Qty    [id number] HC OT EVAL LOW COMPLEXITY 3 8/20/2024 Gio Minaya OT GO 1                 Gio Minaya OT  8/20/2024

## 2024-08-20 NOTE — THERAPY EVALUATION
Acute Care - Physical Therapy Initial Evaluation   [place]     Patient Name: Brian Mehta  : 1955  MRN: [id number]  Today's Date: 2024      Visit Dx:     ICD-10-CM ICD-9-CM   1. Coronavirus infection  B34.2 079.89   2. Acute UTI  N39.0 599.0   3. Sepsis, due to unspecified organism, unspecified whether acute organ dysfunction present  A41.9 038.9     995.91     Patient Active Problem List   Diagnosis    CVA (cerebral vascular accident)    Essential hypertension    High cholesterol    Vitamin B12 deficiency    Kidney disorder    Chronic pain of both knees    Chronic HFrEF (heart failure with reduced ejection fraction)    LVH (left ventricular hypertrophy)    COPD (chronic obstructive pulmonary disease)    DM2 (diabetes mellitus, type 2)    Urinary tract infection without hematuria    Abnormal nuclear stress test    UTI (urinary tract infection)     Past Medical History:   Diagnosis Date    CHF (congestive heart failure)     SEE'S DR [place] NO CURRENT S/S    COPD (chronic obstructive pulmonary disease)     INHALER    Diabetes mellitus     DOESN'T CHECK BG OFTEN AT HOME    Hyperlipidemia     Hypertension     Sepsis 2023    Stroke 2017    RIGHT SIDE WEAKNESS     Past Surgical History:   Procedure Laterality Date    APPENDECTOMY      EYE SURGERY Bilateral     MISTY CATARACT     PT Assessment (Last 12 Hours)       PT Evaluation and Treatment       Row Name 24 1300          Physical Therapy Time and Intention    Document Type evaluation  -AV     Mode of Treatment individual therapy;physical therapy  -AV       Row Name 24 1300          General Information    Patient Profile Reviewed yes  -AV     Patient Observations alert;cooperative;agree to therapy  -AV     Prior Level of Function --  Reports (I) with ADLs. Ambulated with RW. No home O2. Recent falls.  -AV     Equipment Currently Used at Home walker, rolling  -AV     Existing Precautions/Restrictions fall  -AV       Row Name 24 1300           Living Environment    Current Living Arrangements home  -AV     People in Home spouse  -AV       Row Name 08/20/24 1300          Range of Motion (ROM)    Range of Motion bilateral lower extremities;ROM is WFL  -AV       Row Name 08/20/24 1300          Bed Mobility    Comment, (Bed Mobility) Patient seated upright in recliner upon therapist entry  -AV       Row Name 08/20/24 1300          Transfers    Transfers sit-stand transfer;stand-sit transfer  -AV       Row Name 08/20/24 1300          Sit-Stand Transfer    Sit-Stand Beckham (Transfers) minimum assist (75% patient effort)  -AV     Assistive Device (Sit-Stand Transfers) walker, front-wheeled  -AV       Row Name 08/20/24 1300          Stand-Sit Transfer    Stand-Sit Beckham (Transfers) minimum assist (75% patient effort)  -AV     Assistive Device (Stand-Sit Transfers) walker, front-wheeled  -AV       Row Name 08/20/24 1300          Gait/Stairs (Locomotion)    Gait/Stairs Locomotion gait/ambulation independence;gait/ambulation assistive device;distance ambulated  -AV     Beckham Level (Gait) contact guard  -AV     Assistive Device (Gait) walker, front-wheeled  -AV     Distance in Feet (Gait) 40  Some R knee buckling noted but patient did not require increased assist for balance  -AV       Row Name 08/20/24 1300          Safety Issues, Functional Mobility    Impairments Affecting Function (Mobility) balance;endurance/activity tolerance;strength;cognition  -AV       Row Name 08/20/24 1300          Balance    Balance Assessment standing dynamic balance  -AV     Dynamic Standing Balance contact guard  -AV     Position/Device Used, Standing Balance supported;walker, front-wheeled  -AV       Row Name             Wound 08/19/24 1426 Bilateral perirectal Other (comment)    Wound - Properties Group Placement Date: 08/19/24  -CA Placement Time: 1426  -CA Present on Original Admission: Y  -CA Side: Bilateral  -CA Location: perirectal  -CA Primary Wound Type:  Other  -CA, redness and excoriation     Retired Wound - Properties Group Placement Date: 08/19/24  -CA Placement Time: 1426  -CA Present on Original Admission: Y  -CA Side: Bilateral  -CA Location: perirectal  -CA Primary Wound Type: Other  -CA, redness and excoriation     Retired Wound - Properties Group Date first assessed: 08/19/24  -CA Time first assessed: 1426  -CA Present on Original Admission: Y  -CA Side: Bilateral  -CA Location: perirectal  -CA Primary Wound Type: Other  -CA, redness and excoriation       Row Name 08/20/24 1300          Plan of Care Review    Plan of Care Reviewed With patient  -AV     Progress no change  -AV     Outcome Evaluation Patient presents with deficits in balance, endurance, transfers, and ambulation. Patient will benefit from skilled PT services to address these mobility deficits and decrease risk of falls.  -AV       Row Name 08/20/24 1300          Positioning and Restraints    Pre-Treatment Position sitting in chair/recliner  -AV     Post Treatment Position chair  -AV     In Chair sitting;call light within reach;encouraged to call for assist;exit alarm on  -AV       Row Name 08/20/24 1300          Therapy Assessment/Plan (PT)    Rehab Potential (PT) good, to achieve stated therapy goals  -AV     Criteria for Skilled Interventions Met (PT) yes;meets criteria  -AV     Therapy Frequency (PT) daily  -AV     Predicted Duration of Therapy Intervention (PT) 10 days  -AV     Problem List (PT) problems related to;balance;cognition;mobility;strength  -AV     Activity Limitations Related to Problem List (PT) unable to transfer safely;unable to ambulate safely  -AV       Row Name 08/20/24 1300          PT Evaluation Complexity    History, PT Evaluation Complexity 1-2 personal factors and/or comorbidities  -AV     Examination of Body Systems (PT Eval Complexity) total of 4 or more elements  -AV     Clinical Presentation (PT Evaluation Complexity) stable  -AV     Clinical Decision Making (PT  Evaluation Complexity) low complexity  -AV     Overall Complexity (PT Evaluation Complexity) low complexity  -AV       Row Name 08/20/24 1300          Therapy Plan Review/Discharge Plan (PT)    Therapy Plan Review (PT) evaluation/treatment results reviewed;patient  -AV       Row Name 08/20/24 1300          Physical Therapy Goals    Bed Mobility Goal Selection (PT) bed mobility, PT goal 1  -AV     Transfer Goal Selection (PT) transfer, PT goal 1  -AV     Gait Training Goal Selection (PT) gait training, PT goal 1  -AV       Row Name 08/20/24 1300          Bed Mobility Goal 1 (PT)    Activity/Assistive Device (Bed Mobility Goal 1, PT) sit to supine/supine to sit  -AV     [place] Level/Cues Needed (Bed Mobility Goal 1, PT) modified independence  -AV     Time Frame (Bed Mobility Goal 1, PT) 10 days  -AV       Row Name 08/20/24 1300          Transfer Goal 1 (PT)    Activity/Assistive Device (Transfer Goal 1, PT) sit-to-stand/stand-to-sit;bed-to-chair/chair-to-bed;walker, rolling  -AV     [place] Level/Cues Needed (Transfer Goal 1, PT) modified independence  -AV     Time Frame (Transfer Goal 1, PT) 10 days  -AV       Row Name 08/20/24 1300          Gait Training Goal 1 (PT)    Activity/Assistive Device (Gait Training Goal 1, PT) gait (walking locomotion);assistive device use;walker, rolling  -AV     [place] Level (Gait Training Goal 1, PT) modified independence  -AV     Distance (Gait Training Goal 1, PT) 120  -AV     Time Frame (Gait Training Goal 1, PT) 10 days  -AV               User Key  (r) = Recorded By, (t) = Taken By, (c) = Cosigned By      Initials Name Provider Type    Zo Nash, RN Registered Nurse    Adilson Berry, PT Physical Therapist                    Physical Therapy Education       Title: PT OT SLP Therapies (In Progress)       Topic: Physical Therapy (In Progress)       Point: Mobility training (Done)       Learning Progress Summary             Patient Acceptance, E,TB, VU  by AV at 8/20/2024 1350                         Point: Home exercise program (Not Started)       Learner Progress:  Not documented in this visit.              Point: Body mechanics (Done)       Learning Progress Summary             Patient Acceptance, E,TB, VU by AV at 8/20/2024 1357                         Point: Precautions (Done)       Learning Progress Summary             Patient Acceptance, E,TB, VU by AV at 8/20/2024 1357                                         User Key       Initials Effective Dates Name Provider Type Discipline    AV 06/11/21 -  Adilson Orozco, PT Physical Therapist PT                  PT Recommendation and Plan  Anticipated Discharge Disposition (PT): sub acute care setting  Planned Therapy Interventions (PT): balance training, bed mobility training, gait training, home exercise program, neuromuscular re-education, strengthening, transfer training  Therapy Frequency (PT): daily  Plan of Care Reviewed With: patient  Progress: no change  Outcome Evaluation: Patient presents with deficits in balance, endurance, transfers, and ambulation. Patient will benefit from skilled PT services to address these mobility deficits and decrease risk of falls.   Outcome Measures       Row Name 08/20/24 1300             How much help from another person do you currently need...    Turning from your back to your side while in flat bed without using bedrails? 3  -AV      Moving from lying on back to sitting on the side of a flat bed without bedrails? 2  -AV      Moving to and from a bed to a chair (including a wheelchair)? 3  -AV      Standing up from a chair using your arms (e.g., wheelchair, bedside chair)? 3  -AV      Climbing 3-5 steps with a railing? 2  -AV      To walk in hospital room? 3  -AV      AM-PAC 6 Clicks Score (PT) 16  -AV      Highest Level of Mobility Goal 5 --> Static standing  -AV         Functional Assessment    Outcome Measure Options AM-PAC 6 Clicks Basic Mobility (PT)  -AV                 User Key  (r) = Recorded By, (t) = Taken By, (c) = Cosigned By      Initials Name Provider Type    AV Adilson Orozco, PT Physical Therapist                     Time Calculation:    PT Charges       Row Name 08/20/24 1356             Time Calculation    PT Received On 08/20/24  -AV      PT Goal Re-Cert Due Date 08/29/24  -AV         Untimed Charges    PT Eval/Re-eval Minutes 35  -AV         Total Minutes    Untimed Charges Total Minutes 35  -AV       Total Minutes 35  -AV                User Key  (r) = Recorded By, (t) = Taken By, (c) = Cosigned By      Initials Name Provider Type    AV Adilson Orozco, PT Physical Therapist                  Therapy Charges for Today       Code Description Service Date Service Provider Modifiers Qty    46982412163 HC PT EVAL LOW COMPLEXITY 3 8/20/2024 Adilson Orozco, PT GP 1            PT G-Codes  Outcome Measure Options: AM-PAC 6 Clicks Basic Mobility (PT)  AM-PAC 6 Clicks Score (PT): 16  AM-PAC 6 Clicks Score (OT): 14    Adilson Orozco PT  8/20/2024

## 2024-08-20 NOTE — PROGRESS NOTES
[facility]   Hospitalist Progress Note  Date: 2024  Patient Name: Brian Mehta  : 1955  MRN: [id number]  Date of admission: 2024  Room/Bed: [facility]      Subjective   Subjective     Chief Complaint:   Generalized weakness, recurrent falls at home    Summary:  Brian Mehta is a 69 y.o. male with a past medical history of hypertension, hyperlipidemia, type 2 diabetes mellitus, HFrEF, COPD presented to the ED for evaluation of generalized weakness, repeated falls, difficulty taking care of self.  EMS was called by his wife as she was not able to take care of him at home due to his recurrent falls and generalized weakness. Patient has history of CVA with residual right-sided weakness but usually walks with the help of walker and cane but for the past 2 days patient has been weaker. Did not hit his head.  Associated with increased urinary frequency and incontinence.      In the ED, vital signs temperature 98.7, pulse 104, respiratory 22, blood pressure 149/81 on room air saturating around 92%.  Labs showed normal troponin, proBNP, calcium 8.4, albumin 3.4, rest of the CMP with no significant findings, normal TSH and free T4, normal lactic acid, normal WBC, hemoglobin, platelets.  Urinalysis showed 4+ bacteria, too numerous to count WBC, positive nitrates.  COVID-positive.  Urine cultures and blood cultures in process.  Chest x-ray showed mild vascular congestion with chronic scarring in the left lung base.  Received IV fluids and ceftriaxone in the ED.  Patient has been admitted for further evaluation and management of UTI, COVID-19 infection, generalized weakness, recurrent falls    Interval Followup:   Patient with a Tmax of 102.4 overnight.  Remains on room air at this time, still weak on exam this morning.  Empirically placed on ceftriaxone and treatment urinary tract infection, will continue to watch urine cultures.  PT and OT orders are, will get a better assessment of patient's  strength    Objective   Objective     Vitals:   Temp:  [99.1 °F (37.3 °C)-102.4 °F (39.1 °C)] 99.1 °F (37.3 °C)  Heart Rate:  [] 89  Resp:  [16-20] 18  BP: (117-147)/(48-80) 128/73    Physical Exam               Constitutional: Awake, alert, no acute distress              Eyes: Pupils equal, sclerae anicteric, no conjunctival injection              HENT: NCAT, mucous membranes moist              Neck: Supple, no thyromegaly, no lymphadenopathy, trachea midline              Respiratory: Diminished bases, no crackles, wheezing              Cardiovascular: RRR, no murmurs              Gastrointestinal: Positive bowel sounds, soft, nontender, nondistended              Musculoskeletal: Bilateral lower extremity with 1+ pitting edema              Neurologic: Oriented x 3, strength 4/5 right upper and right lower extremity, 5/5 left upper and left lower extremity cranial Nerves grossly intact to confrontation, speech clear       Result Review    Result Review:  I have personally reviewed these results:  [x]  Laboratory      Lab 08/20/24  0558 08/19/24  0701 08/19/24  0550 08/14/24  1357   WBC 5.03  --  5.09 6.51   HEMOGLOBIN 13.9  --  14.1 14.8   HEMATOCRIT 43.1  --  43.1 45.3   PLATELETS 157  --  172 228   NEUTROS ABS 1.04*  --  1.29*  1.58* 2.66   IMMATURE GRANS (ABS) 0.01  --  0.01  --    LYMPHS ABS 3.46*  --  3.05  --    MONOS ABS 0.51  --  0.73  --    EOS ABS 0.00  --  0.00 0.00   MCV 81.8  --  80.9 81.3   LACTATE  --  1.3  --   --          Lab 08/20/24  0558 08/19/24  0549 08/14/24  1357   SODIUM 133* 136 135*   POTASSIUM 3.6 4.5 4.3   CHLORIDE 99 99 97*   CO2 24.1 27.4 27.6   ANION GAP 9.9 9.6 10.4   BUN 10 19 15   CREATININE 0.89 1.10 0.98   EGFR 93.9 73.6 84.5   GLUCOSE 104* 192* 260*   CALCIUM 8.1* 8.4* 9.3   MAGNESIUM 1.8 1.8 2.1   PHOSPHORUS 2.5 2.8  --    TSH  --  1.080  --          Lab 08/20/24  0558 08/19/24  0549 08/14/24  1357   TOTAL PROTEIN 6.7 7.4 7.5   ALBUMIN 3.1* 3.4* 3.7   GLOBULIN 3.6 4.0  3.8   ALT (SGPT) 11 10 12   AST (SGOT) 20 20 11   BILIRUBIN 0.4 0.4 0.3   ALK PHOS 85 95 115         Lab 08/19/24  0549   PROBNP 634.3   HSTROP T 10                 Brief Urine Lab Results  (Last result in the past 365 days)        Color   Clarity   Blood   Leuk Est   Nitrite   Protein   CREAT   Urine HCG        08/19/24 0800 Yellow   Cloudy   Large (3+)   Moderate (2+)   Positive   30 mg/dL (1+)                 [x]  Microbiology   Microbiology Results (last 10 days)       Procedure Component Value - Date/Time    Blood Culture - Blood, Arm, Right [[id number]]  (Normal) Collected: 08/19/24 0701    Lab Status: Preliminary result Specimen: Blood from Arm, Right Updated: 08/20/24 0715     Blood Culture No growth at 24 hours    Blood Culture - Blood, Arm, Left [[id number]]  (Normal) Collected: 08/19/24 0701    Lab Status: Preliminary result Specimen: Blood from Arm, Left Updated: 08/20/24 0715     Blood Culture No growth at 24 hours    COVID-19, FLU A/B, RSV PCR 1 HR TAT - Swab, Nasopharynx [[id number]]  (Abnormal) Collected: 08/19/24 0658    Lab Status: Final result Specimen: Swab from Nasopharynx Updated: 08/19/24 0813     COVID19 Detected     Influenza A PCR Not Detected     Influenza B PCR Not Detected     RSV, PCR Not Detected    Narrative:      Fact sheet for providers: https://www.fda.gov/media/350814/download    Fact sheet for patients: https://www.fda.gov/media/918951/download    Test performed by PCR.          [x]  Radiology  XR Chest 1 View    Result Date: 8/19/2024  Mild vascular congestion with chronic scarring at the left lung base. Electronically Signed: Durga Zurita MD  8/19/2024 5:54 AM EDT  Workstation ID: [id number]   []  EKG/Telemetry   []  Cardiology/Vascular   []  Pathology  []  Old records  []  Other:    Assessment & Plan   Assessment / Plan     Assessment:  UTI  COVID-19 infection  Generalized weakness  Recent recurrent falls  Left lower extremity swelling, rule out  DVT  Hypertension  Hyperlipidemia  Type 2 diabetes mellitus  HFrEF, not in exacerbation  COPD  History of CVA with residual right-sided weakness     Plan  Admit to inpatient, remote telemetry  Continue ceftriaxone, monitor urine cultures  Follow-up on blood cultures, urine cultures  Pharmacy consult for Paxlovid  Fall precautions  PT OT consult  Duplex obtained, patient refused left common femoral vein imaging, otherwise normal left lower extremity.  Patient refused right lower extremity  Heart healthy, consistent carb diet  Low-dose sliding scale insulin with hypoglycemia protocol  Scheduled and as needed breathing treatments  Bronchodilator protocol       Discussed with RN.    VTE Prophylaxis:  Pharmacologic VTE prophylaxis orders are present.        CODE STATUS:   Level Of Support Discussed With: Patient  Code Status (Patient has no pulse and is not breathing): CPR (Attempt to Resuscitate)  Medical Interventions (Patient has pulse or is breathing): Full Support      Electronically signed by Arcenio Mosquera MD, 8/20/2024, 10:41 EDT.

## 2024-08-20 NOTE — CONSULTS
Consult received per Database Referral. Called patient, we spoke briefly on the phone. He states he is no longer taking the Lantus and is unsure if he is taking Jardiance. Patient's most recent A1c was 8.1% with an estimated average glucose of 186 mg/dl from March 2024. When I asked the patient if he checks his blood sugar at home, he proceeded to hang up the phone. No needs or questions at this time.

## 2024-08-20 NOTE — PLAN OF CARE
Goal Outcome Evaluation:  Plan of Care Reviewed With: patient        Progress: improving  Outcome Evaluation: Patient had no complaints of pain or discomfort, vitals stable, saat in the recliner most of the shift.

## 2024-08-21 ENCOUNTER — TELEPHONE (OUTPATIENT)
Dept: FAMILY MEDICINE CLINIC | Facility: CLINIC | Age: 69
End: 2024-08-21

## 2024-08-21 LAB
BACTERIA SPEC AEROBE CULT: ABNORMAL
GLUCOSE BLDC GLUCOMTR-MCNC: 145 MG/DL (ref 70–99)
GLUCOSE BLDC GLUCOMTR-MCNC: 150 MG/DL (ref 70–99)
GLUCOSE BLDC GLUCOMTR-MCNC: 158 MG/DL (ref 70–99)
GLUCOSE BLDC GLUCOMTR-MCNC: 203 MG/DL (ref 70–99)

## 2024-08-21 PROCEDURE — 99232 SBSQ HOSP IP/OBS MODERATE 35: CPT | Performed by: INTERNAL MEDICINE

## 2024-08-21 PROCEDURE — 94799 UNLISTED PULMONARY SVC/PX: CPT

## 2024-08-21 PROCEDURE — 63710000001 INSULIN LISPRO (HUMAN) PER 5 UNITS: Performed by: STUDENT IN AN ORGANIZED HEALTH CARE EDUCATION/TRAINING PROGRAM

## 2024-08-21 PROCEDURE — 25010000002 CEFTRIAXONE PER 250 MG: Performed by: STUDENT IN AN ORGANIZED HEALTH CARE EDUCATION/TRAINING PROGRAM

## 2024-08-21 PROCEDURE — 82948 REAGENT STRIP/BLOOD GLUCOSE: CPT

## 2024-08-21 PROCEDURE — 82948 REAGENT STRIP/BLOOD GLUCOSE: CPT | Performed by: STUDENT IN AN ORGANIZED HEALTH CARE EDUCATION/TRAINING PROGRAM

## 2024-08-21 RX ADMIN — ARFORMOTEROL TARTRATE 15 MCG: 15 SOLUTION RESPIRATORY (INHALATION) at 09:10

## 2024-08-21 RX ADMIN — INSULIN LISPRO 3 UNITS: 100 INJECTION, SOLUTION INTRAVENOUS; SUBCUTANEOUS at 20:51

## 2024-08-21 RX ADMIN — NIRMATRELVIR AND RITONAVIR 3 TABLET: KIT at 20:51

## 2024-08-21 RX ADMIN — Medication 10 ML: at 00:05

## 2024-08-21 RX ADMIN — ASPIRIN 81 MG: 81 TABLET, CHEWABLE ORAL at 09:23

## 2024-08-21 RX ADMIN — BUDESONIDE 0.5 MG: 0.5 SUSPENSION RESPIRATORY (INHALATION) at 19:08

## 2024-08-21 RX ADMIN — CEFTRIAXONE SODIUM 1000 MG: 1 INJECTION, POWDER, FOR SOLUTION INTRAMUSCULAR; INTRAVENOUS at 09:24

## 2024-08-21 RX ADMIN — NIRMATRELVIR AND RITONAVIR 3 TABLET: KIT at 09:26

## 2024-08-21 RX ADMIN — NICOTINE 1 PATCH: 21 PATCH, EXTENDED RELEASE TRANSDERMAL at 09:24

## 2024-08-21 RX ADMIN — EMPAGLIFLOZIN 25 MG: 25 TABLET, FILM COATED ORAL at 09:23

## 2024-08-21 RX ADMIN — BUDESONIDE 0.5 MG: 0.5 SUSPENSION RESPIRATORY (INHALATION) at 09:10

## 2024-08-21 RX ADMIN — ISOSORBIDE MONONITRATE 30 MG: 30 TABLET, EXTENDED RELEASE ORAL at 09:23

## 2024-08-21 RX ADMIN — Medication 10 ML: at 09:26

## 2024-08-21 RX ADMIN — ARFORMOTEROL TARTRATE 15 MCG: 15 SOLUTION RESPIRATORY (INHALATION) at 19:08

## 2024-08-21 RX ADMIN — NIRMATRELVIR AND RITONAVIR 3 TABLET: KIT at 00:05

## 2024-08-21 NOTE — PROGRESS NOTES
[facility]   Hospitalist Progress Note  Date: 2024  Patient Name: Brian Mehta  : 1955  MRN: [id number]  Date of admission: 2024  Room/Bed: [facility]      Subjective   Subjective     Chief Complaint:   Generalized weakness, recurrent falls at home    Summary:  Brian Mehta is a 69 y.o. male with a past medical history of hypertension, hyperlipidemia, type 2 diabetes mellitus, HFrEF, COPD presented to the ED for evaluation of generalized weakness, repeated falls, difficulty taking care of self.  EMS was called by his wife as she was not able to take care of him at home due to his recurrent falls and generalized weakness. Patient has history of CVA with residual right-sided weakness but usually walks with the help of walker and cane but for the past 2 days patient has been weaker. Did not hit his head.  Associated with increased urinary frequency and incontinence.      In the ED, vital signs temperature 98.7, pulse 104, respiratory 22, blood pressure 149/81 on room air saturating around 92%.  Labs showed normal troponin, proBNP, calcium 8.4, albumin 3.4, rest of the CMP with no significant findings, normal TSH and free T4, normal lactic acid, normal WBC, hemoglobin, platelets.  Urinalysis showed 4+ bacteria, too numerous to count WBC, positive nitrates.  COVID-positive.  Urine cultures and blood cultures in process.  Chest x-ray showed mild vascular congestion with chronic scarring in the left lung base.  Received IV fluids and ceftriaxone in the ED.  Patient has been admitted for further evaluation and management of UTI, COVID-19 infection, generalized weakness, recurrent falls    Interval Followup:   Afebrile over the past 24 hours.  Patient feeling better today.  Patient has recommendations for SNF discharge, not currently interested.  Patient remains on room air at this time.  Complains of 1 episode of diarrhea following breakfast this morning.    Objective   Objective     Vitals:   Temp:  [97.7  °F (36.5 °C)-98.8 °F (37.1 °C)] 98.8 °F (37.1 °C)  Heart Rate:  [70-96] 70  Resp:  [18-20] 20  BP: (122-130)/(70-82) 126/72    Physical Exam               Constitutional: Awake, alert, no acute distress              Eyes: Pupils equal, sclerae anicteric, no conjunctival injection              HENT: NCAT, mucous membranes moist              Neck: Supple, no thyromegaly, no lymphadenopathy, trachea midline              Respiratory: Diminished bases, no crackles, wheezing              Cardiovascular: RRR, no murmurs              Gastrointestinal: Positive bowel sounds, soft, nontender, nondistended              Musculoskeletal: Bilateral lower extremity with 1+ pitting edema              Neurologic: Oriented x 3, strength 4/5 right upper and right lower extremity, 5/5 left upper and left lower extremity cranial Nerves grossly intact to confrontation, speech clear       Result Review    Result Review:  I have personally reviewed these results:  [x]  Laboratory      Lab 08/20/24  0558 08/19/24  0701 08/19/24  0550   WBC 5.03  --  5.09   HEMOGLOBIN 13.9  --  14.1   HEMATOCRIT 43.1  --  43.1   PLATELETS 157  --  172   NEUTROS ABS 1.04*  --  1.29*  1.58*   IMMATURE GRANS (ABS) 0.01  --  0.01   LYMPHS ABS 3.46*  --  3.05   MONOS ABS 0.51  --  0.73   EOS ABS 0.00  --  0.00   MCV 81.8  --  80.9   LACTATE  --  1.3  --          Lab 08/20/24  0558 08/19/24  0549   SODIUM 133* 136   POTASSIUM 3.6 4.5   CHLORIDE 99 99   CO2 24.1 27.4   ANION GAP 9.9 9.6   BUN 10 19   CREATININE 0.89 1.10   EGFR 93.9 73.6   GLUCOSE 104* 192*   CALCIUM 8.1* 8.4*   MAGNESIUM 1.8 1.8   PHOSPHORUS 2.5 2.8   TSH  --  1.080         Lab 08/20/24  0558 08/19/24  0549   TOTAL PROTEIN 6.7 7.4   ALBUMIN 3.1* 3.4*   GLOBULIN 3.6 4.0   ALT (SGPT) 11 10   AST (SGOT) 20 20   BILIRUBIN 0.4 0.4   ALK PHOS 85 95         Lab 08/19/24  0549   PROBNP 634.3   HSTROP T 10                 Brief Urine Lab Results  (Last result in the past 365 days)        Color   Clarity    Blood   Leuk Est   Nitrite   Protein   CREAT   Urine HCG        08/19/24 0800 Yellow   Cloudy   Large (3+)   Moderate (2+)   Positive   30 mg/dL (1+)                 [x]  Microbiology   Microbiology Results (last 10 days)       Procedure Component Value - Date/Time    Urine Culture - Urine, Urine, Clean Catch [[id number]]  (Abnormal)  (Susceptibility) Collected: 08/19/24 0800    Lab Status: Final result Specimen: Urine, Clean Catch Updated: 08/21/24 1133     Urine Culture >100,000 CFU/mL Escherichia coli    Narrative:      Colonization of the urinary tract without infection is common. Treatment is discouraged unless the patient is symptomatic, pregnant, or undergoing an invasive urologic procedure.    Susceptibility        Escherichia coli      GILBERT      Amoxicillin + Clavulanate Susceptible      Ampicillin Resistant      Ampicillin + Sulbactam Susceptible      Cefazolin Susceptible      Cefepime Susceptible      Ceftazidime Susceptible      Ceftriaxone Susceptible      Gentamicin Susceptible      Levofloxacin Susceptible      Nitrofurantoin Susceptible      Piperacillin + Tazobactam Susceptible      Trimethoprim + Sulfamethoxazole Resistant                           Blood Culture - Blood, Arm, Right [[id number]]  (Normal) Collected: 08/19/24 0701    Lab Status: Preliminary result Specimen: Blood from Arm, Right Updated: 08/21/24 0715     Blood Culture No growth at 2 days    Blood Culture - Blood, Arm, Left [[id number]]  (Normal) Collected: 08/19/24 0701    Lab Status: Preliminary result Specimen: Blood from Arm, Left Updated: 08/21/24 0715     Blood Culture No growth at 2 days    COVID-19, FLU A/B, RSV PCR 1 HR TAT - Swab, Nasopharynx [[id number]]  (Abnormal) Collected: 08/19/24 0658    Lab Status: Final result Specimen: Swab from Nasopharynx Updated: 08/19/24 0813     COVID19 Detected     Influenza A PCR Not Detected     Influenza B PCR Not Detected     RSV, PCR Not Detected    Narrative:      Fact sheet for providers:  https://www.fda.gov/media/806181/download    Fact sheet for patients: https://www.fda.gov/media/505693/download    Test performed by PCR.          [x]  Radiology  XR Chest 1 View    Result Date: 8/19/2024  Mild vascular congestion with chronic scarring at the left lung base. Electronically Signed: Durga Zurita MD  8/19/2024 5:54 AM EDT  Workstation ID: [id number]   []  EKG/Telemetry   []  Cardiology/Vascular   []  Pathology  []  Old records  []  Other:    Assessment & Plan   Assessment / Plan     Assessment:  UTI  COVID-19 infection  Generalized weakness  Recent recurrent falls  Left lower extremity swelling, rule out DVT  Hypertension  Hyperlipidemia  Type 2 diabetes mellitus  HFrEF, not in exacerbation  COPD  History of CVA with residual right-sided weakness     Plan  Admit to inpatient, remote telemetry  Completed 3 days ceftriaxone, urine growing E. coli resistant to ampicillin and Bactrim  Blood cultures no growth to date  Pharmacy consult for Paxlovid  Fall precautions  PT OT consult  Duplex obtained, patient refused left common femoral vein imaging, otherwise normal left lower extremity.  Patient refused right lower extremity  Heart healthy, consistent carb diet  Low-dose sliding scale insulin with hypoglycemia protocol  Scheduled and as needed breathing treatments  Bronchodilator protocol  Patient with recommendations for SNF discharge, not currently interested at this time.  Will have patient work with therapy again today and reassess strength now that he is feeling better       Discussed with RN.    VTE Prophylaxis:  Pharmacologic VTE prophylaxis orders are present.        CODE STATUS:   Level Of Support Discussed With: Patient  Code Status (Patient has no pulse and is not breathing): CPR (Attempt to Resuscitate)  Medical Interventions (Patient has pulse or is breathing): Full Support      Electronically signed by Arcenio Mosquera MD, 8/21/2024, 18:39 EDT.

## 2024-08-21 NOTE — PLAN OF CARE
Goal Outcome Evaluation:  Plan of Care Reviewed With: patient        Progress: no change  Outcome Evaluation: Patient had no complaints of pain or discomfort, vitals stable, getting up x1 assist to recliner and bathroom.

## 2024-08-21 NOTE — PLAN OF CARE
Goal Outcome Evaluation:              Outcome Evaluation: Patient has no complaints of any pain, patient rested throughout the night

## 2024-08-22 ENCOUNTER — READMISSION MANAGEMENT (OUTPATIENT)
Dept: CALL CENTER | Facility: HOSPITAL | Age: 69
End: 2024-08-22
Payer: MEDICARE

## 2024-08-22 VITALS
SYSTOLIC BLOOD PRESSURE: 112 MMHG | BODY MASS INDEX: 27.55 KG/M2 | WEIGHT: 221.56 LBS | DIASTOLIC BLOOD PRESSURE: 68 MMHG | HEIGHT: 75 IN | RESPIRATION RATE: 18 BRPM | OXYGEN SATURATION: 94 % | TEMPERATURE: 97.9 F | HEART RATE: 88 BPM

## 2024-08-22 LAB
ANION GAP SERPL CALCULATED.3IONS-SCNC: 8.7 MMOL/L (ref 5–15)
BUN SERPL-MCNC: 14 MG/DL (ref 8–23)
BUN/CREAT SERPL: 15.9 (ref 7–25)
CALCIUM SPEC-SCNC: 8.4 MG/DL (ref 8.6–10.5)
CHLORIDE SERPL-SCNC: 106 MMOL/L (ref 98–107)
CO2 SERPL-SCNC: 24.3 MMOL/L (ref 22–29)
CREAT SERPL-MCNC: 0.88 MG/DL (ref 0.76–1.27)
DEPRECATED RDW RBC AUTO: 46.9 FL (ref 37–54)
EGFRCR SERPLBLD CKD-EPI 2021: 94.2 ML/MIN/1.73
ERYTHROCYTE [DISTWIDTH] IN BLOOD BY AUTOMATED COUNT: 15.9 % (ref 12.3–15.4)
GLUCOSE BLDC GLUCOMTR-MCNC: 118 MG/DL (ref 70–99)
GLUCOSE BLDC GLUCOMTR-MCNC: 250 MG/DL (ref 70–99)
GLUCOSE SERPL-MCNC: 101 MG/DL (ref 65–99)
HCT VFR BLD AUTO: 45.5 % (ref 37.5–51)
HGB BLD-MCNC: 14.5 G/DL (ref 13–17.7)
MAGNESIUM SERPL-MCNC: 1.8 MG/DL (ref 1.6–2.4)
MCH RBC QN AUTO: 26.1 PG (ref 26.6–33)
MCHC RBC AUTO-ENTMCNC: 31.9 G/DL (ref 31.5–35.7)
MCV RBC AUTO: 82 FL (ref 79–97)
PLATELET # BLD AUTO: 152 10*3/MM3 (ref 140–450)
PMV BLD AUTO: 10.3 FL (ref 6–12)
POTASSIUM SERPL-SCNC: 4.6 MMOL/L (ref 3.5–5.2)
RBC # BLD AUTO: 5.55 10*6/MM3 (ref 4.14–5.8)
SODIUM SERPL-SCNC: 139 MMOL/L (ref 136–145)
WBC NRBC COR # BLD AUTO: 5.14 10*3/MM3 (ref 3.4–10.8)

## 2024-08-22 PROCEDURE — 80048 BASIC METABOLIC PNL TOTAL CA: CPT | Performed by: INTERNAL MEDICINE

## 2024-08-22 PROCEDURE — 63710000001 INSULIN LISPRO (HUMAN) PER 5 UNITS: Performed by: STUDENT IN AN ORGANIZED HEALTH CARE EDUCATION/TRAINING PROGRAM

## 2024-08-22 PROCEDURE — 94760 N-INVAS EAR/PLS OXIMETRY 1: CPT

## 2024-08-22 PROCEDURE — 99239 HOSP IP/OBS DSCHRG MGMT >30: CPT | Performed by: INTERNAL MEDICINE

## 2024-08-22 PROCEDURE — 83735 ASSAY OF MAGNESIUM: CPT | Performed by: INTERNAL MEDICINE

## 2024-08-22 PROCEDURE — 85027 COMPLETE CBC AUTOMATED: CPT | Performed by: INTERNAL MEDICINE

## 2024-08-22 PROCEDURE — 82948 REAGENT STRIP/BLOOD GLUCOSE: CPT

## 2024-08-22 PROCEDURE — 94799 UNLISTED PULMONARY SVC/PX: CPT

## 2024-08-22 PROCEDURE — 94664 DEMO&/EVAL PT USE INHALER: CPT

## 2024-08-22 RX ORDER — NICOTINE 21 MG/24HR
1 PATCH, TRANSDERMAL 24 HOURS TRANSDERMAL
Qty: 28 EACH | Refills: 1 | Status: SHIPPED | OUTPATIENT
Start: 2024-08-22

## 2024-08-22 RX ORDER — ATORVASTATIN CALCIUM 20 MG/1
20 TABLET, FILM COATED ORAL NIGHTLY
Qty: 30 TABLET | Refills: 0 | Status: SHIPPED | OUTPATIENT
Start: 2024-08-26

## 2024-08-22 RX ADMIN — ASPIRIN 81 MG: 81 TABLET, CHEWABLE ORAL at 09:43

## 2024-08-22 RX ADMIN — ISOSORBIDE MONONITRATE 30 MG: 30 TABLET, EXTENDED RELEASE ORAL at 09:43

## 2024-08-22 RX ADMIN — INSULIN LISPRO 4 UNITS: 100 INJECTION, SOLUTION INTRAVENOUS; SUBCUTANEOUS at 13:02

## 2024-08-22 RX ADMIN — Medication 10 ML: at 09:44

## 2024-08-22 RX ADMIN — NIRMATRELVIR AND RITONAVIR 3 TABLET: KIT at 09:43

## 2024-08-22 RX ADMIN — NICOTINE 1 PATCH: 21 PATCH, EXTENDED RELEASE TRANSDERMAL at 09:44

## 2024-08-22 RX ADMIN — BUDESONIDE 0.5 MG: 0.5 SUSPENSION RESPIRATORY (INHALATION) at 10:49

## 2024-08-22 RX ADMIN — ARFORMOTEROL TARTRATE 15 MCG: 15 SOLUTION RESPIRATORY (INHALATION) at 10:49

## 2024-08-22 RX ADMIN — EMPAGLIFLOZIN 25 MG: 25 TABLET, FILM COATED ORAL at 09:44

## 2024-08-22 NOTE — CONSULTS
Discharge Planning Assessment   [place]     Patient Name: Brian Mehta  MRN: [id number]  Today's Date: 8/22/2024    Admit Date: 8/19/2024   Discharge Plan       Row Name 08/22/24 1051       Plan    Patient/Family in Agreement with Plan yes    Final Discharge Disposition Code 06 - home with home health care    Final Note Pt discharging home today. A [facility] set up for pt for nursing and therapy needs. Transportation home scheduled with [facility] for 2:00 PM. No other needs noted at this time.             Continued Care and Services - Admitted Since 8/19/2024       Home Medical Care       Service Provider Request Status Selected Services Address Phone Fax Patient Preferred    [facility] Accepted N/A [address] [place] [address] [phone] --             Expected Discharge Date and Time       Expected Discharge Date Expected Discharge Time    Aug 22, 2024         YUVAL Christian

## 2024-08-22 NOTE — DISCHARGE SUMMARY
[facility]         HOSPITALIST  DISCHARGE SUMMARY    Patient Name: Brian Mehta  : 1955  MRN: [id number]    Date of Admission: 2024  Date of Discharge:  2024  Primary Care Physician: Sophie Capps APRN    Active and Resolved Hospital Problems:  UTI, E coli  COVID-19 infection  Generalized weakness  Recent recurrent falls  Hypertension  Hyperlipidemia  Type 2 diabetes mellitus  HFrEF, not in exacerbation  COPD, not in exacerbation  History of CVA with residual right-sided weakness      Hospital Course     Hospital Course:  Brian Mehta is a 69 y.o. male with a medical history of hypertension, hyperlipidemia, type 2 diabetes mellitus, HFrEF, COPD presented to the ED for evaluation of generalized weakness, repeated falls, difficulty taking care of self.  EMS was called by his wife as she was not able to take care of him at home due to his recurrent falls and generalized weakness. Patient has history of CVA with residual right-sided weakness but usually walks with the help of walker and cane but for the past 2 days patient has been weaker. Did not hit his head.  Associated with increased urinary frequency and incontinence.      In the ED, vital signs temperature 98.7, pulse 104, respiratory 22, blood pressure 149/81 on room air saturating around 92%.  Labs showed normal troponin, proBNP, calcium 8.4, albumin 3.4, rest of the CMP with no significant findings, normal TSH and free T4, normal lactic acid, normal WBC, hemoglobin, platelets.  Urinalysis showed 4+ bacteria, too numerous to count WBC, positive nitrates.  COVID-positive.  Chest x-ray showed mild vascular congestion with chronic scarring in the left lung base.  Patient treated for urinary tract infection and COVID-19 infection.  Patient worked with PT and OT while inpatient with recommendations for SNF discharge.  Patient not interested in discharge anywhere but home, agreeable to home health.  Patient discharged with  remaining days of Paxlovid.  Also sent home with nicotine patches.  Patient seen on date of discharge, clinically and hemodynamically stable.  Patient provided concerning signs and symptoms prompting immediate medical attention, patient understanding and agreeable     DISCHARGE Follow Up Recommendations for labs and diagnostics:   Follow-up primary care physician as soon as possible      Day of Discharge     Vital Signs:  Temp:  [97.6 °F (36.4 °C)-98.8 °F (37.1 °C)] 98.4 °F (36.9 °C)  Heart Rate:  [70-88] 78  Resp:  [18-20] 18  BP: (126-148)/(60-78) 148/78  Physical Exam:               Constitutional: Awake, alert, no acute distress              Eyes: Pupils equal, sclerae anicteric, no conjunctival injection              HENT: NCAT, mucous membranes moist              Neck: Supple, no thyromegaly, no lymphadenopathy, trachea midline              Respiratory: Diminished bases, no crackles, wheezing              Cardiovascular: RRR, no murmurs              Gastrointestinal: Positive bowel sounds, soft, nontender, nondistended              Musculoskeletal: Bilateral lower extremity with 1+ pitting edema              Neurologic: Oriented x 3, strength 4/5 right upper and right lower extremity, 5/5 left upper and left lower extremity cranial Nerves grossly intact to confrontation, speech clear       Discharge Details        Discharge Medications        New Medications        Instructions Start Date   nicotine 21 MG/24HR patch  Commonly known as: NICODERM CQ   1 patch, Transdermal, Every 24 Hours Scheduled      Nirmatrelvir & Ritonavir (300mg/100mg)  Commonly known as: PAXLOVID   3 tablets, Oral, 2 Times Daily             Changes to Medications        Instructions Start Date   atorvastatin 20 MG tablet  Commonly known as: LIPITOR  What changed: These instructions start on August 26, 2024. If you are unsure what to do until then, ask your doctor or other care provider.   20 mg, Oral, Nightly   Start Date: August 26,  2024     Lantus SoloStar 100 UNIT/ML injection pen  Generic drug: Insulin Glargine  What changed: See the new instructions.   INJECT 10 UNITS SUBCUTANEOUSLY INTO THE APPROPRIATE AREA EVERY NIGHT AS DIRECTED             Continue These Medications        Instructions Start Date   aspirin 81 MG chewable tablet   81 mg, Oral, Daily      furosemide 20 MG tablet  Commonly known as: Lasix   20 mg, Oral, 2 Times Daily      isosorbide mononitrate 30 MG 24 hr tablet  Commonly known as: IMDUR   30 mg, Oral, Daily      Jardiance 25 MG tablet tablet  Generic drug: empagliflozin   25 mg, Oral, Daily      losartan 25 MG tablet  Commonly known as: COZAAR   25 mg, Oral, Daily      potassium chloride 10 MEQ CR tablet   10 mEq, Oral, 2 Times Daily               No Known Allergies    Discharge Disposition:  Home-Health Care [facility]    Diet:  Hospital:  Diet Order   Procedures   • Diet: Cardiac, Diabetic; Healthy Heart (2-3 Na+); Consistent Carbohydrate; Fluid Consistency: Thin (IDDSI 0)       Discharge Activity:   Activity Instructions       Activity as Tolerated              CODE STATUS:  Code Status and Medical Interventions: CPR (Attempt to Resuscitate); Full Support   Ordered at: 08/19/24 0916     Level Of Support Discussed With:    Patient     Code Status (Patient has no pulse and is not breathing):    CPR (Attempt to Resuscitate)     Medical Interventions (Patient has pulse or is breathing):    Full Support         Future Appointments   Date Time Provider Department Center   1/8/2025  1:30 PM Rodolfo Najera MD [facility] [place] [facility]       Additional Instructions for the Follow-ups that You Need to Schedule       Discharge Follow-up with PCP   As directed       Currently Documented PCP:    Sophie Capps APRN    PCP Phone Number:    [phone]     Follow Up Details: In less than one week                Pertinent  and/or Most Recent Results     PROCEDURES:   None     LAB RESULTS:      Lab 08/22/24  0547 08/20/24  0558 08/19/24  0701  08/19/24  0550   WBC 5.14 5.03  --  5.09   HEMOGLOBIN 14.5 13.9  --  14.1   HEMATOCRIT 45.5 43.1  --  43.1   PLATELETS 152 157  --  172   NEUTROS ABS  --  1.04*  --  1.29*  1.58*   IMMATURE GRANS (ABS)  --  0.01  --  0.01   LYMPHS ABS  --  3.46*  --  3.05   MONOS ABS  --  0.51  --  0.73   EOS ABS  --  0.00  --  0.00   MCV 82.0 81.8  --  80.9   LACTATE  --   --  1.3  --          Lab 08/22/24  0547 08/20/24  0558 08/19/24  0549   SODIUM 139 133* 136   POTASSIUM 4.6 3.6 4.5   CHLORIDE 106 99 99   CO2 24.3 24.1 27.4   ANION GAP 8.7 9.9 9.6   BUN 14 10 19   CREATININE 0.88 0.89 1.10   EGFR 94.2 93.9 73.6   GLUCOSE 101* 104* 192*   CALCIUM 8.4* 8.1* 8.4*   MAGNESIUM 1.8 1.8 1.8   PHOSPHORUS  --  2.5 2.8   TSH  --   --  1.080         Lab 08/20/24  0558 08/19/24  0549   TOTAL PROTEIN 6.7 7.4   ALBUMIN 3.1* 3.4*   GLOBULIN 3.6 4.0   ALT (SGPT) 11 10   AST (SGOT) 20 20   BILIRUBIN 0.4 0.4   ALK PHOS 85 95         Lab 08/19/24  0549   PROBNP 634.3   HSTROP T 10                 Brief Urine Lab Results  (Last result in the past 365 days)        Color   Clarity   Blood   Leuk Est   Nitrite   Protein   CREAT   Urine HCG        08/19/24 0800 Yellow   Cloudy   Large (3+)   Moderate (2+)   Positive   30 mg/dL (1+)                 Microbiology Results (last 10 days)       Procedure Component Value - Date/Time    Urine Culture - Urine, Urine, Clean Catch [[id number]]  (Abnormal)  (Susceptibility) Collected: 08/19/24 0800    Lab Status: Final result Specimen: Urine, Clean Catch Updated: 08/21/24 1133     Urine Culture >100,000 CFU/mL Escherichia coli    Narrative:      Colonization of the urinary tract without infection is common. Treatment is discouraged unless the patient is symptomatic, pregnant, or undergoing an invasive urologic procedure.    Susceptibility        Escherichia coli      GILBERT      Amoxicillin + Clavulanate Susceptible      Ampicillin Resistant      Ampicillin + Sulbactam Susceptible      Cefazolin Susceptible       Cefepime Susceptible      Ceftazidime Susceptible      Ceftriaxone Susceptible      Gentamicin Susceptible      Levofloxacin Susceptible      Nitrofurantoin Susceptible      Piperacillin + Tazobactam Susceptible      Trimethoprim + Sulfamethoxazole Resistant                           Blood Culture - Blood, Arm, Right [[id number]]  (Normal) Collected: 08/19/24 0701    Lab Status: Preliminary result Specimen: Blood from Arm, Right Updated: 08/22/24 0715     Blood Culture No growth at 3 days    Blood Culture - Blood, Arm, Left [[id number]]  (Normal) Collected: 08/19/24 0701    Lab Status: Preliminary result Specimen: Blood from Arm, Left Updated: 08/22/24 0715     Blood Culture No growth at 3 days    COVID-19, FLU A/B, RSV PCR 1 HR TAT - Swab, Nasopharynx [[id number]]  (Abnormal) Collected: 08/19/24 0658    Lab Status: Final result Specimen: Swab from Nasopharynx Updated: 08/19/24 0813     COVID19 Detected     Influenza A PCR Not Detected     Influenza B PCR Not Detected     RSV, PCR Not Detected    Narrative:      Fact sheet for providers: https://www.fda.gov/media/813738/download    Fact sheet for patients: https://www.fda.gov/media/900143/download    Test performed by PCR.            XR Chest 1 View    Result Date: 8/19/2024  Impression: Mild vascular congestion with chronic scarring at the left lung base. Electronically Signed: Durga Zurita MD  8/19/2024 5:54 AM EDT  Workstation ID: [id number]     Results for orders placed during the hospital encounter of 08/19/24    Duplex Venous Lower Extremity - Bilateral CV-READ    Interpretation Summary  •  Patient refused left common femoral vein imaging, otherwise this was a normal left lower extremity venous duplex scan.  •  Patient refused right lower extremity venous evaluation.      Results for orders placed during the hospital encounter of 08/19/24    Duplex Venous Lower Extremity - Bilateral CV-READ    Interpretation Summary  •  Patient refused left common femoral vein  imaging, otherwise this was a normal left lower extremity venous duplex scan.  •  Patient refused right lower extremity venous evaluation.      Results for orders placed during the hospital encounter of 09/13/23    Adult Transthoracic Echo Complete w/ Color, Spectral and Contrast if necessary per protocol    Interpretation Summary  •  There is mild global hypokinesis of the left ventricle.  Estimated LV ejection fraction is 40 to 45%.  •  Left ventricular wall thickness is consistent with mild concentric hypertrophy.  •  Left ventricular diastolic function is consistent with (grade I) impaired relaxation.  •  There are no significant valvular abnormalities.  •  Estimated right ventricular systolic pressure from tricuspid regurgitation is normal (<35 mmHg).      Labs Pending at Discharge:  Pending Labs       Order Current Status    Blood Culture - Blood, Arm, Left Preliminary result    Blood Culture - Blood, Arm, Right Preliminary result              Time spent on Discharge including face to face service:  38 minutes    Electronically signed by Arcenio Mosquera MD, 08/22/24, 9:47 AM EDT.

## 2024-08-22 NOTE — OUTREACH NOTE
Prep Survey      Flowsheet Row Responses   [facility] patient discharged from? [facility]   Is LACE score < 7 ? No   Eligibility [facility]   Date of Admission 08/19/24   Date of Discharge 08/22/24   Discharge Disposition [place]Health [place] [facility]   Discharge diagnosis UTI (urinary tract infection), COVID-19   Does the patient have one of the following disease processes/diagnoses(primary or secondary)? Other   Does the patient have Home health ordered? Yes   What is the Home health agency?  [facility]   Is there a DME ordered? No   Prep survey completed? Yes            Elvira SHOEMAKER - Registered Nurse

## 2024-08-22 NOTE — PLAN OF CARE
Goal Outcome Evaluation:  Plan of Care Reviewed With: patient        Progress: improving  Outcome Evaluation: Alert and oriented x4, forgetfulness. No complaints of pain. Up to chair this shift x2. Bed/chair alarm on. Skin care provided. Turned and repositioned frequently.

## 2024-08-22 NOTE — PLAN OF CARE
Goal Outcome Evaluation:              Outcome Evaluation: Patient had no new complaints\

## 2024-08-23 ENCOUNTER — TRANSITIONAL CARE MANAGEMENT TELEPHONE ENCOUNTER (OUTPATIENT)
Dept: CALL CENTER | Facility: HOSPITAL | Age: 69
End: 2024-08-23
Payer: MEDICARE

## 2024-08-23 ENCOUNTER — PATIENT OUTREACH (OUTPATIENT)
Dept: CASE MANAGEMENT | Facility: OTHER | Age: 69
End: 2024-08-23
Payer: MEDICARE

## 2024-08-23 ENCOUNTER — NURSE TRIAGE (OUTPATIENT)
Dept: CALL CENTER | Facility: HOSPITAL | Age: 69
End: 2024-08-23
Payer: MEDICARE

## 2024-08-23 DIAGNOSIS — I63.9 CEREBROVASCULAR ACCIDENT (CVA), UNSPECIFIED MECHANISM: Primary | ICD-10-CM

## 2024-08-23 DIAGNOSIS — R53.1 WEAKNESS: ICD-10-CM

## 2024-08-23 NOTE — TELEPHONE ENCOUNTER
Calling to see what moisture barrier was used for him during admission.  MAR reviewed and no moisture barrier was listed.  Caller states that it was it was just for skin protection.  He has Desitin max strength another moisture barrier cream.  Discussed that either of those would be appropriate for skin protection.  Reason for Disposition  • Caller has medicine question, adult has minor symptoms, caller declines triage, AND triager answers question    Additional Information  • Negative: [1] Intentional drug overdose AND [2] suicidal thoughts or ideas  • Negative: Drug overdose and triager unable to answer question  • Negative: Caller requesting a renewal or refill of a medicine patient is currently taking  • Negative: Caller requesting information unrelated to medicine  • Negative: Caller requesting information about COVID-19 Vaccine  • Negative: Caller requesting information about Emergency Contraception  • Negative: Caller requesting information about Combined Birth Control Pills  • Negative: Caller requesting information about Progestin Birth Control Pills  • Negative: Caller requesting information about Post-Op pain or medicines  • Negative: Caller requesting a prescription antibiotic (such as Penicillin) for Strep throat and has a positive culture result  • Negative: Caller requesting a prescription anti-viral med (such as Tamiflu) and has influenza (flu) symptoms  • Negative: Immunization reaction suspected  • Negative: Rash while taking a medicine or within 3 days of stopping it  • Negative: [1] Asthma and [2] having symptoms of asthma (cough, wheezing, etc.)  • Negative: [1] Symptom of illness (e.g., headache, abdominal pain, earache, vomiting) AND [2] more than mild  • Negative: Breastfeeding questions about mother's medicines and diet  • Negative: MORE THAN A DOUBLE DOSE of a prescription or over-the-counter (OTC) drug  • Negative: [1] DOUBLE DOSE (an extra dose or lesser amount) of prescription drug AND  [2] any symptoms (e.g., dizziness, nausea, pain, sleepiness)  • Negative: [1] DOUBLE DOSE (an extra dose or lesser amount) of over-the-counter (OTC) drug AND [2] any symptoms (e.g., dizziness, nausea, pain, sleepiness)  • Negative: Took another person's prescription drug  • Negative: [1] DOUBLE DOSE (an extra dose or lesser amount) of prescription drug AND [2] NO symptoms  (Exception: A double dose of antibiotics.)  • Negative: Diabetes drug error or overdose (e.g., took wrong type of insulin or took extra dose)  • Negative: [1] Prescription not at pharmacy AND [2] was prescribed by PCP recently (Exception: Triager has access to EMR and prescription is recorded there. Go to Home Care and confirm for pharmacy.)  • Negative: [1] Pharmacy calling with prescription question AND [2] triager unable to answer question  • Negative: [1] Caller has URGENT medicine question about med that PCP or specialist prescribed AND [2] triager unable to answer question  • Negative: Medicine patch causing local rash or itching  • Negative: [1] Caller has medicine question about med NOT prescribed by PCP AND [2] triager unable to answer question (e.g., compatibility with other med, storage)  • Negative: Prescription request for new medicine (not a refill)  • Negative: [1] Caller has NON-URGENT medicine question about med that PCP prescribed AND [2] triager unable to answer question  • Negative: Caller wants to use a complementary or alternative medicine  • Negative: [1] Prescription prescribed recently is not at pharmacy AND [2] triager has access to patient's EMR AND [3] prescription is recorded in the EMR  • Negative: [1] DOUBLE DOSE (an extra dose or lesser amount) of over-the-counter (OTC) drug AND [2] NO symptoms  • Negative: [1] DOUBLE DOSE (an extra dose or lesser amount) of antibiotic drug AND [2] NO symptoms  • Negative: Caller has medicine question only, adult not sick, AND triager answers question    Answer Assessment - Initial  "Assessment Questions  1. NAME of MEDICINE: \"What medicine(s) are you calling about?\"      Moisture barrier cream  2. QUESTION: \"What is your question?\" (e.g., double dose of medicine, side effect)      Does it say the name of the one they used while I was admitted?  3. PRESCRIBER: \"Who prescribed the medicine?\" Reason: if prescribed by specialist, call should be referred to that group.      na  4. SYMPTOMS: \"Do you have any symptoms?\" If Yes, ask: \"What symptoms are you having?\"  \"How bad are the symptoms (e.g., mild, moderate, severe)      Yes, mild  5. PREGNANCY:  \"Is there any chance that you are pregnant?\" \"When was your last menstrual period?\"      na    Protocols used: Medication Question Call-ADULT-    "

## 2024-08-23 NOTE — OUTREACH NOTE
Call Center TCM Note      Flowsheet Row Responses   [facility] patient discharged from? [facility]   Does the patient have one of the following disease processes/diagnoses(primary or secondary)? Other   TCM attempt successful? Yes   Call start time 1058   Call end time 1059   Discharge diagnosis UTI (urinary tract infection), COVID-19   Is patient permission given to speak with other caregiver? Yes   List who call center can speak with spouse- Jumana   Person spoke with today (if not patient) and relationship spouse   Does the patient have all medications ordered at discharge? Yes   Is the patient taking all medications as directed (includes completed medication regime)? Yes   Comments Hospital follow up appt with PCP MORRO Guevara for 8/27   Does the patient have an appointment with their PCP within 7-14 days of discharge? Yes   What is the Home health agency?  [facility]    Has home health visited the patient within 72 hours of discharge? Call prior to 72 hours   Psychosocial issues? No   Did the patient receive a copy of their discharge instructions? Yes   Nursing interventions Reviewed instructions with patient   What is the patient's perception of their health status since discharge? Improving   Is the patient/caregiver able to teach back signs and symptoms related to disease process for when to call PCP? Yes   Is the patient/caregiver able to teach back signs and symptoms related to disease process for when to call 911? Yes   Is the patient/caregiver able to teach back the hierarchy of who to call/visit for symptoms/problems? PCP, Specialist, Home health nurse, Urgent Care, ED, 911 Yes   TCM call completed? Yes   Wrap up additional comments Per spouse, patient is doing well, no questions, confirmed hospital follow up appt with PCP for 8/27.   Call end time 1059   Would this patient benefit from a Referral to Amb Social Work? No   Is the patient interested in additional calls from an ambulatory ?  No            Jacqui Sears RN    8/23/2024, 11:00 EDT

## 2024-08-23 NOTE — OUTREACH NOTE
AMBULATORY CASE MANAGEMENT NOTE    Names and Relationships of Patient/Support Persons: Contact: FERDINAND CATALAN; Relationship: Emergency Contact -     [facility] Interim Update    Called and spoke with patients wife after discharge home from the hospital. Spouse states that patient is doing pretty well. He did get his medications at discharge.    He does have an apt with [facility] in [place] in September.     [facility] called yesterday and will be coming out to see patient but spouse is unsure when.     She did get a belt from PT in the hospital to help transfer patient.         Education Documentation  No documentation found.        YANDEL GONZALEZ  Ambulatory Case Management    8/23/2024, 09:22 EDT

## 2024-08-24 LAB
BACTERIA SPEC AEROBE CULT: NORMAL
BACTERIA SPEC AEROBE CULT: NORMAL

## 2024-08-27 ENCOUNTER — PATIENT OUTREACH (OUTPATIENT)
Dept: CASE MANAGEMENT | Facility: OTHER | Age: 69
End: 2024-08-27
Payer: MEDICARE

## 2024-08-27 ENCOUNTER — OFFICE VISIT (OUTPATIENT)
Dept: FAMILY MEDICINE CLINIC | Facility: CLINIC | Age: 69
End: 2024-08-27
Payer: MEDICARE

## 2024-08-27 VITALS
OXYGEN SATURATION: 93 % | HEART RATE: 112 BPM | DIASTOLIC BLOOD PRESSURE: 70 MMHG | SYSTOLIC BLOOD PRESSURE: 110 MMHG | TEMPERATURE: 99 F

## 2024-08-27 DIAGNOSIS — Z09 HOSPITAL DISCHARGE FOLLOW-UP: Primary | ICD-10-CM

## 2024-08-27 DIAGNOSIS — I63.9 CEREBROVASCULAR ACCIDENT (CVA), UNSPECIFIED MECHANISM: Primary | ICD-10-CM

## 2024-08-27 DIAGNOSIS — R31.9 URINARY TRACT INFECTION WITH HEMATURIA, SITE UNSPECIFIED: ICD-10-CM

## 2024-08-27 DIAGNOSIS — R53.1 WEAKNESS: ICD-10-CM

## 2024-08-27 DIAGNOSIS — N39.0 URINARY TRACT INFECTION WITH HEMATURIA, SITE UNSPECIFIED: ICD-10-CM

## 2024-08-27 PROCEDURE — 1159F MED LIST DOCD IN RCRD: CPT | Performed by: NURSE PRACTITIONER

## 2024-08-27 PROCEDURE — 3052F HG A1C>EQUAL 8.0%<EQUAL 9.0%: CPT | Performed by: NURSE PRACTITIONER

## 2024-08-27 PROCEDURE — 1160F RVW MEDS BY RX/DR IN RCRD: CPT | Performed by: NURSE PRACTITIONER

## 2024-08-27 PROCEDURE — 1111F DSCHRG MED/CURRENT MED MERGE: CPT | Performed by: NURSE PRACTITIONER

## 2024-08-27 PROCEDURE — 1126F AMNT PAIN NOTED NONE PRSNT: CPT | Performed by: NURSE PRACTITIONER

## 2024-08-27 PROCEDURE — 99495 TRANSJ CARE MGMT MOD F2F 14D: CPT | Performed by: NURSE PRACTITIONER

## 2024-08-27 PROCEDURE — 3078F DIAST BP <80 MM HG: CPT | Performed by: NURSE PRACTITIONER

## 2024-08-27 PROCEDURE — 3074F SYST BP LT 130 MM HG: CPT | Performed by: NURSE PRACTITIONER

## 2024-08-27 NOTE — OUTREACH NOTE
"AMBULATORY CASE MANAGEMENT NOTE    Names and Relationships of Patient/Support Persons: Contact: Brian Mehta P \"Gio\"; Relationship: Self -     CCM Interim Update    Met with patient in office today to attempt to apply Dexcom G6. Unfortunately, patient did not receive the  per wife. We attempted to download devon on patients phone, but the devon says it is not compatible with his phone. We then attempted on wife's phone but the devon would not download.     I advised patient and spouse that I would call [facility] to see why  was never sent and call them with an update.        Education Documentation  No documentation found.        YANDEL GONZALEZ  Ambulatory Case Management    8/27/2024, 15:53 EDT  "

## 2024-08-27 NOTE — PROGRESS NOTES
Transitional Care Follow Up Visit  Subjective     Brian Mehta is a 69 y.o. male who presents for a transitional care management visit.    Within 48 business hours after discharge our office contacted him via telephone to coordinate his care and needs.      I reviewed and discussed the details of that call along with the discharge summary, hospital problems, inpatient lab results, inpatient diagnostic studies, and consultation reports with Brian.     Current outpatient and discharge medications have been reconciled for the patient.  Reviewed by: MORRO Vasquez          8/22/2024     4:53 PM   Date of TCM Phone Call   [facility]   Date of Admission 8/19/2024   Date of Discharge 8/22/2024   [place] [facility] [place] for [place] (LACE) Score: 13 (8/22/2024  6:00 AM)      History of Present Illness   Course During Hospital Stay:  pt fell out of bed x 4 on 8/19/24. EMS transported to the hospital and while in the hospital he was diagnosed with covid and a bladder infection from E.coli. He completed a course of Paxlovid, 3 tablets daily x 5 day. Pt denies pain or burning with urination or fever.     Pt reports he is feeling tired still but he is back in his own bed at home, a hospital bed. Glucose has been normal in the 120's.     Peripheral edema is improved with lasix and taking potassium.      The following portions of the patient's history were reviewed and updated as appropriate: allergies, current medications, past family history, past medical history, past social history, past surgical history, and problem list.    Review of Systems    Objective   /70 (BP Location: Left arm, Patient Position: Sitting)   Pulse 112   Temp 99 °F (37.2 °C) (Temporal)   SpO2 93%   Physical Exam  Vitals reviewed.   Constitutional:       General: He is not in acute distress.     Appearance: Normal appearance. He is well-developed.   HENT:      Head: Normocephalic and  atraumatic.   Eyes:      Conjunctiva/sclera: Conjunctivae normal.      Pupils: Pupils are equal, round, and reactive to light.   Cardiovascular:      Rate and Rhythm: Normal rate and regular rhythm.      Heart sounds: Normal heart sounds.   Pulmonary:      Effort: Pulmonary effort is normal.      Breath sounds: Normal breath sounds.   Musculoskeletal:      Cervical back: Neck supple.      Right lower leg: No edema.      Left lower leg: No edema.   Skin:     General: Skin is warm and dry.   Neurological:      Mental Status: He is alert and oriented to person, place, and time.   Psychiatric:         Mood and Affect: Mood and affect normal.         Behavior: Behavior normal.         Thought Content: Thought content normal.         Judgment: Judgment normal.         Assessment & Plan   Diagnoses and all orders for this visit:    1. Hospital discharge follow-up (Primary)    2. Urinary tract infection with hematuria, site unspecified      Juana Mehta RN attempted to help pt put on Dexcom but his phone is not compatible.     Pt has home health nurse and PT coming out. [facility] is supposed to look into wheelchair evaluation.     Patient declined to repeat urine culture for clearance of bacteria.

## 2024-08-30 ENCOUNTER — PATIENT OUTREACH (OUTPATIENT)
Dept: CASE MANAGEMENT | Facility: OTHER | Age: 69
End: 2024-08-30
Payer: MEDICARE

## 2024-08-30 DIAGNOSIS — R53.1 WEAKNESS: ICD-10-CM

## 2024-08-30 DIAGNOSIS — I63.9 CEREBROVASCULAR ACCIDENT (CVA), UNSPECIFIED MECHANISM: Primary | ICD-10-CM

## 2024-08-30 DIAGNOSIS — I50.22 CHRONIC HFREF (HEART FAILURE WITH REDUCED EJECTION FRACTION): ICD-10-CM

## 2024-08-30 DIAGNOSIS — E11.69 TYPE 2 DIABETES MELLITUS WITH OTHER SPECIFIED COMPLICATION, UNSPECIFIED WHETHER LONG TERM INSULIN USE: ICD-10-CM

## 2024-08-30 NOTE — OUTREACH NOTE
[facility] End of Month Documentation    This Chronic Medical Management Care Plan for Brian Mehta, 69 y.o. male, has been monitored and managed; reviewed and a new plan of care implemented for the month of August.  A cumulative time of 88  minutes was spent on this patient record this month, including phone call with relative; chart review; phone call with patient; face to face with provider; face to face visit with patient, face to face with relative/care giver.    Regarding the patient's problems: has CVA (cerebral vascular accident); Essential hypertension; High cholesterol; Vitamin B12 deficiency; Kidney disorder; Chronic pain of both knees; Chronic HFrEF (heart failure with reduced ejection fraction); LVH (left ventricular hypertrophy); COPD (chronic obstructive pulmonary disease); DM2 (diabetes mellitus, type 2); Urinary tract infection without hematuria; Abnormal nuclear stress test; and UTI (urinary tract infection) on their problem list., the following items were addressed: medical records; medications and any changes can be found within the plan section of the note.  A detailed listing of time spent for chronic care management is tracked within each outreach encounter.  Current medications include:  has a current medication list which includes the following prescription(s): aspirin, atorvastatin, furosemide, lantus solostar, isosorbide mononitrate, jardiance, losartan, nicotine, and potassium chloride. and the patient is reported to be patient is compliant with medication protocol,  Medications are reported to be effective.  Regarding these diagnoses, referrals were made to the following provider(s):  n/a.  All notes on chart for PCP to review.    The patient was monitored remotely for activity level; medications; blood glucose.    The patient's physical needs include:  DME supplies; physical healthcare; needs assistance with ADLs.     The patient's mental support needs include:  continued support    The  patient's cognitive support needs include:  needs assistance with ADLs; requires supervision; household care; health care    The patient's psychosocial support needs include:  continued support    The patient's functional needs include: DME; physical healthcare    The patient's environmental needs include:  an unsafe living environment    Care Plan overall comments:  No data recorded    Refer to previous outreach notes for more information on the areas listed above.    Monthly Billing Diagnoses  (I63.9) Cerebrovascular accident (CVA), unspecified mechanism    (R53.1) Weakness    (I50.22) Chronic HFrEF (heart failure with reduced ejection fraction)    (E11.69) Type 2 diabetes mellitus with other specified complication, unspecified whether long term insulin use    Medications   Medications have been reconciled    Care Plan progress this month:      Recently Modified Care Plans Updates made since 7/30/2024 12:00 AM      No recently modified care plans.            Instructions   Patient was provided an electronic copy of care plan  CCM services were explained and offered and patient has accepted these services.  Patient has given their written consent to receive CCM services and understands that this includes the authorization of electronic communication of medical information with the other treating providers.  Patient understands that they may stop CCM services at any time and these changes will be effective at the end of the calendar month and will effectively revocate the agreement of CCM services.  Patient understands that only one practitioner can furnish and be paid for CCM services during one calendar month.  Patient also understands that there may be co-payment or deductible fees in association with CCM services.  Patient will continue with at least monthly follow-up calls with the Ambulatory .    YANDEL GONZALEZ  Ambulatory Case Management    8/30/2024, 09:27 EDT

## 2024-09-04 ENCOUNTER — TELEPHONE (OUTPATIENT)
Dept: FAMILY MEDICINE CLINIC | Facility: CLINIC | Age: 69
End: 2024-09-04
Payer: MEDICARE

## 2024-09-05 DIAGNOSIS — E78.00 HIGH CHOLESTEROL: ICD-10-CM

## 2024-09-05 DIAGNOSIS — I10 ESSENTIAL HYPERTENSION: ICD-10-CM

## 2024-09-05 DIAGNOSIS — E11.9 TYPE 2 DIABETES MELLITUS WITHOUT COMPLICATION, WITHOUT LONG-TERM CURRENT USE OF INSULIN: ICD-10-CM

## 2024-09-06 ENCOUNTER — PATIENT OUTREACH (OUTPATIENT)
Dept: CASE MANAGEMENT | Facility: OTHER | Age: 69
End: 2024-09-06
Payer: MEDICARE

## 2024-09-06 DIAGNOSIS — I50.22 CHRONIC HFREF (HEART FAILURE WITH REDUCED EJECTION FRACTION): ICD-10-CM

## 2024-09-06 DIAGNOSIS — I63.9 CEREBROVASCULAR ACCIDENT (CVA), UNSPECIFIED MECHANISM: Primary | ICD-10-CM

## 2024-09-06 LAB
QT INTERVAL: 343 MS
QTC INTERVAL: 458 MS

## 2024-09-06 RX ORDER — LOSARTAN POTASSIUM 25 MG/1
25 TABLET ORAL DAILY
Qty: 30 TABLET | Refills: 2 | Status: SHIPPED | OUTPATIENT
Start: 2024-09-06

## 2024-09-06 RX ORDER — ATORVASTATIN CALCIUM 20 MG/1
20 TABLET, FILM COATED ORAL NIGHTLY
Qty: 30 TABLET | Refills: 2 | Status: SHIPPED | OUTPATIENT
Start: 2024-09-06

## 2024-09-06 RX ORDER — HYDROCHLOROTHIAZIDE 25 MG/1
25 TABLET ORAL DAILY
Qty: 30 TABLET | Refills: 2 | Status: SHIPPED | OUTPATIENT
Start: 2024-09-06

## 2024-09-06 RX ORDER — EMPAGLIFLOZIN 25 MG/1
25 TABLET, FILM COATED ORAL DAILY
Qty: 30 TABLET | Refills: 2 | Status: SHIPPED | OUTPATIENT
Start: 2024-09-06

## 2024-09-06 NOTE — OUTREACH NOTE
AMBULATORY CASE MANAGEMENT NOTE    Names and Relationships of Patient/Support Persons: Contact: [facility] ; Relationship: Other -     Care Coordination    Called BRYANNA  to inquire on wheelchair eval. Left message with Salome who will have somebody call me back regarding eval.    CCM Interim Update    Called and informed wife of status.        Education Documentation  No documentation found.        YANDEL GONZALEZ  Ambulatory Case Management    9/6/2024, 09:29 EDT

## 2024-09-10 ENCOUNTER — PATIENT OUTREACH (OUTPATIENT)
Dept: CASE MANAGEMENT | Facility: OTHER | Age: 69
End: 2024-09-10
Payer: MEDICARE

## 2024-09-10 DIAGNOSIS — I50.22 CHRONIC HFREF (HEART FAILURE WITH REDUCED EJECTION FRACTION): ICD-10-CM

## 2024-09-10 DIAGNOSIS — I63.9 CEREBROVASCULAR ACCIDENT (CVA), UNSPECIFIED MECHANISM: Primary | ICD-10-CM

## 2024-09-10 NOTE — OUTREACH NOTE
AMBULATORY CASE MANAGEMENT NOTE    Names and Relationships of Patient/Support Persons: Contact: ADI Roberts; Relationship: Home Health -     Care Coordination    Spoke to home health nurse. She stated that ADI had asked her to call me. Advised that I was needing to know if wheelchair eval had been completed. She states she will have PT, Bharat, call me regarding this.     Education Documentation  No documentation found.        YANDEL GONZALEZ  Ambulatory Case Management    9/10/2024, 11:19 EDT

## 2024-09-12 ENCOUNTER — PATIENT OUTREACH (OUTPATIENT)
Dept: CASE MANAGEMENT | Facility: OTHER | Age: 69
End: 2024-09-12
Payer: MEDICARE

## 2024-09-12 ENCOUNTER — TELEPHONE (OUTPATIENT)
Dept: CASE MANAGEMENT | Facility: OTHER | Age: 69
End: 2024-09-12
Payer: MEDICARE

## 2024-09-12 DIAGNOSIS — I63.9 CEREBROVASCULAR ACCIDENT (CVA), UNSPECIFIED MECHANISM: Primary | ICD-10-CM

## 2024-09-12 DIAGNOSIS — R53.1 WEAKNESS: ICD-10-CM

## 2024-09-12 DIAGNOSIS — I50.22 CHRONIC HFREF (HEART FAILURE WITH REDUCED EJECTION FRACTION): ICD-10-CM

## 2024-09-12 NOTE — OUTREACH NOTE
AMBULATORY CASE MANAGEMENT NOTE    Names and Relationships of Patient/Support Persons: Contact: Bharat [facility]; Relationship: Other -     Care Coordination    Received return call from Bharat. He states that he was not aware that power wheelchair eval was needed. He has never worked with [facility] supply before. States that they typically work with [facility] or [facility].    He is unsure if patient would be able to maneuver wheelchair within the home at this time.    I have emailed Chela MCKEON at [facility] with inquiries on community use of a power wheelchair & to see if [facility] can  the order even though [facility] has started it.    Education Documentation  No documentation found.        YANDEL GONZALEZ  Ambulatory Case Management    9/12/2024, 14:01 EDT

## 2024-09-16 ENCOUNTER — PATIENT OUTREACH (OUTPATIENT)
Dept: CASE MANAGEMENT | Facility: OTHER | Age: 69
End: 2024-09-16
Payer: MEDICARE

## 2024-09-16 ENCOUNTER — TELEPHONE (OUTPATIENT)
Dept: FAMILY MEDICINE CLINIC | Facility: CLINIC | Age: 69
End: 2024-09-16

## 2024-09-16 DIAGNOSIS — R53.1 WEAKNESS: ICD-10-CM

## 2024-09-16 DIAGNOSIS — I50.22 CHRONIC HFREF (HEART FAILURE WITH REDUCED EJECTION FRACTION): Primary | ICD-10-CM

## 2024-09-16 DIAGNOSIS — I63.9 CEREBROVASCULAR ACCIDENT (CVA), UNSPECIFIED MECHANISM: ICD-10-CM

## 2024-09-16 NOTE — TELEPHONE ENCOUNTER
Caller: FERDINAND CATALAN    Relationship: Emergency Contact    Best call back number: [phone]     What is the best time to reach you: ANYTIME     Who are you requesting to speak with (clinical staff, provider,  specific staff member): CLINICAL     What was the call regarding: PATIENT SPOUSE WAS CALLING TO CHECK THE STATUS OF A POWERED WHEEL CHAIR, PATIENT SPOUSE STATED THAT PATIENT WILL BE RELEASES FROM HOME HEALTH PT, WAS ADVISED THAT THE MEDICAL SUPPLY WAS SUGGESTING A E- EVALUATION.

## 2024-09-17 ENCOUNTER — TELEPHONE (OUTPATIENT)
Dept: FAMILY MEDICINE CLINIC | Facility: CLINIC | Age: 69
End: 2024-09-17
Payer: MEDICARE

## 2024-09-17 ENCOUNTER — PATIENT OUTREACH (OUTPATIENT)
Dept: CASE MANAGEMENT | Facility: OTHER | Age: 69
End: 2024-09-17
Payer: MEDICARE

## 2024-09-17 DIAGNOSIS — I50.22 CHRONIC HFREF (HEART FAILURE WITH REDUCED EJECTION FRACTION): Primary | ICD-10-CM

## 2024-09-17 DIAGNOSIS — R53.1 WEAKNESS: ICD-10-CM

## 2024-09-19 ENCOUNTER — TELEPHONE (OUTPATIENT)
Dept: FAMILY MEDICINE CLINIC | Facility: CLINIC | Age: 69
End: 2024-09-19
Payer: MEDICARE

## 2024-09-19 ENCOUNTER — PATIENT OUTREACH (OUTPATIENT)
Dept: CASE MANAGEMENT | Facility: OTHER | Age: 69
End: 2024-09-19
Payer: MEDICARE

## 2024-09-19 DIAGNOSIS — E11.9 TYPE 2 DIABETES MELLITUS WITHOUT COMPLICATION, WITHOUT LONG-TERM CURRENT USE OF INSULIN: ICD-10-CM

## 2024-09-19 DIAGNOSIS — R53.1 WEAKNESS: ICD-10-CM

## 2024-09-19 DIAGNOSIS — M25.562 CHRONIC PAIN OF BOTH KNEES: ICD-10-CM

## 2024-09-19 DIAGNOSIS — I50.22 CHRONIC HFREF (HEART FAILURE WITH REDUCED EJECTION FRACTION): Primary | ICD-10-CM

## 2024-09-19 DIAGNOSIS — G89.29 CHRONIC PAIN OF BOTH KNEES: ICD-10-CM

## 2024-09-19 DIAGNOSIS — Z74.09 POOR MOBILITY: Primary | ICD-10-CM

## 2024-09-19 DIAGNOSIS — M25.561 CHRONIC PAIN OF BOTH KNEES: ICD-10-CM

## 2024-09-20 RX ORDER — INSULIN GLARGINE 100 [IU]/ML
10 INJECTION, SOLUTION SUBCUTANEOUS NIGHTLY
Qty: 10 ML | Refills: 0 | Status: SHIPPED | OUTPATIENT
Start: 2024-09-20

## 2024-09-24 ENCOUNTER — TELEPHONE (OUTPATIENT)
Dept: FAMILY MEDICINE CLINIC | Facility: CLINIC | Age: 69
End: 2024-09-24
Payer: MEDICARE

## 2024-09-24 ENCOUNTER — TELEPHONE (OUTPATIENT)
Dept: CASE MANAGEMENT | Facility: OTHER | Age: 69
End: 2024-09-24
Payer: MEDICARE

## 2024-09-24 DIAGNOSIS — E11.9 TYPE 2 DIABETES MELLITUS WITHOUT COMPLICATION, WITHOUT LONG-TERM CURRENT USE OF INSULIN: Primary | ICD-10-CM

## 2024-09-24 RX ORDER — PROCHLORPERAZINE 25 MG/1
1 SUPPOSITORY RECTAL
Qty: 3 EACH | Refills: 1 | Status: SHIPPED | OUTPATIENT
Start: 2024-09-24

## 2024-09-25 ENCOUNTER — PATIENT OUTREACH (OUTPATIENT)
Dept: CASE MANAGEMENT | Facility: OTHER | Age: 69
End: 2024-09-25
Payer: MEDICARE

## 2024-09-25 DIAGNOSIS — I50.22 CHRONIC HFREF (HEART FAILURE WITH REDUCED EJECTION FRACTION): Primary | ICD-10-CM

## 2024-09-25 DIAGNOSIS — R53.1 WEAKNESS: ICD-10-CM

## 2024-09-26 ENCOUNTER — TELEPHONE (OUTPATIENT)
Dept: FAMILY MEDICINE CLINIC | Facility: CLINIC | Age: 69
End: 2024-09-26
Payer: MEDICARE

## 2024-09-27 ENCOUNTER — PATIENT OUTREACH (OUTPATIENT)
Dept: CASE MANAGEMENT | Facility: OTHER | Age: 69
End: 2024-09-27
Payer: MEDICARE

## 2024-09-27 ENCOUNTER — TELEPHONE (OUTPATIENT)
Dept: FAMILY MEDICINE CLINIC | Facility: CLINIC | Age: 69
End: 2024-09-27
Payer: MEDICARE

## 2024-09-27 DIAGNOSIS — I50.22 CHRONIC HFREF (HEART FAILURE WITH REDUCED EJECTION FRACTION): Primary | ICD-10-CM

## 2024-09-27 DIAGNOSIS — R53.1 WEAKNESS: ICD-10-CM

## 2024-09-30 ENCOUNTER — PATIENT OUTREACH (OUTPATIENT)
Dept: CASE MANAGEMENT | Facility: OTHER | Age: 69
End: 2024-09-30
Payer: MEDICARE

## 2024-09-30 ENCOUNTER — TELEPHONE (OUTPATIENT)
Dept: FAMILY MEDICINE CLINIC | Facility: CLINIC | Age: 69
End: 2024-09-30

## 2024-09-30 DIAGNOSIS — R53.1 WEAKNESS: ICD-10-CM

## 2024-09-30 DIAGNOSIS — I50.22 CHRONIC HFREF (HEART FAILURE WITH REDUCED EJECTION FRACTION): Primary | ICD-10-CM

## 2024-09-30 NOTE — OUTREACH NOTE
AMBULATORY CASE MANAGEMENT NOTE    Names and Relationships of Patient/Support Persons: Contact: Paperwork; Relationship: Other -     Care Coordination    Completed paperwork for [facility]. Placed on PCP desk for signature.    Education Documentation  No documentation found.        YANDEL GONZALEZ  Ambulatory Case Management    9/30/2024, 12:24 EDT

## 2024-09-30 NOTE — TELEPHONE ENCOUNTER
Caller: [facility]    Relationship:     Best call back number: [phone]     What is the best time to reach you: ANYTIME     Who are you requesting to speak with (clinical staff, provider,  specific staff member): CLINICAL     What was the call regarding: [facility] IS CALLING TO SEE IF FAX WAS RECEIVED, FOR MEDICAL INCONTINENCE SUPPLY'S.

## 2024-09-30 NOTE — OUTREACH NOTE
[facility] End of Month Documentation    This Chronic Medical Management Care Plan for Brian Mehta, 69 y.o. male, has been monitored and managed; reviewed and a new plan of care implemented for the month of September.  A cumulative time of 96  minutes was spent on this patient record this month, including phone call with relative; chart review; phone call with patient; face to face with provider; face to face visit with patient; electronic communication with other providers; phone call with pharmacist.    Regarding the patient's problems: has CVA (cerebral vascular accident); Essential hypertension; High cholesterol; Vitamin B12 deficiency; Kidney disorder; Chronic pain of both knees; Chronic HFrEF (heart failure with reduced ejection fraction); LVH (left ventricular hypertrophy); COPD (chronic obstructive pulmonary disease); DM2 (diabetes mellitus, type 2); Urinary tract infection without hematuria; Abnormal nuclear stress test; and UTI (urinary tract infection) on their problem list., the following items were addressed: medical records; medications and any changes can be found within the plan section of the note.  A detailed listing of time spent for chronic care management is tracked within each outreach encounter.  Current medications include:  has a current medication list which includes the following prescription(s): aspirin, atorvastatin, dexcom g6 , furosemide, hydrochlorothiazide, lantus solostar, isosorbide mononitrate, jardiance, losartan, nicotine, and potassium chloride. and the patient is reported to be patient is compliant with medication protocol,  Medications are reported to be effective.  Regarding these diagnoses, referrals were made to the following provider(s):  n/a.  All notes on chart for PCP to review.    The patient was monitored remotely for activity level; medications; blood glucose.    The patient's physical needs include:  DME supplies; physical healthcare; needs assistance with  ADLs.     The patient's mental support needs include:  continued support    The patient's cognitive support needs include:  needs assistance with ADLs; requires supervision; household care; health care; personal care    The patient's psychosocial support needs include:  continued support    The patient's functional needs include: DME; physical healthcare; needs assistance for ADLs    The patient's environmental needs include:  an unsafe living environment    Care Plan overall comments:  No data recorded    Refer to previous outreach notes for more information on the areas listed above.    Monthly Billing Diagnoses  (I50.22) Chronic HFrEF (heart failure with reduced ejection fraction)    (R53.1) Weakness    Medications   Medications have been reconciled    Care Plan progress this month:      Recently Modified Care Plans Updates made since 8/30/2024 12:00 AM      No recently modified care plans.            Instructions   Patient was provided an electronic copy of care plan  CCM services were explained and offered and patient has accepted these services.  Patient has given their written consent to receive CCM services and understands that this includes the authorization of electronic communication of medical information with the other treating providers.  Patient understands that they may stop CCM services at any time and these changes will be effective at the end of the calendar month and will effectively revocate the agreement of CCM services.  Patient understands that only one practitioner can furnish and be paid for CCM services during one calendar month.  Patient also understands that there may be co-payment or deductible fees in association with CCM services.  Patient will continue with at least monthly follow-up calls with the Ambulatory .    YANDEL GONZALEZ  Ambulatory Case Management    9/30/2024, 09:48 EDT

## 2024-10-01 ENCOUNTER — PATIENT OUTREACH (OUTPATIENT)
Dept: CASE MANAGEMENT | Facility: OTHER | Age: 69
End: 2024-10-01
Payer: MEDICARE

## 2024-10-01 DIAGNOSIS — R53.1 WEAKNESS: ICD-10-CM

## 2024-10-01 DIAGNOSIS — I50.22 CHRONIC HFREF (HEART FAILURE WITH REDUCED EJECTION FRACTION): Primary | ICD-10-CM

## 2024-10-01 NOTE — OUTREACH NOTE
AMBULATORY CASE MANAGEMENT NOTE    Names and Relationships of Patient/Support Persons: Contact: [facility] - Chela MCKEON; Relationship: Other  Contact: [facility] - Chela MCKEON; Relationship: Other -     Care Coordination    Faxed completed forms for WC to Chela MCKEON with [facility]. Emailed Chela to inform her that forms have been faxed.    Education Documentation  No documentation found.        YANDEL GONZALEZ  Ambulatory Case Management    10/1/2024, 09:42 EDT

## 2024-10-02 ENCOUNTER — TELEPHONE (OUTPATIENT)
Dept: FAMILY MEDICINE CLINIC | Facility: CLINIC | Age: 69
End: 2024-10-02
Payer: MEDICARE

## 2024-10-02 ENCOUNTER — PATIENT OUTREACH (OUTPATIENT)
Dept: CASE MANAGEMENT | Facility: OTHER | Age: 69
End: 2024-10-02
Payer: MEDICARE

## 2024-10-02 DIAGNOSIS — I50.22 CHRONIC HFREF (HEART FAILURE WITH REDUCED EJECTION FRACTION): Primary | ICD-10-CM

## 2024-10-02 DIAGNOSIS — R53.1 WEAKNESS: ICD-10-CM

## 2024-10-02 NOTE — OUTREACH NOTE
AMBULATORY CASE MANAGEMENT NOTE    Names and Relationships of Patient/Support Persons: Contact: FERDINAND CATALAN; Relationship: Emergency Contact -     [facility] Interim Update    Called and spoke with patients wife for wheelchair update. Advised her that Chela states all paperwork has been submitted for review.    Patients wife confirms that Dexcom G7  was delivered to them. I advised that I would be happy to set up a time for dexcom education. She states the best time would be at her appointment next month on 11/6 @ 1pm. We will plan to meet then with patient present for education.     Care Coordination    Spoke with [employer] to advise that patient decided on lightweight wheelchair instead of motorized chair. They will close their order.      Education Documentation  No documentation found.        YANDEL GONZALEZ  Ambulatory Case Management    10/2/2024, 11:29 EDT

## 2024-10-02 NOTE — TELEPHONE ENCOUNTER
[phone] ext [phone] [employer] called they are waiting on Juana to send over Home Health Physical Therapy Eval report.  Fax #[phone]

## 2024-10-02 NOTE — TELEPHONE ENCOUNTER
Called [employer] and informed that patient no longer wants a power wheelchair and he has purchased a standard lightweight wheelchair. They will close their order.

## 2024-10-09 ENCOUNTER — TELEPHONE (OUTPATIENT)
Dept: FAMILY MEDICINE CLINIC | Facility: CLINIC | Age: 69
End: 2024-10-09
Payer: MEDICARE

## 2024-10-09 NOTE — TELEPHONE ENCOUNTER
ONLY APPOINTMENT BOX CHECKED ON  VERBAL.    Caller: FERDINAND CATALAN    Relationship to patient: Emergency Contact    Best call back number: [phone]    Patient is needing: CALLER WOULD LIKE AN UPDATE ON WHEN PATIENT WILL GET STANDARD WHEELCHAIR TO HELP WITH TRANSPORTATION. PER CALLER PROVIDER NANCY WAS SUPPOSE TO SIGN OFF ON THE ORDER FOR HIM TO GET ONE.    PATIENT NOT AVAILABLE TO SPEAK WITH TO GET VERBAL.    PLEASE CONTACT TO ADVISE.        Is it okay if the provider responds through MyChart: NO, CALL PREFERRED MAY LEAVE VOICEMAIL.

## 2024-10-09 NOTE — TELEPHONE ENCOUNTER
Patient's wife called and is wanting some information on the patients wheelchair.  I told her that all the paperwork was filled out and faxed over but she states they still have not heard anything about it. She would like a call back from you since Sophie is out.

## 2024-10-10 ENCOUNTER — TELEPHONE (OUTPATIENT)
Dept: CASE MANAGEMENT | Facility: OTHER | Age: 69
End: 2024-10-10
Payer: MEDICARE

## 2024-10-10 NOTE — TELEPHONE ENCOUNTER
I was returning a call to the patient's wife regarding his order for a wheelchair. I was unable to leave a message.  not set up. [facility] states that the request was sent into the patient's insurance on 10/8. They are waiting on a response from [employer].

## 2024-10-11 ENCOUNTER — TELEPHONE (OUTPATIENT)
Dept: CASE MANAGEMENT | Facility: OTHER | Age: 69
End: 2024-10-11
Payer: MEDICARE

## 2024-10-11 NOTE — TELEPHONE ENCOUNTER
Attempted to call patient to inform of update on wheelchair status which is that it is still pending with insurance. Per Chela with [facility], this can take up to 2 weeks to process. No answer, vm not set up.

## 2024-10-14 ENCOUNTER — PATIENT OUTREACH (OUTPATIENT)
Dept: CASE MANAGEMENT | Facility: OTHER | Age: 69
End: 2024-10-14
Payer: MEDICARE

## 2024-10-14 DIAGNOSIS — R53.1 WEAKNESS: ICD-10-CM

## 2024-10-14 DIAGNOSIS — I50.22 CHRONIC HFREF (HEART FAILURE WITH REDUCED EJECTION FRACTION): Primary | ICD-10-CM

## 2024-10-14 NOTE — TELEPHONE ENCOUNTER
Caller: AVIVA    Relationship to patient: [facility]    Best call back number:  [phone]  FAX:[phone]     What is the best time to reach you: ANYTIME      Who are you requesting to speak with (clinical staff, provider,  specific staff member): CLINICAL      What was the call regarding: [facility] IS CALLING TO SEE IF FAX WAS RECEIVED FOR MEDICAL [employer] SUPPLIES THAT WAS SENT ON THE 7TH.

## 2024-10-14 NOTE — OUTREACH NOTE
AMBULATORY CASE MANAGEMENT NOTE    Names and Relationships of Patient/Support Persons: Contact: FERDINAND CATALAN; Relationship: Emergency Contact -     [facility] Interim Update    Called and spoke with patients wife for wheelchair update. Advised that I spoke to [facility] on Friday and they stated that everything was submitted to insurance and they were waiting on response from insurance. This can take up to 2 weeks. She verbalizes understanding. She would like to know if there is any way to get assistance with getting a ramp built to their home. Advised that I would contact MSW to see if there was assistance available for this.    Care Coordination    Placed MSW referral.         Education Documentation  No documentation found.        YANDEL GONZALEZ  Ambulatory Case Management    10/14/2024, 15:27 EDT

## 2024-10-16 ENCOUNTER — PATIENT OUTREACH (OUTPATIENT)
Age: 69
End: 2024-10-16
Payer: MEDICARE

## 2024-10-16 NOTE — OUTREACH NOTE
MSW attempted to reach patient's spouse on this day. VM not set up, unable to leave message. MSW to await return call.    Patient Outreach    MSW sent information to PCP clinic manager to please mail to patient regarding ramp assistance programs.    Luisana LIU -   Ambulatory Case Management    10/16/2024, 12:37 EDT

## 2024-10-17 ENCOUNTER — PATIENT OUTREACH (OUTPATIENT)
Age: 69
End: 2024-10-17
Payer: MEDICARE

## 2024-10-17 NOTE — OUTREACH NOTE
Patient Outreach    MSW spoke with patient's wife about resources being mailed to their home. MSW sent all info to [place] and she is mailing to patient. Patient's wife understands information will be mailed.    Luisana LIU -   Ambulatory Case Management    10/17/2024, 15:20 EDT

## 2024-10-18 ENCOUNTER — TELEPHONE (OUTPATIENT)
Dept: CASE MANAGEMENT | Facility: OTHER | Age: 69
End: 2024-10-18
Payer: MEDICARE

## 2024-10-18 NOTE — TELEPHONE ENCOUNTER
Received voicemail from Chela with [facility] regarding Mr. Mehta. Attempted to call her back. Left message to call back.

## 2024-10-21 ENCOUNTER — TELEPHONE (OUTPATIENT)
Dept: CASE MANAGEMENT | Facility: OTHER | Age: 69
End: 2024-10-21
Payer: MEDICARE

## 2024-10-21 ENCOUNTER — PATIENT OUTREACH (OUTPATIENT)
Dept: CASE MANAGEMENT | Facility: OTHER | Age: 69
End: 2024-10-21
Payer: MEDICARE

## 2024-10-21 DIAGNOSIS — I50.22 CHRONIC HFREF (HEART FAILURE WITH REDUCED EJECTION FRACTION): Primary | ICD-10-CM

## 2024-10-21 DIAGNOSIS — R53.1 WEAKNESS: ICD-10-CM

## 2024-10-21 NOTE — OUTREACH NOTE
AMBULATORY CASE MANAGEMENT NOTE    Names and Relationships of Patient/Support Persons: Contact: Chela - [facility]; Relationship: Other -     Care Coordination    Received call back from Chela. She was able to reach patient and patients wheelchair has been ordered. It should be delivered to patients home in 7-10 business days.     Education Documentation  No documentation found.        YANDEL GONZALEZ  Ambulatory Case Management    10/21/2024, 15:48 EDT

## 2024-10-21 NOTE — TELEPHONE ENCOUNTER
Caller: [facility]    Relationship to patient:     Best call back number: [phone]- MON-FRID [phone] EST    Patient is needing: CALLER REQUESTING STATUS UPDATE FOR ORDERS FAXED AND CONFIRMED ON 10.07.2024 AT 1655 AND 10.14.2014 AT 1545     HUB CONFIRMED NO DOCUMENTS HAVE BEEN UPLOADED AS OF YET.      CONFIRMED OUR FAX AND ADVISED THEM TO REFAX TODAY 10.21.2024- CONFIRMED THEY FAXED IT AT 1216 PM.     ADVISED THEM THEY COULD TRY AND MAIL IN THE DOCUMENTS AS WELL AND CONFIRMED ADDRESS. CALLER STATES THAT THE NEXT TIME THEY CALL IN TO ADVISE THEM TO DO THAT.    CALLER CONFIRMED A VERBAL FOR THE ORDERS CANNOT BE TAKEN, MUST HAVE WET SIGNATURE.

## 2024-10-25 ENCOUNTER — TELEPHONE (OUTPATIENT)
Dept: FAMILY MEDICINE CLINIC | Facility: CLINIC | Age: 69
End: 2024-10-25
Payer: MEDICARE

## 2024-10-25 NOTE — TELEPHONE ENCOUNTER
Caller: FERDINAND CATALAN    Relationship to patient: Emergency Contact    Best call back number:     [phone] (Mobile)       Patient is needing: PATIENT'S WIFE CALLED IN AND SAID THAT SHE WAS TOLD BY INSURANCE THAT PATIENT IS NEEDING MORE HOME THERAPY. PATIENT'S WIFE WOULD ALSO LIKE A CALL BACK FROM YANDEL CATALAN IF SHE IS IN THE OFFICE.

## 2024-10-26 ENCOUNTER — APPOINTMENT (OUTPATIENT)
Dept: GENERAL RADIOLOGY | Facility: HOSPITAL | Age: 69
DRG: 699 | End: 2024-10-26
Payer: MEDICARE

## 2024-10-26 ENCOUNTER — HOSPITAL ENCOUNTER (INPATIENT)
Facility: HOSPITAL | Age: 69
LOS: 8 days | Discharge: LEFT BEFORE DISCHARGE | DRG: 699 | End: 2024-11-04
Attending: EMERGENCY MEDICINE | Admitting: STUDENT IN AN ORGANIZED HEALTH CARE EDUCATION/TRAINING PROGRAM
Payer: MEDICARE

## 2024-10-26 DIAGNOSIS — S70.02XA CONTUSION OF LEFT HIP, INITIAL ENCOUNTER: ICD-10-CM

## 2024-10-26 DIAGNOSIS — R53.1 GENERALIZED WEAKNESS: ICD-10-CM

## 2024-10-26 DIAGNOSIS — N39.0 ACUTE UTI: Primary | ICD-10-CM

## 2024-10-26 DIAGNOSIS — R26.2 DIFFICULTY IN WALKING: ICD-10-CM

## 2024-10-26 LAB
BACTERIA UR QL AUTO: ABNORMAL /HPF
BILIRUB UR QL STRIP: NEGATIVE
CLARITY UR: ABNORMAL
COLOR UR: ABNORMAL
GLUCOSE UR STRIP-MCNC: NEGATIVE MG/DL
HGB UR QL STRIP.AUTO: ABNORMAL
HYALINE CASTS UR QL AUTO: ABNORMAL /LPF
KETONES UR QL STRIP: NEGATIVE
LEUKOCYTE ESTERASE UR QL STRIP.AUTO: ABNORMAL
NITRITE UR QL STRIP: POSITIVE
PH UR STRIP.AUTO: 5.5 [PH] (ref 5–8)
PROT UR QL STRIP: ABNORMAL
RBC # UR STRIP: ABNORMAL /HPF
REF LAB TEST METHOD: ABNORMAL
SP GR UR STRIP: 1.02 (ref 1–1.03)
SQUAMOUS #/AREA URNS HPF: ABNORMAL /HPF
UROBILINOGEN UR QL STRIP: ABNORMAL
WBC # UR STRIP: ABNORMAL /HPF

## 2024-10-26 PROCEDURE — 81001 URINALYSIS AUTO W/SCOPE: CPT | Performed by: EMERGENCY MEDICINE

## 2024-10-26 PROCEDURE — 87086 URINE CULTURE/COLONY COUNT: CPT | Performed by: EMERGENCY MEDICINE

## 2024-10-26 PROCEDURE — 73502 X-RAY EXAM HIP UNI 2-3 VIEWS: CPT

## 2024-10-26 PROCEDURE — 87186 SC STD MICRODIL/AGAR DIL: CPT | Performed by: EMERGENCY MEDICINE

## 2024-10-26 PROCEDURE — P9612 CATHETERIZE FOR URINE SPEC: HCPCS

## 2024-10-26 PROCEDURE — 87088 URINE BACTERIA CULTURE: CPT | Performed by: EMERGENCY MEDICINE

## 2024-10-26 PROCEDURE — 99285 EMERGENCY DEPT VISIT HI MDM: CPT

## 2024-10-26 PROCEDURE — 36415 COLL VENOUS BLD VENIPUNCTURE: CPT

## 2024-10-26 PROCEDURE — 71045 X-RAY EXAM CHEST 1 VIEW: CPT

## 2024-10-26 NOTE — ED PROVIDER NOTES
Time: 6:38 PM EDT  Date of encounter:  10/26/2024  Independent Historian/Clinical History and Information was obtained by:   Patient    History is limited by: N/A    Chief Complaint: Fall, left hip pain      History of Present Illness:  Patient is a 69 y.o. year old male who presents to the emergency department for evaluation of fall, left hip pain.  Patient's reportedly fell while trying to board a transportation van/bus.  Reportedly fell going up the steps.  Denies LOC.  Only complaint is new left hip pain since this fall.  Denies any feeling of head injury, etc.  No recent illness.      Patient Care Team  Primary Care Provider: Sophie Capps APRN    Past Medical History:     No Known Allergies  Past Medical History:   Diagnosis Date    CHF (congestive heart failure)     SEE'S DR [place]ICKLAND NO CURRENT S/S    COPD (chronic obstructive pulmonary disease)     INHALER    Diabetes mellitus     DOESN'T CHECK BG OFTEN AT HOME    Hyperlipidemia     Hypertension     Sepsis 09/14/2023    Stroke 2017    RIGHT SIDE WEAKNESS     Past Surgical History:   Procedure Laterality Date    APPENDECTOMY      EYE SURGERY Bilateral     MISTY CATARACT     Family History   Problem Relation Age of Onset    No Known Problems Mother     No Known Problems Father     Malig Hyperthermia Neg Hx        Home Medications:  Prior to Admission medications    Medication Sig Start Date End Date Taking? Authorizing Provider   aspirin 81 MG chewable tablet Chew 1 tablet Daily. 4/26/24   Sophie Capps APRN   atorvastatin (LIPITOR) 20 MG tablet TAKE 1 TABLET BY MOUTH NIGHTLY 9/6/24   Sophie Capps APRN   Continuous Glucose  (Dexcom G6 ) device Use 1 each Every 30 (Thirty) Days. 9/24/24   Sophie Capps APRN   furosemide (Lasix) 20 MG tablet Take 1 tablet by mouth 2 (Two) Times a Day. 8/14/24   Sophie Capps APRN   hydroCHLOROthiazide 25 MG tablet TAKE 1 TABLET BY MOUTH DAILY 9/6/24   Sophie Capps APRN   Insulin Glargine  (Lantus SoloStar) 100 UNIT/ML injection pen Inject 10 Units under the skin into the appropriate area as directed Every Night. 9/20/24   Sophie Capps APRN   isosorbide mononitrate (IMDUR) 30 MG 24 hr tablet Take 1 tablet by mouth Daily. 7/8/24   King RadhaMORRO Espinoza   Jardiance 25 MG tablet tablet TAKE 1 TABLET BY MOUTH DAILY 9/6/24   Sophie Capps APRN   losartan (COZAAR) 25 MG tablet TAKE 1 TABLET BY MOUTH DAILY 9/6/24   Sophie Capps APRN   nicotine (NICODERM CQ) 21 MG/24HR patch Place 1 patch on the skin as directed by provider Daily. 8/22/24   Arcenio Mosquera MD   potassium chloride 10 MEQ CR tablet Take 1 tablet by mouth 2 (Two) Times a Day. 8/14/24   Sophie Capps APRN        Social History:   Social History     Tobacco Use    Smoking status: Every Day     Current packs/day: 2.50     Average packs/day: 2.5 packs/day for 52.8 years (132.0 ttl pk-yrs)     Types: Cigarettes     Start date: 1972     Passive exposure: Current    Smokeless tobacco: Never    Tobacco comments:     INSTRUCTED NO SMOKING 24 HOUR PRIOR TO SURGERY    Vaping Use    Vaping status: Never Used   Substance Use Topics    Alcohol use: Yes    Drug use: Never         Review of Systems:  Review of Systems   Constitutional:  Negative for chills and fever.   HENT:  Negative for congestion, rhinorrhea and sore throat.    Eyes:  Negative for photophobia.   Respiratory:  Negative for apnea, cough, chest tightness and shortness of breath.    Cardiovascular:  Negative for chest pain and palpitations.   Gastrointestinal:  Negative for abdominal pain, diarrhea, nausea and vomiting.   Endocrine: Negative.    Genitourinary:  Negative for difficulty urinating and dysuria.   Musculoskeletal:  Positive for arthralgias and myalgias. Negative for back pain and joint swelling.   Skin:  Negative for color change and wound.   Allergic/Immunologic: Negative.    Neurological:  Negative for seizures and headaches.   Psychiatric/Behavioral: Negative.  "    All other systems reviewed and are negative.       Physical Exam:  /77   Pulse 111   Temp 98.5 °F (36.9 °C) (Oral)   Resp 17   Ht 190.5 cm (75\")   Wt 98.8 kg (217 lb 13 oz)   SpO2 94%   BMI 27.22 kg/m²     Physical Exam  Vitals and nursing note reviewed.   Constitutional:       General: He is awake.   HENT:      Head: Normocephalic and atraumatic.      Nose: Nose normal.      Mouth/Throat:      Mouth: Mucous membranes are moist.   Eyes:      Extraocular Movements: Extraocular movements intact.      Pupils: Pupils are equal, round, and reactive to light.   Cardiovascular:      Rate and Rhythm: Normal rate and regular rhythm.      Heart sounds: Normal heart sounds.   Pulmonary:      Effort: Pulmonary effort is normal. No respiratory distress.      Breath sounds: Normal breath sounds. No wheezing, rhonchi or rales.   Abdominal:      General: Bowel sounds are normal.      Palpations: Abdomen is soft.      Tenderness: There is no abdominal tenderness. There is no guarding or rebound.      Comments: No rigidity   Musculoskeletal:         General: Tenderness (Soft tissue, left hip) present.      Cervical back: Normal range of motion and neck supple.      Comments: Only very mild left hip pain with passive range of motion.  Not convincing for left hip fracture.  Left lower extremity is not shortened or externally rotated.   Skin:     General: Skin is warm and dry.      Coloration: Skin is not jaundiced.   Neurological:      General: No focal deficit present.      Mental Status: He is alert. Mental status is at baseline.   Psychiatric:         Mood and Affect: Mood normal.                  Procedures:  Procedures      Medical Decision Making:      Comorbidities that affect care:      COPD, diabetes, CVA, CHF, hypertension, hyperlipidemia    External Notes reviewed:    Hospital Discharge Summary:    Patient Name: Brian Mehta  : 1955  MRN: [id number]     Date of Admission: 2024  Date of Discharge: "  8/22/2024  Primary Care Physician: Sophie Capps APRN     Active and Resolved Hospital Problems:  UTI, E coli  COVID-19 infection  Generalized weakness  Recent recurrent falls  Hypertension  Hyperlipidemia  Type 2 diabetes mellitus  HFrEF, not in exacerbation  COPD, not in exacerbation  History of CVA with residual right-sided weakness        Hospital Course      Hospital Course:  Brian Mehta is a 69 y.o. male with a medical history of hypertension, hyperlipidemia, type 2 diabetes mellitus, HFrEF, COPD presented to the ED for evaluation of generalized weakness, repeated falls, difficulty taking care of self.  EMS was called by his wife as she was not able to take care of him at home due to his recurrent falls and generalized weakness. Patient has history of CVA with residual right-sided weakness but usually walks with the help of walker and cane but for the past 2 days patient has been weaker. Did not hit his head.  Associated with increased urinary frequency and incontinence.      In the ED, vital signs temperature 98.7, pulse 104, respiratory 22, blood pressure 149/81 on room air saturating around 92%.  Labs showed normal troponin, proBNP, calcium 8.4, albumin 3.4, rest of the CMP with no significant findings, normal TSH and free T4, normal lactic acid, normal WBC, hemoglobin, platelets.  Urinalysis showed 4+ bacteria, too numerous to count WBC, positive nitrates.  COVID-positive.  Chest x-ray showed mild vascular congestion with chronic scarring in the left lung base.  Patient treated for urinary tract infection and COVID-19 infection.  Patient worked with PT and OT while inpatient with recommendations for SNF discharge.  Patient not interested in discharge anywhere but home, agreeable to home health.  Patient discharged with remaining days of Paxlovid.  Also sent home with nicotine patches.  Patient seen on date of discharge, clinically and hemodynamically stable.  Patient provided concerning signs and  symptoms prompting immediate medical attention, patient understanding and agreeable     DISCHARGE Follow Up Recommendations for labs and diagnostics:   Follow-up primary care physician as soon as possible    The following orders were placed and all results were independently analyzed by me:  Orders Placed This Encounter   Procedures    Urine Culture - Urine,    XR Hip With or Without Pelvis 2 - 3 View Left    XR Chest 1 View    Comprehensive Metabolic Panel    CBC Auto Differential    BNP    Urinalysis With Culture If Indicated - Straight Cath    Urinalysis, Microscopic Only - Urine, Clean Catch    Nursing Dysphagia Screening (Complete Prior to Giving Anything By Mouth)    Code Status and Medical Interventions: CPR (Attempt to Resuscitate); Full Support    Hospitalist (on-call MD unless specified)    Inpatient Urology Consult    ECG 12 Lead Rhythm Change    Inpatient Admission    CBC & Differential       Medications Given in the Emergency Department:  Medications   cefTRIAXone (ROCEPHIN) in NS 1 gram/10ml IV PUSH syringe (has no administration in time range)   sodium chloride 0.9 % bolus 1,000 mL (has no administration in time range)        ED Course:    ED Course as of 10/27/24 0153   Sun Oct 27, 2024   0126 I have personally interpreted the EKG today and it shows no evidence of any acute ischemia or heart arrhythmia. [RP]      ED Course User Index  [RP] Haider Lewis MD       Labs:    Lab Results (last 24 hours)       Procedure Component Value Units Date/Time    Urinalysis With Culture If Indicated - Straight Cath [[id number]]  (Abnormal) Collected: 10/26/24 2340    Specimen: Urine from Straight Cath Updated: 10/26/24 2352     Color, UA Dark Yellow     Appearance, UA Turbid     pH, UA 5.5     Specific Gravity, UA 1.025     Glucose, UA Negative     Ketones, UA Negative     Bilirubin, UA Negative     Blood, UA Large (3+)     Protein,  mg/dL (2+)     Leuk Esterase, UA Large (3+)     Nitrite, UA Positive      Urobilinogen, UA 1.0 E.U./dL    Narrative:      In absence of clinical symptoms, the presence of pyuria, bacteria, and/or nitrites on the urinalysis result does not correlate with infection.    Urinalysis, Microscopic Only - Straight Cath [[id number]]  (Abnormal) Collected: 10/26/24 2340    Specimen: Urine from Straight Cath Updated: 10/26/24 2354     RBC, UA 6-10 /HPF      WBC, UA Too Numerous to Count /HPF      Bacteria, UA 2+ /HPF      Squamous Epithelial Cells, UA 7-12 /HPF      Hyaline Casts, UA 0-2 /LPF      Methodology Manual Light Microscopy    Urine Culture - Urine, Straight Cath [[id number]] Collected: 10/26/24 2340    Specimen: Urine from Straight Cath Updated: 10/26/24 2354    CBC & Differential [[id number]]  (Abnormal) Collected: 10/27/24 0011    Specimen: Blood Updated: 10/27/24 0015    Narrative:      The following orders were created for panel order CBC & Differential.  Procedure                               Abnormality         Status                     ---------                               -----------         ------                     CBC Auto Differential[[id number]]        Abnormal            Final result                 Please view results for these tests on the individual orders.    CBC Auto Differential [[id number]]  (Abnormal) Collected: 10/27/24 0011    Specimen: Blood Updated: 10/27/24 0015     WBC 5.68 10*3/mm3      RBC 5.04 10*6/mm3      Hemoglobin 13.1 g/dL      Hematocrit 39.8 %      MCV 79.0 fL      MCH 26.0 pg      MCHC 32.9 g/dL      RDW 15.9 %      RDW-SD 44.9 fl      MPV 9.6 fL      Platelets 226 10*3/mm3      Neutrophil % 34.8 %      Lymphocyte % 53.3 %      Monocyte % 10.4 %      Eosinophil % 0.9 %      Basophil % 0.4 %      Immature Grans % 0.2 %      Neutrophils, Absolute 1.98 10*3/mm3      Lymphocytes, Absolute 3.03 10*3/mm3      Monocytes, Absolute 0.59 10*3/mm3      Eosinophils, Absolute 0.05 10*3/mm3      Basophils, Absolute 0.02 10*3/mm3      Immature Grans, Absolute 0.01  10*3/mm3      nRBC 0.0 /100 WBC     Comprehensive Metabolic Panel [[id number]]  (Abnormal) Collected: 10/27/24 0012    Specimen: Blood Updated: 10/27/24 0051     Glucose 180 mg/dL      BUN 10 mg/dL      Creatinine 0.73 mg/dL      Sodium 132 mmol/L      Potassium 4.0 mmol/L      Chloride 99 mmol/L      CO2 24.1 mmol/L      Calcium 8.7 mg/dL      Total Protein 7.2 g/dL      Albumin 3.1 g/dL      ALT (SGPT) 5 U/L      AST (SGOT) 8 U/L      Alkaline Phosphatase 91 U/L      Total Bilirubin 0.3 mg/dL      Globulin 4.1 gm/dL      A/G Ratio 0.8 g/dL      BUN/Creatinine Ratio 13.7     Anion Gap 8.9 mmol/L      eGFR 99.7 mL/min/1.73     Narrative:      GFR Normal >60  Chronic Kidney Disease <60  Kidney Failure <15      BNP [[id number]]  (Normal) Collected: 10/27/24 0012    Specimen: Blood Updated: 10/27/24 0036     proBNP 757.6 pg/mL     Narrative:      This assay is used as an aid in the diagnosis of individuals suspected of having heart failure. It can be used as an aid in the diagnosis of acute decompensated heart failure (ADHF) in patients presenting with signs and symptoms of ADHF to the emergency department (ED). In addition, NT-proBNP of <300 pg/mL indicates ADHF is not likely.    Age Range Result Interpretation  NT-proBNP Concentration (pg/mL:      <50             Positive            >450                   Gray                 300-450                    Negative             <300    50-75           Positive            >900                  Gray                300-900                  Negative            <300      >75             Positive            >1800                  Gray                300-1800                  Negative            <300             Imaging:    XR Chest 1 View    Result Date: 10/26/2024  XR CHEST 1 VW Date of Exam: 10/26/2024 11:13 PM EDT Indication: weakness Comparison: 8/19/2024 Findings: Cardiac and mediastinal contours remain normal. There are chronic interstitial changes in the lungs with chronic  left basilar scarring. Granulomatous calcifications are present. No acute infiltrates are seen. There is no pneumothorax. Pulmonary vascularity is normal.     No acute cardiopulmonary findings. Stable exam from prior. Electronically Signed: Durga Zurita MD  10/26/2024 11:33 PM EDT  Workstation ID: [id number]    XR Hip With or Without Pelvis 2 - 3 View Left    Result Date: 10/26/2024  XR HIP W OR WO PELVIS 2-3 VIEW LEFT Date of Exam: 10/26/2024 7:20 PM EDT Indication: Fall, hip pain Comparison: CT abdomen pelvis 4/7/2019 Findings: AP pelvis and 2 views of the left hip. Note is made of a left-sided ureteral stent. The distal end is fractured, probably within the bladder lumen. 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 No sacroiliac joint diastasis. No acute fracture or dislocation. No bone erosion or destruction.     1. No acute fracture or dislocation in the pelvis or left hip. 2. The distal end of the left side ureteral stent appears fractured, possibly within the bladder lumen. Urology follow-up recommended. Electronically Signed: Durga Zurita MD  10/26/2024 7:41 PM EDT  Workstation ID: [id number]       Differential Diagnosis and Discussion:    Extremity Pain: Differential diagnosis includes but is not limited to soft tissue sprain, tendonitis, tendon injury, dislocation, fracture, deep vein thrombosis, arterial insufficiency, osteoarthritis, bursitis, and ligamentous damage.    All labs were reviewed and interpreted by me.  All X-rays impressions were independently interpreted by me.  EKG was interpreted by me.    MDM                     Patient Care Considerations:    SEPSIS was considered but is NOT present in the emergency department as SIRS criteria is not present.      Consultants/Shared Management Plan:    Hospitalist: I have discussed the case with Dr. Venegas who agrees to accept the patient for admission.    Social Determinants of Health:    Patient is unable to carry out activities of daily life.  Escalation of care is necessary.       Disposition and Care Coordination:    Admit:   Through independent evaluation of the patient's history, physical, and imperical data, the patient meets criteria for inpatient admission to the hospital.        Final diagnoses:   Contusion of left hip, initial encounter   Acute UTI   Generalized weakness        ED Disposition       ED Disposition   Decision to Admit    Condition   --    Comment   Level of Care: Remote Telemetry [26]   Diagnosis: UTI (urinary tract infection) [370310]   Certification: I Certify That Inpatient Hospital Services Are Medically Necessary For Greater Than 2 Midnights                 This medical record created using voice recognition software.           Haider Lewis MD  10/27/24 0153

## 2024-10-27 ENCOUNTER — APPOINTMENT (OUTPATIENT)
Dept: CT IMAGING | Facility: HOSPITAL | Age: 69
DRG: 699 | End: 2024-10-27
Payer: MEDICARE

## 2024-10-27 PROBLEM — M25.559 HIP PAIN: Status: ACTIVE | Noted: 2023-07-18
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
## 2024-10-27 PROBLEM — R62.7 FAILURE TO THRIVE IN ADULT: Status: ACTIVE | Noted: 2024-10-27

## 2024-10-27 LAB
ALBUMIN SERPL-MCNC: 3.1 G/DL (ref 3.5–5.2)
ALBUMIN/GLOB SERPL: 0.8 G/DL
ALP SERPL-CCNC: 91 U/L (ref 39–117)
ALT SERPL W P-5'-P-CCNC: 5 U/L (ref 1–41)
ANION GAP SERPL CALCULATED.3IONS-SCNC: 8.9 MMOL/L (ref 5–15)
ANION GAP SERPL CALCULATED.3IONS-SCNC: 9 MMOL/L (ref 5–15)
ANISOCYTOSIS BLD QL: ABNORMAL
AST SERPL-CCNC: 8 U/L (ref 1–40)
BASOPHILS # BLD AUTO: 0.02 10*3/MM3 (ref 0–0.2)
BASOPHILS # BLD MANUAL: 0.12 10*3/MM3 (ref 0–0.2)
BASOPHILS NFR BLD AUTO: 0.4 % (ref 0–1.5)
BASOPHILS NFR BLD MANUAL: 2 % (ref 0–1.5)
BILIRUB SERPL-MCNC: 0.3 MG/DL (ref 0–1.2)
BUN SERPL-MCNC: 10 MG/DL (ref 8–23)
BUN SERPL-MCNC: 10 MG/DL (ref 8–23)
BUN/CREAT SERPL: 11.1 (ref 7–25)
BUN/CREAT SERPL: 13.7 (ref 7–25)
CALCIUM SPEC-SCNC: 8.4 MG/DL (ref 8.6–10.5)
CALCIUM SPEC-SCNC: 8.7 MG/DL (ref 8.6–10.5)
CHLORIDE SERPL-SCNC: 100 MMOL/L (ref 98–107)
CHLORIDE SERPL-SCNC: 99 MMOL/L (ref 98–107)
CO2 SERPL-SCNC: 24 MMOL/L (ref 22–29)
CO2 SERPL-SCNC: 24.1 MMOL/L (ref 22–29)
CREAT SERPL-MCNC: 0.73 MG/DL (ref 0.76–1.27)
CREAT SERPL-MCNC: 0.9 MG/DL (ref 0.76–1.27)
DEPRECATED RDW RBC AUTO: 44.9 FL (ref 37–54)
DEPRECATED RDW RBC AUTO: 47.8 FL (ref 37–54)
EGFRCR SERPLBLD CKD-EPI 2021: 93.6 ML/MIN/1.73
EGFRCR SERPLBLD CKD-EPI 2021: 99.7 ML/MIN/1.73
EOSINOPHIL # BLD AUTO: 0.05 10*3/MM3 (ref 0–0.4)
EOSINOPHIL NFR BLD AUTO: 0.9 % (ref 0.3–6.2)
ERYTHROCYTE [DISTWIDTH] IN BLOOD BY AUTOMATED COUNT: 15.9 % (ref 12.3–15.4)
ERYTHROCYTE [DISTWIDTH] IN BLOOD BY AUTOMATED COUNT: 15.9 % (ref 12.3–15.4)
GLOBULIN UR ELPH-MCNC: 4.1 GM/DL
GLUCOSE BLDC GLUCOMTR-MCNC: 163 MG/DL (ref 70–99)
GLUCOSE BLDC GLUCOMTR-MCNC: 178 MG/DL (ref 70–99)
GLUCOSE BLDC GLUCOMTR-MCNC: 201 MG/DL (ref 70–99)
GLUCOSE BLDC GLUCOMTR-MCNC: 219 MG/DL (ref 70–99)
GLUCOSE SERPL-MCNC: 163 MG/DL (ref 65–99)
GLUCOSE SERPL-MCNC: 180 MG/DL (ref 65–99)
HCT VFR BLD AUTO: 39.8 % (ref 37.5–51)
HCT VFR BLD AUTO: 41.5 % (ref 37.5–51)
HGB BLD-MCNC: 13.1 G/DL (ref 13–17.7)
HGB BLD-MCNC: 13.3 G/DL (ref 13–17.7)
HOLD SPECIMEN: NORMAL
HYPOCHROMIA BLD QL: ABNORMAL
IMM GRANULOCYTES # BLD AUTO: 0.01 10*3/MM3 (ref 0–0.05)
IMM GRANULOCYTES NFR BLD AUTO: 0.2 % (ref 0–0.5)
LYMPHOCYTES # BLD AUTO: 3.03 10*3/MM3 (ref 0.7–3.1)
LYMPHOCYTES # BLD MANUAL: 2.43 10*3/MM3 (ref 0.7–3.1)
LYMPHOCYTES NFR BLD AUTO: 53.3 % (ref 19.6–45.3)
LYMPHOCYTES NFR BLD MANUAL: 10 % (ref 5–12)
MCH RBC QN AUTO: 26 PG (ref 26.6–33)
MCH RBC QN AUTO: 26.4 PG (ref 26.6–33)
MCHC RBC AUTO-ENTMCNC: 32 G/DL (ref 31.5–35.7)
MCHC RBC AUTO-ENTMCNC: 32.9 G/DL (ref 31.5–35.7)
MCV RBC AUTO: 79 FL (ref 79–97)
MCV RBC AUTO: 82.5 FL (ref 79–97)
MONOCYTES # BLD AUTO: 0.59 10*3/MM3 (ref 0.1–0.9)
MONOCYTES # BLD: 0.59 10*3/MM3 (ref 0.1–0.9)
MONOCYTES NFR BLD AUTO: 10.4 % (ref 5–12)
NEUTROPHILS # BLD AUTO: 2.79 10*3/MM3 (ref 1.7–7)
NEUTROPHILS NFR BLD AUTO: 1.98 10*3/MM3 (ref 1.7–7)
NEUTROPHILS NFR BLD AUTO: 34.8 % (ref 42.7–76)
NEUTROPHILS NFR BLD MANUAL: 46 % (ref 42.7–76)
NEUTS BAND NFR BLD MANUAL: 1 % (ref 0–5)
NRBC BLD AUTO-RTO: 0 /100 WBC (ref 0–0.2)
NT-PROBNP SERPL-MCNC: 757.6 PG/ML (ref 0–900)
PLATELET # BLD AUTO: 204 10*3/MM3 (ref 140–450)
PLATELET # BLD AUTO: 226 10*3/MM3 (ref 140–450)
PMV BLD AUTO: 10 FL (ref 6–12)
PMV BLD AUTO: 9.6 FL (ref 6–12)
POTASSIUM SERPL-SCNC: 4 MMOL/L (ref 3.5–5.2)
POTASSIUM SERPL-SCNC: 4.5 MMOL/L (ref 3.5–5.2)
PROT SERPL-MCNC: 7.2 G/DL (ref 6–8.5)
RBC # BLD AUTO: 5.03 10*6/MM3 (ref 4.14–5.8)
RBC # BLD AUTO: 5.04 10*6/MM3 (ref 4.14–5.8)
SCAN SLIDE: NORMAL
SMALL PLATELETS BLD QL SMEAR: ADEQUATE
SODIUM SERPL-SCNC: 132 MMOL/L (ref 136–145)
SODIUM SERPL-SCNC: 133 MMOL/L (ref 136–145)
T4 FREE SERPL-MCNC: 1.32 NG/DL (ref 0.92–1.68)
TSH SERPL DL<=0.05 MIU/L-ACNC: 0.96 UIU/ML (ref 0.27–4.2)
VARIANT LYMPHS NFR BLD MANUAL: 3 % (ref 0–5)
VARIANT LYMPHS NFR BLD MANUAL: 38 % (ref 19.6–45.3)
WBC MORPH BLD: NORMAL
WBC NRBC COR # BLD AUTO: 5.68 10*3/MM3 (ref 3.4–10.8)
WBC NRBC COR # BLD AUTO: 5.93 10*3/MM3 (ref 3.4–10.8)

## 2024-10-27 PROCEDURE — 25810000003 SODIUM CHLORIDE 0.9 % SOLUTION: Performed by: EMERGENCY MEDICINE

## 2024-10-27 PROCEDURE — 25010000002 HEPARIN (PORCINE) PER 1000 UNITS: Performed by: STUDENT IN AN ORGANIZED HEALTH CARE EDUCATION/TRAINING PROGRAM

## 2024-10-27 PROCEDURE — 63710000001 INSULIN LISPRO (HUMAN) PER 5 UNITS: Performed by: STUDENT IN AN ORGANIZED HEALTH CARE EDUCATION/TRAINING PROGRAM

## 2024-10-27 PROCEDURE — 93010 ELECTROCARDIOGRAM REPORT: CPT | Performed by: INTERNAL MEDICINE

## 2024-10-27 PROCEDURE — 85025 COMPLETE CBC W/AUTO DIFF WBC: CPT | Performed by: EMERGENCY MEDICINE

## 2024-10-27 PROCEDURE — 87040 BLOOD CULTURE FOR BACTERIA: CPT | Performed by: INTERNAL MEDICINE

## 2024-10-27 PROCEDURE — 93005 ELECTROCARDIOGRAM TRACING: CPT | Performed by: EMERGENCY MEDICINE

## 2024-10-27 PROCEDURE — 82948 REAGENT STRIP/BLOOD GLUCOSE: CPT | Performed by: STUDENT IN AN ORGANIZED HEALTH CARE EDUCATION/TRAINING PROGRAM

## 2024-10-27 PROCEDURE — 82948 REAGENT STRIP/BLOOD GLUCOSE: CPT

## 2024-10-27 PROCEDURE — 85025 COMPLETE CBC W/AUTO DIFF WBC: CPT | Performed by: STUDENT IN AN ORGANIZED HEALTH CARE EDUCATION/TRAINING PROGRAM

## 2024-10-27 PROCEDURE — 99223 1ST HOSP IP/OBS HIGH 75: CPT | Performed by: STUDENT IN AN ORGANIZED HEALTH CARE EDUCATION/TRAINING PROGRAM

## 2024-10-27 PROCEDURE — 83880 ASSAY OF NATRIURETIC PEPTIDE: CPT | Performed by: EMERGENCY MEDICINE

## 2024-10-27 PROCEDURE — 84443 ASSAY THYROID STIM HORMONE: CPT | Performed by: STUDENT IN AN ORGANIZED HEALTH CARE EDUCATION/TRAINING PROGRAM

## 2024-10-27 PROCEDURE — 80053 COMPREHEN METABOLIC PANEL: CPT | Performed by: EMERGENCY MEDICINE

## 2024-10-27 PROCEDURE — 74176 CT ABD & PELVIS W/O CONTRAST: CPT

## 2024-10-27 PROCEDURE — 99222 1ST HOSP IP/OBS MODERATE 55: CPT | Performed by: UROLOGY

## 2024-10-27 PROCEDURE — 63710000001 INSULIN GLARGINE PER 5 UNITS: Performed by: INTERNAL MEDICINE

## 2024-10-27 PROCEDURE — 84439 ASSAY OF FREE THYROXINE: CPT | Performed by: STUDENT IN AN ORGANIZED HEALTH CARE EDUCATION/TRAINING PROGRAM

## 2024-10-27 PROCEDURE — 25010000002 CEFTRIAXONE PER 250 MG: Performed by: EMERGENCY MEDICINE

## 2024-10-27 RX ORDER — LOSARTAN POTASSIUM 25 MG/1
25 TABLET ORAL DAILY
Status: DISCONTINUED | OUTPATIENT
Start: 2024-10-27 | End: 2024-11-04 | Stop reason: HOSPADM

## 2024-10-27 RX ORDER — AMOXICILLIN 250 MG
2 CAPSULE ORAL 2 TIMES DAILY PRN
Status: DISCONTINUED | OUTPATIENT
Start: 2024-10-27 | End: 2024-11-04 | Stop reason: HOSPADM

## 2024-10-27 RX ORDER — ONDANSETRON 4 MG/1
4 TABLET, ORALLY DISINTEGRATING ORAL EVERY 6 HOURS PRN
Status: DISCONTINUED | OUTPATIENT
Start: 2024-10-27 | End: 2024-11-04 | Stop reason: HOSPADM

## 2024-10-27 RX ORDER — SODIUM CHLORIDE 9 MG/ML
40 INJECTION, SOLUTION INTRAVENOUS AS NEEDED
Status: DISCONTINUED | OUTPATIENT
Start: 2024-10-27 | End: 2024-11-04 | Stop reason: HOSPADM

## 2024-10-27 RX ORDER — ASPIRIN 81 MG/1
81 TABLET, CHEWABLE ORAL DAILY
Status: DISCONTINUED | OUTPATIENT
Start: 2024-10-27 | End: 2024-11-04 | Stop reason: HOSPADM

## 2024-10-27 RX ORDER — IBUPROFEN 600 MG/1
1 TABLET ORAL
Status: DISCONTINUED | OUTPATIENT
Start: 2024-10-27 | End: 2024-11-04 | Stop reason: HOSPADM

## 2024-10-27 RX ORDER — ISOSORBIDE MONONITRATE 30 MG/1
30 TABLET, EXTENDED RELEASE ORAL DAILY
Status: DISCONTINUED | OUTPATIENT
Start: 2024-10-27 | End: 2024-11-04 | Stop reason: HOSPADM

## 2024-10-27 RX ORDER — ATORVASTATIN CALCIUM 20 MG/1
20 TABLET, FILM COATED ORAL NIGHTLY
Status: DISCONTINUED | OUTPATIENT
Start: 2024-10-27 | End: 2024-11-04 | Stop reason: HOSPADM

## 2024-10-27 RX ORDER — BISACODYL 5 MG/1
5 TABLET, DELAYED RELEASE ORAL DAILY PRN
Status: DISCONTINUED | OUTPATIENT
Start: 2024-10-27 | End: 2024-11-04 | Stop reason: HOSPADM

## 2024-10-27 RX ORDER — ACETAMINOPHEN 650 MG/1
650 SUPPOSITORY RECTAL EVERY 4 HOURS PRN
Status: DISCONTINUED | OUTPATIENT
Start: 2024-10-27 | End: 2024-11-04 | Stop reason: HOSPADM

## 2024-10-27 RX ORDER — BISACODYL 10 MG
10 SUPPOSITORY, RECTAL RECTAL DAILY PRN
Status: DISCONTINUED | OUTPATIENT
Start: 2024-10-27 | End: 2024-11-04 | Stop reason: HOSPADM

## 2024-10-27 RX ORDER — INSULIN LISPRO 100 [IU]/ML
2-9 INJECTION, SOLUTION INTRAVENOUS; SUBCUTANEOUS
Status: DISCONTINUED | OUTPATIENT
Start: 2024-10-27 | End: 2024-11-04 | Stop reason: HOSPADM

## 2024-10-27 RX ORDER — HEPARIN SODIUM 5000 [USP'U]/ML
5000 INJECTION, SOLUTION INTRAVENOUS; SUBCUTANEOUS EVERY 12 HOURS SCHEDULED
Status: DISCONTINUED | OUTPATIENT
Start: 2024-10-27 | End: 2024-11-04 | Stop reason: HOSPADM

## 2024-10-27 RX ORDER — POLYETHYLENE GLYCOL 3350 17 G/17G
17 POWDER, FOR SOLUTION ORAL DAILY PRN
Status: DISCONTINUED | OUTPATIENT
Start: 2024-10-27 | End: 2024-11-04 | Stop reason: HOSPADM

## 2024-10-27 RX ORDER — NICOTINE POLACRILEX 4 MG
15 LOZENGE BUCCAL
Status: DISCONTINUED | OUTPATIENT
Start: 2024-10-27 | End: 2024-11-04 | Stop reason: HOSPADM

## 2024-10-27 RX ORDER — ACETAMINOPHEN 325 MG/1
650 TABLET ORAL EVERY 4 HOURS PRN
Status: DISCONTINUED | OUTPATIENT
Start: 2024-10-27 | End: 2024-11-04 | Stop reason: HOSPADM

## 2024-10-27 RX ORDER — ACETAMINOPHEN 160 MG/5ML
650 SOLUTION ORAL EVERY 4 HOURS PRN
Status: DISCONTINUED | OUTPATIENT
Start: 2024-10-27 | End: 2024-11-04 | Stop reason: HOSPADM

## 2024-10-27 RX ORDER — SODIUM CHLORIDE 0.9 % (FLUSH) 0.9 %
10 SYRINGE (ML) INJECTION AS NEEDED
Status: DISCONTINUED | OUTPATIENT
Start: 2024-10-27 | End: 2024-11-04 | Stop reason: HOSPADM

## 2024-10-27 RX ORDER — SODIUM CHLORIDE 0.9 % (FLUSH) 0.9 %
10 SYRINGE (ML) INJECTION EVERY 12 HOURS SCHEDULED
Status: DISCONTINUED | OUTPATIENT
Start: 2024-10-27 | End: 2024-11-04 | Stop reason: HOSPADM

## 2024-10-27 RX ORDER — ONDANSETRON 2 MG/ML
4 INJECTION INTRAMUSCULAR; INTRAVENOUS EVERY 6 HOURS PRN
Status: DISCONTINUED | OUTPATIENT
Start: 2024-10-27 | End: 2024-11-04 | Stop reason: HOSPADM

## 2024-10-27 RX ORDER — DEXTROSE MONOHYDRATE 25 G/50ML
25 INJECTION, SOLUTION INTRAVENOUS
Status: DISCONTINUED | OUTPATIENT
Start: 2024-10-27 | End: 2024-11-04 | Stop reason: HOSPADM

## 2024-10-27 RX ADMIN — LOSARTAN POTASSIUM 25 MG: 25 TABLET, FILM COATED ORAL at 08:52

## 2024-10-27 RX ADMIN — SODIUM CHLORIDE 1000 MG: 9 INJECTION INTRAMUSCULAR; INTRAVENOUS; SUBCUTANEOUS at 02:36

## 2024-10-27 RX ADMIN — INSULIN LISPRO 4 UNITS: 100 INJECTION, SOLUTION INTRAVENOUS; SUBCUTANEOUS at 21:48

## 2024-10-27 RX ADMIN — ATORVASTATIN CALCIUM 20 MG: 20 TABLET, FILM COATED ORAL at 21:47

## 2024-10-27 RX ADMIN — Medication 10 ML: at 21:47

## 2024-10-27 RX ADMIN — Medication 10 ML: at 08:53

## 2024-10-27 RX ADMIN — ASPIRIN 81 MG: 81 TABLET, CHEWABLE ORAL at 08:52

## 2024-10-27 RX ADMIN — SODIUM CHLORIDE 1000 ML: 9 INJECTION, SOLUTION INTRAVENOUS at 02:35

## 2024-10-27 RX ADMIN — ACETAMINOPHEN 650 MG: 325 TABLET ORAL at 09:07

## 2024-10-27 RX ADMIN — HEPARIN SODIUM 5000 UNITS: 5000 INJECTION INTRAVENOUS; SUBCUTANEOUS at 08:53

## 2024-10-27 RX ADMIN — ISOSORBIDE MONONITRATE 30 MG: 30 TABLET, EXTENDED RELEASE ORAL at 08:53

## 2024-10-27 NOTE — ED NOTES
Patient wife contacted. Wife states that the patient fell a week ago and not today. Wife states the patient has been extremely weak and unable to to walk well. This RN notified the provider. Provider to place orders for further testing.

## 2024-10-27 NOTE — PLAN OF CARE
Goal Outcome Evaluation:  Plan of Care Reviewed With: patient        Progress: no change  Outcome Evaluation: Patient refuses insulin. CT completed today. Patient tolerated dressing change on left hip. See discharge care plans for further details.

## 2024-10-27 NOTE — H&P
Patient Care Team:  Sophie Capps APRN as PCP - General (Nurse Practitioner)  Juana Mehta, MYA as Ambulatory  ([facility])  Luisana Cary MSW as  ([facility]mt) ([facility])    Chief complaint fall, hip pain, failure to thrive    Subjective     Patient is a 69 y.o. male presents with left hip pain and inability to ambulate.  Patient is unable to care for himself.  He was brought in covered in feces and urine.  Patient was apparently trying to board a transportation van/bus and fell going up the steps.  History taken from the wife is that the patient has not been able to care for himself at home due to weakness.  He has a history of CVA with residual right-sided weakness and usually walks with a walker or cane but has not been able to for the past 2 days.  Imaging was negative for acute fracture.    Patient was found have a UTI and was treated with Rocephin.  Incidentally, patient was found have a fractured left ureteral stent      Review of Systems   Pertinent items are noted in HPI    History  Past Medical History:   Diagnosis Date    CHF (congestive heart failure)     SEE'S DR [place]ICK[place] NO CURRENT S/S    COPD (chronic obstructive pulmonary disease)     INHALER    Diabetes mellitus     DOESN'T CHECK BG OFTEN AT HOME    Hyperlipidemia     Hypertension     Sepsis 09/14/2023    Stroke 2017    RIGHT SIDE WEAKNESS     Past Surgical History:   Procedure Laterality Date    APPENDECTOMY      EYE SURGERY Bilateral     MISTY CATARACT     Family History   Problem Relation Age of Onset    No Known Problems Mother     No Known Problems Father     Malig Hyperthermia Neg Hx      Social History     Tobacco Use    Smoking status: Every Day     Current packs/day: 2.50     Average packs/day: 2.5 packs/day for 52.8 years (132.0 ttl pk-yrs)     Types: Cigarettes     Start date: 1972     Passive exposure: Current    Smokeless tobacco: Never    Tobacco comments:     INSTRUCTED NO SMOKING 24 HOUR  PRIOR TO SURGERY    Vaping Use    Vaping status: Never Used   Substance Use Topics    Alcohol use: Yes    Drug use: Never     (Not in a hospital admission)   Allergies:  Patient has no known allergies.    Objective     Vital Signs  Temp:  [98.5 °F (36.9 °C)] 98.5 °F (36.9 °C)  Heart Rate:  [111-115] 111  Resp:  [17] 17  BP: (122-157)/(77-85) 122/77    Physical Exam:      General Appearance:  Alert, cooperative, in no acute distress   Head:  Normocephalic, without obvious abnormality, atraumatic   Eyes:  Lids and lashes normal, conjunctivae and sclerae normal, no icterus, no pallor, corneas clear, PERRLA   Ears:  Ears appear intact with no abnormalities noted   Throat:  No oral lesions, no thrush, oral mucosa moist   Neck:  No adenopathy, supple, trachea midline, no thyromegaly, no carotid bruit, no JVD   Back:  No kyphosis present, no scoliosis present, no skin lesions, erythema or scars, no tenderness to percussion or palpation, range of motion normal   Lungs:  Clear to auscultation, respirations regular, even and unlabored    Heart:  Regular rhythm and normal rate, normal S1 and S2, no murmur, no gallop, no rub, no click   Chest Wall:  No abnormalities observed   Abdomen:  Normal bowel sounds, no masses, no organomegaly, soft non-tender, non-distended, no guarding, no rebound tenderness   Rectal:  Deferred   Extremities:  Moves all extremities well, no edema, no cyanosis, no redness   Pulses:  Pulses palpable and equal bilaterally   Skin:  No bleeding, bruising or rash   Lymph nodes:  No palpable adenopathy   Neurologic:  Cranial nerves 2 - 12 grossly intact, sensation intact, DTR present and equal bilaterally     Results Review:    I reviewed the patient's new clinical results.  I reviewed the patient's new imaging results and agree with the interpretation.  I reviewed the patient's other test results and agree with the interpretation  I personally viewed and interpreted the patient's EKG/Telemetry  data    Assessment & Plan       UTI (urinary tract infection)    Essential hypertension    Hip pain    DM2 (diabetes mellitus, type 2)    Fall    Failure to thrive in adult  Fracture left ureteral stent    Admit to hospitalist service  Remote telemetry  Rocephin for UTI  PT OT  Case management consult  Insulin sliding scale  N.p.o., will need swallow eval  Urology notified of consultation  Full code        Rodolfo Venegas MD  10/27/24  01:27 EDT

## 2024-10-27 NOTE — CONSULTS
[facility]   UROLOGY Consult Note    Patient Name: Brian Mehta  : 1955  MRN: [id number]  Primary Care Physician:  Sophie Capps APRN  Referring Physician: No ref. provider found  Date of admission: 10/26/2024    Subjective   Subjective     Chief Complaint:     Retained stent    HPI:  Brian Mehta is a 69 y.o. male           Retained left ureteral stent  UTI  History of CVA    Poor historian    10/26/2024 came in through ED with weakness/inability to ambulate/unable to care for self    Not sure when stent was placed, no records in our system    10/24 0.7, GFR 99   10/26/2024 chest x-ray - negative  2024 x-ray hip left - no acute fracture or dislocation of the pelvis or left hip.  Report left ureteral stent appears fractured.  10/24 UA nitrite positive, 6-10 RBCs, TNTC WBCs, 2+ bacteria     admitted for COVID    Urine cultures     E. coli - resistant to ampicillin and Bactrim  3/24 Proteus          Review of Systems     Unable to obtain    Personal History     Past Medical History:   Diagnosis Date    CHF (congestive heart failure)     SEE'S DR STRICKLAND NO CURRENT S/S    COPD (chronic obstructive pulmonary disease)     INHALER    Diabetes mellitus     DOESN'T CHECK BG OFTEN AT HOME    Hyperlipidemia     Hypertension     Sepsis 2023    Stroke 2017    RIGHT SIDE WEAKNESS       Past Surgical History:   Procedure Laterality Date    APPENDECTOMY      EYE SURGERY Bilateral     MISTY CATARACT       Family History: family history includes No Known Problems in his father and mother. Otherwise pertinent FHx was reviewed and not pertinent to current issue.    Social History:  reports that he has been smoking cigarettes. He started smoking about 52 years ago. He has a 132 pack-year smoking history. He has been exposed to tobacco smoke. He has never used smokeless tobacco. He reports current alcohol use. He reports that he does not use drugs.    Home Medications:  Dexcom G6 , Insulin  Glargine, aspirin, atorvastatin, empagliflozin, furosemide, hydroCHLOROthiazide, isosorbide mononitrate, losartan, nicotine, and potassium chloride    Allergies:  No Known Allergies    Objective    Objective     Vitals:   Temp:  [98.5 °F (36.9 °C)-99 °F (37.2 °C)] 99 °F (37.2 °C)  Heart Rate:  [107-115] 111  Resp:  [16-17] 16  BP: (122-157)/() 130/88    Physical Exam:   Constitutional: Awake, alert    Respiratory: Clear to auscultation bilaterally, nonlabored respirations    Cardiovascular: RRR, no murmurs, rubs, or gallops, palpable pedal pulses bilaterally   Gastrointestinal: Positive bowel sounds, soft, nontender, nondistended   Musculoskeletal: No bilateral ankle edema, no clubbing or cyanosis to extremities       Result Review    Result Review:  I have personally reviewed the results from the time of this admission to 10/27/2024 06:26 EDT and agree with these findings:  []  Laboratory  []  Microbiology  []  Radiology  []  EKG/Telemetry   []  Cardiology/Vascular   []  Pathology  []  Old records  []  Other:      Assessment & Plan   Assessment / Plan     Brief Patient Summary:  Brian Mehta is a 69 y.o. male     Active Hospital Problems:  Active Hospital Problems    Diagnosis     **UTI (urinary tract infection)     Fall     Failure to thrive in adult     DM2 (diabetes mellitus, type 2)     Hip pain     Essential hypertension            Retained left ureteral stent  UTI  History of CVA    Records reviewed and summarized in chart    No procedures planned at this time, patient does not have to be n.p.o. for urology      Patient with a left ureteral stent in for undetermined amount of time.  Patient not sure.  Was not done by any of our urologist.    X-ray mentions fractured stent but likely stent is just turning at the UO.  Have never seen a stent fracture or fall apart unless it was cut    Will order CT abdomen/pelvis without to better delineate if there is any stone burden on stent    Did try to contact  family but was unable to to get more information about the placement and timing of the stent.    Thank you for the consult    Will follow-up      Electronically signed by Ector Kulkarni MD, 10/27/24, 6:26 AM EDT.

## 2024-10-27 NOTE — PAYOR COMM NOTE
"Brandon Mehta P \"Gio\" (69 y.o. Male)       Date of Birth   1955    Social Security Number       Address   [address] [place] [address] [address] [place] [id number]    Home Phone   [phone]    MRN   [id number]       Zoroastrianism   None    Marital Status                               Admission Date   10/26/24    Admission Type   Emergency    Admitting Provider   Rodolfo Venegas MD    Attending Provider   Isidro Bradley MD    Department, Room/Bed   [address], 3023/1       Discharge Date       Discharge Disposition       Discharge Destination                                 Attending Provider: Isidro Bradley MD    Allergies: No Known Allergies    Isolation: None   Infection: COVID (confirmed) (08/19/24)   Code Status: CPR    Ht: 190.5 cm (75\")   Wt: 94.6 kg (208 lb 8.9 oz)    Admission Cmt: None   Principal Problem: UTI (urinary tract infection) [N39.0]                   Active Insurance as of 10/26/2024       Primary Coverage       Payor Plan Insurance Group Employer/Plan Group    AETNA MEDICARE REPLACEMENT AETNA MEDICARE REPLACEMENT [id number]       Payor Plan Address Payor Plan Phone Number Payor Plan Fax Number Effective Dates    [address] [phone]  5/1/2024 - None Entered    [facility] [id number]         Subscriber Name Subscriber Birth Date Member ID       BRANDON MEHTA P 1955 [id number]               Secondary Coverage       Payor Plan Insurance Group Employer/Plan Group    ANTHEM MEDICAID ANTHEM MEDICAID [id number]       Payor Plan Address Payor Plan Phone Number Payor Plan Fax Number Effective Dates    [address] [address] [phone]  7/2/2019 - None Entered    [facility] [id number]         Subscriber Name Subscriber Birth Date Member ID       BRANDON MEHTA P 1955 [id number]                     Emergency Contacts        (Rel.) Home Phone Work Phone Mobile Phone    HOSSEINFERDINAND (Spouse) [phone] -- [phone]           Urinary Tract Infection " (UTI) [facility] Inpatient Care       Indications Met   Last updated by Brenda Schulz RN on 10/27/2024 1102     Review Status Created By   Primary Completed Brenda Schulz RN      Criteria Review   Urinary Tract Infection (UTI) [facility] Inpatient Care     Overall Determination: Indications Met     Criteria:  [×] Admission is indicated for  1 or more  of the following :      [×] Hemodynamic instability          10/27/2024 11:02 AM              -- 10/27/2024 11:02 AM by Brenda Schulz RN --                  , 113, 107, 108     Notes:  -- 10/27/2024 11:02 AM by Brenda Schulz RN --      10/26/2024 came in through ED with weakness/inability to ambulate/unable to care for self            Patient with a left ureteral stent in for undetermined amount of time. Patient not sure. Was not done by any of our urologist.            X-ray mentions fractured stent but likely stent is just turning at the UO. Have never seen a stent fracture or fall apart unless it was cut            Will order CT abdomen/pelvis without to better delineate if there is any stone burden on stent                              History & Physical        Rodolfo Venegas MD at 10/27/24 0127              Patient Care Team:  Sophie Capps APRN as PCP - General (Nurse Practitioner)  Juana Mehta, MYA as Ambulatory  ([facility])  Luisana Cary MSW as  (Amb Case Mgmt) ([facility])    Chief complaint fall, hip pain, failure to thrive    Subjective    Patient is a 69 y.o. male presents with left hip pain and inability to ambulate.  Patient is unable to care for himself.  He was brought in covered in feces and urine.  Patient was apparently trying to board a transportation van/bus and fell going up the steps.  History taken from the wife is that the patient has not been able to care for himself at home due to weakness.  He has a history of CVA with residual right-sided weakness and usually walks with a  walker or cane but has not been able to for the past 2 days.  Imaging was negative for acute fracture.    Patient was found have a UTI and was treated with Rocephin.  Incidentally, patient was found have a fractured left ureteral stent      Review of Systems   Pertinent items are noted in HPI    History  Past Medical History:   Diagnosis Date    CHF (congestive heart failure)     SEE'S DR STRICKLAND NO CURRENT S/S    COPD (chronic obstructive pulmonary disease)     INHALER    Diabetes mellitus     DOESN'T CHECK BG OFTEN AT HOME    Hyperlipidemia     Hypertension     Sepsis 09/14/2023    Stroke 2017    RIGHT SIDE WEAKNESS     Past Surgical History:   Procedure Laterality Date    APPENDECTOMY      EYE SURGERY Bilateral     MISTY CATARACT     Family History   Problem Relation Age of Onset    No Known Problems Mother     No Known Problems Father     Malig Hyperthermia Neg Hx      Social History     Tobacco Use    Smoking status: Every Day     Current packs/day: 2.50     Average packs/day: 2.5 packs/day for 52.8 years (132.0 ttl pk-yrs)     Types: Cigarettes     Start date: 1972     Passive exposure: Current    Smokeless tobacco: Never    Tobacco comments:     INSTRUCTED NO SMOKING 24 HOUR PRIOR TO SURGERY    Vaping Use    Vaping status: Never Used   Substance Use Topics    Alcohol use: Yes    Drug use: Never     (Not in a hospital admission)   Allergies:  Patient has no known allergies.    Objective    Vital Signs  Temp:  [98.5 °F (36.9 °C)] 98.5 °F (36.9 °C)  Heart Rate:  [111-115] 111  Resp:  [17] 17  BP: (122-157)/(77-85) 122/77    Physical Exam:      General Appearance:  Alert, cooperative, in no acute distress   Head:  Normocephalic, without obvious abnormality, atraumatic   Eyes:  Lids and lashes normal, conjunctivae and sclerae normal, no icterus, no pallor, corneas clear, PERRLA   Ears:  Ears appear intact with no abnormalities noted   Throat:  No oral lesions, no thrush, oral mucosa moist   Neck:  No adenopathy,  supple, trachea midline, no thyromegaly, no carotid bruit, no JVD   Back:  No kyphosis present, no scoliosis present, no skin lesions, erythema or scars, no tenderness to percussion or palpation, range of motion normal   Lungs:  Clear to auscultation, respirations regular, even and unlabored    Heart:  Regular rhythm and normal rate, normal S1 and S2, no murmur, no gallop, no rub, no click   Chest Wall:  No abnormalities observed   Abdomen:  Normal bowel sounds, no masses, no organomegaly, soft non-tender, non-distended, no guarding, no rebound tenderness   Rectal:  Deferred   Extremities:  Moves all extremities well, no edema, no cyanosis, no redness   Pulses:  Pulses palpable and equal bilaterally   Skin:  No bleeding, bruising or rash   Lymph nodes:  No palpable adenopathy   Neurologic:  Cranial nerves 2 - 12 grossly intact, sensation intact, DTR present and equal bilaterally     Results Review:    I reviewed the patient's new clinical results.  I reviewed the patient's new imaging results and agree with the interpretation.  I reviewed the patient's other test results and agree with the interpretation  I personally viewed and interpreted the patient's EKG/Telemetry data    Assessment & Plan      UTI (urinary tract infection)    Essential hypertension    Hip pain    DM2 (diabetes mellitus, type 2)    Fall    Failure to thrive in adult  Fracture left ureteral stent    Admit to hospitalist service  Remote telemetry  Rocephin for UTI  PT OT  Case management consult  Insulin sliding scale  N.p.o., will need swallow eval  Urology notified of consultation  Full code        Rodolfo Venegas MD  10/27/24  01:27 EDT            Electronically signed by Rodolfo Venegas MD at 10/27/24 0152          Emergency Department Notes        Shara Serrano RN at 10/26/24 2250          Patient wife contacted. Wife states that the patient fell a week ago and not today. Wife states the patient has been extremely weak and unable to to walk  well. This RN notified the provider. Provider to place orders for further testing.    Electronically signed by Shara Serrano RN at 10/27/24 0132       Haider Lewis MD at 10/26/24 4518          Time: 6:38 PM EDT  Date of encounter:  10/26/2024  Independent Historian/Clinical History and Information was obtained by:   Patient    History is limited by: N/A    Chief Complaint: Fall, left hip pain      History of Present Illness:  Patient is a 69 y.o. year old male who presents to the emergency department for evaluation of fall, left hip pain.  Patient's reportedly fell while trying to board a transportation van/bus.  Reportedly fell going up the steps.  Denies LOC.  Only complaint is new left hip pain since this fall.  Denies any feeling of head injury, etc.  No recent illness.      Patient Care Team  Primary Care Provider: Sophie Capps APRN    Past Medical History:     No Known Allergies  Past Medical History:   Diagnosis Date    CHF (congestive heart failure)     SEE'S DR STRICKLAND NO CURRENT S/S    COPD (chronic obstructive pulmonary disease)     INHALER    Diabetes mellitus     DOESN'T CHECK BG OFTEN AT HOME    Hyperlipidemia     Hypertension     Sepsis 09/14/2023    Stroke 2017    RIGHT SIDE WEAKNESS     Past Surgical History:   Procedure Laterality Date    APPENDECTOMY      EYE SURGERY Bilateral     MISTY CATARACT     Family History   Problem Relation Age of Onset    No Known Problems Mother     No Known Problems Father     Malig Hyperthermia Neg Hx        Home Medications:  Prior to Admission medications    Medication Sig Start Date End Date Taking? Authorizing Provider   aspirin 81 MG chewable tablet Chew 1 tablet Daily. 4/26/24   Sophie Capps APRN   atorvastatin (LIPITOR) 20 MG tablet TAKE 1 TABLET BY MOUTH NIGHTLY 9/6/24   Sophie Capps APRN   Continuous Glucose  (Dexcom G6 ) device Use 1 each Every 30 (Thirty) Days. 9/24/24   Sophie Capps APRN   furosemide (Lasix) 20 MG  tablet Take 1 tablet by mouth 2 (Two) Times a Day. 8/14/24   Sophie Capps APRN   hydroCHLOROthiazide 25 MG tablet TAKE 1 TABLET BY MOUTH DAILY 9/6/24   Sophie Capps APRN   Insulin Glargine (Lantus SoloStar) 100 UNIT/ML injection pen Inject 10 Units under the skin into the appropriate area as directed Every Night. 9/20/24   Sophie Capps APRN   isosorbide mononitrate (IMDUR) 30 MG 24 hr tablet Take 1 tablet by mouth Daily. 7/8/24   LucianoVladRadha KavitaMORRO bloom   Jardiance 25 MG tablet tablet TAKE 1 TABLET BY MOUTH DAILY 9/6/24   Sophie Capps APRN   losartan (COZAAR) 25 MG tablet TAKE 1 TABLET BY MOUTH DAILY 9/6/24   Sophie Capps APRN   nicotine (NICODERM CQ) 21 MG/24HR patch Place 1 patch on the skin as directed by provider Daily. 8/22/24   Arcenio Mosquera MD   potassium chloride 10 MEQ CR tablet Take 1 tablet by mouth 2 (Two) Times a Day. 8/14/24   Sophie Capps APRN        Social History:   Social History     Tobacco Use    Smoking status: Every Day     Current packs/day: 2.50     Average packs/day: 2.5 packs/day for 52.8 years (132.0 ttl pk-yrs)     Types: Cigarettes     Start date: 1972     Passive exposure: Current    Smokeless tobacco: Never    Tobacco comments:     INSTRUCTED NO SMOKING 24 HOUR PRIOR TO SURGERY    Vaping Use    Vaping status: Never Used   Substance Use Topics    Alcohol use: Yes    Drug use: Never         Review of Systems:  Review of Systems   Constitutional:  Negative for chills and fever.   HENT:  Negative for congestion, rhinorrhea and sore throat.    Eyes:  Negative for photophobia.   Respiratory:  Negative for apnea, cough, chest tightness and shortness of breath.    Cardiovascular:  Negative for chest pain and palpitations.   Gastrointestinal:  Negative for abdominal pain, diarrhea, nausea and vomiting.   Endocrine: Negative.    Genitourinary:  Negative for difficulty urinating and dysuria.   Musculoskeletal:  Positive for arthralgias and myalgias. Negative for  "back pain and joint swelling.   Skin:  Negative for color change and wound.   Allergic/Immunologic: Negative.    Neurological:  Negative for seizures and headaches.   Psychiatric/Behavioral: Negative.     All other systems reviewed and are negative.       Physical Exam:  /77   Pulse 111   Temp 98.5 °F (36.9 °C) (Oral)   Resp 17   Ht 190.5 cm (75\")   Wt 98.8 kg (217 lb 13 oz)   SpO2 94%   BMI 27.22 kg/m²     Physical Exam  Vitals and nursing note reviewed.   Constitutional:       General: He is awake.   HENT:      Head: Normocephalic and atraumatic.      Nose: Nose normal.      Mouth/Throat:      Mouth: Mucous membranes are moist.   Eyes:      Extraocular Movements: Extraocular movements intact.      Pupils: Pupils are equal, round, and reactive to light.   Cardiovascular:      Rate and Rhythm: Normal rate and regular rhythm.      Heart sounds: Normal heart sounds.   Pulmonary:      Effort: Pulmonary effort is normal. No respiratory distress.      Breath sounds: Normal breath sounds. No wheezing, rhonchi or rales.   Abdominal:      General: Bowel sounds are normal.      Palpations: Abdomen is soft.      Tenderness: There is no abdominal tenderness. There is no guarding or rebound.      Comments: No rigidity   Musculoskeletal:         General: Tenderness (Soft tissue, left hip) present.      Cervical back: Normal range of motion and neck supple.      Comments: Only very mild left hip pain with passive range of motion.  Not convincing for left hip fracture.  Left lower extremity is not shortened or externally rotated.   Skin:     General: Skin is warm and dry.      Coloration: Skin is not jaundiced.   Neurological:      General: No focal deficit present.      Mental Status: He is alert. Mental status is at baseline.   Psychiatric:         Mood and Affect: Mood normal.                  Procedures:  Procedures      Medical Decision Making:      Comorbidities that affect care:      COPD, diabetes, CVA, CHF, " hypertension, hyperlipidemia    External Notes reviewed:    Hospital Discharge Summary:    Patient Name: Brian Mehta  : 1955  MRN: [id number]     Date of Admission: 2024  Date of Discharge:  2024  Primary Care Physician: Sophie Capps APRN     Active and Resolved Hospital Problems:  UTI, E coli  COVID-19 infection  Generalized weakness  Recent recurrent falls  Hypertension  Hyperlipidemia  Type 2 diabetes mellitus  HFrEF, not in exacerbation  COPD, not in exacerbation  History of CVA with residual right-sided weakness        Hospital Course      Hospital Course:  Brian Mehta is a 69 y.o. male with a medical history of hypertension, hyperlipidemia, type 2 diabetes mellitus, HFrEF, COPD presented to the ED for evaluation of generalized weakness, repeated falls, difficulty taking care of self.  EMS was called by his wife as she was not able to take care of him at home due to his recurrent falls and generalized weakness. Patient has history of CVA with residual right-sided weakness but usually walks with the help of walker and cane but for the past 2 days patient has been weaker. Did not hit his head.  Associated with increased urinary frequency and incontinence.      In the ED, vital signs temperature 98.7, pulse 104, respiratory 22, blood pressure 149/81 on room air saturating around 92%.  Labs showed normal troponin, proBNP, calcium 8.4, albumin 3.4, rest of the CMP with no significant findings, normal TSH and free T4, normal lactic acid, normal WBC, hemoglobin, platelets.  Urinalysis showed 4+ bacteria, too numerous to count WBC, positive nitrates.  COVID-positive.  Chest x-ray showed mild vascular congestion with chronic scarring in the left lung base.  Patient treated for urinary tract infection and COVID-19 infection.  Patient worked with PT and OT while inpatient with recommendations for SNF discharge.  Patient not interested in discharge anywhere but home, agreeable to home health.   Patient discharged with remaining days of Paxlovid.  Also sent home with nicotine patches.  Patient seen on date of discharge, clinically and hemodynamically stable.  Patient provided concerning signs and symptoms prompting immediate medical attention, patient understanding and agreeable     DISCHARGE Follow Up Recommendations for labs and diagnostics:   Follow-up primary care physician as soon as possible    The following orders were placed and all results were independently analyzed by me:  Orders Placed This Encounter   Procedures    Urine Culture - Urine,    XR Hip With or Without Pelvis 2 - 3 View Left    XR Chest 1 View    Comprehensive Metabolic Panel    CBC Auto Differential    BNP    Urinalysis With Culture If Indicated - Straight Cath    Urinalysis, Microscopic Only - Urine, Clean Catch    Nursing Dysphagia Screening (Complete Prior to Giving Anything By Mouth)    Code Status and Medical Interventions: CPR (Attempt to Resuscitate); Full Support    Hospitalist (on-call MD unless specified)    Inpatient Urology Consult    ECG 12 Lead Rhythm Change    Inpatient Admission    CBC & Differential       Medications Given in the Emergency Department:  Medications   cefTRIAXone (ROCEPHIN) in NS 1 gram/10ml IV PUSH syringe (has no administration in time range)   sodium chloride 0.9 % bolus 1,000 mL (has no administration in time range)        ED Course:    ED Course as of 10/27/24 0153   Sun Oct 27, 2024   0126 I have personally interpreted the EKG today and it shows no evidence of any acute ischemia or heart arrhythmia. [RP]      ED Course User Index  [RP] Haider Lewis MD       Labs:    Lab Results (last 24 hours)       Procedure Component Value Units Date/Time    Urinalysis With Culture If Indicated - Straight Cath [[id number]]  (Abnormal) Collected: 10/26/24 2340    Specimen: Urine from Straight Cath Updated: 10/26/24 2352     Color, UA Dark Yellow     Appearance, UA Turbid     pH, UA 5.5     Specific  Gravity, UA 1.025     Glucose, UA Negative     Ketones, UA Negative     Bilirubin, UA Negative     Blood, UA Large (3+)     Protein,  mg/dL (2+)     Leuk Esterase, UA Large (3+)     Nitrite, UA Positive     Urobilinogen, UA 1.0 E.U./dL    Narrative:      In absence of clinical symptoms, the presence of pyuria, bacteria, and/or nitrites on the urinalysis result does not correlate with infection.    Urinalysis, Microscopic Only - Straight Cath [[id number]]  (Abnormal) Collected: 10/26/24 2340    Specimen: Urine from Straight Cath Updated: 10/26/24 2354     RBC, UA 6-10 /HPF      WBC, UA Too Numerous to Count /HPF      Bacteria, UA 2+ /HPF      Squamous Epithelial Cells, UA 7-12 /HPF      Hyaline Casts, UA 0-2 /LPF      Methodology Manual Light Microscopy    Urine Culture - Urine, Straight Cath [[id number]] Collected: 10/26/24 2340    Specimen: Urine from Straight Cath Updated: 10/26/24 2354    CBC & Differential [[id number]]  (Abnormal) Collected: 10/27/24 0011    Specimen: Blood Updated: 10/27/24 0015    Narrative:      The following orders were created for panel order CBC & Differential.  Procedure                               Abnormality         Status                     ---------                               -----------         ------                     CBC Auto Differential[[id number]]        Abnormal            Final result                 Please view results for these tests on the individual orders.    CBC Auto Differential [[id number]]  (Abnormal) Collected: 10/27/24 0011    Specimen: Blood Updated: 10/27/24 0015     WBC 5.68 10*3/mm3      RBC 5.04 10*6/mm3      Hemoglobin 13.1 g/dL      Hematocrit 39.8 %      MCV 79.0 fL      MCH 26.0 pg      MCHC 32.9 g/dL      RDW 15.9 %      RDW-SD 44.9 fl      MPV 9.6 fL      Platelets 226 10*3/mm3      Neutrophil % 34.8 %      Lymphocyte % 53.3 %      Monocyte % 10.4 %      Eosinophil % 0.9 %      Basophil % 0.4 %      Immature Grans % 0.2 %      Neutrophils,  Absolute 1.98 10*3/mm3      Lymphocytes, Absolute 3.03 10*3/mm3      Monocytes, Absolute 0.59 10*3/mm3      Eosinophils, Absolute 0.05 10*3/mm3      Basophils, Absolute 0.02 10*3/mm3      Immature Grans, Absolute 0.01 10*3/mm3      nRBC 0.0 /100 WBC     Comprehensive Metabolic Panel [[id number]]  (Abnormal) Collected: 10/27/24 0012    Specimen: Blood Updated: 10/27/24 0051     Glucose 180 mg/dL      BUN 10 mg/dL      Creatinine 0.73 mg/dL      Sodium 132 mmol/L      Potassium 4.0 mmol/L      Chloride 99 mmol/L      CO2 24.1 mmol/L      Calcium 8.7 mg/dL      Total Protein 7.2 g/dL      Albumin 3.1 g/dL      ALT (SGPT) 5 U/L      AST (SGOT) 8 U/L      Alkaline Phosphatase 91 U/L      Total Bilirubin 0.3 mg/dL      Globulin 4.1 gm/dL      A/G Ratio 0.8 g/dL      BUN/Creatinine Ratio 13.7     Anion Gap 8.9 mmol/L      eGFR 99.7 mL/min/1.73     Narrative:      GFR Normal >60  Chronic Kidney Disease <60  Kidney Failure <15      BNP [[id number]]  (Normal) Collected: 10/27/24 0012    Specimen: Blood Updated: 10/27/24 0036     proBNP 757.6 pg/mL     Narrative:      This assay is used as an aid in the diagnosis of individuals suspected of having heart failure. It can be used as an aid in the diagnosis of acute decompensated heart failure (ADHF) in patients presenting with signs and symptoms of ADHF to the emergency department (ED). In addition, NT-proBNP of <300 pg/mL indicates ADHF is not likely.    Age Range Result Interpretation  NT-proBNP Concentration (pg/mL:      <50             Positive            >450                   Gray                 300-450                    Negative             <300    50-75           Positive            >900                  Gray                300-900                  Negative            <300      >75             Positive            >1800                  Gray                300-1800                  Negative            <300             Imaging:    XR Chest 1 View    Result Date:  10/26/2024  XR CHEST 1 VW Date of Exam: 10/26/2024 11:13 PM EDT Indication: weakness Comparison: 8/19/2024 Findings: Cardiac and mediastinal contours remain normal. There are chronic interstitial changes in the lungs with chronic left basilar scarring. Granulomatous calcifications are present. No acute infiltrates are seen. There is no pneumothorax. Pulmonary vascularity is normal.     No acute cardiopulmonary findings. Stable exam from prior. Electronically Signed: Durga Zurita MD  10/26/2024 11:33 PM EDT  Workstation ID: [id number]    XR Hip With or Without Pelvis 2 - 3 View Left    Result Date: 10/26/2024  XR HIP W OR WO PELVIS 2-3 VIEW LEFT Date of Exam: 10/26/2024 7:20 PM EDT Indication: Fall, hip pain Comparison: CT abdomen pelvis 4/7/2019 Findings: AP pelvis and 2 views of the left hip. Note is made of a left-sided ureteral stent. The distal end is fractured, probably within the bladder lumen. 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 No sacroiliac joint diastasis. No acute fracture or dislocation. No bone erosion or destruction.     1. No acute fracture or dislocation in the pelvis or left hip. 2. The distal end of the left side ureteral stent appears fractured, possibly within the bladder lumen. Urology follow-up recommended. Electronically Signed: Durga Zurita MD  10/26/2024 7:41 PM EDT  Workstation ID: [id number]       Differential Diagnosis and Discussion:    Extremity Pain: Differential diagnosis includes but is not limited to soft tissue sprain, tendonitis, tendon injury, dislocation, fracture, deep vein thrombosis, arterial insufficiency, osteoarthritis, bursitis, and ligamentous damage.    All labs were reviewed and interpreted by me.  All X-rays impressions were independently interpreted by me.  EKG was interpreted by me.    [facility]                     Patient Care Considerations:    SEPSIS was considered but is NOT present in the emergency department as SIRS criteria is not  present.      Consultants/Shared Management Plan:    Hospitalist: I have discussed the case with Dr. Venegas who agrees to accept the patient for admission.    Social Determinants of Health:    Patient is unable to carry out activities of daily life. Escalation of care is necessary.       Disposition and Care Coordination:    Admit:   Through independent evaluation of the patient's history, physical, and imperical data, the patient meets criteria for inpatient admission to the hospital.        Final diagnoses:   Contusion of left hip, initial encounter   Acute UTI   Generalized weakness        ED Disposition       ED Disposition   Decision to Admit    Condition   --    Comment   Level of Care: Remote Telemetry [26]   Diagnosis: UTI (urinary tract infection) [[id number]]   Certification: I Certify That Inpatient Hospital Services Are Medically Necessary For Greater Than 2 Midnights                 This medical record created using voice recognition software.           Haider Lewis MD  10/27/24 0153      Electronically signed by Haider Lewis MD at 10/27/24 0153       Physician Progress Notes (last 24 hours)  Notes from 10/26/24 1205 through 10/27/24 1205   No notes of this type exist for this encounter.          Consult Notes (last 24 hours)        Ector Kulkarni MD at 10/27/24 0626            [facility]   UROLOGY Consult Note    Patient Name: Brian Mehta  : 1955  MRN: [id number]  Primary Care Physician:  Sophie Capps APRN  Referring Physician: No ref. provider found  Date of admission: 10/26/2024    Subjective   Subjective     Chief Complaint:     Retained stent    HPI:  Brian Mehta is a 69 y.o. male           Retained left ureteral stent  UTI  History of CVA    Poor historian    10/26/2024 came in through ED with weakness/inability to ambulate/unable to care for self    Not sure when stent was placed, no records in our system    10/24 0.7, GFR 99   10/26/2024 chest x-ray -  negative  9/26/2024 x-ray hip left - no acute fracture or dislocation of the pelvis or left hip.  Report left ureteral stent appears fractured.  10/24 UA nitrite positive, 6-10 RBCs, TNTC WBCs, 2+ bacteria    8/24 admitted for COVID    Urine cultures    8/24 E. coli - resistant to ampicillin and Bactrim  3/24 Proteus          Review of Systems     Unable to obtain    Personal History     Past Medical History:   Diagnosis Date    CHF (congestive heart failure)     SEE'S DR [place]ICK[place] NO CURRENT S/S    COPD (chronic obstructive pulmonary disease)     INHALER    Diabetes mellitus     DOESN'T CHECK BG OFTEN AT HOME    Hyperlipidemia     Hypertension     Sepsis 09/14/2023    Stroke 2017    RIGHT SIDE WEAKNESS       Past Surgical History:   Procedure Laterality Date    APPENDECTOMY      EYE SURGERY Bilateral     MISTY CATARACT       Family History: family history includes No Known Problems in his father and mother. Otherwise pertinent FHx was reviewed and not pertinent to current issue.    Social History:  reports that he has been smoking cigarettes. He started smoking about 52 years ago. He has a 132 pack-year smoking history. He has been exposed to tobacco smoke. He has never used smokeless tobacco. He reports current alcohol use. He reports that he does not use drugs.    Home Medications:  Dexcom G6 , Insulin Glargine, aspirin, atorvastatin, empagliflozin, furosemide, hydroCHLOROthiazide, isosorbide mononitrate, losartan, nicotine, and potassium chloride    Allergies:  No Known Allergies    Objective    Objective     Vitals:   Temp:  [98.5 °F (36.9 °C)-99 °F (37.2 °C)] 99 °F (37.2 °C)  Heart Rate:  [107-115] 111  Resp:  [16-17] 16  BP: (122-157)/() 130/88    Physical Exam:   Constitutional: Awake, alert    Respiratory: Clear to auscultation bilaterally, nonlabored respirations    Cardiovascular: RRR, no murmurs, rubs, or gallops, palpable pedal pulses bilaterally   Gastrointestinal: Positive bowel sounds, soft,  nontender, nondistended   Musculoskeletal: No bilateral ankle edema, no clubbing or cyanosis to extremities       Result Review    Result Review:  I have personally reviewed the results from the time of this admission to 10/27/2024 06:26 EDT and agree with these findings:  []  Laboratory  []  Microbiology  []  Radiology  []  EKG/Telemetry   []  Cardiology/Vascular   []  Pathology  []  Old records  []  Other:      Assessment & Plan   Assessment / Plan     Brief Patient Summary:  Brian Mehta is a 69 y.o. male     Active Hospital Problems:  Active Hospital Problems    Diagnosis     **UTI (urinary tract infection)     Fall     Failure to thrive in adult     DM2 (diabetes mellitus, type 2)     Hip pain     Essential hypertension            Retained left ureteral stent  UTI  History of CVA    Records reviewed and summarized in chart    No procedures planned at this time, patient does not have to be n.p.o. for urology      Patient with a left ureteral stent in for undetermined amount of time.  Patient not sure.  Was not done by any of our urologist.    X-ray mentions fractured stent but likely stent is just turning at the UO.  Have never seen a stent fracture or fall apart unless it was cut    Will order CT abdomen/pelvis without to better delineate if there is any stone burden on stent    Did try to contact family but was unable to to get more information about the placement and timing of the stent.    Thank you for the consult    Will follow-up      Electronically signed by Ector Kulkarni MD, 10/27/24, 6:26 AM EDT.    Electronically signed by Ector Kulkarni MD at 10/27/24 0632     [facility] ,[facility] [phone]-  F [phone]

## 2024-10-27 NOTE — PLAN OF CARE
Goal Outcome Evaluation:  Plan of Care Reviewed With: patient           Outcome Evaluation: pt arrived to unit @ [id number]

## 2024-10-28 ENCOUNTER — PREP FOR SURGERY (OUTPATIENT)
Dept: OTHER | Facility: HOSPITAL | Age: 69
End: 2024-10-28
Payer: MEDICARE

## 2024-10-28 DIAGNOSIS — N21.0 BLADDER STONE: Primary | ICD-10-CM

## 2024-10-28 PROBLEM — N20.0 NEPHROLITHIASIS: Status: ACTIVE | Noted: 2024-10-28

## 2024-10-28 LAB
ANION GAP SERPL CALCULATED.3IONS-SCNC: 10.2 MMOL/L (ref 5–15)
BASOPHILS # BLD AUTO: 0.03 10*3/MM3 (ref 0–0.2)
BASOPHILS NFR BLD AUTO: 0.5 % (ref 0–1.5)
BUN SERPL-MCNC: 12 MG/DL (ref 8–23)
BUN/CREAT SERPL: 15.8 (ref 7–25)
CALCIUM SPEC-SCNC: 8.8 MG/DL (ref 8.6–10.5)
CHLORIDE SERPL-SCNC: 103 MMOL/L (ref 98–107)
CO2 SERPL-SCNC: 23.8 MMOL/L (ref 22–29)
CREAT SERPL-MCNC: 0.76 MG/DL (ref 0.76–1.27)
DEPRECATED RDW RBC AUTO: 46.3 FL (ref 37–54)
EGFRCR SERPLBLD CKD-EPI 2021: 98.5 ML/MIN/1.73
EOSINOPHIL # BLD AUTO: 0.1 10*3/MM3 (ref 0–0.4)
EOSINOPHIL NFR BLD AUTO: 1.7 % (ref 0.3–6.2)
ERYTHROCYTE [DISTWIDTH] IN BLOOD BY AUTOMATED COUNT: 15.9 % (ref 12.3–15.4)
GLUCOSE BLDC GLUCOMTR-MCNC: 183 MG/DL (ref 70–99)
GLUCOSE BLDC GLUCOMTR-MCNC: 215 MG/DL (ref 70–99)
GLUCOSE BLDC GLUCOMTR-MCNC: 222 MG/DL (ref 70–99)
GLUCOSE BLDC GLUCOMTR-MCNC: 256 MG/DL (ref 70–99)
GLUCOSE SERPL-MCNC: 150 MG/DL (ref 65–99)
HCT VFR BLD AUTO: 38.9 % (ref 37.5–51)
HGB BLD-MCNC: 12.5 G/DL (ref 13–17.7)
IMM GRANULOCYTES # BLD AUTO: 0.01 10*3/MM3 (ref 0–0.05)
IMM GRANULOCYTES NFR BLD AUTO: 0.2 % (ref 0–0.5)
LYMPHOCYTES # BLD AUTO: 3.05 10*3/MM3 (ref 0.7–3.1)
LYMPHOCYTES NFR BLD AUTO: 52.8 % (ref 19.6–45.3)
MAGNESIUM SERPL-MCNC: 1.8 MG/DL (ref 1.6–2.4)
MCH RBC QN AUTO: 25.9 PG (ref 26.6–33)
MCHC RBC AUTO-ENTMCNC: 32.1 G/DL (ref 31.5–35.7)
MCV RBC AUTO: 80.5 FL (ref 79–97)
MONOCYTES # BLD AUTO: 0.59 10*3/MM3 (ref 0.1–0.9)
MONOCYTES NFR BLD AUTO: 10.2 % (ref 5–12)
NEUTROPHILS NFR BLD AUTO: 2 10*3/MM3 (ref 1.7–7)
NEUTROPHILS NFR BLD AUTO: 34.6 % (ref 42.7–76)
NRBC BLD AUTO-RTO: 0 /100 WBC (ref 0–0.2)
PHOSPHATE SERPL-MCNC: 3 MG/DL (ref 2.5–4.5)
PLATELET # BLD AUTO: 226 10*3/MM3 (ref 140–450)
PMV BLD AUTO: 9.8 FL (ref 6–12)
POTASSIUM SERPL-SCNC: 4.2 MMOL/L (ref 3.5–5.2)
RBC # BLD AUTO: 4.83 10*6/MM3 (ref 4.14–5.8)
SODIUM SERPL-SCNC: 137 MMOL/L (ref 136–145)
WBC NRBC COR # BLD AUTO: 5.78 10*3/MM3 (ref 3.4–10.8)

## 2024-10-28 PROCEDURE — 25010000002 CEFTRIAXONE PER 250 MG: Performed by: INTERNAL MEDICINE

## 2024-10-28 PROCEDURE — 85025 COMPLETE CBC W/AUTO DIFF WBC: CPT | Performed by: INTERNAL MEDICINE

## 2024-10-28 PROCEDURE — 84100 ASSAY OF PHOSPHORUS: CPT | Performed by: INTERNAL MEDICINE

## 2024-10-28 PROCEDURE — 83735 ASSAY OF MAGNESIUM: CPT | Performed by: INTERNAL MEDICINE

## 2024-10-28 PROCEDURE — 97165 OT EVAL LOW COMPLEX 30 MIN: CPT

## 2024-10-28 PROCEDURE — 63710000001 INSULIN LISPRO (HUMAN) PER 5 UNITS: Performed by: STUDENT IN AN ORGANIZED HEALTH CARE EDUCATION/TRAINING PROGRAM

## 2024-10-28 PROCEDURE — 99232 SBSQ HOSP IP/OBS MODERATE 35: CPT | Performed by: INTERNAL MEDICINE

## 2024-10-28 PROCEDURE — 80048 BASIC METABOLIC PNL TOTAL CA: CPT | Performed by: INTERNAL MEDICINE

## 2024-10-28 PROCEDURE — 94799 UNLISTED PULMONARY SVC/PX: CPT

## 2024-10-28 PROCEDURE — 99232 SBSQ HOSP IP/OBS MODERATE 35: CPT | Performed by: UROLOGY

## 2024-10-28 PROCEDURE — 97530 THERAPEUTIC ACTIVITIES: CPT

## 2024-10-28 PROCEDURE — 82948 REAGENT STRIP/BLOOD GLUCOSE: CPT

## 2024-10-28 RX ORDER — BUDESONIDE 0.5 MG/2ML
0.5 INHALANT ORAL
Status: DISCONTINUED | OUTPATIENT
Start: 2024-10-28 | End: 2024-11-04 | Stop reason: HOSPADM

## 2024-10-28 RX ORDER — IPRATROPIUM BROMIDE AND ALBUTEROL SULFATE 2.5; .5 MG/3ML; MG/3ML
3 SOLUTION RESPIRATORY (INHALATION)
Status: DISCONTINUED | OUTPATIENT
Start: 2024-10-28 | End: 2024-11-04 | Stop reason: HOSPADM

## 2024-10-28 RX ORDER — ACETAMINOPHEN 500 MG
1000 TABLET ORAL 3 TIMES DAILY
Status: DISCONTINUED | OUTPATIENT
Start: 2024-10-28 | End: 2024-11-02

## 2024-10-28 RX ORDER — NICOTINE 21 MG/24HR
1 PATCH, TRANSDERMAL 24 HOURS TRANSDERMAL
Status: DISCONTINUED | OUTPATIENT
Start: 2024-10-28 | End: 2024-11-04 | Stop reason: HOSPADM

## 2024-10-28 RX ORDER — MINERAL OIL/HYDROPHIL PETROLAT
1 OINTMENT (GRAM) TOPICAL
Status: DISCONTINUED | OUTPATIENT
Start: 2024-10-28 | End: 2024-10-31

## 2024-10-28 RX ORDER — ARFORMOTEROL TARTRATE 15 UG/2ML
15 SOLUTION RESPIRATORY (INHALATION)
Status: DISCONTINUED | OUTPATIENT
Start: 2024-10-28 | End: 2024-11-04 | Stop reason: HOSPADM

## 2024-10-28 RX ORDER — SODIUM CHLORIDE 0.9 % (FLUSH) 0.9 %
3 SYRINGE (ML) INJECTION EVERY 12 HOURS SCHEDULED
OUTPATIENT
Start: 2024-10-28

## 2024-10-28 RX ORDER — ALBUTEROL SULFATE 0.83 MG/ML
2.5 SOLUTION RESPIRATORY (INHALATION) EVERY 4 HOURS PRN
Status: DISCONTINUED | OUTPATIENT
Start: 2024-10-28 | End: 2024-11-04 | Stop reason: HOSPADM

## 2024-10-28 RX ORDER — SODIUM CHLORIDE 0.9 % (FLUSH) 0.9 %
10 SYRINGE (ML) INJECTION AS NEEDED
OUTPATIENT
Start: 2024-10-28

## 2024-10-28 RX ADMIN — ACETAMINOPHEN 650 MG: 325 TABLET ORAL at 08:48

## 2024-10-28 RX ADMIN — ATORVASTATIN CALCIUM 20 MG: 20 TABLET, FILM COATED ORAL at 21:24

## 2024-10-28 RX ADMIN — WHITE PETROLATUM 1 APPLICATION: 1.75 OINTMENT TOPICAL at 21:23

## 2024-10-28 RX ADMIN — CEFTRIAXONE SODIUM 1000 MG: 1 INJECTION, POWDER, FOR SOLUTION INTRAMUSCULAR; INTRAVENOUS at 02:38

## 2024-10-28 RX ADMIN — INSULIN LISPRO 6 UNITS: 100 INJECTION, SOLUTION INTRAVENOUS; SUBCUTANEOUS at 21:01

## 2024-10-28 RX ADMIN — ACETAMINOPHEN 1000 MG: 500 TABLET ORAL at 16:11

## 2024-10-28 RX ADMIN — INSULIN LISPRO 4 UNITS: 100 INJECTION, SOLUTION INTRAVENOUS; SUBCUTANEOUS at 12:29

## 2024-10-28 RX ADMIN — ACETAMINOPHEN 1000 MG: 500 TABLET ORAL at 10:56

## 2024-10-28 RX ADMIN — DICLOFENAC SODIUM 2 G: 10 GEL TOPICAL at 11:01

## 2024-10-28 RX ADMIN — ACETAMINOPHEN 1000 MG: 500 TABLET ORAL at 21:24

## 2024-10-28 RX ADMIN — NICOTINE 1 PATCH: 21 PATCH, EXTENDED RELEASE TRANSDERMAL at 11:51

## 2024-10-28 RX ADMIN — Medication 10 ML: at 21:25

## 2024-10-28 RX ADMIN — Medication 10 ML: at 08:51

## 2024-10-28 RX ADMIN — DICLOFENAC SODIUM 2 G: 10 GEL TOPICAL at 21:24

## 2024-10-28 RX ADMIN — ISOSORBIDE MONONITRATE 30 MG: 30 TABLET, EXTENDED RELEASE ORAL at 08:48

## 2024-10-28 RX ADMIN — INSULIN LISPRO 4 UNITS: 100 INJECTION, SOLUTION INTRAVENOUS; SUBCUTANEOUS at 17:44

## 2024-10-28 RX ADMIN — ASPIRIN 81 MG: 81 TABLET, CHEWABLE ORAL at 08:48

## 2024-10-28 RX ADMIN — LOSARTAN POTASSIUM 25 MG: 25 TABLET, FILM COATED ORAL at 08:48

## 2024-10-28 NOTE — THERAPY EVALUATION
Patient Name: Brian Mehta  : 1955    MRN: [id number]                              Today's Date: 10/28/2024       Admit Date: 10/26/2024    Visit Dx:     ICD-10-CM ICD-9-CM   1. Acute UTI  N39.0 599.0   2. Contusion of left hip, initial encounter  S70.02XA 924.01   3. Generalized weakness  R53.1 780.79     Patient Active Problem List   Diagnosis    CVA (cerebral vascular accident)    Essential hypertension    High cholesterol    Hip pain    Vitamin B12 deficiency    Kidney disorder    Chronic pain of both knees    Chronic HFrEF (heart failure with reduced ejection fraction)    LVH (left ventricular hypertrophy)    COPD (chronic obstructive pulmonary disease)    DM2 (diabetes mellitus, type 2)    Urinary tract infection without hematuria    Abnormal nuclear stress test    UTI (urinary tract infection)    Fall    Failure to thrive in adult    Nephrolithiasis     Past Medical History:   Diagnosis Date    CHF (congestive heart failure)     SEE'S DR [place] NO CURRENT S/S    COPD (chronic obstructive pulmonary disease)     INHALER    Diabetes mellitus     DOESN'T CHECK BG OFTEN AT HOME    Hyperlipidemia     Hypertension     Sepsis 2023    Stroke 2017    RIGHT SIDE WEAKNESS     Past Surgical History:   Procedure Laterality Date    APPENDECTOMY      EYE SURGERY Bilateral     MISTY CATARACT      General Information       Row Name 10/28/24 1023 10/28/24 1015       OT Time and Intention    Document Type therapy note (daily note)  -PG evaluation  -PG    Mode of Treatment individual therapy;occupational therapy  -PG individual therapy;occupational therapy  -PG      Row Name 10/28/24 1015          General Information    Patient Profile Reviewed yes  Patient resides with spouse.  Reports using both cane and rolling walker at home and receives minimal assistance with self-care activities.  Patient does not drive  -PG     Prior Level of Function min assist:;transfer;ADL's  -PG     Existing Precautions/Restrictions fall  -PG      Barriers to Rehab none identified  -PG       Row Name 10/28/24 1015          Occupational Profile    Reason for Services/Referral (Occupational Profile) Patient is a 69-year-old male admitted for UTI generalized weakness and status post fall.  Patient is being evaluated by Occupational Therapy due to recent decline in ADL function.  No previous OT services identified  -PG       Row Name 10/28/24 1015          Living Environment    People in Home spouse  -PG       Row Name 10/28/24 1015          Cognition    Orientation Status (Cognition) oriented x 3  -PG       Row Name 10/28/24 1015          Safety Issues/Impairments Affecting Functional Mobility    Impairments Affecting Function (Mobility) balance;shortness of breath;strength;endurance/activity tolerance  -PG               User Key  (r) = Recorded By, (t) = Taken By, (c) = Cosigned By      Initials Name Provider Type    PG Gio Minaya, OT Occupational Therapist                     Mobility/ADL's       Row Name 10/28/24 1017          Transfers    Transfers bed-chair transfer  -PG       Row Name 10/28/24 1023 10/28/24 1017       Bed-Chair Transfer    Bed-Chair [place] (Transfers) minimum assist (75% patient effort);verbal cues  -PG minimum assist (75% patient effort);verbal cues  -PG    Assistive Device (Bed-Chair Transfers) walker, front-wheeled  -PG walker, front-wheeled  -PG      Row Name 10/28/24 1017          Activities of Daily Living    BADL Assessment/Intervention bathing;upper body dressing;lower body dressing;grooming;toileting  -PG       Row Name 10/28/24 1017          Bathing Assessment/Intervention    Ross Level (Bathing) bathing skills;moderate assist (50% patient effort)  -PG       Row Name 10/28/24 1017          Upper Body Dressing Assessment/Training    [place] Level (Upper Body Dressing) upper body dressing skills;set up  -PG       Row Name 10/28/24 1017          Lower Body Dressing Assessment/Training    [place] Level  (Lower Body Dressing) lower body dressing skills;maximum assist (25% patient effort)  -PG       Row Name 10/28/24 1017          Grooming Assessment/Training    [place] Level (Grooming) grooming skills;set up  -PG       Row Name 10/28/24 1017          Toileting Assessment/Training    [place] Level (Toileting) toileting skills;dependent (less than 25% patient effort)  -PG               User Key  (r) = Recorded By, (t) = Taken By, (c) = Cosigned By      Initials Name Provider Type    PG Gio Minaya OT Occupational Therapist                   Obj/Interventions       Row Name 10/28/24 1017          Sensory Assessment (Somatosensory)    Sensory Assessment (Somatosensory) unable/difficult to assess  -PG       Row Name 10/28/24 1017          Vision Assessment/Intervention    Visual Impairment/Limitations unable/difficult to assess  -PG       Row Name 10/28/24 1017          Range of Motion Comprehensive    General Range of Motion no range of motion deficits identified  -PG       Row Name 10/28/24 1017          Strength Comprehensive (MMT)    Comment, General Manual Muscle Testing (MMT) Assessment 4 - to 4/5 BUE  -PG       Row Name 10/28/24 1017          Motor Skills    Motor Skills coordination;functional endurance  -PG     Coordination WFL  -PG     Functional Endurance Fair minus  -PG               User Key  (r) = Recorded By, (t) = Taken By, (c) = Cosigned By      Initials Name Provider Type    PG Gio Minaya OT Occupational Therapist                   Goals/Plan       Row Name 10/28/24 1019          Transfer Goal 1 (OT)    Activity/Assistive Device (Transfer Goal 1, OT) transfers, all  -PG     [place] Level/Cues Needed (Transfer Goal 1, OT) modified independence  -PG     Time Frame (Transfer Goal 1, OT) long term goal (LTG);10 days  -PG       Row Name 10/28/24 1019          Bathing Goal 1 (OT)    Activity/Device (Bathing Goal 1, OT) bathing skills, all  -PG     [place]/Cues Needed (Bathing  Goal 1, OT) modified independence  -PG     Time Frame (Bathing Goal 1, OT) long term goal (LTG);10 days  -PG       Row Name 10/28/24 1019          Dressing Goal 1 (OT)    Activity/Device (Dressing Goal 1, OT) dressing skills, all  -PG     [place]/Cues Needed (Dressing Goal 1, OT) modified independence  -PG     Time Frame (Dressing Goal 1, OT) long term goal (LTG);10 days  -PG       Row Name 10/28/24 1019          Toileting Goal 1 (OT)    Activity/Device (Toileting Goal 1, OT) toileting skills, all  -PG     [place] Level/Cues Needed (Toileting Goal 1, OT) modified independence  -PG     Time Frame (Toileting Goal 1, OT) long term goal (LTG);10 days  -PG       Row Name 10/28/24 1019          Grooming Goal 1 (OT)    Activity/Device (Grooming Goal 1, OT) grooming skills, all  -PG     [place] (Grooming Goal 1, OT) modified independence  -PG     Time Frame (Grooming Goal 1, OT) long term goal (LTG);10 days  -PG       Row Name 10/28/24 1019          Problem Specific Goal 1 (OT)    Problem Specific Goal 1 (OT) Patient will improve activity tolerance to fair plus to support independence and engagement with ADL activities  -PG     Time Frame (Problem Specific Goal 1, OT) long term goal (LTG);10 days  -PG       Row Name 10/28/24 1019          Therapy Assessment/Plan (OT)    Planned Therapy Interventions (OT) activity tolerance training;BADL retraining;strengthening exercise;transfer/mobility retraining;patient/caregiver education/training;occupation/activity based interventions  -PG               User Key  (r) = Recorded By, (t) = Taken By, (c) = Cosigned By      Initials Name Provider Type    PG Gio Minaya, OT Occupational Therapist                   Clinical Impression       Row Name 10/28/24 1018          Pain Assessment    Pretreatment Pain Rating 0/10 - no pain  -PG     Posttreatment Pain Rating 0/10 - no pain  -PG       Row Name 10/28/24 1018          Plan of Care Review    Plan of Care Reviewed With  patient  -PG     Progress no change  -PG     Outcome Evaluation Patient presents with limitations affecting strength, activity tolerance, and balance impacting patient's ability to return home safely and independently.  The skills of a therapist will be required to safely and effectively implement the following treatment plan to restore maximal level of function  -PG       Row Name 10/28/24 1018          Therapy Assessment/Plan (OT)    Patient/Family Therapy Goal Statement (OT) Get stronger and return home independently  -PG     Rehab Potential (OT) good  -PG     Criteria for Skilled Therapeutic Interventions Met (OT) yes;meets criteria;skilled treatment is necessary  -PG     Therapy Frequency (OT) 5 times/wk  -PG       Row Name 10/28/24 1018          Therapy Plan Review/Discharge Plan (OT)    Anticipated Discharge Disposition (OT) inpatient rehabilitation facility;sub acute care setting;skilled nursing facility  -PG               User Key  (r) = Recorded By, (t) = Taken By, (c) = Cosigned By      Initials Name Provider Type    PG Gio Minaya, OT Occupational Therapist                   Outcome Measures       Row Name 10/28/24 1020          How much help from another is currently needed...    Putting on and taking off regular lower body clothing? 2  -PG     Bathing (including washing, rinsing, and drying) 2  -PG     Toileting (which includes using toilet bed pan or urinal) 2  -PG     Putting on and taking off regular upper body clothing 4  -PG     Taking care of personal grooming (such as brushing teeth) 4  -PG     Eating meals 4  -PG     AM-PAC 6 Clicks Score (OT) 18  -PG       Row Name 10/28/24 0801 10/28/24 0200       How much help from another person do you currently need...    Turning from your back to your side while in flat bed without using bedrails? 3  -SS 3  -MF    Moving from lying on back to sitting on the side of a flat bed without bedrails? 2  -SS 2  -MF    Moving to and from a bed to a chair  (including a wheelchair)? 2  -SS 2  -MF    Standing up from a chair using your arms (e.g., wheelchair, bedside chair)? 2  -SS 1  -MF    Climbing 3-5 steps with a railing? 1  -SS 1  -MF    To walk in hospital room? 1  -SS 1  -MF    AM-PAC 6 Clicks Score (PT) 11  -SS 10  -MF      Row Name 10/28/24 1020          Functional Assessment    Outcome Measure Options AM-PAC 6 Clicks Daily Activity (OT);Optimal Instrument  -PG       Row Name 10/28/24 1020          Optimal Instrument    Optimal Instrument Optimal - 3  -PG     Bending/Stooping 3  -PG     Standing 2  -PG     Reaching 1  -PG     From the list, choose the 3 activities you would most like to be able to do without any difficulty Bending/stooping;Standing;Reaching  -PG     Total Score Optimal - 3 6  -PG               User Key  (r) = Recorded By, (t) = Taken By, (c) = Cosigned By      Initials Name Provider Type    PG Gio Minaya, OT Occupational Therapist    Nat Rocha LPN Licensed Nurse    Maricruz Jackson RN Registered Nurse                    Occupational Therapy Education       Title: PT OT SLP Therapies (In Progress)       Topic: Occupational Therapy (In Progress)       Point: ADL training (In Progress)       Description:   Instruct learner(s) on proper safety adaptation and remediation techniques during self care or transfers.   Instruct in proper use of assistive devices.                  Learning Progress Summary            Patient Acceptance, D,E, NR by PG at 10/28/2024 1020                      Point: Home exercise program (In Progress)       Description:   Instruct learner(s) on appropriate technique for monitoring, assisting and/or progressing therapeutic exercises/activities.                  Learning Progress Summary            Patient Acceptance, D,E, NR by PG at 10/28/2024 1020                      Point: Precautions (In Progress)       Description:   Instruct learner(s) on prescribed precautions during self-care and functional transfers.                   Learning Progress Summary            Patient Acceptance, D,E, NR by PG at 10/28/2024 1020                      Point: Body mechanics (In Progress)       Description:   Instruct learner(s) on proper positioning and spine alignment during self-care, functional mobility activities and/or exercises.                  Learning Progress Summary            Patient Acceptance, D,E, NR by PG at 10/28/2024 1020                                      User Key       Initials Effective Dates Name Provider Type Discipline    PG 06/16/21 -  Gio Minaya OT Occupational Therapist OT                  OT Recommendation and Plan  Planned Therapy Interventions (OT): activity tolerance training, BADL retraining, strengthening exercise, transfer/mobility retraining, patient/caregiver education/training, occupation/activity based interventions  Therapy Frequency (OT): 5 times/wk  Plan of Care Review  Plan of Care Reviewed With: patient  Progress: no change  Outcome Evaluation: Patient presents with limitations affecting strength, activity tolerance, and balance impacting patient's ability to return home safely and independently.  The skills of a therapist will be required to safely and effectively implement the following treatment plan to restore maximal level of function     Time Calculation:   Evaluation Complexity (OT)  Review Occupational Profile/Medical/Therapy History Complexity: brief/low complexity  Assessment, Occupational Performance/Identification of Deficit Complexity: 3-5 performance deficits  Clinical Decision Making Complexity (OT): problem focused assessment/low complexity  Overall Complexity of Evaluation (OT): low complexity     Time Calculation- OT       Row Name 10/28/24 1023 10/28/24 1022 10/28/24 1021       Time Calculation- OT    OT Received On -- 10/28/24  -PG 10/28/24  -PG    OT Goal Re-Cert Due Date -- 11/06/24  -PG 11/06/24  -PG       Timed Charges    82246 - OT Therapeutic Activity Minutes 10   -PG -- --       Untimed Charges    OT Eval/Re-eval Minutes -- 35  -PG --       Total Minutes    Timed Charges Total Minutes 10  -PG -- --    Untimed Charges Total Minutes -- 35  -PG --     Total Minutes 10  -PG 35  -PG --              User Key  (r) = Recorded By, (t) = Taken By, (c) = Cosigned By      Initials Name Provider Type     Gio Minaya OT Occupational Therapist                  Therapy Charges for Today       Code Description Service Date Service Provider Modifiers Qty    [id number]  OT EVAL LOW COMPLEXITY 3 10/28/2024 Gio Minaya OT GO 1    [id number]  OT THERAPEUTIC ACT EA 15 MIN 10/28/2024 Gio Minaya OT GO 1                 Gio Minaya OT  10/28/2024

## 2024-10-28 NOTE — PLAN OF CARE
Goal Outcome Evaluation:  Plan of Care Reviewed With: patient        Progress: no change  Outcome Evaluation: Patient presents with limitations affecting strength, activity tolerance, and balance impacting patient's ability to return home safely and independently.  The skills of a therapist will be required to safely and effectively implement the following treatment plan to restore maximal level of function    Anticipated Discharge Disposition (OT): inpatient rehabilitation facility, sub acute care setting, skilled nursing facility

## 2024-10-28 NOTE — PROGRESS NOTES
Respiratory Therapist Broncho-Pulmonary Hygiene Progress Note      Patient Name:  Brian Mehta  YOB: 1955    Brian eMhta meets the qualification for Level 1 of the Bronco-Pulmonary Hygiene Protocol. This was based on my daily patient assessment and includes review of chest x-ray results, cough ability and quality, oxygenation, secretions or risk for secretion development and patient mobility.     Broncho-Pulmonary Hygiene Assessment:    Level of Movement: Actively changing positions without assistance  Alert/ oriented/ cooperative    Breath Sounds: Clear to slightly diminished    Cough: Strong, effective    Chest X-Ray: Possible signs of consolidation and/or atelectasis or clear.     Sputum Productions: None or small amount of thin or watery secretions with effective cough    History and Physical: Chronic condition    SpO2 to Oxygen Need: greater than 92% on room air or  less than 3L nasal canula    Current SpO2 is: 92% on RA    Based on this information, I have completed the following interventions: Teach/Instruct patient on cough and deep breathe      Electronically signed by Yulisa Ramírez RRT, 10/28/24, 12:00 PM EDT.

## 2024-10-28 NOTE — CONSULTS
10/28/24 1156   Coping/Psychosocial   Additional Documentation Spiritual Care (Group)   Spiritual Care   Spiritual Care Source patient request (describe)   Spiritual Care Follow-Up will follow closely   Response to Spiritual Care receptive of support   Spiritual Care Interventions resource assistance provided  (I will work on getting the pt a pair of tennis shoes donated to him. I will also get sweatpants and a sweatshirt for him.)   Spiritual Care Visit Type initial   Spiritual Care Request other (see comments)  (pt shares his concerns about not having shoes or clothes at time of discharge to rehab facility. his wife does not drive and he has no family or friends to aid in getting his clothes to the hospital)   Receptivity to Spiritual Care other (see comments)  (pt has flat affect throughout assessment)

## 2024-10-28 NOTE — PLAN OF CARE
Goal Outcome Evaluation:  Plan of Care Reviewed With: patient        Progress: no change  Outcome Evaluation: VSS, blood glucose monitored throughout shift, refused insulin this morning when coverage was required. patient also refused heparin. Wound care consulted today and evaluated patient per policy. Patient took off own dressing on left glutueal aspect. Dressing is reappiled as ordered. Purewick was removed at beginning of shift but patient has been incontinent and unable to know when he is voiding. No new concerns this shift, see plan of care and discharge nursing for more info.

## 2024-10-28 NOTE — PROGRESS NOTES
[facility]   Urology Progress Note    Patient Name: Brian Mehta  : 1955  MRN: [id number]  Primary Care Physician:  Sophie Capps APRN  Date of admission: 10/26/2024    Subjective   Subjective       No pain, no fevers    Objective   Objective     Vitals:   Temp:  [98.3 °F (36.8 °C)-99.2 °F (37.3 °C)] 98.3 °F (36.8 °C)  Heart Rate:  [] 110  Resp:  [16-18] 16  BP: (102-145)/(62-82) 136/72  Physical Exam     Alert and oriented x3  No acute distress  Unlabored respirations  Nontender/nondistended       Result Review    Result Review:  I have personally reviewed the results from the time of this admission to 10/28/2024 06:25 EDT and agree with these findings:  []  Laboratory  []  Microbiology  []  Radiology  []  EKG/Telemetry   []  Cardiology/Vascular   []  Pathology  []  Old records  []  Other:      Assessment & Plan   Assessment / Plan     Brief Patient Summary:  Brian Mehta is a 69 y.o. male who     Active Hospital Problems:  Active Hospital Problems    Diagnosis     **UTI (urinary tract infection)     Fall     Failure to thrive in adult     DM2 (diabetes mellitus, type 2)     Hip pain     Essential hypertension      Retained left ureteral stent  UTI  History of CVA         CT imaging read reviewed discussed with patient    10/2624 CT abdomen/pelvis without - hepatomegaly, cholelithiasis, AAA 3.1, diverticulosis.  Retained left ureteral stent.  Stones in the left kidney nonobstructing not attached to the stent 17 x 8 x 9 mm.  No large stones on the stent proximally.  In the bladder there is reasonable stone burden 26 x 13 mm attached to the stone with the stent also having other stones attached to it.  No stones on the right    No procedures planned at this time, patient does not have to be n.p.o. for urology         Patient has no recollection of having a left ureteral stent placed.  Or why it was placed.          Based on CT review this has been in place for a long time probably greater than a  year.  It has stone formed on it definitely in the bladder.      Patient will require cystolitholapaxy with left ureteroscopy with laser and left ureteral stent exchange.  This would be in a few weeks after he is out of the hospital as outpatient.  Risks and benefits were discussed including bleeding, infection and damage to the urinary system.  We also discussed the risk of anesthesia up to and including death.  Patient voiced understanding and would like to proceed.      Patient understands that we will likely take multiple procedures as he has a retained stent that is calcified in place and is very difficult to remove.      Needs follow-up me in clinic in 1 to 2 weeks after discharge

## 2024-10-28 NOTE — PLAN OF CARE
Problem: Adult Inpatient Plan of Care  Goal: Plan of Care Review  Outcome: Not Progressing  Flowsheets (Taken 10/28/2024 0633)  Progress: no change  Plan of Care Reviewed With: patient  Goal: Patient-Specific Goal (Individualized)  Outcome: Not Progressing  Goal: Absence of Hospital-Acquired Illness or Injury  Outcome: Not Progressing  Intervention: Identify and Manage Fall Risk  Recent Flowsheet Documentation  Taken 10/28/2024 0512 by Nat Shepherd LPN  Safety Promotion/Fall Prevention: safety round/check completed  Taken 10/28/2024 0315 by Nat Shepherd LPN  Safety Promotion/Fall Prevention: safety round/check completed  Taken 10/28/2024 0133 by Nat Shepherd LPN  Safety Promotion/Fall Prevention: safety round/check completed  Taken 10/27/2024 2311 by Nat Shepherd LPN  Safety Promotion/Fall Prevention: safety round/check completed  Taken 10/27/2024 2115 by Nat Shepherd LPN  Safety Promotion/Fall Prevention: safety round/check completed  Taken 10/27/2024 1943 by Nat Shepherd LPN  Safety Promotion/Fall Prevention: safety round/check completed  Intervention: Prevent Skin Injury  Recent Flowsheet Documentation  Taken 10/28/2024 0200 by Nat Shepherd LPN  Skin Protection:   incontinence pads utilized   transparent dressing maintained  Intervention: Prevent and Manage VTE (Venous Thromboembolism) Risk  Recent Flowsheet Documentation  Taken 10/28/2024 0200 by Nat Shepherd LPN  VTE Prevention/Management: patient refused intervention  Intervention: Prevent Infection  Recent Flowsheet Documentation  Taken 10/28/2024 0200 by Nat Shepherd LPN  Infection Prevention:   equipment surfaces disinfected   hand hygiene promoted  Goal: Optimal Comfort and Wellbeing  Outcome: Not Progressing  Intervention: Provide Person-Centered Care  Recent Flowsheet Documentation  Taken 10/28/2024 0200 by Nat Shepherd LPN  Trust Relationship/Rapport:   care explained   choices provided   questions answered   reassurance  provided   thoughts/feelings acknowledged  Goal: Readiness for Transition of Care  Outcome: Not Progressing   Goal Outcome Evaluation:  Plan of Care Reviewed With: patient        Progress: no change     Vitals stable. Pt agreed to bedtime insulin, but refused subq heparin. No complaints of pain. Will continue to monitor.

## 2024-10-28 NOTE — SIGNIFICANT NOTE
Wound Eval / Progress Noted    AFSHAN Funez     Patient Name: Brian Mehta  : 1955  MRN: [id number]  Today's Date: 10/28/2024                 Admit Date: 10/26/2024    Visit Dx:    ICD-10-CM ICD-9-CM   1. Acute UTI  N39.0 599.0   2. Contusion of left hip, initial encounter  S70.02XA 924.01   3. Generalized weakness  R53.1 780.79         UTI (urinary tract infection)    Essential hypertension    Hip pain    DM2 (diabetes mellitus, type 2)    Fall    Failure to thrive in adult        Past Medical History:   Diagnosis Date    CHF (congestive heart failure)     SEE'S DR [place]ICK[place] NO CURRENT S/S    COPD (chronic obstructive pulmonary disease)     INHALER    Diabetes mellitus     DOESN'T CHECK BG OFTEN AT HOME    Hyperlipidemia     Hypertension     Sepsis 2023    Stroke 2017    RIGHT SIDE WEAKNESS        Past Surgical History:   Procedure Laterality Date    APPENDECTOMY      EYE SURGERY Bilateral     MISTY CATARACT         Physical Assessment:  Wound 10/27/24 0409 Left gluteal MASD (moisture associated skin damage) (Active)        Dressing Appearance open to air 10/28/24 1056   Closure None 10/28/24 1056   Base moist;red;blanchable 10/28/24 1056   Periwound dry;intact;pink;redness;maroon/purple 10/28/24 1056   Periwound Temperature warm 10/28/24 1056   Periwound Skin Turgor soft 10/28/24 1056   Edges open 10/28/24 1056   Drainage Characteristics/Odor serosanguineous 10/28/24 1056   Drainage Amount scant 10/28/24 1056   Care, Wound cleansed with;sterile normal saline 10/28/24 1056   Dressing Care dressing applied;hydrofiber;silver impregnated;silicone border foam 10/28/24 1056   Periwound Care absorptive dressing applied 10/28/24 1056      Wound Check / Follow-up: Patient seen today for wound consult. Patient is awake, alert, and oriented at time of visit. He is agreeable to assessment. Patient reports that he had a fall during a [employer] transportation which resulted in wound to his left lateral hip / gluteal area. Wound  to this area with red, moist tissue to wound base. Tissue remains blanchable at this time. Periwound tissue is dry and intact. Cleansed wound with normal saline and gauze. Recommending daily dressing changes with silver impregnated hydrofiber and silicone border dressing.    Bilateral gluteal aspects with red / maroon discoloration. All tissue remains blanchable at this time. Dry, flaky skin with scattered crusting is noted as well. Recommending skin care and topical treatment with application of Aquaphor ointment.     Bilateral heels intact without discoloration.    Patient incontinent of urine during visit. Incontinence care provided and bed linens as well as patient gown changed.     Recommending to implement pressure reduction measures including every two hour turns and offloading heels at all times.      Impression: Traumatic injury to left lateral hip / gluteal aspect. Red / maroon discoloration to bilateral gluteal aspects with dry, flaky skin and scattered crusting.      Short term goals: Regain skin integrity, skin protection, moisture prevention, pressure reduction, skin care, topical treatment, daily dressing changes.    Alnana Morris RN    10/28/2024    15:00 EDT

## 2024-10-28 NOTE — PROGRESS NOTES
[facility]   Hospitalist Progress Note  Date: 10/28/2024  Patient Name: Brian Mehta  : 1955  MRN: [id number]  Date of admission: 10/26/2024      Subjective   Subjective     Chief Complaint: Hip pain    Summary:  69 y.o. male PMH CHF, COPD, DM, HLD, HTN, continued tobacco dependence, history of CVA who presented with left hip pain and inability to ambulate.  Patient is unable to care for himself.  He was brought in covered in feces and urine.  Patient was apparently trying to board a transportation van/bus and fell going up the steps.  History taken from the wife is that the patient has not been able to care for himself at home due to weakness.  He has a history of CVA with residual right-sided weakness and usually walks with a walker or cane but has not been able to for the past 2 days.  Imaging was negative for acute fracture.  There was evidence of a UTI as well as prior stent noted on CT abdomen pelvis.  He was admitted for further care, started on Rocephin, urology consulted.  Mental status has improved.  Urology plans to follow-up outpatient for cystolitholapaxy with left ureteroscopy with laser and left ureteral stent exchange.  Will need rehab at discharge.    Interval Followup: Patient complains of hip and knee discomfort this morning.  Took some Tylenol this morning but states it did not control his pain adequately.  He is requesting a nicotine patch.    Objective   Objective     Vitals:   Temp:  [98.3 °F (36.8 °C)-98.6 °F (37 °C)] 98.6 °F (37 °C)  Heart Rate:  [] 110  Resp:  [16-18] 16  BP: (102-141)/(55-78) 135/55  Physical Exam    Constitutional: Elderly male, awake, alert, no acute distress   Respiratory: Diminished in bilateral bases, otherwise clear to auscultation bilaterally, nonlabored respirations    Cardiovascular: RRR, no MRG   Gastrointestinal: Positive bowel sounds, soft, nontender, nondistended   Neurologic: Oriented x 3, right-sided weakness noted, Cranial Nerves grossly  intact to confrontation, speech clear    Result Review    I have personally reviewed the results below:  [x]  Laboratory personally reviewed BMP, CBC, blood sugars  []  Microbiology  []  Radiology  []  EKG/Telemetry   []  Cardiology/Vascular   []  Pathology  []  Old records  []  Other:  CBC          8/22/2024    05:47 10/27/2024    00:11 10/27/2024    05:34 10/28/2024    05:20   CBC   WBC 5.14  5.68  5.93  5.78    RBC 5.55  5.04  5.03  4.83    Hemoglobin 14.5  13.1  13.3  12.5    Hematocrit 45.5  39.8  41.5  38.9    MCV 82.0  79.0  82.5  80.5    MCH 26.1  26.0  26.4  25.9    MCHC 31.9  32.9  32.0  32.1    RDW 15.9  15.9  15.9  15.9    Platelets 152  226  204  226      CMP          8/22/2024    05:47 10/27/2024    00:12 10/27/2024    05:34 10/28/2024    05:20   CMP   Glucose 101  180  163  150    BUN 14  10  10  12    Creatinine 0.88  0.73  0.90  0.76    EGFR 94.2  99.7  93.6  98.5    Sodium 139  132  133  137    Potassium 4.6  4.0  4.5  4.2    Chloride 106  99  100  103    Calcium 8.4  8.7  8.4  8.8    Total Protein  7.2      Albumin  3.1      Globulin  4.1      Total Bilirubin  0.3      Alkaline Phosphatase  91      AST (SGOT)  8      ALT (SGPT)  5      Albumin/Globulin Ratio  0.8      BUN/Creatinine Ratio 15.9  13.7  11.1  15.8    Anion Gap 8.7  8.9  9.0  10.2        Assessment & Plan   Assessment / Plan   UTI due to unknown bacterial source  Hypertension  Type 2 diabetes mellitus  Failure to thrive in adult  Fall  Left ureteral stent  Hip pain  Continued tobacco dependence    Continue to monitor in the hospital for workup and management of the above  Discussed with urology-Patient will require cystolitholapaxy with left ureteroscopy with laser and left ureteral stent exchange. This would be in a few weeks after he is out of the hospital as outpatient   Continue with IV Rocephin for now, blood cultures negative, urine culture pending  Start nicotine patch as scheduled  Start scheduled DuoNebs, Pulmicort and  Brovana twice daily, albuterol as needed  Scheduled Tylenol 1 g 3 times daily, add Voltaren gel 4 times daily for hip and knee pain  Blood sugars acceptable, continue Lantus 10 units daily, sliding scale insulin  Continue appropriate home medications including antihypertensives  PT/OT consulted-recommend rehab.   [facility] and sending out referrals  Trend renal function and electrolytes with a.m. BMP, magnesium   Trend Hgb and WBC with a.m. CBC     Discussed plan with RN, urology, clinical     VTE Prophylaxis:  Pharmacologic VTE prophylaxis orders are present.        CODE STATUS:   Code Status (Patient has no pulse and is not breathing): CPR (Attempt to Resuscitate)  Medical Interventions (Patient has pulse or is breathing): Full Support        Electronically signed by Isidro Diamond MD, 10/28/24, 10:38 AM EDT.

## 2024-10-29 LAB
ANION GAP SERPL CALCULATED.3IONS-SCNC: 10.6 MMOL/L (ref 5–15)
BACTERIA SPEC AEROBE CULT: ABNORMAL
BASOPHILS # BLD AUTO: 0.03 10*3/MM3 (ref 0–0.2)
BASOPHILS NFR BLD AUTO: 0.6 % (ref 0–1.5)
BUN SERPL-MCNC: 12 MG/DL (ref 8–23)
BUN/CREAT SERPL: 17.4 (ref 7–25)
CALCIUM SPEC-SCNC: 8.7 MG/DL (ref 8.6–10.5)
CHLORIDE SERPL-SCNC: 102 MMOL/L (ref 98–107)
CO2 SERPL-SCNC: 22.4 MMOL/L (ref 22–29)
CREAT SERPL-MCNC: 0.69 MG/DL (ref 0.76–1.27)
DEPRECATED RDW RBC AUTO: 47.4 FL (ref 37–54)
EGFRCR SERPLBLD CKD-EPI 2021: 101.4 ML/MIN/1.73
EOSINOPHIL # BLD AUTO: 0.13 10*3/MM3 (ref 0–0.4)
EOSINOPHIL NFR BLD AUTO: 2.7 % (ref 0.3–6.2)
ERYTHROCYTE [DISTWIDTH] IN BLOOD BY AUTOMATED COUNT: 16 % (ref 12.3–15.4)
GLUCOSE BLDC GLUCOMTR-MCNC: 133 MG/DL (ref 70–99)
GLUCOSE BLDC GLUCOMTR-MCNC: 175 MG/DL (ref 70–99)
GLUCOSE BLDC GLUCOMTR-MCNC: 221 MG/DL (ref 70–99)
GLUCOSE BLDC GLUCOMTR-MCNC: 326 MG/DL (ref 70–99)
GLUCOSE SERPL-MCNC: 168 MG/DL (ref 65–99)
HCT VFR BLD AUTO: 40.5 % (ref 37.5–51)
HGB BLD-MCNC: 12.8 G/DL (ref 13–17.7)
IMM GRANULOCYTES # BLD AUTO: 0.01 10*3/MM3 (ref 0–0.05)
IMM GRANULOCYTES NFR BLD AUTO: 0.2 % (ref 0–0.5)
LYMPHOCYTES # BLD AUTO: 2.33 10*3/MM3 (ref 0.7–3.1)
LYMPHOCYTES NFR BLD AUTO: 48.3 % (ref 19.6–45.3)
MAGNESIUM SERPL-MCNC: 1.8 MG/DL (ref 1.6–2.4)
MCH RBC QN AUTO: 25.8 PG (ref 26.6–33)
MCHC RBC AUTO-ENTMCNC: 31.6 G/DL (ref 31.5–35.7)
MCV RBC AUTO: 81.5 FL (ref 79–97)
MONOCYTES # BLD AUTO: 0.46 10*3/MM3 (ref 0.1–0.9)
MONOCYTES NFR BLD AUTO: 9.5 % (ref 5–12)
NEUTROPHILS NFR BLD AUTO: 1.86 10*3/MM3 (ref 1.7–7)
NEUTROPHILS NFR BLD AUTO: 38.7 % (ref 42.7–76)
NRBC BLD AUTO-RTO: 0 /100 WBC (ref 0–0.2)
PHOSPHATE SERPL-MCNC: 3.6 MG/DL (ref 2.5–4.5)
PLATELET # BLD AUTO: 227 10*3/MM3 (ref 140–450)
PMV BLD AUTO: 9.8 FL (ref 6–12)
POTASSIUM SERPL-SCNC: 3.9 MMOL/L (ref 3.5–5.2)
RBC # BLD AUTO: 4.97 10*6/MM3 (ref 4.14–5.8)
SODIUM SERPL-SCNC: 135 MMOL/L (ref 136–145)
WBC NRBC COR # BLD AUTO: 4.82 10*3/MM3 (ref 3.4–10.8)

## 2024-10-29 PROCEDURE — 25010000002 HEPARIN (PORCINE) PER 1000 UNITS: Performed by: STUDENT IN AN ORGANIZED HEALTH CARE EDUCATION/TRAINING PROGRAM

## 2024-10-29 PROCEDURE — 84100 ASSAY OF PHOSPHORUS: CPT | Performed by: INTERNAL MEDICINE

## 2024-10-29 PROCEDURE — 97161 PT EVAL LOW COMPLEX 20 MIN: CPT

## 2024-10-29 PROCEDURE — 99232 SBSQ HOSP IP/OBS MODERATE 35: CPT | Performed by: INTERNAL MEDICINE

## 2024-10-29 PROCEDURE — 63710000001 INSULIN LISPRO (HUMAN) PER 5 UNITS: Performed by: STUDENT IN AN ORGANIZED HEALTH CARE EDUCATION/TRAINING PROGRAM

## 2024-10-29 PROCEDURE — 63710000001 INSULIN GLARGINE PER 5 UNITS: Performed by: INTERNAL MEDICINE

## 2024-10-29 PROCEDURE — 99232 SBSQ HOSP IP/OBS MODERATE 35: CPT | Performed by: UROLOGY

## 2024-10-29 PROCEDURE — 80048 BASIC METABOLIC PNL TOTAL CA: CPT | Performed by: INTERNAL MEDICINE

## 2024-10-29 PROCEDURE — 85025 COMPLETE CBC W/AUTO DIFF WBC: CPT | Performed by: INTERNAL MEDICINE

## 2024-10-29 PROCEDURE — 82948 REAGENT STRIP/BLOOD GLUCOSE: CPT | Performed by: STUDENT IN AN ORGANIZED HEALTH CARE EDUCATION/TRAINING PROGRAM

## 2024-10-29 PROCEDURE — 25010000002 CEFTRIAXONE PER 250 MG: Performed by: INTERNAL MEDICINE

## 2024-10-29 PROCEDURE — 83735 ASSAY OF MAGNESIUM: CPT | Performed by: INTERNAL MEDICINE

## 2024-10-29 PROCEDURE — 82948 REAGENT STRIP/BLOOD GLUCOSE: CPT

## 2024-10-29 PROCEDURE — 94799 UNLISTED PULMONARY SVC/PX: CPT

## 2024-10-29 RX ADMIN — ACETAMINOPHEN 1000 MG: 500 TABLET ORAL at 21:14

## 2024-10-29 RX ADMIN — INSULIN LISPRO 4 UNITS: 100 INJECTION, SOLUTION INTRAVENOUS; SUBCUTANEOUS at 12:10

## 2024-10-29 RX ADMIN — Medication 10 ML: at 08:27

## 2024-10-29 RX ADMIN — INSULIN LISPRO 2 UNITS: 100 INJECTION, SOLUTION INTRAVENOUS; SUBCUTANEOUS at 08:25

## 2024-10-29 RX ADMIN — DICLOFENAC SODIUM 2 G: 10 GEL TOPICAL at 12:11

## 2024-10-29 RX ADMIN — LOSARTAN POTASSIUM 25 MG: 25 TABLET, FILM COATED ORAL at 08:23

## 2024-10-29 RX ADMIN — ACETAMINOPHEN 1000 MG: 500 TABLET ORAL at 17:24

## 2024-10-29 RX ADMIN — CEFTRIAXONE SODIUM 1000 MG: 1 INJECTION, POWDER, FOR SOLUTION INTRAMUSCULAR; INTRAVENOUS at 03:22

## 2024-10-29 RX ADMIN — ASPIRIN 81 MG: 81 TABLET, CHEWABLE ORAL at 08:23

## 2024-10-29 RX ADMIN — HEPARIN SODIUM 5000 UNITS: 5000 INJECTION INTRAVENOUS; SUBCUTANEOUS at 08:23

## 2024-10-29 RX ADMIN — DICLOFENAC SODIUM 2 G: 10 GEL TOPICAL at 18:44

## 2024-10-29 RX ADMIN — DICLOFENAC SODIUM 2 G: 10 GEL TOPICAL at 08:26

## 2024-10-29 RX ADMIN — ISOSORBIDE MONONITRATE 30 MG: 30 TABLET, EXTENDED RELEASE ORAL at 08:23

## 2024-10-29 RX ADMIN — ACETAMINOPHEN 1000 MG: 500 TABLET ORAL at 08:23

## 2024-10-29 RX ADMIN — NICOTINE 1 PATCH: 21 PATCH, EXTENDED RELEASE TRANSDERMAL at 08:28

## 2024-10-29 RX ADMIN — WHITE PETROLATUM 1 APPLICATION: 1.75 OINTMENT TOPICAL at 10:30

## 2024-10-29 RX ADMIN — ATORVASTATIN CALCIUM 20 MG: 20 TABLET, FILM COATED ORAL at 21:14

## 2024-10-29 RX ADMIN — INSULIN LISPRO 7 UNITS: 100 INJECTION, SOLUTION INTRAVENOUS; SUBCUTANEOUS at 21:59

## 2024-10-29 RX ADMIN — Medication 10 ML: at 22:00

## 2024-10-29 RX ADMIN — INSULIN GLARGINE 10 UNITS: 100 INJECTION, SOLUTION SUBCUTANEOUS at 08:28

## 2024-10-29 NOTE — PROGRESS NOTES
[facility]   Hospitalist Progress Note  Date: 10/29/2024  Patient Name: Brian Mehta  : 1955  MRN: [id number]  Date of admission: 10/26/2024      Subjective   Subjective     Chief Complaint: Hip pain    Summary:  69 y.o. male PMH CHF, COPD, DM, HLD, HTN, continued tobacco dependence, history of CVA who presented with left hip pain and inability to ambulate.  Patient is unable to care for himself.  He was brought in covered in feces and urine.  Patient was apparently trying to board a transportation van/bus and fell going up the steps.  History taken from the wife is that the patient has not been able to care for himself at home due to weakness.  He has a history of CVA with residual right-sided weakness and usually walks with a walker or cane but has not been able to for the past 2 days.  Imaging was negative for acute fracture.  There was evidence of a UTI as well as prior stent noted on CT abdomen pelvis.  He was admitted for further care, started on Rocephin, urology consulted.  Mental status has improved.  Urology plans to follow-up outpatient for cystolitholapaxy with left ureteroscopy with laser and left ureteral stent exchange.  Will need rehab at discharge.    Interval Followup: No acute events overnight, patient very weak and debilitated, had a lot of difficulty trying to transition to chair    Objective   Objective     Vitals:   Temp:  [98.1 °F (36.7 °C)-99 °F (37.2 °C)] 98.4 °F (36.9 °C)  Heart Rate:  [] 100  Resp:  [16-20] 20  BP: (113-140)/(67-81) 130/81  Physical Exam   GEN: No acute distress  HEENT: Moist mucous membranes  LUNGS: Equal chest rise bilaterally  CARDIAC: Regular rate and rhythm  NEURO: Moving all 4 extremities spontaneously  SKIN: No obvious breakdown    Result Review    I have personally reviewed the results below:  [x]  Laboratory personally reviewed BMP, CBC, blood sugars  []  Microbiology  []  Radiology  []  EKG/Telemetry   []  Cardiology/Vascular   []   Pathology  []  Old records  []  Other:  CBC          10/27/2024    00:11 10/27/2024    05:34 10/28/2024    05:20 10/29/2024    05:30   CBC   WBC 5.68  5.93  5.78  4.82    RBC 5.04  5.03  4.83  4.97    Hemoglobin 13.1  13.3  12.5  12.8    Hematocrit 39.8  41.5  38.9  40.5    MCV 79.0  82.5  80.5  81.5    MCH 26.0  26.4  25.9  25.8    MCHC 32.9  32.0  32.1  31.6    RDW 15.9  15.9  15.9  16.0    Platelets 226  204  226  227      CMP          10/27/2024    00:12 10/27/2024    05:34 10/28/2024    05:20 10/29/2024    05:30   CMP   Glucose 180  163  150  168    BUN 10  10  12  12    Creatinine 0.73  0.90  0.76  0.69    EGFR 99.7  93.6  98.5  101.4    Sodium 132  133  137  135    Potassium 4.0  4.5  4.2  3.9    Chloride 99  100  103  102    Calcium 8.7  8.4  8.8  8.7    Total Protein 7.2       Albumin 3.1       Globulin 4.1       Total Bilirubin 0.3       Alkaline Phosphatase 91       AST (SGOT) 8       ALT (SGPT) 5       Albumin/Globulin Ratio 0.8       BUN/Creatinine Ratio 13.7  11.1  15.8  17.4    Anion Gap 8.9  9.0  10.2  10.6        Assessment & Plan   Assessment / Plan   UTI due to unknown bacterial source  Hypertension  Type 2 diabetes mellitus  Failure to thrive in adult  Fall  Left ureteral stent  Hip pain  Continued tobacco dependence    Continue to monitor in the hospital for workup and management of the above  Discussed with urology-Patient will require cystolitholapaxy with left ureteroscopy with laser and left ureteral stent exchange. This would be in a few weeks after he is out of the hospital as outpatient   Continue with IV Rocephin for now, blood cultures negative, urine culture with E. coli  Continue nicotine patch as scheduled  Continue scheduled DuoNebs, Pulmicort and Brovana twice daily, albuterol as needed  Scheduled Tylenol 1 g 3 times daily, add Voltaren gel 4 times daily for hip and knee pain  Blood sugars acceptable, continue Lantus 10 units daily, sliding scale insulin  Continue appropriate home  medications including antihypertensives  PT/OT consulted-recommend rehab.   [facility] and sending out referrals  CBC, CMP reviewed  Repeat CBC, CMP, mag and Phos in a.m.     Discussed plan with RN, urology,     VTE Prophylaxis:  Pharmacologic VTE prophylaxis orders are present.        CODE STATUS:   Code Status (Patient has no pulse and is not breathing): CPR (Attempt to Resuscitate)  Medical Interventions (Patient has pulse or is breathing): Full Support      Electronically signed by Derrick Garland MD, 10/29/2024, 12:00 EDT.

## 2024-10-29 NOTE — PROGRESS NOTES
[facility]   Urology Progress Note    Patient Name: Brian Mehta  : 1955  MRN: [id number]  Primary Care Physician:  Sophie Capps APRN  Date of admission: 10/26/2024    Subjective   Subjective       Having trouble sleeping secondary to some pain      Objective   Objective     Vitals:   Temp:  [98.1 °F (36.7 °C)-98.6 °F (37 °C)] 98.6 °F (37 °C)  Heart Rate:  [] 102  Resp:  [16] 16  BP: (113-140)/(55-80) 140/77  Physical Exam     Alert and oriented x3  No acute distress  Unlabored respirations  Nontender/nondistended       Result Review    Result Review:  I have personally reviewed the results from the time of this admission to 10/29/2024 06:11 EDT and agree with these findings:  []  Laboratory  []  Microbiology  []  Radiology  []  EKG/Telemetry   []  Cardiology/Vascular   []  Pathology  []  Old records  []  Other:      Assessment & Plan   Assessment / Plan     Brief Patient Summary:  Brian Mehta is a 69 y.o. male who     Active Hospital Problems:  Active Hospital Problems    Diagnosis     **UTI (urinary tract infection)     Fall     Failure to thrive in adult     DM2 (diabetes mellitus, type 2)     Hip pain     Essential hypertension      Retained left ureteral stent  UTI  History of CVA     10/20/2024 E. coli    Discussed situation with patient's wife    10/2624 CT abdomen/pelvis without - hepatomegaly, cholelithiasis, AAA 3.1, diverticulosis.  Retained left ureteral stent.  Stones in the left kidney nonobstructing not attached to the stent 17 x 8 x 9 mm.  No large stones on the stent proximally.  In the bladder there is reasonable stone burden 26 x 13 mm attached to the stone with the stent also having other stones attached to it.  No stones on the right    No procedures planned during this admission     will require cystolitholapaxy with left ureteroscopy with laser and left ureteral stent exchange.  This would be in a few weeks after he is out of the hospital as outpatient.  Risks and  benefits were discussed with patient and wife    Okay to be discharged whenever medically ready by the primary service as far as urology is concerned    Needs follow-up me in clinic in 1 to 2 weeks after discharge    Call with questions.

## 2024-10-29 NOTE — PLAN OF CARE
Goal Outcome Evaluation:  Plan of Care Reviewed With: patient           Outcome Evaluation: Pt removed dressing to L buttock 3 times this shift. Pt refused to have dressing applied after last removal. No s/s discomfort or distress.

## 2024-10-29 NOTE — THERAPY EVALUATION
Acute Care - Physical Therapy Initial Evaluation   [place]     Patient Name: Brian Mehta  : 1955  MRN: [id number]  Today's Date: 10/29/2024      Visit Dx:     ICD-10-CM ICD-9-CM   1. Acute UTI  N39.0 599.0   2. Contusion of left hip, initial encounter  S70.02XA 924.01   3. Generalized weakness  R53.1 780.79   4. Difficulty in walking  R26.2 719.7     Patient Active Problem List   Diagnosis    CVA (cerebral vascular accident)    Essential hypertension    High cholesterol    Hip pain    Vitamin B12 deficiency    Kidney disorder    Chronic pain of both knees    Chronic HFrEF (heart failure with reduced ejection fraction)    LVH (left ventricular hypertrophy)    COPD (chronic obstructive pulmonary disease)    DM2 (diabetes mellitus, type 2)    Urinary tract infection without hematuria    Abnormal nuclear stress test    UTI (urinary tract infection)    Fall    Failure to thrive in adult    Nephrolithiasis     Past Medical History:   Diagnosis Date    CHF (congestive heart failure)     SEE'S DR [place] NO CURRENT S/S    COPD (chronic obstructive pulmonary disease)     INHALER    Diabetes mellitus     DOESN'T CHECK BG OFTEN AT HOME    Hyperlipidemia     Hypertension     Sepsis 2023    Stroke 2017    RIGHT SIDE WEAKNESS     Past Surgical History:   Procedure Laterality Date    APPENDECTOMY      EYE SURGERY Bilateral     MISTY CATARACT     PT Assessment (Last 12 Hours)       PT Evaluation and Treatment       Row Name 10/29/24 1229          Physical Therapy Time and Intention    Subjective Information complains of;weakness;fatigue (P)   -KL     Document Type evaluation (P)   -KL     Mode of Treatment individual therapy;physical therapy (P)   -KL     Total Minutes, Physical Therapy 30 (P)   -KL     Patient Effort fair (P)   -KL     Symptoms Noted During/After Treatment fatigue;increased pain (P)   -KL       Row Name 10/29/24 1229          General Information    Patient Profile Reviewed yes (P)   -KL     Patient  Observations alert;cooperative;agree to therapy (P)   -KL     Prior Level of Function min assist: (P)   -KL     Equipment Currently Used at Home walker, rolling (P)   -KL     Existing Precautions/Restrictions fall (P)   -KL     Risks Reviewed patient: (P)   -KL     Benefits Reviewed patient: (P)   -KL     Barriers to Rehab none identified (P)   -       Row Name 10/29/24 1229          Previous Level of Function/Home Environm    Bathing, Previous Functional Level uses device or equipment (P)   -KL     Grooming, Previous Functional Level independent (P)   -KL     Dressing, Previous Functional Level independent (P)   -KL     Eating/Feeding, Previous Functional Level independent (P)   -KL     Toileting, Previous Functional Level partial assistance (P)   -KL     BADLs, Previous Functional Level independent (P)   -KL     IADLs, Previous Functional Level independent (P)   -KL     Bed Mobility, Previous Functional Level independent (P)   -KL     Transfers, Previous Functional Level independent (P)   -KL     Household Ambulation, Previous Functional Level uses device or equipment (P)   -KL     Stairs, Previous Functional Level partial assistance (P)   -KL     Community Ambulation, Previous Functional Level uses device or equipment (P)   -       Row Name 10/29/24 1229          Living Environment    Current Living Arrangements home (P)   -KL     People in Home spouse (P)   -KL     Primary Care Provided by self (P)   -       Row Name 10/29/24 1229          Home Use of Assistive/Adaptive Equipment    Equipment Currently Used at Home cane, quad tip;walker, rolling;grab bar (P)   -       Row Name 10/29/24 1229          Range of Motion (ROM)    Range of Motion ROM is WFL (P)   -       Row Name 10/29/24 1229          Strength (Manual Muscle Testing)    Strength (Manual Muscle Testing) other (see comments) (P)   Pt scored 3+/5 for bilateral hip flex, knee flex, knee ext, and ankle DF MMTs  -       Row Name 10/29/24 1220           Bed Mobility    Bed Mobility bed mobility (all) activities (P)   -KL     All Activities, [place] (Bed Mobility) minimum assist (75% patient effort) (P)   -KL       Row Name 10/29/24 1229          Transfers    Transfers sit-stand transfer;stand-sit transfer (P)   -KL     Maintains Weight-bearing Status (Transfers) able to maintain (P)   -KL       Row Name 10/29/24 1229          Sit-Stand Transfer    Sit-Stand [place] (Transfers) minimum assist (75% patient effort) (P)   -KL     Assistive Device (Sit-Stand Transfers) walker, front-wheeled (P)   -KL       Row Name 10/29/24 1229          Stand-Sit Transfer    Stand-Sit [place] (Transfers) minimum assist (75% patient effort) (P)   -KL     Assistive Device (Stand-Sit Transfers) walker, front-wheeled (P)   -KL       Row Name 10/29/24 1229          Gait/Stairs (Locomotion)    Gait/Stairs Locomotion gait/ambulation assistive device (P)   -KL     [place] Level (Gait) minimum assist (75% patient effort) (P)   -KL     Assistive Device (Gait) walker, front-wheeled (P)   -KL     Patient was able to Ambulate yes (P)   -KL     Distance in Feet (Gait) 5 (P)   -KL     Pattern (Gait) 2-point (P)   -KL     Deviations/Abnormal Patterns (Gait) base of support, narrow;stride length decreased;weight shifting decreased (P)   -KL       Row Name 10/29/24 1229          Safety Issues/Impairments Affecting Functional Mobility    Impairments Affecting Function (Mobility) balance;cognition;coordination;motor control;pain;range of motion (ROM);shortness of breath;strength (P)   -KL     Cognitive Impairments, Mobility Safety/Performance attention;impulsivity;judgment (P)   -KL       Row Name 10/29/24 1229          Balance    Balance Assessment standing dynamic balance (P)   -KL     Dynamic Standing Balance minimal assist (P)   -KL     Position/Device Used, Standing Balance walker, front-wheeled (P)   -KL     Balance Interventions standing (P)   -       Row Name              Wound 10/27/24 0409 Left gluteal MASD (moisture associated skin damage)    Wound - Properties Group Placement Date: 10/27/24  -AY Placement Time: 0409 -AY Side: Left  -AY Location: gluteal  -AY Primary Wound Type: MASD  -AY Type: MASD (moisture associated skin damage)  -AY Present on Original Admission: Y  -AY    Retired Wound - Properties Group Placement Date: 10/27/24  -AY Placement Time: 0409 -AY Present on Original Admission: Y  -AY Side: Left  -AY Location: gluteal  -AY Primary Wound Type: MASD  -AY Type: MASD (moisture associated skin damage)  -AY    Retired Wound - Properties Group Placement Date: 10/27/24  -AY Placement Time: 0409 -AY Present on Original Admission: Y  -AY Side: Left  -AY Location: gluteal  -AY Primary Wound Type: MASD  -AY Type: MASD (moisture associated skin damage)  -AY    Retired Wound - Properties Group Date first assessed: 10/27/24  -AY Time first assessed: 0409 -AY Present on Original Admission: Y  -AY Side: Left  -AY Location: gluteal  -AY Primary Wound Type: MASD  -AY Type: MASD (moisture associated skin damage)  -AY      Row Name 10/29/24 1229          Plan of Care Review    Plan of Care Reviewed With patient (P)   -KL     Progress no change (P)   -KL     Outcome Evaluation Pt presents to treatment with complaints of fatigue and weakness in BLE. Had significant difficulty with ambulation and mobility. Pt would continue to benefit from the use of skilled physical therapy to address BLE strength and muscular endurance deficits required for ambulation. (P)   -KL       Row Name 10/29/24 1229          Positioning and Restraints    Pre-Treatment Position bedside commode (P)   -KL     Post Treatment Position bed (P)   -KL     In Bed supine (P)   -KL       Row Name 10/29/24 1229          Therapy Assessment/Plan (PT)    Rehab Potential (PT) fair (P)   -KL     Criteria for Skilled Interventions Met (PT) yes (P)   -KL     Therapy Frequency (PT) daily (P)   -KL     Predicted Duration of  Therapy Intervention (PT) 10 days (P)   -     Problem List (PT) problems related to;balance;cognition;coordination;mobility;range of motion (ROM);strength;pain;postural control (P)   -KL     Activity Limitations Related to Problem List (PT) unable to ambulate safely (P)   -       Row Name 10/29/24 1229          Therapy Plan Review/Discharge Plan (PT)    Therapy Plan Review (PT) evaluation/treatment results reviewed (P)   -       Row Name 10/29/24 1229          Physical Therapy Goals    Transfer Goal Selection (PT) transfer, PT goal 1 (P)   -KL     Gait Training Goal Selection (PT) gait training, PT goal 1 (P)   -     Strength Goal Selection (PT) strength, PT goal 1 (P)   -       Row Name 10/29/24 1229          Transfer Goal 1 (PT)    Activity/Assistive Device (Transfer Goal 1, PT) transfers, all (P)   -     [place] Level/Cues Needed (Transfer Goal 1, PT) independent (P)   -     Time Frame (Transfer Goal 1, PT) long term goal (LTG);10 days (P)   -       Row Name 10/29/24 1229          Gait Training Goal 1 (PT)    Activity/Assistive Device (Gait Training Goal 1, PT) assistive device use (P)   -     [place] Level (Gait Training Goal 1, PT) independent;contact guard required (P)   -KL     Distance (Gait Training Goal 1, PT) 250 ft (P)   -     Time Frame (Gait Training Goal 1, PT) long term goal (LTG);10 days (P)   -       Row Name 10/29/24 1229          Strength Goal 1 (PT)    Strength Goal 1 (PT) Pt will achieve score of 5/5 for bilateral hip flex MMTs (P)   -     Time Frame (Strength Goal 1, PT) long term goal (LTG);10 days (P)   -               User Key  (r) = Recorded By, (t) = Taken By, (c) = Cosigned By      Initials Name Provider Type    Payton Amezquita, RN Registered Nurse    Jaky Bourne, PT Student PT Student                    Physical Therapy Education        No education to display                  PT Recommendation and Plan  Anticipated Discharge Disposition (PT):  (P) sub acute care setting  Planned Therapy Interventions (PT): (P) balance training, bed mobility training, gait training, home exercise program, postural re-education, ROM (range of motion), stair training, strengthening, stretching, transfer training  Therapy Frequency (PT): (P) daily  Plan of Care Reviewed With: (P) patient  Progress: (P) no change  Outcome Evaluation: (P) Pt presents to treatment with complaints of fatigue and weakness in BLE. Had significant difficulty with ambulation and mobility. Pt would continue to benefit from the use of skilled physical therapy to address BLE strength and muscular endurance deficits required for ambulation.   Outcome Measures       Row Name 10/29/24 1200             How much help from another person do you currently need...    Turning from your back to your side while in flat bed without using bedrails? 3 (P)   -KL      Moving from lying on back to sitting on the side of a flat bed without bedrails? 3 (P)   -KL      Moving to and from a bed to a chair (including a wheelchair)? 2 (P)   -KL      Standing up from a chair using your arms (e.g., wheelchair, bedside chair)? 3 (P)   -KL      Climbing 3-5 steps with a railing? 1 (P)   -KL      To walk in hospital room? 2 (P)   -KL      AM-PAC 6 Clicks Score (PT) 14 (P)   -KL         Functional Assessment    Outcome Measure Options AM-PAC 6 Clicks Basic Mobility (PT) (P)   -KL                User Key  (r) = Recorded By, (t) = Taken By, (c) = Cosigned By      Initials Name Provider Type    Jaky Bourne, PT Student PT Student                     Time Calculation:    PT Charges       Row Name 10/29/24 1229             Time Calculation    PT Received On 10/29/24  -PRAKASH (r) KL (t) PRAKASH (c)      PT Goal Re-Cert Due Date 11/07/24  -PRAKASH (r) KL (t) PRAKASH (c)         Time Calculation- PT    Total Timed Code Minutes- PT 25 minute(s)  -PRAKASH (r) KL (t) PRAKASH (c)         Untimed Charges    PT Eval/Re-eval Minutes 25  -PRAKASH (r) KL (t) PRAKASH (c)         Total  Minutes    Untimed Charges Total Minutes 25  -PRAKASH (r) KL (t)       Total Minutes 25  -PRAKASH (r) KL (t)                User Key  (r) = Recorded By, (t) = Taken By, (c) = Cosigned By      Initials Name Provider Type    Royce Murphy, PT Physical Therapist    Jaky Bourne PT Student PT Student                  Therapy Charges for Today       Code Description Service Date Service Provider Modifiers Qty    [id number] HC PT EVAL LOW COMPLEXITY 2 10/29/2024 Jaky Talamantes PT Student GP 1            PT G-Codes  Outcome Measure Options: (P) AM-PAC 6 Clicks Basic Mobility (PT)  AM-PAC 6 Clicks Score (PT): (P) 14  AM-PAC 6 Clicks Score (OT): 18    Jaky Talamantes PT Student  10/29/2024

## 2024-10-29 NOTE — PLAN OF CARE
Goal Outcome Evaluation:      Pt a/0x4. Pain treated as per mar. Ambulated to chair x1 assist with walker. Sat up in bed. BS controlled as per mar. Skin care and wound care completed. Pt cooperative today. Continue with plan of care.

## 2024-10-29 NOTE — PLAN OF CARE
Goal Outcome Evaluation:  Plan of Care Reviewed With: (P) patient        Progress: (P) no change  Outcome Evaluation: (P) Pt presents to treatment with complaints of fatigue and weakness in BLE. Had significant difficulty with ambulation and mobility. Pt would continue to benefit from the use of skilled physical therapy to address BLE strength and muscular endurance deficits required for ambulation.    Anticipated Discharge Disposition (PT): (P) sub acute care setting

## 2024-10-29 NOTE — CONSULTS
10/29/24 1133   Spiritual Care   Spiritual Care Follow-Up will follow closely   Response to Spiritual Care receptive of support   Spiritual Care Interventions supportive conversation provided;resource assistance provided   Spiritual Care Visit Type other (see comments)  (out of the graciousness of her heart a member of a local [employer] donated brand new sneakers and socks for me to give to the patient.)   Receptivity to Spiritual Care other (see comments)  (patient was delighted to have new shoes to use during PT)

## 2024-10-30 ENCOUNTER — TELEPHONE (OUTPATIENT)
Dept: UROLOGY | Age: 69
End: 2024-10-30
Payer: MEDICARE

## 2024-10-30 LAB
ALBUMIN SERPL-MCNC: 2.9 G/DL (ref 3.5–5.2)
ALBUMIN/GLOB SERPL: 0.7 G/DL
ALP SERPL-CCNC: 87 U/L (ref 39–117)
ALT SERPL W P-5'-P-CCNC: 5 U/L (ref 1–41)
ANION GAP SERPL CALCULATED.3IONS-SCNC: 9.2 MMOL/L (ref 5–15)
AST SERPL-CCNC: 9 U/L (ref 1–40)
BASOPHILS # BLD AUTO: 0.03 10*3/MM3 (ref 0–0.2)
BASOPHILS NFR BLD AUTO: 0.7 % (ref 0–1.5)
BILIRUB SERPL-MCNC: 0.3 MG/DL (ref 0–1.2)
BUN SERPL-MCNC: 9 MG/DL (ref 8–23)
BUN/CREAT SERPL: 12.9 (ref 7–25)
CALCIUM SPEC-SCNC: 9 MG/DL (ref 8.6–10.5)
CHLORIDE SERPL-SCNC: 104 MMOL/L (ref 98–107)
CO2 SERPL-SCNC: 23.8 MMOL/L (ref 22–29)
CREAT SERPL-MCNC: 0.7 MG/DL (ref 0.76–1.27)
DEPRECATED RDW RBC AUTO: 45.9 FL (ref 37–54)
EGFRCR SERPLBLD CKD-EPI 2021: 101 ML/MIN/1.73
EOSINOPHIL # BLD AUTO: 0.13 10*3/MM3 (ref 0–0.4)
EOSINOPHIL NFR BLD AUTO: 3 % (ref 0.3–6.2)
ERYTHROCYTE [DISTWIDTH] IN BLOOD BY AUTOMATED COUNT: 15.8 % (ref 12.3–15.4)
GLOBULIN UR ELPH-MCNC: 3.9 GM/DL
GLUCOSE BLDC GLUCOMTR-MCNC: 132 MG/DL (ref 70–99)
GLUCOSE BLDC GLUCOMTR-MCNC: 158 MG/DL (ref 70–99)
GLUCOSE BLDC GLUCOMTR-MCNC: 191 MG/DL (ref 70–99)
GLUCOSE BLDC GLUCOMTR-MCNC: 227 MG/DL (ref 70–99)
GLUCOSE SERPL-MCNC: 141 MG/DL (ref 65–99)
HCT VFR BLD AUTO: 40.1 % (ref 37.5–51)
HGB BLD-MCNC: 12.6 G/DL (ref 13–17.7)
IMM GRANULOCYTES # BLD AUTO: 0 10*3/MM3 (ref 0–0.05)
IMM GRANULOCYTES NFR BLD AUTO: 0 % (ref 0–0.5)
LYMPHOCYTES # BLD AUTO: 2.36 10*3/MM3 (ref 0.7–3.1)
LYMPHOCYTES NFR BLD AUTO: 55 % (ref 19.6–45.3)
MAGNESIUM SERPL-MCNC: 1.7 MG/DL (ref 1.6–2.4)
MCH RBC QN AUTO: 25.4 PG (ref 26.6–33)
MCHC RBC AUTO-ENTMCNC: 31.4 G/DL (ref 31.5–35.7)
MCV RBC AUTO: 80.7 FL (ref 79–97)
MONOCYTES # BLD AUTO: 0.47 10*3/MM3 (ref 0.1–0.9)
MONOCYTES NFR BLD AUTO: 11 % (ref 5–12)
NEUTROPHILS NFR BLD AUTO: 1.3 10*3/MM3 (ref 1.7–7)
NEUTROPHILS NFR BLD AUTO: 30.3 % (ref 42.7–76)
NRBC BLD AUTO-RTO: 0 /100 WBC (ref 0–0.2)
PHOSPHATE SERPL-MCNC: 3.5 MG/DL (ref 2.5–4.5)
PLATELET # BLD AUTO: 239 10*3/MM3 (ref 140–450)
PMV BLD AUTO: 9.4 FL (ref 6–12)
POTASSIUM SERPL-SCNC: 4.1 MMOL/L (ref 3.5–5.2)
PROT SERPL-MCNC: 6.8 G/DL (ref 6–8.5)
RBC # BLD AUTO: 4.97 10*6/MM3 (ref 4.14–5.8)
SODIUM SERPL-SCNC: 137 MMOL/L (ref 136–145)
WBC NRBC COR # BLD AUTO: 4.29 10*3/MM3 (ref 3.4–10.8)

## 2024-10-30 PROCEDURE — 25010000002 HEPARIN (PORCINE) PER 1000 UNITS: Performed by: STUDENT IN AN ORGANIZED HEALTH CARE EDUCATION/TRAINING PROGRAM

## 2024-10-30 PROCEDURE — 85025 COMPLETE CBC W/AUTO DIFF WBC: CPT | Performed by: INTERNAL MEDICINE

## 2024-10-30 PROCEDURE — 25010000002 CEFTRIAXONE PER 250 MG: Performed by: INTERNAL MEDICINE

## 2024-10-30 PROCEDURE — 94664 DEMO&/EVAL PT USE INHALER: CPT

## 2024-10-30 PROCEDURE — 84100 ASSAY OF PHOSPHORUS: CPT | Performed by: INTERNAL MEDICINE

## 2024-10-30 PROCEDURE — 99232 SBSQ HOSP IP/OBS MODERATE 35: CPT | Performed by: INTERNAL MEDICINE

## 2024-10-30 PROCEDURE — 83735 ASSAY OF MAGNESIUM: CPT | Performed by: INTERNAL MEDICINE

## 2024-10-30 PROCEDURE — 80053 COMPREHEN METABOLIC PANEL: CPT | Performed by: INTERNAL MEDICINE

## 2024-10-30 PROCEDURE — 82948 REAGENT STRIP/BLOOD GLUCOSE: CPT

## 2024-10-30 PROCEDURE — 97110 THERAPEUTIC EXERCISES: CPT

## 2024-10-30 PROCEDURE — 63710000001 INSULIN GLARGINE PER 5 UNITS: Performed by: INTERNAL MEDICINE

## 2024-10-30 PROCEDURE — 63710000001 INSULIN LISPRO (HUMAN) PER 5 UNITS: Performed by: STUDENT IN AN ORGANIZED HEALTH CARE EDUCATION/TRAINING PROGRAM

## 2024-10-30 PROCEDURE — 94640 AIRWAY INHALATION TREATMENT: CPT

## 2024-10-30 PROCEDURE — 94799 UNLISTED PULMONARY SVC/PX: CPT

## 2024-10-30 PROCEDURE — 82948 REAGENT STRIP/BLOOD GLUCOSE: CPT | Performed by: STUDENT IN AN ORGANIZED HEALTH CARE EDUCATION/TRAINING PROGRAM

## 2024-10-30 RX ADMIN — INSULIN LISPRO 2 UNITS: 100 INJECTION, SOLUTION INTRAVENOUS; SUBCUTANEOUS at 23:00

## 2024-10-30 RX ADMIN — ASPIRIN 81 MG: 81 TABLET, CHEWABLE ORAL at 08:24

## 2024-10-30 RX ADMIN — DICLOFENAC SODIUM 2 G: 10 GEL TOPICAL at 12:40

## 2024-10-30 RX ADMIN — HEPARIN SODIUM 5000 UNITS: 5000 INJECTION INTRAVENOUS; SUBCUTANEOUS at 21:42

## 2024-10-30 RX ADMIN — INSULIN LISPRO 2 UNITS: 100 INJECTION, SOLUTION INTRAVENOUS; SUBCUTANEOUS at 12:40

## 2024-10-30 RX ADMIN — ACETAMINOPHEN 650 MG: 325 TABLET ORAL at 06:28

## 2024-10-30 RX ADMIN — ACETAMINOPHEN 1000 MG: 500 TABLET ORAL at 08:24

## 2024-10-30 RX ADMIN — ACETAMINOPHEN 1000 MG: 500 TABLET ORAL at 21:42

## 2024-10-30 RX ADMIN — Medication 10 ML: at 08:25

## 2024-10-30 RX ADMIN — INSULIN LISPRO 4 UNITS: 100 INJECTION, SOLUTION INTRAVENOUS; SUBCUTANEOUS at 17:22

## 2024-10-30 RX ADMIN — ATORVASTATIN CALCIUM 20 MG: 20 TABLET, FILM COATED ORAL at 21:42

## 2024-10-30 RX ADMIN — IPRATROPIUM BROMIDE AND ALBUTEROL SULFATE 3 ML: .5; 3 SOLUTION RESPIRATORY (INHALATION) at 13:18

## 2024-10-30 RX ADMIN — DICLOFENAC SODIUM 2 G: 10 GEL TOPICAL at 22:00

## 2024-10-30 RX ADMIN — WHITE PETROLATUM 1 APPLICATION: 1.75 OINTMENT TOPICAL at 12:40

## 2024-10-30 RX ADMIN — DICLOFENAC SODIUM 2 G: 10 GEL TOPICAL at 17:22

## 2024-10-30 RX ADMIN — INSULIN GLARGINE 10 UNITS: 100 INJECTION, SOLUTION SUBCUTANEOUS at 08:24

## 2024-10-30 RX ADMIN — Medication 10 ML: at 21:43

## 2024-10-30 RX ADMIN — LOSARTAN POTASSIUM 25 MG: 25 TABLET, FILM COATED ORAL at 08:24

## 2024-10-30 RX ADMIN — DICLOFENAC SODIUM 2 G: 10 GEL TOPICAL at 08:25

## 2024-10-30 RX ADMIN — NICOTINE 1 PATCH: 21 PATCH, EXTENDED RELEASE TRANSDERMAL at 08:23

## 2024-10-30 RX ADMIN — ISOSORBIDE MONONITRATE 30 MG: 30 TABLET, EXTENDED RELEASE ORAL at 08:24

## 2024-10-30 RX ADMIN — CEFTRIAXONE SODIUM 1000 MG: 1 INJECTION, POWDER, FOR SOLUTION INTRAMUSCULAR; INTRAVENOUS at 03:06

## 2024-10-30 NOTE — DISCHARGE PLACEMENT REQUEST
"Tyson Brandon P \"Gio\" (69 y.o. Male)       Date of Birth   1955    Social Security Number       Address   [address] [place] [address] [address] [place] [id number]    Home Phone   [phone]    MRN   [id number]       Taoism   None    Marital Status                               Admission Date   10/26/24    Admission Type   Emergency    Admitting Provider   Rodolfo Venegas MD    Attending Provider   Derrick Garland MD    Department, Room/Bed   [address], 3023/1       Discharge Date       Discharge Disposition       Discharge Destination                                 Attending Provider: Derrick Garland MD    Allergies: No Known Allergies    Isolation: None   Infection: COVID (History) (10/28/24)   Code Status: CPR    Ht: 190.5 cm (75\")   Wt: 94.6 kg (208 lb 8.9 oz)    Admission Cmt: None   Principal Problem: UTI (urinary tract infection) [N39.0]                   Active Insurance as of 10/26/2024       Primary Coverage       Payor Plan Insurance Group Employer/Plan Group    AETNA MEDICARE REPLACEMENT AETNA MEDICARE REPLACEMENT [id number]       Payor Plan Address Payor Plan Phone Number Payor Plan Fax Number Effective Dates    [address] [phone]  5/1/2024 - None Entered    [facility] [id number]         Subscriber Name Subscriber Birth Date Member ID       BRANDON CATALAN P 1955 [id number]               Secondary Coverage       Payor Plan Insurance Group Employer/Plan Group    ANTHEM MEDICAID ANTHEM MEDICAID [id number]       Payor Plan Address Payor Plan Phone Number Payor Plan Fax Number Effective Dates    [address] [address] [phone]  7/2/2019 - None Entered    [facility] [address]         Subscriber Name Subscriber Birth Date Member ID       BRANDON CATALAN P 1955 [id number]                     Emergency Contacts        (Rel.) Home Phone Work Phone Mobile Phone    FERDINAND CATALAN (Spouse) [phone] -- [phone]              Insurance " Information                  AETNA MEDICARE REPLACEMENT/AETNA MEDICARE REPLACEMENT Phone: [phone]    Subscriber: Brian Mehta JESSICA Subscriber#: [id number]    Group#: [id number] Precert#: --    Authorization#: [id number] Effective Date: --        ANTHEM MEDICAID/ANTHEM MEDICAID Phone: [phone]    Subscriber: Brian Mehta JESSICA Subscriber#: [id number]    Group#: [id number] Precert#: --    Authorization#: -- Effective Date: --             History & Physical        Rodolfo Venegas MD at 10/27/24 0127              Patient Care Team:  Sophie Capps APRN as PCP - General (Nurse Practitioner)  Juana Mehta RN as Ambulatory  ([facility])  Luisana Cary MSW as  (Amb Case Mgmt) ([facility])    Chief complaint fall, hip pain, failure to thrive    Subjective    Patient is a 69 y.o. male presents with left hip pain and inability to ambulate.  Patient is unable to care for himself.  He was brought in covered in feces and urine.  Patient was apparently trying to board a transportation van/bus and fell going up the steps.  History taken from the wife is that the patient has not been able to care for himself at home due to weakness.  He has a history of CVA with residual right-sided weakness and usually walks with a walker or cane but has not been able to for the past 2 days.  Imaging was negative for acute fracture.    Patient was found have a UTI and was treated with Rocephin.  Incidentally, patient was found have a fractured left ureteral stent      Review of Systems   Pertinent items are noted in HPI    History  Past Medical History:   Diagnosis Date    CHF (congestive heart failure)     SEE'S DR STRICKLAND NO CURRENT S/S    COPD (chronic obstructive pulmonary disease)     INHALER    Diabetes mellitus     DOESN'T CHECK BG OFTEN AT HOME    Hyperlipidemia     Hypertension     Sepsis 09/14/2023    Stroke 2017    RIGHT SIDE WEAKNESS     Past Surgical History:   Procedure Laterality Date     APPENDECTOMY      EYE SURGERY Bilateral     MISTY CATARACT     Family History   Problem Relation Age of Onset    No Known Problems Mother     No Known Problems Father     Malig Hyperthermia Neg Hx      Social History     Tobacco Use    Smoking status: Every Day     Current packs/day: 2.50     Average packs/day: 2.5 packs/day for 52.8 years (132.0 ttl pk-yrs)     Types: Cigarettes     Start date: 1972     Passive exposure: Current    Smokeless tobacco: Never    Tobacco comments:     INSTRUCTED NO SMOKING 24 HOUR PRIOR TO SURGERY    Vaping Use    Vaping status: Never Used   Substance Use Topics    Alcohol use: Yes    Drug use: Never     (Not in a hospital admission)   Allergies:  Patient has no known allergies.    Objective    Vital Signs  Temp:  [98.5 °F (36.9 °C)] 98.5 °F (36.9 °C)  Heart Rate:  [111-115] 111  Resp:  [17] 17  BP: (122-157)/(77-85) 122/77    Physical Exam:      General Appearance:  Alert, cooperative, in no acute distress   Head:  Normocephalic, without obvious abnormality, atraumatic   Eyes:  Lids and lashes normal, conjunctivae and sclerae normal, no icterus, no pallor, corneas clear, PERRLA   Ears:  Ears appear intact with no abnormalities noted   Throat:  No oral lesions, no thrush, oral mucosa moist   Neck:  No adenopathy, supple, trachea midline, no thyromegaly, no carotid bruit, no JVD   Back:  No kyphosis present, no scoliosis present, no skin lesions, erythema or scars, no tenderness to percussion or palpation, range of motion normal   Lungs:  Clear to auscultation, respirations regular, even and unlabored    Heart:  Regular rhythm and normal rate, normal S1 and S2, no murmur, no gallop, no rub, no click   Chest Wall:  No abnormalities observed   Abdomen:  Normal bowel sounds, no masses, no organomegaly, soft non-tender, non-distended, no guarding, no rebound tenderness   Rectal:  Deferred   Extremities:  Moves all extremities well, no edema, no cyanosis, no redness   Pulses:  Pulses palpable  and equal bilaterally   Skin:  No bleeding, bruising or rash   Lymph nodes:  No palpable adenopathy   Neurologic:  Cranial nerves 2 - 12 grossly intact, sensation intact, DTR present and equal bilaterally     Results Review:    I reviewed the patient's new clinical results.  I reviewed the patient's new imaging results and agree with the interpretation.  I reviewed the patient's other test results and agree with the interpretation  I personally viewed and interpreted the patient's EKG/Telemetry data    Assessment & Plan      UTI (urinary tract infection)    Essential hypertension    Hip pain    DM2 (diabetes mellitus, type 2)    Fall    Failure to thrive in adult  Fracture left ureteral stent    Admit to hospitalist service  Remote telemetry  [facility]  PT OT  Case management consult  Insulin sliding scale  N.p.o., will need swallow eval  Urology notified of consultation  Full code        Rodolfo Venegas MD  10/27/24  01:27 EDT            Electronically signed by Rodolfo Venegas MD at 10/27/24 0152       Current Facility-Administered Medications   Medication Dose Route Frequency Provider Last Rate Last Admin    acetaminophen (TYLENOL) tablet 650 mg  650 mg Oral Q4H PRN Rodolfo Venegas MD   650 mg at 10/30/24 0628    Or    acetaminophen (TYLENOL) 160 MG/5ML oral solution 650 mg  650 mg Oral Q4H PRN Rodolfo Venegas MD        Or    acetaminophen (TYLENOL) suppository 650 mg  650 mg Rectal Q4H PRN Rodolfo Venegas MD        acetaminophen (TYLENOL) tablet 1,000 mg  1,000 mg Oral TID Isidro Bradley MD   1,000 mg at 10/30/24 0824    albuterol (PROVENTIL) nebulizer solution 0.083% 2.5 mg/3mL  2.5 mg Nebulization Q4H PRN Isidro Bradley MD        arformoterol (BROVANA) nebulizer solution 15 mcg  15 mcg Nebulization BID - RT Isidro Bradley MD        aspirin chewable tablet 81 mg  81 mg Oral Daily Isidro Bradley MD   81 mg at 10/30/24 0824    atorvastatin (LIPITOR) tablet 20 mg  20 mg Oral Nightly  Isidro Bradley MD   20 mg at 10/29/24 2114    sennosides-docusate (PERICOLACE) 8.6-50 MG per tablet 2 tablet  2 tablet Oral BID PRN Rodolfo Venegas MD        And    polyethylene glycol (MIRALAX) packet 17 g  17 g Oral Daily PRN Rodolfo Venegas MD        And    bisacodyl (DULCOLAX) EC tablet 5 mg  5 mg Oral Daily PRN Rodolfo Venegas MD        And    bisacodyl (DULCOLAX) suppository 10 mg  10 mg Rectal Daily PRN Rodolfo Venegas MD        budesonide (PULMICORT) nebulizer solution 0.5 mg  0.5 mg Nebulization BID - RT Isidro Bradley MD        cefTRIAXone (ROCEPHIN) in NS 1 gram/10ml IV PUSH syringe  1,000 mg Intravenous Q24H Isidro Bradley MD   1,000 mg at 10/30/24 0306    dextrose (D50W) (25 g/50 mL) IV injection 25 g  25 g Intravenous Q15 Min PRN Rodolfo Venegas MD        dextrose (GLUTOSE) oral gel 15 g  15 g Oral Q15 Min PRN Rodolfo Venegas MD        Diclofenac Sodium (VOLTAREN) 1 % gel 2 g  2 g Topical 4x Daily Isidro Bradley MD   2 g at 10/30/24 0825    glucagon (GLUCAGEN) injection 1 mg  1 mg Intramuscular Q15 Min PRN Rodolfo Venegas MD        heparin (porcine) 5000 UNIT/ML injection 5,000 Units  5,000 Units Subcutaneous Q12H Rodolfo Venegas MD   5,000 Units at 10/29/24 0823    insulin glargine (LANTUS, SEMGLEE) injection 10 Units  10 Units Subcutaneous Daily Isidro Bradley MD   10 Units at 10/30/24 0824    Insulin Lispro (humaLOG) injection 2-9 Units  2-9 Units Subcutaneous 4x Daily AC & at Bedtime Rodolfo Veneags MD   7 Units at 10/29/24 2159    ipratropium-albuterol (DUO-NEB) nebulizer solution 3 mL  3 mL Nebulization 4x Daily - RT Isidro Bradley MD        isosorbide mononitrate (IMDUR) 24 hr tablet 30 mg  30 mg Oral Daily Isidro Bradley MD   30 mg at 10/30/24 0824    losartan (COZAAR) tablet 25 mg  25 mg Oral Daily Isidro Bradley MD   25 mg at 10/30/24 0824    mineral oil-hydrophilic petrolatum (AQUAPHOR) ointment 1 Application  1 Application Topical 2 times per day  Isidro Bradley MD   1 Application at 10/29/24 1030    nicotine (NICODERM CQ) 21 MG/24HR patch 1 patch  1 patch Transdermal Q24H Isidro Bradley MD   1 patch at 10/30/24 0823    ondansetron ODT (ZOFRAN-ODT) disintegrating tablet 4 mg  4 mg Oral Q6H PRN Rodolfo Venegas MD        Or    ondansetron (ZOFRAN) injection 4 mg  4 mg Intravenous Q6H PRN Rodolfo Venegas MD        sodium chloride 0.9 % flush 10 mL  10 mL Intravenous Q12H Rodolfo Venegas MD   10 mL at 10/30/24 0825    sodium chloride 0.9 % flush 10 mL  10 mL Intravenous PRN Rodolfo Venegas MD        sodium chloride 0.9 % infusion 40 mL  40 mL Intravenous PRN Rodolfo Venegas MD         Lab Results (last 24 hours)       Procedure Component Value Units Date/Time    POC Glucose Once [[id number]]  (Abnormal) Collected: 10/30/24 0655    Specimen: Blood Updated: 10/30/24 0657     Glucose 132 mg/dL      Comment: Serial Number: [id number]:  [id number]       Magnesium [[id number]]  (Normal) Collected: 10/30/24 0542    Specimen: Blood from Arm, Left Updated: 10/30/24 0641     Magnesium 1.7 mg/dL     Comprehensive Metabolic Panel [[id number]]  (Abnormal) Collected: 10/30/24 0542    Specimen: Blood from Arm, Left Updated: 10/30/24 0641     Glucose 141 mg/dL      BUN 9 mg/dL      Creatinine 0.70 mg/dL      Sodium 137 mmol/L      Potassium 4.1 mmol/L      Chloride 104 mmol/L      CO2 23.8 mmol/L      Calcium 9.0 mg/dL      Total Protein 6.8 g/dL      Albumin 2.9 g/dL      ALT (SGPT) 5 U/L      AST (SGOT) 9 U/L      Alkaline Phosphatase 87 U/L      Total Bilirubin 0.3 mg/dL      Globulin 3.9 gm/dL      A/G Ratio 0.7 g/dL      BUN/Creatinine Ratio 12.9     Anion Gap 9.2 mmol/L      eGFR 101.0 mL/min/1.73     Narrative:      GFR Normal >60  Chronic Kidney Disease <60  Kidney Failure <15      Phosphorus [[id number]]  (Normal) Collected: 10/30/24 0542    Specimen: Blood from Arm, Left Updated: 10/30/24 0627     Phosphorus 3.5 mg/dL     CBC & Differential [[id number]]   (Abnormal) Collected: 10/30/24 0542    Specimen: Blood from Arm, Left Updated: 10/30/24 0607    Narrative:      The following orders were created for panel order CBC & Differential.  Procedure                               Abnormality         Status                     ---------                               -----------         ------                     CBC Auto Differential[[id number]]        Abnormal            Final result                 Please view results for these tests on the individual orders.    CBC Auto Differential [[id number]]  (Abnormal) Collected: 10/30/24 0542    Specimen: Blood from Arm, Left Updated: 10/30/24 0607     WBC 4.29 10*3/mm3      RBC 4.97 10*6/mm3      Hemoglobin 12.6 g/dL      Hematocrit 40.1 %      MCV 80.7 fL      MCH 25.4 pg      MCHC 31.4 g/dL      RDW 15.8 %      RDW-SD 45.9 fl      MPV 9.4 fL      Platelets 239 10*3/mm3      Neutrophil % 30.3 %      Lymphocyte % 55.0 %      Monocyte % 11.0 %      Eosinophil % 3.0 %      Basophil % 0.7 %      Immature Grans % 0.0 %      Neutrophils, Absolute 1.30 10*3/mm3      Lymphocytes, Absolute 2.36 10*3/mm3      Monocytes, Absolute 0.47 10*3/mm3      Eosinophils, Absolute 0.13 10*3/mm3      Basophils, Absolute 0.03 10*3/mm3      Immature Grans, Absolute 0.00 10*3/mm3      nRBC 0.0 /100 WBC     POC Glucose Once [[id number]]  (Abnormal) Collected: 10/29/24 2112    Specimen: Blood Updated: 10/29/24 2114     Glucose 326 mg/dL      Comment: Serial Number: [id number]:  [id number]       POC Glucose 4x Daily Before Meals & at Bedtime [[id number]]  (Abnormal) Collected: 10/29/24 1721    Specimen: Blood Updated: 10/29/24 1723     Glucose 133 mg/dL      Comment: Serial Number: [id number]:  [id number]       POC Glucose 4x Daily Before Meals & at Bedtime [[id number]]  (Abnormal) Collected: 10/29/24 1154    Specimen: Blood Updated: 10/29/24 1159     Glucose 221 mg/dL      Comment: Serial Number: [id number]:  [id number]       Blood Culture -  Blood, Arm, Left [[id number]]  (Normal) Collected: 10/27/24 0959    Specimen: Blood from Arm, Left Updated: 10/29/24 1015     Blood Culture No growth at 2 days    Blood Culture - Blood, Arm, Right [[id number]]  (Normal) Collected: 10/27/24 0959    Specimen: Blood from Arm, Right Updated: 10/29/24 1015     Blood Culture No growth at 2 days    Urine Culture - Urine, Straight Cath [[id number]]  (Abnormal)  (Susceptibility) Collected: 10/26/24 2340    Specimen: Urine from Straight Cath Updated: 10/29/24 0939     Urine Culture >100,000 CFU/mL Escherichia coli    Narrative:      Colonization of the urinary tract without infection is common. Treatment is discouraged unless the patient is symptomatic, pregnant, or undergoing an invasive urologic procedure.    Susceptibility        Escherichia coli      GILBERT      Amoxicillin + Clavulanate Susceptible      Ampicillin Resistant      Ampicillin + Sulbactam Intermediate      Cefazolin Susceptible      Cefepime Susceptible      Ceftazidime Susceptible      Ceftriaxone Susceptible      Gentamicin Susceptible      Levofloxacin Intermediate      Nitrofurantoin Susceptible      Piperacillin + Tazobactam Susceptible      Trimethoprim + Sulfamethoxazole Resistant                                    Physician Progress Notes (most recent note)        Derrick Garland MD at 10/29/24 [address] [place], [place] [address]ist Progress Note  Date: 10/29/2024  Patient Name: Brian Mehta  : 1955  MRN: [id number]  Date of admission: 10/26/2024      Subjective   Subjective     Chief Complaint: Hip pain    Summary:  69 y.o. male PMH CHF, COPD, DM, HLD, HTN, continued tobacco dependence, history of CVA who presented with left hip pain and inability to ambulate.  Patient is unable to care for himself.  He was brought in covered in feces and urine.  Patient was apparently trying to board a transportation van/bus and fell going up the steps.  History taken from the wife is that the patient  has not been able to care for himself at home due to weakness.  He has a history of CVA with residual right-sided weakness and usually walks with a walker or cane but has not been able to for the past 2 days.  Imaging was negative for acute fracture.  There was evidence of a UTI as well as prior stent noted on CT abdomen pelvis.  He was admitted for further care, started on Rocephin, urology consulted.  Mental status has improved.  Urology plans to follow-up outpatient for cystolitholapaxy with left ureteroscopy with laser and left ureteral stent exchange.  Will need rehab at discharge.    Interval Followup: No acute events overnight, patient very weak and debilitated, had a lot of difficulty trying to transition to chair    Objective   Objective     Vitals:   Temp:  [98.1 °F (36.7 °C)-99 °F (37.2 °C)] 98.4 °F (36.9 °C)  Heart Rate:  [] 100  Resp:  [16-20] 20  BP: (113-140)/(67-81) 130/81  Physical Exam   GEN: No acute distress  HEENT: Moist mucous membranes  LUNGS: Equal chest rise bilaterally  CARDIAC: Regular rate and rhythm  NEURO: Moving all 4 extremities spontaneously  SKIN: No obvious breakdown    Result Review    I have personally reviewed the results below:  [x]  Laboratory personally reviewed BMP, CBC, blood sugars  []  Microbiology  []  Radiology  []  EKG/Telemetry   []  Cardiology/Vascular   []  Pathology  []  Old records  []  Other:  CBC          10/27/2024    00:11 10/27/2024    05:34 10/28/2024    05:20 10/29/2024    05:30   CBC   WBC 5.68  5.93  5.78  4.82    RBC 5.04  5.03  4.83  4.97    Hemoglobin 13.1  13.3  12.5  12.8    Hematocrit 39.8  41.5  38.9  40.5    MCV 79.0  82.5  80.5  81.5    MCH 26.0  26.4  25.9  25.8    MCHC 32.9  32.0  32.1  31.6    RDW 15.9  15.9  15.9  16.0    Platelets 226  204  226  227      CMP          10/27/2024    00:12 10/27/2024    05:34 10/28/2024    05:20 10/29/2024    05:30   CMP   Glucose 180  163  150  168    BUN 10  10  12  12    Creatinine 0.73  0.90  0.76   0.69    EGFR 99.7  93.6  98.5  101.4    Sodium 132  133  137  135    Potassium 4.0  4.5  4.2  3.9    Chloride 99  100  103  102    Calcium 8.7  8.4  8.8  8.7    Total Protein 7.2       Albumin 3.1       Globulin 4.1       Total Bilirubin 0.3       Alkaline Phosphatase 91       AST (SGOT) 8       ALT (SGPT) 5       Albumin/Globulin Ratio 0.8       BUN/Creatinine Ratio 13.7  11.1  15.8  17.4    Anion Gap 8.9  9.0  10.2  10.6        Assessment & Plan   Assessment / Plan   UTI due to unknown bacterial source  Hypertension  Type 2 diabetes mellitus  Failure to thrive in adult  Fall  Left ureteral stent  Hip pain  Continued tobacco dependence    Continue to monitor in the hospital for workup and management of the above  Discussed with urology-Patient will require cystolitholapaxy with left ureteroscopy with laser and left ureteral stent exchange. This would be in a few weeks after he is out of the hospital as outpatient   Continue with IV Rocephin for now, blood cultures negative, urine culture with E. coli  Continue nicotine patch as scheduled  Continue scheduled DuoNebs, Pulmicort and Brovana twice daily, albuterol as needed  Scheduled Tylenol 1 g 3 times daily, add Voltaren gel 4 times daily for hip and knee pain  Blood sugars acceptable, continue Lantus 10 units daily, sliding scale insulin  Continue appropriate home medications including antihypertensives  PT/OT consulted-recommend rehab.   aware and sending out referrals  CBC, CMP reviewed  Repeat CBC, CMP, mag and Phos in a.m.     Discussed plan with RN, urology,     VTE Prophylaxis:  Pharmacologic VTE prophylaxis orders are present.        CODE STATUS:   Code Status (Patient has no pulse and is not breathing): CPR (Attempt to Resuscitate)  Medical Interventions (Patient has pulse or is breathing): Full Support      Electronically signed by Derrick Garland MD, 10/29/2024, 12:00 EDT.                   Electronically signed by Dada  Derrick White MD at 10/29/24 1201          Consult Notes (most recent note)        Shara Padilla at 10/29/24 1133             10/29/24 1133   Spiritual Care   Spiritual Care Follow-Up will follow closely   Response to Spiritual Care receptive of support   Spiritual Care Interventions supportive conversation provided;resource assistance provided   Spiritual Care Visit Type other (see comments)  (out of the graciousness of her heart a member of a local [employer] donated brand new sneakers and socks for me to give to the patient.)   Receptivity to Spiritual Care other (see comments)  (patient was delighted to have new shoes to use during PT)       Electronically signed by Shara Padilla at 10/29/24 1148          Physical Therapy Notes (most recent note)        Jaky Talamantes, PT Student at 10/29/24 1241  Version 1 of 1      Attestation signed by Royce Carias, PT at 10/29/24 1439                     Acute Care - Physical Therapy Initial Evaluation   [place] [place]     Patient Name: Brian Mehta  : 1955  MRN: [id number]  Today's Date: 10/29/2024      Visit Dx:     ICD-10-CM ICD-9-CM   1. Acute UTI  N39.0 599.0   2. Contusion of left hip, initial encounter  S70.02XA 924.01   3. Generalized weakness  R53.1 780.79   4. Difficulty in walking  R26.2 719.7     Patient Active Problem List   Diagnosis    CVA (cerebral vascular accident)    Essential hypertension    High cholesterol    Hip pain    Vitamin B12 deficiency    Kidney disorder    Chronic pain of both knees    Chronic HFrEF (heart failure with reduced ejection fraction)    LVH (left ventricular hypertrophy)    COPD (chronic obstructive pulmonary disease)    DM2 (diabetes mellitus, type 2)    Urinary tract infection without hematuria    Abnormal nuclear stress test    UTI (urinary tract infection)    Fall    Failure to thrive in adult    Nephrolithiasis     Past Medical History:   Diagnosis Date    CHF (congestive heart failure)     SEE'S DR STRICKLAND NO CURRENT S/S     COPD (chronic obstructive pulmonary disease)     INHALER    Diabetes mellitus     DOESN'T CHECK BG OFTEN AT HOME    Hyperlipidemia     Hypertension     Sepsis 09/14/2023    Stroke 2017    RIGHT SIDE WEAKNESS     Past Surgical History:   Procedure Laterality Date    APPENDECTOMY      EYE SURGERY Bilateral     MISTY CATARACT     PT Assessment (Last 12 Hours)       PT Evaluation and Treatment       Row Name 10/29/24 1229          Physical Therapy Time and Intention    Subjective Information complains of;weakness;fatigue (P)   -KL     Document Type evaluation (P)   -KL     Mode of Treatment individual therapy;physical therapy (P)   -KL     Total Minutes, Physical Therapy 30 (P)   -KL     Patient Effort fair (P)   -KL     Symptoms Noted During/After Treatment fatigue;increased pain (P)   -KL       Row Name 10/29/24 1229          General Information    Patient Profile Reviewed yes (P)   -KL     Patient Observations alert;cooperative;agree to therapy (P)   -KL     Prior Level of Function min assist: (P)   -KL     Equipment Currently Used at Home walker, rolling (P)   -KL     Existing Precautions/Restrictions fall (P)   -KL     Risks Reviewed patient: (P)   -KL     Benefits Reviewed patient: (P)   -KL     Barriers to Rehab none identified (P)   -KL       Row Name 10/29/24 1229          Previous Level of Function/Home Environm    Bathing, Previous Functional Level uses device or equipment (P)   -KL     Grooming, Previous Functional Level independent (P)   -KL     Dressing, Previous Functional Level independent (P)   -KL     Eating/Feeding, Previous Functional Level independent (P)   -KL     Toileting, Previous Functional Level partial assistance (P)   -KL     BADLs, Previous Functional Level independent (P)   -KL     IADLs, Previous Functional Level independent (P)   -KL     Bed Mobility, Previous Functional Level independent (P)   -KL     Transfers, Previous Functional Level independent (P)   -KL     Household Ambulation,  Previous Functional Level uses device or equipment (P)   -     Stairs, Previous Functional Level partial assistance (P)   -     Community Ambulation, Previous Functional Level uses device or equipment (P)   -[facility] Name 10/29/24 1229          Living Environment    Current Living Arrangements home (P)   -KL     People in Home spouse (P)   -     Primary Care Provided by self (P)   -[facility] Name 10/29/24 1229          Home Use of Assistive/Adaptive Equipment    Equipment Currently Used at Home cane, quad tip;walker, rolling;grab bar (P)   -[facility] Name 10/29/24 1229          Range of Motion (ROM)    Range of Motion ROM is WFL (P)   -[facility] Name 10/29/24 1229          Strength (Manual Muscle Testing)    Strength (Manual Muscle Testing) other (see comments) (P)   Pt scored 3+/5 for bilateral hip flex, knee flex, knee ext, and ankle DF MMTs  -[facility] Name 10/29/24 1229          Bed Mobility    Bed Mobility bed mobility (all) activities (P)   -     All Activities, [place] (Bed Mobility) minimum assist (75% patient effort) (P)   -[facility] Name 10/29/24 1229          Transfers    Transfers sit-stand transfer;stand-sit transfer (P)   -     Maintains Weight-bearing Status (Transfers) able to maintain (P)   -[facility] Name 10/29/24 1229          Sit-Stand Transfer    Sit-Stand [place] (Transfers) minimum assist (75% patient effort) (P)   -     Assistive Device (Sit-Stand Transfers) walker, front-wheeled (P)   -       Row Name 10/29/24 1229          Stand-Sit Transfer    Stand-Sit [place] (Transfers) minimum assist (75% patient effort) (P)   -     Assistive Device (Stand-Sit Transfers) walker, front-wheeled (P)   -       Row Name 10/29/24 1229          Gait/Stairs (Locomotion)    Gait/Stairs Locomotion gait/ambulation assistive device (P)   -     [place] Level (Gait) minimum assist (75% patient effort) (P)   -     Assistive Device (Gait) walker,  front-wheeled (P)   -KL     Patient was able to Ambulate yes (P)   -KL     Distance in Feet (Gait) 5 (P)   -KL     Pattern (Gait) 2-point (P)   -KL     Deviations/Abnormal Patterns (Gait) base of support, narrow;stride length decreased;weight shifting decreased (P)   -KL       Row Name 10/29/24 1229          Safety Issues/Impairments Affecting Functional Mobility    Impairments Affecting Function (Mobility) balance;cognition;coordination;motor control;pain;range of motion (ROM);shortness of breath;strength (P)   -KL     Cognitive Impairments, Mobility Safety/Performance attention;impulsivity;judgment (P)   -KL       Row Name 10/29/24 1229          Balance    Balance Assessment standing dynamic balance (P)   -KL     Dynamic Standing Balance minimal assist (P)   -KL     Position/Device Used, Standing Balance walker, front-wheeled (P)   -KL     Balance Interventions standing (P)   -KL       Row Name             Wound 10/27/24 0409 Left gluteal MASD (moisture associated skin damage)    Wound - Properties Group Placement Date: 10/27/24  -AY Placement Time: 0409 -AY Side: Left  -AY Location: gluteal  -AY Primary Wound Type: MASD  -AY Type: MASD (moisture associated skin damage)  -AY Present on Original Admission: Y  -AY    Retired Wound - Properties Group Placement Date: 10/27/24  -AY Placement Time: 0409 -AY Present on Original Admission: Y  -AY Side: Left  -AY Location: gluteal  -AY Primary Wound Type: MASD  -AY Type: MASD (moisture associated skin damage)  -AY    Retired Wound - Properties Group Placement Date: 10/27/24  -AY Placement Time: 0409 -AY Present on Original Admission: Y  -AY Side: Left  -AY Location: gluteal  -AY Primary Wound Type: MASD  -AY Type: MASD (moisture associated skin damage)  -AY    Retired Wound - Properties Group Date first assessed: 10/27/24  -AY Time first assessed: 0409 -AY Present on Original Admission: Y  -AY Side: Left  -AY Location: gluteal  -AY Primary Wound Type: MASD  -AY Type:  MASD (moisture associated skin damage)  -      Row Name 10/29/24 1229          Plan of Care Review    Plan of Care Reviewed With patient (P)   -KL     Progress no change (P)   -KL     Outcome Evaluation Pt presents to treatment with complaints of fatigue and weakness in BLE. Had significant difficulty with ambulation and mobility. Pt would continue to benefit from the use of skilled physical therapy to address BLE strength and muscular endurance deficits required for ambulation. (P)   -       Row Name 10/29/24 1229          Positioning and Restraints    Pre-Treatment Position bedside commode (P)   -KL     Post Treatment Position bed (P)   -KL     In Bed supine (P)   -       Row Name 10/29/24 1229          Therapy Assessment/Plan (PT)    Rehab Potential (PT) fair (P)   -     Criteria for Skilled Interventions Met (PT) yes (P)   -KL     Therapy Frequency (PT) daily (P)   -KL     Predicted Duration of Therapy Intervention (PT) 10 days (P)   -KL     Problem List (PT) problems related to;balance;cognition;coordination;mobility;range of motion (ROM);strength;pain;postural control (P)   -KL     Activity Limitations Related to Problem List (PT) unable to ambulate safely (P)   -       Row Name 10/29/24 1229          Therapy Plan Review/Discharge Plan (PT)    Therapy Plan Review (PT) evaluation/treatment results reviewed (P)   -       Row Name 10/29/24 1229          Physical Therapy Goals    Transfer Goal Selection (PT) transfer, PT goal 1 (P)   -KL     Gait Training Goal Selection (PT) gait training, PT goal 1 (P)   -KL     Strength Goal Selection (PT) strength, PT goal 1 (P)   -       Row Name 10/29/24 1229          Transfer Goal 1 (PT)    Activity/Assistive Device (Transfer Goal 1, PT) transfers, all (P)   -KL     [place] Level/Cues Needed (Transfer Goal 1, PT) independent (P)   -KL     Time Frame (Transfer Goal 1, PT) long term goal (LTG);10 days (P)   -       Row Name 10/29/24 1229          Gait  Training Goal 1 (PT)    Activity/Assistive Device (Gait Training Goal 1, PT) assistive device use (P)   -KL     [place] Level (Gait Training Goal 1, PT) independent;contact guard required (P)   -KL     Distance (Gait Training Goal 1, PT) 250 ft (P)   -KL     Time Frame (Gait Training Goal 1, PT) long term goal (LTG);10 days (P)   -KL       Row Name 10/29/24 1229          Strength Goal 1 (PT)    Strength Goal 1 (PT) Pt will achieve score of 5/5 for bilateral hip flex MMTs (P)   -KL     Time Frame (Strength Goal 1, PT) long term goal (LTG);10 days (P)   -KL               User Key  (r) = Recorded By, (t) = Taken By, (c) = Cosigned By      Initials Name Provider Type    Payton Amezquita, RN Registered Nurse    Jaky Bourne, PT Student PT Student                    Physical Therapy Education        No education to display                  PT Recommendation and Plan  Anticipated Discharge Disposition (PT): (P) sub acute care setting  Planned Therapy Interventions (PT): (P) balance training, bed mobility training, gait training, home exercise program, postural re-education, ROM (range of motion), stair training, strengthening, stretching, transfer training  Therapy Frequency (PT): (P) daily  Plan of Care Reviewed With: (P) patient  Progress: (P) no change  Outcome Evaluation: (P) Pt presents to treatment with complaints of fatigue and weakness in BLE. Had significant difficulty with ambulation and mobility. Pt would continue to benefit from the use of skilled physical therapy to address BLE strength and muscular endurance deficits required for ambulation.   Outcome Measures       Row Name 10/29/24 1200             How much help from another person do you currently need...    Turning from your back to your side while in flat bed without using bedrails? 3 (P)   -KL      Moving from lying on back to sitting on the side of a flat bed without bedrails? 3 (P)   -KL      Moving to and from a bed to a chair (including a  wheelchair)? 2 (P)   -KL      Standing up from a chair using your arms (e.g., wheelchair, bedside chair)? 3 (P)   -KL      Climbing 3-5 steps with a railing? 1 (P)   -KL      To walk in hospital room? 2 (P)   -KL      AM-PAC 6 Clicks Score (PT) 14 (P)   -KL         Functional Assessment    Outcome Measure Options AM-PAC 6 Clicks Basic Mobility (PT) (P)   -KL                User Key  (r) = Recorded By, (t) = Taken By, (c) = Cosigned By      Initials Name Provider Type    Jaky Bourne, PT Student PT Student                     Time Calculation:    PT Charges       Row Name 10/29/24 1229             Time Calculation    PT Received On 10/29/24  -PRAKASH (r) KL (t) PRAKASH (c)      PT Goal Re-Cert Due Date 24  -PRAKASH (r) KL (t) PRAKASH (c)         Time Calculation- PT    Total Timed Code Minutes- PT 25 minute(s)  -PRAKASH (r) KL (t) PRAKASH (c)         Untimed Charges    PT Eval/Re-eval Minutes 25  -PRAKASH (r) KL (t) PRAKASH (c)         Total Minutes    Untimed Charges Total Minutes 25  -PRAKASH (r) KL (t)       Total Minutes 25  -PRAKASH (r) KL (t)                User Key  (r) = Recorded By, (t) = Taken By, (c) = Cosigned By      Initials Name Provider Type    Royce Murphy, PT Physical Therapist    Jaky Bourne, PT Student PT Student                  Therapy Charges for Today       Code Description Service Date Service Provider Modifiers Qty    [id number] HC PT EVAL LOW COMPLEXITY 2 10/29/2024 Jaky Talamantes, PT Student GP 1            PT G-Codes  Outcome Measure Options: (P) AM-PAC 6 Clicks Basic Mobility (PT)  AM-PAC 6 Clicks Score (PT): (P) 14  AM-PAC 6 Clicks Score (OT): 18    Jaky Talamantes PT Student  10/29/2024      Electronically signed by Royce Carias PT at 10/29/24 1439          Occupational Therapy Notes (most recent note)        Gio Minaya, OT at 10/28/24 1023          Patient Name: Brian LOPEZ Tyson  : 1955    MRN: [id number]                              Today's Date: 10/28/2024       Admit Date: 10/26/2024    Visit Dx:      ICD-10-CM ICD-9-CM   1. Acute UTI  N39.0 599.0   2. Contusion of left hip, initial encounter  S70.02XA 924.01   3. Generalized weakness  R53.1 780.79     Patient Active Problem List   Diagnosis    CVA (cerebral vascular accident)    Essential hypertension    High cholesterol    Hip pain    Vitamin B12 deficiency    Kidney disorder    Chronic pain of both knees    Chronic HFrEF (heart failure with reduced ejection fraction)    LVH (left ventricular hypertrophy)    COPD (chronic obstructive pulmonary disease)    DM2 (diabetes mellitus, type 2)    Urinary tract infection without hematuria    Abnormal nuclear stress test    UTI (urinary tract infection)    Fall    Failure to thrive in adult    Nephrolithiasis     Past Medical History:   Diagnosis Date    CHF (congestive heart failure)     SEE'S DR [place] NO CURRENT S/S    COPD (chronic obstructive pulmonary disease)     INHALER    Diabetes mellitus     DOESN'T CHECK BG OFTEN AT HOME    Hyperlipidemia     Hypertension     Sepsis 09/14/2023    Stroke 2017    RIGHT SIDE WEAKNESS     Past Surgical History:   Procedure Laterality Date    APPENDECTOMY      EYE SURGERY Bilateral     MISTY CATARACT      General Information       Row Name 10/28/24 1023 10/28/24 1015       OT Time and Intention    Document Type therapy note (daily note)  -PG evaluation  -PG    Mode of Treatment individual therapy;occupational therapy  -PG individual therapy;occupational therapy  -PG      Row Name 10/28/24 1015          General Information    Patient Profile Reviewed yes  Patient resides with spouse.  Reports using both cane and rolling walker at home and receives minimal assistance with self-care activities.  Patient does not drive  -PG     Prior Level of Function min assist:;transfer;ADL's  -PG     Existing Precautions/Restrictions fall  -PG     Barriers to Rehab none identified  -PG       Row Name 10/28/24 1015          Occupational Profile    Reason for Services/Referral (Occupational Profile)  Patient is a 69-year-old male admitted for UTI generalized weakness and status post fall.  Patient is being evaluated by Occupational Therapy due to recent decline in ADL function.  No previous OT services identified  -PG       Row Name 10/28/24 1015          Living Environment    People in Home spouse  -PG       Row Name 10/28/24 1015          Cognition    Orientation Status (Cognition) oriented x 3  -PG       Row Name 10/28/24 1015          Safety Issues/Impairments Affecting Functional Mobility    Impairments Affecting Function (Mobility) balance;shortness of breath;strength;endurance/activity tolerance  -PG               User Key  (r) = Recorded By, (t) = Taken By, (c) = Cosigned By      Initials Name Provider Type    PG Gio Minaya, OT Occupational Therapist                     Mobility/ADL's       Row Name 10/28/24 1017          Transfers    Transfers bed-chair transfer  -PG       Row Name 10/28/24 1023 10/28/24 1017       Bed-Chair Transfer    Bed-Chair [place] (Transfers) minimum assist (75% patient effort);verbal cues  -PG minimum assist (75% patient effort);verbal cues  -PG    Assistive Device (Bed-Chair Transfers) walker, front-wheeled  -PG walker, front-wheeled  -PG      Row Name 10/28/24 1017          Activities of Daily Living    BADL Assessment/Intervention bathing;upper body dressing;lower body dressing;grooming;toileting  -PG       Row Name 10/28/24 1017          Bathing Assessment/Intervention    [place] Level (Bathing) bathing skills;moderate assist (50% patient effort)  -PG       Row Name 10/28/24 1017          Upper Body Dressing Assessment/Training    [place] Level (Upper Body Dressing) upper body dressing skills;set up  -PG       Row Name 10/28/24 1017          Lower Body Dressing Assessment/Training    [place] Level (Lower Body Dressing) lower body dressing skills;maximum assist (25% patient effort)  -       Row Name 10/28/24 1017          Grooming Assessment/Training     [place] Level (Grooming) grooming skills;set up  -PG       Row Name 10/28/24 1017          Toileting Assessment/Training    [place] Level (Toileting) toileting skills;dependent (less than 25% patient effort)  -PG               User Key  (r) = Recorded By, (t) = Taken By, (c) = Cosigned By      Initials Name Provider Type    PG Gio Minaya, OT Occupational Therapist                   Obj/Interventions       Row Name 10/28/24 1017          Sensory Assessment (Somatosensory)    Sensory Assessment (Somatosensory) unable/difficult to assess  -PG       Row Name 10/28/24 1017          Vision Assessment/Intervention    Visual Impairment/Limitations unable/difficult to assess  -PG       Row Name 10/28/24 1017          Range of Motion Comprehensive    General Range of Motion no range of motion deficits identified  -PG       Row Name 10/28/24 1017          Strength Comprehensive (MMT)    Comment, General Manual Muscle Testing (MMT) Assessment 4 - to 4/5 BUE  -PG       Row Name 10/28/24 1017          Motor Skills    Motor Skills coordination;functional endurance  -PG     Coordination WFL  -PG     Functional Endurance Fair minus  -PG               User Key  (r) = Recorded By, (t) = Taken By, (c) = Cosigned By      Initials Name Provider Type    PG Gio Minaya, OT Occupational Therapist                   Goals/Plan       Row Name 10/28/24 1019          Transfer Goal 1 (OT)    Activity/Assistive Device (Transfer Goal 1, OT) transfers, all  -PG     [place] Level/Cues Needed (Transfer Goal 1, OT) modified independence  -PG     Time Frame (Transfer Goal 1, OT) long term goal (LTG);10 days  -PG       Row Name 10/28/24 1019          Bathing Goal 1 (OT)    Activity/Device (Bathing Goal 1, OT) bathing skills, all  -PG     [place] Level/Cues Needed (Bathing Goal 1, OT) modified independence  -PG     Time Frame (Bathing Goal 1, OT) long term goal (LTG);10 days  -PG       Row Name 10/28/24 1019          Dressing  Goal 1 (OT)    Activity/Device (Dressing Goal 1, OT) dressing skills, all  -PG     [place]/Cues Needed (Dressing Goal 1, OT) modified independence  -PG     Time Frame (Dressing Goal 1, OT) long term goal (LTG);10 days  -PG       Row Name 10/28/24 1019          Toileting Goal 1 (OT)    Activity/Device (Toileting Goal 1, OT) toileting skills, all  -PG     [place] Level/Cues Needed (Toileting Goal 1, OT) modified independence  -PG     Time Frame (Toileting Goal 1, OT) long term goal (LTG);10 days  -PG       Row Name 10/28/24 1019          Grooming Goal 1 (OT)    Activity/Device (Grooming Goal 1, OT) grooming skills, all  -PG     [place] (Grooming Goal 1, OT) modified independence  -PG     Time Frame (Grooming Goal 1, OT) long term goal (LTG);10 days  -PG       Row Name 10/28/24 1019          Problem Specific Goal 1 (OT)    Problem Specific Goal 1 (OT) Patient will improve activity tolerance to fair plus to support independence and engagement with ADL activities  -PG     Time Frame (Problem Specific Goal 1, OT) long term goal (LTG);10 days  -PG       Row Name 10/28/24 1019          Therapy Assessment/Plan (OT)    Planned Therapy Interventions (OT) activity tolerance training;BADL retraining;strengthening exercise;transfer/mobility retraining;patient/caregiver education/training;occupation/activity based interventions  -PG               User Key  (r) = Recorded By, (t) = Taken By, (c) = Cosigned By      Initials Name Provider Type    PG Gio Minaya, OT Occupational Therapist                   Clinical Impression       Row Name 10/28/24 1018          Pain Assessment    Pretreatment Pain Rating 0/10 - no pain  -PG     Posttreatment Pain Rating 0/10 - no pain  -PG       Row Name 10/28/24 1018          Plan of Care Review    Plan of Care Reviewed With patient  -PG     Progress no change  -PG     Outcome Evaluation Patient presents with limitations affecting strength, activity tolerance, and balance impacting  patient's ability to return home safely and independently.  The skills of a therapist will be required to safely and effectively implement the following treatment plan to restore maximal level of function  -PG       Row Name 10/28/24 1018          Therapy Assessment/Plan (OT)    Patient/Family Therapy Goal Statement (OT) Get stronger and return home independently  -PG     Rehab Potential (OT) good  -PG     Criteria for Skilled Therapeutic Interventions Met (OT) yes;meets criteria;skilled treatment is necessary  -PG     Therapy Frequency (OT) 5 times/wk  -PG       Row Name 10/28/24 1018          Therapy Plan Review/Discharge Plan (OT)    Anticipated Discharge Disposition (OT) inpatient rehabilitation facility;sub acute care setting;skilled nursing facility  -PG               User Key  (r) = Recorded By, (t) = Taken By, (c) = Cosigned By      Initials Name Provider Type    PG Gio Minaya, OT Occupational Therapist                   Outcome Measures       Row Name 10/28/24 1020          How much help from another is currently needed...    Putting on and taking off regular lower body clothing? 2  -PG     Bathing (including washing, rinsing, and drying) 2  -PG     Toileting (which includes using toilet bed pan or urinal) 2  -PG     Putting on and taking off regular upper body clothing 4  -PG     Taking care of personal grooming (such as brushing teeth) 4  -PG     Eating meals 4  -PG     AM-PAC 6 Clicks Score (OT) 18  -PG       Row Name 10/28/24 0801 10/28/24 0200       How much help from another person do you currently need...    Turning from your back to your side while in flat bed without using bedrails? 3  -SS 3  -MF    Moving from lying on back to sitting on the side of a flat bed without bedrails? 2  -SS 2  -MF    Moving to and from a bed to a chair (including a wheelchair)? 2  -SS 2  -MF    Standing up from a chair using your arms (e.g., wheelchair, bedside chair)? 2  -SS 1  -MF    Climbing 3-5 steps with a  railing? 1  -SS 1  -MF    To walk in hospital room? 1  -SS 1  -MF    AM-PAC 6 Clicks Score (PT) 11  -SS 10  -MF      Row Name 10/28/24 1020          Functional Assessment    Outcome Measure Options AM-PAC 6 Clicks Daily Activity (OT);Optimal Instrument  -PG       Row Name 10/28/24 1020          Optimal Instrument    Optimal Instrument Optimal - 3  -PG     Bending/Stooping 3  -PG     Standing 2  -PG     Reaching 1  -PG     From the list, choose the 3 activities you would most like to be able to do without any difficulty Bending/stooping;Standing;Reaching  -PG     Total Score Optimal - 3 6  -PG               User Key  (r) = Recorded By, (t) = Taken By, (c) = Cosigned By      Initials Name Provider Type    PG Gio Minaya, KALEB Occupational Therapist    Nat Rocha LPN Licensed Nurse    Maricruz Jackson, RN Registered Nurse                    Occupational Therapy Education       Title: PT OT SLP Therapies (In Progress)       Topic: Occupational Therapy (In Progress)       Point: ADL training (In Progress)       Description:   Instruct learner(s) on proper safety adaptation and remediation techniques during self care or transfers.   Instruct in proper use of assistive devices.                  Learning Progress Summary            Patient Acceptance, D,E, NR by PG at 10/28/2024 1020                      Point: Home exercise program (In Progress)       Description:   Instruct learner(s) on appropriate technique for monitoring, assisting and/or progressing therapeutic exercises/activities.                  Learning Progress Summary            Patient Acceptance, D,E, NR by PG at 10/28/2024 1020                      Point: Precautions (In Progress)       Description:   Instruct learner(s) on prescribed precautions during self-care and functional transfers.                  Learning Progress Summary            Patient Acceptance, D,E, NR by PG at 10/28/2024 1020                      Point: Body mechanics (In  Progress)       Description:   Instruct learner(s) on proper positioning and spine alignment during self-care, functional mobility activities and/or exercises.                  Learning Progress Summary            Patient Acceptance, D,E, NR by PG at 10/28/2024 1020                                      User Key       Initials Effective Dates Name Provider Type Discipline    PG 06/16/21 -  Gio Minaya OT Occupational Therapist OT                  OT Recommendation and Plan  Planned Therapy Interventions (OT): activity tolerance training, BADL retraining, strengthening exercise, transfer/mobility retraining, patient/caregiver education/training, occupation/activity based interventions  Therapy Frequency (OT): 5 times/wk  Plan of Care Review  Plan of Care Reviewed With: patient  Progress: no change  Outcome Evaluation: Patient presents with limitations affecting strength, activity tolerance, and balance impacting patient's ability to return home safely and independently.  The skills of a therapist will be required to safely and effectively implement the following treatment plan to restore maximal level of function     Time Calculation:   Evaluation Complexity (OT)  Review Occupational Profile/Medical/Therapy History Complexity: brief/low complexity  Assessment, Occupational Performance/Identification of Deficit Complexity: 3-5 performance deficits  Clinical Decision Making Complexity (OT): problem focused assessment/low complexity  Overall Complexity of Evaluation (OT): low complexity     Time Calculation- OT       Row Name 10/28/24 1023 10/28/24 1022 10/28/24 1021       Time Calculation- OT    OT Received On -- 10/28/24  -PG 10/28/24  -PG    OT Goal Re-Cert Due Date -- 11/06/24  -PG 11/06/24  -PG       Timed Charges    78622 - OT Therapeutic Activity Minutes 10  -PG -- --       Untimed Charges    OT Eval/Re-eval Minutes -- 35  -PG --       Total Minutes    Timed Charges Total Minutes 10  -PG -- --    Untimed  Charges Total Minutes -- 35  -PG --     Total Minutes 10  -PG 35  -PG --              User Key  (r) = Recorded By, (t) = Taken By, (c) = Cosigned By      Initials Name Provider Type    PG Gio Minaya OT Occupational Therapist                  Therapy Charges for Today       Code Description Service Date Service Provider Modifiers Qty    [id number]  OT EVAL LOW COMPLEXITY 3 10/28/2024 Gio Minaya OT GO 1    [id number]  OT THERAPEUTIC ACT EA 15 MIN 10/28/2024 Gio Minaya OT GO 1                 Gio Minaya OT  10/28/2024    Electronically signed by Gio Minaya OT at 10/28/24 1024       Speech Language Pathology Notes (most recent note)    No notes exist for this encounter.

## 2024-10-30 NOTE — PROGRESS NOTES
[facility]   Hospitalist Progress Note  Date: 10/30/2024  Patient Name: Brian Mehta  : 1955  MRN: [id number]  Date of admission: 10/26/2024      Subjective   Subjective     Chief Complaint: Hip pain    Summary:  69 y.o. male PMH CHF, COPD, DM, HLD, HTN, continued tobacco dependence, history of CVA who presented with left hip pain and inability to ambulate.  Patient is unable to care for himself.  He was brought in covered in feces and urine.  Patient was apparently trying to board a transportation van/bus and fell going up the steps.  History taken from the wife is that the patient has not been able to care for himself at home due to weakness.  He has a history of CVA with residual right-sided weakness and usually walks with a walker or cane but has not been able to for the past 2 days.  Imaging was negative for acute fracture.  There was evidence of a UTI as well as prior stent noted on CT abdomen pelvis.  He was admitted for further care, started on Rocephin, urology consulted.  Mental status has improved.  Urology plans to follow-up outpatient for cystolitholapaxy with left ureteroscopy with laser and left ureteral stent exchange.  Will need rehab at discharge.    Interval Followup: No acute events overnight, patient resting comfortably in bed, still very weak and debilitated    Objective   Objective     Vitals:   Temp:  [97.5 °F (36.4 °C)-98.8 °F (37.1 °C)] 98.5 °F (36.9 °C)  Heart Rate:  [] 101  Resp:  [20] 20  BP: (119-163)/(68-86) 119/68  Physical Exam   GEN: No acute distress  HEENT: Moist mucous membranes  LUNGS: Equal chest rise bilaterally  CARDIAC: Regular rate and rhythm  NEURO: Moving all 4 extremities spontaneously  SKIN: No obvious breakdown    Result Review    I have personally reviewed the results below:  [x]  Laboratory personally reviewed BMP, CBC, blood sugars  []  Microbiology  []  Radiology  []  EKG/Telemetry   []  Cardiology/Vascular   []  Pathology  []  Old records  []   Other:  CBC          10/28/2024    05:20 10/29/2024    05:30 10/30/2024    05:42   CBC   WBC 5.78  4.82  4.29    RBC 4.83  4.97  4.97    Hemoglobin 12.5  12.8  12.6    Hematocrit 38.9  40.5  40.1    MCV 80.5  81.5  80.7    MCH 25.9  25.8  25.4    MCHC 32.1  31.6  31.4    RDW 15.9  16.0  15.8    Platelets 226  227  239      CMP          10/28/2024    05:20 10/29/2024    05:30 10/30/2024    05:42   CMP   Glucose 150  168  141    BUN 12  12  9    Creatinine 0.76  0.69  0.70    EGFR 98.5  101.4  101.0    Sodium 137  135  137    Potassium 4.2  3.9  4.1    Chloride 103  102  104    Calcium 8.8  8.7  9.0    Total Protein   6.8    Albumin   2.9    Globulin   3.9    Total Bilirubin   0.3    Alkaline Phosphatase   87    AST (SGOT)   9    ALT (SGPT)   5    Albumin/Globulin Ratio   0.7    BUN/Creatinine Ratio 15.8  17.4  12.9    Anion Gap 10.2  10.6  9.2        Assessment & Plan   Assessment / Plan   UTI due to unknown bacterial source  Hypertension  Type 2 diabetes mellitus  Failure to thrive in adult  Fall  Left ureteral stent  Hip pain  Continued tobacco dependence    Continue to monitor in the hospital for workup and management of the above  Discussed with urology-Patient will require cystolitholapaxy with left ureteroscopy with laser and left ureteral stent exchange. This would be in a few weeks after he is out of the hospital as outpatient   Continue with IV Rocephin for now, blood cultures negative, urine culture with E. coli  Continue nicotine patch as scheduled  Continue scheduled DuoNebs, Pulmicort and Brovana twice daily, albuterol as needed  Scheduled Tylenol 1 g 3 times daily, add Voltaren gel 4 times daily for hip and knee pain  Continue to monitor blood glucose, continue Lantus 10 units daily, sliding scale insulin  Continue appropriate home medications including antihypertensives  PT/OT consulted-recommend rehab.   aware and sending out referrals  CBC, CMP reviewed 10/30/2024   Repeat CBC, CMP, mag  and Phos in a.m. 10/30/2024      Discussed plan with RN, urology,     VTE Prophylaxis:  Pharmacologic VTE prophylaxis orders are present.        CODE STATUS:   Code Status (Patient has no pulse and is not breathing): CPR (Attempt to Resuscitate)  Medical Interventions (Patient has pulse or is breathing): Full Support      Electronically signed by Derrick Garland MD, 10/30/2024, 12:54 EDT.

## 2024-10-30 NOTE — TELEPHONE ENCOUNTER
----- Message from Ector Shad sent at 10/29/2024  6:15 AM EDT -----  Regarding: surgery    Patient needs to be scheduled for surgery - cystolitholapaxy with left ureteroscopy with laser and left ureteral stent exchange in 4 to 5 weeks, he is currently in the hospital    I would like to see him in the clinic in 2 weeks.  He would likely be in rehab at that time    He will need face-to-face couple weeks before.  History of CVA, I do not think he is on anticoagulation but we need to see if we can find out, patient is a very poor historian and so is his wife.

## 2024-10-30 NOTE — PLAN OF CARE
"Goal Outcome Evaluation:  Plan of Care Reviewed With: patient        Progress: no change  Outcome Evaluation: Pt displayed short-term memory loss and mild confusion this shift. He asked the same questions repeatedly. Pt also displayed attention seeking behaviors- he used the call light several times an hour and then would throw his call light on the floor and ask staff to \"find his controller\" when they responded to the call light. Patient also frequently yelled \"Help!Help!\" and when staff would respond he would ask for things like \"chips\" or ask \"What time is it?\"                             "

## 2024-10-30 NOTE — THERAPY TREATMENT NOTE
Patient Name: Brian Mehta  : 1955    MRN: [id number]                              Today's Date: 10/30/2024       Admit Date: 10/26/2024    Visit Dx:     ICD-10-CM ICD-9-CM   1. Acute UTI  N39.0 599.0   2. Contusion of left hip, initial encounter  S70.02XA 924.01   3. Generalized weakness  R53.1 780.79   4. Difficulty in walking  R26.2 719.7     Patient Active Problem List   Diagnosis    CVA (cerebral vascular accident)    Essential hypertension    High cholesterol    Hip pain    Vitamin B12 deficiency    Kidney disorder    Chronic pain of both knees    Chronic HFrEF (heart failure with reduced ejection fraction)    LVH (left ventricular hypertrophy)    COPD (chronic obstructive pulmonary disease)    DM2 (diabetes mellitus, type 2)    Urinary tract infection without hematuria    Abnormal nuclear stress test    UTI (urinary tract infection)    Fall    Failure to thrive in adult    Nephrolithiasis     Past Medical History:   Diagnosis Date    CHF (congestive heart failure)     SEE'S DR [place] NO CURRENT S/S    COPD (chronic obstructive pulmonary disease)     INHALER    Diabetes mellitus     DOESN'T CHECK BG OFTEN AT HOME    Hyperlipidemia     Hypertension     Sepsis 2023    Stroke 2017    RIGHT SIDE WEAKNESS     Past Surgical History:   Procedure Laterality Date    APPENDECTOMY      EYE SURGERY Bilateral     MISTY CATARACT      General Information       Row Name 10/30/24 1506          OT Time and Intention    Document Type therapy note (daily note)  -LF (r) TW (t) LF (c)     Mode of Treatment individual therapy;occupational therapy  -LF (r) TW (t) LF (c)       Row Name 10/30/24 1508          General Information    Existing Precautions/Restrictions fall  -LF (r) TW (t) LF (c)     Barriers to Rehab none identified  -LF (r) TW (t) LF (c)       Row Name 10/30/24 1503          Cognition    Orientation Status (Cognition) oriented x 3  -LF (r) TW (t) LF (c)       Row Name 10/30/24 1502          Safety  Issues/Impairments Affecting Functional Mobility    Impairments Affecting Function (Mobility) balance;range of motion (ROM);shortness of breath;strength;coordination  -LF (r) TW (t) LF (c)               User Key  (r) = Recorded By, (t) = Taken By, (c) = Cosigned By      Initials Name Provider Type    LF Lori Hurd OT Occupational Therapist    Roseanne Arana, OT Student OT Student                     Mobility/ADL's       Row Name 10/30/24 1510          Bed Mobility    Bed Mobility supine-sit  -LF (r) TW (t) LF (c)     Supine-Sit [place] (Bed Mobility) minimum assist (75% patient effort);1 person assist  -LF (r) TW (t) LF (c)     Assistive Device (Bed Mobility) bed rails;head of bed elevated;repositioning sheet  -LF (r) TW (t) LF (c)               User Key  (r) = Recorded By, (t) = Taken By, (c) = Cosigned By      Initials Name Provider Type    LF Lori Hurd OT Occupational Therapist    Roseanne Arana, OT Student OT Student                   Obj/Interventions       Row Name 10/30/24 1511          Shoulder (Therapeutic Exercise)    Shoulder (Therapeutic Exercise) AROM (active range of motion)  -LF (r) TW (t) LF (c)     Shoulder AROM (Therapeutic Exercise) bilateral;flexion;extension;horizontal aBduction/aDduction;2 sets;10 repetitions  -LF (r) TW (t) LF (c)       Row Name 10/30/24 1511          Elbow/Forearm (Therapeutic Exercise)    Elbow/Forearm (Therapeutic Exercise) AROM (active range of motion)  -LF (r) TW (t) LF (c)     Elbow/Forearm AROM (Therapeutic Exercise) bilateral;flexion;extension;supination;pronation;2 sets;10 repetitions  -LF (r) TW (t) LF (c)       Row Name 10/30/24 1511          Motor Skills    Motor Skills coordination;functional endurance  -LF (r) TW (t) LF (c)     Coordination WFL  -LF (r) TW (t) LF (c)     Functional Endurance fair-  -LF (r) TW (t) LF (c)     Therapeutic Exercise shoulder;elbow/forearm  -LF (r) TW (t) LF (c)       Row Name 10/30/24 1511          Balance     Balance Assessment sitting dynamic balance  -LF (r) TW (t) LF (c)     Dynamic Sitting Balance supervision  -LF (r) TW (t) LF (c)     Position, Sitting Balance unsupported;sitting edge of bed  -LF (r) TW (t) LF (c)     Balance Interventions sitting;dynamic;static;UE activity with balance activity;supported  -LF (r) TW (t) LF (c)               User Key  (r) = Recorded By, (t) = Taken By, (c) = Cosigned By      Initials Name Provider Type    LF Lori Hurd, OT Occupational Therapist    Roseanne Arana, OT Student OT Student                   Goals/Plan    No documentation.                  Clinical Impression       Row Name 10/30/24 1512          Plan of Care Review    Plan of Care Reviewed With patient;family  -LF (r) TW (t) LF (c)     Progress improving  -LF (r) TW (t) LF (c)     Outcome Evaluation Patient agreeable to AROM BUE exercises while seated unsupported at EOB, required min verbal and tactile cues for proper technique. He would continue to benefit from skilled occupational therapy services.  -LF (r) TW (t) LF (c)       Row Name 10/30/24 1512          Vital Signs    O2 Delivery Pre Treatment room air  -LF (r) TW (t) LF (c)     O2 Delivery Intra Treatment room air  -LF (r) TW (t) LF (c)     O2 Delivery Post Treatment room air  -LF (r) TW (t) LF (c)       Row Name 10/30/24 1512          Positioning and Restraints    Pre-Treatment Position in bed  no alarm active upon OT's arrival  -LF (r) TW (t) LF (c)     Post Treatment Position bed  -LF (r) TW (t) LF (c)     In Bed notified nsg;sitting EOB;call light within reach;encouraged to call for assist;with family/caregiver  -LF (r) TW (t) LF (c)               User Key  (r) = Recorded By, (t) = Taken By, (c) = Cosigned By      Initials Name Provider Type    LF Lori Hurd, OT Occupational Therapist    Roseanne Arana, OT Student OT Student                   Outcome Measures       Row Name 10/30/24 1514          How much help from another is currently  needed...    Putting on and taking off regular lower body clothing? 2  -LF (r) TW (t) LF (c)     Bathing (including washing, rinsing, and drying) 2  -LF (r) TW (t) LF (c)     Toileting (which includes using toilet bed pan or urinal) 2  -LF (r) TW (t) LF (c)     Putting on and taking off regular upper body clothing 4  -LF (r) TW (t) LF (c)     Taking care of personal grooming (such as brushing teeth) 4  -LF (r) TW (t) LF (c)     Eating meals 4  -LF (r) TW (t) LF (c)     AM-PAC 6 Clicks Score (OT) 18  -LF (r) TW (t)       Row Name 10/30/24 0827          How much help from another person do you currently need...    Turning from your back to your side while in flat bed without using bedrails? 3  -FL     Moving from lying on back to sitting on the side of a flat bed without bedrails? 2  -FL     Moving to and from a bed to a chair (including a wheelchair)? 2  -FL     Standing up from a chair using your arms (e.g., wheelchair, bedside chair)? 2  -FL     Climbing 3-5 steps with a railing? 1  -FL     To walk in hospital room? 1  -FL     AM-[facility] 6 Clicks Score (PT) 11  -FL     Highest Level of Mobility Goal 4 --> Transfer to chair/commode  -FL       Row Name 10/30/24 1514          Functional Assessment    Outcome Measure Options AM-[facility] 6 Clicks Daily Activity (OT);Optimal Instrument  -LF (r) TW (t) LF (c)       Row Name 10/30/24 1514          Optimal Instrument    Optimal Instrument Optimal - 3  -LF (r) TW (t) LF (c)     Bending/Stooping 3  -LF (r) TW (t) LF (c)     Standing 2  -LF (r) TW (t) LF (c)     Reaching 1  -LF (r) TW (t) LF (c)     From the list, choose the 3 activities you would most like to be able to do without any difficulty Bending/stooping;Standing;Reaching  -LF (r) TW (t) LF (c)     Total Score Optimal - 3 6  -LF (r) TW (t)               User Key  (r) = Recorded By, (t) = Taken By, (c) = Cosigned By      Initials Name Provider Type    Kaitlynn Staley RN Registered Nurse    Lori Dowd, OT Occupational  Therapist    Roseanne Arana, OT Student OT Student                    Occupational Therapy Education       Title: PT OT SLP Therapies (Done)       Topic: Occupational Therapy (Done)       Point: ADL training (Done)       Description:   Instruct learner(s) on proper safety adaptation and remediation techniques during self care or transfers.   Instruct in proper use of assistive devices.                  Learning Progress Summary            Patient Acceptance, E,TB, VU by  at 10/29/2024 1240    Acceptance, D,E, NR by PG at 10/28/2024 1020                      Point: Home exercise program (Done)       Description:   Instruct learner(s) on appropriate technique for monitoring, assisting and/or progressing therapeutic exercises/activities.                  Learning Progress Summary            Patient Acceptance, E,TB, VU by  at 10/29/2024 1240    Acceptance, D,E, NR by PG at 10/28/2024 1020                      Point: Precautions (Done)       Description:   Instruct learner(s) on prescribed precautions during self-care and functional transfers.                  Learning Progress Summary            Patient Acceptance, E,TB, VU by  at 10/29/2024 1240    Acceptance, D,E, NR by PG at 10/28/2024 1020                      Point: Body mechanics (Done)       Description:   Instruct learner(s) on proper positioning and spine alignment during self-care, functional mobility activities and/or exercises.                  Learning Progress Summary            Patient Acceptance, E,TB, VU by  at 10/29/2024 1240    Acceptance, D,E, NR by PG at 10/28/2024 1020                                      User Key       Initials Effective Dates Name Provider Type Discipline     06/16/21 -  Gio Minaya OT Occupational Therapist OT     08/27/24 -  Jaky Talamantes, ROLAND Student PT Student PT                  OT Recommendation and Plan     Plan of Care Review  Plan of Care Reviewed With: patient, family  Progress: improving  Outcome  Evaluation: Patient agreeable to AROM BUE exercises while seated unsupported at EOB, required min verbal and tactile cues for proper technique. He would continue to benefit from skilled occupational therapy services.     Time Calculation:         Time Calculation- OT       Row Name 10/30/24 1515             Time Calculation- OT    OT Received On 10/30/24  -LF (r) TW (t) LF (c)      OT Goal Re-Cert Due Date 11/06/24  -LF (r) TW (t) LF (c)         Timed Charges    19027 - OT Therapeutic Exercise Minutes 10  -LF (r) TW (t) LF (c)         Total Minutes    Timed Charges Total Minutes 10  -LF (r) TW (t)       Total Minutes 10  -LF (r) TW (t)                User Key  (r) = Recorded By, (t) = Taken By, (c) = Cosigned By      Initials Name Provider Type    LF Lori Hurd OT Occupational Therapist    TW Roseanne Ware OT Student OT Scott                  Therapy Charges for Today       Code Description Service Date Service Provider Modifiers Qty    [id number] HC OT THER PROC EA 15 MIN 10/30/2024 Roseanne Ware OT Student GO 1                 KALEB Kohler  10/30/2024

## 2024-10-30 NOTE — PLAN OF CARE
Goal Outcome Evaluation:  Plan of Care Reviewed With: patient        Progress: improving  Outcome Evaluation: Sit up in recliner with meals. Required insulin with lunch and dinner. tolerated dressing change. No complaints. Family visited at bedside today. See discharge care plans for further details.

## 2024-10-31 ENCOUNTER — PATIENT OUTREACH (OUTPATIENT)
Dept: CASE MANAGEMENT | Facility: OTHER | Age: 69
End: 2024-10-31
Payer: MEDICARE

## 2024-10-31 ENCOUNTER — TELEPHONE (OUTPATIENT)
Dept: UROLOGY | Age: 69
End: 2024-10-31
Payer: MEDICARE

## 2024-10-31 DIAGNOSIS — I50.22 CHRONIC HFREF (HEART FAILURE WITH REDUCED EJECTION FRACTION): Primary | ICD-10-CM

## 2024-10-31 DIAGNOSIS — R53.1 WEAKNESS: ICD-10-CM

## 2024-10-31 PROBLEM — N21.0 BLADDER STONE: Status: ACTIVE | Noted: 2024-10-28

## 2024-10-31 LAB
ALBUMIN SERPL-MCNC: 2.9 G/DL (ref 3.5–5.2)
ALBUMIN/GLOB SERPL: 0.7 G/DL
ALP SERPL-CCNC: 86 U/L (ref 39–117)
ALT SERPL W P-5'-P-CCNC: 5 U/L (ref 1–41)
ANION GAP SERPL CALCULATED.3IONS-SCNC: 10 MMOL/L (ref 5–15)
AST SERPL-CCNC: 10 U/L (ref 1–40)
BASOPHILS # BLD AUTO: 0.04 10*3/MM3 (ref 0–0.2)
BASOPHILS NFR BLD AUTO: 0.7 % (ref 0–1.5)
BILIRUB SERPL-MCNC: 0.3 MG/DL (ref 0–1.2)
BUN SERPL-MCNC: 10 MG/DL (ref 8–23)
BUN/CREAT SERPL: 12.5 (ref 7–25)
CALCIUM SPEC-SCNC: 8.8 MG/DL (ref 8.6–10.5)
CHLORIDE SERPL-SCNC: 102 MMOL/L (ref 98–107)
CO2 SERPL-SCNC: 23 MMOL/L (ref 22–29)
CREAT SERPL-MCNC: 0.8 MG/DL (ref 0.76–1.27)
DEPRECATED RDW RBC AUTO: 46.7 FL (ref 37–54)
EGFRCR SERPLBLD CKD-EPI 2021: 97 ML/MIN/1.73
EOSINOPHIL # BLD AUTO: 0.11 10*3/MM3 (ref 0–0.4)
EOSINOPHIL NFR BLD AUTO: 2 % (ref 0.3–6.2)
ERYTHROCYTE [DISTWIDTH] IN BLOOD BY AUTOMATED COUNT: 15.9 % (ref 12.3–15.4)
GLOBULIN UR ELPH-MCNC: 4 GM/DL
GLUCOSE BLDC GLUCOMTR-MCNC: 118 MG/DL (ref 70–99)
GLUCOSE BLDC GLUCOMTR-MCNC: 167 MG/DL (ref 70–99)
GLUCOSE BLDC GLUCOMTR-MCNC: 184 MG/DL (ref 70–99)
GLUCOSE BLDC GLUCOMTR-MCNC: 193 MG/DL (ref 70–99)
GLUCOSE SERPL-MCNC: 102 MG/DL (ref 65–99)
HCT VFR BLD AUTO: 39.5 % (ref 37.5–51)
HGB BLD-MCNC: 12.6 G/DL (ref 13–17.7)
IMM GRANULOCYTES # BLD AUTO: 0.01 10*3/MM3 (ref 0–0.05)
IMM GRANULOCYTES NFR BLD AUTO: 0.2 % (ref 0–0.5)
LYMPHOCYTES # BLD AUTO: 3.17 10*3/MM3 (ref 0.7–3.1)
LYMPHOCYTES NFR BLD AUTO: 58.5 % (ref 19.6–45.3)
MAGNESIUM SERPL-MCNC: 1.7 MG/DL (ref 1.6–2.4)
MCH RBC QN AUTO: 25.9 PG (ref 26.6–33)
MCHC RBC AUTO-ENTMCNC: 31.9 G/DL (ref 31.5–35.7)
MCV RBC AUTO: 81.3 FL (ref 79–97)
MONOCYTES # BLD AUTO: 0.49 10*3/MM3 (ref 0.1–0.9)
MONOCYTES NFR BLD AUTO: 9 % (ref 5–12)
NEUTROPHILS NFR BLD AUTO: 1.6 10*3/MM3 (ref 1.7–7)
NEUTROPHILS NFR BLD AUTO: 29.6 % (ref 42.7–76)
NRBC BLD AUTO-RTO: 0 /100 WBC (ref 0–0.2)
PHOSPHATE SERPL-MCNC: 3.5 MG/DL (ref 2.5–4.5)
PLATELET # BLD AUTO: 250 10*3/MM3 (ref 140–450)
PMV BLD AUTO: 9.5 FL (ref 6–12)
POTASSIUM SERPL-SCNC: 4 MMOL/L (ref 3.5–5.2)
PROT SERPL-MCNC: 6.9 G/DL (ref 6–8.5)
RBC # BLD AUTO: 4.86 10*6/MM3 (ref 4.14–5.8)
RBC MORPH BLD: NORMAL
SMALL PLATELETS BLD QL SMEAR: ADEQUATE
SODIUM SERPL-SCNC: 135 MMOL/L (ref 136–145)
WBC MORPH BLD: NORMAL
WBC NRBC COR # BLD AUTO: 5.42 10*3/MM3 (ref 3.4–10.8)

## 2024-10-31 PROCEDURE — 85025 COMPLETE CBC W/AUTO DIFF WBC: CPT | Performed by: INTERNAL MEDICINE

## 2024-10-31 PROCEDURE — 83735 ASSAY OF MAGNESIUM: CPT | Performed by: INTERNAL MEDICINE

## 2024-10-31 PROCEDURE — 63710000001 INSULIN LISPRO (HUMAN) PER 5 UNITS: Performed by: STUDENT IN AN ORGANIZED HEALTH CARE EDUCATION/TRAINING PROGRAM

## 2024-10-31 PROCEDURE — 84100 ASSAY OF PHOSPHORUS: CPT | Performed by: INTERNAL MEDICINE

## 2024-10-31 PROCEDURE — 82948 REAGENT STRIP/BLOOD GLUCOSE: CPT | Performed by: STUDENT IN AN ORGANIZED HEALTH CARE EDUCATION/TRAINING PROGRAM

## 2024-10-31 PROCEDURE — 63710000001 INSULIN GLARGINE PER 5 UNITS: Performed by: INTERNAL MEDICINE

## 2024-10-31 PROCEDURE — 94799 UNLISTED PULMONARY SVC/PX: CPT

## 2024-10-31 PROCEDURE — 80053 COMPREHEN METABOLIC PANEL: CPT | Performed by: INTERNAL MEDICINE

## 2024-10-31 PROCEDURE — 25010000002 HEPARIN (PORCINE) PER 1000 UNITS: Performed by: STUDENT IN AN ORGANIZED HEALTH CARE EDUCATION/TRAINING PROGRAM

## 2024-10-31 PROCEDURE — 99232 SBSQ HOSP IP/OBS MODERATE 35: CPT | Performed by: INTERNAL MEDICINE

## 2024-10-31 PROCEDURE — 85007 BL SMEAR W/DIFF WBC COUNT: CPT | Performed by: INTERNAL MEDICINE

## 2024-10-31 PROCEDURE — 25010000002 CEFTRIAXONE PER 250 MG: Performed by: INTERNAL MEDICINE

## 2024-10-31 PROCEDURE — 82948 REAGENT STRIP/BLOOD GLUCOSE: CPT

## 2024-10-31 RX ADMIN — DICLOFENAC SODIUM 2 G: 10 GEL TOPICAL at 08:39

## 2024-10-31 RX ADMIN — WHITE PETROLATUM 1 APPLICATION: 1.75 OINTMENT TOPICAL at 10:54

## 2024-10-31 RX ADMIN — ACETAMINOPHEN 1000 MG: 500 TABLET ORAL at 08:35

## 2024-10-31 RX ADMIN — CEFTRIAXONE SODIUM 1000 MG: 1 INJECTION, POWDER, FOR SOLUTION INTRAMUSCULAR; INTRAVENOUS at 03:21

## 2024-10-31 RX ADMIN — ACETAMINOPHEN 1000 MG: 500 TABLET ORAL at 15:32

## 2024-10-31 RX ADMIN — Medication 10 ML: at 08:40

## 2024-10-31 RX ADMIN — ASPIRIN 81 MG: 81 TABLET, CHEWABLE ORAL at 08:35

## 2024-10-31 RX ADMIN — DICLOFENAC SODIUM 2 G: 10 GEL TOPICAL at 12:01

## 2024-10-31 RX ADMIN — DICLOFENAC SODIUM 2 G: 10 GEL TOPICAL at 21:41

## 2024-10-31 RX ADMIN — Medication 10 ML: at 21:41

## 2024-10-31 RX ADMIN — INSULIN LISPRO 2 UNITS: 100 INJECTION, SOLUTION INTRAVENOUS; SUBCUTANEOUS at 21:40

## 2024-10-31 RX ADMIN — INSULIN LISPRO 2 UNITS: 100 INJECTION, SOLUTION INTRAVENOUS; SUBCUTANEOUS at 12:10

## 2024-10-31 RX ADMIN — ISOSORBIDE MONONITRATE 30 MG: 30 TABLET, EXTENDED RELEASE ORAL at 08:36

## 2024-10-31 RX ADMIN — HEPARIN SODIUM 5000 UNITS: 5000 INJECTION INTRAVENOUS; SUBCUTANEOUS at 21:40

## 2024-10-31 RX ADMIN — INSULIN LISPRO 2 UNITS: 100 INJECTION, SOLUTION INTRAVENOUS; SUBCUTANEOUS at 17:19

## 2024-10-31 RX ADMIN — LOSARTAN POTASSIUM 25 MG: 25 TABLET, FILM COATED ORAL at 08:35

## 2024-10-31 RX ADMIN — DICLOFENAC SODIUM 2 G: 10 GEL TOPICAL at 17:18

## 2024-10-31 RX ADMIN — NICOTINE 1 PATCH: 21 PATCH, EXTENDED RELEASE TRANSDERMAL at 08:39

## 2024-10-31 RX ADMIN — ATORVASTATIN CALCIUM 20 MG: 20 TABLET, FILM COATED ORAL at 21:40

## 2024-10-31 RX ADMIN — INSULIN GLARGINE 10 UNITS: 100 INJECTION, SOLUTION SUBCUTANEOUS at 08:35

## 2024-10-31 NOTE — PLAN OF CARE
Goal Outcome Evaluation:  Plan of Care Reviewed With: patient        Progress: improving  Outcome Evaluation: Pt alert and oriented x3, disoriented to time. Pt has intermittent confusion. Pt is an assist x2 to turn. External catheter placed on pt due to incontience and bedrest. Pt had scheduled tylenol for pain control and stated the intervention was effective. Awaiting rehab placement.

## 2024-10-31 NOTE — OUTREACH NOTE
[facility] End of Month Documentation    This Chronic Medical Management Care Plan for Brian Mehta, 69 y.o. male, has been monitored and managed; reviewed and a new plan of care implemented for the month of October.  A cumulative time of 51  minutes was spent on this patient record this month, including phone call with relative; chart review; phone call with other providers; electronic communication with other providers.    Regarding the patient's problems: has CVA (cerebral vascular accident); Essential hypertension; High cholesterol; Hip pain; Vitamin B12 deficiency; Kidney disorder; Chronic pain of both knees; Chronic HFrEF (heart failure with reduced ejection fraction); LVH (left ventricular hypertrophy); COPD (chronic obstructive pulmonary disease); DM2 (diabetes mellitus, type 2); Urinary tract infection without hematuria; Abnormal nuclear stress test; UTI (urinary tract infection); Fall; Failure to thrive in adult; Nephrolithiasis; and Bladder stone on their problem list., the following items were addressed: medical records; medications and any changes can be found within the plan section of the note.  A detailed listing of time spent for chronic care management is tracked within each outreach encounter.  Current medications include:  has a current medication list which includes the following prescription(s): aspirin, atorvastatin, dexcom g6 , furosemide, hydrochlorothiazide, lantus solostar, isosorbide mononitrate, jardiance, losartan, and nicotine, and the following Facility-Administered Medications: acetaminophen **OR** acetaminophen **OR** acetaminophen, acetaminophen, albuterol, arformoterol, aspirin, atorvastatin, sennosides-docusate **AND** polyethylene glycol **AND** bisacodyl **AND** bisacodyl, budesonide, dextrose, dextrose, diclofenac sodium, glucagon, heparin (porcine), insulin glargine, insulin lispro, ipratropium-albuterol, isosorbide mononitrate, losartan, mineral oil-hydrophilic petrolatum,  nicotine, ondansetron odt **OR** ondansetron, sodium chloride, sodium chloride, sodium chloride. and the patient is reported to be patient is compliant with medication protocol,  Medications are reported to be effective.  Regarding these diagnoses, referrals were made to the following provider(s):  n/a.  All notes on chart for PCP to review.    The patient was monitored remotely for activity level; medications; blood glucose.    The patient's physical needs include:  DME supplies; physical healthcare; needs assistance with ADLs.     The patient's mental support needs include:  continued support    The patient's cognitive support needs include:  needs assistance with ADLs; requires supervision; household care; health care; personal care    The patient's psychosocial support needs include:  continued support    The patient's functional needs include: DME; physical healthcare; needs assistance for ADLs    The patient's environmental needs include:  an unsafe living environment    Care Plan overall comments:  No data recorded    Refer to previous outreach notes for more information on the areas listed above.    Monthly Billing Diagnoses  (I50.22) Chronic HFrEF (heart failure with reduced ejection fraction)    (R53.1) Weakness    Medications   Medications have been reconciled    Care Plan progress this month:      Recently Modified Care Plans Updates made since 9/30/2024 12:00 AM      No recently modified care plans.            Instructions   Patient was provided an electronic copy of care plan  CCM services were explained and offered and patient has accepted these services.  Patient has given their written consent to receive CCM services and understands that this includes the authorization of electronic communication of medical information with the other treating providers.  Patient understands that they may stop CCM services at any time and these changes will be effective at the end of the calendar month and will  effectively revocate the agreement of CCM services.  Patient understands that only one practitioner can furnish and be paid for CCM services during one calendar month.  Patient also understands that there may be co-payment or deductible fees in association with CCM services.  Patient will continue with at least monthly follow-up calls with the Ambulatory .    YANDEL GONZALEZ  Ambulatory Case Management    10/31/2024, 10:26 EDT

## 2024-10-31 NOTE — SIGNIFICANT NOTE
Wound Eval / Progress Noted    AFSHAN Funez     Patient Name: Brian Mehta  : 1955  MRN: [id number]  Today's Date: 10/31/2024                 Admit Date: 10/26/2024    Visit Dx:    ICD-10-CM ICD-9-CM   1. Acute UTI  N39.0 599.0   2. Contusion of left hip, initial encounter  S70.02XA 924.01   3. Generalized weakness  R53.1 780.79   4. Difficulty in walking  R26.2 719.7         UTI (urinary tract infection)    Essential hypertension    Hip pain    DM2 (diabetes mellitus, type 2)    Fall    Failure to thrive in adult        Past Medical History:   Diagnosis Date    CHF (congestive heart failure)     SEE'S DR [place] NO CURRENT S/S    COPD (chronic obstructive pulmonary disease)     INHALER    Diabetes mellitus     DOESN'T CHECK BG OFTEN AT HOME    Hyperlipidemia     Hypertension     Sepsis 2023    Stroke 2017    RIGHT SIDE WEAKNESS        Past Surgical History:   Procedure Laterality Date    APPENDECTOMY      EYE SURGERY Bilateral     MISTY CATARACT         Physical Assessment:  Wound 10/27/24 0409 Left gluteal MASD (moisture associated skin damage) (Active)   Wound Image   10/31/24 1234   Pressure Injury Stage 2 10/30/24 2142   Dressing Appearance moist drainage 10/31/24 1324   Closure Adhesive bandage 10/31/24 1324   Base pink;red 10/31/24 1324   Periwound dry;intact;pink;redness;maroon/purple 10/31/24 1324   Periwound Temperature warm 10/31/24 1324   Periwound Skin Turgor soft 10/31/24 1324   Edges open 10/31/24 1324   Drainage Characteristics/Odor serosanguineous 10/31/24 1324   Drainage Amount none 10/31/24 1324   Care, Wound cleansed with;sterile normal saline 10/31/24 1234   Dressing Care dressing changed 10/31/24 1324   Periwound Care absorptive dressing applied 10/31/24 1234       Wound 10/31/24 1234 Bilateral gluteal (Active)   Wound Image   10/31/24 1234   Dressing Appearance open to air 10/31/24 1234   Closure None 10/31/24 1234   Base moist;pink;red;blanchable 10/31/24 1234   Periwound  dry;intact;maroon/purple;other (see comments) 10/31/24 1234   Periwound Temperature warm 10/31/24 1234   Periwound Skin Turgor soft 10/31/24 1234   Edges open 10/31/24 1234   Drainage Characteristics/Odor serosanguineous 10/31/24 1234   Drainage Amount scant 10/31/24 1234   Care, Wound cleansed with;sterile normal saline 10/31/24 1234   Dressing Care dressing applied;hydrofiber;silver impregnated;silicone border foam 10/31/24 1234   Periwound Care absorptive dressing applied 10/31/24 1234      Wound Check / Follow-up: Patient seen today for wound follow-up.  Patient is awake, alert and oriented at time of visit.  He is agreeable to assessment and dressing change.    Wound to left lateral hip/gluteal area with red, moist blanchable tissue to wound base.  Periwound tissue is pink, dry and intact.  Cleansed wound with normal saline and gauze.  Wound is showing improvement compared to last assessment.  Applied silver impregnated Hydrofiber and secured with silicone border dressing.  Recommending to continue daily dressing changes.    Crusting to bilateral gluteal aspects has resolved.  Dry, red to maroon blanchable tissue is present to bilateral gluteal aspects.  Recommending to discontinue use of Aquaphor ointment and transition to use of blue top barrier cream.  2 small open areas are noted to bilateral gluteal aspects.  Moist, pink to red blanchable tissue is present to wound bases.  Periwound tissue is dry and intact.  Cleansed wounds with normal saline and gauze.  Applied silver impregnated Hydrofiber and secured with silicone border dressing.  Recommending daily dressing changes.    Bilateral heels intact without discoloration.    Recommending to continue pressure reduction measures including every 2 hour turns and offloading heels at all times.      Impression: Traumatic injury to left lateral hip/gluteal aspect.  MASD to bilateral gluteal aspects.  Red/maroon discoloration to bilateral gluteal  aspects.      Short term goals: Regain skin integrity, skin protection, moisture prevention, pressure reduction, skin care, daily dressing changes.    Alanna Morris RN    10/31/2024    15:14 EDT

## 2024-10-31 NOTE — TELEPHONE ENCOUNTER
Spoke to Jumana (wife) to let her know Im mailing out a surgery brochure with PAT and surgery appt for Mr Mehta.  She verbalized understanding.

## 2024-10-31 NOTE — PROGRESS NOTES
[facility]   Hospitalist Progress Note  Date: 10/31/2024  Patient Name: Brian Mehta  : 1955  MRN: [id number]  Date of admission: 10/26/2024      Subjective   Subjective     Chief Complaint: Hip pain    Summary:  69 y.o. male PMH CHF, COPD, DM, HLD, HTN, continued tobacco dependence, history of CVA who presented with left hip pain and inability to ambulate.  Patient is unable to care for himself.  He was brought in covered in feces and urine.  Patient was apparently trying to board a transportation van/bus and fell going up the steps.  History taken from the wife is that the patient has not been able to care for himself at home due to weakness.  He has a history of CVA with residual right-sided weakness and usually walks with a walker or cane but has not been able to for the past 2 days.  Imaging was negative for acute fracture.  There was evidence of a UTI as well as prior stent noted on CT abdomen pelvis.  He was admitted for further care, started on Rocephin, urology consulted.  Mental status has improved.  Urology plans to follow-up outpatient for cystolitholapaxy with left ureteroscopy with laser and left ureteral stent exchange.  Will need rehab at discharge.    Interval Followup: No acute events overnight, patient still very weak and debilitated    Objective   Objective     Vitals:   Temp:  [97.6 °F (36.4 °C)-98.3 °F (36.8 °C)] 98.3 °F (36.8 °C)  Heart Rate:  [] 95  Resp:  [18-20] 20  BP: (106-143)/(66-84) 107/66  Physical Exam   GEN: No acute distress  HEENT: Moist mucous membranes  LUNGS: Equal chest rise bilaterally  CARDIAC: Regular rate and rhythm  NEURO: Moving all 4 extremities spontaneously  SKIN: No obvious breakdown    Result Review    I have personally reviewed the results below:  [x]  Laboratory personally reviewed BMP, CBC, blood sugars  []  Microbiology  []  Radiology  []  EKG/Telemetry   []  Cardiology/Vascular   []  Pathology  []  Old records  []  Other:  CBC           10/29/2024    05:30 10/30/2024    05:42 10/31/2024    05:27   CBC   WBC 4.82  4.29  5.42    RBC 4.97  4.97  4.86    Hemoglobin 12.8  12.6  12.6    Hematocrit 40.5  40.1  39.5    MCV 81.5  80.7  81.3    MCH 25.8  25.4  25.9    MCHC 31.6  31.4  31.9    RDW 16.0  15.8  15.9    Platelets 227  239  250      CMP          10/29/2024    05:30 10/30/2024    05:42 10/31/2024    05:27   CMP   Glucose 168  141  102    BUN 12  9  10    Creatinine 0.69  0.70  0.80    EGFR 101.4  101.0  97.0    Sodium 135  137  135    Potassium 3.9  4.1  4.0    Chloride 102  104  102    Calcium 8.7  9.0  8.8    Total Protein  6.8  6.9    Albumin  2.9  2.9    Globulin  3.9  4.0    Total Bilirubin  0.3  0.3    Alkaline Phosphatase  87  86    AST (SGOT)  9  10    ALT (SGPT)  5  5    Albumin/Globulin Ratio  0.7  0.7    BUN/Creatinine Ratio 17.4  12.9  12.5    Anion Gap 10.6  9.2  10.0        Assessment & Plan   Assessment / Plan   UTI due to unknown bacterial source  Hypertension  Type 2 diabetes mellitus  Failure to thrive in adult  Fall  Left ureteral stent  Hip pain  Continued tobacco dependence    Continue to monitor in the hospital for workup and management of the above  Discussed with urology-Patient will require cystolitholapaxy with left ureteroscopy with laser and left ureteral stent exchange. This would be in a few weeks after he is out of the hospital as outpatient   Continue with IV Rocephin for now, blood cultures negative, urine culture with E. coli  Continue nicotine patch as scheduled  Continue scheduled DuoNebs, Pulmicort and Brovana twice daily, albuterol as needed  Scheduled Tylenol 1 g 3 times daily, add Voltaren gel 4 times daily for hip and knee pain  Continue to monitor blood glucose, continue Lantus 10 units daily, sliding scale insulin  Continue appropriate home medications including antihypertensives  PT/OT consulted-recommend rehab.   [facility] and sending out referrals  CBC, CMP reviewed 10/31/2024   Repeat CBC,  CMP, mag and Phos in a.m. 10/31/2024      Discussed plan with RN, urology,     VTE Prophylaxis:  Pharmacologic VTE prophylaxis orders are present.        CODE STATUS:   Code Status (Patient has no pulse and is not breathing): CPR (Attempt to Resuscitate)  Medical Interventions (Patient has pulse or is breathing): Full Support      Electronically signed by Derrick Garland MD, 10/31/2024, 12:48 EDT.

## 2024-10-31 NOTE — OUTREACH NOTE
AMBULATORY CASE MANAGEMENT NOTE    Names and Relationships of Patient/Support Persons: Contact: FERDINAND CATALAN; Relationship: Emergency Contact -     [facility] Interim Update    Received call from patients wife stating that [facility] was needing physician order and insurance initiation before patient could be discharged to them. I educated patient that this will be completed by inpatient case management/providers. I did offer to call inpatient case management to get an update on where they were at in the process so that I could call her back and give her an update.    Care Coordination    Called Cristine JACOBO with inpatient CM and left message to call back with update.        Education Documentation  No documentation found.        YANDEL GONZALEZ  Ambulatory Case Management    10/31/2024, 12:15 EDT

## 2024-10-31 NOTE — PLAN OF CARE
Goal Outcome Evaluation:  Plan of Care Reviewed With: patient        Progress: improving  Outcome Evaluation: Pt a/ox3-4. room air. Up to chair with heavy 2 assist. Pain med given as per mar. Rehab placement accepted. Possible discharge in day or two as strength improves. continue with plan of care.

## 2024-11-01 ENCOUNTER — PATIENT OUTREACH (OUTPATIENT)
Dept: CASE MANAGEMENT | Facility: OTHER | Age: 69
End: 2024-11-01
Payer: MEDICARE

## 2024-11-01 DIAGNOSIS — I50.22 CHRONIC HFREF (HEART FAILURE WITH REDUCED EJECTION FRACTION): Primary | ICD-10-CM

## 2024-11-01 DIAGNOSIS — E11.9 TYPE 2 DIABETES MELLITUS WITHOUT COMPLICATION, WITHOUT LONG-TERM CURRENT USE OF INSULIN: ICD-10-CM

## 2024-11-01 DIAGNOSIS — Z86.73 HISTORY OF STROKE: ICD-10-CM

## 2024-11-01 DIAGNOSIS — R53.1 WEAKNESS: ICD-10-CM

## 2024-11-01 LAB
ALBUMIN SERPL-MCNC: 3 G/DL (ref 3.5–5.2)
ALBUMIN/GLOB SERPL: 0.8 G/DL
ALP SERPL-CCNC: 89 U/L (ref 39–117)
ALT SERPL W P-5'-P-CCNC: 6 U/L (ref 1–41)
ANION GAP SERPL CALCULATED.3IONS-SCNC: 9.1 MMOL/L (ref 5–15)
AST SERPL-CCNC: 10 U/L (ref 1–40)
BACTERIA SPEC AEROBE CULT: NORMAL
BACTERIA SPEC AEROBE CULT: NORMAL
BASOPHILS # BLD AUTO: 0.03 10*3/MM3 (ref 0–0.2)
BASOPHILS NFR BLD AUTO: 0.6 % (ref 0–1.5)
BILIRUB SERPL-MCNC: 0.3 MG/DL (ref 0–1.2)
BUN SERPL-MCNC: 17 MG/DL (ref 8–23)
BUN/CREAT SERPL: 21.8 (ref 7–25)
CALCIUM SPEC-SCNC: 8.5 MG/DL (ref 8.6–10.5)
CHLORIDE SERPL-SCNC: 101 MMOL/L (ref 98–107)
CO2 SERPL-SCNC: 22.9 MMOL/L (ref 22–29)
CREAT SERPL-MCNC: 0.78 MG/DL (ref 0.76–1.27)
DEPRECATED RDW RBC AUTO: 47.1 FL (ref 37–54)
EGFRCR SERPLBLD CKD-EPI 2021: 97.7 ML/MIN/1.73
EOSINOPHIL # BLD AUTO: 0.1 10*3/MM3 (ref 0–0.4)
EOSINOPHIL NFR BLD AUTO: 1.9 % (ref 0.3–6.2)
ERYTHROCYTE [DISTWIDTH] IN BLOOD BY AUTOMATED COUNT: 15.9 % (ref 12.3–15.4)
GLOBULIN UR ELPH-MCNC: 3.8 GM/DL
GLUCOSE BLDC GLUCOMTR-MCNC: 122 MG/DL (ref 70–99)
GLUCOSE BLDC GLUCOMTR-MCNC: 206 MG/DL (ref 70–99)
GLUCOSE BLDC GLUCOMTR-MCNC: 233 MG/DL (ref 70–99)
GLUCOSE BLDC GLUCOMTR-MCNC: 237 MG/DL (ref 70–99)
GLUCOSE SERPL-MCNC: 129 MG/DL (ref 65–99)
HCT VFR BLD AUTO: 40.1 % (ref 37.5–51)
HGB BLD-MCNC: 12.9 G/DL (ref 13–17.7)
IMM GRANULOCYTES # BLD AUTO: 0.01 10*3/MM3 (ref 0–0.05)
IMM GRANULOCYTES NFR BLD AUTO: 0.2 % (ref 0–0.5)
LYMPHOCYTES # BLD AUTO: 3.21 10*3/MM3 (ref 0.7–3.1)
LYMPHOCYTES NFR BLD AUTO: 61.4 % (ref 19.6–45.3)
MAGNESIUM SERPL-MCNC: 1.6 MG/DL (ref 1.6–2.4)
MCH RBC QN AUTO: 26.1 PG (ref 26.6–33)
MCHC RBC AUTO-ENTMCNC: 32.2 G/DL (ref 31.5–35.7)
MCV RBC AUTO: 81.2 FL (ref 79–97)
MONOCYTES # BLD AUTO: 0.47 10*3/MM3 (ref 0.1–0.9)
MONOCYTES NFR BLD AUTO: 9 % (ref 5–12)
NEUTROPHILS NFR BLD AUTO: 1.41 10*3/MM3 (ref 1.7–7)
NEUTROPHILS NFR BLD AUTO: 26.9 % (ref 42.7–76)
NRBC BLD AUTO-RTO: 0 /100 WBC (ref 0–0.2)
PHOSPHATE SERPL-MCNC: 2.9 MG/DL (ref 2.5–4.5)
PLATELET # BLD AUTO: 255 10*3/MM3 (ref 140–450)
PMV BLD AUTO: 9.3 FL (ref 6–12)
POTASSIUM SERPL-SCNC: 3.9 MMOL/L (ref 3.5–5.2)
PROT SERPL-MCNC: 6.8 G/DL (ref 6–8.5)
RBC # BLD AUTO: 4.94 10*6/MM3 (ref 4.14–5.8)
RBC MORPH BLD: NORMAL
SMALL PLATELETS BLD QL SMEAR: ADEQUATE
SODIUM SERPL-SCNC: 133 MMOL/L (ref 136–145)
WBC MORPH BLD: NORMAL
WBC NRBC COR # BLD AUTO: 5.23 10*3/MM3 (ref 3.4–10.8)

## 2024-11-01 PROCEDURE — 82948 REAGENT STRIP/BLOOD GLUCOSE: CPT

## 2024-11-01 PROCEDURE — 83735 ASSAY OF MAGNESIUM: CPT | Performed by: INTERNAL MEDICINE

## 2024-11-01 PROCEDURE — 82948 REAGENT STRIP/BLOOD GLUCOSE: CPT | Performed by: STUDENT IN AN ORGANIZED HEALTH CARE EDUCATION/TRAINING PROGRAM

## 2024-11-01 PROCEDURE — 85025 COMPLETE CBC W/AUTO DIFF WBC: CPT | Performed by: INTERNAL MEDICINE

## 2024-11-01 PROCEDURE — 63710000001 INSULIN GLARGINE PER 5 UNITS: Performed by: INTERNAL MEDICINE

## 2024-11-01 PROCEDURE — 80053 COMPREHEN METABOLIC PANEL: CPT | Performed by: INTERNAL MEDICINE

## 2024-11-01 PROCEDURE — 99232 SBSQ HOSP IP/OBS MODERATE 35: CPT | Performed by: INTERNAL MEDICINE

## 2024-11-01 PROCEDURE — 84100 ASSAY OF PHOSPHORUS: CPT | Performed by: INTERNAL MEDICINE

## 2024-11-01 PROCEDURE — 85007 BL SMEAR W/DIFF WBC COUNT: CPT | Performed by: INTERNAL MEDICINE

## 2024-11-01 PROCEDURE — 63710000001 INSULIN LISPRO (HUMAN) PER 5 UNITS: Performed by: STUDENT IN AN ORGANIZED HEALTH CARE EDUCATION/TRAINING PROGRAM

## 2024-11-01 PROCEDURE — 97110 THERAPEUTIC EXERCISES: CPT

## 2024-11-01 RX ADMIN — ISOSORBIDE MONONITRATE 30 MG: 30 TABLET, EXTENDED RELEASE ORAL at 09:17

## 2024-11-01 RX ADMIN — INSULIN LISPRO 4 UNITS: 100 INJECTION, SOLUTION INTRAVENOUS; SUBCUTANEOUS at 20:21

## 2024-11-01 RX ADMIN — Medication 10 ML: at 20:23

## 2024-11-01 RX ADMIN — NICOTINE 1 PATCH: 21 PATCH, EXTENDED RELEASE TRANSDERMAL at 09:19

## 2024-11-01 RX ADMIN — ACETAMINOPHEN 1000 MG: 500 TABLET ORAL at 09:17

## 2024-11-01 RX ADMIN — INSULIN LISPRO 4 UNITS: 100 INJECTION, SOLUTION INTRAVENOUS; SUBCUTANEOUS at 17:40

## 2024-11-01 RX ADMIN — Medication 10 ML: at 09:18

## 2024-11-01 RX ADMIN — INSULIN GLARGINE 10 UNITS: 100 INJECTION, SOLUTION SUBCUTANEOUS at 09:17

## 2024-11-01 RX ADMIN — ATORVASTATIN CALCIUM 20 MG: 20 TABLET, FILM COATED ORAL at 20:22

## 2024-11-01 RX ADMIN — DICLOFENAC SODIUM 2 G: 10 GEL TOPICAL at 20:22

## 2024-11-01 RX ADMIN — INSULIN LISPRO 4 UNITS: 100 INJECTION, SOLUTION INTRAVENOUS; SUBCUTANEOUS at 12:35

## 2024-11-01 RX ADMIN — DICLOFENAC SODIUM 2 G: 10 GEL TOPICAL at 09:18

## 2024-11-01 RX ADMIN — LOSARTAN POTASSIUM 25 MG: 25 TABLET, FILM COATED ORAL at 09:17

## 2024-11-01 RX ADMIN — DICLOFENAC SODIUM 2 G: 10 GEL TOPICAL at 12:34

## 2024-11-01 RX ADMIN — ASPIRIN 81 MG: 81 TABLET, CHEWABLE ORAL at 09:17

## 2024-11-01 RX ADMIN — DICLOFENAC SODIUM 2 G: 10 GEL TOPICAL at 17:41

## 2024-11-01 NOTE — PLAN OF CARE
Goal Outcome Evaluation:  Plan of Care Reviewed With: patient        Progress: no change  Outcome Evaluation: Vss. No acute change noted. Rested well. Continuing plan of care.

## 2024-11-01 NOTE — PROGRESS NOTES
[facility]   Hospitalist Progress Note  Date: 2024  Patient Name: Brian Mehta  : 1955  MRN: [id number]  Date of admission: 10/26/2024      Subjective   Subjective     Chief Complaint: Hip pain    Summary:  69 y.o. male PMH CHF, COPD, DM, HLD, HTN, continued tobacco dependence, history of CVA who presented with left hip pain and inability to ambulate.  Patient is unable to care for himself.  He was brought in covered in feces and urine.  Patient was apparently trying to board a transportation van/bus and fell going up the steps.  History taken from the wife is that the patient has not been able to care for himself at home due to weakness.  He has a history of CVA with residual right-sided weakness and usually walks with a walker or cane but has not been able to for the past 2 days.  Imaging was negative for acute fracture.  There was evidence of a UTI as well as prior stent noted on CT abdomen pelvis.  He was admitted for further care, started on Rocephin, urology consulted.  Mental status has improved.  Urology plans to follow-up outpatient for cystolitholapaxy with left ureteroscopy with laser and left ureteral stent exchange.  Will need rehab at discharge.    Interval Followup: No acute events overnight, patient resting comfortably in bed, remains very weak and debilitated    Objective   Objective     Vitals:   Temp:  [97.2 °F (36.2 °C)-98.6 °F (37 °C)] 97.8 °F (36.6 °C)  Heart Rate:  [] 95  Resp:  [16-20] 16  BP: (109-133)/(68-84) 126/72  Physical Exam   GEN: No acute distress  HEENT: Moist mucous membranes  LUNGS: Equal chest rise bilaterally  CARDIAC: Regular rate and rhythm  NEURO: Moving all 4 extremities spontaneously  SKIN: No obvious breakdown    Result Review    I have personally reviewed the results below:  [x]  Laboratory personally reviewed BMP, CBC, blood sugars  []  Microbiology  []  Radiology  []  EKG/Telemetry   []  Cardiology/Vascular   []  Pathology  []  Old records  []   Other:  CBC          10/30/2024    05:42 10/31/2024    05:27 11/1/2024    05:45   CBC   WBC 4.29  5.42  5.23    RBC 4.97  4.86  4.94    Hemoglobin 12.6  12.6  12.9    Hematocrit 40.1  39.5  40.1    MCV 80.7  81.3  81.2    MCH 25.4  25.9  26.1    MCHC 31.4  31.9  32.2    RDW 15.8  15.9  15.9    Platelets 239  250  255      CMP          10/30/2024    05:42 10/31/2024    05:27 11/1/2024    05:45   CMP   Glucose 141  102  129    BUN 9  10  17    Creatinine 0.70  0.80  0.78    EGFR 101.0  97.0  97.7    Sodium 137  135  133    Potassium 4.1  4.0  3.9    Chloride 104  102  101    Calcium 9.0  8.8  8.5    Total Protein 6.8  6.9  6.8    Albumin 2.9  2.9  3.0    Globulin 3.9  4.0  3.8    Total Bilirubin 0.3  0.3  0.3    Alkaline Phosphatase 87  86  89    AST (SGOT) 9  10  10    ALT (SGPT) 5  5  6    Albumin/Globulin Ratio 0.7  0.7  0.8    BUN/Creatinine Ratio 12.9  12.5  21.8    Anion Gap 9.2  10.0  9.1        Assessment & Plan   Assessment / Plan   UTI due to unknown bacterial source  Hypertension  Type 2 diabetes mellitus  Failure to thrive in adult  Fall  Left ureteral stent  Hip pain  Continued tobacco dependence    Continue to monitor in the hospital for workup and management of the above  Discussed with urology-Patient will require cystolitholapaxy with left ureteroscopy with laser and left ureteral stent exchange. This would be in a few weeks after he is out of the hospital as outpatient   Continue with IV Rocephin for now, blood cultures negative, urine culture with E. coli, day 6 of 7  Continue nicotine patch as scheduled  Continue scheduled DuoNebs, Pulmicort and Brovana twice daily, albuterol as needed  Scheduled Tylenol 1 g 3 times daily, add Voltaren gel 4 times daily for hip and knee pain  Continue to monitor blood glucose,  continue basal bolus insulin  Continue appropriate home medications including antihypertensives  PT/OT consulted-recommend rehab.   [facility] and sending out referrals  CBC, CMP  reviewed 11/1/2024   Repeat CBC, CMP, mag and Phos in a.m. 11/1/2024      Discussed plan with RN, urology,     VTE Prophylaxis:  Pharmacologic VTE prophylaxis orders are present.        CODE STATUS:   Code Status (Patient has no pulse and is not breathing): CPR (Attempt to Resuscitate)  Medical Interventions (Patient has pulse or is breathing): Full Support      Electronically signed by Derrick Garland MD, 11/1/2024, 13:17 EDT.

## 2024-11-01 NOTE — THERAPY TREATMENT NOTE
Patient Name: Brian Mehta  : 1955    MRN: [id number]                              Today's Date: 2024       Admit Date: 10/26/2024    Visit Dx:     ICD-10-CM ICD-9-CM   1. Acute UTI  N39.0 599.0   2. Contusion of left hip, initial encounter  S70.02XA 924.01   3. Generalized weakness  R53.1 780.79   4. Difficulty in walking  R26.2 719.7     Patient Active Problem List   Diagnosis    CVA (cerebral vascular accident)    Essential hypertension    High cholesterol    Hip pain    Vitamin B12 deficiency    Kidney disorder    Chronic pain of both knees    Chronic HFrEF (heart failure with reduced ejection fraction)    LVH (left ventricular hypertrophy)    COPD (chronic obstructive pulmonary disease)    DM2 (diabetes mellitus, type 2)    Urinary tract infection without hematuria    Abnormal nuclear stress test    UTI (urinary tract infection)    Fall    Failure to thrive in adult    Nephrolithiasis    Bladder stone     Past Medical History:   Diagnosis Date    CHF (congestive heart failure)     SEE'S DR [place] NO CURRENT S/S    COPD (chronic obstructive pulmonary disease)     INHALER    Diabetes mellitus     DOESN'T CHECK BG OFTEN AT HOME    Hyperlipidemia     Hypertension     Sepsis 2023    Stroke 2017    RIGHT SIDE WEAKNESS     Past Surgical History:   Procedure Laterality Date    APPENDECTOMY      EYE SURGERY Bilateral     MISTY CATARACT      General Information       Row Name 24 0934          OT Time and Intention    Subjective Information complains of;pain;weakness  -EG     Document Type therapy note (daily note)  -EG     Mode of Treatment individual therapy;occupational therapy  -EG     Patient Effort poor  -EG     Symptoms Noted During/After Treatment fatigue;increased pain  -EG     Comment R knee pain  -EG       Row Name 24 0934          General Information    Existing Precautions/Restrictions fall  -EG       Row Name 24 0934          Cognition    Orientation Status (Cognition)  oriented x 3  -EG       Row Name 11/01/24 0934          Safety Issues/Impairments Affecting Functional Mobility    Impairments Affecting Function (Mobility) balance;range of motion (ROM);shortness of breath;strength;coordination  -EG               User Key  (r) = Recorded By, (t) = Taken By, (c) = Cosigned By      Initials Name Provider Type    EG uDlce Diggs OT Occupational Therapist                     Mobility/ADL's       Row Name 11/01/24 0935          Bed Mobility    Comment, (Bed Mobility) Patient semi-fowlers in bed for session  -EG       Row Name 11/01/24 0935          Transfers    Comment, (Transfers) Declines all transfers and OOB activity due to 9/10 knee pain per patient report  -EG               User Key  (r) = Recorded By, (t) = Taken By, (c) = Cosigned By      Initials Name Provider Type    Dulce Antonio OT Occupational Therapist                   Obj/Interventions       Row Name 11/01/24 0935          Shoulder (Therapeutic Exercise)    Shoulder (Therapeutic Exercise) strengthening exercise  -EG     Shoulder Strengthening (Therapeutic Exercise) bilateral;flexion;extension;scapular stabilization;horizontal aBduction/aDduction;external rotation;internal rotation;2 lb free weight;10 repetitions;2 sets  semi-fowlers in bed  -EG       Row Name 11/01/24 0935          Elbow/Forearm (Therapeutic Exercise)    Elbow/Forearm (Therapeutic Exercise) strengthening exercise  -EG     Elbow/Forearm AROM (Therapeutic Exercise) --  semi-fowlers in bed  -EG     Elbow/Forearm Strengthening (Therapeutic Exercise) bilateral;flexion;extension;2 lb free weight;2 sets;10 repetitions  -       Row Name 11/01/24 0935          Motor Skills    Therapeutic Exercise shoulder;elbow/forearm  -EG               User Key  (r) = Recorded By, (t) = Taken By, (c) = Cosigned By      Initials Name Provider Type    Dulce Antonio OT Occupational Therapist                   Goals/Plan    No documentation.                   Clinical Impression       Row Name 11/01/24 0936          Pain Assessment    Pretreatment Pain Rating 8/10  -EG     Posttreatment Pain Rating 9/10  -EG     Pain Location knee  -EG     Pain Side/Orientation right  -EG     Pain Management Interventions activity modification encouraged;positioning techniques utilized  -EG     Pre/Posttreatment Pain Comment OT assisted witih repositioning of R knee  -EG       Row Name 11/01/24 0936          Plan of Care Review    Plan of Care Reviewed With patient  -EG     Progress no change  -EG     Outcome Evaluation Patient declines OOB activity this date; OT session modified for BUE strengthening tasks; OT will continue with POC to address deficits in ADL/Transfer performance areas.  -EG       Row Name 11/01/24 0936          Therapy Assessment/Plan (OT)    Rehab Potential (OT) limited  -EG     Criteria for Skilled Therapeutic Interventions Met (OT) yes;meets criteria;skilled treatment is necessary  -EG     Therapy Frequency (OT) 5 times/wk  -EG       Row Name 11/01/24 0936          Therapy Plan Review/Discharge Plan (OT)    Anticipated Discharge Disposition (OT) inpatient rehabilitation facility;sub acute care setting;skilled nursing facility  -EG       Row Name 11/01/24 0936          Positioning and Restraints    Pre-Treatment Position in bed  -EG     Post Treatment Position bed  -EG     In Bed supine;exit alarm on;encouraged to call for assist  -EG               User Key  (r) = Recorded By, (t) = Taken By, (c) = Cosigned By      Initials Name Provider Type    EG Dulce Diggs, KALEB Occupational Therapist                   Outcome Measures       Row Name 11/01/24 0938          How much help from another is currently needed...    Putting on and taking off regular lower body clothing? 2  -EG     Bathing (including washing, rinsing, and drying) 2  -EG     Toileting (which includes using toilet bed pan or urinal) 2  -EG     Putting on and taking off regular upper body clothing 4  -EG      Taking care of personal grooming (such as brushing teeth) 4  -EG     Eating meals 4  -EG     AM-PAC 6 Clicks Score (OT) 18  -EG       Row Name 10/31/24 2122          How much help from another person do you currently need...    Turning from your back to your side while in flat bed without using bedrails? 3  -FM     Moving from lying on back to sitting on the side of a flat bed without bedrails? 2  -FM     Moving to and from a bed to a chair (including a wheelchair)? 2  -FM     Standing up from a chair using your arms (e.g., wheelchair, bedside chair)? 2  -FM     Climbing 3-5 steps with a railing? 1  -FM     To walk in hospital room? 1  -FM     AM-PAC 6 Clicks Score (PT) 11  -FM     Highest Level of Mobility Goal 4 --> Transfer to chair/commode  -FM       Row Name 11/01/24 0938          Functional Assessment    Outcome Measure Options AM-PAC 6 Clicks Daily Activity (OT);Optimal Instrument  -EG       Row Name 11/01/24 0938          Optimal Instrument    Optimal Instrument Optimal - 3  -EG     Bending/Stooping 3  -EG     Standing 2  -EG     Reaching 1  -EG               User Key  (r) = Recorded By, (t) = Taken By, (c) = Cosigned By      Initials Name Provider Type    FM Jerica Thomson, RN Registered Nurse    Dulce Antonio, KALEB Occupational Therapist                    Occupational Therapy Education       Title: PT OT SLP Therapies (Done)       Topic: Occupational Therapy (Done)       Point: ADL training (Done)       Description:   Instruct learner(s) on proper safety adaptation and remediation techniques during self care or transfers.   Instruct in proper use of assistive devices.                  Learning Progress Summary            Patient Acceptance, E,TB, VU by KL at 10/29/2024 1240    Acceptance, D,E, NR by PG at 10/28/2024 1020                      Point: Home exercise program (Done)       Description:   Instruct learner(s) on appropriate technique for monitoring, assisting and/or progressing therapeutic  exercises/activities.                  Learning Progress Summary            Patient Acceptance, E,TB, VU by  at 10/29/2024 1240    Acceptance, D,E, NR by PG at 10/28/2024 1020                      Point: Precautions (Done)       Description:   Instruct learner(s) on prescribed precautions during self-care and functional transfers.                  Learning Progress Summary            Patient Acceptance, E,TB, VU by  at 10/29/2024 1240    Acceptance, D,E, NR by PG at 10/28/2024 1020                      Point: Body mechanics (Done)       Description:   Instruct learner(s) on proper positioning and spine alignment during self-care, functional mobility activities and/or exercises.                  Learning Progress Summary            Patient Acceptance, E,TB, VU by  at 10/29/2024 1240    Acceptance, D,E, NR by PG at 10/28/2024 1020                                      User Key       Initials Effective Dates Name Provider Type Discipline     06/16/21 -  Gio Minaya OT Occupational Therapist OT     08/27/24 -  Jaky Talamantes PT Student PT Student PT                  OT Recommendation and Plan  Therapy Frequency (OT): 5 times/wk  Plan of Care Review  Plan of Care Reviewed With: patient  Progress: no change  Outcome Evaluation: Patient declines OOB activity this date; OT session modified for BUE strengthening tasks; OT will continue with POC to address deficits in ADL/Transfer performance areas.     Time Calculation:         Time Calculation- OT       Row Name 11/01/24 0938             Time Calculation- OT    OT Received On 11/01/24  -EG      OT Goal Re-Cert Due Date 11/06/24  -EG         Timed Charges    65151 - OT Therapeutic Exercise Minutes 11  -EG         Total Minutes    Timed Charges Total Minutes 11  -EG       Total Minutes 11  -EG                User Key  (r) = Recorded By, (t) = Taken By, (c) = Cosigned By      Initials Name Provider Type    uDlce Antonio, OT Occupational Therapist                   Therapy Charges for Today       Code Description Service Date Service Provider Modifiers Qty    46195310796 HC OT THER PROC EA 15 MIN 11/1/2024 Dulce Diggs, KALEB GO 1                 Dulce Diggs OT  11/1/2024

## 2024-11-01 NOTE — PLAN OF CARE
Goal Outcome Evaluation:  Plan of Care Reviewed With: patient        Progress: improving  Outcome Evaluation: Pt a/o x3-4. Room air. Up in chair much of day. Ready to go to rehab; reinforce to be patient with placement. Ate well. Family visited. Continue with plan of care.

## 2024-11-01 NOTE — OUTREACH NOTE
AMBULATORY CASE MANAGEMENT NOTE    Names and Relationships of Patient/Support Persons: Contact: Cristine with Inpatient CM; Relationship: Other -     Care Coordination    Received call back from Cristine with inpatient case management. She states that she spoke with patients spouse and gave update on rehab placement. They are still working on bed placement and with insurance at this time.    Education Documentation  No documentation found.        YANDEL GONZALEZ  Ambulatory Case Management    11/1/2024, 10:26 EDT

## 2024-11-02 LAB
ALBUMIN SERPL-MCNC: 2.8 G/DL (ref 3.5–5.2)
ALBUMIN/GLOB SERPL: 0.7 G/DL
ALP SERPL-CCNC: 111 U/L (ref 39–117)
ALT SERPL W P-5'-P-CCNC: 8 U/L (ref 1–41)
ANION GAP SERPL CALCULATED.3IONS-SCNC: 9.4 MMOL/L (ref 5–15)
AST SERPL-CCNC: 13 U/L (ref 1–40)
BASOPHILS # BLD AUTO: 0.03 10*3/MM3 (ref 0–0.2)
BASOPHILS NFR BLD AUTO: 0.6 % (ref 0–1.5)
BILIRUB SERPL-MCNC: 0.3 MG/DL (ref 0–1.2)
BUN SERPL-MCNC: 15 MG/DL (ref 8–23)
BUN/CREAT SERPL: 20 (ref 7–25)
CALCIUM SPEC-SCNC: 8.6 MG/DL (ref 8.6–10.5)
CHLORIDE SERPL-SCNC: 103 MMOL/L (ref 98–107)
CO2 SERPL-SCNC: 20.6 MMOL/L (ref 22–29)
CREAT SERPL-MCNC: 0.75 MG/DL (ref 0.76–1.27)
DEPRECATED RDW RBC AUTO: 47.7 FL (ref 37–54)
EGFRCR SERPLBLD CKD-EPI 2021: 98.9 ML/MIN/1.73
EOSINOPHIL # BLD AUTO: 0.09 10*3/MM3 (ref 0–0.4)
EOSINOPHIL NFR BLD AUTO: 1.8 % (ref 0.3–6.2)
ERYTHROCYTE [DISTWIDTH] IN BLOOD BY AUTOMATED COUNT: 15.9 % (ref 12.3–15.4)
GLOBULIN UR ELPH-MCNC: 3.9 GM/DL
GLUCOSE BLDC GLUCOMTR-MCNC: 172 MG/DL (ref 70–99)
GLUCOSE BLDC GLUCOMTR-MCNC: 186 MG/DL (ref 70–99)
GLUCOSE BLDC GLUCOMTR-MCNC: 250 MG/DL (ref 70–99)
GLUCOSE SERPL-MCNC: 182 MG/DL (ref 65–99)
HCT VFR BLD AUTO: 40.4 % (ref 37.5–51)
HGB BLD-MCNC: 12.8 G/DL (ref 13–17.7)
IMM GRANULOCYTES # BLD AUTO: 0.01 10*3/MM3 (ref 0–0.05)
IMM GRANULOCYTES NFR BLD AUTO: 0.2 % (ref 0–0.5)
LYMPHOCYTES # BLD AUTO: 2.85 10*3/MM3 (ref 0.7–3.1)
LYMPHOCYTES NFR BLD AUTO: 55.9 % (ref 19.6–45.3)
MAGNESIUM SERPL-MCNC: 1.8 MG/DL (ref 1.6–2.4)
MCH RBC QN AUTO: 25.9 PG (ref 26.6–33)
MCHC RBC AUTO-ENTMCNC: 31.7 G/DL (ref 31.5–35.7)
MCV RBC AUTO: 81.8 FL (ref 79–97)
MONOCYTES # BLD AUTO: 0.5 10*3/MM3 (ref 0.1–0.9)
MONOCYTES NFR BLD AUTO: 9.8 % (ref 5–12)
NEUTROPHILS NFR BLD AUTO: 1.62 10*3/MM3 (ref 1.7–7)
NEUTROPHILS NFR BLD AUTO: 31.7 % (ref 42.7–76)
NRBC BLD AUTO-RTO: 0 /100 WBC (ref 0–0.2)
PHOSPHATE SERPL-MCNC: 2.8 MG/DL (ref 2.5–4.5)
PLATELET # BLD AUTO: 259 10*3/MM3 (ref 140–450)
PMV BLD AUTO: 9.5 FL (ref 6–12)
POTASSIUM SERPL-SCNC: 4.1 MMOL/L (ref 3.5–5.2)
PROT SERPL-MCNC: 6.7 G/DL (ref 6–8.5)
RBC # BLD AUTO: 4.94 10*6/MM3 (ref 4.14–5.8)
SODIUM SERPL-SCNC: 133 MMOL/L (ref 136–145)
WBC NRBC COR # BLD AUTO: 5.1 10*3/MM3 (ref 3.4–10.8)

## 2024-11-02 PROCEDURE — 82948 REAGENT STRIP/BLOOD GLUCOSE: CPT | Performed by: STUDENT IN AN ORGANIZED HEALTH CARE EDUCATION/TRAINING PROGRAM

## 2024-11-02 PROCEDURE — 80053 COMPREHEN METABOLIC PANEL: CPT | Performed by: INTERNAL MEDICINE

## 2024-11-02 PROCEDURE — 82948 REAGENT STRIP/BLOOD GLUCOSE: CPT

## 2024-11-02 PROCEDURE — 84100 ASSAY OF PHOSPHORUS: CPT | Performed by: INTERNAL MEDICINE

## 2024-11-02 PROCEDURE — 25010000002 HEPARIN (PORCINE) PER 1000 UNITS: Performed by: STUDENT IN AN ORGANIZED HEALTH CARE EDUCATION/TRAINING PROGRAM

## 2024-11-02 PROCEDURE — 63710000001 INSULIN GLARGINE PER 5 UNITS: Performed by: INTERNAL MEDICINE

## 2024-11-02 PROCEDURE — 85025 COMPLETE CBC W/AUTO DIFF WBC: CPT | Performed by: INTERNAL MEDICINE

## 2024-11-02 PROCEDURE — 99232 SBSQ HOSP IP/OBS MODERATE 35: CPT | Performed by: INTERNAL MEDICINE

## 2024-11-02 PROCEDURE — 83735 ASSAY OF MAGNESIUM: CPT | Performed by: INTERNAL MEDICINE

## 2024-11-02 PROCEDURE — 63710000001 INSULIN LISPRO (HUMAN) PER 5 UNITS: Performed by: STUDENT IN AN ORGANIZED HEALTH CARE EDUCATION/TRAINING PROGRAM

## 2024-11-02 RX ORDER — PROCHLORPERAZINE 25 MG/1
SUPPOSITORY RECTAL
Qty: 3 EACH | Refills: 10 | Status: SHIPPED | OUTPATIENT
Start: 2024-11-02

## 2024-11-02 RX ORDER — ASPIRIN 81 MG
81 TABLET,CHEWABLE ORAL DAILY
Qty: 30 TABLET | Refills: 10 | Status: SHIPPED | OUTPATIENT
Start: 2024-11-02

## 2024-11-02 RX ADMIN — HEPARIN SODIUM 5000 UNITS: 5000 INJECTION INTRAVENOUS; SUBCUTANEOUS at 20:05

## 2024-11-02 RX ADMIN — ISOSORBIDE MONONITRATE 30 MG: 30 TABLET, EXTENDED RELEASE ORAL at 08:17

## 2024-11-02 RX ADMIN — ASPIRIN 81 MG: 81 TABLET, CHEWABLE ORAL at 08:17

## 2024-11-02 RX ADMIN — LOSARTAN POTASSIUM 25 MG: 25 TABLET, FILM COATED ORAL at 08:17

## 2024-11-02 RX ADMIN — NICOTINE 1 PATCH: 21 PATCH, EXTENDED RELEASE TRANSDERMAL at 08:29

## 2024-11-02 RX ADMIN — INSULIN GLARGINE 10 UNITS: 100 INJECTION, SOLUTION SUBCUTANEOUS at 08:17

## 2024-11-02 RX ADMIN — INSULIN LISPRO 2 UNITS: 100 INJECTION, SOLUTION INTRAVENOUS; SUBCUTANEOUS at 08:17

## 2024-11-02 RX ADMIN — ATORVASTATIN CALCIUM 20 MG: 20 TABLET, FILM COATED ORAL at 20:05

## 2024-11-02 RX ADMIN — DICLOFENAC SODIUM 2 G: 10 GEL TOPICAL at 08:18

## 2024-11-02 RX ADMIN — HEPARIN SODIUM 5000 UNITS: 5000 INJECTION INTRAVENOUS; SUBCUTANEOUS at 08:17

## 2024-11-02 RX ADMIN — INSULIN LISPRO 2 UNITS: 100 INJECTION, SOLUTION INTRAVENOUS; SUBCUTANEOUS at 17:35

## 2024-11-02 RX ADMIN — DICLOFENAC SODIUM 2 G: 10 GEL TOPICAL at 20:08

## 2024-11-02 RX ADMIN — DICLOFENAC SODIUM 2 G: 10 GEL TOPICAL at 12:34

## 2024-11-02 RX ADMIN — Medication 10 ML: at 20:07

## 2024-11-02 RX ADMIN — INSULIN LISPRO 2 UNITS: 100 INJECTION, SOLUTION INTRAVENOUS; SUBCUTANEOUS at 12:35

## 2024-11-02 RX ADMIN — Medication 10 ML: at 08:18

## 2024-11-02 RX ADMIN — INSULIN LISPRO 6 UNITS: 100 INJECTION, SOLUTION INTRAVENOUS; SUBCUTANEOUS at 20:07

## 2024-11-02 NOTE — PROGRESS NOTES
[facility]   Hospitalist Progress Note  Date: 2024  Patient Name: Brian Mehta  : 1955  MRN: [id number]  Date of admission: 10/26/2024      Subjective   Subjective     Chief Complaint: Hip pain    Summary:  69 y.o. male PMH CHF, COPD, DM, HLD, HTN, continued tobacco dependence, history of CVA who presented with left hip pain and inability to ambulate.  Patient is unable to care for himself.  He was brought in covered in feces and urine.  Patient was apparently trying to board a transportation van/bus and fell going up the steps.  History taken from the wife is that the patient has not been able to care for himself at home due to weakness.  He has a history of CVA with residual right-sided weakness and usually walks with a walker or cane but has not been able to for the past 2 days.  Imaging was negative for acute fracture.  There was evidence of a UTI as well as prior stent noted on CT abdomen pelvis.  He was admitted for further care, started on Rocephin, urology consulted.  Mental status has improved.  Urology plans to follow-up outpatient for cystolitholapaxy with left ureteroscopy with laser and left ureteral stent exchange.  Will need rehab at discharge.    Interval Followup: No acute events overnight, patient resting comfortably in bed, remains weak and debilitated, needing rehab placement    Objective   Objective     Vitals:   Temp:  [97.8 °F (36.6 °C)-98.5 °F (36.9 °C)] 98.4 °F (36.9 °C)  Heart Rate:  [] 90  Resp:  [16-18] 18  BP: ()/(56-98) 132/98  Physical Exam   GEN: No acute distress  HEENT: Moist mucous membranes  LUNGS: Equal chest rise bilaterally  CARDIAC: Regular rate and rhythm  NEURO: Moving all 4 extremities spontaneously  SKIN: No obvious breakdown    Result Review    I have personally reviewed the results below:  [x]  Laboratory personally reviewed BMP, CBC, blood sugars  []  Microbiology  []  Radiology  []  EKG/Telemetry   []  Cardiology/Vascular   []   Pathology  []  Old records  []  Other:  CBC          10/31/2024    05:27 11/1/2024    05:45 11/2/2024    05:20   CBC   WBC 5.42  5.23  5.10    RBC 4.86  4.94  4.94    Hemoglobin 12.6  12.9  12.8    Hematocrit 39.5  40.1  40.4    MCV 81.3  81.2  81.8    MCH 25.9  26.1  25.9    MCHC 31.9  32.2  31.7    RDW 15.9  15.9  15.9    Platelets 250  255  259      CMP          10/31/2024    05:27 11/1/2024    05:45 11/2/2024    05:20   CMP   Glucose 102  129  182    BUN 10  17  15    Creatinine 0.80  0.78  0.75    EGFR 97.0  97.7  98.9    Sodium 135  133  133    Potassium 4.0  3.9  4.1    Chloride 102  101  103    Calcium 8.8  8.5  8.6    Total Protein 6.9  6.8  6.7    Albumin 2.9  3.0  2.8    Globulin 4.0  3.8  3.9    Total Bilirubin 0.3  0.3  0.3    Alkaline Phosphatase 86  89  111    AST (SGOT) 10  10  13    ALT (SGPT) 5  6  8    Albumin/Globulin Ratio 0.7  0.8  0.7    BUN/Creatinine Ratio 12.5  21.8  20.0    Anion Gap 10.0  9.1  9.4        Assessment & Plan   Assessment / Plan   UTI due to unknown bacterial source  Hypertension  Type 2 diabetes mellitus  Failure to thrive in adult  Fall  Left ureteral stent  Hip pain  Continued tobacco dependence    Continue to monitor in the hospital for workup and management of the above  Discussed with urology-Patient will require cystolitholapaxy with left ureteroscopy with laser and left ureteral stent exchange. This would be in a few weeks after he is out of the hospital as outpatient   Completed antibiotics  Continue nicotine patch as scheduled  Continue scheduled DuoNebs, Pulmicort and Brovana twice daily, albuterol as needed  Continue Voltaren gel 4 times daily for knee pain, patient requesting acetaminophen to be discontinued  Continue to monitor blood glucose,  continue basal bolus insulin  Continue appropriate home medications including antihypertensives  PT/OT consulted-recommend rehab.   aware and sending out referrals  CBC, CMP reviewed 11/2/2024   Repeat CBC,  CMP, mag and Phos in a.m. 11/2/2024      Discussed plan with RN,     VTE Prophylaxis:  Pharmacologic VTE prophylaxis orders are present.        CODE STATUS:   Code Status (Patient has no pulse and is not breathing): CPR (Attempt to Resuscitate)  Medical Interventions (Patient has pulse or is breathing): Full Support      Electronically signed by Derrick Garland MD, 11/2/2024, 11:13 EDT.

## 2024-11-02 NOTE — PLAN OF CARE
Goal Outcome Evaluation:  Patient alert and oriented, able to make needs known.  Patient with questions on rehab, when he can be discharged from her and go to rehab.  Patient with no other concerns or needs voiced at this time.  Call light within reach.  Continue plan of care.

## 2024-11-02 NOTE — PLAN OF CARE
Goal Outcome Evaluation:              Outcome Evaluation: Pt alert x3-4.  Pain managed with prn pain medications with good effect.  Pt allowed wound tx to left elbow, but denied remaining wound care.  Requested to have it done tonight.

## 2024-11-03 LAB
ALBUMIN SERPL-MCNC: 3.1 G/DL (ref 3.5–5.2)
ALBUMIN/GLOB SERPL: 0.8 G/DL
ALP SERPL-CCNC: 108 U/L (ref 39–117)
ALT SERPL W P-5'-P-CCNC: 13 U/L (ref 1–41)
ANION GAP SERPL CALCULATED.3IONS-SCNC: 11.3 MMOL/L (ref 5–15)
AST SERPL-CCNC: 16 U/L (ref 1–40)
BASOPHILS # BLD AUTO: 0.02 10*3/MM3 (ref 0–0.2)
BASOPHILS NFR BLD AUTO: 0.4 % (ref 0–1.5)
BILIRUB SERPL-MCNC: 0.3 MG/DL (ref 0–1.2)
BUN SERPL-MCNC: 18 MG/DL (ref 8–23)
BUN/CREAT SERPL: 24 (ref 7–25)
CALCIUM SPEC-SCNC: 8.6 MG/DL (ref 8.6–10.5)
CHLORIDE SERPL-SCNC: 104 MMOL/L (ref 98–107)
CO2 SERPL-SCNC: 21.7 MMOL/L (ref 22–29)
CREAT SERPL-MCNC: 0.75 MG/DL (ref 0.76–1.27)
DEPRECATED RDW RBC AUTO: 46.5 FL (ref 37–54)
EGFRCR SERPLBLD CKD-EPI 2021: 98.9 ML/MIN/1.73
EOSINOPHIL # BLD AUTO: 0.08 10*3/MM3 (ref 0–0.4)
EOSINOPHIL NFR BLD AUTO: 1.6 % (ref 0.3–6.2)
ERYTHROCYTE [DISTWIDTH] IN BLOOD BY AUTOMATED COUNT: 15.8 % (ref 12.3–15.4)
GLOBULIN UR ELPH-MCNC: 3.9 GM/DL
GLUCOSE BLDC GLUCOMTR-MCNC: 163 MG/DL (ref 70–99)
GLUCOSE BLDC GLUCOMTR-MCNC: 222 MG/DL (ref 70–99)
GLUCOSE BLDC GLUCOMTR-MCNC: 290 MG/DL (ref 70–99)
GLUCOSE BLDC GLUCOMTR-MCNC: 366 MG/DL (ref 70–99)
GLUCOSE SERPL-MCNC: 154 MG/DL (ref 65–99)
HCT VFR BLD AUTO: 40.2 % (ref 37.5–51)
HGB BLD-MCNC: 13.2 G/DL (ref 13–17.7)
IMM GRANULOCYTES # BLD AUTO: 0.01 10*3/MM3 (ref 0–0.05)
IMM GRANULOCYTES NFR BLD AUTO: 0.2 % (ref 0–0.5)
LYMPHOCYTES # BLD AUTO: 2.6 10*3/MM3 (ref 0.7–3.1)
LYMPHOCYTES NFR BLD AUTO: 51.9 % (ref 19.6–45.3)
MAGNESIUM SERPL-MCNC: 1.7 MG/DL (ref 1.6–2.4)
MCH RBC QN AUTO: 26.6 PG (ref 26.6–33)
MCHC RBC AUTO-ENTMCNC: 32.8 G/DL (ref 31.5–35.7)
MCV RBC AUTO: 81 FL (ref 79–97)
MONOCYTES # BLD AUTO: 0.41 10*3/MM3 (ref 0.1–0.9)
MONOCYTES NFR BLD AUTO: 8.2 % (ref 5–12)
NEUTROPHILS NFR BLD AUTO: 1.89 10*3/MM3 (ref 1.7–7)
NEUTROPHILS NFR BLD AUTO: 37.7 % (ref 42.7–76)
NRBC BLD AUTO-RTO: 0 /100 WBC (ref 0–0.2)
PHOSPHATE SERPL-MCNC: 3.8 MG/DL (ref 2.5–4.5)
PLATELET # BLD AUTO: 268 10*3/MM3 (ref 140–450)
PMV BLD AUTO: 9.6 FL (ref 6–12)
POTASSIUM SERPL-SCNC: 4.3 MMOL/L (ref 3.5–5.2)
PROT SERPL-MCNC: 7 G/DL (ref 6–8.5)
RBC # BLD AUTO: 4.96 10*6/MM3 (ref 4.14–5.8)
SODIUM SERPL-SCNC: 137 MMOL/L (ref 136–145)
WBC NRBC COR # BLD AUTO: 5.01 10*3/MM3 (ref 3.4–10.8)

## 2024-11-03 PROCEDURE — 85025 COMPLETE CBC W/AUTO DIFF WBC: CPT | Performed by: INTERNAL MEDICINE

## 2024-11-03 PROCEDURE — 83735 ASSAY OF MAGNESIUM: CPT | Performed by: INTERNAL MEDICINE

## 2024-11-03 PROCEDURE — 63710000001 INSULIN GLARGINE PER 5 UNITS: Performed by: INTERNAL MEDICINE

## 2024-11-03 PROCEDURE — 99232 SBSQ HOSP IP/OBS MODERATE 35: CPT | Performed by: INTERNAL MEDICINE

## 2024-11-03 PROCEDURE — 25010000002 HEPARIN (PORCINE) PER 1000 UNITS: Performed by: STUDENT IN AN ORGANIZED HEALTH CARE EDUCATION/TRAINING PROGRAM

## 2024-11-03 PROCEDURE — 82948 REAGENT STRIP/BLOOD GLUCOSE: CPT | Performed by: STUDENT IN AN ORGANIZED HEALTH CARE EDUCATION/TRAINING PROGRAM

## 2024-11-03 PROCEDURE — 63710000001 INSULIN LISPRO (HUMAN) PER 5 UNITS: Performed by: STUDENT IN AN ORGANIZED HEALTH CARE EDUCATION/TRAINING PROGRAM

## 2024-11-03 PROCEDURE — 84100 ASSAY OF PHOSPHORUS: CPT | Performed by: INTERNAL MEDICINE

## 2024-11-03 PROCEDURE — 82948 REAGENT STRIP/BLOOD GLUCOSE: CPT

## 2024-11-03 PROCEDURE — 80053 COMPREHEN METABOLIC PANEL: CPT | Performed by: INTERNAL MEDICINE

## 2024-11-03 RX ADMIN — ATORVASTATIN CALCIUM 20 MG: 20 TABLET, FILM COATED ORAL at 21:43

## 2024-11-03 RX ADMIN — Medication 2 G: at 05:23

## 2024-11-03 RX ADMIN — NICOTINE 1 PATCH: 21 PATCH, EXTENDED RELEASE TRANSDERMAL at 08:26

## 2024-11-03 RX ADMIN — ASPIRIN 81 MG: 81 TABLET, CHEWABLE ORAL at 08:23

## 2024-11-03 RX ADMIN — INSULIN LISPRO 6 UNITS: 100 INJECTION, SOLUTION INTRAVENOUS; SUBCUTANEOUS at 17:42

## 2024-11-03 RX ADMIN — Medication 10 ML: at 08:24

## 2024-11-03 RX ADMIN — HEPARIN SODIUM 5000 UNITS: 5000 INJECTION INTRAVENOUS; SUBCUTANEOUS at 21:43

## 2024-11-03 RX ADMIN — INSULIN LISPRO 8 UNITS: 100 INJECTION, SOLUTION INTRAVENOUS; SUBCUTANEOUS at 12:25

## 2024-11-03 RX ADMIN — INSULIN GLARGINE 10 UNITS: 100 INJECTION, SOLUTION SUBCUTANEOUS at 08:23

## 2024-11-03 RX ADMIN — INSULIN LISPRO 2 UNITS: 100 INJECTION, SOLUTION INTRAVENOUS; SUBCUTANEOUS at 08:23

## 2024-11-03 RX ADMIN — ISOSORBIDE MONONITRATE 30 MG: 30 TABLET, EXTENDED RELEASE ORAL at 08:23

## 2024-11-03 RX ADMIN — LOSARTAN POTASSIUM 25 MG: 25 TABLET, FILM COATED ORAL at 08:23

## 2024-11-03 RX ADMIN — INSULIN LISPRO 4 UNITS: 100 INJECTION, SOLUTION INTRAVENOUS; SUBCUTANEOUS at 21:49

## 2024-11-03 NOTE — PROGRESS NOTES
[facility]   Hospitalist Progress Note  Date: 11/3/2024  Patient Name: Brian Mehta  : 1955  MRN: [id number]  Date of admission: 10/26/2024      Subjective   Subjective     Chief Complaint: Hip pain    Summary:  69 y.o. male PMH CHF, COPD, DM, HLD, HTN, continued tobacco dependence, history of CVA who presented with left hip pain and inability to ambulate.  Patient is unable to care for himself.  He was brought in covered in feces and urine.  Patient was apparently trying to board a transportation van/bus and fell going up the steps.  History taken from the wife is that the patient has not been able to care for himself at home due to weakness.  He has a history of CVA with residual right-sided weakness and usually walks with a walker or cane but has not been able to for the past 2 days.  Imaging was negative for acute fracture.  There was evidence of a UTI as well as prior stent noted on CT abdomen pelvis.  He was admitted for further care, started on Rocephin, urology consulted.  Mental status has improved.  Urology plans to follow-up outpatient for cystolitholapaxy with left ureteroscopy with laser and left ureteral stent exchange.  Will need rehab at discharge.    Interval Followup: No acute events over the last 24 hours, patient remains weak and debilitated, he is resting comfortably in bed    Objective   Objective     Vitals:   Temp:  [97.7 °F (36.5 °C)-98.8 °F (37.1 °C)] 98.1 °F (36.7 °C)  Heart Rate:  [90-95] 95  Resp:  [17-18] 18  BP: (113-153)/(67-90) 153/90    Physical Exam   GEN: No acute distress  HEENT: Moist mucous membranes  LUNGS: Equal chest rise bilaterally  CARDIAC: Regular rate and rhythm  NEURO: Moving all 4 extremities spontaneously  SKIN: No obvious breakdown    Result Review    I have personally reviewed the results below:  [x]  Laboratory personally reviewed BMP, CBC, blood sugars  []  Microbiology  []  Radiology  []  EKG/Telemetry   []  Cardiology/Vascular   []  Pathology  []   Old records  []  Other:  CBC          11/1/2024    05:45 11/2/2024    05:20 11/3/2024    06:44   CBC   WBC 5.23  5.10  5.01    RBC 4.94  4.94  4.96    Hemoglobin 12.9  12.8  13.2    Hematocrit 40.1  40.4  40.2    MCV 81.2  81.8  81.0    MCH 26.1  25.9  26.6    MCHC 32.2  31.7  32.8    RDW 15.9  15.9  15.8    Platelets 255  259  268      CMP          11/1/2024    05:45 11/2/2024    05:20 11/3/2024    06:44   CMP   Glucose 129  182  154    BUN 17  15  18    Creatinine 0.78  0.75  0.75    EGFR 97.7  98.9  98.9    Sodium 133  133  137    Potassium 3.9  4.1  4.3    Chloride 101  103  104    Calcium 8.5  8.6  8.6    Total Protein 6.8  6.7  7.0    Albumin 3.0  2.8  3.1    Globulin 3.8  3.9  3.9    Total Bilirubin 0.3  0.3  0.3    Alkaline Phosphatase 89  111  108    AST (SGOT) 10  13  16    ALT (SGPT) 6  8  13    Albumin/Globulin Ratio 0.8  0.7  0.8    BUN/Creatinine Ratio 21.8  20.0  24.0    Anion Gap 9.1  9.4  11.3        Assessment & Plan   Assessment / Plan   UTI due to unknown bacterial source  Hypertension  Type 2 diabetes mellitus  Failure to thrive in adult  Fall  Left ureteral stent  Hip pain  Continued tobacco dependence    Continue to monitor in the hospital for workup and management of the above  Discussed with urology-Patient will require cystolitholapaxy with left ureteroscopy with laser and left ureteral stent exchange. This would be in a few weeks after he is out of the hospital as outpatient   Completed antibiotics  Continue nicotine patch as scheduled  Continue scheduled DuoNebs, Pulmicort and Brovana twice daily, albuterol as needed  Continue Voltaren gel 4 times daily for knee pain, patient requesting acetaminophen to be discontinued  Continue to monitor blood glucose,  continue basal bolus insulin  Continue appropriate home medications including antihypertensives  CBC, CMP reviewed 11/3/2024   Repeat CBC, CMP, mag and Phos in a.m. 11/3/2024   PT/OT/rehab placement  Out of bed to chair 3 times daily  with meals     Discussed plan with RN,     VTE Prophylaxis:  Pharmacologic VTE prophylaxis orders are present.        CODE STATUS:   Code Status (Patient has no pulse and is not breathing): CPR (Attempt to Resuscitate)  Medical Interventions (Patient has pulse or is breathing): Full Support      Electronically signed by Derrick Garland MD, 11/3/2024, 10:17 EST.

## 2024-11-03 NOTE — PLAN OF CARE
Goal Outcome Evaluation:              Outcome Evaluation: Pt is alert and orient x4.  VS WNl for pt.  Insulin adm as ordered.  Pt is non compliant with drinking non sugar drinks.  Family brings in sugar drinks, snacks and additional foods.  Family educated on pt increasing blood sugars, especially after visits.

## 2024-11-03 NOTE — PLAN OF CARE
Goal Outcome Evaluation:  Plan of Care Reviewed With: patient           Outcome Evaluation: Pt VSS. Able to make needs knows. No concerns voiced throughout. Wound care completed during this shift. Pt rested well. Continue plan of care.

## 2024-11-03 NOTE — PROGRESS NOTES
Patient has been refusing neb tx's for several days now.  He states he only takes prn at home and does not need them here.  RT will cont. To follow up u

## 2024-11-04 ENCOUNTER — TELEPHONE (OUTPATIENT)
Dept: FAMILY MEDICINE CLINIC | Facility: CLINIC | Age: 69
End: 2024-11-04

## 2024-11-04 VITALS
RESPIRATION RATE: 18 BRPM | BODY MASS INDEX: 25.93 KG/M2 | TEMPERATURE: 98.2 F | DIASTOLIC BLOOD PRESSURE: 87 MMHG | HEIGHT: 75 IN | HEART RATE: 98 BPM | OXYGEN SATURATION: 95 % | SYSTOLIC BLOOD PRESSURE: 132 MMHG | WEIGHT: 208.56 LBS

## 2024-11-04 LAB
ALBUMIN SERPL-MCNC: 3.3 G/DL (ref 3.5–5.2)
ALBUMIN/GLOB SERPL: 0.8 G/DL
ALP SERPL-CCNC: 105 U/L (ref 39–117)
ALT SERPL W P-5'-P-CCNC: 13 U/L (ref 1–41)
ANION GAP SERPL CALCULATED.3IONS-SCNC: 10.4 MMOL/L (ref 5–15)
AST SERPL-CCNC: 14 U/L (ref 1–40)
BASOPHILS # BLD AUTO: 0.03 10*3/MM3 (ref 0–0.2)
BASOPHILS NFR BLD AUTO: 0.6 % (ref 0–1.5)
BILIRUB SERPL-MCNC: 0.3 MG/DL (ref 0–1.2)
BUN SERPL-MCNC: 15 MG/DL (ref 8–23)
BUN/CREAT SERPL: 21.1 (ref 7–25)
CALCIUM SPEC-SCNC: 8.6 MG/DL (ref 8.6–10.5)
CHLORIDE SERPL-SCNC: 105 MMOL/L (ref 98–107)
CO2 SERPL-SCNC: 21.6 MMOL/L (ref 22–29)
CREAT SERPL-MCNC: 0.71 MG/DL (ref 0.76–1.27)
DEPRECATED RDW RBC AUTO: 47.5 FL (ref 37–54)
EGFRCR SERPLBLD CKD-EPI 2021: 100.6 ML/MIN/1.73
EOSINOPHIL # BLD AUTO: 0.09 10*3/MM3 (ref 0–0.4)
EOSINOPHIL NFR BLD AUTO: 1.7 % (ref 0.3–6.2)
ERYTHROCYTE [DISTWIDTH] IN BLOOD BY AUTOMATED COUNT: 15.9 % (ref 12.3–15.4)
GLOBULIN UR ELPH-MCNC: 3.9 GM/DL
GLUCOSE BLDC GLUCOMTR-MCNC: 200 MG/DL (ref 70–99)
GLUCOSE BLDC GLUCOMTR-MCNC: 211 MG/DL (ref 70–99)
GLUCOSE SERPL-MCNC: 142 MG/DL (ref 65–99)
HCT VFR BLD AUTO: 42.9 % (ref 37.5–51)
HGB BLD-MCNC: 13.6 G/DL (ref 13–17.7)
IMM GRANULOCYTES # BLD AUTO: 0.01 10*3/MM3 (ref 0–0.05)
IMM GRANULOCYTES NFR BLD AUTO: 0.2 % (ref 0–0.5)
LYMPHOCYTES # BLD AUTO: 2.62 10*3/MM3 (ref 0.7–3.1)
LYMPHOCYTES NFR BLD AUTO: 50.6 % (ref 19.6–45.3)
MAGNESIUM SERPL-MCNC: 1.6 MG/DL (ref 1.6–2.4)
MCH RBC QN AUTO: 26 PG (ref 26.6–33)
MCHC RBC AUTO-ENTMCNC: 31.7 G/DL (ref 31.5–35.7)
MCV RBC AUTO: 82 FL (ref 79–97)
MONOCYTES # BLD AUTO: 0.53 10*3/MM3 (ref 0.1–0.9)
MONOCYTES NFR BLD AUTO: 10.2 % (ref 5–12)
NEUTROPHILS NFR BLD AUTO: 1.9 10*3/MM3 (ref 1.7–7)
NEUTROPHILS NFR BLD AUTO: 36.7 % (ref 42.7–76)
NRBC BLD AUTO-RTO: 0 /100 WBC (ref 0–0.2)
PHOSPHATE SERPL-MCNC: 3 MG/DL (ref 2.5–4.5)
PLATELET # BLD AUTO: 270 10*3/MM3 (ref 140–450)
PMV BLD AUTO: 9.3 FL (ref 6–12)
POTASSIUM SERPL-SCNC: 4.3 MMOL/L (ref 3.5–5.2)
PROT SERPL-MCNC: 7.2 G/DL (ref 6–8.5)
QT INTERVAL: 333 MS
QTC INTERVAL: 460 MS
RBC # BLD AUTO: 5.23 10*6/MM3 (ref 4.14–5.8)
SODIUM SERPL-SCNC: 137 MMOL/L (ref 136–145)
WBC NRBC COR # BLD AUTO: 5.18 10*3/MM3 (ref 3.4–10.8)

## 2024-11-04 PROCEDURE — 97116 GAIT TRAINING THERAPY: CPT

## 2024-11-04 PROCEDURE — 83735 ASSAY OF MAGNESIUM: CPT | Performed by: INTERNAL MEDICINE

## 2024-11-04 PROCEDURE — 80053 COMPREHEN METABOLIC PANEL: CPT | Performed by: INTERNAL MEDICINE

## 2024-11-04 PROCEDURE — 84100 ASSAY OF PHOSPHORUS: CPT | Performed by: INTERNAL MEDICINE

## 2024-11-04 PROCEDURE — 63710000001 INSULIN GLARGINE PER 5 UNITS: Performed by: INTERNAL MEDICINE

## 2024-11-04 PROCEDURE — 25010000002 HEPARIN (PORCINE) PER 1000 UNITS: Performed by: STUDENT IN AN ORGANIZED HEALTH CARE EDUCATION/TRAINING PROGRAM

## 2024-11-04 PROCEDURE — 99232 SBSQ HOSP IP/OBS MODERATE 35: CPT | Performed by: INTERNAL MEDICINE

## 2024-11-04 PROCEDURE — 82948 REAGENT STRIP/BLOOD GLUCOSE: CPT | Performed by: STUDENT IN AN ORGANIZED HEALTH CARE EDUCATION/TRAINING PROGRAM

## 2024-11-04 PROCEDURE — 63710000001 INSULIN LISPRO (HUMAN) PER 5 UNITS: Performed by: STUDENT IN AN ORGANIZED HEALTH CARE EDUCATION/TRAINING PROGRAM

## 2024-11-04 PROCEDURE — 85025 COMPLETE CBC W/AUTO DIFF WBC: CPT | Performed by: INTERNAL MEDICINE

## 2024-11-04 PROCEDURE — 97530 THERAPEUTIC ACTIVITIES: CPT

## 2024-11-04 RX ADMIN — INSULIN LISPRO 4 UNITS: 100 INJECTION, SOLUTION INTRAVENOUS; SUBCUTANEOUS at 08:25

## 2024-11-04 RX ADMIN — LOSARTAN POTASSIUM 25 MG: 25 TABLET, FILM COATED ORAL at 08:25

## 2024-11-04 RX ADMIN — NICOTINE 1 PATCH: 21 PATCH, EXTENDED RELEASE TRANSDERMAL at 08:26

## 2024-11-04 RX ADMIN — ISOSORBIDE MONONITRATE 30 MG: 30 TABLET, EXTENDED RELEASE ORAL at 08:25

## 2024-11-04 RX ADMIN — INSULIN GLARGINE 10 UNITS: 100 INJECTION, SOLUTION SUBCUTANEOUS at 08:25

## 2024-11-04 RX ADMIN — INSULIN LISPRO 4 UNITS: 100 INJECTION, SOLUTION INTRAVENOUS; SUBCUTANEOUS at 11:46

## 2024-11-04 RX ADMIN — Medication 10 ML: at 08:27

## 2024-11-04 RX ADMIN — ASPIRIN 81 MG: 81 TABLET, CHEWABLE ORAL at 08:25

## 2024-11-04 NOTE — THERAPY TREATMENT NOTE
Acute Care - Physical Therapy Treatment Note  AFSHAN Funez     Patient Name: Brian Mehta  : 1955  MRN: [id number]  Today's Date: 2024      Visit Dx:     ICD-10-CM ICD-9-CM   1. Acute UTI  N39.0 599.0   2. Contusion of left hip, initial encounter  S70.02XA 924.01   3. Generalized weakness  R53.1 780.79   4. Difficulty in walking  R26.2 719.7     Patient Active Problem List   Diagnosis    CVA (cerebral vascular accident)    Essential hypertension    High cholesterol    Hip pain    Vitamin B12 deficiency    Kidney disorder    Chronic pain of both knees    Chronic HFrEF (heart failure with reduced ejection fraction)    LVH (left ventricular hypertrophy)    COPD (chronic obstructive pulmonary disease)    DM2 (diabetes mellitus, type 2)    Urinary tract infection without hematuria    Abnormal nuclear stress test    UTI (urinary tract infection)    Fall    Failure to thrive in adult    Nephrolithiasis    Bladder stone     Past Medical History:   Diagnosis Date    CHF (congestive heart failure)     SEE'S DR [place] NO CURRENT S/S    COPD (chronic obstructive pulmonary disease)     INHALER    Diabetes mellitus     DOESN'T CHECK BG OFTEN AT HOME    Hyperlipidemia     Hypertension     Sepsis 2023    Stroke 2017    RIGHT SIDE WEAKNESS     Past Surgical History:   Procedure Laterality Date    APPENDECTOMY      EYE SURGERY Bilateral     MISTY CATARACT     PT Assessment (Last 12 Hours)       PT Evaluation and Treatment       Row Name 24 8735          Physical Therapy Time and Intention    Subjective Information complains of;weakness  Tightness in posterior R knee.  -VK     Document Type therapy note (daily note)  -VK     Mode of Treatment individual therapy;physical therapy  -VK     Patient Effort good  -VK       Row Name 24 5833          General Information    Patient Profile Reviewed yes  -VK     Existing Precautions/Restrictions fall  -VK       Row Name 24 3616          Cognition    Affect/Mental  Status (Cognition) confused  Pt asking PTA to give him his pitcher of water so he can pay his water bill.  -VK     Orientation Status (Cognition) oriented to;person;situation  -VK     Personal Safety Interventions nonskid shoes/slippers when out of bed;gait belt;fall prevention program maintained  -VK       Row Name 11/04/24 0940          Bed Mobility    Supine-Sit [place] (Bed Mobility) minimum assist (75% patient effort);verbal cues  -VK     Bed Mobility, Safety Issues decreased use of arms for pushing/pulling;decreased use of legs for bridging/pushing  -VK     Assistive Device (Bed Mobility) bed rails;head of bed elevated  -VK       Row Name 11/04/24 0940          Transfers    Transfers sit-stand transfer;stand-sit transfer;bed-chair transfer  -VK       Row Name 11/04/24 0940          Bed-Chair Transfer    Bed-Chair [place] (Transfers) minimum assist (75% patient effort);verbal cues  -VK     Assistive Device (Bed-Chair Transfers) walker, front-wheeled  -VK       Row Name 11/04/24 0940          Sit-Stand Transfer    Sit-Stand [place] (Transfers) minimum assist (75% patient effort);verbal cues  -VK     Assistive Device (Sit-Stand Transfers) walker, front-wheeled  -VK       Row Name 11/04/24 0940          Stand-Sit Transfer    Stand-Sit [place] (Transfers) minimum assist (75% patient effort);verbal cues  -VK     Assistive Device (Stand-Sit Transfers) walker, front-wheeled  -VK       Row Name 11/04/24 0940          Gait/Stairs (Locomotion)    [place] Level (Gait) minimum assist (75% patient effort);verbal cues  -VK     Assistive Device (Gait) walker, front-wheeled  -VK     Patient was able to Ambulate yes  -VK     Distance in Feet (Gait) 38  -VK     Pattern (Gait) step-to  -VK     Deviations/Abnormal Patterns (Gait) antalgic;base of support, narrow;chirag decreased;gait speed decreased;stride length decreased  -VK     Bilateral Gait Deviations forward flexed posture;heel strike decreased   -VK     Right Sided Gait Deviations weight shift ability decreased;decreased knee extension  -VK     Gait Assessment/Intervention Pt generally unsteady, appears to be due to R knee tightness and BLE weakness.  -VK       Row Name 11/04/24 0940          Safety Issues/Impairments Affecting Functional Mobility    Impairments Affecting Function (Mobility) balance;range of motion (ROM);shortness of breath;strength;coordination  -VK       Row Name 11/04/24 0940          Balance    Balance Assessment sit to stand dynamic balance;standing static balance  -VK     Sit to Stand Dynamic Balance minimal assist;verbal cues  -VK     Static Standing Balance contact guard  -VK     Dynamic Standing Balance minimal assist;verbal cues  -VK     Position/Device Used, Standing Balance walker, front-wheeled  -VK     Balance Interventions sit to stand  -VK       Row Name             Wound 10/27/24 0409 Left gluteal MASD (moisture associated skin damage)    Wound - Properties Group Placement Date: 10/27/24  -AY Placement Time: 0409 -AY Side: Left  -AY Location: gluteal  -AY Primary Wound Type: MASD  -AY Type: MASD (moisture associated skin damage)  -AY Present on Original Admission: Y  -AY    Retired Wound - Properties Group Placement Date: 10/27/24  -AY Placement Time: 0409 -AY Present on Original Admission: Y  -AY Side: Left  -AY Location: gluteal  -AY Primary Wound Type: MASD  -AY Type: MASD (moisture associated skin damage)  -AY    Retired Wound - Properties Group Placement Date: 10/27/24  -AY Placement Time: 0409 -AY Present on Original Admission: Y  -AY Side: Left  -AY Location: gluteal  -AY Primary Wound Type: MASD  -AY Type: MASD (moisture associated skin damage)  -AY    Retired Wound - Properties Group Date first assessed: 10/27/24  -AY Time first assessed: 0409 -AY Present on Original Admission: Y  -AY Side: Left  -AY Location: gluteal  -AY Primary Wound Type: MASD  -AY Type: MASD (moisture associated skin damage)  -AY      Row  Name             Wound 10/31/24 1234 Bilateral gluteal    Wound - Properties Group Placement Date: 10/31/24  -NH Placement Time: 1234  -NH Side: Bilateral  -NH Location: gluteal  -NH Primary Wound Type: MASD  -NH    Retired Wound - Properties Group Placement Date: 10/31/24  -NH Placement Time: 1234  -NH Side: Bilateral  -NH Location: gluteal  -NH Primary Wound Type: MASD  -NH    Retired Wound - Properties Group Placement Date: 10/31/24  -NH Placement Time: 1234  -NH Side: Bilateral  -NH Location: gluteal  -NH Primary Wound Type: MASD  -NH    Retired Wound - Properties Group Date first assessed: 10/31/24  -NH Time first assessed: 1234  -NH Side: Bilateral  -NH Location: gluteal  -NH Primary Wound Type: MASD  -NH      Row Name 11/04/24 0940          Positioning and Restraints    Pre-Treatment Position in bed  -VK     Post Treatment Position chair  -VK     In Chair reclined;call light within reach;encouraged to call for assist;exit alarm on;notified nsg  -VK       Row Name 11/04/24 0940          Progress Summary (PT)    Progress Toward Functional Goals (PT) progress toward functional goals is fair;progress toward functional goals is good  -VK               User Key  (r) = Recorded By, (t) = Taken By, (c) = Cosigned By      Initials Name Provider Type    Alanna Hernandez, RN Registered Nurse    Anita Monteiro PTA Physical Therapist Assistant    Payton Amezquita RN Registered Nurse                    Physical Therapy Education        No education to display                  PT Recommendation and Plan     Progress Summary (PT)  Progress Toward Functional Goals (PT): progress toward functional goals is fair, progress toward functional goals is good   Outcome Measures       Row Name 11/04/24 1000             How much help from another person do you currently need...    Turning from your back to your side while in flat bed without using bedrails? 3  -VK      Moving from lying on back to sitting on the side of a flat  bed without bedrails? 3  -VK      Moving to and from a bed to a chair (including a wheelchair)? 2  -VK      Standing up from a chair using your arms (e.g., wheelchair, bedside chair)? 2  -VK      Climbing 3-5 steps with a railing? 1  -VK      To walk in hospital room? 2  -VK      AM-PAC 6 Clicks Score (PT) 13  -VK                User Key  (r) = Recorded By, (t) = Taken By, (c) = Cosigned By      Initials Name Provider Type    Anita Monteiro PTA Physical Therapist Assistant                     Time Calculation:    PT Charges       Row Name 11/04/24 1044             Time Calculation    PT Received On 11/04/24  -VK         Timed Charges    74761 - Gait Training Minutes  13  -VK      96271 - PT Therapeutic Activity Minutes 19  -VK         Total Minutes    Timed Charges Total Minutes 32  -VK       Total Minutes 32  -VK                User Key  (r) = Recorded By, (t) = Taken By, (c) = Cosigned By      Initials Name Provider Type    Anita Monteiro PTA Physical Therapist Assistant                  Therapy Charges for Today       Code Description Service Date Service Provider Modifiers Qty    42386055739 HC GAIT TRAINING EA 15 MIN 11/4/2024 Anita Leonard PTA GP 1    39680066875 HC PT THERAPEUTIC ACT EA 15 MIN 11/4/2024 Anita Leonard PTA GP 1            PT G-Codes  Outcome Measure Options: AM-PAC 6 Clicks Daily Activity (OT), Optimal Instrument  AM-PAC 6 Clicks Score (PT): 13  AM-PAC 6 Clicks Score (OT): 18    Anita Leonard PTA  11/4/2024

## 2024-11-04 NOTE — CONSULTS
"Nutrition Services    Patient Name: Brian Mehta  YOB: 1955  MRN: [id number]  Admission date: 10/26/2024      CLINICAL NUTRITION ASSESSMENT      Reason for Assessment  LOS   H&P:  Past Medical History:   Diagnosis Date    CHF (congestive heart failure)     SEE'S DR [place] NO CURRENT S/S    COPD (chronic obstructive pulmonary disease)     INHALER    Diabetes mellitus     DOESN'T CHECK BG OFTEN AT HOME    Hyperlipidemia     Hypertension     Sepsis 09/14/2023    Stroke 2017    RIGHT SIDE WEAKNESS        Current Problems:   Active Hospital Problems    Diagnosis     **UTI (urinary tract infection)     Fall     Failure to thrive in adult     DM2 (diabetes mellitus, type 2)     Hip pain     Essential hypertension         Nutrition/Diet History         Narrative     Patient assessed by RD for LOS x 9 days. Patient consuming 100% meals and snacks x 4. No significant weight loss per EMR. Patient is at low risk per nutrition risk screening (NRS-2002).     No acute nutrition concerns or interventions at this time.      Anthropometrics        Current Height, Weight Height: 190.5 cm (75\")  Weight: 94.6 kg (208 lb 8.9 oz)   Current BMI Body mass index is 26.07 kg/m².   BMI Classification Normal range 23-30 >66yo   % %   Adjusted Body Weight (ABW) N/A   Weight Hx  Wt Readings from Last 30 Encounters:   10/27/24 0409 94.6 kg (208 lb 8.9 oz)   10/26/24 1834 98.8 kg (217 lb 13 oz)   08/19/24 0516 101 kg (221 lb 9 oz)   08/14/24 1323 100 kg (220 lb 14.4 oz)   07/08/24 1308 98.4 kg (217 lb)   06/17/24 1421 98.4 kg (217 lb)   03/11/24 0150 99.2 kg (218 lb 11.1 oz)   03/10/24 2305 98 kg (216 lb)   12/07/23 1314 98 kg (216 lb)   11/21/23 1136 110 kg (242 lb 4.6 oz)   09/13/23 2144 101 kg (222 lb 0.1 oz)   08/08/23 1351 93.4 kg (206 lb)   07/18/23 1347 99.8 kg (220 lb)   02/21/19 0000 102 kg (225 lb)   07/17/18 0000 102 kg (225 lb)   03/20/18 0000 110 kg (243 lb 2 oz)            Wt Change Observation -4.1% x 3 months, not " clinically significant     Estimated/Assessed Needs  Estimated Needs based on: Ideal Body Weight       Energy Requirements 25-30 kcal/kg   EST Needs (kcal/day) 6925-7742 kcal       Protein Requirements 0.8-1.0 g/kg   EST Daily Needs (g/day) 67-84 g       Fluid Requirements 1 ml/kcal    Estimated Needs (mL/day) 5612-5336 mL     Labs/Medications         Pertinent Labs Reviewed.   Results from last 7 days   Lab Units 11/04/24  0624 11/03/24  0644 11/02/24  0520   SODIUM mmol/L 137 137 133*   POTASSIUM mmol/L 4.3 4.3 4.1   CHLORIDE mmol/L 105 104 103   CO2 mmol/L 21.6* 21.7* 20.6*   BUN mg/dL 15 18 15   CREATININE mg/dL 0.71* 0.75* 0.75*   CALCIUM mg/dL 8.6 8.6 8.6   BILIRUBIN mg/dL 0.3 0.3 0.3   ALK PHOS U/L 105 108 111   ALT (SGPT) U/L 13 13 8   AST (SGOT) U/L 14 16 13   GLUCOSE mg/dL 142* 154* 182*     Results from last 7 days   Lab Units 11/04/24  0624 11/03/24  0644 11/02/24  0520   MAGNESIUM mg/dL 1.6 1.7 1.8   PHOSPHORUS mg/dL 3.0 3.8 2.8   HEMOGLOBIN g/dL 13.6 13.2 12.8*   HEMATOCRIT % 42.9 40.2 40.4     COVID19   Date Value Ref Range Status   08/19/2024 Detected (C) Not Detected - Ref. Range Final     Lab Results   Component Value Date    HGBA1C 8.10 (H) 03/10/2024         Pertinent Medications Reviewed.     Malnutrition Severity Assessment              Nutrition Diagnosis         Nutrition Dx Problem 1 No nutrition diagnosis at this time.       Nutrition Intervention        Current Nutrition Orders & Evaluation of Intake     Current Nutrition Orders & Evaluation of Intake       Current PO Diet Diet: Cardiac; Healthy Heart (2-3 Na+); Fluid Consistency: Thin (IDDSI 0)   Supplement No active supplement orders       Nutrition Intervention/Prescription        No further nutrition intervention indicated.       Medical Nutrition Therapy/Nutrition Education          Learner     Readiness Patient  Education not indicated.      Method     Response N/A  N/A     Monitor/Evaluation        Monitor Per protocol     Nutrition  Discharge Plan         No discharge nutrition needs identified.      Electronically signed by:  Yasmeen Mena RD  11/04/24 09:55 EST

## 2024-11-04 NOTE — PLAN OF CARE
Goal Outcome Evaluation:              Outcome Evaluation: DEENA, KALIOx4. Pt states he does not like diet sodas. Educated patient about the problems associated with chronically high blood sugars.

## 2024-11-04 NOTE — TELEPHONE ENCOUNTER
Caller: HOME HEALTH DELIVERED    Relationship: Other    Best call back number: [phone]    What form or medical record are you requesting: CERTIFICATE FOR MEDICAL NECESSITY FOR INCONTINENCE SUPPLIES     Who is requesting this form or medical record from you: MEDICAL EQUIPMENT SUPPLIER     How would you like to receive the form or medical records (pick-up, mail, fax):   If fax, what is the fax number: [phone]    Timeframe paperwork needed: AS SOON AS POSSIBLE SO PATIENT CAN GET NEEDED SUPPLIES     Additional notes: PAPERWORK HAS BEEN FAXED SEVERAL TIMES  10/07/2024, 10/14/2024 AND 10/21/2024   AND NO RESPONSE HAS BEEN RECEIVED   PLEASE CONTACT IF FORM WAS NEVER RECEIVED

## 2024-11-04 NOTE — PROGRESS NOTES
[facility]   Hospitalist Progress Note  Date: 2024  Patient Name: Brian Mehta  : 1955  MRN: [id number]  Date of admission: 10/26/2024      Subjective   Subjective     Chief Complaint: Hip pain    Summary:  69 y.o. male PMH CHF, COPD, DM, HLD, HTN, continued tobacco dependence, history of CVA who presented with left hip pain and inability to ambulate.  Patient is unable to care for himself.  He was brought in covered in feces and urine.  Patient was apparently trying to board a transportation van/bus and fell going up the steps.  History taken from the wife is that the patient has not been able to care for himself at home due to weakness.  He has a history of CVA with residual right-sided weakness and usually walks with a walker or cane but has not been able to for the past 2 days.  Imaging was negative for acute fracture.  There was evidence of a UTI as well as prior stent noted on CT abdomen pelvis.  He was admitted for further care, started on Rocephin, urology consulted.  Mental status has improved.  Urology plans to follow-up outpatient for cystolitholapaxy with left ureteroscopy with laser and left ureteral stent exchange.  Will need rehab at discharge.    Interval Followup: No acute events overnight, patient completed antibiotics.  Patient remains weak and debilitated, awaiting rehab placement    Objective   Objective     Vitals:   Temp:  [97.3 °F (36.3 °C)-98.4 °F (36.9 °C)] 98.2 °F (36.8 °C)  Heart Rate:  [] 98  Resp:  [18] 18  BP: (107-143)/(72-87) 132/87    Physical Exam   GEN: No acute distress  HEENT: Moist mucous membranes  LUNGS: Equal chest rise bilaterally  CARDIAC: Regular rate and rhythm  NEURO: Moving all 4 extremities spontaneously  SKIN: No obvious breakdown    Result Review    I have personally reviewed the results below:  [x]  Laboratory personally reviewed BMP, CBC, blood sugars  []  Microbiology  []  Radiology  []  EKG/Telemetry   []  Cardiology/Vascular   []   Pathology  []  Old records  []  Other:  CBC          11/2/2024    05:20 11/3/2024    06:44 11/4/2024    06:24   CBC   WBC 5.10  5.01  5.18    RBC 4.94  4.96  5.23    Hemoglobin 12.8  13.2  13.6    Hematocrit 40.4  40.2  42.9    MCV 81.8  81.0  82.0    MCH 25.9  26.6  26.0    MCHC 31.7  32.8  31.7    RDW 15.9  15.8  15.9    Platelets 259  268  270      CMP          11/2/2024    05:20 11/3/2024    06:44 11/4/2024    06:24   CMP   Glucose 182  154  142    BUN 15  18  15    Creatinine 0.75  0.75  0.71    EGFR 98.9  98.9  100.6    Sodium 133  137  137    Potassium 4.1  4.3  4.3    Chloride 103  104  105    Calcium 8.6  8.6  8.6    Total Protein 6.7  7.0  7.2    Albumin 2.8  3.1  3.3    Globulin 3.9  3.9  3.9    Total Bilirubin 0.3  0.3  0.3    Alkaline Phosphatase 111  108  105    AST (SGOT) 13  16  14    ALT (SGPT) 8  13  13    Albumin/Globulin Ratio 0.7  0.8  0.8    BUN/Creatinine Ratio 20.0  24.0  21.1    Anion Gap 9.4  11.3  10.4        Assessment & Plan   Assessment / Plan   UTI due to unknown bacterial source  Hypertension  Type 2 diabetes mellitus  Failure to thrive in adult  Fall  Left ureteral stent  Hip pain  Continued tobacco dependence    Continue to monitor in the hospital for workup and management of the above  Discussed with urology-Patient will require cystolitholapaxy with left ureteroscopy with laser and left ureteral stent exchange. This would be in a few weeks after he is out of the hospital as outpatient   Completed antibiotics  Continue nicotine patch as scheduled  Continue scheduled DuoNebs, Pulmicort and Brovana twice daily, albuterol as needed  Continue Voltaren gel 4 times daily for knee pain, patient requesting acetaminophen to be discontinued  Continue to monitor blood glucose,  continue basal bolus insulin  Continue appropriate home medications including antihypertensives  CBC, CMP reviewed 11/4/2024   Repeat CBC, CMP, mag and Phos in a.m. 11/4/2024   PT/OT to rehab when bed available  Out of  bed to chair 3 times daily with meals     Discussed plan with RN,     VTE Prophylaxis:  Pharmacologic VTE prophylaxis orders are present.        CODE STATUS:   Code Status (Patient has no pulse and is not breathing): CPR (Attempt to Resuscitate)  Medical Interventions (Patient has pulse or is breathing): Full Support      Electronically signed by Derrick Garland MD, 11/4/2024, 13:38 EST.

## 2024-11-05 ENCOUNTER — TELEPHONE (OUTPATIENT)
Dept: CASE MANAGEMENT | Facility: OTHER | Age: 69
End: 2024-11-05
Payer: MEDICARE

## 2024-11-05 ENCOUNTER — PATIENT OUTREACH (OUTPATIENT)
Dept: CASE MANAGEMENT | Facility: OTHER | Age: 69
End: 2024-11-05
Payer: MEDICARE
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
## 2024-11-05 DIAGNOSIS — I63.9 CEREBROVASCULAR ACCIDENT (CVA), UNSPECIFIED MECHANISM: ICD-10-CM

## 2024-11-05 DIAGNOSIS — R53.1 WEAKNESS: ICD-10-CM

## 2024-11-05 DIAGNOSIS — I50.22 CHRONIC HFREF (HEART FAILURE WITH REDUCED EJECTION FRACTION): Primary | ICD-10-CM

## 2024-11-05 NOTE — TELEPHONE ENCOUNTER
Sophie-  Patient left hospital AMA yesterday while waiting to be approved for rehab stay.     Patient would like referral for Home Health nursing and physical therapy. Patient has already had one fall since being home with no injury reported. Patient also reports a hospital acquired pressure area on buttocks.    Patient will not receive TCM call due to leaving AMA. But I did get patient scheduled to see you for 11/18.     I have pended HH order to you.    Thank you!  MYA Arias

## 2024-11-05 NOTE — OUTREACH NOTE
"AMBULATORY CASE MANAGEMENT NOTE    Names and Relationships of Patient/Support Persons: Contact: FERDINAND CATALAN; Relationship: Emergency Contact  Contact: Chela - [facility]; Relationship: Other  Contact: FERDINAND CATALAN; Relationship: Emergency Contact  Contact: Sophie LOPEZ - PCP; Relationship: Other -     [facility] Interim Update    Called to speak to patient spouse as I noticed patient was no longer inpatient at [facility]. Spouse states that patient left AMA because he \"got mad at staff because he didn't want his fingers pricked and because he was not being changed enough.\" She states that he \"started yelling at the nurses and signed himself out\" also states \"they lied to him and said he was supposed to be discharged yesterday and he wasn't so he discharged himself\". Patients spouse reports that patient was noncompliant with medications while in the hospital and often times refused medication and \"shots\".    Patients spouse states that he was supposed to be discharging to SNF, but signed himself out AMA instead. They would like HH to come back to the home. Patient has fallen x 1 since being home. Reports no injuries from this fall but did require EMS and several neighbors assistance to get him inside and in his bed. He reports a pressure area on his buttock.     Spouse would like for me to call [facility] to inform them that patient is home so that wheelchair can be delivered.    Care Coordination    Called rehab [facility] and spoke with Chela to inform of above. She states that they should be able to deliver this week, likely Thursday.    Sent telephone note to PCP for HH order request.    [facility] Interim Update    Called to advise spouse of wheelchair delivery expectation.    Scheduled patient PCP follow up for 11/18. We attempted to schedule an earlier date, but were unable to do so due to patient spouse having multiple apts next week already and she would need to come with patient to his appointment. I will plan to meet patient and " spouse at office on 11/18 for Dexcom education as well.            Education Documentation  No documentation found.        YANDEL GONZALEZ  Ambulatory Case Management    11/5/2024, 15:13 EST

## 2024-11-07 ENCOUNTER — TELEPHONE (OUTPATIENT)
Dept: FAMILY MEDICINE CLINIC | Facility: CLINIC | Age: 69
End: 2024-11-07
Payer: MEDICARE

## 2024-11-07 ENCOUNTER — PATIENT OUTREACH (OUTPATIENT)
Dept: CASE MANAGEMENT | Facility: OTHER | Age: 69
End: 2024-11-07
Payer: MEDICARE

## 2024-11-07 DIAGNOSIS — I50.22 CHRONIC HFREF (HEART FAILURE WITH REDUCED EJECTION FRACTION): Primary | ICD-10-CM

## 2024-11-07 DIAGNOSIS — R53.1 WEAKNESS: ICD-10-CM

## 2024-11-07 NOTE — OUTREACH NOTE
AMBULATORY CASE MANAGEMENT NOTE    Names and Relationships of Patient/Support Persons: Contact: FERDINAND CATALAN; Relationship: Emergency Contact -     [facility] Interim Update    Called and spoke with patients spouse to see if they had heard from [facility] regarding HH referral and for wheel chair update.    Spouse states that she did receive the wheel chair and they are very pleased with it.    She has not heard from [facility]. She states that patient would like to be admitted for inpatient rehab. Educated spouse on process of this. Patient will need face to face visit with PCP and home health PT visit for eval first. After these two appointments occur, I will send orders to [facility] per spouse request.    Care Coordination    Messaged PCP office to inquire on status of [facility] referral. If PCP office has not heard from A, I will call [facility].        Education Documentation  No documentation found.        YANDEL GONZALEZ  Ambulatory Case Management    11/7/2024, 16:06 EST

## 2024-11-08 ENCOUNTER — PATIENT OUTREACH (OUTPATIENT)
Dept: CASE MANAGEMENT | Facility: OTHER | Age: 69
End: 2024-11-08
Payer: MEDICARE

## 2024-11-08 DIAGNOSIS — I50.22 CHRONIC HFREF (HEART FAILURE WITH REDUCED EJECTION FRACTION): Primary | ICD-10-CM

## 2024-11-08 DIAGNOSIS — R53.1 WEAKNESS: ICD-10-CM

## 2024-11-08 PROBLEM — Z96.0 RETAINED URETERAL STENT: Status: ACTIVE | Noted: 2024-11-08

## 2024-11-08 NOTE — OUTREACH NOTE
AMBULATORY CASE MANAGEMENT NOTE    Names and Relationships of Patient/Support Persons: Contact: [facility]; Relationship: Other -     Care Coordination    Called and spoke with [facility]. They state that they did receive patient referral but it has not been processed yet. Will likely be processed on Monday. I will plan to call back Tuesday for determination.    Education Documentation  No documentation found.        YANDEL GONZALEZ  Ambulatory Case Management    11/8/2024, 15:32 EST

## 2024-11-12 ENCOUNTER — PATIENT OUTREACH (OUTPATIENT)
Dept: CASE MANAGEMENT | Facility: OTHER | Age: 69
End: 2024-11-12
Payer: MEDICARE

## 2024-11-12 DIAGNOSIS — I50.22 CHRONIC HFREF (HEART FAILURE WITH REDUCED EJECTION FRACTION): Primary | ICD-10-CM

## 2024-11-12 DIAGNOSIS — R53.1 WEAKNESS: ICD-10-CM

## 2024-11-12 NOTE — DISCHARGE SUMMARY
[facility]         HOSPITALIST  DISCHARGE SUMMARY    Patient Name: Brian Mehta  : 1955  MRN: [id number]    Date of Admission: 10/26/2024  Date of Discharge:  2024  Primary Care Physician: Sophie Capps APRN    Consults       No orders found from 2024 to 10/27/2024.            Active and Resolved Hospital Problems:  UTI due to unknown bacterial source  Hypertension  Type 2 diabetes mellitus  Failure to thrive in adult  Fall  Left ureteral stent  Hip pain  Continued tobacco dependence    Hospital Course     Hospital Course:  69 y.o. male PMH CHF, COPD, DM, HLD, HTN, continued tobacco dependence, history of CVA who presented with left hip pain and inability to ambulate. Patient is unable to care for himself. He was brought in covered in feces and urine. Patient was apparently trying to board a transportation van/bus and fell going up the steps. History taken from the wife is that the patient has not been able to care for himself at home due to weakness. He has a history of CVA with residual right-sided weakness and usually walks with a walker or cane but has not been able to for the past 2 days. Imaging was negative for acute fracture. There was evidence of a UTI as well as prior stent noted on CT abdomen pelvis. He was admitted for further care, started on Rocephin, urology consulted. Mental status has improved. Urology plans to follow-up outpatient for cystolitholapaxy with left ureteroscopy with laser and left ureteral stent exchange. Will need rehab at discharge, prior to being placed at rehab patient opted to leave the hospital AGAINST MEDICAL ADVICE      Patient left AMA    Day of Discharge         Discharge Details        Discharge Medications        New Medications        Instructions Start Date   Dexcom G6 Sensor   USE FOR MONITORING BLOOD GLUCOSE. CHANGE SENSOR EVERY 10 DAYS             Changes to Medications        Instructions Start Date   furosemide 20 MG  tablet  Commonly known as: Lasix  What changed:   when to take this  reasons to take this   20 mg, Oral, 2 Times Daily             Continue These Medications        Instructions Start Date   atorvastatin 20 MG tablet  Commonly known as: LIPITOR   20 mg, Oral, Nightly      Dexcom G6  device   1 each, Not Applicable, Every 30 Days      hydroCHLOROthiazide 25 MG tablet   25 mg, Oral, Daily      isosorbide mononitrate 30 MG 24 hr tablet  Commonly known as: IMDUR   30 mg, Oral, Daily      Jardiance 25 MG tablet tablet  Generic drug: empagliflozin   25 mg, Oral, Daily      Lantus SoloStar 100 UNIT/ML injection pen  Generic drug: Insulin Glargine   10 Units, Subcutaneous, Nightly      losartan 25 MG tablet  Commonly known as: COZAAR   25 mg, Oral, Daily      nicotine 21 MG/24HR patch  Commonly known as: NICODERM CQ   1 patch, Transdermal, Every 24 Hours Scheduled             ASK your doctor about these medications        Instructions Start Date   Aspirin Low Dose 81 MG chewable tablet  Generic drug: aspirin  Ask about: Which instructions should I use?   81 mg, Oral, Daily               No Known Allergies    Discharge Disposition:  Left Against Medical Advice    Diet:  Hospital:No active diet order      Discharge Activity:       CODE STATUS:  Code Status and Medical Interventions: CPR (Attempt to Resuscitate); Full Support   Ordered at: 10/27/24 0127     Code Status (Patient has no pulse and is not breathing):    CPR (Attempt to Resuscitate)     Medical Interventions (Patient has pulse or is breathing):    Full Support       Future Appointments   Date Time Provider Department Center   11/18/2024  2:15 PM Sophie Capps APRN [facility]   11/20/2024  2:00 PM PAT 2 [place]   1/8/2025  1:30 PM Rodolfo Najera MD [facility] [place]   1/27/2025  1:30 PM Ector Kulkarni MD [facility]           Pertinent  and/or Most Recent Results     IMAGING:  CT Abdomen Pelvis Without Contrast    Result Date:  10/27/2024  CT ABDOMEN PELVIS WO CONTRAST Date of Exam: 10/27/2024 4:13 PM EDT Indication: Retained left ureteral stent. Comparison: 4/7/2019 CT Technique: Axial CT images were obtained of the abdomen and pelvis without the administration of contrast. Reconstructed coronal and sagittal images were also obtained. Automated exposure control and iterative construction methods were used. Findings: Included lung bases show atelectatic changes with no acute cardiopulmonary abnormality. There are coronary artery calcifications. Hepatomegaly. No suspicious hepatic lesion. Cholelithiasis. No evidence of cholecystitis. No significant biliary ductal dilation. There are benign splenic calcifications. Unremarkable appearance of the pancreas and bilateral adrenal glands. There is a left-sided ureteral stent with proximal pigtail in the left renal pelvis and distal pigtail within the urinary bladder. There is moderate left hydronephrosis with evidence of interval multifocal left renal parenchymal atrophy. Dependent stones  are present within the left kidney. There is perinephric and periureteral fat stranding proximally. No evidence of right-sided hydronephrosis or nephrolithiasis. The urinary bladder is decompressed. There is mineralization surrounding the distal aspect of the ureteral stent. No evidence of bowel obstruction. There are a few scattered colonic diverticuli without evidence of diverticulitis. No suspicious lymphadenopathy. Borderline abdominal aortic aneurysm measuring 3.1 cm on series 2 image 93, unchanged from prior exam. Atherosclerotic calcifications of the aorta and branch vessels. The abdominal and pelvic wall soft tissues show no acute abnormality. No acute fracture or suspicious bone lesion.     Impression: Retained left-sided ureteral stent with proximal pigtail in the left renal pelvis and distal pigtail in the urinary bladder. Mineralization surrounds the distal aspect of the ureteral stent. There is  moderate left-sided hydronephrosis with chronic parenchymal lung changes of the left kidney. Multiple stones are present in the left renal pelvis. Hepatomegaly. Cholelithiasis. Diverticulosis. Borderline abdominal aortic aneurysm measuring 3.1 cm. Electronically Signed: Lico Grover MD  10/27/2024 5:16 PM EDT  Workstation ID: [id number]    XR Chest 1 View    Result Date: 10/26/2024  XR CHEST 1 VW Date of Exam: 10/26/2024 11:13 PM EDT Indication: weakness Comparison: 8/19/2024 Findings: Cardiac and mediastinal contours remain normal. There are chronic interstitial changes in the lungs with chronic left basilar scarring. Granulomatous calcifications are present. No acute infiltrates are seen. There is no pneumothorax. Pulmonary vascularity is normal.     No acute cardiopulmonary findings. Stable exam from prior. Electronically Signed: Durga Zurita MD  10/26/2024 11:33 PM EDT  Workstation ID: [id number]    XR Hip With or Without Pelvis 2 - 3 View Left    Result Date: 10/26/2024  XR HIP W OR WO PELVIS 2-3 VIEW LEFT Date of Exam: 10/26/2024 7:20 PM EDT Indication: Fall, hip pain Comparison: CT abdomen pelvis 4/7/2019 Findings: AP pelvis and 2 views of the left hip. Note is made of a left-sided ureteral stent. The distal end is fractured, probably within the bladder lumen. 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 No sacroiliac joint diastasis. No acute fracture or dislocation. No bone erosion or destruction.     1. No acute fracture or dislocation in the pelvis or left hip. 2. The distal end of the left side ureteral stent appears fractured, possibly within the bladder lumen. Urology follow-up recommended. Electronically Signed: Durga Zurita MD  10/26/2024 7:41 PM EDT  Workstation ID: [id number]      LAB RESULTS:                              Brief Urine Lab Results  (Last result in the past 365 days)        Color   Clarity   Blood   Leuk Est   Nitrite   Protein   CREAT   Urine HCG        10/26/24 2340 Dark Yellow   Turbid    Large (3+)   Large (3+)   Positive   100 mg/dL (2+)                 Microbiology Results (last 10 days)       ** No results found for the last 240 hours. **          Results for orders placed during the hospital encounter of 08/19/24    Duplex Venous Lower Extremity - Bilateral CV-READ    Interpretation Summary    Patient refused left common femoral vein imaging, otherwise this was a normal left lower extremity venous duplex scan.    Patient refused right lower extremity venous evaluation.    Results for orders placed during the hospital encounter of 08/19/24    Duplex Venous Lower Extremity - Bilateral CV-READ    Interpretation Summary    Patient refused left common femoral vein imaging, otherwise this was a normal left lower extremity venous duplex scan.    Patient refused right lower extremity venous evaluation.    Results for orders placed during the hospital encounter of 09/13/23    Adult Transthoracic Echo Complete w/ Color, Spectral and Contrast if necessary per protocol    Interpretation Summary    There is mild global hypokinesis of the left ventricle.  Estimated LV ejection fraction is 40 to 45%.    Left ventricular wall thickness is consistent with mild concentric hypertrophy.    Left ventricular diastolic function is consistent with (grade I) impaired relaxation.    There are no significant valvular abnormalities.    Estimated right ventricular systolic pressure from tricuspid regurgitation is normal (<35 mmHg).        Electronically signed by Derrick Garland MD, 11/12/24, 4:04 PM EST.

## 2024-11-12 NOTE — OUTREACH NOTE
AMBULATORY CASE MANAGEMENT NOTE    Names and Relationships of Patient/Support Persons: Contact: FERDINAND CATALAN; Relationship: Emergency Contact  Contact: [facility]; Relationship: Other -     [facility] Interim Update    Called and spoke with patients spouse to see if she has heard from [facility] home health about acceptance or start date. She states that she has not heard from them.    Care Coordination    Called and spoke with [facility]. They state that they did accept patient referral and nursing will be out to see the patient this Thursday.    CCM Interim Update    Called and advised spouse of above. She is aware. She states that he is doing slightly better and she was able to help him get up to his recliner chair yesterday.    Goal is still inpatient rehab. Wife verbalizes understanding that PT eval and PCP face to face visit must be done before order can be submitted to rehab.            Education Documentation  No documentation found.        YANDEL GONZALEZ  Ambulatory Case Management    11/12/2024, 15:11 EST

## 2024-11-18 ENCOUNTER — PATIENT OUTREACH (OUTPATIENT)
Dept: CASE MANAGEMENT | Facility: OTHER | Age: 69
End: 2024-11-18
Payer: MEDICARE

## 2024-11-18 DIAGNOSIS — I50.22 CHRONIC HFREF (HEART FAILURE WITH REDUCED EJECTION FRACTION): Primary | ICD-10-CM

## 2024-11-18 DIAGNOSIS — R53.1 WEAKNESS: ICD-10-CM

## 2024-11-18 NOTE — OUTREACH NOTE
AMBULATORY CASE MANAGEMENT NOTE    Names and Relationships of Patient/Support Persons: Contact: FERDINAND CATALAN; Relationship: Emergency Contact -     [facility] Interim Update    Received call from patients spouse that appointment was cancelled for today because she has had a hard time getting patient out of bed still and does not feel as though she can travel with him. She did reschedule apt for this Friday 11/22. Will plan to meet at office at 11:30 on 11/22 for Dexcom training.        Education Documentation  No documentation found.        YANDEL GONZALEZ  Ambulatory Case Management    11/18/2024, 11:27 EST   LACERATION/PAIN

## 2024-11-22 ENCOUNTER — PATIENT OUTREACH (OUTPATIENT)
Dept: CASE MANAGEMENT | Facility: OTHER | Age: 69
End: 2024-11-22
Payer: MEDICARE

## 2024-11-22 DIAGNOSIS — R53.1 WEAKNESS: ICD-10-CM

## 2024-11-22 DIAGNOSIS — I50.22 CHRONIC HFREF (HEART FAILURE WITH REDUCED EJECTION FRACTION): Primary | ICD-10-CM

## 2024-11-22 NOTE — OUTREACH NOTE
AMBULATORY CASE MANAGEMENT NOTE    Names and Relationships of Patient/Support Persons: Contact: FERDINAND CATALAN; Relationship: Emergency Contact -     [facility] Interim Update    Received call from patients spouse stating he would not be able to make it to office today because he is not feeling well and it is too cold. She has rescheduled it for Monday 11/25 at 2:45.        Education Documentation  No documentation found.        YANDEL GONZALEZ  Ambulatory Case Management    11/22/2024, 09:19 EST

## 2024-11-25 ENCOUNTER — TELEPHONE (OUTPATIENT)
Dept: FAMILY MEDICINE CLINIC | Facility: CLINIC | Age: 69
End: 2024-11-25

## 2024-11-25 ENCOUNTER — PATIENT OUTREACH (OUTPATIENT)
Dept: CASE MANAGEMENT | Facility: OTHER | Age: 69
End: 2024-11-25
Payer: MEDICARE

## 2024-11-25 DIAGNOSIS — R53.1 WEAKNESS: ICD-10-CM

## 2024-11-25 DIAGNOSIS — I50.22 CHRONIC HFREF (HEART FAILURE WITH REDUCED EJECTION FRACTION): Primary | ICD-10-CM

## 2024-11-25 NOTE — TELEPHONE ENCOUNTER
Caller: FERDINAND CATALAN    Relationship to patient: Emergency Contact    Best call back number: [phone]    Patient is needing: PATIENTS WIFE CALLED IN AND SAID THAT PATIENT IS HAVING A HARD TIME GETTING OUT OF BED AND IS REALLY WEAK. PATIENT HAS AN UPCOMING APPOINTMENT ON   12/3/2024

## 2024-11-25 NOTE — OUTREACH NOTE
AMBULATORY CASE MANAGEMENT NOTE    Names and Relationships of Patient/Support Persons: Contact: FERDINAND CATALAN; Relationship: Emergency Contact -     [facility] Interim Update    Received message from office that patient cancelled appointment and cannot meet today for Dexcom education.    Called and spoke to patients spouse. She states that they are rescheduled for 11/27. Advised patients spouse that I will be out of the office on that date, but she is welcome to bring Dexcom supplies to see if somebody else in the office can help her apply it to patient if she will bring instruction sheet. She verbalizes understanding.         Education Documentation  No documentation found.        YANDEL GONZALEZ  Ambulatory Case Management    11/25/2024, 15:26 EST   Detail Level: Zone General Sunscreen Counseling: I recommended a broad spectrum sunscreen with a SPF of 30 or higher.  I explained that SPF 30 sunscreens block approximately 97 percent of the sun's harmful rays.  Sunscreens should be applied at least 15 minutes prior to expected sun exposure and then every 2 hours after that as long as sun exposure continues. If swimming or exercising sunscreen should be reapplied every 45 minutes to an hour after getting wet or sweating.  One ounce, or the equivalent of a shot glass full of sunscreen, is adequate to protect the skin not covered by a bathing suit. I also recommended a lip balm with a sunscreen as well. Sun protective clothing can be used in lieu of sunscreen but must be worn the entire time you are exposed to the sun's rays.

## 2024-11-26 ENCOUNTER — PATIENT OUTREACH (OUTPATIENT)
Dept: CASE MANAGEMENT | Facility: OTHER | Age: 69
End: 2024-11-26
Payer: MEDICARE

## 2024-11-26 DIAGNOSIS — R53.1 WEAKNESS: ICD-10-CM

## 2024-11-26 DIAGNOSIS — I50.22 CHRONIC HFREF (HEART FAILURE WITH REDUCED EJECTION FRACTION): Primary | ICD-10-CM

## 2024-11-26 NOTE — OUTREACH NOTE
[facility] End of Month Documentation    This Chronic Medical Management Care Plan for Brian Mehta, 69 y.o. male, has been monitored and managed; reviewed and a new plan of care implemented for the month of November.  A cumulative time of 69  minutes was spent on this patient record this month, including phone call with relative; chart review; electronic communication with primary care provider; phone call with other providers; electronic communication with other providers.    Regarding the patient's problems: has CVA (cerebral vascular accident); Essential hypertension; High cholesterol; Hip pain; Vitamin B12 deficiency; Kidney disorder; Chronic pain of both knees; Chronic HFrEF (heart failure with reduced ejection fraction); LVH (left ventricular hypertrophy); COPD (chronic obstructive pulmonary disease); DM2 (diabetes mellitus, type 2); Urinary tract infection without hematuria; Abnormal nuclear stress test; UTI (urinary tract infection); Fall; Failure to thrive in adult; Nephrolithiasis; Bladder stone; and Retained ureteral stent on their problem list., the following items were addressed: medical records; medications; changes to medical care and any changes can be found within the plan section of the note.  A detailed listing of time spent for chronic care management is tracked within each outreach encounter.  Current medications include:  has a current medication list which includes the following prescription(s): aspirin low dose, atorvastatin, dexcom g6 , dexcom g6 sensor, furosemide, hydrochlorothiazide, lantus solostar, isosorbide mononitrate, jardiance, losartan, and nicotine. and the patient is reported to be patient is compliant with medication protocol,  Medications are reported to be effective.  Regarding these diagnoses, referrals were made to the following provider(s):  PCP.  All notes on chart for PCP to review.    The patient was monitored remotely for activity level; medications; blood  glucose.    The patient's physical needs include:  DME supplies; physical healthcare; needs assistance with ADLs; resources for disability needs.     The patient's mental support needs include:  continued support    The patient's cognitive support needs include:  needs assistance with ADLs; requires supervision; household care; health care; personal care    The patient's psychosocial support needs include:  continued support    The patient's functional needs include: No data recorded    The patient's environmental needs include:  an unsafe living environment    Care Plan overall comments:  No data recorded    Refer to previous outreach notes for more information on the areas listed above.    Monthly Billing Diagnoses  (I50.22) Chronic HFrEF (heart failure with reduced ejection fraction)    (R53.1) Weakness    Medications   Medications have been reconciled    Care Plan progress this month:      Recently Modified Care Plans Updates made since 10/26/2024 12:00 AM      No recently modified care plans.            Instructions   Patient was provided an electronic copy of care plan  CCM services were explained and offered and patient has accepted these services.  Patient has given their written consent to receive CCM services and understands that this includes the authorization of electronic communication of medical information with the other treating providers.  Patient understands that they may stop CCM services at any time and these changes will be effective at the end of the calendar month and will effectively revocate the agreement of CCM services.  Patient understands that only one practitioner can furnish and be paid for CCM services during one calendar month.  Patient also understands that there may be co-payment or deductible fees in association with CCM services.  Patient will continue with at least monthly follow-up calls with the Ambulatory .    YANDEL GONZALEZ  Ambulatory Case Management    11/26/2024,  09:16 EST

## 2024-11-27 ENCOUNTER — TELEPHONE (OUTPATIENT)
Dept: UROLOGY | Age: 69
End: 2024-11-27
Payer: MEDICARE

## 2024-11-27 DIAGNOSIS — N20.0 NEPHROLITHIASIS: Primary | ICD-10-CM

## 2024-11-27 NOTE — TELEPHONE ENCOUNTER
PT WIFE CALLED BACK I TOLD HER PT NEEDS A URINE CULTURE PRIOR TO SURGERY. SHE STATED SHE UNDERSTOOD. SHE ALSO ASKED ABOUT THE PAT APPT. I TOLD HER IT WAS ON 12/2/24 @ 1:00. SHE ALSO ASKED WHAT TIME PT HAD TO BE AT THE HOSPITAL FOR MARCIA. I TOLD HER HOSPITAL WILL CALL THEM THE DAY BEFORE WITH ARRIVAL TIME

## 2024-12-02 ENCOUNTER — TELEPHONE (OUTPATIENT)
Dept: UROLOGY | Facility: CLINIC | Age: 69
End: 2024-12-02
Payer: MEDICARE

## 2024-12-02 NOTE — TELEPHONE ENCOUNTER
Tried calling patient to remind him to do urine culture. Voicemail box not set up. If patient calls back okay for HUB to relay message.

## 2024-12-02 NOTE — TELEPHONE ENCOUNTER
GUSTABO, [facility]CC[facility], CALLED.  HE WAS AT PATIENT'S HOUSE.  HE SAID THAT THEY CAN'T GET HIM TO HIS PAT APPOINTMENT TODAY AT 1:00.    PATIENT IS IN A WHEELCHAIR.  THEY CALLED [facility], AND THEY CAME TO HIS HOME, BUT DECLINED TO HELP, BECAUSE PATIENT DOES NOT HAVE A RAMP.  PATIENT HAS LIMITED MOBILITY.    HE SAID HE THINKS THE ONLY THING TO DO IS TO HAVE EMS COME GET HIM FOR SURGERY, THE DAY OF SURGERY, AND HAVE THE PRE-OP PAT DONE THAT DAY.    I TOLD HIM THAT LUZMA TRIED TO CALL THE PATIENT THIS MORNING TO REMIND TO DO A URINE CULTURE, BUT WAS UNABLE TO LMOM AND GOT NO ANSWER.  NURSE ASKED IF THIS IS SOMETHING THEY ARE TO COLLECT.  I TOLD HIM I WILL ASK NURSE TO CALL HIM.    GUSTABO CB#[phone] FERDINAND, PATIENT'S SPOUSE, CB#[phone]

## 2024-12-02 NOTE — TELEPHONE ENCOUNTER
Spoke to patients wife. I informed her per Dr. Kulkarni that patient needs to go to the ER to be evaluated for severe weakness and we need to cancel surgery for now. She verbalized understanding of information. She will try to get him to the hospital. Jumana stated that she does  not know if she can get patient to the ER but will try to get EMS to take him as he is to weak.    Neuro

## 2024-12-03 DIAGNOSIS — E11.9 TYPE 2 DIABETES MELLITUS WITHOUT COMPLICATION, WITHOUT LONG-TERM CURRENT USE OF INSULIN: ICD-10-CM

## 2024-12-03 DIAGNOSIS — E78.00 HIGH CHOLESTEROL: ICD-10-CM

## 2024-12-03 DIAGNOSIS — I10 ESSENTIAL HYPERTENSION: ICD-10-CM

## 2024-12-04 ENCOUNTER — TELEPHONE (OUTPATIENT)
Dept: UROLOGY | Age: 69
End: 2024-12-04
Payer: MEDICARE

## 2024-12-04 RX ORDER — EMPAGLIFLOZIN 25 MG/1
25 TABLET, FILM COATED ORAL DAILY
Qty: 30 TABLET | Refills: 0 | Status: SHIPPED | OUTPATIENT
Start: 2024-12-04

## 2024-12-04 RX ORDER — ATORVASTATIN CALCIUM 20 MG/1
20 TABLET, FILM COATED ORAL NIGHTLY
Qty: 30 TABLET | Refills: 0 | Status: SHIPPED | OUTPATIENT
Start: 2024-12-04

## 2024-12-04 RX ORDER — HYDROCHLOROTHIAZIDE 25 MG/1
25 TABLET ORAL DAILY
Qty: 30 TABLET | Refills: 0 | Status: SHIPPED | OUTPATIENT
Start: 2024-12-04

## 2024-12-04 RX ORDER — LOSARTAN POTASSIUM 25 MG/1
25 TABLET ORAL DAILY
Qty: 30 TABLET | Refills: 0 | Status: SHIPPED | OUTPATIENT
Start: 2024-12-04

## 2024-12-04 NOTE — TELEPHONE ENCOUNTER
Spoke to patients wife. She did not get patient to the hospital she stated that her  is very weak still. She stated that he is also very cold, has his heater on as well as multiple blankets. I recommended that she take him to the ER to be evaluated. She stated she will try to get patient to the ER by EMS. She is concerned that she will not have a ride there as well and cannot guarantee he will agree to go.

## 2024-12-04 NOTE — TELEPHONE ENCOUNTER
Spoke to patients wife Jumana. Jumana stated that she called EMS, and they showed up to  the patient. She told EMS he was going to the hospital for surgery. I informed her that we had already discussed that the patients surgery had been canceled and that the patient needs to be evaluated in the ER due to his condition and severe weakness. I informed the patients wife per dr miranda, the patient has a foreign object in the body, and this will continue to form stones and severely damage the bladder and kidneys if it has not already. She stated that they have transportation issues and they have already tried [facility] and EMS and neither has brought the patient.

## 2024-12-13 ENCOUNTER — TELEPHONE (OUTPATIENT)
Dept: CASE MANAGEMENT | Facility: OTHER | Age: 69
End: 2024-12-13
Payer: MEDICARE

## 2024-12-13 ENCOUNTER — TELEPHONE (OUTPATIENT)
Dept: FAMILY MEDICINE CLINIC | Facility: CLINIC | Age: 69
End: 2024-12-13
Payer: MEDICARE

## 2024-12-13 NOTE — TELEPHONE ENCOUNTER
Pt non compliant and Alejandra with [facility] is discharging patient.    Orders were for CHF excerebration and physical therapy for strength

## 2024-12-13 NOTE — TELEPHONE ENCOUNTER
Received call from patient stating that they are without water due to it being shut off on Monday and that she needs to discuss patient. Called patient back, no answer no vm. Sent secure chat to MSW regarding water.

## 2024-12-17 ENCOUNTER — PATIENT OUTREACH (OUTPATIENT)
Dept: CASE MANAGEMENT | Facility: OTHER | Age: 69
End: 2024-12-17
Payer: MEDICARE

## 2024-12-17 DIAGNOSIS — R53.1 WEAKNESS: ICD-10-CM

## 2024-12-17 DIAGNOSIS — I50.22 CHRONIC HFREF (HEART FAILURE WITH REDUCED EJECTION FRACTION): Primary | ICD-10-CM

## 2024-12-17 NOTE — OUTREACH NOTE
"AMBULATORY CASE MANAGEMENT NOTE    Names and Relationships of Patient/Support Persons: Contact: FERDINAND CATALAN; Relationship: Emergency Contact  Contact: Alejandra JOSHI ; Relationship:   Contact: Brian Catalan P \"Igo\"; Relationship: Self -     [facility] Interim Update    Received call from patient spouse stating that patient still will not go to ER. Per documentation in Epic, patient was advised to go to the ER on 12/4/24 by urology. Patient spouse called EMS that day, but patient refused to go to ER.    Patient spouse states that she cannot get him up. He is too weak and when she tries to sit him up, he just \"falls back down in to the bed\". She states that he has not been out of bed since he has been home from the hospital. She is unable to clean him or care for him. He is apparently in his right frame of mind, per patient, but all he will do is lay in bed and smoke and drink liquids.     Spouse states that he needs to go to the ER and he said he would go for his sister, but he has not gone. I did encourage patient to have sister come to house if she is able, and call EMS while sister is there to see if he would be willing to go.    I educated spouse that if patient does have an infection, that it could ultimately lead to his death if untreated. He has also been educated per urology note that the foreign object (ureteral stent) and kidney stones could lead to severe damage to his bladder and kidneys if this has not already occurred. I did request to call patient to speak to him myself. Spouse gave me number of [phone] to call, but there was no answer and no voicemail set up.     I called wife back and reiterated education above. I also encouraged her to have patient call me back to discuss his wishes and goals.     Care Coordination    I did call home health nurse, Alejandra, to get her take on the home situation and patient care. She verbalizes that patient is often saturated in urine and feces. She states that there is " no walk way in the home, so even if he was strong enough to get in to his wheelchair, they cannot get the wheelchair in to the home. Home is infested with bugs and rodents (per patient spouse).     I have placed an APS referral regarding the above situation. Web ID # [id number].          Education Documentation  No documentation found.        YANDEL GONZALEZ  Ambulatory Case Management    12/17/2024, 15:20 EST

## 2024-12-20 ENCOUNTER — TELEPHONE (OUTPATIENT)
Dept: CASE MANAGEMENT | Facility: OTHER | Age: 69
End: 2024-12-20
Payer: MEDICARE

## 2024-12-23 ENCOUNTER — PATIENT OUTREACH (OUTPATIENT)
Dept: CASE MANAGEMENT | Facility: OTHER | Age: 69
End: 2024-12-23
Payer: MEDICARE

## 2024-12-23 DIAGNOSIS — I50.22 CHRONIC HFREF (HEART FAILURE WITH REDUCED EJECTION FRACTION): Primary | ICD-10-CM

## 2024-12-23 DIAGNOSIS — R53.1 WEAKNESS: ICD-10-CM
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
## 2024-12-26 DIAGNOSIS — E11.9 TYPE 2 DIABETES MELLITUS WITHOUT COMPLICATION, WITHOUT LONG-TERM CURRENT USE OF INSULIN: ICD-10-CM

## 2024-12-27 RX ORDER — INSULIN GLARGINE 100 [IU]/ML
INJECTION, SOLUTION SUBCUTANEOUS
Qty: 15 ML | Refills: 0 | Status: SHIPPED | OUTPATIENT
Start: 2024-12-27

## 2024-12-30 ENCOUNTER — TELEPHONE (OUTPATIENT)
Dept: CASE MANAGEMENT | Facility: OTHER | Age: 69
End: 2024-12-30
Payer: MEDICARE

## 2024-12-30 ENCOUNTER — PATIENT OUTREACH (OUTPATIENT)
Age: 69
End: 2024-12-30
Payer: MEDICARE

## 2024-12-30 ENCOUNTER — PATIENT OUTREACH (OUTPATIENT)
Dept: CASE MANAGEMENT | Facility: OTHER | Age: 69
End: 2024-12-30
Payer: MEDICARE

## 2024-12-30 DIAGNOSIS — I50.22 CHRONIC HFREF (HEART FAILURE WITH REDUCED EJECTION FRACTION): ICD-10-CM

## 2024-12-30 DIAGNOSIS — R53.1 WEAKNESS: Primary | ICD-10-CM
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
## 2024-12-30 NOTE — OUTREACH NOTE
"AMBULATORY CASE MANAGEMENT NOTE    Names and Relationships of Patient/Support Persons: Contact: FERDINAND CATALAN; Relationship: Emergency Contact -     [facility] Interim Update    Patients wife called and states that he did not go to hospital after our last conversation on 12/23. She states that after we got off the phone, patient stated that he did not want to go to the hospital and she did not call EMS.    I inquired on patients mental status today and spouse states it is \"the same, he knows he's not in the right mind\". She states that he is also still slow to speech and this is not normal for him.     Spouse states that she has tried to reach APS worker, but has not received a call back.    She states she has also tried to call home authority apartments but has not heard back from them. She would like to speak to Luisana regarding this.    Advised patients spouse that I would attempt to call APS to see if they can assist in case, and that I would message Luisana to let her know to call.    Care Coordination    Called Anat Alex (patient spouse provided name and contact info), left message to call back.    Sent secure chat to [facility], she states she will call patients spouse.         Education Documentation  No documentation found.        YANDEL GONZALEZ  Ambulatory Case Management    12/30/2024, 10:52 EST  "

## 2024-12-30 NOTE — OUTREACH NOTE
[facility] End of Month Documentation    This Chronic Medical Management Care Plan for Brian Mehta, 69 y.o. male, has been monitored and managed; reviewed and a new plan of care implemented for the month of December.  A cumulative time of 98  minutes was spent on this patient record this month, including phone call with relative; chart review; electronic communication with other providers.    Regarding the patient's problems: has CVA (cerebral vascular accident); Essential hypertension; High cholesterol; Hip pain; Vitamin B12 deficiency; Kidney disorder; Chronic pain of both knees; Chronic HFrEF (heart failure with reduced ejection fraction); LVH (left ventricular hypertrophy); COPD (chronic obstructive pulmonary disease); DM2 (diabetes mellitus, type 2); Urinary tract infection without hematuria; Abnormal nuclear stress test; UTI (urinary tract infection); Fall; Failure to thrive in adult; Nephrolithiasis; Bladder stone; and Retained ureteral stent on their problem list., the following items were addressed: medical records; medications; changes to medical care and any changes can be found within the plan section of the note.  A detailed listing of time spent for chronic care management is tracked within each outreach encounter.  Current medications include:  has a current medication list which includes the following prescription(s): aspirin low dose, atorvastatin, dexcom g6 , dexcom g6 sensor, furosemide, hydrochlorothiazide, lantus solostar, isosorbide mononitrate, jardiance, losartan, and nicotine. and the patient is reported to be patient is compliant with medication protocol,  Medications are reported to be effective.  Regarding these diagnoses, referrals were made to the following provider(s): n/a.  All notes on chart for PCP to review.    The patient was monitored remotely for activity level; medications; blood glucose.    The patient's physical needs include:  DME supplies; physical healthcare; needs  assistance with ADLs; resources for disability needs.     The patient's mental support needs include:  continued support    The patient's cognitive support needs include:  needs assistance with ADLs; requires supervision; household care; health care; personal care    The patient's psychosocial support needs include:  continued support    The patient's functional needs include: DME; physical healthcare; needs assistance for ADLs    The patient's environmental needs include:  an unsafe living environment    Care Plan overall comments:  No data recorded    Refer to previous outreach notes for more information on the areas listed above.    Monthly Billing Diagnoses  (R53.1) Weakness    (I50.22) Chronic HFrEF (heart failure with reduced ejection fraction)    Medications   Medications have been reconciled    Care Plan progress this month:      Recently Modified Care Plans Updates made since 11/29/2024 12:00 AM      No recently modified care plans.                 Instructions   Patient was provided an electronic copy of care plan  CCM services were explained and offered and patient has accepted these services.  Patient has given their written consent to receive CCM services and understands that this includes the authorization of electronic communication of medical information with the other treating providers.  Patient understands that they may stop CCM services at any time and these changes will be effective at the end of the calendar month and will effectively revocate the agreement of CCM services.  Patient understands that only one practitioner can furnish and be paid for CCM services during one calendar month.  Patient also understands that there may be co-payment or deductible fees in association with CCM services.  Patient will continue with at least monthly follow-up calls with the Ambulatory .    YANDEL GONZALEZ  Ambulatory Case Management    12/30/2024, 10:20 EST

## 2024-12-30 NOTE — OUTREACH NOTE
Patient Outreach    MSW spoke with patient's spouse on this day and provided housing information. MSW also spoke with patient's spouse regarding [facility] rental assistance possibly. Patient's spouse states that her water bill is due tomorrow, but she will get social security income tomorrow. Patient's spouse given housing list.    Luisana LIU -   Ambulatory Case Management    12/30/2024, 11:23 EST

## 2025-01-02 ENCOUNTER — TELEPHONE (OUTPATIENT)
Dept: CASE MANAGEMENT | Facility: OTHER | Age: 70
End: 2025-01-02
Payer: MEDICARE

## 2025-01-03 ENCOUNTER — TELEPHONE (OUTPATIENT)
Dept: CASE MANAGEMENT | Facility: OTHER | Age: 70
End: 2025-01-03
Payer: MEDICARE

## 2025-01-03 DIAGNOSIS — I10 ESSENTIAL HYPERTENSION: ICD-10-CM

## 2025-01-03 DIAGNOSIS — E78.00 HIGH CHOLESTEROL: ICD-10-CM

## 2025-01-03 DIAGNOSIS — E11.9 TYPE 2 DIABETES MELLITUS WITHOUT COMPLICATION, WITHOUT LONG-TERM CURRENT USE OF INSULIN: ICD-10-CM

## 2025-01-03 NOTE — TELEPHONE ENCOUNTER
Attempted to reach Pavithra with APS after she left a voicemail on my machine. Left message to call back.

## 2025-01-06 DIAGNOSIS — E11.9 TYPE 2 DIABETES MELLITUS WITHOUT COMPLICATION, WITHOUT LONG-TERM CURRENT USE OF INSULIN: ICD-10-CM

## 2025-01-06 RX ORDER — LOSARTAN POTASSIUM 25 MG/1
25 TABLET ORAL DAILY
Qty: 30 TABLET | Refills: 0 | Status: ON HOLD | OUTPATIENT
Start: 2025-01-06

## 2025-01-06 RX ORDER — EMPAGLIFLOZIN 25 MG/1
25 TABLET, FILM COATED ORAL DAILY
Qty: 30 TABLET | Refills: 0 | Status: ON HOLD | OUTPATIENT
Start: 2025-01-06

## 2025-01-06 RX ORDER — ATORVASTATIN CALCIUM 20 MG/1
20 TABLET, FILM COATED ORAL NIGHTLY
Qty: 30 TABLET | Refills: 0 | Status: ON HOLD | OUTPATIENT
Start: 2025-01-06

## 2025-01-06 RX ORDER — HYDROCHLOROTHIAZIDE 25 MG/1
25 TABLET ORAL DAILY
Qty: 30 TABLET | Refills: 0 | Status: ON HOLD | OUTPATIENT
Start: 2025-01-06

## 2025-01-07 ENCOUNTER — APPOINTMENT (OUTPATIENT)
Dept: GENERAL RADIOLOGY | Facility: HOSPITAL | Age: 70
DRG: 660 | End: 2025-01-07
Payer: MEDICARE

## 2025-01-07 ENCOUNTER — HOSPITAL ENCOUNTER (INPATIENT)
Facility: HOSPITAL | Age: 70
LOS: 8 days | Discharge: NURSING, REHAB OR LONG-TERM CARE | DRG: 660 | End: 2025-01-17
Attending: EMERGENCY MEDICINE | Admitting: STUDENT IN AN ORGANIZED HEALTH CARE EDUCATION/TRAINING PROGRAM
Payer: MEDICARE

## 2025-01-07 DIAGNOSIS — N21.0 BLADDER STONE: ICD-10-CM

## 2025-01-07 DIAGNOSIS — Z96.0 RETAINED URETERAL STENT: ICD-10-CM

## 2025-01-07 DIAGNOSIS — E11.9 TYPE 2 DIABETES MELLITUS WITHOUT COMPLICATION, WITHOUT LONG-TERM CURRENT USE OF INSULIN: ICD-10-CM

## 2025-01-07 DIAGNOSIS — R26.2 DIFFICULTY WALKING: ICD-10-CM

## 2025-01-07 DIAGNOSIS — N30.90 CYSTITIS: Primary | ICD-10-CM

## 2025-01-07 DIAGNOSIS — Z74.09 IMPAIRED MOBILITY AND ADLS: ICD-10-CM

## 2025-01-07 DIAGNOSIS — R60.0 PERIPHERAL EDEMA: ICD-10-CM

## 2025-01-07 DIAGNOSIS — Z78.9 IMPAIRED MOBILITY AND ADLS: ICD-10-CM

## 2025-01-07 LAB
ALBUMIN SERPL-MCNC: 2.9 G/DL (ref 3.5–5.2)
ALBUMIN/GLOB SERPL: 0.6 G/DL
ALP SERPL-CCNC: 100 U/L (ref 39–117)
ALT SERPL W P-5'-P-CCNC: 5 U/L (ref 1–41)
ANION GAP SERPL CALCULATED.3IONS-SCNC: 9.4 MMOL/L (ref 5–15)
AST SERPL-CCNC: 10 U/L (ref 1–40)
BASOPHILS # BLD AUTO: 0.02 10*3/MM3 (ref 0–0.2)
BASOPHILS NFR BLD AUTO: 0.4 % (ref 0–1.5)
BILIRUB SERPL-MCNC: 0.5 MG/DL (ref 0–1.2)
BUN SERPL-MCNC: 14 MG/DL (ref 8–23)
BUN/CREAT SERPL: 18.2 (ref 7–25)
CALCIUM SPEC-SCNC: 8.7 MG/DL (ref 8.6–10.5)
CHLORIDE SERPL-SCNC: 98 MMOL/L (ref 98–107)
CO2 SERPL-SCNC: 22.6 MMOL/L (ref 22–29)
CREAT SERPL-MCNC: 0.77 MG/DL (ref 0.76–1.27)
DEPRECATED RDW RBC AUTO: 41.4 FL (ref 37–54)
EGFRCR SERPLBLD CKD-EPI 2021: 98.1 ML/MIN/1.73
EOSINOPHIL # BLD AUTO: 0.05 10*3/MM3 (ref 0–0.4)
EOSINOPHIL NFR BLD AUTO: 0.9 % (ref 0.3–6.2)
ERYTHROCYTE [DISTWIDTH] IN BLOOD BY AUTOMATED COUNT: 14.6 % (ref 12.3–15.4)
GLOBULIN UR ELPH-MCNC: 4.5 GM/DL
GLUCOSE SERPL-MCNC: 264 MG/DL (ref 65–99)
HCT VFR BLD AUTO: 47.1 % (ref 37.5–51)
HGB BLD-MCNC: 15.4 G/DL (ref 13–17.7)
HOLD SPECIMEN: NORMAL
HOLD SPECIMEN: NORMAL
IMM GRANULOCYTES # BLD AUTO: 0.02 10*3/MM3 (ref 0–0.05)
IMM GRANULOCYTES NFR BLD AUTO: 0.4 % (ref 0–0.5)
LYMPHOCYTES # BLD AUTO: 1.75 10*3/MM3 (ref 0.7–3.1)
LYMPHOCYTES NFR BLD AUTO: 32 % (ref 19.6–45.3)
MAGNESIUM SERPL-MCNC: 1.5 MG/DL (ref 1.6–2.4)
MCH RBC QN AUTO: 25.8 PG (ref 26.6–33)
MCHC RBC AUTO-ENTMCNC: 32.7 G/DL (ref 31.5–35.7)
MCV RBC AUTO: 78.9 FL (ref 79–97)
MONOCYTES # BLD AUTO: 0.53 10*3/MM3 (ref 0.1–0.9)
MONOCYTES NFR BLD AUTO: 9.7 % (ref 5–12)
NEUTROPHILS NFR BLD AUTO: 3.1 10*3/MM3 (ref 1.7–7)
NEUTROPHILS NFR BLD AUTO: 56.6 % (ref 42.7–76)
NRBC BLD AUTO-RTO: 0 /100 WBC (ref 0–0.2)
PLATELET # BLD AUTO: 248 10*3/MM3 (ref 140–450)
PMV BLD AUTO: 9.6 FL (ref 6–12)
POTASSIUM SERPL-SCNC: 4.4 MMOL/L (ref 3.5–5.2)
PROT SERPL-MCNC: 7.4 G/DL (ref 6–8.5)
RBC # BLD AUTO: 5.97 10*6/MM3 (ref 4.14–5.8)
SODIUM SERPL-SCNC: 130 MMOL/L (ref 136–145)
TROPONIN T SERPL HS-MCNC: 9 NG/L
WBC NRBC COR # BLD AUTO: 5.47 10*3/MM3 (ref 3.4–10.8)
WHOLE BLOOD HOLD COAG: NORMAL
WHOLE BLOOD HOLD SPECIMEN: NORMAL

## 2025-01-07 PROCEDURE — 84484 ASSAY OF TROPONIN QUANT: CPT | Performed by: EMERGENCY MEDICINE

## 2025-01-07 PROCEDURE — 93005 ELECTROCARDIOGRAM TRACING: CPT

## 2025-01-07 PROCEDURE — 83036 HEMOGLOBIN GLYCOSYLATED A1C: CPT | Performed by: STUDENT IN AN ORGANIZED HEALTH CARE EDUCATION/TRAINING PROGRAM

## 2025-01-07 PROCEDURE — 85025 COMPLETE CBC W/AUTO DIFF WBC: CPT | Performed by: EMERGENCY MEDICINE

## 2025-01-07 PROCEDURE — 93010 ELECTROCARDIOGRAM REPORT: CPT | Performed by: SPECIALIST

## 2025-01-07 PROCEDURE — 80053 COMPREHEN METABOLIC PANEL: CPT | Performed by: EMERGENCY MEDICINE

## 2025-01-07 PROCEDURE — 82550 ASSAY OF CK (CPK): CPT | Performed by: STUDENT IN AN ORGANIZED HEALTH CARE EDUCATION/TRAINING PROGRAM

## 2025-01-07 PROCEDURE — 93005 ELECTROCARDIOGRAM TRACING: CPT | Performed by: EMERGENCY MEDICINE

## 2025-01-07 PROCEDURE — 83735 ASSAY OF MAGNESIUM: CPT | Performed by: EMERGENCY MEDICINE

## 2025-01-07 PROCEDURE — 73502 X-RAY EXAM HIP UNI 2-3 VIEWS: CPT

## 2025-01-07 PROCEDURE — 99285 EMERGENCY DEPT VISIT HI MDM: CPT

## 2025-01-07 PROCEDURE — 71045 X-RAY EXAM CHEST 1 VIEW: CPT

## 2025-01-07 RX ORDER — INSULIN GLARGINE 100 [IU]/ML
INJECTION, SOLUTION SUBCUTANEOUS
Qty: 15 ML | Refills: 10 | Status: ON HOLD | OUTPATIENT
Start: 2025-01-07

## 2025-01-07 RX ORDER — SODIUM CHLORIDE 0.9 % (FLUSH) 0.9 %
10 SYRINGE (ML) INJECTION AS NEEDED
Status: DISCONTINUED | OUTPATIENT
Start: 2025-01-07 | End: 2025-01-14

## 2025-01-08 PROBLEM — N39.0 ACUTE UTI: Status: ACTIVE | Noted: 2025-01-08

## 2025-01-08 LAB
ANION GAP SERPL CALCULATED.3IONS-SCNC: 7.6 MMOL/L (ref 5–15)
BACTERIA BLD CULT: NORMAL
BACTERIA UR QL AUTO: ABNORMAL /HPF
BILIRUB UR QL STRIP: NEGATIVE
BOTTLE TYPE: NORMAL
BUN SERPL-MCNC: 17 MG/DL (ref 8–23)
BUN/CREAT SERPL: 19.3 (ref 7–25)
CALCIUM SPEC-SCNC: 8.7 MG/DL (ref 8.6–10.5)
CHLORIDE SERPL-SCNC: 100 MMOL/L (ref 98–107)
CK SERPL-CCNC: 32 U/L (ref 20–200)
CK SERPL-CCNC: 38 U/L (ref 20–200)
CLARITY UR: ABNORMAL
CO2 SERPL-SCNC: 26.4 MMOL/L (ref 22–29)
COLOR UR: YELLOW
CREAT SERPL-MCNC: 0.88 MG/DL (ref 0.76–1.27)
D-LACTATE SERPL-SCNC: 1.1 MMOL/L (ref 0.5–2)
DEPRECATED RDW RBC AUTO: 41.2 FL (ref 37–54)
EGFRCR SERPLBLD CKD-EPI 2021: 94.2 ML/MIN/1.73
ERYTHROCYTE [DISTWIDTH] IN BLOOD BY AUTOMATED COUNT: 14.6 % (ref 12.3–15.4)
GLUCOSE BLDC GLUCOMTR-MCNC: 223 MG/DL (ref 70–99)
GLUCOSE BLDC GLUCOMTR-MCNC: 231 MG/DL (ref 70–99)
GLUCOSE SERPL-MCNC: 243 MG/DL (ref 65–99)
GLUCOSE UR STRIP-MCNC: NEGATIVE MG/DL
HBA1C MFR BLD: 8.8 % (ref 4.8–5.6)
HCT VFR BLD AUTO: 39.4 % (ref 37.5–51)
HGB BLD-MCNC: 13.1 G/DL (ref 13–17.7)
HGB UR QL STRIP.AUTO: ABNORMAL
HYALINE CASTS UR QL AUTO: ABNORMAL /LPF
KETONES UR QL STRIP: NEGATIVE
LEUKOCYTE ESTERASE UR QL STRIP.AUTO: ABNORMAL
MAGNESIUM SERPL-MCNC: 2.3 MG/DL (ref 1.6–2.4)
MCH RBC QN AUTO: 26 PG (ref 26.6–33)
MCHC RBC AUTO-ENTMCNC: 33.2 G/DL (ref 31.5–35.7)
MCV RBC AUTO: 78.3 FL (ref 79–97)
NITRITE UR QL STRIP: POSITIVE
PH UR STRIP.AUTO: 6 [PH] (ref 5–8)
PLATELET # BLD AUTO: 208 10*3/MM3 (ref 140–450)
PMV BLD AUTO: 9.3 FL (ref 6–12)
POTASSIUM SERPL-SCNC: 3.9 MMOL/L (ref 3.5–5.2)
PROT UR QL STRIP: ABNORMAL
QT INTERVAL: 351 MS
QTC INTERVAL: 513 MS
RBC # BLD AUTO: 5.03 10*6/MM3 (ref 4.14–5.8)
RBC # UR STRIP: ABNORMAL /HPF
REF LAB TEST METHOD: ABNORMAL
SODIUM SERPL-SCNC: 134 MMOL/L (ref 136–145)
SP GR UR STRIP: 1.02 (ref 1–1.03)
SQUAMOUS #/AREA URNS HPF: ABNORMAL /HPF
UROBILINOGEN UR QL STRIP: ABNORMAL
WBC # UR STRIP: ABNORMAL /HPF
WBC CLUMPS # UR AUTO: ABNORMAL /HPF
WBC NRBC COR # BLD AUTO: 5.02 10*3/MM3 (ref 3.4–10.8)

## 2025-01-08 PROCEDURE — 87147 CULTURE TYPE IMMUNOLOGIC: CPT

## 2025-01-08 PROCEDURE — 80048 BASIC METABOLIC PNL TOTAL CA: CPT | Performed by: INTERNAL MEDICINE

## 2025-01-08 PROCEDURE — 87154 CUL TYP ID BLD PTHGN 6+ TRGT: CPT

## 2025-01-08 PROCEDURE — P9612 CATHETERIZE FOR URINE SPEC: HCPCS

## 2025-01-08 PROCEDURE — 25010000002 CEFTRIAXONE PER 250 MG

## 2025-01-08 PROCEDURE — G0378 HOSPITAL OBSERVATION PER HR: HCPCS

## 2025-01-08 PROCEDURE — 87186 SC STD MICRODIL/AGAR DIL: CPT

## 2025-01-08 PROCEDURE — 83605 ASSAY OF LACTIC ACID: CPT

## 2025-01-08 PROCEDURE — 25010000002 MAGNESIUM SULFATE 2 GM/50ML SOLUTION: Performed by: STUDENT IN AN ORGANIZED HEALTH CARE EDUCATION/TRAINING PROGRAM

## 2025-01-08 PROCEDURE — 63710000001 INSULIN GLARGINE PER 5 UNITS: Performed by: STUDENT IN AN ORGANIZED HEALTH CARE EDUCATION/TRAINING PROGRAM

## 2025-01-08 PROCEDURE — 87077 CULTURE AEROBIC IDENTIFY: CPT

## 2025-01-08 PROCEDURE — 36415 COLL VENOUS BLD VENIPUNCTURE: CPT

## 2025-01-08 PROCEDURE — 88312 SPECIAL STAINS GROUP 1: CPT

## 2025-01-08 PROCEDURE — 82948 REAGENT STRIP/BLOOD GLUCOSE: CPT

## 2025-01-08 PROCEDURE — 63710000001 INSULIN LISPRO (HUMAN) PER 5 UNITS: Performed by: STUDENT IN AN ORGANIZED HEALTH CARE EDUCATION/TRAINING PROGRAM

## 2025-01-08 PROCEDURE — 83735 ASSAY OF MAGNESIUM: CPT | Performed by: INTERNAL MEDICINE

## 2025-01-08 PROCEDURE — 25010000002 ENOXAPARIN PER 10 MG: Performed by: STUDENT IN AN ORGANIZED HEALTH CARE EDUCATION/TRAINING PROGRAM

## 2025-01-08 PROCEDURE — 87040 BLOOD CULTURE FOR BACTERIA: CPT

## 2025-01-08 PROCEDURE — 99222 1ST HOSP IP/OBS MODERATE 55: CPT | Performed by: STUDENT IN AN ORGANIZED HEALTH CARE EDUCATION/TRAINING PROGRAM

## 2025-01-08 PROCEDURE — 82550 ASSAY OF CK (CPK): CPT | Performed by: INTERNAL MEDICINE

## 2025-01-08 PROCEDURE — 25010000002 CEFTRIAXONE PER 250 MG: Performed by: PHYSICIAN ASSISTANT

## 2025-01-08 PROCEDURE — 81001 URINALYSIS AUTO W/SCOPE: CPT | Performed by: EMERGENCY MEDICINE

## 2025-01-08 PROCEDURE — 87040 BLOOD CULTURE FOR BACTERIA: CPT | Performed by: PHYSICIAN ASSISTANT

## 2025-01-08 PROCEDURE — 25810000003 LACTATED RINGERS PER 1000 ML: Performed by: STUDENT IN AN ORGANIZED HEALTH CARE EDUCATION/TRAINING PROGRAM

## 2025-01-08 PROCEDURE — 85027 COMPLETE CBC AUTOMATED: CPT | Performed by: INTERNAL MEDICINE

## 2025-01-08 RX ORDER — ASPIRIN 81 MG/1
81 TABLET, CHEWABLE ORAL DAILY
Status: DISCONTINUED | OUTPATIENT
Start: 2025-01-08 | End: 2025-01-17 | Stop reason: HOSPADM

## 2025-01-08 RX ORDER — ACETAMINOPHEN 325 MG/1
650 TABLET ORAL EVERY 6 HOURS PRN
Status: DISCONTINUED | OUTPATIENT
Start: 2025-01-08 | End: 2025-01-17 | Stop reason: HOSPADM

## 2025-01-08 RX ORDER — IPRATROPIUM BROMIDE AND ALBUTEROL 20; 100 UG/1; UG/1
1 SPRAY, METERED RESPIRATORY (INHALATION)
COMMUNITY

## 2025-01-08 RX ORDER — FAMOTIDINE 40 MG/1
40 TABLET, FILM COATED ORAL NIGHTLY PRN
COMMUNITY

## 2025-01-08 RX ORDER — BISACODYL 5 MG/1
5 TABLET, DELAYED RELEASE ORAL DAILY PRN
Status: DISCONTINUED | OUTPATIENT
Start: 2025-01-08 | End: 2025-01-17 | Stop reason: HOSPADM

## 2025-01-08 RX ORDER — AMOXICILLIN 250 MG
2 CAPSULE ORAL 2 TIMES DAILY PRN
Status: DISCONTINUED | OUTPATIENT
Start: 2025-01-08 | End: 2025-01-17 | Stop reason: HOSPADM

## 2025-01-08 RX ORDER — NICOTINE POLACRILEX 4 MG
15 LOZENGE BUCCAL
Status: DISCONTINUED | OUTPATIENT
Start: 2025-01-08 | End: 2025-01-17 | Stop reason: HOSPADM

## 2025-01-08 RX ORDER — SODIUM CHLORIDE 0.9 % (FLUSH) 0.9 %
10 SYRINGE (ML) INJECTION AS NEEDED
Status: DISCONTINUED | OUTPATIENT
Start: 2025-01-08 | End: 2025-01-17 | Stop reason: HOSPADM

## 2025-01-08 RX ORDER — NICOTINE 21 MG/24HR
1 PATCH, TRANSDERMAL 24 HOURS TRANSDERMAL
Status: DISCONTINUED | OUTPATIENT
Start: 2025-01-08 | End: 2025-01-13

## 2025-01-08 RX ORDER — POLYETHYLENE GLYCOL 3350 17 G/17G
17 POWDER, FOR SOLUTION ORAL DAILY PRN
Status: DISCONTINUED | OUTPATIENT
Start: 2025-01-08 | End: 2025-01-17 | Stop reason: HOSPADM

## 2025-01-08 RX ORDER — BISACODYL 10 MG
10 SUPPOSITORY, RECTAL RECTAL DAILY PRN
Status: DISCONTINUED | OUTPATIENT
Start: 2025-01-08 | End: 2025-01-17 | Stop reason: HOSPADM

## 2025-01-08 RX ORDER — SODIUM CHLORIDE 0.9 % (FLUSH) 0.9 %
10 SYRINGE (ML) INJECTION EVERY 12 HOURS SCHEDULED
Status: DISCONTINUED | OUTPATIENT
Start: 2025-01-08 | End: 2025-01-17 | Stop reason: HOSPADM

## 2025-01-08 RX ORDER — MAGNESIUM SULFATE HEPTAHYDRATE 40 MG/ML
2 INJECTION, SOLUTION INTRAVENOUS ONCE
Status: COMPLETED | OUTPATIENT
Start: 2025-01-08 | End: 2025-01-08

## 2025-01-08 RX ORDER — BUDESONIDE AND FORMOTEROL FUMARATE DIHYDRATE 160; 4.5 UG/1; UG/1
2 AEROSOL RESPIRATORY (INHALATION)
COMMUNITY

## 2025-01-08 RX ORDER — ENOXAPARIN SODIUM 100 MG/ML
40 INJECTION SUBCUTANEOUS DAILY
Status: DISCONTINUED | OUTPATIENT
Start: 2025-01-08 | End: 2025-01-17 | Stop reason: HOSPADM

## 2025-01-08 RX ORDER — INSULIN LISPRO 100 [IU]/ML
2-7 INJECTION, SOLUTION INTRAVENOUS; SUBCUTANEOUS
Status: DISCONTINUED | OUTPATIENT
Start: 2025-01-08 | End: 2025-01-17 | Stop reason: HOSPADM

## 2025-01-08 RX ORDER — SODIUM CHLORIDE 9 MG/ML
40 INJECTION, SOLUTION INTRAVENOUS AS NEEDED
Status: DISCONTINUED | OUTPATIENT
Start: 2025-01-08 | End: 2025-01-17 | Stop reason: HOSPADM

## 2025-01-08 RX ORDER — IBUPROFEN 600 MG/1
1 TABLET ORAL
Status: DISCONTINUED | OUTPATIENT
Start: 2025-01-08 | End: 2025-01-17 | Stop reason: HOSPADM

## 2025-01-08 RX ORDER — ALUMINA, MAGNESIA, AND SIMETHICONE 2400; 2400; 240 MG/30ML; MG/30ML; MG/30ML
15 SUSPENSION ORAL EVERY 6 HOURS PRN
Status: DISCONTINUED | OUTPATIENT
Start: 2025-01-08 | End: 2025-01-17 | Stop reason: HOSPADM

## 2025-01-08 RX ORDER — SODIUM CHLORIDE, SODIUM LACTATE, POTASSIUM CHLORIDE, CALCIUM CHLORIDE 600; 310; 30; 20 MG/100ML; MG/100ML; MG/100ML; MG/100ML
100 INJECTION, SOLUTION INTRAVENOUS CONTINUOUS
Status: ACTIVE | OUTPATIENT
Start: 2025-01-08 | End: 2025-01-08

## 2025-01-08 RX ORDER — DEXTROSE MONOHYDRATE 25 G/50ML
25 INJECTION, SOLUTION INTRAVENOUS
Status: DISCONTINUED | OUTPATIENT
Start: 2025-01-08 | End: 2025-01-17 | Stop reason: HOSPADM

## 2025-01-08 RX ADMIN — INSULIN LISPRO 3 UNITS: 100 INJECTION, SOLUTION INTRAVENOUS; SUBCUTANEOUS at 18:47

## 2025-01-08 RX ADMIN — Medication 10 ML: at 09:54

## 2025-01-08 RX ADMIN — SODIUM CHLORIDE 2000 MG: 9 INJECTION INTRAMUSCULAR; INTRAVENOUS; SUBCUTANEOUS at 23:23

## 2025-01-08 RX ADMIN — MAGNESIUM SULFATE HEPTAHYDRATE 2 G: 40 INJECTION, SOLUTION INTRAVENOUS at 09:54

## 2025-01-08 RX ADMIN — ACETAMINOPHEN 650 MG: 325 TABLET ORAL at 18:48

## 2025-01-08 RX ADMIN — NICOTINE 1 PATCH: 21 PATCH, EXTENDED RELEASE TRANSDERMAL at 22:13

## 2025-01-08 RX ADMIN — INSULIN LISPRO 3 UNITS: 100 INJECTION, SOLUTION INTRAVENOUS; SUBCUTANEOUS at 13:18

## 2025-01-08 RX ADMIN — SODIUM CHLORIDE, POTASSIUM CHLORIDE, SODIUM LACTATE AND CALCIUM CHLORIDE 100 ML/HR: 600; 310; 30; 20 INJECTION, SOLUTION INTRAVENOUS at 09:54

## 2025-01-08 RX ADMIN — ASPIRIN 81 MG: 81 TABLET, CHEWABLE ORAL at 09:53

## 2025-01-08 RX ADMIN — INSULIN LISPRO 2 UNITS: 100 INJECTION, SOLUTION INTRAVENOUS; SUBCUTANEOUS at 22:13

## 2025-01-08 RX ADMIN — INSULIN GLARGINE 10 UNITS: 100 INJECTION, SOLUTION SUBCUTANEOUS at 09:53

## 2025-01-08 RX ADMIN — SODIUM CHLORIDE 1000 MG: 9 INJECTION INTRAMUSCULAR; INTRAVENOUS; SUBCUTANEOUS at 01:52

## 2025-01-08 RX ADMIN — INSULIN LISPRO 3 UNITS: 100 INJECTION, SOLUTION INTRAVENOUS; SUBCUTANEOUS at 09:53

## 2025-01-08 NOTE — PROGRESS NOTES
[facility]   Hospitalist Progress Note  Date: 2025  Patient Name: Brian Mehta  : 1955  MRN: [id number]  Date of admission: 2025  Room/Bed: 426/1      Subjective   Subjective     Chief Complaint:   Generalized weakness    Summary:  Brian Mehta is a 69 y.o. male with medical history of hypertension, hyperlipidemia, CHF, COPD, type 2 diabetes, tobacco use and history of CVA with residual right-sided weakness presented to the ER with weakness and some hip pain.  Patient is a poor historian and history obtained by discussion with ER staff.  Appears patient for the last couple days have some right hip pain after a fall.  It has been accompanied by inability care for himself and generalized weakness, prompting a call to EMS to bring him in for evaluation.  Upon arrival they found his house to be in a poor unkempt state as well as the patient.  He was found on the ground with complaints of pain and was brought to the ER for further evaluation     Upon arrival he was tachycardic and afebrile otherwise stable.  Lab workup revealed pyuria with positive nitrites and bacteriuria and low magnesium.  Patient had x-ray of the pelvis negative for any fracture or dislocation and x-ray chest was also unremarkable.  Patient was given Rocephin for UTI and the hospitalist then contacted for admission of the patient was unable to ambulate well in the ER and unable to care for himself.  Social work is involved due to patient having poor living situation at home when EMS felt his house was unkempt and he was unable to care for himself.    Interval Followup:   Repeat CK negative this morning.  Improvement in sodium with IV fluids.  On exam, patient reserved minimally conversant.  Patient reluctant to give up much information about living situation.  However at the end of the conversation patient insistent he will be discharging home, not willing to give much thought to SNF discharge.  Started on antibiotics, no original  urine culture sent, sending now.  Will continue to have patient work with PT and OT, discussed with case management.  Wound care will see as well      Objective   Objective     Vitals:   Temp:  [97.9 °F (36.6 °C)-99 °F (37.2 °C)] 97.9 °F (36.6 °C)  Heart Rate:  [] 100  Resp:  [19-20] 20  BP: (101-149)/(69-90) 125/81    Physical Exam               Constitutional: Awake, alert, no acute distress, disheveled              Eyes: Pupils equal, sclerae anicteric, no conjunctival injection              HENT: NCAT, mucous membranes dry              Neck: Supple, no thyromegaly, no lymphadenopathy, trachea midline              Respiratory: Clear to auscultation bilaterally              Cardiovascular: RRR, no murmurs, rubs, or gallops, palpable pedal pulses bilaterally              Gastrointestinal: Positive bowel sounds, soft, nontender, nondistended              Musculoskeletal: No bilateral ankle edema, no clubbing or cyanosis to extremities              Neurologic: Oriented x 3, mild residual weakness on the right side 4 out of 5 strength.      Result Review    Result Review:  I have personally reviewed these results:  [x]  Laboratory      Lab 01/08/25  1256 01/08/25  0147 01/07/25  2156   WBC 5.02  --  5.47   HEMOGLOBIN 13.1  --  15.4   HEMATOCRIT 39.4  --  47.1   PLATELETS 208  --  248   NEUTROS ABS  --   --  3.10   IMMATURE GRANS (ABS)  --   --  0.02   LYMPHS ABS  --   --  1.75   MONOS ABS  --   --  0.53   EOS ABS  --   --  0.05   MCV 78.3*  --  78.9*   LACTATE  --  1.1  --          Lab 01/08/25  1256 01/07/25  2156   SODIUM 134* 130*   POTASSIUM 3.9 4.4   CHLORIDE 100 98   CO2 26.4 22.6   ANION GAP 7.6 9.4   BUN 17 14   CREATININE 0.88 0.77   EGFR 94.2 98.1   GLUCOSE 243* 264*   CALCIUM 8.7 8.7   MAGNESIUM 2.3 1.5*   HEMOGLOBIN A1C  --  8.80*         Lab 01/07/25  2156   TOTAL PROTEIN 7.4   ALBUMIN 2.9*   GLOBULIN 4.5   ALT (SGPT) 5   AST (SGOT) 10   BILIRUBIN 0.5   ALK PHOS 100         Lab 01/07/25  5493    HSTROP T 9                 Brief Urine Lab Results  (Last result in the past 365 days)        Color   Clarity   Blood   Leuk Est   Nitrite   Protein   CREAT   Urine HCG        01/08/25 0056 Yellow   Turbid   Moderate (2+)   Large (3+)   Positive   100 mg/dL (2+)                 [x]  Microbiology   Microbiology Results (last 10 days)       ** No results found for the last 240 hours. **          [x]  Radiology  XR Hip With or Without Pelvis 2 - 3 View Right    Result Date: 1/7/2025  1.No acute fracture or dislocation. 2.Degenerative changes in both hips. 3.Fractured distal left ureteral stent, unchanged. Electronically Signed: Durga Zurita MD  1/7/2025 11:48 PM EST  Workstation ID: [id number]    XR Chest 1 View    Result Date: 1/7/2025  Impression: No acute cardiopulmonary disease. Electronically Signed: Jm Bran MD  1/7/2025 10:11 PM EST  Workstation ID: [id number]   []  EKG/Telemetry   []  Cardiology/Vascular   []  Pathology  []  Old records  []  Other:    Assessment & Plan   Assessment / Plan     Assessment:  Urinary tract infection  Type 2 diabetes  Hypertension  History of CVA with residual right-sided deficits  Heart failure, low ejection fraction  COPD not acute exacerbation  Active tobacco abuse  Generalized weakness    Plan:  Patient remains admitted to hospital for further care and management  Continue patient on ceftriaxone at this time, follow-up urine cultures  Continue with IV hydration at this time monitoring for history of congestive heart failure  PT OT, wound care.  Case management consulted.  Most appropriate disposition for patient would likely be inpatient rehab for ongoing strengthening.  However in conversation with patient, he does not appear interested in discharging anywhere but home.  Will continue to work with the patient as well as with therapy and case management for best disposition for patient.  At this time he is alert and oriented x 3, would be unable to force patient into any  situation he would not be agreeable     Discussed with RN.  Discussed with case management    VTE Prophylaxis:  Pharmacologic VTE prophylaxis orders are present.        CODE STATUS:   Code Status (Patient has no pulse and is not breathing): CPR (Attempt to Resuscitate)  Medical Interventions (Patient has pulse or is breathing): Full Support      Electronically signed by Arcenio Mosquera MD, 1/8/2025, 17:14 EST.

## 2025-01-08 NOTE — H&P
[facility]   HOSPITALIST HISTORY AND PHYSICAL  Date: 2025   Patient Name: Brian Mehta  : 1955  MRN: [id number]  Primary Care Physician:  Sophie Capps APRN  Date of admission: 2025    Subjective   Subjective     Chief Complaint: Pain, weakness    HPI:    Brian Mehta is a 69 y.o. male past medical history of hypertension, hyperlipidemia, CHF, COPD, type 2 diabetes, tobacco use and history of CVA with residual right-sided weakness presented to the ER with weakness and some hip pain.  Patient is a poor historian and history Cellfina by discussion with ER staff.  Appears patient for the last couple days have some right hip pain after a fall.  It has been accompanied by inability care for himself and generalized weakness the point that he called EMS to bring him in for evaluation.  Upon arrival they found his house to be in a poor unkempt state as well as the patient.  He was found on the ground with complaints of pain and was brought to the ER for further evaluation    Upon arrival he was tachycardic and afebrile otherwise stable.  Lab workup revealed pyuria with positive nitrites and bacteriuria and low magnesium.  Patient had x-ray of the pelvis that was negative for any fracture or dislocation and x-ray chest was also unremarkable.  Patient was given Rocephin for UTI and the hospitalist then contacted for admission of the patient was unable to ambulate well in the ER and unable to care for himself.  Social work is involved due to patient having poor living situation at home when EMS felt that his house was unkempt and he was unable to care for himself.      Personal History     Past Medical History:  Past Medical History:   Diagnosis Date   • CHF (congestive heart failure)     SEE'S DR STRICKLAND NO CURRENT S/S   • COPD (chronic obstructive pulmonary disease)     INHALER   • Diabetes mellitus     DOESN'T CHECK BG OFTEN AT HOME   • Hyperlipidemia    • Hypertension    • Sepsis 2023   • Stroke  2017    RIGHT SIDE WEAKNESS         Past Surgical History:  Past Surgical History:   Procedure Laterality Date   • APPENDECTOMY     • EYE SURGERY Bilateral     MISTY CATARACT         Family History:   Reviewed and noncontributory except as mentioned in HPI    Social History:   Social Drivers of Health     Tobacco Use: High Risk (10/26/2024)    Patient History    • Smoking Tobacco Use: Every Day    • Smokeless Tobacco Use: Never    • Passive Exposure: Current   Alcohol Use: Not At Risk (10/27/2024)    AUDIT-C    • Frequency of Alcohol Consumption: 2-4 times a month    • Average Number of Drinks: 1 or 2    • Frequency of Binge Drinking: Less than monthly   Financial Resource Strain: Medium Risk (12/14/2023)    Overall Financial Resource Strain (CARDIA)    • Difficulty of Paying Living Expenses: Somewhat hard   Food Insecurity: No Food Insecurity (10/27/2024)    Hunger Vital Sign    • Worried About Running Out of Food in the Last Year: Never true    • Ran Out of Food in the Last Year: Never true   Transportation Needs: No Transportation Needs (10/27/2024)    PRAPARE - Transportation    • Lack of Transportation (Medical): No    • Lack of Transportation (Non-Medical): No   Physical Activity: Insufficiently Active (10/27/2024)    Exercise Vital Sign    • Days of Exercise per Week: 3 days    • Minutes of Exercise per Session: 20 min   Stress: Not on file   Social Connections: At Risk (10/27/2024)    Family and Community Support    • Help with Day-to-Day Activities: I need a lot more help    • Lonely or Isolated: Not on file   Interpersonal Safety: Not At Risk (10/27/2024)    Abuse Screen    • Unsafe at Home or Work/School: no    • Feels Threatened by Someone?: no    • Does Anyone Keep You from Contacting Others or Doint Things Outside the Home?: no    • Physical Sign of Abuse Present: no   Depression: Not at risk (8/14/2024)    PHQ-2    • PHQ-2 Score: 0   Housing Stability: Not At Risk (10/29/2024)    Housing Stability    •  Current Living Arrangements: home    • Potentially Unsafe Housing Conditions: none   Utilities: Not At Risk (10/27/2024)    [facility] Utilities    • Threatened with loss of utilities: No   Health Literacy: Not At Risk (10/27/2024)    Education    • Help with school or training?: No    • Preferred Language: English   Employment: Not At Risk (10/27/2024)    Employment    • Do you want help finding or keeping work or a job?: I do not need or want help   Disabilities: At Risk (10/27/2024)    Disabilities    • Concentrating, Remembering, or Making Decisions Difficulty: no    • Doing Errands Independently Difficulty: yes         Home Medications:  Dexcom G6 , Dexcom G6 Sensor, Insulin Glargine, aspirin, atorvastatin, empagliflozin, furosemide, hydroCHLOROthiazide, isosorbide mononitrate, losartan, and nicotine    Allergies:  No Known Allergies    Review of Systems   All systems were reviewed and negative except for: Weakness, right hip pain    Objective   Objective     Vitals:   Heart Rate:  [122-131] 123  BP: (101-149)/(75-90) 149/81    Physical Exam    Constitutional: Awake, alert, no acute distress   Eyes: Pupils equal, sclerae anicteric, no conjunctival injection   HENT: NCAT, mucous membranes moist   Neck: Supple, no thyromegaly, no lymphadenopathy, trachea midline   Respiratory: Faint wheezing bilaterally but otherwise overall clear to auscultation, nonlabored respirations    Cardiovascular: RRR, no murmurs, rubs, or gallops, palpable pedal pulses bilaterally   Gastrointestinal: Positive bowel sounds, soft, nontender, nondistended   Musculoskeletal: No bilateral ankle edema, no clubbing or cyanosis to extremities   Psychiatric: Appropriate affect, cooperative   Neurologic: Oriented x 3, mild residual weakness on the right side 4 out of 5 strength.   Skin: No rashes     Result Review    Result Review:  I have personally reviewed the results from the time of this admission to 1/8/2025 03:09 EST and agree with these  findings:  [x]  Laboratory  [x]  Microbiology  [x]  Radiology  [x]  EKG/Telemetry   [x]  Cardiology/Vascular   []  Pathology  [x]  Old records  []  Other:      Assessment & Plan   Assessment / Plan     Assessment/Plan:   Acute complicated UTI  Sinus tachycardia likely secondary above as well as deconditioning  Insulin treated type 2 diabetes  Hypertension  History of CVA with residual right-sided deficits  Chronic congestive heart failure with intermediate ejection fraction, not in acute exacerbation  COPD, not in acute exacerbation  Active tobacco use    Plan  - Admit to hospitalist service  - Continue Rocephin therapy, follow-up urine cultures.  Prior cultures do show pattern of resistance but notable for susceptibility to Rocephin  - Patient ripped off telemetry and will not keep it on, continue telemonitoring if possible to monitor sinus tachycardia.  Suspect a portion of this could be related to hypovolemia and I will continue hydration overnight  - Basal bolus insulin regimen to be started follow-up sliding-scale Accu-Cheks  - Continue home antihypertensives once we confirm dosing  - No indication of CHF at this time, monitor clinically while on IV hydration  - Counseled on smoking cessation.  Nicotine patch  - Patient does have some faint wheezing on exam but does not seem to be labored breathing at this time, monitor respiratory status closely  - Patient with unkempt living situation, social work/APS consulted in the ER      Discussed with ER Physician and Nurse    All labs/imaging studies were personally reviewed and findings are as noted above      DVT Prophylaxis: Lovenox    CODE STATUS:    Code Status (Patient has no pulse and is not breathing): CPR (Attempt to Resuscitate)  Medical Interventions (Patient has pulse or is breathing): Full Support      Admission Status:  I believe this patient meets observation status.    Electronically signed by Pio Vital MD, 01/08/25, 3:09 AM EST.

## 2025-01-08 NOTE — PLAN OF CARE
Goal Outcome Evaluation:  Plan of Care Reviewed With: patient           Outcome Evaluation: Patient is A&Ox4 and able to make needs known. Admitted and oriented to unit from Ed. EMS from home. VSS on room air. Patient c/o right hip pain r/t fall at home. unkept and unclean with rash and excoriation to butt and groin. Continue with POC

## 2025-01-08 NOTE — ED PROVIDER NOTES
Time: 1:40 AM EST  Date of encounter:  1/7/2025  Independent Historian/Clinical History and Information was obtained by:   Patient    History is limited by: N/A    Chief Complaint: Generalized weakness x 2 days      History of Present Illness:    Patient is a 69 y.o. year old male who presents to the emergency department for evaluation of inability to complete ADLs, generalized weakness, fall with right hip pain x 2 days.  Denies hitting head on fall.  Patient has a prior medical history consistent for CHF, COPD, diabetes mellitus.  Patient reports he had a fall couple days ago while he was walking and has been having ongoing right hip pain secondary to that.  He has been having some occasional ongoing lower abdominal pain as well.  Denies inability to complete activities of daily living, reports that he feels safe at home, reports that he lives at home the wife who has been taking care of him, denies any fevers chills nausea or vomiting.      Patient Care Team  Primary Care Provider: Sophie Capps APRN    Past Medical History:     No Known Allergies  Past Medical History:   Diagnosis Date    CHF (congestive heart failure)     SEE'S DR STRICKLAND NO CURRENT S/S    COPD (chronic obstructive pulmonary disease)     INHALER    Diabetes mellitus     DOESN'T CHECK BG OFTEN AT HOME    Hyperlipidemia     Hypertension     Sepsis 09/14/2023    Stroke 2017    RIGHT SIDE WEAKNESS     Past Surgical History:   Procedure Laterality Date    APPENDECTOMY      EYE SURGERY Bilateral     MISTY CATARACT     Family History   Problem Relation Age of Onset    No Known Problems Mother     No Known Problems Father     Malig Hyperthermia Neg Hx        Home Medications:  Prior to Admission medications    Medication Sig Start Date End Date Taking? Authorizing Provider   Aspirin Low Dose 81 MG chewable tablet CHEW AND SWALLOW 1 TABLET BY MOUTH DAILY 11/2/24   Sophie Capps APRN   atorvastatin (LIPITOR) 20 MG tablet TAKE 1 TABLET BY MOUTH NIGHTLY  1/6/25   Sophie Capps APRN   Continuous Glucose  (Dexcom G6 ) device Use 1 each Every 30 (Thirty) Days. 9/24/24   Sophie Capps APRN   Continuous Glucose Sensor (Dexcom G6 Sensor) USE FOR MONITORING BLOOD GLUCOSE. CHANGE SENSOR EVERY 10 DAYS 11/2/24   Sophie Capps APRN   empagliflozin (Jardiance) 25 MG tablet tablet TAKE 1 TABLET BY MOUTH DAILY 1/6/25   Sophie Capps APRN   furosemide (Lasix) 20 MG tablet Take 1 tablet by mouth 2 (Two) Times a Day.  Patient taking differently: Take 1 tablet by mouth Daily As Needed. 8/14/24   Sophie Capps APRN   hydroCHLOROthiazide 25 MG tablet TAKE 1 TABLET BY MOUTH DAILY 1/6/25   Sophie Capps APRN   isosorbide mononitrate (IMDUR) 30 MG 24 hr tablet Take 1 tablet by mouth Daily.  Patient not taking: Reported on 10/27/2024 7/8/24   Radha Wolf APRN   Lantus SoloStar 100 UNIT/ML injection pen INJECT 10 UNITS SUBCUTANEOUSLY INTO THE APPROPRIATE AREA EVERY NIGHT AS DIRECTED 1/7/25   Sophie Capps APRN   losartan (COZAAR) 25 MG tablet TAKE 1 TABLET BY MOUTH DAILY 1/6/25   Sophie Capps APRN   nicotine (NICODERM CQ) 21 MG/24HR patch Place 1 patch on the skin as directed by provider Daily. 8/22/24   Arcenio Mosquera MD        Social History:   Social History     Tobacco Use    Smoking status: Every Day     Current packs/day: 2.50     Average packs/day: 2.5 packs/day for 53.0 years (132.5 ttl pk-yrs)     Types: Cigarettes     Start date: 1972     Passive exposure: Current    Smokeless tobacco: Never    Tobacco comments:     INSTRUCTED NO SMOKING 24 HOUR PRIOR TO SURGERY    Vaping Use    Vaping status: Never Used   Substance Use Topics    Alcohol use: Yes    Drug use: Never         Review of Systems:  Review of Systems   Gastrointestinal:  Positive for abdominal pain.   Neurological:  Positive for weakness.        Physical Exam:  /75   Pulse (!) 123   SpO2 91%     Physical Exam  Constitutional:       Appearance: He is  well-developed.      Comments: Disheveled, evidence of generalized dirt on upper and lower extremities including hands and feet.   HENT:      Nose: Nose normal.   Eyes:      General: No scleral icterus.  Cardiovascular:      Rate and Rhythm: Regular rhythm. Tachycardia present.   Pulmonary:      Effort: Pulmonary effort is normal.   Abdominal:      Palpations: Abdomen is soft.      Tenderness: There is abdominal tenderness in the suprapubic area.   Musculoskeletal:         General: No deformity.   Skin:     General: Skin is warm.   Neurological:      Mental Status: He is alert and oriented to person, place, and time.      Comments: Right upper extremity tremor consistent with previous physical exam findings been no history of CVA with right-sided deficits                    Medical Decision Making:      Comorbidities that affect care:    Congestive Heart Failure, COPD    External Notes reviewed:    Previous Admission Note: Previous admission note on 12/4/2024 reviewed.      The following orders were placed and all results were independently analyzed by me:  Orders Placed This Encounter   Procedures    Blood Culture - Blood,    Blood Culture - Blood,    XR Chest 1 View    XR Hip With or Without Pelvis 2 - 3 View Right    [place] Draw    Comprehensive Metabolic Panel    High Sensitivity Troponin T    Magnesium    Urinalysis With Microscopic If Indicated (No Culture) - Urine, Clean Catch    CBC Auto Differential    Urinalysis, Microscopic Only - Urine, Clean Catch    Lactic Acid, Plasma    CK    NPO Diet NPO Type: Strict NPO    Undress & Gown    Continuous Pulse Oximetry    Vital Signs    Orthostatic Blood Pressure    Inpatient SANE Consult    Inpatient Hospitalist Consult    Oxygen Therapy- Nasal Cannula; Titrate 1-6 LPM Per SpO2; 90 - 95%    POC Glucose Once    ECG 12 Lead ED Triage Standing Order; Weak / Dizzy / AMS    Insert Peripheral IV    Fall Precautions    CBC & Differential    Green Top (Gel)    Lavender Top     Gold Top - SST    Light Blue Top       Medications Given in the Emergency Department:  Medications   sodium chloride 0.9 % flush 10 mL (has no administration in time range)   cefTRIAXone (ROCEPHIN) in NS 1 gram/10ml IV PUSH syringe (1,000 mg Intravenous Given 1/8/25 0152)        ED Course:    ED Course as of 01/08/25 0231 Wed Jan 08, 2025   0138 Urinalysis, Microscopic Only - Straight Cath [CB]   0139 Urinalysis reveals evidence of ongoing nitrite positive UTI.  UTI complicated by ongoing evidence of inability to complete daily ADLs and low suspicion for completion of outpatient therapy.  Inpatient hospitalist consult placed. [CB]   0230 CBC reviewed.  No acute findings.  CMP reviewed.  No acute findings. [CB]   0230 = [CB]   0231 Lactic reviewed.  No acute findings. [CB]      ED Course User Index  [CB] Fadi Bowers, SHELLEY       Labs:    Lab Results (last 24 hours)       Procedure Component Value Units Date/Time    CBC & Differential [[id number]]  (Abnormal) Collected: 01/07/25 2156    Specimen: Blood Updated: 01/07/25 2202    Narrative:      The following orders were created for panel order CBC & Differential.  Procedure                               Abnormality         Status                     ---------                               -----------         ------                     CBC Auto Differential[[id number]]        Abnormal            Final result                 Please view results for these tests on the individual orders.    Comprehensive Metabolic Panel [[id number]]  (Abnormal) Collected: 01/07/25 2156    Specimen: Blood Updated: 01/07/25 2230     Glucose 264 mg/dL      BUN 14 mg/dL      Creatinine 0.77 mg/dL      Sodium 130 mmol/L      Potassium 4.4 mmol/L      Chloride 98 mmol/L      CO2 22.6 mmol/L      Calcium 8.7 mg/dL      Total Protein 7.4 g/dL      Albumin 2.9 g/dL      ALT (SGPT) 5 U/L      AST (SGOT) 10 U/L      Alkaline Phosphatase 100 U/L      Total Bilirubin 0.5 mg/dL      Globulin 4.5  gm/dL      A/G Ratio 0.6 g/dL      BUN/Creatinine Ratio 18.2     Anion Gap 9.4 mmol/L      eGFR 98.1 mL/min/1.73     Narrative:      GFR Categories in Chronic Kidney Disease (CKD)      GFR Category          GFR (mL/min/1.73)    Interpretation  G1                     90 or greater         Normal or high (1)  G2                      60-89                Mild decrease (1)  G3a                   45-59                Mild to moderate decrease  G3b                   30-44                Moderate to severe decrease  G4                    15-29                Severe decrease  G5                    14 or less           Kidney failure          (1)In the absence of evidence of kidney disease, neither GFR category G1 or G2 fulfill the criteria for CKD.    eGFR calculation 2021 CKD-EPI creatinine equation, which does not include race as a factor    High Sensitivity Troponin T [[id number]]  (Normal) Collected: 01/07/25 2156    Specimen: Blood Updated: 01/07/25 2219     HS Troponin T 9 ng/L     Narrative:      High Sensitive Troponin T Reference Range:  <14.0 ng/L- Negative Female for AMI  <22.0 ng/L- Negative Male for AMI  >=14 - Abnormal Female indicating possible myocardial injury.  >=22 - Abnormal Male indicating possible myocardial injury.   Clinicians would have to utilize clinical acumen, EKG, Troponin, and serial changes to determine if it is an Acute Myocardial Infarction or myocardial injury due to an underlying chronic condition.         Magnesium [[id number]]  (Abnormal) Collected: 01/07/25 2156    Specimen: Blood Updated: 01/07/25 2227     Magnesium 1.5 mg/dL     CBC Auto Differential [[id number]]  (Abnormal) Collected: 01/07/25 2156    Specimen: Blood Updated: 01/07/25 2202     WBC 5.47 10*3/mm3      RBC 5.97 10*6/mm3      Hemoglobin 15.4 g/dL      Hematocrit 47.1 %      MCV 78.9 fL      MCH 25.8 pg      MCHC 32.7 g/dL      RDW 14.6 %      RDW-SD 41.4 fl      MPV 9.6 fL      Platelets 248 10*3/mm3      Neutrophil % 56.6  %      Lymphocyte % 32.0 %      Monocyte % 9.7 %      Eosinophil % 0.9 %      Basophil % 0.4 %      Immature Grans % 0.4 %      Neutrophils, Absolute 3.10 10*3/mm3      Lymphocytes, Absolute 1.75 10*3/mm3      Monocytes, Absolute 0.53 10*3/mm3      Eosinophils, Absolute 0.05 10*3/mm3      Basophils, Absolute 0.02 10*3/mm3      Immature Grans, Absolute 0.02 10*3/mm3      nRBC 0.0 /100 WBC     CK [[id number]]  (Normal) Collected: 01/07/25 2156    Specimen: Blood Updated: 01/08/25 0208     Creatine Kinase 38 U/L     Urinalysis With Microscopic If Indicated (No Culture) - Straight Cath [[id number]]  (Abnormal) Collected: 01/08/25 0056    Specimen: Urine from Straight Cath Updated: 01/08/25 0109     Color, UA Yellow     Appearance, UA Turbid     pH, UA 6.0     Specific Gravity, UA 1.016     Glucose, UA Negative     Ketones, UA Negative     Bilirubin, UA Negative     Blood, UA Moderate (2+)     Protein,  mg/dL (2+)     Leuk Esterase, UA Large (3+)     Nitrite, UA Positive     Urobilinogen, UA 1.0 E.U./dL    Urinalysis, Microscopic Only - Straight Cath [[id number]]  (Abnormal) Collected: 01/08/25 0056    Specimen: Urine from Straight Cath Updated: 01/08/25 0202     RBC, UA 11-20 /HPF      WBC, UA Too Numerous to Count /HPF      Bacteria, UA 1+ /HPF      Squamous Epithelial Cells, UA 0-2 /HPF      Hyaline Casts, UA None Seen /LPF      WBC Clumps, UA Moderate/2+ /HPF      Methodology Manual Light Microscopy    Blood Culture - Blood, Arm, Right [[id number]] Collected: 01/08/25 0147    Specimen: Blood from Arm, Right Updated: 01/08/25 0151    Blood Culture - Blood, Arm, Right [[id number]] Collected: 01/08/25 0147    Specimen: Blood from Arm, Right Updated: 01/08/25 0151    Lactic Acid, Plasma [[id number]]  (Normal) Collected: 01/08/25 0147    Specimen: Blood Updated: 01/08/25 0209     Lactate 1.1 mmol/L              Imaging:    XR Hip With or Without Pelvis 2 - 3 View Right    Result Date: 1/7/2025  XR HIP W OR WO PELVIS 2-3  VIEW RIGHT Date of Exam: 1/7/2025 11:28 PM EST Indication: right hip pain/fall x 2 days Comparison: 10/26/2024 Findings: AP pelvis and 2 views of the right hip. Again seen is a fractured distal left ureteral stent, probably within the urinary bladder. Urinary bladder stones are present. There is degenerative disease in both hips with joint space narrowing and periarticular  spurring. No hip dislocation or sacroiliac joint diastasis. No acute fracture. No bone erosion or destruction.     1.No acute fracture or dislocation. 2.Degenerative changes in both hips. 3.Fractured distal left ureteral stent, unchanged. Electronically Signed: Durga Zurita MD  1/7/2025 11:48 PM EST  Workstation ID: [id number]    XR Chest 1 View    Result Date: 1/7/2025  XR CHEST 1 VW Date of Exam: 1/7/2025 9:58 PM EST Indication: Weak/Dizzy/AMS triage protocol Comparison: 10/26/2024 Findings: No focal consolidation. Calcified granulomata. No pneumothorax. Trace left effusion.. Cardiac size is normal. The visualized clavicles appear intact. No displaced rib fractures. The visualized upper abdomen is normal.     Impression: No acute cardiopulmonary disease. Electronically Signed: Jm Bran MD  1/7/2025 10:11 PM EST  Workstation ID: [id number]       Differential Diagnosis and Discussion:    Weakness: Based on the patient's history, signs, and symptoms, the diffential diagnosis includes but is not limited to meningitis, stroke, sepsis, subarachnoid hemorrhage, intracranial bleeding, encephalitis, acute uti, dehydration, MS, myasthenia gravis, [place], migraine variant, neuromuscular disorders vertigo, electrolyte imbalance, and metabolic disorders.    PROCEDURES:    Labs were collected in the emergency department and all labs were reviewed and interpreted by me.    ECG 12 Lead ED Triage Standing Order; Weak / Dizzy / AMS   Preliminary Result   HEART EGPB=534  bpm   RR Teswqdyy=787  ms   SD Interval=50  ms   P Horizontal Axis=  deg   P Front  Axis=0  deg   QRSD Interval=80  ms   QT Xqxdvbtg=532  ms   TDfW=638  ms   QRS Axis=47  deg   T Wave Axis=13  deg   - ABNORMAL ECG -   Sinus tachycardia   Consider right atrial enlargement   Borderline ST depression, inferior leads   Prolonged QT interval   Date and Time of Study:2025-01-07 22:19:05          Procedures    MDM                     Patient Care Considerations:    SEPSIS was considered but is NOT present in the emergency department as SIRS criteria is not present.      Consultants/Shared Management Plan:    Hospitalist: I have discussed the case with Dr. Vital who agrees to accept the patient for admission.    Social Determinants of Health:    Patient is unable to carry out activities of daily life. Escalation of care is necessary.       Disposition and Care Coordination:    Admit:   Through independent evaluation of the patient's history, physical, and imperical data, the patient meets criteria for inpatient admission to the hospital.        Final diagnoses:   Cystitis   Impaired mobility and ADLs        ED Disposition       ED Disposition   Decision to Admit    Condition   --    Comment   --               This medical record created using voice recognition software.             Fadi Bowers PA-C  01/08/25 0156       Fadi Bowers PA-C  01/08/25 0231

## 2025-01-08 NOTE — SIGNIFICANT NOTE
Wound Eval / Progress Noted    AFSHAN Funez     Patient Name: Brian Mehta  : 1955  MRN: [id number]  Today's Date: 2025                 Admit Date: 2025    Visit Dx:    ICD-10-CM ICD-9-CM   1. Cystitis  N30.90 595.9   2. Impaired mobility and ADLs  Z74.09 V49.89    Z78.9          Acute UTI        Past Medical History:   Diagnosis Date    CHF (congestive heart failure)     SEE'S DR [place] NO CURRENT S/S    COPD (chronic obstructive pulmonary disease)     INHALER    Diabetes mellitus     DOESN'T CHECK BG OFTEN AT HOME    Hyperlipidemia     Hypertension     Sepsis 2023    Stroke 2017    RIGHT SIDE WEAKNESS        Past Surgical History:   Procedure Laterality Date    APPENDECTOMY      EYE SURGERY Bilateral     MISTY CATARACT         Physical Assessment:  Wound 10/31/24 1234 Bilateral gluteal (Active)   Wound Image    25 1424   Dressing Appearance open to air 25 1424   Closure None 25 1424   Base moist;red 25 1424   Red (%), Wound Tissue Color 100 25 1424   Periwound dry;redness 25 1424   Periwound Temperature warm 25 1424   Periwound Skin Turgor soft 25 1424   Edges open 25 1424   Drainage Characteristics/Odor serosanguineous 25 1424   Drainage Amount scant 25 1424   Care, Wound cleansed with;sterile normal saline 25 1424   Dressing Care open to air;skin barrier agent applied 25 1424   Periwound Care barrier ointment applied 25 1424    Left lower abdomen and left lateral thigh     Right groin crease     Left groin crease     Wound Check / Follow-up:  Patient seen today for wound consult. Patient is awake, alert, and oriented. Patient states he is normally able to ambulate and care for self; however, per MD note patient called EMS due to weakness and inability to care for self. Per MD note, patient was found on the ground and reported having a fall which led to right hip pain. Patient was noted to have been incontinent of  urine prior to arrival. A full linen change and incontinence care was provided with assistance from PCA. Nutritional supplements will be ordered for this patient.    Incontinence associated dermatitis with areas of erosion and a fungal presentation noted to bilateral gluteal aspects, left aspect of abdomen, and left lateral thigh. Areas of erosion present with moist red tissue. Periwound tissue is dry with redness. Bilateral aspect of groin present with redness and a fungal presentation, no area of erosion noted. Cleansed all areas with normal saline and gauze, blotted dry. Recommending quality skin care and hygiene with application of hydrocortisone-bacitracin-zinc oxide-nystatin (MAGIC BARRIER) ointment TID, alternating with blue top moisture barrier to gluteal aspects TID and PRN incontinence. Implement every 2 hour turns, and offload at all times.  Keep patient clean, dry, and free from all moisture. Discussed findings with Attending MD and orders obtained.    Blanchable redness to heels. Recommending quality skin care and hygiene with application of blue top moisture barrier three times a day. Offload heels at all times.     Impression: IAD with areas of erosion and a fungal presentation to bilateral gluteal aspects, left aspect of abdomen, and left lateral thigh. Redness with a fungal presentation to bilateral aspects of groin.    Short term goals:  Regain skin integrity, skin protection, moisture prevention, pressure reduction, quality skin care and hygiene, topical treatment.    Beckie Abrams RN    1/8/2025    17:48 EST

## 2025-01-09 ENCOUNTER — PATIENT OUTREACH (OUTPATIENT)
Dept: CASE MANAGEMENT | Facility: OTHER | Age: 70
End: 2025-01-09
Payer: MEDICARE

## 2025-01-09 DIAGNOSIS — R53.1 WEAKNESS: Primary | ICD-10-CM

## 2025-01-09 DIAGNOSIS — I50.22 CHRONIC HFREF (HEART FAILURE WITH REDUCED EJECTION FRACTION): ICD-10-CM

## 2025-01-09 PROBLEM — R78.81 BACTEREMIA: Status: ACTIVE | Noted: 2025-01-09

## 2025-01-09 LAB
ALBUMIN SERPL-MCNC: 2.6 G/DL (ref 3.5–5.2)
ALBUMIN/GLOB SERPL: 0.7 G/DL
ALP SERPL-CCNC: 87 U/L (ref 39–117)
ALT SERPL W P-5'-P-CCNC: <5 U/L (ref 1–41)
ANION GAP SERPL CALCULATED.3IONS-SCNC: 8.8 MMOL/L (ref 5–15)
AST SERPL-CCNC: 11 U/L (ref 1–40)
BACTERIA UR QL AUTO: ABNORMAL /HPF
BASOPHILS # BLD AUTO: 0.04 10*3/MM3 (ref 0–0.2)
BASOPHILS NFR BLD AUTO: 0.7 % (ref 0–1.5)
BILIRUB SERPL-MCNC: 0.3 MG/DL (ref 0–1.2)
BILIRUB UR QL STRIP: NEGATIVE
BUN SERPL-MCNC: 17 MG/DL (ref 8–23)
BUN/CREAT SERPL: 19.1 (ref 7–25)
CALCIUM SPEC-SCNC: 8.5 MG/DL (ref 8.6–10.5)
CHLORIDE SERPL-SCNC: 102 MMOL/L (ref 98–107)
CLARITY UR: ABNORMAL
CO2 SERPL-SCNC: 25.2 MMOL/L (ref 22–29)
COLOR UR: YELLOW
CREAT SERPL-MCNC: 0.89 MG/DL (ref 0.76–1.27)
DEPRECATED RDW RBC AUTO: 42.4 FL (ref 37–54)
EGFRCR SERPLBLD CKD-EPI 2021: 93.9 ML/MIN/1.73
EOSINOPHIL # BLD AUTO: 0.1 10*3/MM3 (ref 0–0.4)
EOSINOPHIL NFR BLD AUTO: 1.9 % (ref 0.3–6.2)
ERYTHROCYTE [DISTWIDTH] IN BLOOD BY AUTOMATED COUNT: 14.7 % (ref 12.3–15.4)
GLOBULIN UR ELPH-MCNC: 4 GM/DL
GLUCOSE BLDC GLUCOMTR-MCNC: 240 MG/DL (ref 70–99)
GLUCOSE BLDC GLUCOMTR-MCNC: 269 MG/DL (ref 70–99)
GLUCOSE BLDC GLUCOMTR-MCNC: 291 MG/DL (ref 70–99)
GLUCOSE SERPL-MCNC: 136 MG/DL (ref 65–99)
GLUCOSE UR STRIP-MCNC: ABNORMAL MG/DL
HCT VFR BLD AUTO: 40.1 % (ref 37.5–51)
HGB BLD-MCNC: 13 G/DL (ref 13–17.7)
HGB UR QL STRIP.AUTO: ABNORMAL
HYALINE CASTS UR QL AUTO: ABNORMAL /LPF
IMM GRANULOCYTES # BLD AUTO: 0.01 10*3/MM3 (ref 0–0.05)
IMM GRANULOCYTES NFR BLD AUTO: 0.2 % (ref 0–0.5)
KETONES UR QL STRIP: NEGATIVE
LEUKOCYTE ESTERASE UR QL STRIP.AUTO: ABNORMAL
LYMPHOCYTES # BLD AUTO: 2.56 10*3/MM3 (ref 0.7–3.1)
LYMPHOCYTES NFR BLD AUTO: 47.7 % (ref 19.6–45.3)
MAGNESIUM SERPL-MCNC: 1.9 MG/DL (ref 1.6–2.4)
MCH RBC QN AUTO: 25.9 PG (ref 26.6–33)
MCHC RBC AUTO-ENTMCNC: 32.4 G/DL (ref 31.5–35.7)
MCV RBC AUTO: 80 FL (ref 79–97)
MONOCYTES # BLD AUTO: 0.52 10*3/MM3 (ref 0.1–0.9)
MONOCYTES NFR BLD AUTO: 9.7 % (ref 5–12)
NEUTROPHILS NFR BLD AUTO: 2.14 10*3/MM3 (ref 1.7–7)
NEUTROPHILS NFR BLD AUTO: 39.8 % (ref 42.7–76)
NITRITE UR QL STRIP: POSITIVE
NRBC BLD AUTO-RTO: 0 /100 WBC (ref 0–0.2)
PH UR STRIP.AUTO: 6 [PH] (ref 5–8)
PHOSPHATE SERPL-MCNC: 3 MG/DL (ref 2.5–4.5)
PLATELET # BLD AUTO: 230 10*3/MM3 (ref 140–450)
PMV BLD AUTO: 9.7 FL (ref 6–12)
POTASSIUM SERPL-SCNC: 4 MMOL/L (ref 3.5–5.2)
PROT SERPL-MCNC: 6.6 G/DL (ref 6–8.5)
PROT UR QL STRIP: ABNORMAL
RBC # BLD AUTO: 5.01 10*6/MM3 (ref 4.14–5.8)
RBC # UR STRIP: ABNORMAL /HPF
REF LAB TEST METHOD: ABNORMAL
SODIUM SERPL-SCNC: 136 MMOL/L (ref 136–145)
SP GR UR STRIP: 1.02 (ref 1–1.03)
SQUAMOUS #/AREA URNS HPF: ABNORMAL /HPF
UROBILINOGEN UR QL STRIP: ABNORMAL
WBC # UR STRIP: ABNORMAL /HPF
WBC NRBC COR # BLD AUTO: 5.37 10*3/MM3 (ref 3.4–10.8)

## 2025-01-09 PROCEDURE — 84100 ASSAY OF PHOSPHORUS: CPT | Performed by: INTERNAL MEDICINE

## 2025-01-09 PROCEDURE — 63710000001 INSULIN GLARGINE PER 5 UNITS: Performed by: STUDENT IN AN ORGANIZED HEALTH CARE EDUCATION/TRAINING PROGRAM

## 2025-01-09 PROCEDURE — 63710000001 INSULIN LISPRO (HUMAN) PER 5 UNITS: Performed by: STUDENT IN AN ORGANIZED HEALTH CARE EDUCATION/TRAINING PROGRAM

## 2025-01-09 PROCEDURE — 85025 COMPLETE CBC W/AUTO DIFF WBC: CPT | Performed by: STUDENT IN AN ORGANIZED HEALTH CARE EDUCATION/TRAINING PROGRAM

## 2025-01-09 PROCEDURE — 25810000003 SODIUM CHLORIDE 0.9 % SOLUTION 250 ML FLEX CONT: Performed by: INTERNAL MEDICINE

## 2025-01-09 PROCEDURE — 87086 URINE CULTURE/COLONY COUNT: CPT | Performed by: STUDENT IN AN ORGANIZED HEALTH CARE EDUCATION/TRAINING PROGRAM

## 2025-01-09 PROCEDURE — 97165 OT EVAL LOW COMPLEX 30 MIN: CPT

## 2025-01-09 PROCEDURE — 25010000002 CEFTRIAXONE PER 250 MG: Performed by: PHYSICIAN ASSISTANT

## 2025-01-09 PROCEDURE — 83735 ASSAY OF MAGNESIUM: CPT | Performed by: STUDENT IN AN ORGANIZED HEALTH CARE EDUCATION/TRAINING PROGRAM

## 2025-01-09 PROCEDURE — 97161 PT EVAL LOW COMPLEX 20 MIN: CPT

## 2025-01-09 PROCEDURE — 25810000003 SODIUM CHLORIDE 0.9 % SOLUTION: Performed by: INTERNAL MEDICINE

## 2025-01-09 PROCEDURE — 80053 COMPREHEN METABOLIC PANEL: CPT | Performed by: INTERNAL MEDICINE

## 2025-01-09 PROCEDURE — 99233 SBSQ HOSP IP/OBS HIGH 50: CPT | Performed by: INTERNAL MEDICINE

## 2025-01-09 PROCEDURE — 81001 URINALYSIS AUTO W/SCOPE: CPT | Performed by: INTERNAL MEDICINE

## 2025-01-09 PROCEDURE — 82948 REAGENT STRIP/BLOOD GLUCOSE: CPT

## 2025-01-09 PROCEDURE — 25010000002 VANCOMYCIN 5 G RECONSTITUTED SOLUTION: Performed by: INTERNAL MEDICINE

## 2025-01-09 PROCEDURE — 25010000002 VANCOMYCIN 1 G RECONSTITUTED SOLUTION 1 EACH VIAL: Performed by: INTERNAL MEDICINE

## 2025-01-09 RX ORDER — VANCOMYCIN 2 GRAM/500 ML IN 0.9 % SODIUM CHLORIDE INTRAVENOUS
2000 ONCE
Status: COMPLETED | OUTPATIENT
Start: 2025-01-09 | End: 2025-01-09

## 2025-01-09 RX ADMIN — HYDROCORTISONE 1 APPLICATION: 1 OINTMENT TOPICAL at 18:02

## 2025-01-09 RX ADMIN — Medication 10 ML: at 20:07

## 2025-01-09 RX ADMIN — VANCOMYCIN HYDROCHLORIDE 1000 MG: 1 INJECTION, POWDER, LYOPHILIZED, FOR SOLUTION INTRAVENOUS at 18:21

## 2025-01-09 RX ADMIN — INSULIN LISPRO 4 UNITS: 100 INJECTION, SOLUTION INTRAVENOUS; SUBCUTANEOUS at 18:20

## 2025-01-09 RX ADMIN — VANCOMYCIN HYDROCHLORIDE 2000 MG: 5 INJECTION, POWDER, LYOPHILIZED, FOR SOLUTION INTRAVENOUS at 10:21

## 2025-01-09 RX ADMIN — SODIUM CHLORIDE 2000 MG: 9 INJECTION INTRAMUSCULAR; INTRAVENOUS; SUBCUTANEOUS at 23:30

## 2025-01-09 RX ADMIN — HYDROCORTISONE 1 APPLICATION: 1 OINTMENT TOPICAL at 13:50

## 2025-01-09 RX ADMIN — Medication 1 APPLICATION: at 13:49

## 2025-01-09 RX ADMIN — Medication 10 ML: at 13:50

## 2025-01-09 RX ADMIN — INSULIN LISPRO 3 UNITS: 100 INJECTION, SOLUTION INTRAVENOUS; SUBCUTANEOUS at 20:07

## 2025-01-09 RX ADMIN — INSULIN GLARGINE 10 UNITS: 100 INJECTION, SOLUTION SUBCUTANEOUS at 13:44

## 2025-01-09 RX ADMIN — ASPIRIN 81 MG: 81 TABLET, CHEWABLE ORAL at 13:45

## 2025-01-09 RX ADMIN — SENNOSIDES AND DOCUSATE SODIUM 2 TABLET: 50; 8.6 TABLET ORAL at 13:45

## 2025-01-09 RX ADMIN — ACETAMINOPHEN 650 MG: 325 TABLET ORAL at 13:45

## 2025-01-09 RX ADMIN — INSULIN LISPRO 4 UNITS: 100 INJECTION, SOLUTION INTRAVENOUS; SUBCUTANEOUS at 13:45

## 2025-01-09 NOTE — PLAN OF CARE
"Goal Outcome Evaluation:  Plan of Care Reviewed With: patient           Outcome Evaluation: Patient axox4. Refusing many interventions and assessments. Patient desired to leave AMA but was unable to find a ride- patient even asked if he could \"leave and come back\". Nicotine patch was started. IV access regained for antibiotics.                             "

## 2025-01-09 NOTE — PLAN OF CARE
Goal Outcome Evaluation:  Plan of Care Reviewed With: patient        Progress: no change  Outcome Evaluation: patient demonstrates decreased strength, endurance, and balance which limits patients ability to safely and independently perform adls and adl transfers/mobility. patient would benefit from occupational therapy services in order to maximize independence.    Anticipated Discharge Disposition (OT): sub acute care setting

## 2025-01-09 NOTE — THERAPY EVALUATION
"Patient Name: Brian Mehta  : 1955    MRN: [id number]                              Today's Date: 2025       Admit Date: 2025    Visit Dx:     ICD-10-CM ICD-9-CM   1. Cystitis  N30.90 595.9   2. Impaired mobility and ADLs  Z74.09 V49.89    Z78.9      Patient Active Problem List   Diagnosis    CVA (cerebral vascular accident)    Essential hypertension    High cholesterol    Hip pain    Vitamin B12 deficiency    Kidney disorder    Chronic pain of both knees    Chronic HFrEF (heart failure with reduced ejection fraction)    LVH (left ventricular hypertrophy)    COPD (chronic obstructive pulmonary disease)    DM2 (diabetes mellitus, type 2)    Urinary tract infection without hematuria    Abnormal nuclear stress test    UTI (urinary tract infection)    Fall    Failure to thrive in adult    Nephrolithiasis    Bladder stone    Retained ureteral stent    Acute UTI     Past Medical History:   Diagnosis Date    CHF (congestive heart failure)     SEE'S DR [place] NO CURRENT S/S    COPD (chronic obstructive pulmonary disease)     INHALER    Diabetes mellitus     DOESN'T CHECK BG OFTEN AT HOME    Hyperlipidemia     Hypertension     Sepsis 2023    Stroke 2017    RIGHT SIDE WEAKNESS     Past Surgical History:   Procedure Laterality Date    APPENDECTOMY      EYE SURGERY Bilateral     MISTY CATARACT      General Information       Row Name 25 1013          OT Time and Intention    Document Type evaluation  -AC     Mode of Treatment individual therapy;occupational therapy  -AC     Patient Effort good  -AC       Row Name 25 1013          General Information    Patient Profile Reviewed yes  -AC     Prior Level of Function --  patient reports he has assist for adls from wife, uses a rolling walker but has fallen. APS involved per reports \"pt. not well kempt\"  -AC     Existing Precautions/Restrictions fall  -AC     Barriers to Rehab none identified  -AC       Row Name 25 1013          Occupational Profile "    Reason for Services/Referral (Occupational Profile) patient is a 69 year old male admitted for the above diagnosis. Occupational therapy services ordered to assess patient ability to perform adls and adl transfers. Patient not currently on OT caseload for the above diagnosis.  -       Row Name 01/09/25 1013          Living Environment    People in Home spouse  -       Row Name 01/09/25 1013          Cognition    Orientation Status (Cognition) oriented x 3  -       Row Name 01/09/25 1013          Safety Issues/Impairments Affecting Functional Mobility    Safety Issues Affecting Function (Mobility) judgment;insight into deficits/self-awareness  -     Impairments Affecting Function (Mobility) balance;endurance/activity tolerance;strength;shortness of breath;pain  -               User Key  (r) = Recorded By, (t) = Taken By, (c) = Cosigned By      Initials Name Provider Type     Katie Pena OT Occupational Therapist                     Mobility/ADL's       Row Name 01/09/25 1018          Bed Mobility    Bed Mobility bed mobility (all) activities  -     All Activities, [place] (Bed Mobility) moderate assist (50% patient effort);1 person assist  -     Bed Mobility, Safety Issues decreased use of arms for pushing/pulling;decreased use of legs for bridging/pushing  -     Assistive Device (Bed Mobility) head of bed elevated;bed rails;repositioning sheet  -       Row Name 01/09/25 1018          Transfers    Transfers sit-stand transfer  -       Row Name 01/09/25 1018          Sit-Stand Transfer    Sit-Stand [place] (Transfers) moderate assist (50% patient effort);1 person assist;verbal cues  -     Assistive Device (Sit-Stand Transfers) walker, front-wheeled  -       Row Name 01/09/25 1018          Functional Mobility    Functional Mobility- Ind. Level contact guard assist;verbal cues required;1 person;minimum assist (75% patient effort)  -     Functional Mobility- Device walker,  front-wheeled  -     Functional Mobility- Comment Patient was CGA/min A for side stepping toward HOB x3, unsteady and shaky. Patient requested to return to supine.  -       Row Name 01/09/25 1018          Activities of Daily Living    BADL Assessment/Intervention --  Patient is setup for self-feeding, min a for grooming, min A for upper body bathing/dressing, max A for lower body bathing/dressing, max A for toileting.  -               User Key  (r) = Recorded By, (t) = Taken By, (c) = Cosigned By      Initials Name Provider Type     Katie Pena OT Occupational Therapist                   Obj/Interventions       Row Name 01/09/25 1020          Sensory Assessment (Somatosensory)    Sensory Assessment (Somatosensory) UE sensation intact  -[facility] Name 01/09/25 1020          Vision Assessment/Intervention    Visual Impairment/Limitations [facility] Name 01/09/25 1020          Range of Motion Comprehensive    General Range of Motion bilateral upper extremity ROM [facility] Name 01/09/25 1020          Strength Comprehensive (MMT)    Comment, General Manual Muscle Testing (MMT) Assessment RUE shoulder impaired at basline distally 4/5, LUE (dominant) 4/5  -       Row Name 01/09/25 1020          Motor Skills    Motor Skills coordination;functional endurance  -     Coordination WFL  -     Functional Endurance fair minus  -       Row Name 01/09/25 1020          Balance    Balance Assessment standing dynamic balance  -     Dynamic Standing Balance minimal assist;1-person assist  -     Position/Device Used, Standing Balance supported;walker, rolling  -     Balance Interventions standing;sit to stand;supported;dynamic;moderate challenge;occupation based/functional task  -               User Key  (r) = Recorded By, (t) = Taken By, (c) = Cosigned By      Initials Name Provider Type     Katie Pena OT Occupational Therapist                   Goals/Plan       Row Name 01/09/25 1026           Bed Mobility Goal 1 (OT)    Activity/Assistive Device (Bed Mobility Goal 1, OT) bed mobility activities, all  -AC     [place] Level/Cues Needed (Bed Mobility Goal 1, OT) modified independence  -AC     Time Frame (Bed Mobility Goal 1, OT) long term goal (LTG);10 days  -AC       Row Name 01/09/25 1026          Transfer Goal 1 (OT)    Activity/Assistive Device (Transfer Goal 1, OT) transfers, all  -AC     [place] Level/Cues Needed (Transfer Goal 1, OT) modified independence  -AC     Time Frame (Transfer Goal 1, OT) long term goal (LTG);10 days  -AC       Row Name 01/09/25 1026          Bathing Goal 1 (OT)    Activity/Device (Bathing Goal 1, OT) bathing skills, all  -AC     [place] Level/Cues Needed (Bathing Goal 1, OT) modified independence  -AC     Time Frame (Bathing Goal 1, OT) long term goal (LTG);10 days  -AC       Row Name 01/09/25 1026          Dressing Goal 1 (OT)    Activity/Device (Dressing Goal 1, OT) dressing skills, all  -AC     [place]/Cues Needed (Dressing Goal 1, OT) modified independence  -AC     Time Frame (Dressing Goal 1, OT) long term goal (LTG);10 days  -AC       Row Name 01/09/25 1026          Toileting Goal 1 (OT)    Activity/Device (Toileting Goal 1, OT) toileting skills, all  -AC     [place] Level/Cues Needed (Toileting Goal 1, OT) modified independence  -AC     Time Frame (Toileting Goal 1, OT) long term goal (LTG);10 days  -AC       Row Name 01/09/25 1026          Strength Goal 1 (OT)    Strength Goal 1 (OT) patient will demonstrate BUE strength of 5/5 for adls.  -AC     Time Frame (Strength Goal 1, OT) long term goal (LTG);10 days  -AC       Row Name 01/09/25 1026          Problem Specific Goal 1 (OT)    Problem Specific Goal 1 (OT) patient will improve endurance to fair plus for adls.  -AC     Time Frame (Problem Specific Goal 1, OT) long term goal (LTG);10 days  -AC       Row Name 01/09/25 1026          Therapy Assessment/Plan (OT)    Planned Therapy  Interventions (OT) activity tolerance training;functional balance retraining;occupation/activity based interventions;ROM/therapeutic exercise;transfer/mobility retraining;patient/caregiver education/training;BADL retraining  -               User Key  (r) = Recorded By, (t) = Taken By, (c) = Cosigned By      Initials Name Provider Type     Katie Pena OT Occupational Therapist                   Clinical Impression       Row Name 01/09/25 1022          Pain Assessment    Pretreatment Pain Rating 1/10  -     Posttreatment Pain Rating 1/10  -     Pain Location extremity;shoulder  -     Pain Side/Orientation right  -       Row Name 01/09/25 1022          Plan of Care Review    Plan of Care Reviewed With patient  -     Progress no change  -     Outcome Evaluation patient demonstrates decreased strength, endurance, and balance which limits patients ability to safely and independently perform adls and adl transfers/mobility. patient would benefit from occupational therapy services in order to maximize independence.  -       Row Name 01/09/25 1022          Therapy Assessment/Plan (OT)    Patient/Family Therapy Goal Statement (OT) none stated  -     Rehab Potential (OT) good  -     Criteria for Skilled Therapeutic Interventions Met (OT) yes;meets criteria;skilled treatment is necessary  -     Therapy Frequency (OT) 5 times/wk  -       Row Name 01/09/25 1022          Therapy Plan Review/Discharge Plan (OT)    Equipment Needs Upon Discharge (OT) walker, rolling;commode chair  -     Anticipated Discharge Disposition (OT) sub acute care setting  -       Row Name 01/09/25 1022          Positioning and Restraints    Pre-Treatment Position in bed  -     Post Treatment Position bed  -AC     In Bed fowlers;call light within reach;encouraged to call for assist;exit alarm on  -               User Key  (r) = Recorded By, (t) = Taken By, (c) = Cosigned By      Initials Name Provider Type    YUDY Pena  KALEB Wilson Occupational Therapist                   Outcome Measures       Row Name 01/09/25 1028          How much help from another is currently needed...    Putting on and taking off regular lower body clothing? 2  -AC     Bathing (including washing, rinsing, and drying) 2  -AC     Toileting (which includes using toilet bed pan or urinal) 2  -AC     Putting on and taking off regular upper body clothing 3  -AC     Taking care of personal grooming (such as brushing teeth) 3  -AC     Eating meals 4  -AC     AM-PAC 6 Clicks Score (OT) 16  -AC       Row Name 01/09/25 1028          Functional Assessment    Outcome Measure Options AM-PAC 6 Clicks Daily Activity (OT);Optimal Instrument  -AC       Row Name 01/09/25 1028          Optimal Instrument    Optimal Instrument Optimal - 3  -AC     Bending/Stooping 4  -AC     Standing 3  -AC     Reaching 2  -AC     From the list, choose the 3 activities you would most like to be able to do without any difficulty Bending/stooping;Standing;Reaching  -AC     Total Score Optimal - 3 9  -AC               User Key  (r) = Recorded By, (t) = Taken By, (c) = Cosigned By      Initials Name Provider Type    AC Katie Pena OT Occupational Therapist                    Occupational Therapy Education       Title: PT OT SLP Therapies (Done)       Topic: Occupational Therapy (Done)       Point: ADL training (Done)       Description:   Instruct learner(s) on proper safety adaptation and remediation techniques during self care or transfers.   Instruct in proper use of assistive devices.                  Learning Progress Summary            Patient Acceptance, E, VU,NR by  at 1/9/2025 1029                      Point: Home exercise program (Done)       Description:   Instruct learner(s) on appropriate technique for monitoring, assisting and/or progressing therapeutic exercises/activities.                  Learning Progress Summary            Patient Acceptance, E, VU,NR by  at 1/9/2025 1029                       Point: Precautions (Done)       Description:   Instruct learner(s) on prescribed precautions during self-care and functional transfers.                  Learning Progress Summary            Patient Acceptance, E, VU,NR by  at 1/9/2025 1029                      Point: Body mechanics (Done)       Description:   Instruct learner(s) on proper positioning and spine alignment during self-care, functional mobility activities and/or exercises.                  Learning Progress Summary            Patient Acceptance, E, VU,NR by  at 1/9/2025 1029                                      User Key       Initials Effective Dates Name Provider Type Discipline     06/16/21 -  Katie Pena OT Occupational Therapist OT                  OT Recommendation and Plan  Planned Therapy Interventions (OT): activity tolerance training, functional balance retraining, occupation/activity based interventions, ROM/therapeutic exercise, transfer/mobility retraining, patient/caregiver education/training, BADL retraining  Therapy Frequency (OT): 5 times/wk  Plan of Care Review  Plan of Care Reviewed With: patient  Progress: no change  Outcome Evaluation: patient demonstrates decreased strength, endurance, and balance which limits patients ability to safely and independently perform adls and adl transfers/mobility. patient would benefit from occupational therapy services in order to maximize independence.     Time Calculation:   Evaluation Complexity (OT)  Review Occupational Profile/Medical/Therapy History Complexity: expanded/moderate complexity  Assessment, Occupational Performance/Identification of Deficit Complexity: 1-3 performance deficits  Clinical Decision Making Complexity (OT): problem focused assessment/low complexity  Overall Complexity of Evaluation (OT): low complexity     Time Calculation- OT       Row Name 01/09/25 1030             Time Calculation- OT    OT Received On 01/09/25  -      OT Goal Re-Cert  Due Date 01/18/25  -AC         Untimed Charges    OT Eval/Re-eval Minutes 33  -AC         Total Minutes    Untimed Charges Total Minutes 33  -AC       Total Minutes 33  -AC                User Key  (r) = Recorded By, (t) = Taken By, (c) = Cosigned By      Initials Name Provider Type    Katie Martínez OT Occupational Therapist                  Therapy Charges for Today       Code Description Service Date Service Provider Modifiers Qty    [id number] HC OT EVAL LOW COMPLEXITY 3 1/9/2025 Katie Pena OT GO 1                 Katie Pena OT  1/9/2025

## 2025-01-09 NOTE — PLAN OF CARE
Goal Outcome Evaluation:  Plan of Care Reviewed With: patient           Outcome Evaluation: Pt presents with decreased strength, transfers and functional mobility. Will benefit from inpatient PT services and continued PT services upon discharge.    Anticipated Discharge Disposition (PT): inpatient rehabilitation facility

## 2025-01-09 NOTE — THERAPY EVALUATION
Acute Care - Physical Therapy Initial Evaluation  AFSHAN Funez     Patient Name: Brian Mehta  : 1955  MRN: [id number]  Today's Date: 2025      Visit Dx:     ICD-10-CM ICD-9-CM   1. Cystitis  N30.90 595.9   2. Impaired mobility and ADLs  Z74.09 V49.89    Z78.9    3. Difficulty walking  R26.2 719.7     Patient Active Problem List   Diagnosis    CVA (cerebral vascular accident)    Essential hypertension    High cholesterol    Hip pain    Vitamin B12 deficiency    Kidney disorder    Chronic pain of both knees    Chronic HFrEF (heart failure with reduced ejection fraction)    LVH (left ventricular hypertrophy)    COPD (chronic obstructive pulmonary disease)    DM2 (diabetes mellitus, type 2)    Urinary tract infection without hematuria    Abnormal nuclear stress test    UTI (urinary tract infection)    Fall    Failure to thrive in adult    Nephrolithiasis    Bladder stone    Retained ureteral stent    Acute UTI    Bacteremia     Past Medical History:   Diagnosis Date    CHF (congestive heart failure)     SEE'S DR [place] NO CURRENT S/S    COPD (chronic obstructive pulmonary disease)     INHALER    Diabetes mellitus     DOESN'T CHECK BG OFTEN AT HOME    Hyperlipidemia     Hypertension     Sepsis 2023    Stroke 2017    RIGHT SIDE WEAKNESS     Past Surgical History:   Procedure Laterality Date    APPENDECTOMY      EYE SURGERY Bilateral     MISTY CATARACT     PT Assessment (Last 12 Hours)       PT Evaluation and Treatment       Row Name 25 1100          Physical Therapy Time and Intention    Subjective Information no complaints  -DP     Document Type evaluation  -DP     Mode of Treatment individual therapy;physical therapy  -DP     Patient Effort good  -DP       Row Name 25 1100          General Information    Patient Profile Reviewed yes  -DP     Patient Observations alert;cooperative  -DP     General Observations of Patient assist from wife  -DP     Prior Level of Function min  assist:;transfer;gait;bed mobility;ADL's  -DP     Equipment Currently Used at Home walker, rolling  -DP     Existing Precautions/Restrictions fall  -DP     Barriers to Rehab none identified  -DP       Row Name 01/09/25 1100          Living Environment    Current Living Arrangements home  -DP     Home Accessibility stairs to enter home  -DP     People in Home spouse  -DP       Row Name 01/09/25 1100          Cognition    Orientation Status (Cognition) oriented x 3  -DP       Row Name 01/09/25 1100          Range of Motion (ROM)    Range of Motion bilateral lower extremities;ROM is WFL  -DP       Row Name 01/09/25 1100          Strength Comprehensive (MMT)    General Manual Muscle Testing (MMT) Assessment lower extremity strength deficits identified  -DP     Comment, General Manual Muscle Testing (MMT) Assessment BLE: 4-/5  -DP       Row Name 01/09/25 1100          Bed Mobility    Bed Mobility supine-sit-supine  -DP     Supine-Sit-Supine [place] (Bed Mobility) moderate assist (50% patient effort)  -DP     Bed Mobility, Safety Issues decreased use of arms for pushing/pulling;decreased use of legs for bridging/pushing  -DP     Assistive Device (Bed Mobility) head of bed elevated;bed rails;repositioning sheet  -DP       Row Name 01/09/25 1100          Transfers    Transfers sit-stand transfer  -DP       Row Name 01/09/25 1100          Sit-Stand Transfer    Sit-Stand [place] (Transfers) moderate assist (50% patient effort)  -DP     Assistive Device (Sit-Stand Transfers) walker, front-wheeled  -DP       Row Name 01/09/25 1100          Gait/Stairs (Locomotion)    Gait/Stairs Locomotion gait/ambulation assistive device  -DP     [place] Level (Gait) moderate assist (50% patient effort)  -DP     Assistive Device (Gait) walker, front-wheeled  -DP     Patient was able to Ambulate yes  -DP     Distance in Feet (Gait) 1  -DP       Row Name 01/09/25 1100          Safety Issues/Impairments Affecting Functional  Mobility    Impairments Affecting Function (Mobility) balance;endurance/activity tolerance;strength;shortness of breath;pain  -DP       Row Name 01/09/25 1100          Balance    Balance Assessment standing static balance  -DP     Static Standing Balance moderate assist  -DP     Position/Device Used, Standing Balance supported;walker, front-wheeled  -DP       Row Name             Wound 10/31/24 1234 Bilateral gluteal    Wound - Properties Group Placement Date: 10/31/24  -NH Placement Time: 1234  -NH Side: Bilateral  -NH Location: gluteal  -NH Primary Wound Type: MASD  -NH    Retired Wound - Properties Group Placement Date: 10/31/24  -NH Placement Time: 1234  -NH Side: Bilateral  -NH Location: gluteal  -NH Primary Wound Type: MASD  -NH    Retired Wound - Properties Group Placement Date: 10/31/24  -NH Placement Time: 1234  -NH Side: Bilateral  -NH Location: gluteal  -NH Primary Wound Type: MASD  -NH    Retired Wound - Properties Group Date first assessed: 10/31/24  -NH Time first assessed: 1234  -NH Side: Bilateral  -NH Location: gluteal  -NH Primary Wound Type: MASD  -NH      Row Name 01/09/25 1100          Plan of Care Review    Plan of Care Reviewed With patient  -DP     Outcome Evaluation Pt presents with decreased strength, transfers and functional mobility. Will benefit from inpatient PT services and continued PT services upon discharge.  -DP       Row Name 01/09/25 1100          Therapy Assessment/Plan (PT)    Criteria for Skilled Interventions Met (PT) yes;meets criteria  -DP     Therapy Frequency (PT) daily  -DP     Predicted Duration of Therapy Intervention (PT) 10 days  -DP     Problem List (PT) problems related to;mobility  -DP     Activity Limitations Related to Problem List (PT) unable to transfer safely;unable to ambulate safely  -DP       Row Name 01/09/25 1100          PT Evaluation Complexity    History, PT Evaluation Complexity no personal factors and/or comorbidities  -DP     Examination of Body  Systems (PT Eval Complexity) total of 4 or more elements  -DP     Clinical Presentation (PT Evaluation Complexity) stable  -DP     Clinical Decision Making (PT Evaluation Complexity) low complexity  -DP     Overall Complexity (PT Evaluation Complexity) low complexity  -DP       Row Name 01/09/25 1100          Physical Therapy Goals    Transfer Goal Selection (PT) transfer, PT goal 1  -DP     Gait Training Goal Selection (PT) gait training, PT goal 1  -DP       Row Name 01/09/25 1100          Transfer Goal 1 (PT)    Activity/Assistive Device (Transfer Goal 1, PT) sit-to-stand/stand-to-sit  -DP     [place] Level/Cues Needed (Transfer Goal 1, PT) supervision required  -DP     Time Frame (Transfer Goal 1, PT) 10 days  -DP       Row Name 01/09/25 1100          Gait Training Goal 1 (PT)    Activity/Assistive Device (Gait Training Goal 1, PT) assistive device use;walker, rolling  -DP     [place] Level (Gait Training Goal 1, PT) supervision required  -DP     Distance (Gait Training Goal 1, PT) 200  -DP     Time Frame (Gait Training Goal 1, PT) 10 days  -DP               User Key  (r) = Recorded By, (t) = Taken By, (c) = Cosigned By      Initials Name Provider Type    Alanna Hernandez RN Registered Nurse    Cori Guillermo, PT Physical Therapist                    Physical Therapy Education        No education to display                  PT Recommendation and Plan  Anticipated Discharge Disposition (PT): inpatient rehabilitation facility  Planned Therapy Interventions (PT): balance training, bed mobility training, gait training, strengthening, transfer training  Therapy Frequency (PT): daily  Plan of Care Reviewed With: patient  Outcome Evaluation: Pt presents with decreased strength, transfers and functional mobility. Will benefit from inpatient PT services and continued PT services upon discharge.   Outcome Measures       Row Name 01/09/25 1100             How much help from another person do you currently  need...    Turning from your back to your side while in flat bed without using bedrails? 2  -DP      Moving from lying on back to sitting on the side of a flat bed without bedrails? 2  -DP      Moving to and from a bed to a chair (including a wheelchair)? 2  -DP      Standing up from a chair using your arms (e.g., wheelchair, bedside chair)? 2  -DP      Climbing 3-5 steps with a railing? 2  -DP      To walk in hospital room? 2  -DP      AM-PAC 6 Clicks Score (PT) 12  -DP         Functional Assessment    Outcome Measure Options AM-PAC 6 Clicks Basic Mobility (PT)  -DP                User Key  (r) = Recorded By, (t) = Taken By, (c) = Cosigned By      Initials Name Provider Type    Cori Guillermo, PT Physical Therapist                     Time Calculation:    PT Charges       Row Name 01/09/25 1157             Time Calculation    PT Received On 01/09/25  -DP      PT Goal Re-Cert Due Date 01/18/25  -DP         Untimed Charges    PT Eval/Re-eval Minutes 40  -DP         Total Minutes    Untimed Charges Total Minutes 40  -DP       Total Minutes 40  -DP                User Key  (r) = Recorded By, (t) = Taken By, (c) = Cosigned By      Initials Name Provider Type    Cori Guillermo, PT Physical Therapist                      PT G-Codes  Outcome Measure Options: AM-PAC 6 Clicks Basic Mobility (PT)  AM-PAC 6 Clicks Score (PT): 12  AM-PAC 6 Clicks Score (OT): 16    Cori Martinez, PT  1/9/2025

## 2025-01-09 NOTE — OUTREACH NOTE
AMBULATORY CASE MANAGEMENT NOTE    Names and Relationships of Patient/Support Persons: Contact: Pavithra - APS; Relationship: Other -     [facility] Interim Update    Chart reviewed. Patient at [facility] due to fall, weakness and cystitis.     Called and spoke with Pavithra at [facility].to touch base and see what APS goal/plan is. Pavithra states that she sent a referral to [facility] on 12/30 and [facility] has apparently been trying to reach patient or spouse. I did advise that patient is currently inpatient. Pavithra also states that they could help the family with pest control, but patient spouse will have to  clutter that is in the home first because pest control will not be effective in houses current state. She states that if they clean and have a pest control agency come out to give an estimate and itemized statement and can get that to [facility], they can assist with the cost.     We did discuss patients confusion and she states that if wife thinks that patients confusion is continuing and it is an emergency then she can go to the 's office and file an emergency petition for guardianship.     She is going to attempt to call patient spouse now to get an update and talk more about [facility] program.        Education Documentation  No documentation found.        YANDEL GONZALEZ  Ambulatory Case Management    1/9/2025, 13:45 EST

## 2025-01-09 NOTE — SIGNIFICANT NOTE
01/09/25 1450   Coping/Psychosocial   Observed Emotional State anxious;sad;tearful/crying   Verbalized Emotional State anxiety;fear;sadness   Trust Relationship/Rapport empathic listening provided   Family/Support Persons spouse;friend   Involvement in Care interacting with patient   Family/Support System Care support provided   Additional Documentation Spiritual Care (Group)   Spiritual Care   Use of Spiritual Resources prayer;spirituality for coping, indicated strong use of   Spiritual Care Source nurse referral   Spiritual Care Follow-Up follow-up, none required as presently assessed   Response to Spiritual Care receptive of support;engaged in conversation;thanks expressed   Spiritual Care Interventions supportive conversation provided;prayer support provided   Spiritual Care Visit Type initial   Spiritual Care Request prayer support   Receptivity to Spiritual Care visit welcomed     Duration of visit: 10  min

## 2025-01-09 NOTE — PLAN OF CARE
Goal Outcome Evaluation:  Plan of Care Reviewed With: patient, spouse        Progress: no change  Outcome Evaluation: Patient A+Ox4, but has made confused statements throughout the day. Patient has made request to leave the hosptial. I have provided patient with education and regarding his plan of care, patient verbalized understanding.  Patient has repeated request for pain medication, to have his IVs removed, then by end of shift patient removed them himself, resulting in IVF being placed on HOLD, patient then asking to be discharged, as he expresses to his wife.

## 2025-01-09 NOTE — PROGRESS NOTES
[facility] Clinical Pharmacy Services: Vancomycin Pharmacokinetic Initial Consult Note    Brian Mehta is a 69 y.o. male who is on day 1 of pharmacy to dose vancomycin for Bacteremia.    Consult Information  Consulting Provider: Demetri  Planned Duration of Therapy: 14 days  Was Patient Receiving Prior to Admission/Consult?: No  Loading Dose Ordered or Given: 2000 mg ordered on    PK/PD Target: -600 mg/L.hr   Relevant ID History:   10/26 Urine culture >100k CFUs e coli   Urine culture >100k CFUs e coli  3/20 Urine culture >100k CFUs proteus hauseri  3/10 Urine culture >100k CFUs e coli    Other Antimicrobials: Ceftriaxone    Imaging Reviewed?: Yes    Microbiology Data  MRSA PCR performed: No; Result: Not ordered due to excluded indication or presence of suspected abscess  Culture/Source:    Blood culture, ID, PCR- Negative by BCID PCR.  Culture to follow.    Blood culture, rt arm- Gram pos cocci in pairs, chains and clusters    Vitals/Labs  Ht:  ; Wt: 94.1 kg (207 lb 7.3 oz)  Temp (24hrs), Av.2 °F (36.8 °C), Min:97.9 °F (36.6 °C), Max:99 °F (37.2 °C)   Estimated Creatinine Clearance: 107.2 mL/min (by C-G formula based on SCr of 0.89 mg/dL).     Results from last 7 days   Lab Units 25  0444 25  1256 25  2156   CREATININE mg/dL 0.89 0.88 0.77   WBC 10*3/mm3 5.37 5.02 5.47     Assessment/Plan:  Vancomycin Dose:  1000 mg IV every 12 hours; which provides the following predicted parameters:    Regimen: 1000 mg IV every 12 hours.  Start time: 08:26 on 2025  Exposure target: AUC24 (range)400-600 mg/L.hr   AUC24,ss: 471 mg/L.hr  Probability of AUC24 > 400: 68 %  Ctrough,ss: 15.2 mg/L  Probability of Ctrough,ss > 20: 25 %  Probability of nephrotoxicity (Lodise TERRELL ): 10 %    Vanc Random ordered for 1/10 at 1200  Patient has order for Basic Metabolic Panel    Pharmacy will follow patient's kidney function and will adjust doses and obtain levels as necessary. Thank you for  involving pharmacy in this patient's care. Please contact pharmacy with any questions or concerns.                           Duane To  Clinical Pharmacist

## 2025-01-09 NOTE — PROGRESS NOTES
[facility]   Hospitalist Progress Note  Date: 2025  Patient Name: Brian Mehta  : 1955  MRN: [id number]  Date of admission: 2025  Room/Bed: 426/1      Subjective   Subjective     Chief Complaint:   Generalized weakness    Summary:  Brian Mehta is a 69 y.o. male with medical history of hypertension, hyperlipidemia, CHF, COPD, type 2 diabetes, tobacco use and history of CVA with residual right-sided weakness presented to the ER with weakness and some hip pain.  Patient is a poor historian and history obtained by discussion with ER staff.  Appears patient for the last couple days have some right hip pain after a fall.  It has been accompanied by inability care for himself and generalized weakness, prompting a call to EMS to bring him in for evaluation.  Upon arrival they found his house to be in a poor unkempt state as well as the patient.  He was found on the ground with complaints of pain and was brought to the ER for further evaluation     Upon arrival he was tachycardic and afebrile otherwise stable.  Lab workup revealed pyuria with positive nitrites and bacteriuria and low magnesium.  Patient had x-ray of the pelvis negative for any fracture or dislocation and x-ray chest was also unremarkable.  Patient was given Rocephin for UTI and the hospitalist then contacted for admission of the patient was unable to ambulate well in the ER and unable to care for himself.  Social work is involved due to patient having poor living situation at home when EMS felt his house was unkempt and he was unable to care for himself.    Interval Followup:   Overnight blood cultures returned positive, patient has gram-positive cocci in pairs and chains, gram-positive cocci in clusters 2 out of 2.  Patient with gram-negative bacilli 1 out of 2.  Addition of vancomycin the patient ceftriaxone.  Repeat blood cultures have been ordered.  Urine culture was never obtained on initial sample therefore repeating urine culture  today.  Given patient's symptoms of generalized weakness will order C-spine MRI to rule out epidural abscess.  Patient also had initial hip film with fracture distal left ureteral stent, unchanged noted.  Will order CT of abdomen as well.      Objective   Objective     Vitals:   Temp:  [98.1 °F (36.7 °C)-99 °F (37.2 °C)] 99 °F (37.2 °C)  Heart Rate:  [] 98  Resp:  [20] 20  BP: (120-159)/() 129/78    Physical Exam               Constitutional: Awake, alert, no acute distress, disheveled              Eyes: Pupils equal, sclerae anicteric, no conjunctival injection              HENT: NCAT, mucous membranes dry              Neck: Supple, no thyromegaly, no lymphadenopathy, trachea midline              Respiratory: Clear to auscultation bilaterally              Cardiovascular: RRR, no murmurs, rubs, or gallops, palpable pedal pulses bilaterally              Gastrointestinal: Positive bowel sounds, soft, nontender, nondistended              Musculoskeletal: No bilateral ankle edema, no clubbing or cyanosis to extremities.  No pain with palpation of spine              Neurologic: Oriented x 3, mild residual weakness on the right side 4 out of 5 strength.  Normal reflexes in bilateral lower extremities.  Normal sensation in bilateral lower extremities      Result Review    Result Review:  I have personally reviewed these results:  [x]  Laboratory      Lab 01/09/25 0444 01/08/25  1256 01/08/25  0147 01/07/25 2156   WBC 5.37 5.02  --  5.47   HEMOGLOBIN 13.0 13.1  --  15.4   HEMATOCRIT 40.1 39.4  --  47.1   PLATELETS 230 208  --  248   NEUTROS ABS 2.14  --   --  3.10   IMMATURE GRANS (ABS) 0.01  --   --  0.02   LYMPHS ABS 2.56  --   --  1.75   MONOS ABS 0.52  --   --  0.53   EOS ABS 0.10  --   --  0.05   MCV 80.0 78.3*  --  78.9*   LACTATE  --   --  1.1  --          Lab 01/09/25 0444 01/08/25  1256 01/07/25  2156   SODIUM 136 134* 130*   POTASSIUM 4.0 3.9 4.4   CHLORIDE 102 100 98   CO2 25.2 26.4 22.6   ANION  GAP 8.8 7.6 9.4   BUN 17 17 14   CREATININE 0.89 0.88 0.77   EGFR 93.9 94.2 98.1   GLUCOSE 136* 243* 264*   CALCIUM 8.5* 8.7 8.7   MAGNESIUM 1.9 2.3 1.5*   PHOSPHORUS 3.0  --   --    HEMOGLOBIN A1C  --   --  8.80*         Lab 01/09/25  0444 01/07/25  2156   TOTAL PROTEIN 6.6 7.4   ALBUMIN 2.6* 2.9*   GLOBULIN 4.0 4.5   ALT (SGPT) <5 5   AST (SGOT) 11 10   BILIRUBIN 0.3 0.5   ALK PHOS 87 100         Lab 01/07/25  2156   HSTROP T 9                 Brief Urine Lab Results  (Last result in the past 365 days)        Color   Clarity   Blood   Leuk Est   Nitrite   Protein   CREAT   Urine HCG        01/09/25 1428 Yellow   Cloudy   Small (1+)   Large (3+)   Positive   30 mg/dL (1+)                 [x]  Microbiology   Microbiology Results (last 10 days)       Procedure Component Value - Date/Time    Blood Culture - Blood, Arm, Right [[id number]]  (Abnormal) Collected: 01/08/25 0147    Lab Status: Preliminary result Specimen: Blood from Arm, Right Updated: 01/09/25 1049     Blood Culture Abnormal Stain     Gram Stain Aerobic Bottle Gram positive cocci in pairs, chains and clusters      Aerobic Bottle Gram negative bacilli      Anaerobic Bottle Gram positive cocci in pairs, chains and clusters    Narrative:      Less than seven (7) mL's of blood was collected.  Insufficient quantity may yield false negative results.    Blood Culture - Blood, Arm, Right [[id number]]  (Abnormal) Collected: 01/08/25 0147    Lab Status: Preliminary result Specimen: Blood from Arm, Right Updated: 01/08/25 2208     Blood Culture Abnormal Stain     Gram Stain Aerobic Bottle Gram positive cocci in pairs and chains      Aerobic Bottle Gram positive cocci in clusters    Narrative:      Less than seven (7) mL's of blood was collected.  Insufficient quantity may yield false negative results.    Blood Culture ID, PCR - Blood, Arm, Right [[id number]] Collected: 01/08/25 0147    Lab Status: Final result Specimen: Blood from Arm, Right Updated: 01/08/25 2210      BCID, PCR Negative by BCID PCR. Culture to Follow.     BOTTLE TYPE Aerobic Bottle          [x]  Radiology  XR Hip With or Without Pelvis 2 - 3 View Right    Result Date: 1/7/2025  1.No acute fracture or dislocation. 2.Degenerative changes in both hips. 3.Fractured distal left ureteral stent, unchanged. Electronically Signed: Durga Zurita MD  1/7/2025 11:48 PM EST  Workstation ID: [id number]    XR Chest 1 View    Result Date: 1/7/2025  Impression: No acute cardiopulmonary disease. Electronically Signed: Jm Bran MD  1/7/2025 10:11 PM EST  Workstation ID: [id number]   []  EKG/Telemetry   []  Cardiology/Vascular   []  Pathology  []  Old records  []  Other:    Assessment & Plan   Assessment / Plan     Assessment:  Bacteremia  Urinary tract infection  Type 2 diabetes  Hypertension  History of CVA with residual right-sided deficits  Heart failure, low ejection fraction  COPD not acute exacerbation  Active tobacco abuse  Generalized weakness    Plan:  Patient remains admitted to hospital for further care and management  Patient's blood cultures have returned positive for gram-positive and gram-negative.  In addition to ceftriaxone we will add vancomycin  Therapeutic drug monitoring while on vancomycin  Additional imaging has been ordered given these positive blood cultures, given patient's generalized weakness will order MRI full spine  Also ordering CT of abdomen given patient's retained fractured distal left ureteral stent, unchanged in plain film of hip obtained on admission  Renal function closely given contrast load with imaging  PT OT, wound care.  Case management consulted.  Continue to work on disposition, patient most interested in discharging home once medical workup complete.  Patient would likely be most appropriate for discharging to rehab facility, will continue to discuss with patient daily     Discussed with RN.  Discussed with case management    VTE Prophylaxis:  Pharmacologic VTE prophylaxis orders are  present.        CODE STATUS:   Code Status (Patient has no pulse and is not breathing): CPR (Attempt to Resuscitate)  Medical Interventions (Patient has pulse or is breathing): Full Support      Electronically signed by Arcenio Mosquera MD, 1/9/2025, 16:11 EST.                       Tranexamic Acid Counseling:  Patient advised of the small risk of bleeding problems with tranexamic acid. They were also instructed to call if they developed any nausea, vomiting or diarrhea. All of the patient's questions and concerns were addressed.

## 2025-01-10 ENCOUNTER — APPOINTMENT (OUTPATIENT)
Dept: MRI IMAGING | Facility: HOSPITAL | Age: 70
DRG: 660 | End: 2025-01-10
Payer: MEDICARE

## 2025-01-10 ENCOUNTER — APPOINTMENT (OUTPATIENT)
Dept: CT IMAGING | Facility: HOSPITAL | Age: 70
DRG: 660 | End: 2025-01-10
Payer: MEDICARE

## 2025-01-10 ENCOUNTER — PATIENT OUTREACH (OUTPATIENT)
Dept: CASE MANAGEMENT | Facility: OTHER | Age: 70
End: 2025-01-10
Payer: MEDICARE

## 2025-01-10 DIAGNOSIS — I50.22 CHRONIC HFREF (HEART FAILURE WITH REDUCED EJECTION FRACTION): ICD-10-CM

## 2025-01-10 DIAGNOSIS — R53.1 WEAKNESS: Primary | ICD-10-CM

## 2025-01-10 LAB
ALBUMIN SERPL-MCNC: 2.7 G/DL (ref 3.5–5.2)
ALBUMIN/GLOB SERPL: 0.7 G/DL
ALP SERPL-CCNC: 88 U/L (ref 39–117)
ALT SERPL W P-5'-P-CCNC: <5 U/L (ref 1–41)
ANION GAP SERPL CALCULATED.3IONS-SCNC: 8.5 MMOL/L (ref 5–15)
AST SERPL-CCNC: 9 U/L (ref 1–40)
BACTERIA SPEC AEROBE CULT: NO GROWTH
BASOPHILS # BLD AUTO: 0.03 10*3/MM3 (ref 0–0.2)
BASOPHILS NFR BLD AUTO: 0.6 % (ref 0–1.5)
BILIRUB SERPL-MCNC: 0.3 MG/DL (ref 0–1.2)
BUN SERPL-MCNC: 15 MG/DL (ref 8–23)
BUN/CREAT SERPL: 19 (ref 7–25)
CALCIUM SPEC-SCNC: 8.6 MG/DL (ref 8.6–10.5)
CHLORIDE SERPL-SCNC: 104 MMOL/L (ref 98–107)
CO2 SERPL-SCNC: 24.5 MMOL/L (ref 22–29)
CREAT SERPL-MCNC: 0.79 MG/DL (ref 0.76–1.27)
DEPRECATED RDW RBC AUTO: 42.9 FL (ref 37–54)
EGFRCR SERPLBLD CKD-EPI 2021: 97.4 ML/MIN/1.73
EOSINOPHIL # BLD AUTO: 0.13 10*3/MM3 (ref 0–0.4)
EOSINOPHIL NFR BLD AUTO: 2.7 % (ref 0.3–6.2)
ERYTHROCYTE [DISTWIDTH] IN BLOOD BY AUTOMATED COUNT: 14.6 % (ref 12.3–15.4)
GLOBULIN UR ELPH-MCNC: 3.7 GM/DL
GLUCOSE BLDC GLUCOMTR-MCNC: 190 MG/DL (ref 70–99)
GLUCOSE BLDC GLUCOMTR-MCNC: 206 MG/DL (ref 70–99)
GLUCOSE BLDC GLUCOMTR-MCNC: 250 MG/DL (ref 70–99)
GLUCOSE BLDC GLUCOMTR-MCNC: 258 MG/DL (ref 70–99)
GLUCOSE BLDC GLUCOMTR-MCNC: 266 MG/DL (ref 70–99)
GLUCOSE SERPL-MCNC: 163 MG/DL (ref 65–99)
HCT VFR BLD AUTO: 38.5 % (ref 37.5–51)
HGB BLD-MCNC: 12.1 G/DL (ref 13–17.7)
IMM GRANULOCYTES # BLD AUTO: 0.01 10*3/MM3 (ref 0–0.05)
IMM GRANULOCYTES NFR BLD AUTO: 0.2 % (ref 0–0.5)
LYMPHOCYTES # BLD AUTO: 2.6 10*3/MM3 (ref 0.7–3.1)
LYMPHOCYTES NFR BLD AUTO: 53.4 % (ref 19.6–45.3)
MAGNESIUM SERPL-MCNC: 1.8 MG/DL (ref 1.6–2.4)
MCH RBC QN AUTO: 25.6 PG (ref 26.6–33)
MCHC RBC AUTO-ENTMCNC: 31.4 G/DL (ref 31.5–35.7)
MCV RBC AUTO: 81.4 FL (ref 79–97)
MONOCYTES # BLD AUTO: 0.42 10*3/MM3 (ref 0.1–0.9)
MONOCYTES NFR BLD AUTO: 8.6 % (ref 5–12)
NEUTROPHILS NFR BLD AUTO: 1.68 10*3/MM3 (ref 1.7–7)
NEUTROPHILS NFR BLD AUTO: 34.5 % (ref 42.7–76)
NRBC BLD AUTO-RTO: 0 /100 WBC (ref 0–0.2)
PHOSPHATE SERPL-MCNC: 2.8 MG/DL (ref 2.5–4.5)
PLATELET # BLD AUTO: 227 10*3/MM3 (ref 140–450)
PMV BLD AUTO: 9.7 FL (ref 6–12)
POTASSIUM SERPL-SCNC: 4.2 MMOL/L (ref 3.5–5.2)
PROT SERPL-MCNC: 6.4 G/DL (ref 6–8.5)
RBC # BLD AUTO: 4.73 10*6/MM3 (ref 4.14–5.8)
SODIUM SERPL-SCNC: 137 MMOL/L (ref 136–145)
VANCOMYCIN SERPL-MCNC: 16.64 MCG/ML (ref 5–40)
WBC NRBC COR # BLD AUTO: 4.87 10*3/MM3 (ref 3.4–10.8)

## 2025-01-10 PROCEDURE — 80202 ASSAY OF VANCOMYCIN: CPT | Performed by: INTERNAL MEDICINE

## 2025-01-10 PROCEDURE — 25010000002 ENOXAPARIN PER 10 MG: Performed by: STUDENT IN AN ORGANIZED HEALTH CARE EDUCATION/TRAINING PROGRAM

## 2025-01-10 PROCEDURE — 25010000002 VANCOMYCIN 1 G RECONSTITUTED SOLUTION 1 EACH VIAL: Performed by: INTERNAL MEDICINE

## 2025-01-10 PROCEDURE — 80053 COMPREHEN METABOLIC PANEL: CPT | Performed by: INTERNAL MEDICINE

## 2025-01-10 PROCEDURE — 84100 ASSAY OF PHOSPHORUS: CPT | Performed by: INTERNAL MEDICINE

## 2025-01-10 PROCEDURE — 25010000002 CEFTRIAXONE PER 250 MG: Performed by: PHYSICIAN ASSISTANT

## 2025-01-10 PROCEDURE — 25810000003 SODIUM CHLORIDE 0.9 % SOLUTION 250 ML FLEX CONT: Performed by: INTERNAL MEDICINE

## 2025-01-10 PROCEDURE — 85025 COMPLETE CBC W/AUTO DIFF WBC: CPT | Performed by: STUDENT IN AN ORGANIZED HEALTH CARE EDUCATION/TRAINING PROGRAM

## 2025-01-10 PROCEDURE — 82948 REAGENT STRIP/BLOOD GLUCOSE: CPT

## 2025-01-10 PROCEDURE — 25010000002 VANCOMYCIN 5 G RECONSTITUTED SOLUTION: Performed by: INTERNAL MEDICINE

## 2025-01-10 PROCEDURE — 25810000003 SODIUM CHLORIDE 0.9 % SOLUTION: Performed by: INTERNAL MEDICINE

## 2025-01-10 PROCEDURE — 99232 SBSQ HOSP IP/OBS MODERATE 35: CPT | Performed by: INTERNAL MEDICINE

## 2025-01-10 PROCEDURE — 63710000001 INSULIN GLARGINE PER 5 UNITS: Performed by: STUDENT IN AN ORGANIZED HEALTH CARE EDUCATION/TRAINING PROGRAM

## 2025-01-10 PROCEDURE — 63710000001 INSULIN LISPRO (HUMAN) PER 5 UNITS: Performed by: STUDENT IN AN ORGANIZED HEALTH CARE EDUCATION/TRAINING PROGRAM

## 2025-01-10 PROCEDURE — 83735 ASSAY OF MAGNESIUM: CPT | Performed by: STUDENT IN AN ORGANIZED HEALTH CARE EDUCATION/TRAINING PROGRAM

## 2025-01-10 RX ORDER — LORAZEPAM 2 MG/ML
1 INJECTION INTRAMUSCULAR ONCE AS NEEDED
Status: COMPLETED | OUTPATIENT
Start: 2025-01-10 | End: 2025-01-12

## 2025-01-10 RX ADMIN — VANCOMYCIN HYDROCHLORIDE 1000 MG: 1 INJECTION, POWDER, LYOPHILIZED, FOR SOLUTION INTRAVENOUS at 04:27

## 2025-01-10 RX ADMIN — INSULIN LISPRO 3 UNITS: 100 INJECTION, SOLUTION INTRAVENOUS; SUBCUTANEOUS at 18:25

## 2025-01-10 RX ADMIN — ENOXAPARIN SODIUM 40 MG: 100 INJECTION SUBCUTANEOUS at 09:35

## 2025-01-10 RX ADMIN — INSULIN GLARGINE 10 UNITS: 100 INJECTION, SOLUTION SUBCUTANEOUS at 09:36

## 2025-01-10 RX ADMIN — Medication 10 ML: at 09:38

## 2025-01-10 RX ADMIN — VANCOMYCIN HYDROCHLORIDE 1250 MG: 5 INJECTION, POWDER, LYOPHILIZED, FOR SOLUTION INTRAVENOUS at 14:18

## 2025-01-10 RX ADMIN — INSULIN LISPRO 4 UNITS: 100 INJECTION, SOLUTION INTRAVENOUS; SUBCUTANEOUS at 20:18

## 2025-01-10 RX ADMIN — Medication 10 ML: at 20:20

## 2025-01-10 RX ADMIN — ACETAMINOPHEN 650 MG: 325 TABLET ORAL at 09:37

## 2025-01-10 RX ADMIN — ASPIRIN 81 MG: 81 TABLET, CHEWABLE ORAL at 09:35

## 2025-01-10 RX ADMIN — SODIUM CHLORIDE 2000 MG: 9 INJECTION INTRAMUSCULAR; INTRAVENOUS; SUBCUTANEOUS at 23:44

## 2025-01-10 RX ADMIN — HYDROCORTISONE 1 APPLICATION: 1 OINTMENT TOPICAL at 18:20

## 2025-01-10 RX ADMIN — INSULIN LISPRO 2 UNITS: 100 INJECTION, SOLUTION INTRAVENOUS; SUBCUTANEOUS at 09:36

## 2025-01-10 NOTE — PROGRESS NOTES
[facility] Clinical Pharmacy Services: Vancomycin Pharmacokinetic Initial Consult Note    Brian Mehta is a 69 y.o. male who is on day 2 of pharmacy to dose vancomycin for Bacteremia.    Consult Information  Consulting Provider: Demetri  Planned Duration of Therapy: 14 days  Was Patient Receiving Prior to Admission/Consult?: No  Loading Dose Ordered or Given: 2000 mg ordered on    PK/PD Target: -600 mg/L.hr   Relevant ID History:   10/26 Urine culture >100k CFUs e coli   Urine culture >100k CFUs e coli  3/20 Urine culture >100k CFUs proteus hauseri  3/10 Urine culture >100k CFUs e coli    Other Antimicrobials: Ceftriaxone    Imaging Reviewed?: Yes    Microbiology Data  MRSA PCR performed: No; Result: Not ordered due to excluded indication or presence of suspected abscess  Culture/Source:    Urine culture- No growth   Blood culture- Gram pos cocci in pairs and chains   Blood- NGTD   Blood- Gram pos cocci in pairs, chains and clusters.  Gram neg bacilli.    Vitals/Labs  Ht:  ; Wt: 94.1 kg (207 lb 7.3 oz)  Temp (24hrs), Av.4 °F (36.9 °C), Min:97.9 °F (36.6 °C), Max:99 °F (37.2 °C)   Estimated Creatinine Clearance: 120.8 mL/min (by C-G formula based on SCr of 0.79 mg/dL).     Results from last 7 days   Lab Units 01/10/25  1104 01/10/25  0347 25  0444 25  1256   VANCOMYCIN RM mcg/mL 16.64  --   --   --    CREATININE mg/dL  --  0.79 0.89 0.88   WBC 10*3/mm3  --  4.87 5.37 5.02     Assessment/Plan:  Vancomycin level this AM reported as 16.64.  Will adjust vancomycin to 1250 mg every 12 hours per [employer] recommendation.     Vancomycin Dose:  1250 mg IV every 12 hours; which provides the following predicted parameters:    Regimen: 1250 mg IV every 12 hours.  Start time: 16:27 on 01/10/2025  Exposure target: AUC24 (range)400-600 mg/L.hr   AUC24,ss: 526 mg/L.hr  Probability of AUC24 > 400: 92 %  Ctrough,ss: 16.7 mg/L  Probability of Ctrough,ss > 20: 28 %  Probability of  nephrotoxicity (Lodise TERRELL 2009): 12 %    Patient has order for Basic Metabolic Panel    Pharmacy will follow patient's kidney function and will adjust doses and obtain levels as necessary. Thank you for involving pharmacy in this patient's care. Please contact pharmacy with any questions or concerns.                           Duane To  Clinical Pharmacist

## 2025-01-10 NOTE — PROGRESS NOTES
[facility]   Hospitalist Progress Note  Date: 1/10/2025  Patient Name: Brian Mehta  : 1955  MRN: [id number]  Date of admission: 2025  Room/Bed: 426/1      Subjective   Subjective     Chief Complaint:   Generalized weakness    Summary:  Brian Mehta is a 69 y.o. male with medical history of hypertension, hyperlipidemia, CHF, COPD, type 2 diabetes, tobacco use and history of CVA with residual right-sided weakness presented to the ER with weakness and some hip pain.  Patient is a poor historian and history obtained by discussion with ER staff.  Appears patient for the last couple days have some right hip pain after a fall.  It has been accompanied by inability care for himself and generalized weakness, prompting a call to EMS to bring him in for evaluation.  Upon arrival they found his house to be in a poor unkempt state as well as the patient.  He was found on the ground with complaints of pain and was brought to the ER for further evaluation     Upon arrival he was tachycardic and afebrile otherwise stable.  Lab workup revealed pyuria with positive nitrites and bacteriuria and low magnesium.  Patient had x-ray of the pelvis negative for any fracture or dislocation and x-ray chest was also unremarkable.  Patient was given Rocephin for UTI and the hospitalist then contacted for admission of the patient was unable to ambulate well in the ER and unable to care for himself.  Social work is involved due to patient having poor living situation at home when EMS felt his house was unkempt and he was unable to care for himself.  Patient's blood cultures returned positive, gram-positive cocci in pairs and chains, gram-negative cocci, 2 out of 2, gram-negative bacilli 1 out of 2.  Patient has vancomycin added to ceftriaxone.  Imaging has been ordered of entire spine given his generalized weakness as well as abdominal CT.    Interval Followup:   Still imaging has not been obtained at this time, discussed with  nursing staff, this should be obtained hopefully today.  No other acute issues overnight.  Repeat blood cultures remain no growth to date.  Still awaiting speciation on original blood culture      Objective   Objective     Vitals:   Temp:  [97.9 °F (36.6 °C)-98.6 °F (37 °C)] 98.1 °F (36.7 °C)  Heart Rate:  [88-97] 88  Resp:  [18-20] 18  BP: (120-146)/(72-81) 120/79    Physical Exam               Constitutional: Awake, alert, no acute distress, disheveled              Eyes: Pupils equal, sclerae anicteric, no conjunctival injection              HENT: NCAT, mucous membranes dry              Neck: Supple, no thyromegaly, no lymphadenopathy, trachea midline              Respiratory: Clear to auscultation bilaterally              Cardiovascular: RRR, no murmurs, rubs, or gallops, palpable pedal pulses bilaterally              Gastrointestinal: Positive bowel sounds, soft, nontender, nondistended              Musculoskeletal: No bilateral ankle edema, no clubbing or cyanosis to extremities.  No pain with palpation of spine              Neurologic: Oriented x 3, mild residual weakness on the right side 4 out of 5 strength.  Normal reflexes in bilateral lower extremities.  Normal sensation in bilateral lower extremities      Result Review    Result Review:  I have personally reviewed these results:  [x]  Laboratory      Lab 01/10/25  0347 01/09/25  0444 01/08/25  1256 01/08/25  0147 01/07/25  2156   WBC 4.87 5.37 5.02  --  5.47   HEMOGLOBIN 12.1* 13.0 13.1  --  15.4   HEMATOCRIT 38.5 40.1 39.4  --  47.1   PLATELETS 227 230 208  --  248   NEUTROS ABS 1.68* 2.14  --   --  3.10   IMMATURE GRANS (ABS) 0.01 0.01  --   --  0.02   LYMPHS ABS 2.60 2.56  --   --  1.75   MONOS ABS 0.42 0.52  --   --  0.53   EOS ABS 0.13 0.10  --   --  0.05   MCV 81.4 80.0 78.3*  --  78.9*   LACTATE  --   --   --  1.1  --          Lab 01/10/25  0347 01/09/25  0444 01/08/25  1256 01/07/25  2156   SODIUM 137 136 134* 130*   POTASSIUM 4.2 4.0 3.9 4.4    CHLORIDE 104 102 100 98   CO2 24.5 25.2 26.4 22.6   ANION GAP 8.5 8.8 7.6 9.4   BUN 15 17 17 14   CREATININE 0.79 0.89 0.88 0.77   EGFR 97.4 93.9 94.2 98.1   GLUCOSE 163* 136* 243* 264*   CALCIUM 8.6 8.5* 8.7 8.7   MAGNESIUM 1.8 1.9 2.3 1.5*   PHOSPHORUS 2.8 3.0  --   --    HEMOGLOBIN A1C  --   --   --  8.80*         Lab 01/10/25  0347 01/09/25  0444 01/07/25  2156   TOTAL PROTEIN 6.4 6.6 7.4   ALBUMIN 2.7* 2.6* 2.9*   GLOBULIN 3.7 4.0 4.5   ALT (SGPT) <5 <5 5   AST (SGOT) 9 11 10   BILIRUBIN 0.3 0.3 0.5   ALK PHOS 88 87 100         Lab 01/07/25  2156   HSTROP T 9                 Brief Urine Lab Results  (Last result in the past 365 days)        Color   Clarity   Blood   Leuk Est   Nitrite   Protein   CREAT   Urine HCG        01/09/25 1428 Yellow   Cloudy   Small (1+)   Large (3+)   Positive   30 mg/dL (1+)                 [x]  Microbiology   Microbiology Results (last 10 days)       Procedure Component Value - Date/Time    Urine Culture - Urine, Urine, Clean Catch [[id number]]  (Normal) Collected: 01/09/25 1428    Lab Status: Final result Specimen: Urine, Clean Catch Updated: 01/10/25 0936     Urine Culture No growth    Blood Culture - Blood, Arm, Right [[id number]]  (Normal) Collected: 01/08/25 2303    Lab Status: Preliminary result Specimen: Blood from Arm, Right Updated: 01/09/25 2331     Blood Culture No growth at 24 hours    Narrative:      Less than seven (7) mL's of blood was collected.  Insufficient quantity may yield false negative results.    Blood Culture - Blood, Arm, Left [[id number]]  (Normal) Collected: 01/08/25 2303    Lab Status: Preliminary result Specimen: Blood from Arm, Left Updated: 01/09/25 2331     Blood Culture No growth at 24 hours    Narrative:      Less than seven (7) mL's of blood was collected.  Insufficient quantity may yield false negative results.    Blood Culture - Blood, Arm, Right [[id number]]  (Abnormal) Collected: 01/08/25 0147    Lab Status: Preliminary result Specimen: Blood  from Arm, Right Updated: 01/09/25 1049     Blood Culture Abnormal Stain     Gram Stain Aerobic Bottle Gram positive cocci in pairs, chains and clusters      Aerobic Bottle Gram negative bacilli      Anaerobic Bottle Gram positive cocci in pairs, chains and clusters    Narrative:      Less than seven (7) mL's of blood was collected.  Insufficient quantity may yield false negative results.    Blood Culture - Blood, Arm, Right [[id number]]  (Abnormal) Collected: 01/08/25 0147    Lab Status: Preliminary result Specimen: Blood from Arm, Right Updated: 01/10/25 0644     Blood Culture Gram Positive Cocci     Isolated from Aerobic Bottle     Gram Stain Aerobic Bottle Gram positive cocci in pairs and chains      Aerobic Bottle Gram positive cocci in clusters    Narrative:      Less than seven (7) mL's of blood was collected.  Insufficient quantity may yield false negative results.    Blood Culture ID, PCR - Blood, Arm, Right [[id number]] Collected: 01/08/25 0147    Lab Status: Final result Specimen: Blood from Arm, Right Updated: 01/08/25 2210     BCID, PCR Negative by BCID PCR. Culture to Follow.     BOTTLE TYPE Aerobic Bottle          [x]  Radiology  XR Hip With or Without Pelvis 2 - 3 View Right    Result Date: 1/7/2025  1.No acute fracture or dislocation. 2.Degenerative changes in both hips. 3.Fractured distal left ureteral stent, unchanged. Electronically Signed: Druga Zurita MD  1/7/2025 11:48 PM EST  Workstation ID: [id number]    XR Chest 1 View    Result Date: 1/7/2025  Impression: No acute cardiopulmonary disease. Electronically Signed: Jm Bran MD  1/7/2025 10:11 PM EST  Workstation ID: [id number]   []  EKG/Telemetry   []  Cardiology/Vascular   []  Pathology  []  Old records  []  Other:    Assessment & Plan   Assessment / Plan     Assessment:  Bacteremia  Urinary tract infection  Type 2 diabetes  Hypertension  History of CVA with residual right-sided deficits  Heart failure, low ejection fraction  COPD not acute  exacerbation  Active tobacco abuse  Generalized weakness    Plan:  Patient remains admitted to hospital for further care and management  Patient's blood cultures have returned positive for gram-positive and gram-negative.  In addition to ceftriaxone we will add vancomycin  Therapeutic drug monitoring while on vancomycin  Additional imaging has been ordered given these positive blood cultures, given patient's generalized weakness will order MRI full spine  Also ordering CT of abdomen given patient's retained fractured distal left ureteral stent, unchanged in plain film of hip obtained on admission  Awaiting results of imaging  Renal function closely given contrast load with imaging  PT OT, wound care.  Case management consulted.  Continue to work on disposition, patient most interested in discharging home once medical workup complete.  Patient would likely be most appropriate for discharging to rehab facility, will continue to discuss with patient daily     Discussed with RN.  Discussed with case management    VTE Prophylaxis:  Pharmacologic VTE prophylaxis orders are present.        CODE STATUS:   Code Status (Patient has no pulse and is not breathing): CPR (Attempt to Resuscitate)  Medical Interventions (Patient has pulse or is breathing): Full Support      Electronically signed by Arcenio Mosquera MD, 1/10/2025, 15:52 EST.

## 2025-01-10 NOTE — SIGNIFICANT NOTE
01/10/25 0934   OTHER   Discipline occupational therapist   Rehab Time/Intention   Session Not Performed patient unavailable for treatment  (with nurse)

## 2025-01-10 NOTE — PLAN OF CARE
Goal Outcome Evaluation:  Plan of Care Reviewed With: patient           Outcome Evaluation: Patient has been more amenable to care for this nurse. Skin barrier cream applied after incontinence care. Sleep promoted. MRI screen completed and report called. Antibiotics given per MAR.

## 2025-01-10 NOTE — OUTREACH NOTE
AMBULATORY CASE MANAGEMENT NOTE    Names and Relationships of Patient/Support Persons: Contact: FERDINAND CATALAN; Relationship: Emergency Contact -     [facility] Interim Update    Received call from patient spouse with update. She wanted to update me about patient being in the hospital. She states that the plan is for him to go to rehab upon discharge. She states that she is home without heat and does not want him coming home to a cold house.    Care Coordination    I have sent MSW a message to inquire on warming centers. Unfortunately patient cannot drive and does not have a ride so she states it would have to be within walking distance.    Unfortunately, no warming centers within walking distance were identified. Patient does have space heaters for some heating of the home.        Education Documentation  No documentation found.        YANDEL GONZALEZ  Ambulatory Case Management    1/10/2025, 13:59 EST

## 2025-01-11 LAB
ANION GAP SERPL CALCULATED.3IONS-SCNC: 7.3 MMOL/L (ref 5–15)
BASOPHILS # BLD AUTO: 0.04 10*3/MM3 (ref 0–0.2)
BASOPHILS NFR BLD AUTO: 0.8 % (ref 0–1.5)
BUN SERPL-MCNC: 11 MG/DL (ref 8–23)
BUN/CREAT SERPL: 15.1 (ref 7–25)
CALCIUM SPEC-SCNC: 8.5 MG/DL (ref 8.6–10.5)
CHLORIDE SERPL-SCNC: 104 MMOL/L (ref 98–107)
CO2 SERPL-SCNC: 23.7 MMOL/L (ref 22–29)
CREAT SERPL-MCNC: 0.73 MG/DL (ref 0.76–1.27)
DEPRECATED RDW RBC AUTO: 42.5 FL (ref 37–54)
EGFRCR SERPLBLD CKD-EPI 2021: 99.7 ML/MIN/1.73
EOSINOPHIL # BLD AUTO: 0.12 10*3/MM3 (ref 0–0.4)
EOSINOPHIL NFR BLD AUTO: 2.4 % (ref 0.3–6.2)
ERYTHROCYTE [DISTWIDTH] IN BLOOD BY AUTOMATED COUNT: 14.7 % (ref 12.3–15.4)
GLUCOSE BLDC GLUCOMTR-MCNC: 136 MG/DL (ref 70–99)
GLUCOSE BLDC GLUCOMTR-MCNC: 139 MG/DL (ref 70–99)
GLUCOSE BLDC GLUCOMTR-MCNC: 182 MG/DL (ref 70–99)
GLUCOSE BLDC GLUCOMTR-MCNC: 305 MG/DL (ref 70–99)
GLUCOSE SERPL-MCNC: 124 MG/DL (ref 65–99)
HCT VFR BLD AUTO: 38.9 % (ref 37.5–51)
HGB BLD-MCNC: 12.3 G/DL (ref 13–17.7)
IMM GRANULOCYTES # BLD AUTO: 0.01 10*3/MM3 (ref 0–0.05)
IMM GRANULOCYTES NFR BLD AUTO: 0.2 % (ref 0–0.5)
LYMPHOCYTES # BLD AUTO: 2.73 10*3/MM3 (ref 0.7–3.1)
LYMPHOCYTES NFR BLD AUTO: 54.5 % (ref 19.6–45.3)
MAGNESIUM SERPL-MCNC: 1.8 MG/DL (ref 1.6–2.4)
MCH RBC QN AUTO: 25.5 PG (ref 26.6–33)
MCHC RBC AUTO-ENTMCNC: 31.6 G/DL (ref 31.5–35.7)
MCV RBC AUTO: 80.5 FL (ref 79–97)
MONOCYTES # BLD AUTO: 0.37 10*3/MM3 (ref 0.1–0.9)
MONOCYTES NFR BLD AUTO: 7.4 % (ref 5–12)
NEUTROPHILS NFR BLD AUTO: 1.74 10*3/MM3 (ref 1.7–7)
NEUTROPHILS NFR BLD AUTO: 34.7 % (ref 42.7–76)
NRBC BLD AUTO-RTO: 0 /100 WBC (ref 0–0.2)
PHOSPHATE SERPL-MCNC: 3 MG/DL (ref 2.5–4.5)
PLATELET # BLD AUTO: 244 10*3/MM3 (ref 140–450)
PMV BLD AUTO: 9.4 FL (ref 6–12)
POTASSIUM SERPL-SCNC: 4 MMOL/L (ref 3.5–5.2)
RBC # BLD AUTO: 4.83 10*6/MM3 (ref 4.14–5.8)
SODIUM SERPL-SCNC: 135 MMOL/L (ref 136–145)
WBC NRBC COR # BLD AUTO: 5.01 10*3/MM3 (ref 3.4–10.8)

## 2025-01-11 PROCEDURE — 63710000001 INSULIN LISPRO (HUMAN) PER 5 UNITS: Performed by: STUDENT IN AN ORGANIZED HEALTH CARE EDUCATION/TRAINING PROGRAM

## 2025-01-11 PROCEDURE — 85025 COMPLETE CBC W/AUTO DIFF WBC: CPT | Performed by: INTERNAL MEDICINE

## 2025-01-11 PROCEDURE — 83735 ASSAY OF MAGNESIUM: CPT | Performed by: STUDENT IN AN ORGANIZED HEALTH CARE EDUCATION/TRAINING PROGRAM

## 2025-01-11 PROCEDURE — 25010000002 ENOXAPARIN PER 10 MG: Performed by: STUDENT IN AN ORGANIZED HEALTH CARE EDUCATION/TRAINING PROGRAM

## 2025-01-11 PROCEDURE — 84100 ASSAY OF PHOSPHORUS: CPT | Performed by: INTERNAL MEDICINE

## 2025-01-11 PROCEDURE — 80048 BASIC METABOLIC PNL TOTAL CA: CPT | Performed by: INTERNAL MEDICINE

## 2025-01-11 PROCEDURE — 99232 SBSQ HOSP IP/OBS MODERATE 35: CPT | Performed by: INTERNAL MEDICINE

## 2025-01-11 PROCEDURE — 25010000002 VANCOMYCIN 5 G RECONSTITUTED SOLUTION: Performed by: INTERNAL MEDICINE

## 2025-01-11 PROCEDURE — 82948 REAGENT STRIP/BLOOD GLUCOSE: CPT | Performed by: STUDENT IN AN ORGANIZED HEALTH CARE EDUCATION/TRAINING PROGRAM

## 2025-01-11 PROCEDURE — 25810000003 SODIUM CHLORIDE 0.9 % SOLUTION: Performed by: INTERNAL MEDICINE

## 2025-01-11 PROCEDURE — 63710000001 INSULIN GLARGINE PER 5 UNITS: Performed by: STUDENT IN AN ORGANIZED HEALTH CARE EDUCATION/TRAINING PROGRAM

## 2025-01-11 PROCEDURE — 82948 REAGENT STRIP/BLOOD GLUCOSE: CPT

## 2025-01-11 RX ORDER — HYDROCODONE BITARTRATE AND ACETAMINOPHEN 5; 325 MG/1; MG/1
1 TABLET ORAL ONCE AS NEEDED
Status: COMPLETED | OUTPATIENT
Start: 2025-01-11 | End: 2025-01-11

## 2025-01-11 RX ADMIN — DICLOFENAC SODIUM 2 G: 10 GEL TOPICAL at 12:32

## 2025-01-11 RX ADMIN — DICLOFENAC SODIUM 2 G: 10 GEL TOPICAL at 08:40

## 2025-01-11 RX ADMIN — Medication 1 APPLICATION: at 10:11

## 2025-01-11 RX ADMIN — VANCOMYCIN HYDROCHLORIDE 1250 MG: 5 INJECTION, POWDER, LYOPHILIZED, FOR SOLUTION INTRAVENOUS at 01:59

## 2025-01-11 RX ADMIN — INSULIN LISPRO 5 UNITS: 100 INJECTION, SOLUTION INTRAVENOUS; SUBCUTANEOUS at 20:37

## 2025-01-11 RX ADMIN — ENOXAPARIN SODIUM 40 MG: 100 INJECTION SUBCUTANEOUS at 08:36

## 2025-01-11 RX ADMIN — ASPIRIN 81 MG: 81 TABLET, CHEWABLE ORAL at 08:37

## 2025-01-11 RX ADMIN — HYDROCODONE BITARTRATE AND ACETAMINOPHEN 1 TABLET: 5; 325 TABLET ORAL at 20:08

## 2025-01-11 RX ADMIN — VANCOMYCIN HYDROCHLORIDE 1250 MG: 5 INJECTION, POWDER, LYOPHILIZED, FOR SOLUTION INTRAVENOUS at 15:03

## 2025-01-11 RX ADMIN — HYDROCORTISONE 1 APPLICATION: 1 OINTMENT TOPICAL at 10:11

## 2025-01-11 RX ADMIN — INSULIN GLARGINE 10 UNITS: 100 INJECTION, SOLUTION SUBCUTANEOUS at 08:36

## 2025-01-11 RX ADMIN — HYDROCORTISONE 1 APPLICATION: 1 OINTMENT TOPICAL at 17:57

## 2025-01-11 RX ADMIN — DICLOFENAC SODIUM 2 G: 10 GEL TOPICAL at 17:57

## 2025-01-11 RX ADMIN — Medication 10 ML: at 08:10

## 2025-01-11 RX ADMIN — Medication 10 ML: at 20:38

## 2025-01-11 NOTE — PLAN OF CARE
Goal Outcome Evaluation:   VSS. SR-ST. Lungs diminished. Patient refused to do MRI yesterday , but by the end of the day today he states that he will do MRI. Phone call to MRI and spoke tech. They will not be able to do MRI until tomorrow.  This RN spoke with patient and he states that he will do MRI tomorrow. Continue with current plan of care.

## 2025-01-11 NOTE — PAYOR COMM NOTE
Facility-Administered Medications as of 1/11/2025   Medication Dose Route Frequency Provider Last Rate Last Admin   • acetaminophen (TYLENOL) tablet 650 mg  650 mg Oral Q6H PRN Pio Vital MD   650 mg at 01/10/25 0937   • aluminum-magnesium hydroxide-simethicone (MAALOX MAX) 400-400-40 MG/5ML suspension 15 mL  15 mL Oral Q6H PRN Pio Vital MD       • aspirin chewable tablet 81 mg  81 mg Oral Daily Arcenio Mosquera MD   81 mg at 01/11/25 0837   • sennosides-docusate (PERICOLACE) 8.6-50 MG per tablet 2 tablet  2 tablet Oral BID PRN Pio Vital MD   2 tablet at 01/09/25 1345    And   • polyethylene glycol (MIRALAX) packet 17 g  17 g Oral Daily PRN Pio Vital MD        And   • bisacodyl (DULCOLAX) EC tablet 5 mg  5 mg Oral Daily PRN Pio Vital MD        And   • bisacodyl (DULCOLAX) suppository 10 mg  10 mg Rectal Daily PRN Pio Vital MD       • [COMPLETED] cefTRIAXone (ROCEPHIN) in NS 1 gram/10ml IV PUSH syringe  1,000 mg Intravenous Once Fadi Bowers PA-C   1,000 mg at 01/08/25 0152   • cefTRIAXone (ROCEPHIN) in NS 2 GRAMS/20ml IV PUSH syringe  2,000 mg Intravenous Q24H Enriqueta Gastelum PA   2,000 mg at 01/10/25 2344   • dextrose (D50W) (25 g/50 mL) IV injection 25 g  25 g Intravenous Q15 Min PRN Pio Vital MD       • dextrose (GLUTOSE) oral gel 15 g  15 g Oral Q15 Min PRN Pio Vital MD       • Diclofenac Sodium (VOLTAREN) 1 % gel 2 g  2 g Topical 4x Daily Arcenio Mosquera MD   2 g at 01/11/25 0840   • dimethicone (AVEENO) 1.3 % lotion 1 Application  1 Application Topical Daily Arcenio Mosquera MD   1 Application at 01/11/25 1011   • Enoxaparin Sodium (LOVENOX) syringe 40 mg  40 mg Subcutaneous Daily Pio Vital MD   40 mg at 01/11/25 0836   • glucagon (GLUCAGEN) injection 1 mg  1 mg Intramuscular Q15 Min PRN Pio Vital MD       • hydrocortisone-bacitracin-zinc oxide-nystatin (MAGIC BARRIER) ointment 1 Application  1 Application Topical 3 times per day Arcenio Mosquera MD   1 Application at 01/11/25  1011   • influenza vac split high-dose (FLUZONE HIGH DOSE) injection 0.5 mL  0.5 mL Intramuscular During Hospitalization Arcenio Mosquera MD       • insulin glargine (LANTUS, SEMGLEE) injection 10 Units  10 Units Subcutaneous Daily Pio Vital MD   10 Units at 25 0836   • Insulin Lispro (humaLOG) injection 2-7 Units  2-7 Units Subcutaneous 4x Daily AC & at Bedtime Pio Vital MD   4 Units at 01/10/25 2018   • [] lactated ringers infusion  100 mL/hr Intravenous Continuous Pio Vital MD   Stopped at 25 2146   • LORazepam (ATIVAN) injection 1 mg  1 mg Intravenous Once PRN Jamieson, PA       • [COMPLETED] magnesium sulfate 2g/50 mL (PREMIX) infusion  2 g Intravenous Once Pio Vital MD   2 g at 25 0954   • melatonin tablet 5 mg  5 mg Oral Nightly PRN Pio Vital MD       • nicotine (NICODERM CQ) 21 MG/24HR patch 1 patch  1 patch Transdermal Q24H Jamieson, PA   1 patch at 25 2213   • Pharmacy to dose vancomycin   Not Applicable Continuous PRN Arcenio Mosquera MD       • sodium chloride 0.9 % flush 10 mL  10 mL Intravenous PRN Pio Vital MD       • sodium chloride 0.9 % flush 10 mL  10 mL Intravenous Q12H Pio Vital MD   10 mL at 25 0810   • sodium chloride 0.9 % flush 10 mL  10 mL Intravenous PRN Pio Vital MD       • sodium chloride 0.9 % infusion 40 mL  40 mL Intravenous PRN Pio Vital MD       • vancomycin 1250 mg/250 mL 0.9% NS IVPB ([facility])  1,250 mg Intravenous Q12H Arcenio Mosquera MD   1,250 mg at 25 0159   • [COMPLETED] vancomycin IVPB 2000 mg in 0.9% Sodium Chloride 500 mL  2,000 mg Intravenous Once Arcenio Mosquera  mL/hr at 25 1021 2,000 mg at 25 1021     Lab Results (last 48 hours)       Procedure Component Value Units Date/Time    POC Glucose Once [[id number]]  (Abnormal) Collected: 25    Specimen: Blood Updated: 25     Glucose 139 mg/dL      Comment: Serial Number: [id number]:  [id number]       Blood  Culture - Blood, Arm, Right [[id number]]  (Abnormal) Collected: 01/08/25 0147    Specimen: Blood from Arm, Right Updated: 01/11/25 0652     Blood Culture Gram Positive Cocci     Isolated from Aerobic Bottle     Blood Culture Gram Negative Bacilli     Isolated from Aerobic Bottle     Blood Culture Staphylococcus, coagulase negative     Isolated from Aerobic Bottle     Blood Culture Gram Positive Cocci     Isolated from Anaerobic Bottle     Gram Stain Aerobic Bottle Gram positive cocci in pairs, chains and clusters      Aerobic Bottle Gram negative bacilli      Anaerobic Bottle Gram positive cocci in pairs, chains and clusters    Narrative:      Less than seven (7) mL's of blood was collected.  Insufficient quantity may yield false negative results.    Magnesium [[id number]]  (Normal) Collected: 01/11/25 0530    Specimen: Blood Updated: 01/11/25 0640     Magnesium 1.8 mg/dL     Basic Metabolic Panel [[id number]]  (Abnormal) Collected: 01/11/25 0530    Specimen: Blood Updated: 01/11/25 0640     Glucose 124 mg/dL      BUN 11 mg/dL      Creatinine 0.73 mg/dL      Sodium 135 mmol/L      Potassium 4.0 mmol/L      Chloride 104 mmol/L      CO2 23.7 mmol/L      Calcium 8.5 mg/dL      BUN/Creatinine Ratio 15.1     Anion Gap 7.3 mmol/L      eGFR 99.7 mL/min/1.73     Narrative:      GFR Categories in Chronic Kidney Disease (CKD)      GFR Category          GFR (mL/min/1.73)    Interpretation  G1                     90 or greater         Normal or high (1)  G2                      60-89                Mild decrease (1)  G3a                   45-59                Mild to moderate decrease  G3b                   30-44                Moderate to severe decrease  G4                    15-29                Severe decrease  G5                    14 or less           Kidney failure          (1)In the absence of evidence of kidney disease, neither GFR category G1 or G2 fulfill the criteria for CKD.    eGFR calculation 2021 CKD-EPI creatinine  equation, which does not include race as a factor    Phosphorus [[id number]]  (Normal) Collected: 01/11/25 0530    Specimen: Blood Updated: 01/11/25 0640     Phosphorus 3.0 mg/dL     Blood Culture - Blood, Arm, Right [[id number]]  (Abnormal) Collected: 01/08/25 0147    Specimen: Blood from Arm, Right Updated: 01/11/25 0631     Blood Culture Aerococcus viridans     Comment:   Aerococcus viridans is usually susceptible to vancomycin. Resistance has been reported to the fluoroquinolones and beta-lactams. Please call lab to request further workup.        Isolated from Aerobic Bottle     Blood Culture Gram Negative Bacilli     Isolated from Aerobic Bottle     Blood Culture Staphylococcus, coagulase negative     Isolated from Aerobic Bottle     Gram Stain Aerobic Bottle Gram positive cocci in pairs and chains      Aerobic Bottle Gram positive cocci in clusters    Narrative:      Less than seven (7) mL's of blood was collected.  Insufficient quantity may yield false negative results.    CBC & Differential [[id number]]  (Abnormal) Collected: 01/11/25 0530    Specimen: Blood Updated: 01/11/25 0605    Narrative:      The following orders were created for panel order CBC & Differential.  Procedure                               Abnormality         Status                     ---------                               -----------         ------                     CBC Auto Differential[[id number]]        Abnormal            Final result                 Please view results for these tests on the individual orders.    CBC Auto Differential [[id number]]  (Abnormal) Collected: 01/11/25 0530    Specimen: Blood Updated: 01/11/25 0605     WBC 5.01 10*3/mm3      RBC 4.83 10*6/mm3      Hemoglobin 12.3 g/dL      Hematocrit 38.9 %      MCV 80.5 fL      MCH 25.5 pg      MCHC 31.6 g/dL      RDW 14.7 %      RDW-SD 42.5 fl      MPV 9.4 fL      Platelets 244 10*3/mm3      Neutrophil % 34.7 %      Lymphocyte % 54.5 %      Monocyte % 7.4 %      Eosinophil  % 2.4 %      Basophil % 0.8 %      Immature Grans % 0.2 %      Neutrophils, Absolute 1.74 10*3/mm3      Lymphocytes, Absolute 2.73 10*3/mm3      Monocytes, Absolute 0.37 10*3/mm3      Eosinophils, Absolute 0.12 10*3/mm3      Basophils, Absolute 0.04 10*3/mm3      Immature Grans, Absolute 0.01 10*3/mm3      nRBC 0.0 /100 WBC     Blood Culture - Blood, Arm, Right [[id number]]  (Normal) Collected: 01/08/25 2303    Specimen: Blood from Arm, Right Updated: 01/10/25 2316     Blood Culture No growth at 2 days    Narrative:      Less than seven (7) mL's of blood was collected.  Insufficient quantity may yield false negative results.    Blood Culture - Blood, Arm, Left [[id number]]  (Normal) Collected: 01/08/25 2303    Specimen: Blood from Arm, Left Updated: 01/10/25 2316     Blood Culture No growth at 2 days    Narrative:      Less than seven (7) mL's of blood was collected.  Insufficient quantity may yield false negative results.    POC Glucose Once [[id number]]  (Abnormal) Collected: 01/10/25 1811    Specimen: Blood Updated: 01/10/25 2011     Glucose 266 mg/dL      Comment: Serial Number: [id number]:  [id number]       POC Glucose Once [[id number]]  (Abnormal) Collected: 01/08/25 2204    Specimen: Blood Updated: 01/10/25 2010     Glucose 190 mg/dL      Comment: Serial Number: [id number]:  [id number]       POC Glucose Once [[id number]]  (Abnormal) Collected: 01/08/25 1217    Specimen: Blood Updated: 01/10/25 2009     Glucose 250 mg/dL      Comment: Serial Number: [id number]:  [id number]       POC Glucose Once [[id number]]  (Abnormal) Collected: 01/10/25 1954    Specimen: Blood Updated: 01/10/25 1956     Glucose 258 mg/dL      Comment: Serial Number: [id number]:  [id number]       POC Glucose Once [[id number]]  (Abnormal) Collected: 01/10/25 1213    Specimen: Blood Updated: 01/10/25 1215     Glucose 206 mg/dL      Comment: Serial Number: [id number]:  [id number]       Vancomycin, Random [[id number]]   (Normal) Collected: 01/10/25 1104    Specimen: Blood from Hand, Right Updated: 01/10/25 1141     Vancomycin Random 16.64 mcg/mL     Narrative:      Therapeutic Ranges for Vancomycin    Vancomycin Random   5.0-40.0 mcg/mL  Vancomycin Trough   5.0-20.0 mcg/mL  Vancomycin Peak     20.0-40.0 mcg/mL    Urine Culture - Urine, Urine, Clean Catch [[id number]]  (Normal) Collected: 01/09/25 1428    Specimen: Urine, Clean Catch Updated: 01/10/25 0936     Urine Culture No growth    Comprehensive Metabolic Panel [[id number]]  (Abnormal) Collected: 01/10/25 0347    Specimen: Blood Updated: 01/10/25 0438     Glucose 163 mg/dL      BUN 15 mg/dL      Creatinine 0.79 mg/dL      Sodium 137 mmol/L      Potassium 4.2 mmol/L      Chloride 104 mmol/L      CO2 24.5 mmol/L      Calcium 8.6 mg/dL      Total Protein 6.4 g/dL      Albumin 2.7 g/dL      ALT (SGPT) <5 U/L      AST (SGOT) 9 U/L      Alkaline Phosphatase 88 U/L      Total Bilirubin 0.3 mg/dL      Globulin 3.7 gm/dL      A/G Ratio 0.7 g/dL      BUN/Creatinine Ratio 19.0     Anion Gap 8.5 mmol/L      eGFR 97.4 mL/min/1.73     Narrative:      GFR Categories in Chronic Kidney Disease (CKD)      GFR Category          GFR (mL/min/1.73)    Interpretation  G1                     90 or greater         Normal or high (1)  G2                      60-89                Mild decrease (1)  G3a                   45-59                Mild to moderate decrease  G3b                   30-44                Moderate to severe decrease  G4                    15-29                Severe decrease  G5                    14 or less           Kidney failure          (1)In the absence of evidence of kidney disease, neither GFR category G1 or G2 fulfill the criteria for CKD.    eGFR calculation 2021 CKD-EPI creatinine equation, which does not include race as a factor    Magnesium [[id number]]  (Normal) Collected: 01/10/25 0347    Specimen: Blood Updated: 01/10/25 0431     Magnesium 1.8 mg/dL     Phosphorus  [[id number]]  (Normal) Collected: 01/10/25 0347    Specimen: Blood Updated: 01/10/25 0426     Phosphorus 2.8 mg/dL     CBC & Differential [[id number]]  (Abnormal) Collected: 01/10/25 0347    Specimen: Blood Updated: 01/10/25 0406    Narrative:      The following orders were created for panel order CBC & Differential.  Procedure                               Abnormality         Status                     ---------                               -----------         ------                     CBC Auto Differential[[id number]]        Abnormal            Final result                 Please view results for these tests on the individual orders.    CBC Auto Differential [[id number]]  (Abnormal) Collected: 01/10/25 0347    Specimen: Blood Updated: 01/10/25 0406     WBC 4.87 10*3/mm3      RBC 4.73 10*6/mm3      Hemoglobin 12.1 g/dL      Hematocrit 38.5 %      MCV 81.4 fL      MCH 25.6 pg      MCHC 31.4 g/dL      RDW 14.6 %      RDW-SD 42.9 fl      MPV 9.7 fL      Platelets 227 10*3/mm3      Neutrophil % 34.5 %      Lymphocyte % 53.4 %      Monocyte % 8.6 %      Eosinophil % 2.7 %      Basophil % 0.6 %      Immature Grans % 0.2 %      Neutrophils, Absolute 1.68 10*3/mm3      Lymphocytes, Absolute 2.60 10*3/mm3      Monocytes, Absolute 0.42 10*3/mm3      Eosinophils, Absolute 0.13 10*3/mm3      Basophils, Absolute 0.03 10*3/mm3      Immature Grans, Absolute 0.01 10*3/mm3      nRBC 0.0 /100 WBC     POC Glucose Once [[id number]]  (Abnormal) Collected: 01/09/25 2000    Specimen: Blood Updated: 01/09/25 2001     Glucose 240 mg/dL      Comment: Serial Number: [id number]:  [id number]       POC Glucose Once [[id number]]  (Abnormal) Collected: 01/09/25 1753    Specimen: Blood Updated: 01/09/25 1754     Glucose 291 mg/dL      Comment: Serial Number: [id number]:  [id number]       Urinalysis With Culture If Indicated - Urine, Random Void [[id number]]  (Abnormal) Collected: 01/09/25 1428    Specimen: Urine, Random Void Updated:  01/09/25 1441     Color, UA Yellow     Appearance, UA Cloudy     pH, UA 6.0     Specific Gravity, UA 1.022     Glucose,  mg/dL (2+)     Ketones, UA Negative     Bilirubin, UA Negative     Blood, UA Small (1+)     Protein, UA 30 mg/dL (1+)     Leuk Esterase, UA Large (3+)     Nitrite, UA Positive     Urobilinogen, UA 1.0 E.U./dL    Narrative:      In absence of clinical symptoms, the presence of pyuria, bacteria, and/or nitrites on the urinalysis result does not correlate with infection.    Urinalysis, Microscopic Only - Urine, Random Void [[id number]]  (Abnormal) Collected: 01/09/25 1428    Specimen: Urine, Random Void Updated: 01/09/25 1441     RBC, UA 11-20 /HPF      WBC, UA Too Numerous to Count /HPF      Bacteria, UA None Seen /HPF      Squamous Epithelial Cells, UA 0-2 /HPF      Hyaline Casts, UA 0-2 /LPF      Methodology Automated Microscopy    POC Glucose Once [[id number]]  (Abnormal) Collected: 01/09/25 1203    Specimen: Blood Updated: 01/09/25 1205     Glucose 269 mg/dL      Comment: Serial Number: [id number]:  [id number]             Imaging Results (Last 48 Hours)       ** No results found for the last 48 hours. **          Orders (active)        Start     Ordered    01/11/25 0600  CBC & Differential  Daily       01/10/25 1557    01/10/25 2100  Diclofenac Sodium (VOLTAREN) 1 % gel 2 g  4 Times Daily         01/10/25 1802    01/10/25 1300  vancomycin 1250 mg/250 mL 0.9% NS IVPB ([facility])  Every 12 Hours         01/10/25 1150    01/10/25 0619  LORazepam (ATIVAN) injection 1 mg  Once As Needed         01/10/25 0620    01/09/25 1111  Cardiac Monitoring  Continuous        Comments: Follow Standing Orders As Outlined in Process Instructions (Open Order Report to View Full Instructions)    01/09/25 1110    01/09/25 1109  MRI Cervical Spine With Contrast  1 Time Imaging         01/09/25 1110    01/09/25 1109  MRI Thoracic Spine With Contrast  1 Time Imaging         01/09/25 1110    01/09/25 1109  MRI Lumbar  Spine With Contrast  1 Time Imaging         01/09/25 1110    01/09/25 1107  CT Abdomen Pelvis With Contrast  1 Time Imaging         01/09/25 1110    01/09/25 1000  dimethicone (AVEENO) 1.3 % lotion 1 Application  Daily         01/08/25 1745    01/09/25 0811  Pharmacy to dose vancomycin  Continuous PRN         01/09/25 0811    01/09/25 0800  Dietary Nutrition Supplements Boost Plus (Ensure Plus); chocolate  Daily With Breakfast, Lunch & Dinner       01/08/25 1813    01/08/25 2300  nicotine (NICODERM CQ) 21 MG/24HR patch 1 patch  Every 24 Hours Scheduled         01/08/25 2203 01/08/25 2230  cefTRIAXone (ROCEPHIN) in NS 2 GRAMS/20ml IV PUSH syringe  Every 24 Hours         01/08/25 2231 01/08/25 2200  Skin Care  3 Times Daily        Comments: Ensure skin is fully dry prior to application.    01/08/25 1809    01/08/25 1845  hydrocortisone-bacitracin-zinc oxide-nystatin (MAGIC BARRIER) ointment 1 Application  3 times per day         01/08/25 1745    01/08/25 1746  Skin Care  3 Times Daily         01/08/25 1745    01/08/25 1746  Skin Care  Daily       01/08/25 1745    01/08/25 1738  Elevate Heels Off of Bed  Until Discontinued         01/08/25 1745    01/08/25 1738  Turn Patient  Now Then Every 2 Hours         01/08/25 1745    01/08/25 1738  Use Seat Cushion When Up In Chair  Continuous         01/08/25 1745    01/08/25 0900  sodium chloride 0.9 % flush 10 mL  Every 12 Hours Scheduled         01/08/25 0631    01/08/25 0900  Enoxaparin Sodium (LOVENOX) syringe 40 mg  Daily         01/08/25 0631    01/08/25 0900  insulin glargine (LANTUS, SEMGLEE) injection 10 Units  Daily         01/08/25 0631    01/08/25 0900  aspirin chewable tablet 81 mg  Daily         01/08/25 0730    01/08/25 0800  Vital Signs  Every 4 Hours       01/08/25 0631    01/08/25 0800  Oral Care  2 Times Daily       01/08/25 0631    01/08/25 0730  lactated ringers infusion  Continuous         01/08/25 0631    01/08/25 0730  Insulin Lispro (humaLOG)  "injection 2-7 Units  4 Times Daily Before Meals & Nightly         01/08/25 0631    01/08/25 0730  Diet: Cardiac, Diabetic; Healthy Heart (2-3 Na+); Consistent Carbohydrate; Fluid Consistency: Thin (IDDSI 0)  Diet Effective Now         01/08/25 0729 01/08/25 0729  OT Consult: Eval & Treat ADL Performance Below Baseline  Once         01/08/25 0728    01/08/25 0700  POC Glucose 4x Daily Before Meals & at Bedtime  4 Times Daily Before Meals & at Bedtime      Comments: Complete no more than 45 minutes prior to patient eating      01/08/25 0631 01/08/25 0632  Intake & Output  Every Shift       01/08/25 0631    01/08/25 0632  Weigh Patient  Once         01/08/25 0631 01/08/25 0632  Maintain IV Access  Continuous         01/08/25 0631    01/08/25 0632  Fall Precautions  Continuous         01/08/25 0631    01/08/25 0632  PT Consult: Eval & Treat Discharge Placement Assessment  Once         01/08/25 0631    01/08/25 0631  sennosides-docusate (PERICOLACE) 8.6-50 MG per tablet 2 tablet  2 Times Daily PRN        Placed in \"And\" Linked Group    01/08/25 0631    01/08/25 0631  polyethylene glycol (MIRALAX) packet 17 g  Daily PRN        Placed in \"And\" Linked Group    01/08/25 0631    01/08/25 0631  bisacodyl (DULCOLAX) EC tablet 5 mg  Daily PRN        Placed in \"And\" Linked Group    01/08/25 0631    01/08/25 0631  bisacodyl (DULCOLAX) suppository 10 mg  Daily PRN        Placed in \"And\" Linked Group    01/08/25 0631    01/08/25 0631  acetaminophen (TYLENOL) tablet 650 mg  Every 6 Hours PRN         01/08/25 0631    01/08/25 0631  dextrose (GLUTOSE) oral gel 15 g  Every 15 Minutes PRN         01/08/25 0631    01/08/25 0631  dextrose (D50W) (25 g/50 mL) IV injection 25 g  Every 15 Minutes PRN         01/08/25 0631    01/08/25 0631  glucagon (GLUCAGEN) injection 1 mg  Every 15 Minutes PRN         01/08/25 0631    01/08/25 0631  sodium chloride 0.9 % flush 10 mL  As Needed         01/08/25 0631    01/08/25 0631  sodium chloride " 0.9 % infusion 40 mL  As Needed         01/08/25 0631    01/08/25 0631  aluminum-magnesium hydroxide-simethicone (MAALOX MAX) 400-400-40 MG/5ML suspension 15 mL  Every 6 Hours PRN         01/08/25 0631    01/08/25 0631  melatonin tablet 5 mg  Nightly PRN         01/08/25 0631    01/08/25 0542  Inpatient Case Management  Consult  Once        Provider:  (Not yet assigned)    01/08/25 0542    01/08/25 0541  influenza vac split high-dose (FLUZONE HIGH DOSE) injection 0.5 mL  During Hospitalization         01/08/25 0542    01/08/25 0530  Inpatient Consult to Advance Care Planning  Once        Provider:  (Not yet assigned)    01/08/25 0529    01/08/25 0259  Code Status and Medical Interventions: CPR (Attempt to Resuscitate); Full Support  Continuous         01/08/25 0308    01/08/25 0142  Inpatient Hospitalist Consult  Once        Specialty:  Hospitalist  Provider:  Pio Vital MD    01/08/25 0142    01/07/25 2217  Inpatient SANE Consult  Once        Provider:  (Not yet assigned)    01/07/25 2216    01/07/25 2152  Orthostatic Blood Pressure  Once         01/07/25 2151    01/07/25 2152  Fall Precautions  Continuous         01/07/25 2151 01/07/25 2152  POC Glucose Once  Once        Comments: Complete no more than 45 minutes prior to patient eating      01/07/25 2151    01/07/25 2152  Insert Peripheral IV  Once         01/07/25 2151 01/07/25 2151  sodium chloride 0.9 % flush 10 mL  As Needed         01/07/25 2151    Unscheduled  Oxygen Therapy- Nasal Cannula; Titrate 1-6 LPM Per SpO2; 90 - 95%  Continuous PRN       01/07/25 2151    Unscheduled  Up With Assistance  As Needed       01/08/25 0631    Unscheduled  Follow Hypoglycemia Standing Orders For Blood Glucose <70 & Notify Provider of Treatment  As Needed      Comments: Follow Hypoglycemia Orders As Outlined in Process Instructions (Open Order Report to View Full Instructions)  Notify Provider Any Time Hypoglycemia Treatment is Administered     25 0631    Unscheduled  Wound Care  As Needed       25 1745                     Physician Progress Notes (last 48 hours)        Arcenio Mosquera MD at 01/10/25 1552           [facility]   Hospitalist Progress Note  Date: 1/10/2025  Patient Name: Brian Mehta  : 1955  MRN: [id number]  Date of admission: 2025  Room/Bed: 426/1      Subjective   Subjective     Chief Complaint:   Generalized weakness    Summary:  Brian Mehta is a 69 y.o. male with medical history of hypertension, hyperlipidemia, CHF, COPD, type 2 diabetes, tobacco use and history of CVA with residual right-sided weakness presented to the ER with weakness and some hip pain.  Patient is a poor historian and history obtained by discussion with ER staff.  Appears patient for the last couple days have some right hip pain after a fall.  It has been accompanied by inability care for himself and generalized weakness, prompting a call to EMS to bring him in for evaluation.  Upon arrival they found his house to be in a poor unkempt state as well as the patient.  He was found on the ground with complaints of pain and was brought to the ER for further evaluation     Upon arrival he was tachycardic and afebrile otherwise stable.  Lab workup revealed pyuria with positive nitrites and bacteriuria and low magnesium.  Patient had x-ray of the pelvis negative for any fracture or dislocation and x-ray chest was also unremarkable.  Patient was given Rocephin for UTI and the hospitalist then contacted for admission of the patient was unable to ambulate well in the ER and unable to care for himself.  Social work is involved due to patient having poor living situation at home when EMS felt his house was unkempt and he was unable to care for himself.  Patient's blood cultures returned positive, gram-positive cocci in pairs and chains, gram-negative cocci, 2 out of 2, gram-negative bacilli 1 out of 2.  Patient has vancomycin added to ceftriaxone.   Imaging has been ordered of entire spine given his generalized weakness as well as abdominal CT.    Interval Followup:   Still imaging has not been obtained at this time, discussed with nursing staff, this should be obtained hopefully today.  No other acute issues overnight.  Repeat blood cultures remain no growth to date.  Still awaiting speciation on original blood culture      Objective   Objective     Vitals:   Temp:  [97.9 °F (36.6 °C)-98.6 °F (37 °C)] 98.1 °F (36.7 °C)  Heart Rate:  [88-97] 88  Resp:  [18-20] 18  BP: (120-146)/(72-81) 120/79    Physical Exam               Constitutional: Awake, alert, no acute distress, disheveled              Eyes: Pupils equal, sclerae anicteric, no conjunctival injection              HENT: NCAT, mucous membranes dry              Neck: Supple, no thyromegaly, no lymphadenopathy, trachea midline              Respiratory: Clear to auscultation bilaterally              Cardiovascular: RRR, no murmurs, rubs, or gallops, palpable pedal pulses bilaterally              Gastrointestinal: Positive bowel sounds, soft, nontender, nondistended              Musculoskeletal: No bilateral ankle edema, no clubbing or cyanosis to extremities.  No pain with palpation of spine              Neurologic: Oriented x 3, mild residual weakness on the right side 4 out of 5 strength.  Normal reflexes in bilateral lower extremities.  Normal sensation in bilateral lower extremities      Result Review    Result Review:  I have personally reviewed these results:  [x]  Laboratory      Lab 01/10/25  0347 01/09/25  0444 01/08/25  1256 01/08/25  0147 01/07/25  2156   WBC 4.87 5.37 5.02  --  5.47   HEMOGLOBIN 12.1* 13.0 13.1  --  15.4   HEMATOCRIT 38.5 40.1 39.4  --  47.1   PLATELETS 227 230 208  --  248   NEUTROS ABS 1.68* 2.14  --   --  3.10   IMMATURE GRANS (ABS) 0.01 0.01  --   --  0.02   LYMPHS ABS 2.60 2.56  --   --  1.75   MONOS ABS 0.42 0.52  --   --  0.53   EOS ABS 0.13 0.10  --   --  0.05   MCV 81.4  80.0 78.3*  --  78.9*   LACTATE  --   --   --  1.1  --          Lab 01/10/25  0347 01/09/25  0444 01/08/25  1256 01/07/25  2156   SODIUM 137 136 134* 130*   POTASSIUM 4.2 4.0 3.9 4.4   CHLORIDE 104 102 100 98   CO2 24.5 25.2 26.4 22.6   ANION GAP 8.5 8.8 7.6 9.4   BUN 15 17 17 14   CREATININE 0.79 0.89 0.88 0.77   EGFR 97.4 93.9 94.2 98.1   GLUCOSE 163* 136* 243* 264*   CALCIUM 8.6 8.5* 8.7 8.7   MAGNESIUM 1.8 1.9 2.3 1.5*   PHOSPHORUS 2.8 3.0  --   --    HEMOGLOBIN A1C  --   --   --  8.80*         Lab 01/10/25  0347 01/09/25  0444 01/07/25  2156   TOTAL PROTEIN 6.4 6.6 7.4   ALBUMIN 2.7* 2.6* 2.9*   GLOBULIN 3.7 4.0 4.5   ALT (SGPT) <5 <5 5   AST (SGOT) 9 11 10   BILIRUBIN 0.3 0.3 0.5   ALK PHOS 88 87 100         Lab 01/07/25  2156   HSTROP T 9                 Brief Urine Lab Results  (Last result in the past 365 days)        Color   Clarity   Blood   Leuk Est   Nitrite   Protein   CREAT   Urine HCG        01/09/25 1428 Yellow   Cloudy   Small (1+)   Large (3+)   Positive   30 mg/dL (1+)                 [x]  Microbiology   Microbiology Results (last 10 days)       Procedure Component Value - Date/Time    Urine Culture - Urine, Urine, Clean Catch [[id number]]  (Normal) Collected: 01/09/25 1428    Lab Status: Final result Specimen: Urine, Clean Catch Updated: 01/10/25 0936     Urine Culture No growth    Blood Culture - Blood, Arm, Right [[id number]]  (Normal) Collected: 01/08/25 2303    Lab Status: Preliminary result Specimen: Blood from Arm, Right Updated: 01/09/25 2331     Blood Culture No growth at 24 hours    Narrative:      Less than seven (7) mL's of blood was collected.  Insufficient quantity may yield false negative results.    Blood Culture - Blood, Arm, Left [[id number]]  (Normal) Collected: 01/08/25 2303    Lab Status: Preliminary result Specimen: Blood from Arm, Left Updated: 01/09/25 2331     Blood Culture No growth at 24 hours    Narrative:      Less than seven (7) mL's of blood was collected.   Insufficient quantity may yield false negative results.    Blood Culture - Blood, Arm, Right [[id number]]  (Abnormal) Collected: 01/08/25 0147    Lab Status: Preliminary result Specimen: Blood from Arm, Right Updated: 01/09/25 1049     Blood Culture Abnormal Stain     Gram Stain Aerobic Bottle Gram positive cocci in pairs, chains and clusters      Aerobic Bottle Gram negative bacilli      Anaerobic Bottle Gram positive cocci in pairs, chains and clusters    Narrative:      Less than seven (7) mL's of blood was collected.  Insufficient quantity may yield false negative results.    Blood Culture - Blood, Arm, Right [[id number]]  (Abnormal) Collected: 01/08/25 0147    Lab Status: Preliminary result Specimen: Blood from Arm, Right Updated: 01/10/25 0644     Blood Culture Gram Positive Cocci     Isolated from Aerobic Bottle     Gram Stain Aerobic Bottle Gram positive cocci in pairs and chains      Aerobic Bottle Gram positive cocci in clusters    Narrative:      Less than seven (7) mL's of blood was collected.  Insufficient quantity may yield false negative results.    Blood Culture ID, PCR - Blood, Arm, Right [[id number]] Collected: 01/08/25 0147    Lab Status: Final result Specimen: Blood from Arm, Right Updated: 01/08/25 2210     BCID, PCR Negative by BCID PCR. Culture to Follow.     BOTTLE TYPE Aerobic Bottle          [x]  Radiology  XR Hip With or Without Pelvis 2 - 3 View Right    Result Date: 1/7/2025  1.No acute fracture or dislocation. 2.Degenerative changes in both hips. 3.Fractured distal left ureteral stent, unchanged. Electronically Signed: Durga Zurita MD  1/7/2025 11:48 PM EST  Workstation ID: [id number]    XR Chest 1 View    Result Date: 1/7/2025  Impression: No acute cardiopulmonary disease. Electronically Signed: Jm Bran MD  1/7/2025 10:11 PM EST  Workstation ID: [id number]   []  EKG/Telemetry   []  Cardiology/Vascular   []  Pathology  []  Old records  []  Other:    Assessment & Plan   Assessment /  Plan     Assessment:  Bacteremia  Urinary tract infection  Type 2 diabetes  Hypertension  History of CVA with residual right-sided deficits  Heart failure, low ejection fraction  COPD not acute exacerbation  Active tobacco abuse  Generalized weakness    Plan:  Patient remains admitted to hospital for further care and management  Patient's blood cultures have returned positive for gram-positive and gram-negative.  In addition to ceftriaxone we will add vancomycin  Therapeutic drug monitoring while on vancomycin  Additional imaging has been ordered given these positive blood cultures, given patient's generalized weakness will order MRI full spine  Also ordering CT of abdomen given patient's retained fractured distal left ureteral stent, unchanged in plain film of hip obtained on admission  Awaiting results of imaging  Renal function closely given contrast load with imaging  PT OT, wound care.  Case management consulted.  Continue to work on disposition, patient most interested in discharging home once medical workup complete.  Patient would likely be most appropriate for discharging to rehab facility, will continue to discuss with patient daily     Discussed with RN.  Discussed with case management    VTE Prophylaxis:  Pharmacologic VTE prophylaxis orders are present.        CODE STATUS:   Code Status (Patient has no pulse and is not breathing): CPR (Attempt to Resuscitate)  Medical Interventions (Patient has pulse or is breathing): Full Support      Electronically signed by Arcenio Mosquera MD, 1/10/2025, 15:52 EST.                        Electronically signed by Arcenio Mosquera MD at 01/10/25 1557       Arcenio Mosquera MD at 25 1611           [facility] Hospitalist Progress Note  Date: 2025  Patient Name: Brian Mehta  : 1955  MRN: [id number]  Date of admission: 2025  Room/Bed: [facility]      Subjective   Subjective     Chief Complaint:   Generalized weakness    Summary:  Brian Mehta is a 69 y.o.  male with medical history of hypertension, hyperlipidemia, CHF, COPD, type 2 diabetes, tobacco use and history of CVA with residual right-sided weakness presented to the ER with weakness and some hip pain.  Patient is a poor historian and history obtained by discussion with ER staff.  Appears patient for the last couple days have some right hip pain after a fall.  It has been accompanied by inability care for himself and generalized weakness, prompting a call to EMS to bring him in for evaluation.  Upon arrival they found his house to be in a poor unkempt state as well as the patient.  He was found on the ground with complaints of pain and was brought to the ER for further evaluation     Upon arrival he was tachycardic and afebrile otherwise stable.  Lab workup revealed pyuria with positive nitrites and bacteriuria and low magnesium.  Patient had x-ray of the pelvis negative for any fracture or dislocation and x-ray chest was also unremarkable.  Patient was given Rocephin for UTI and the hospitalist then contacted for admission of the patient was unable to ambulate well in the ER and unable to care for himself.  Social work is involved due to patient having poor living situation at home when EMS felt his house was unkempt and he was unable to care for himself.    Interval Followup:   Overnight blood cultures returned positive, patient has gram-positive cocci in pairs and chains, gram-positive cocci in clusters 2 out of 2.  Patient with gram-negative bacilli 1 out of 2.  Addition of vancomycin the patient ceftriaxone.  Repeat blood cultures have been ordered.  Urine culture was never obtained on initial sample therefore repeating urine culture today.  Given patient's symptoms of generalized weakness will order C-spine MRI to rule out epidural abscess.  Patient also had initial hip film with fracture distal left ureteral stent, unchanged noted.  Will order CT of abdomen as well.      Objective   Objective     Vitals:    Temp:  [98.1 °F (36.7 °C)-99 °F (37.2 °C)] 99 °F (37.2 °C)  Heart Rate:  [] 98  Resp:  [20] 20  BP: (120-159)/() 129/78    Physical Exam               Constitutional: Awake, alert, no acute distress, disheveled              Eyes: Pupils equal, sclerae anicteric, no conjunctival injection              HENT: NCAT, mucous membranes dry              Neck: Supple, no thyromegaly, no lymphadenopathy, trachea midline              Respiratory: Clear to auscultation bilaterally              Cardiovascular: RRR, no murmurs, rubs, or gallops, palpable pedal pulses bilaterally              Gastrointestinal: Positive bowel sounds, soft, nontender, nondistended              Musculoskeletal: No bilateral ankle edema, no clubbing or cyanosis to extremities.  No pain with palpation of spine              Neurologic: Oriented x 3, mild residual weakness on the right side 4 out of 5 strength.  Normal reflexes in bilateral lower extremities.  Normal sensation in bilateral lower extremities      Result Review    Result Review:  I have personally reviewed these results:  [x]  Laboratory      Lab 01/09/25  0444 01/08/25  1256 01/08/25  0147 01/07/25  2156   WBC 5.37 5.02  --  5.47   HEMOGLOBIN 13.0 13.1  --  15.4   HEMATOCRIT 40.1 39.4  --  47.1   PLATELETS 230 208  --  248   NEUTROS ABS 2.14  --   --  3.10   IMMATURE GRANS (ABS) 0.01  --   --  0.02   LYMPHS ABS 2.56  --   --  1.75   MONOS ABS 0.52  --   --  0.53   EOS ABS 0.10  --   --  0.05   MCV 80.0 78.3*  --  78.9*   LACTATE  --   --  1.1  --          Lab 01/09/25  0444 01/08/25  1256 01/07/25  2156   SODIUM 136 134* 130*   POTASSIUM 4.0 3.9 4.4   CHLORIDE 102 100 98   CO2 25.2 26.4 22.6   ANION GAP 8.8 7.6 9.4   BUN 17 17 14   CREATININE 0.89 0.88 0.77   EGFR 93.9 94.2 98.1   GLUCOSE 136* 243* 264*   CALCIUM 8.5* 8.7 8.7   MAGNESIUM 1.9 2.3 1.5*   PHOSPHORUS 3.0  --   --    HEMOGLOBIN A1C  --   --  8.80*         Lab 01/09/25  0444 01/07/25  2156   TOTAL PROTEIN 6.6 7.4    ALBUMIN 2.6* 2.9*   GLOBULIN 4.0 4.5   ALT (SGPT) <5 5   AST (SGOT) 11 10   BILIRUBIN 0.3 0.5   ALK PHOS 87 100         Lab 01/07/25  2156   HSTROP T 9                 Brief Urine Lab Results  (Last result in the past 365 days)        Color   Clarity   Blood   Leuk Est   Nitrite   Protein   CREAT   Urine HCG        01/09/25 1428 Yellow   Cloudy   Small (1+)   Large (3+)   Positive   30 mg/dL (1+)                 [x]  Microbiology   Microbiology Results (last 10 days)       Procedure Component Value - Date/Time    Blood Culture - Blood, Arm, Right [[id number]]  (Abnormal) Collected: 01/08/25 0147    Lab Status: Preliminary result Specimen: Blood from Arm, Right Updated: 01/09/25 1049     Blood Culture Abnormal Stain     Gram Stain Aerobic Bottle Gram positive cocci in pairs, chains and clusters      Aerobic Bottle Gram negative bacilli      Anaerobic Bottle Gram positive cocci in pairs, chains and clusters    Narrative:      Less than seven (7) mL's of blood was collected.  Insufficient quantity may yield false negative results.    Blood Culture - Blood, Arm, Right [[id number]]  (Abnormal) Collected: 01/08/25 0147    Lab Status: Preliminary result Specimen: Blood from Arm, Right Updated: 01/08/25 2208     Blood Culture Abnormal Stain     Gram Stain Aerobic Bottle Gram positive cocci in pairs and chains      Aerobic Bottle Gram positive cocci in clusters    Narrative:      Less than seven (7) mL's of blood was collected.  Insufficient quantity may yield false negative results.    Blood Culture ID, PCR - Blood, Arm, Right [[id number]] Collected: 01/08/25 0147    Lab Status: Final result Specimen: Blood from Arm, Right Updated: 01/08/25 2210     BCID, PCR Negative by BCID PCR. Culture to Follow.     BOTTLE TYPE Aerobic Bottle          [x]  Radiology  XR Hip With or Without Pelvis 2 - 3 View Right    Result Date: 1/7/2025  1.No acute fracture or dislocation. 2.Degenerative changes in both hips. 3.Fractured distal left  ureteral stent, unchanged. Electronically Signed: Durga Zurita MD  1/7/2025 11:48 PM EST  Workstation ID: [id number]    XR Chest 1 View    Result Date: 1/7/2025  Impression: No acute cardiopulmonary disease. Electronically Signed: Jm Bran MD  1/7/2025 10:11 PM EST  Workstation ID: [id number]   []  EKG/Telemetry   []  Cardiology/Vascular   []  Pathology  []  Old records  []  Other:    Assessment & Plan   Assessment / Plan     Assessment:  Bacteremia  Urinary tract infection  Type 2 diabetes  Hypertension  History of CVA with residual right-sided deficits  Heart failure, low ejection fraction  COPD not acute exacerbation  Active tobacco abuse  Generalized weakness    Plan:  Patient remains admitted to hospital for further care and management  Patient's blood cultures have returned positive for gram-positive and gram-negative.  In addition to ceftriaxone we will add vancomycin  Therapeutic drug monitoring while on vancomycin  Additional imaging has been ordered given these positive blood cultures, given patient's generalized weakness will order MRI full spine  Also ordering CT of abdomen given patient's retained fractured distal left ureteral stent, unchanged in plain film of hip obtained on admission  Renal function closely given contrast load with imaging  PT OT, wound care.  Case management consulted.  Continue to work on disposition, patient most interested in discharging home once medical workup complete.  Patient would likely be most appropriate for discharging to rehab facility, will continue to discuss with patient daily     Discussed with RN.  Discussed with case management    VTE Prophylaxis:  Pharmacologic VTE prophylaxis orders are present.        CODE STATUS:   Code Status (Patient has no pulse and is not breathing): CPR (Attempt to Resuscitate)  Medical Interventions (Patient has pulse or is breathing): Full Support      Electronically signed by Arcenio Mosquera MD, 1/9/2025, 16:11 EST.                         Electronically signed by Arcenio Mosquera MD at 01/09/25 1615       Consult Notes (last 48 hours)  Notes from 01/09/25 1052 through 01/11/25 1052   No notes of this type exist for this encounter.

## 2025-01-11 NOTE — PROGRESS NOTES
[facility]   Hospitalist Progress Note  Date: 2025  Patient Name: Brian Mehta  : 1955  MRN: [id number]  Date of admission: 2025  Room/Bed: 426/1      Subjective   Subjective     Chief Complaint:   Generalized weakness    Summary:  Brian Mehta is a 69 y.o. male with medical history of hypertension, hyperlipidemia, CHF, COPD, type 2 diabetes, tobacco use and history of CVA with residual right-sided weakness presented to the ER with weakness and some hip pain.  Patient is a poor historian and history obtained by discussion with ER staff.  Appears patient for the last couple days have some right hip pain after a fall.  It has been accompanied by inability care for himself and generalized weakness, prompting a call to EMS to bring him in for evaluation.  Upon arrival they found his house to be in a poor unkempt state as well as the patient.  He was found on the ground with complaints of pain and was brought to the ER for further evaluation     Upon arrival he was tachycardic and afebrile otherwise stable.  Lab workup revealed pyuria with positive nitrites and bacteriuria and low magnesium.  Patient had x-ray of the pelvis negative for any fracture or dislocation and x-ray chest was also unremarkable.  Patient was given Rocephin for UTI and the hospitalist then contacted for admission of the patient was unable to ambulate well in the ER and unable to care for himself.  Social work is involved due to patient having poor living situation at home when EMS felt his house was unkempt and he was unable to care for himself.  Patient's blood cultures returned positive, gram-positive cocci in pairs and chains, gram-negative cocci, 2 out of 2, gram-negative bacilli 1 out of 2.  Patient has vancomycin added to ceftriaxone.  Imaging has been ordered of entire spine given his generalized weakness as well as abdominal CT.    Interval Followup:   Patient lined up to have imaging performed yesterday, however  patient refused when transport arrived.  Patient unable to give logical reasoning on why he refused imaging yesterday.  Patient states he is afraid, however states he is not scared of the scan itself.  Hence may be a scared of the results.  Able to discussed with patient's sister over the phone, we will continue to encourage patient to have imaging performed so we can complete workup.      Objective   Objective     Vitals:   Temp:  [98.2 °F (36.8 °C)-99.1 °F (37.3 °C)] 98.4 °F (36.9 °C)  Heart Rate:  [90-99] 99  Resp:  [18] 18  BP: (132-147)/(64-83) 132/79    Physical Exam               Constitutional: Awake, alert, no acute distress, disheveled              Eyes: Pupils equal, sclerae anicteric, no conjunctival injection              HENT: NCAT, mucous membranes dry              Neck: Supple, no thyromegaly, no lymphadenopathy, trachea midline              Respiratory: Clear to auscultation bilaterally              Cardiovascular: RRR, no murmurs, rubs, or gallops, palpable pedal pulses bilaterally              Gastrointestinal: Positive bowel sounds, soft, nontender, nondistended              Musculoskeletal: No bilateral ankle edema, no clubbing or cyanosis to extremities.  No pain with palpation of spine              Neurologic: Oriented x 3, mild residual weakness on the right side 4 out of 5 strength.  Normal reflexes in bilateral lower extremities.  Normal sensation in bilateral lower extremities      Result Review    Result Review:  I have personally reviewed these results:  [x]  Laboratory      Lab 01/11/25  0530 01/10/25  0347 01/09/25  0444 01/08/25  1256 01/08/25  0147   WBC 5.01 4.87 5.37   < >  --    HEMOGLOBIN 12.3* 12.1* 13.0   < >  --    HEMATOCRIT 38.9 38.5 40.1   < >  --    PLATELETS 244 227 230   < >  --    NEUTROS ABS 1.74 1.68* 2.14  --   --    IMMATURE GRANS (ABS) 0.01 0.01 0.01  --   --    LYMPHS ABS 2.73 2.60 2.56  --   --    MONOS ABS 0.37 0.42 0.52  --   --    EOS ABS 0.12 0.13 0.10  --    --    MCV 80.5 81.4 80.0   < >  --    LACTATE  --   --   --   --  1.1    < > = values in this interval not displayed.         Lab 01/11/25  0530 01/10/25  0347 01/09/25  0444 01/08/25  1256 01/07/25  2156   SODIUM 135* 137 136   < > 130*   POTASSIUM 4.0 4.2 4.0   < > 4.4   CHLORIDE 104 104 102   < > 98   CO2 23.7 24.5 25.2   < > 22.6   ANION GAP 7.3 8.5 8.8   < > 9.4   BUN 11 15 17   < > 14   CREATININE 0.73* 0.79 0.89   < > 0.77   EGFR 99.7 97.4 93.9   < > 98.1   GLUCOSE 124* 163* 136*   < > 264*   CALCIUM 8.5* 8.6 8.5*   < > 8.7   MAGNESIUM 1.8 1.8 1.9   < > 1.5*   PHOSPHORUS 3.0 2.8 3.0  --   --    HEMOGLOBIN A1C  --   --   --   --  8.80*    < > = values in this interval not displayed.         Lab 01/10/25  0347 01/09/25  0444 01/07/25  2156   TOTAL PROTEIN 6.4 6.6 7.4   ALBUMIN 2.7* 2.6* 2.9*   GLOBULIN 3.7 4.0 4.5   ALT (SGPT) <5 <5 5   AST (SGOT) 9 11 10   BILIRUBIN 0.3 0.3 0.5   ALK PHOS 88 87 100         Lab 01/07/25  2156   HSTROP T 9                 Brief Urine Lab Results  (Last result in the past 365 days)        Color   Clarity   Blood   Leuk Est   Nitrite   Protein   CREAT   Urine HCG        01/09/25 1428 Yellow   Cloudy   Small (1+)   Large (3+)   Positive   30 mg/dL (1+)                 [x]  Microbiology   Microbiology Results (last 10 days)       Procedure Component Value - Date/Time    Urine Culture - Urine, Urine, Clean Catch [[id number]]  (Normal) Collected: 01/09/25 1428    Lab Status: Final result Specimen: Urine, Clean Catch Updated: 01/10/25 0936     Urine Culture No growth    Blood Culture - Blood, Arm, Right [[id number]]  (Normal) Collected: 01/08/25 2303    Lab Status: Preliminary result Specimen: Blood from Arm, Right Updated: 01/10/25 2316     Blood Culture No growth at 2 days    Narrative:      Less than seven (7) mL's of blood was collected.  Insufficient quantity may yield false negative results.    Blood Culture - Blood, Arm, Left [[id number]]  (Normal) Collected: 01/08/25 2303    Lab  Status: Preliminary result Specimen: Blood from Arm, Left Updated: 01/10/25 2316     Blood Culture No growth at 2 days    Narrative:      Less than seven (7) mL's of blood was collected.  Insufficient quantity may yield false negative results.    Blood Culture - Blood, Arm, Right [[id number]]  (Abnormal) Collected: 01/08/25 0147    Lab Status: Preliminary result Specimen: Blood from Arm, Right Updated: 01/11/25 0652     Blood Culture Gram Positive Cocci     Isolated from Aerobic Bottle     Blood Culture Gram Negative Bacilli     Isolated from Aerobic Bottle     Blood Culture Staphylococcus, coagulase negative     Isolated from Aerobic Bottle     Blood Culture Gram Positive Cocci     Isolated from Anaerobic Bottle     Gram Stain Aerobic Bottle Gram positive cocci in pairs, chains and clusters      Aerobic Bottle Gram negative bacilli      Anaerobic Bottle Gram positive cocci in pairs, chains and clusters    Narrative:      Less than seven (7) mL's of blood was collected.  Insufficient quantity may yield false negative results.    Blood Culture - Blood, Arm, Right [[id number]]  (Abnormal) Collected: 01/08/25 0147    Lab Status: Preliminary result Specimen: Blood from Arm, Right Updated: 01/11/25 0631     Blood Culture Aerococcus viridans     Comment:   Aerococcus viridans is usually susceptible to vancomycin. Resistance has been reported to the fluoroquinolones and beta-lactams. Please call lab to request further workup.        Isolated from Aerobic Bottle     Blood Culture Gram Negative Bacilli     Isolated from Aerobic Bottle     Blood Culture Staphylococcus, coagulase negative     Isolated from Aerobic Bottle     Gram Stain Aerobic Bottle Gram positive cocci in pairs and chains      Aerobic Bottle Gram positive cocci in clusters    Narrative:      Less than seven (7) mL's of blood was collected.  Insufficient quantity may yield false negative results.    Blood Culture ID, PCR - Blood, Arm, Right [[id number]]  Collected: 01/08/25 0147    Lab Status: Final result Specimen: Blood from Arm, Right Updated: 01/08/25 2210     BCID, PCR Negative by BCID PCR. Culture to Follow.     BOTTLE TYPE Aerobic Bottle          [x]  Radiology  XR Hip With or Without Pelvis 2 - 3 View Right    Result Date: 1/7/2025  1.No acute fracture or dislocation. 2.Degenerative changes in both hips. 3.Fractured distal left ureteral stent, unchanged. Electronically Signed: Durga Zurita MD  1/7/2025 11:48 PM EST  Workstation ID: [id number]    XR Chest 1 View    Result Date: 1/7/2025  Impression: No acute cardiopulmonary disease. Electronically Signed: Jm Bran MD  1/7/2025 10:11 PM EST  Workstation ID: [id number]   []  EKG/Telemetry   []  Cardiology/Vascular   []  Pathology  []  Old records  []  Other:    Assessment & Plan   Assessment / Plan     Assessment:  Bacteremia  Urinary tract infection  Type 2 diabetes  Hypertension  History of CVA with residual right-sided deficits  Heart failure, low ejection fraction  COPD not acute exacerbation  Active tobacco abuse  Generalized weakness    Plan:  Patient remains admitted to hospital for further care and management  Patient's blood cultures have returned positive for gram-positive and gram-negative.  Continue ceftriaxone vancomycin  Therapeutic drug monitoring while on vancomycin  Repeat blood cultures remain no growth to date  Additional imaging has been ordered given these positive blood cultures, given patient's generalized weakness will order MRI full spine  Also ordering CT of abdomen given patient's retained fractured distal left ureteral stent, unchanged in plain film of hip obtained on admission  Continue to encourage patient to complete imaging, appears he may be interested in having completed today  Renal function closely given contrast load with imaging  PT OT, wound care.  Case management consulted.  Continue to work on disposition, patient most interested in discharging home once medical  workup complete.  Patient would likely be most appropriate for discharging to rehab facility, will continue to discuss with patient daily     Discussed with RN.  Discussed with sister over the phone    VTE Prophylaxis:  Pharmacologic VTE prophylaxis orders are present.        CODE STATUS:   Code Status (Patient has no pulse and is not breathing): CPR (Attempt to Resuscitate)  Medical Interventions (Patient has pulse or is breathing): Full Support      Electronically signed by Arcenio Mosquera MD, 1/11/2025, 16:15 EST.

## 2025-01-11 NOTE — PLAN OF CARE
Goal Outcome Evaluation:  Plan of Care Reviewed With: patient           Outcome Evaluation: Patient AxOx4. Rest has been encouraged. Patient was amenable to MRI but refused when transport came to bedside- patient was educated on importance of MRI and this impacting care. PRN anxiety medication offered which was obtained for MRI. Patient continued to refuse. Discussed with wife and sister patients disposition- they are desiring to be contacted by doctor to discuss if patient is capable of caring for himself or not. Informed wife and sister that patient has been educated and has the right to refuse; provided emotional support to family stating that this is a very difficult and frustrating situation for them and to make sure they are caring for themselves as well.  Patient has refused turns.

## 2025-01-12 ENCOUNTER — APPOINTMENT (OUTPATIENT)
Dept: MRI IMAGING | Facility: HOSPITAL | Age: 70
DRG: 660 | End: 2025-01-12
Payer: MEDICARE

## 2025-01-12 ENCOUNTER — APPOINTMENT (OUTPATIENT)
Dept: CT IMAGING | Facility: HOSPITAL | Age: 70
DRG: 660 | End: 2025-01-12
Payer: MEDICARE

## 2025-01-12 LAB
ANION GAP SERPL CALCULATED.3IONS-SCNC: 7.8 MMOL/L (ref 5–15)
BASOPHILS # BLD AUTO: 0.02 10*3/MM3 (ref 0–0.2)
BASOPHILS NFR BLD AUTO: 0.4 % (ref 0–1.5)
BUN SERPL-MCNC: 11 MG/DL (ref 8–23)
BUN/CREAT SERPL: 15.3 (ref 7–25)
CALCIUM SPEC-SCNC: 8.6 MG/DL (ref 8.6–10.5)
CHLORIDE SERPL-SCNC: 103 MMOL/L (ref 98–107)
CO2 SERPL-SCNC: 25.2 MMOL/L (ref 22–29)
CREAT SERPL-MCNC: 0.72 MG/DL (ref 0.76–1.27)
DEPRECATED RDW RBC AUTO: 42.9 FL (ref 37–54)
EGFRCR SERPLBLD CKD-EPI 2021: 100.1 ML/MIN/1.73
EOSINOPHIL # BLD AUTO: 0.12 10*3/MM3 (ref 0–0.4)
EOSINOPHIL NFR BLD AUTO: 2.2 % (ref 0.3–6.2)
ERYTHROCYTE [DISTWIDTH] IN BLOOD BY AUTOMATED COUNT: 14.9 % (ref 12.3–15.4)
GLUCOSE BLDC GLUCOMTR-MCNC: 131 MG/DL (ref 70–99)
GLUCOSE BLDC GLUCOMTR-MCNC: 155 MG/DL (ref 70–99)
GLUCOSE BLDC GLUCOMTR-MCNC: 228 MG/DL (ref 70–99)
GLUCOSE BLDC GLUCOMTR-MCNC: 324 MG/DL (ref 70–99)
GLUCOSE SERPL-MCNC: 118 MG/DL (ref 65–99)
HCT VFR BLD AUTO: 40.7 % (ref 37.5–51)
HGB BLD-MCNC: 13.2 G/DL (ref 13–17.7)
IMM GRANULOCYTES # BLD AUTO: 0.01 10*3/MM3 (ref 0–0.05)
IMM GRANULOCYTES NFR BLD AUTO: 0.2 % (ref 0–0.5)
LYMPHOCYTES # BLD AUTO: 2.97 10*3/MM3 (ref 0.7–3.1)
LYMPHOCYTES NFR BLD AUTO: 55 % (ref 19.6–45.3)
MAGNESIUM SERPL-MCNC: 1.9 MG/DL (ref 1.6–2.4)
MCH RBC QN AUTO: 26.1 PG (ref 26.6–33)
MCHC RBC AUTO-ENTMCNC: 32.4 G/DL (ref 31.5–35.7)
MCV RBC AUTO: 80.4 FL (ref 79–97)
MONOCYTES # BLD AUTO: 0.46 10*3/MM3 (ref 0.1–0.9)
MONOCYTES NFR BLD AUTO: 8.5 % (ref 5–12)
NEUTROPHILS NFR BLD AUTO: 1.82 10*3/MM3 (ref 1.7–7)
NEUTROPHILS NFR BLD AUTO: 33.7 % (ref 42.7–76)
NRBC BLD AUTO-RTO: 0 /100 WBC (ref 0–0.2)
PLATELET # BLD AUTO: 244 10*3/MM3 (ref 140–450)
PMV BLD AUTO: 9.3 FL (ref 6–12)
POTASSIUM SERPL-SCNC: 4 MMOL/L (ref 3.5–5.2)
RBC # BLD AUTO: 5.06 10*6/MM3 (ref 4.14–5.8)
SODIUM SERPL-SCNC: 136 MMOL/L (ref 136–145)
WBC NRBC COR # BLD AUTO: 5.4 10*3/MM3 (ref 3.4–10.8)

## 2025-01-12 PROCEDURE — 25010000002 CEFTRIAXONE PER 250 MG: Performed by: PHYSICIAN ASSISTANT

## 2025-01-12 PROCEDURE — 63710000001 INSULIN LISPRO (HUMAN) PER 5 UNITS: Performed by: STUDENT IN AN ORGANIZED HEALTH CARE EDUCATION/TRAINING PROGRAM

## 2025-01-12 PROCEDURE — 85025 COMPLETE CBC W/AUTO DIFF WBC: CPT | Performed by: INTERNAL MEDICINE

## 2025-01-12 PROCEDURE — 82948 REAGENT STRIP/BLOOD GLUCOSE: CPT

## 2025-01-12 PROCEDURE — 80048 BASIC METABOLIC PNL TOTAL CA: CPT | Performed by: INTERNAL MEDICINE

## 2025-01-12 PROCEDURE — 25010000002 VANCOMYCIN 5 G RECONSTITUTED SOLUTION: Performed by: INTERNAL MEDICINE

## 2025-01-12 PROCEDURE — 25810000003 SODIUM CHLORIDE 0.9 % SOLUTION: Performed by: INTERNAL MEDICINE

## 2025-01-12 PROCEDURE — 25010000002 ENOXAPARIN PER 10 MG: Performed by: STUDENT IN AN ORGANIZED HEALTH CARE EDUCATION/TRAINING PROGRAM

## 2025-01-12 PROCEDURE — 74177 CT ABD & PELVIS W/CONTRAST: CPT

## 2025-01-12 PROCEDURE — 25510000001 IOPAMIDOL PER 1 ML: Performed by: INTERNAL MEDICINE

## 2025-01-12 PROCEDURE — 99232 SBSQ HOSP IP/OBS MODERATE 35: CPT | Performed by: INTERNAL MEDICINE

## 2025-01-12 PROCEDURE — 83735 ASSAY OF MAGNESIUM: CPT | Performed by: INTERNAL MEDICINE

## 2025-01-12 PROCEDURE — 25010000002 LORAZEPAM PER 2 MG: Performed by: PHYSICIAN ASSISTANT

## 2025-01-12 PROCEDURE — 72148 MRI LUMBAR SPINE W/O DYE: CPT

## 2025-01-12 PROCEDURE — 72146 MRI CHEST SPINE W/O DYE: CPT

## 2025-01-12 PROCEDURE — 72141 MRI NECK SPINE W/O DYE: CPT

## 2025-01-12 PROCEDURE — 99222 1ST HOSP IP/OBS MODERATE 55: CPT | Performed by: UROLOGY

## 2025-01-12 PROCEDURE — 82948 REAGENT STRIP/BLOOD GLUCOSE: CPT | Performed by: STUDENT IN AN ORGANIZED HEALTH CARE EDUCATION/TRAINING PROGRAM

## 2025-01-12 RX ORDER — IOPAMIDOL 755 MG/ML
100 INJECTION, SOLUTION INTRAVASCULAR
Status: COMPLETED | OUTPATIENT
Start: 2025-01-12 | End: 2025-01-12

## 2025-01-12 RX ADMIN — VANCOMYCIN HYDROCHLORIDE 1250 MG: 5 INJECTION, POWDER, LYOPHILIZED, FOR SOLUTION INTRAVENOUS at 13:14

## 2025-01-12 RX ADMIN — DICLOFENAC SODIUM 2 G: 10 GEL TOPICAL at 12:02

## 2025-01-12 RX ADMIN — Medication 10 ML: at 22:41

## 2025-01-12 RX ADMIN — IOPAMIDOL 100 ML: 755 INJECTION, SOLUTION INTRAVENOUS at 08:50

## 2025-01-12 RX ADMIN — DICLOFENAC SODIUM 2 G: 10 GEL TOPICAL at 17:38

## 2025-01-12 RX ADMIN — ASPIRIN 81 MG: 81 TABLET, CHEWABLE ORAL at 13:14

## 2025-01-12 RX ADMIN — SODIUM CHLORIDE 2000 MG: 9 INJECTION INTRAMUSCULAR; INTRAVENOUS; SUBCUTANEOUS at 22:40

## 2025-01-12 RX ADMIN — HYDROCORTISONE 1 APPLICATION: 1 OINTMENT TOPICAL at 17:38

## 2025-01-12 RX ADMIN — LORAZEPAM 1 MG: 2 INJECTION INTRAMUSCULAR; INTRAVENOUS at 08:26

## 2025-01-12 RX ADMIN — ENOXAPARIN SODIUM 40 MG: 100 INJECTION SUBCUTANEOUS at 13:14

## 2025-01-12 RX ADMIN — INSULIN LISPRO 3 UNITS: 100 INJECTION, SOLUTION INTRAVENOUS; SUBCUTANEOUS at 17:12

## 2025-01-12 RX ADMIN — VANCOMYCIN HYDROCHLORIDE 1250 MG: 5 INJECTION, POWDER, LYOPHILIZED, FOR SOLUTION INTRAVENOUS at 00:08

## 2025-01-12 RX ADMIN — INSULIN LISPRO 5 UNITS: 100 INJECTION, SOLUTION INTRAVENOUS; SUBCUTANEOUS at 22:41

## 2025-01-12 RX ADMIN — ACETAMINOPHEN 650 MG: 325 TABLET ORAL at 18:40

## 2025-01-12 RX ADMIN — SODIUM CHLORIDE 2000 MG: 9 INJECTION INTRAMUSCULAR; INTRAVENOUS; SUBCUTANEOUS at 00:07

## 2025-01-12 RX ADMIN — Medication 10 ML: at 07:44

## 2025-01-12 NOTE — CONSULTS
[facility]   UROLOGY Consult Note    Patient Name: Brian Mehta  : 1955  MRN: [id number]  Primary Care Physician:  Sophie Capps APRN  Referring Physician: Arcenio Mosquera MD  Date of admission: 2025    Subjective   Subjective     Chief Complaint:     Retained ureteral stent    HPI:  Brian Mehta is a 69 y.o. male     Retained ureteral stent    Retained left ureteral stent  UTI  History of CVA     Poor historian       past medical history of hypertension, hyperlipidemia, CHF, COPD, type 2 diabetes, tobacco use and history of CVA with residual right-sided weakness presented to the ER with weakness    2025 CT abdomen/pelvis with - retained left ureteral stent with fractured pigtail component within the lumen of the urinary bladder.  Granulation tissue surrounding the pigtail portion which appears unchanged.  Left renal hydronephrosis and proximal hydroureter with periureteral stranding.      2025 0.7,   1/10/2025 urine culture - negative    2025 blood culture Aerococcus x 2, Staphylococcus x 2    Patient was previously scheduled for surgery but would not come in for urine culture/preop also could not get transportation to come to surgery.    Patient thinks a stent was placed 3 years ago, not sure    Previous    10/26/2024 came in through ED with weakness/inability to ambulate/unable to care for self     Not sure when stent was placed, no records in our system     10/24 0.7, GFR 99   10/26/2024 chest x-ray - negative  2024 x-ray hip left - no acute fracture or dislocation of the pelvis or left hip.  Report left ureteral stent appears fractured.  10/24 UA nitrite positive, 6-10 RBCs, TNTC WBCs, 2+ bacteria      admitted for COVID     Urine cultures      E. coli - resistant to ampicillin and Bactrim  3/24 Proteus       Review of Systems     10 systems reviewed and are negative other than what is listed in the HPI    Personal History     Past Medical History:   Diagnosis  Date    CHF (congestive heart failure)     SEE'S DR [place] NO CURRENT S/S    COPD (chronic obstructive pulmonary disease)     INHALER    Diabetes mellitus     DOESN'T CHECK BG OFTEN AT HOME    Hyperlipidemia     Hypertension     Sepsis 09/14/2023    Stroke 2017    RIGHT SIDE WEAKNESS       Past Surgical History:   Procedure Laterality Date    APPENDECTOMY      EYE SURGERY Bilateral     MISTY CATARACT       Family History: family history includes No Known Problems in his father and mother. Otherwise pertinent FHx was reviewed and not pertinent to current issue.    Social History:  reports that he has been smoking cigarettes. He started smoking about 53 years ago. He has a 132.6 pack-year smoking history. He has been exposed to tobacco smoke. He has never used smokeless tobacco. He reports current alcohol use. He reports that he does not use drugs.    Home Medications:  Insulin Glargine, aspirin, atorvastatin, budesonide-formoterol, empagliflozin, famotidine, furosemide, hydroCHLOROthiazide, ipratropium-albuterol, losartan, and nicotine    Allergies:  No Known Allergies    Objective    Objective     Vitals:   Temp:  [97.9 °F (36.6 °C)-98.2 °F (36.8 °C)] 97.9 °F (36.6 °C)  Heart Rate:  [79-97] 96  Resp:  [18-20] 20  BP: (116-150)/(57-90) 131/75    Physical Exam:   Constitutional: Awake, alert    Respiratory: Clear to auscultation bilaterally, nonlabored respirations    Cardiovascular: RRR, no murmurs, rubs, or gallops, palpable pedal pulses bilaterally   Gastrointestinal: Positive bowel sounds, soft, nontender, nondistended         Result Review    Result Review:  I have personally reviewed the results from the time of this admission to 1/12/2025 11:52 EST and agree with these findings:  []  Laboratory  []  Microbiology  []  Radiology  []  EKG/Telemetry   []  Cardiology/Vascular   []  Pathology  []  Old records  []  Other:      Assessment & Plan   Assessment / Plan     Brief Patient Summary:  Brian Mehta is a 69 y.o. male      Active Hospital Problems:  Active Hospital Problems    Diagnosis     **Acute UTI     Bacteremia        CT images and read reviewed discussed with the patient    Records reviewed and summarized in the chart      Retained left ureteral stent  UTI  History of CVA           Patient with a left ureteral stent in for undetermined amount of time.  It does have some calcification/stone in the bladder for sure.  Possibly some up the ureter but hard to tell      Patient will need cystolitholapaxy with left ureteral stent removal possible laser and stent replacement.    Risks and benefits were discussed including bleeding, infection and damage to the urinary system.  We also discussed the risk of anesthesia up to and including death.  Patient voiced understanding and would like to proceed.    We have had this scheduled in the past but he was not able to get to the hospital secondary to transportation issues.  If patient is here later this week we will try to see if we can get the stent out.  The other issue is it may take multiple procedures and he would not have a stent back in for a few days after the procedure to decrease risk of sepsis renal failure.  Everything was explained to the patient      We discussed if his stent stays in place he will continue to have episodes of sepsis and likely loss of that kidney at some point.      Discussed with hospitalist     will follow            Electronically signed by Ector Kulkarni MD, 01/12/25, 11:52 AM EST.

## 2025-01-12 NOTE — PROGRESS NOTES
[facility]   Hospitalist Progress Note  Date: 2025  Patient Name: Brian Mehta  : 1955  MRN: [id number]  Date of admission: 2025  Room/Bed: 426/1      Subjective   Subjective     Chief Complaint:   Generalized weakness    Summary:  Brian Mehta is a 69 y.o. male with medical history of hypertension, hyperlipidemia, CHF, COPD, type 2 diabetes, tobacco use and history of CVA with residual right-sided weakness presented to the ER with weakness and some hip pain.  Patient is a poor historian and history obtained by discussion with ER staff.  Appears patient for the last couple days have some right hip pain after a fall.  It has been accompanied by inability care for himself and generalized weakness, prompting a call to EMS to bring him in for evaluation.  Upon arrival they found his house to be in a poor unkempt state as well as the patient.  He was found on the ground with complaints of pain and was brought to the ER for further evaluation     Upon arrival he was tachycardic and afebrile otherwise stable.  Lab workup revealed pyuria with positive nitrites and bacteriuria and low magnesium.  Patient had x-ray of the pelvis negative for any fracture or dislocation and x-ray chest was also unremarkable.  Patient was given Rocephin for UTI and the hospitalist then contacted for admission of the patient was unable to ambulate well in the ER and unable to care for himself.  Social work is involved due to patient having poor living situation at home when EMS felt his house was unkempt and he was unable to care for himself.  Patient's blood cultures returned positive, gram-positive cocci in pairs and chains, gram-negative cocci, 2 out of 2, gram-negative bacilli 1 out of 2.  Patient has vancomycin added to ceftriaxone.  Imaging has been ordered of entire spine given his generalized weakness as well as abdominal CT.    Interval Followup:   Patient did perform some of the imaging today.  Patient did have CT  scan of abdomen, that showed retained left ureteral stent with fractured pigtail component within the lumen of the urinary bladder, granulation tissue surrounding the pigtail portion which appears unchanged.  Left renal hydronephrosis and proximal hydroureter with periureteric stranding.  Given the concern this could be site of infection, urology consulted today, appreciate assistance.  MRIs completed without contrast, patient could not stay in scanner long enough for contrasted portion.  Lumbar scan with multilevel degenerative changes.  Limited due to motion artifact.  Cervical component shows absence of normal right vertebral artery flow void with follow-up CTA recommended.  Thoracic spine shows active osseous edema involving posterior right 10th rib near costo vertebral junction which on prior exams appears to represent healed fractures however given the edema and adjacent soft tissue nodularity underlying osseous lesion cannot be excluded.  Recommendations for follow-up examination with contrast.  Following discussion with patient coming back from my scanner, patient does not appear willing to have additional imaging done at this time.  Will continue to talk with them.      Objective   Objective     Vitals:   Temp:  [97.9 °F (36.6 °C)-98.2 °F (36.8 °C)] 98.2 °F (36.8 °C)  Heart Rate:  [79-97] 92  Resp:  [18-20] 20  BP: (116-133)/(57-81) 127/81    Physical Exam               Constitutional: Awake, alert, no acute distress, disheveled              Eyes: Pupils equal, sclerae anicteric, no conjunctival injection              HENT: NCAT, mucous membranes dry              Neck: Supple, no thyromegaly, no lymphadenopathy, trachea midline              Respiratory: Clear to auscultation bilaterally              Cardiovascular: RRR, no murmurs, rubs, or gallops, palpable pedal pulses bilaterally              Gastrointestinal: Positive bowel sounds, soft, nontender, nondistended              Musculoskeletal: No bilateral  ankle edema, no clubbing or cyanosis to extremities.  No pain with palpation of spine              Neurologic: Oriented x 3, mild residual weakness on the right side 4 out of 5 strength.  Normal reflexes in bilateral lower extremities.  Normal sensation in bilateral lower extremities      Result Review    Result Review:  I have personally reviewed these results:  [x]  Laboratory      Lab 01/12/25  0541 01/11/25  0530 01/10/25  0347 01/08/25  1256 01/08/25  0147   WBC 5.40 5.01 4.87   < >  --    HEMOGLOBIN 13.2 12.3* 12.1*   < >  --    HEMATOCRIT 40.7 38.9 38.5   < >  --    PLATELETS 244 244 227   < >  --    NEUTROS ABS 1.82 1.74 1.68*   < >  --    IMMATURE GRANS (ABS) 0.01 0.01 0.01   < >  --    LYMPHS ABS 2.97 2.73 2.60   < >  --    MONOS ABS 0.46 0.37 0.42   < >  --    EOS ABS 0.12 0.12 0.13   < >  --    MCV 80.4 80.5 81.4   < >  --    LACTATE  --   --   --   --  1.1    < > = values in this interval not displayed.         Lab 01/12/25  0541 01/11/25  0530 01/10/25  0347 01/09/25  0444 01/08/25  1256 01/07/25  2156   SODIUM 136 135* 137 136   < > 130*   POTASSIUM 4.0 4.0 4.2 4.0   < > 4.4   CHLORIDE 103 104 104 102   < > 98   CO2 25.2 23.7 24.5 25.2   < > 22.6   ANION GAP 7.8 7.3 8.5 8.8   < > 9.4   BUN 11 11 15 17   < > 14   CREATININE 0.72* 0.73* 0.79 0.89   < > 0.77   EGFR 100.1 99.7 97.4 93.9   < > 98.1   GLUCOSE 118* 124* 163* 136*   < > 264*   CALCIUM 8.6 8.5* 8.6 8.5*   < > 8.7   MAGNESIUM 1.9 1.8 1.8 1.9   < > 1.5*   PHOSPHORUS  --  3.0 2.8 3.0  --   --    HEMOGLOBIN A1C  --   --   --   --   --  8.80*    < > = values in this interval not displayed.         Lab 01/10/25  0347 01/09/25  0444 01/07/25 2156   TOTAL PROTEIN 6.4 6.6 7.4   ALBUMIN 2.7* 2.6* 2.9*   GLOBULIN 3.7 4.0 4.5   ALT (SGPT) <5 <5 5   AST (SGOT) 9 11 10   BILIRUBIN 0.3 0.3 0.5   ALK PHOS 88 87 100         Lab 01/07/25 2156   HSTROP T 9                 Brief Urine Lab Results  (Last result in the past 365 days)        Color   Clarity    Blood   Leuk Est   Nitrite   Protein   CREAT   Urine HCG        01/09/25 1428 Yellow   Cloudy   Small (1+)   Large (3+)   Positive   30 mg/dL (1+)                 [x]  Microbiology   Microbiology Results (last 10 days)       Procedure Component Value - Date/Time    Urine Culture - Urine, Urine, Clean Catch [[id number]]  (Normal) Collected: 01/09/25 1428    Lab Status: Final result Specimen: Urine, Clean Catch Updated: 01/10/25 0936     Urine Culture No growth    Blood Culture - Blood, Arm, Right [[id number]]  (Normal) Collected: 01/08/25 2303    Lab Status: Preliminary result Specimen: Blood from Arm, Right Updated: 01/11/25 2331     Blood Culture No growth at 3 days    Narrative:      Less than seven (7) mL's of blood was collected.  Insufficient quantity may yield false negative results.    Blood Culture - Blood, Arm, Left [[id number]]  (Normal) Collected: 01/08/25 2303    Lab Status: Preliminary result Specimen: Blood from Arm, Left Updated: 01/11/25 2331     Blood Culture No growth at 3 days    Narrative:      Less than seven (7) mL's of blood was collected.  Insufficient quantity may yield false negative results.    Blood Culture - Blood, Arm, Right [[id number]]  (Abnormal) Collected: 01/08/25 0147    Lab Status: Preliminary result Specimen: Blood from Arm, Right Updated: 01/12/25 0714     Blood Culture Aerococcus viridans     Comment:   Aerococcus viridans is usually susceptible to vancomycin. Resistance has been reported to the fluoroquinolones and beta-lactams. Please call lab to request further workup.        Isolated from Aerobic Bottle     Blood Culture Gram Negative Bacilli     Comment: Sending to [facility] for ID/MICs 01/13/25        Isolated from Aerobic Bottle     Blood Culture Staphylococcus, coagulase negative     Isolated from Aerobic Bottle     Blood Culture Streptococcus, Alpha Hemolytic     Isolated from Anaerobic Bottle     Gram Stain Aerobic Bottle Gram positive cocci in pairs, chains and clusters       Aerobic Bottle Gram negative bacilli      Anaerobic Bottle Gram positive cocci in pairs, chains and clusters    Narrative:      Less than seven (7) mL's of blood was collected.  Insufficient quantity may yield false negative results.  Streptococcus, Alpha Hemolytic: Probable contaminant requires clinical correlation, susceptibility not performed unless requested by physician.    Blood Culture - Blood, Arm, Right [[id number]]  (Abnormal) Collected: 01/08/25 0147    Lab Status: Preliminary result Specimen: Blood from Arm, Right Updated: 01/12/25 0714     Blood Culture Aerococcus viridans     Comment:   Aerococcus viridans is usually susceptible to vancomycin. Resistance has been reported to the fluoroquinolones and beta-lactams. Please call lab to request further workup.        Isolated from Aerobic Bottle     Blood Culture Gram Negative Bacilli     Isolated from Aerobic Bottle     Blood Culture Staphylococcus lentus     Isolated from Aerobic Bottle     Gram Stain Aerobic Bottle Gram positive cocci in pairs and chains      Aerobic Bottle Gram positive cocci in clusters    Narrative:      Less than seven (7) mL's of blood was collected.  Insufficient quantity may yield false negative results.    Blood Culture ID, PCR - Blood, Arm, Right [[id number]] Collected: 01/08/25 0147    Lab Status: Final result Specimen: Blood from Arm, Right Updated: 01/08/25 2210     BCID, PCR Negative by BCID PCR. Culture to Follow.     BOTTLE TYPE Aerobic Bottle          [x]  Radiology  MRI Lumbar Spine Without Contrast    Result Date: 1/12/2025  Impression: Mild multilevel degenerative changes in the lumbar spine. The exam is limited due to patient motion artifact. The patient had difficulty tolerating the exam and intravenous contrast could not be administered. Electronically Signed: Grisel Gaines MD  1/12/2025 11:43 AM EST  Workstation ID: [id number]    MRI Thoracic Spine Without Contrast    Result Date: 1/12/2025  1. Active osseous edema  involving the posterior right 10th rib near the costovertebral junction which on prior exams appeared to represent healed fractures however given the edema and adjacent soft tissue nodularity underlying osseous lesion cannot be excluded. A follow-up repeat examination with contrast administration is recommended.. Electronically Signed: Grisel Gaines MD  1/12/2025 11:35 AM EST  Workstation ID: [id number]    MRI Cervical Spine Without Contrast    Result Date: 1/12/2025  Impression: 1. Absence of the normal right vertebral artery flow-void with a follow-up CTA recommended. These findings were sent as a staff message. 2. Mild multilevel degenerative changes noted in the cervical spine. Electronically Signed: Grisel Gaines MD  1/12/2025 11:19 AM EST  Workstation ID: [id number]    CT Abdomen Pelvis With Contrast    Result Date: 1/12/2025  Impression: 1. Retained left ureteral stent with a fractured pigtail component within the lumen of the urinary bladder. There is granulation tissue surrounding the pigtail portion which appears unchanged. 2. Left renal hydronephrosis and proximal hydroureter with periureteric stranding. There is some mucosal enhancement of the left renal collecting system and recommend correlation with urinalysis to exclude infection. 3. Diverticulosis. Electronically Signed: Grisel Gaines MD  1/12/2025 9:11 AM EST  Workstation ID: [id number]    XR Hip With or Without Pelvis 2 - 3 View Right    Result Date: 1/7/2025  1.No acute fracture or dislocation. 2.Degenerative changes in both hips. 3.Fractured distal left ureteral stent, unchanged. Electronically Signed: Durga Zurita MD  1/7/2025 11:48 PM EST  Workstation ID: [id number]    XR Chest 1 View    Result Date: 1/7/2025  Impression: No acute cardiopulmonary disease. Electronically Signed: Jm Bran MD  1/7/2025 10:11 PM EST  Workstation ID: [id number]   []  EKG/Telemetry   []  Cardiology/Vascular   []  Pathology  []  Old records  []   Other:    Assessment & Plan   Assessment / Plan     Assessment:  Bacteremia  Urinary tract infection  Retained left ureteral stent with a fractured pigtail component within the lumen of the urinary bladder  Type 2 diabetes  Hypertension  History of CVA with residual right-sided deficits  Heart failure, low ejection fraction  COPD not acute exacerbation  Active tobacco abuse  Generalized weakness    Plan:  Patient remains admitted to hospital for further care and management  Patient's blood cultures have returned positive for gram-positive and gram-negative.  Continue ceftriaxone vancomycin  Therapeutic drug monitoring while on vancomycin  Repeat blood cultures remain no growth to date at 3 days  ET of abdomen shows retained left ureteral stent with fractured pigtail component within the lumen of the bladder.  Discussed with urology today, appreciate assistance.  Concerned this could be source of patient's infection. Patient will need cystolitholapaxy with left ureteral stent removal possible laser and stent replacement.  Review of records indicate patient was to have this procedure done as an outpatient however secondary to transportation issues patient has never arrived at the hospital for this procedure.  Monitor renal function closely following contrast load today  PT OT, wound care.  Case management consulted.  Continue to work on disposition, patient most interested in discharging home once medical workup complete.  Patient would likely be most appropriate for discharging to rehab facility, will continue to discuss with patient daily     Discussed with RN.  Discussed with urologist    VTE Prophylaxis:  Pharmacologic VTE prophylaxis orders are present.        CODE STATUS:   Code Status (Patient has no pulse and is not breathing): CPR (Attempt to Resuscitate)  Medical Interventions (Patient has pulse or is breathing): Full Support      Electronically signed by Arcenio Mosquera MD, 1/12/2025, 18:04  EST.

## 2025-01-12 NOTE — PLAN OF CARE
Goal Outcome Evaluation:              Outcome Evaluation: Patient alert and oriented x4. Pt. needs reinforcement and repeated education in terms of care and the need to cooperate with care plan. Vital signs stable throughout shift. Pt. rested well during night. Continue care per Plan of Care.

## 2025-01-12 NOTE — PLAN OF CARE
Goal Outcome Evaluation:      VSS. SR. Patient did agree to go for CT scan and MRI this morning. He completed the CT Scan, but only completed the first have of MRI. Refused insulin at lunch. Discussed the importance of complying with sliding scale insulin when blood glucose is elevated and patient did all this RN to administer sliding scale at supper.

## 2025-01-13 ENCOUNTER — APPOINTMENT (OUTPATIENT)
Dept: CARDIOLOGY | Facility: HOSPITAL | Age: 70
DRG: 660 | End: 2025-01-13
Payer: MEDICARE

## 2025-01-13 LAB
ANION GAP SERPL CALCULATED.3IONS-SCNC: 7.5 MMOL/L (ref 5–15)
BACTERIA SPEC AEROBE CULT: NORMAL
BACTERIA SPEC AEROBE CULT: NORMAL
BASOPHILS # BLD AUTO: 0.03 10*3/MM3 (ref 0–0.2)
BASOPHILS NFR BLD AUTO: 0.6 % (ref 0–1.5)
BUN SERPL-MCNC: 16 MG/DL (ref 8–23)
BUN/CREAT SERPL: 20.5 (ref 7–25)
CALCIUM SPEC-SCNC: 8.9 MG/DL (ref 8.6–10.5)
CHLORIDE SERPL-SCNC: 104 MMOL/L (ref 98–107)
CO2 SERPL-SCNC: 24.5 MMOL/L (ref 22–29)
CREAT SERPL-MCNC: 0.78 MG/DL (ref 0.76–1.27)
DEPRECATED RDW RBC AUTO: 43 FL (ref 37–54)
EGFRCR SERPLBLD CKD-EPI 2021: 97.7 ML/MIN/1.73
EOSINOPHIL # BLD AUTO: 0.09 10*3/MM3 (ref 0–0.4)
EOSINOPHIL NFR BLD AUTO: 1.9 % (ref 0.3–6.2)
ERYTHROCYTE [DISTWIDTH] IN BLOOD BY AUTOMATED COUNT: 15.1 % (ref 12.3–15.4)
GLUCOSE BLDC GLUCOMTR-MCNC: 159 MG/DL (ref 70–99)
GLUCOSE BLDC GLUCOMTR-MCNC: 171 MG/DL (ref 70–99)
GLUCOSE BLDC GLUCOMTR-MCNC: 215 MG/DL (ref 70–99)
GLUCOSE BLDC GLUCOMTR-MCNC: 311 MG/DL (ref 70–99)
GLUCOSE SERPL-MCNC: 148 MG/DL (ref 65–99)
HCT VFR BLD AUTO: 40.2 % (ref 37.5–51)
HGB BLD-MCNC: 12.9 G/DL (ref 13–17.7)
IMM GRANULOCYTES # BLD AUTO: 0.01 10*3/MM3 (ref 0–0.05)
IMM GRANULOCYTES NFR BLD AUTO: 0.2 % (ref 0–0.5)
LYMPHOCYTES # BLD AUTO: 2.44 10*3/MM3 (ref 0.7–3.1)
LYMPHOCYTES NFR BLD AUTO: 50.5 % (ref 19.6–45.3)
MAGNESIUM SERPL-MCNC: 1.8 MG/DL (ref 1.6–2.4)
MCH RBC QN AUTO: 25.7 PG (ref 26.6–33)
MCHC RBC AUTO-ENTMCNC: 32.1 G/DL (ref 31.5–35.7)
MCV RBC AUTO: 80.1 FL (ref 79–97)
MONOCYTES # BLD AUTO: 0.45 10*3/MM3 (ref 0.1–0.9)
MONOCYTES NFR BLD AUTO: 9.3 % (ref 5–12)
NEUTROPHILS NFR BLD AUTO: 1.81 10*3/MM3 (ref 1.7–7)
NEUTROPHILS NFR BLD AUTO: 37.5 % (ref 42.7–76)
NRBC BLD AUTO-RTO: 0 /100 WBC (ref 0–0.2)
PLATELET # BLD AUTO: 267 10*3/MM3 (ref 140–450)
PMV BLD AUTO: 9.6 FL (ref 6–12)
POTASSIUM SERPL-SCNC: 4.2 MMOL/L (ref 3.5–5.2)
RBC # BLD AUTO: 5.02 10*6/MM3 (ref 4.14–5.8)
SODIUM SERPL-SCNC: 136 MMOL/L (ref 136–145)
VANCOMYCIN TROUGH SERPL-MCNC: 18.12 MCG/ML (ref 5–20)
WBC NRBC COR # BLD AUTO: 4.83 10*3/MM3 (ref 3.4–10.8)

## 2025-01-13 PROCEDURE — 25010000002 ENOXAPARIN PER 10 MG: Performed by: STUDENT IN AN ORGANIZED HEALTH CARE EDUCATION/TRAINING PROGRAM

## 2025-01-13 PROCEDURE — 80202 ASSAY OF VANCOMYCIN: CPT | Performed by: INTERNAL MEDICINE

## 2025-01-13 PROCEDURE — 99232 SBSQ HOSP IP/OBS MODERATE 35: CPT | Performed by: INTERNAL MEDICINE

## 2025-01-13 PROCEDURE — 97530 THERAPEUTIC ACTIVITIES: CPT

## 2025-01-13 PROCEDURE — 83735 ASSAY OF MAGNESIUM: CPT | Performed by: INTERNAL MEDICINE

## 2025-01-13 PROCEDURE — 25010000002 AZTREONAM PER 500 MG: Performed by: INTERNAL MEDICINE

## 2025-01-13 PROCEDURE — 93306 TTE W/DOPPLER COMPLETE: CPT | Performed by: INTERNAL MEDICINE

## 2025-01-13 PROCEDURE — 25810000003 SODIUM CHLORIDE 0.9 % SOLUTION: Performed by: INTERNAL MEDICINE

## 2025-01-13 PROCEDURE — 63710000001 INSULIN LISPRO (HUMAN) PER 5 UNITS: Performed by: STUDENT IN AN ORGANIZED HEALTH CARE EDUCATION/TRAINING PROGRAM

## 2025-01-13 PROCEDURE — 82948 REAGENT STRIP/BLOOD GLUCOSE: CPT | Performed by: STUDENT IN AN ORGANIZED HEALTH CARE EDUCATION/TRAINING PROGRAM

## 2025-01-13 PROCEDURE — 63710000001 INSULIN GLARGINE PER 5 UNITS: Performed by: STUDENT IN AN ORGANIZED HEALTH CARE EDUCATION/TRAINING PROGRAM

## 2025-01-13 PROCEDURE — 80048 BASIC METABOLIC PNL TOTAL CA: CPT | Performed by: INTERNAL MEDICINE

## 2025-01-13 PROCEDURE — 97116 GAIT TRAINING THERAPY: CPT

## 2025-01-13 PROCEDURE — 25010000002 VANCOMYCIN 5 G RECONSTITUTED SOLUTION: Performed by: INTERNAL MEDICINE

## 2025-01-13 PROCEDURE — 93306 TTE W/DOPPLER COMPLETE: CPT

## 2025-01-13 PROCEDURE — 85025 COMPLETE CBC W/AUTO DIFF WBC: CPT | Performed by: INTERNAL MEDICINE

## 2025-01-13 PROCEDURE — 82948 REAGENT STRIP/BLOOD GLUCOSE: CPT

## 2025-01-13 RX ORDER — HYDROXYZINE HYDROCHLORIDE 25 MG/1
25 TABLET, FILM COATED ORAL 3 TIMES DAILY PRN
Status: DISCONTINUED | OUTPATIENT
Start: 2025-01-13 | End: 2025-01-17 | Stop reason: HOSPADM

## 2025-01-13 RX ADMIN — INSULIN LISPRO 2 UNITS: 100 INJECTION, SOLUTION INTRAVENOUS; SUBCUTANEOUS at 20:56

## 2025-01-13 RX ADMIN — DICLOFENAC SODIUM 2 G: 10 GEL TOPICAL at 20:57

## 2025-01-13 RX ADMIN — HYDROCORTISONE 1 APPLICATION: 1 OINTMENT TOPICAL at 09:08

## 2025-01-13 RX ADMIN — Medication 10 ML: at 09:07

## 2025-01-13 RX ADMIN — INSULIN LISPRO 2 UNITS: 100 INJECTION, SOLUTION INTRAVENOUS; SUBCUTANEOUS at 09:05

## 2025-01-13 RX ADMIN — HYDROCORTISONE 1 APPLICATION: 1 OINTMENT TOPICAL at 17:00

## 2025-01-13 RX ADMIN — DICLOFENAC SODIUM 2 G: 10 GEL TOPICAL at 09:06

## 2025-01-13 RX ADMIN — Medication 10 ML: at 20:57

## 2025-01-13 RX ADMIN — VANCOMYCIN HYDROCHLORIDE 1250 MG: 5 INJECTION, POWDER, LYOPHILIZED, FOR SOLUTION INTRAVENOUS at 01:27

## 2025-01-13 RX ADMIN — AZTREONAM 2000 MG: 2 INJECTION, POWDER, LYOPHILIZED, FOR SOLUTION INTRAMUSCULAR; INTRAVENOUS at 20:56

## 2025-01-13 RX ADMIN — VANCOMYCIN HYDROCHLORIDE 1250 MG: 5 INJECTION, POWDER, LYOPHILIZED, FOR SOLUTION INTRAVENOUS at 13:23

## 2025-01-13 RX ADMIN — ENOXAPARIN SODIUM 40 MG: 100 INJECTION SUBCUTANEOUS at 09:05

## 2025-01-13 RX ADMIN — Medication 1 APPLICATION: at 09:08

## 2025-01-13 RX ADMIN — INSULIN LISPRO 5 UNITS: 100 INJECTION, SOLUTION INTRAVENOUS; SUBCUTANEOUS at 16:58

## 2025-01-13 RX ADMIN — INSULIN GLARGINE 10 UNITS: 100 INJECTION, SOLUTION SUBCUTANEOUS at 09:05

## 2025-01-13 RX ADMIN — ASPIRIN 81 MG: 81 TABLET, CHEWABLE ORAL at 09:05

## 2025-01-13 RX ADMIN — INSULIN LISPRO 3 UNITS: 100 INJECTION, SOLUTION INTRAVENOUS; SUBCUTANEOUS at 12:03

## 2025-01-13 NOTE — THERAPY TREATMENT NOTE
Acute Care - Physical Therapy Treatment Note  [facility]     Patient Name: Brian Mehta  : 1955  MRN: [id number]  Today's Date: 2025      Visit Dx:     ICD-10-CM ICD-9-CM   1. Cystitis  N30.90 595.9   2. Impaired mobility and ADLs  Z74.09 V49.89    Z78.9    3. Difficulty walking  R26.2 719.7     Patient Active Problem List   Diagnosis    CVA (cerebral vascular accident)    Essential hypertension    High cholesterol    Hip pain    Vitamin B12 deficiency    Kidney disorder    Chronic pain of both knees    Chronic HFrEF (heart failure with reduced ejection fraction)    LVH (left ventricular hypertrophy)    COPD (chronic obstructive pulmonary disease)    DM2 (diabetes mellitus, type 2)    Urinary tract infection without hematuria    Abnormal nuclear stress test    UTI (urinary tract infection)    Fall    Failure to thrive in adult    Nephrolithiasis    Bladder stone    Retained ureteral stent    Acute UTI    Bacteremia     Past Medical History:   Diagnosis Date    CHF (congestive heart failure)     SEE'S DR [place] NO CURRENT S/S    COPD (chronic obstructive pulmonary disease)     INHALER    Diabetes mellitus     DOESN'T CHECK BG OFTEN AT HOME    Hyperlipidemia     Hypertension     Sepsis 2023    Stroke 2017    RIGHT SIDE WEAKNESS     Past Surgical History:   Procedure Laterality Date    APPENDECTOMY      EYE SURGERY Bilateral     MISTY CATARACT     PT Assessment (Last 12 Hours)       PT Evaluation and Treatment       Row Name 25 1458          Physical Therapy Time and Intention    Subjective Information complains of;pain  Itching  -VK     Document Type therapy note (daily note)  -VK     Mode of Treatment individual therapy;physical therapy  -VK     Patient Effort good  -VK       Row Name 25 1458          General Information    Patient Profile Reviewed yes  -VK     Existing Precautions/Restrictions fall  -VK       Row Name 25 1458          Pain    Pain Location other (see comments)  Pt  reports pain and soreness all over  -VK     Pain Management Interventions exercise or physical activity utilized;positioning techniques utilized;diversional activity provided  -       Row Name 01/13/25 1458          Cognition    Affect/Mental Status (Cognition) confused  -VK     Orientation Status (Cognition) oriented to;person;situation  -VK     Personal Safety Interventions nonskid shoes/slippers when out of bed;gait belt;fall prevention program maintained  -       Row Name 01/13/25 1458          Bed Mobility    Bed Mobility scooting/bridging;supine-sit;sit-supine  -VK     Scooting/Bridging [place] (Bed Mobility) moderate assist (50% patient effort);maximum assist (25% patient effort)  -VK     Supine-Sit-Supine [place] (Bed Mobility) moderate assist (50% patient effort);verbal cues  -VK     Bed Mobility, Safety Issues decreased use of arms for pushing/pulling;decreased use of legs for bridging/pushing  -     Assistive Device (Bed Mobility) bed rails;head of bed elevated;repositioning sheet  -       Row Name 01/13/25 1458          Transfers    Transfers sit-stand transfer;stand-sit transfer  -       Row Name 01/13/25 1458          Sit-Stand Transfer    Sit-Stand [place] (Transfers) minimum assist (75% patient effort);moderate assist (50% patient effort);verbal cues  -     Assistive Device (Sit-Stand Transfers) walker, front-wheeled  -     Comment, (Sit-Stand Transfer) Stand improved throughout session as pt became more confident. Pt reports he is scared of falling.  -       Row Name 01/13/25 1458          Stand-Sit Transfer    Stand-Sit [place] (Transfers) minimum assist (75% patient effort);moderate assist (50% patient effort);verbal cues  -     Assistive Device (Stand-Sit Transfers) walker, front-wheeled  -       Row Name 01/13/25 1458          Gait/Stairs (Locomotion)    [place] Level (Gait) minimum assist (75% patient effort);moderate assist (50% patient  effort);verbal cues  -VK     Assistive Device (Gait) walker, front-wheeled  -VK     Patient was able to Ambulate yes  -VK     Distance in Feet (Gait) 18  -VK     Pattern (Gait) 4-point;step-to  -VK     Deviations/Abnormal Patterns (Gait) ataxic;antalgic;base of support, narrow;chirag decreased;gait speed decreased;stride length decreased  -VK     Bilateral Gait Deviations forward flexed posture;heel strike decreased  -VK     Gait Assessment/Intervention Pt unsteady during most of ambulation, at times displaying a posterior lean that requires ModAx1. Pt fatigued very quickly with ambulation and grunted in pain throughout. Nursing assisted with ambulation at times due to this.  -VK       Row Name 01/13/25 1458          Safety Issues/Impairments Affecting Functional Mobility    Safety Issues Affecting Function (Mobility) judgment;positioning of assistive device;problem-solving;safety precautions follow-through/compliance;safety precaution awareness;sequencing abilities;insight into deficits/self-awareness  -VK     Impairments Affecting Function (Mobility) balance;endurance/activity tolerance;strength;shortness of breath;pain  -VK       Row Name 01/13/25 1458          Balance    Balance Assessment sit to stand dynamic balance;standing static balance;standing dynamic balance  -VK     Sit to Stand Dynamic Balance minimal assist;moderate assist  -VK     Static Standing Balance minimal assist  -VK     Dynamic Standing Balance minimal assist;moderate assist  -VK     Position/Device Used, Standing Balance walker, front-wheeled  -VK     Balance Interventions sit to stand  x4  -VK       Row Name             Wound 10/31/24 1234 Bilateral gluteal    Wound - Properties Group Placement Date: 10/31/24  -NH Placement Time: 1234  -NH Side: Bilateral  -NH Location: gluteal  -NH Primary Wound Type: MASD  -NH    Retired Wound - Properties Group Placement Date: 10/31/24  -NH Placement Time: 1234  -NH Side: Bilateral  -NH Location:  gluteal  -NH Primary Wound Type: MASD  -NH    Retired Wound - Properties Group Placement Date: 10/31/24  -NH Placement Time: 1234  -NH Side: Bilateral  -NH Location: gluteal  -NH Primary Wound Type: MASD  -NH    Retired Wound - Properties Group Date first assessed: 10/31/24  -NH Time first assessed: 1234  -NH Side: Bilateral  -NH Location: gluteal  -NH Primary Wound Type: MASD  -NH      Row Name 01/13/25 1458          Positioning and Restraints    Pre-Treatment Position in bed  -VK     Post Treatment Position bed  -VK     In Bed supine;call light within reach;encouraged to call for assist;exit alarm on;notified nsg  -VK       Row Name 01/13/25 1458          Progress Summary (PT)    Progress Toward Functional Goals (PT) progress toward functional goals is fair;progress toward functional goals is gradual  -VK               User Key  (r) = Recorded By, (t) = Taken By, (c) = Cosigned By      Initials Name Provider Type    NH Alanna Morris, RN Registered Nurse    Anita Monteiro PTA Physical Therapist Assistant                    Physical Therapy Education        No education to display                  PT Recommendation and Plan     Progress Summary (PT)  Progress Toward Functional Goals (PT): progress toward functional goals is fair, progress toward functional goals is gradual   Outcome Measures       Row Name 01/13/25 1500             How much help from another person do you currently need...    Turning from your back to your side while in flat bed without using bedrails? 3  -VK      Moving from lying on back to sitting on the side of a flat bed without bedrails? 2  -VK      Moving to and from a bed to a chair (including a wheelchair)? 2  -VK      Standing up from a chair using your arms (e.g., wheelchair, bedside chair)? 2  -VK      Climbing 3-5 steps with a railing? 1  -VK      To walk in hospital room? 2  -VK      AM-PAC 6 Clicks Score (PT) 12  -VK                User Key  (r) = Recorded By, (t) = Taken By,  (c) = Cosigned By      Initials Name Provider Type    SARAH Anita Leonard PTA Physical Therapist Assistant                     Time Calculation:    PT Charges       Row Name 01/13/25 1542             Time Calculation    PT Received On 01/13/25  -VK         Timed Charges    73477 - Gait Training Minutes  11  -VK      61105 - PT Therapeutic Activity Minutes 28  -VK         Total Minutes    Timed Charges Total Minutes 39  -VK       Total Minutes 39  -VK                User Key  (r) = Recorded By, (t) = Taken By, (c) = Cosigned By      Initials Name Provider Type    SARAH Anita Leonard PTA Physical Therapist Assistant                  Therapy Charges for Today       Code Description Service Date Service Provider Modifiers Qty    43700699237 HC GAIT TRAINING EA 15 MIN 1/13/2025 Anita Leonard, JOHN GP 1    23940299860 HC PT THERAPEUTIC ACT EA 15 MIN 1/13/2025 Anita Leonard PTA GP 2            PT G-Codes  Outcome Measure Options: AM-PAC 6 Clicks Basic Mobility (PT)  AM-PAC 6 Clicks Score (PT): 12  AM-PAC 6 Clicks Score (OT): 16    Anita Leonard PTA  1/13/2025

## 2025-01-13 NOTE — PLAN OF CARE
"Goal Outcome Evaluation:              Outcome Evaluation: Patient alert and oriented x4. Pt. continues to need reinforcement and repeated education with regards to care. Vital signs stable throughout shift. Pt. was placed on suicide precautions over statements made about wanting to end life. PA was requested and arrived at bedside to enforce education on care plan. When asked if patient still felt suicidal he replied, \"No, I feel better.\" Precautions were discontinued. Continue care per Plan of Care.                             "

## 2025-01-13 NOTE — PROGRESS NOTES
"[facility] Clinical Pharmacy Services: Vancomycin Monitoring Note    Brian Mehta is a 69 y.o. male who is on day 5 of pharmacy to dose vancomycin for Bacteremia.    Previous Vancomycin Dose:   1250 mg IV every  12  hours  Imaging Reviewed?: Yes   Updated Cultures and Sensitivities:    Urine cx: NGTD   @ 2303 Blood cx, right: NGTD   @ 2303 Blood cx, left: NGTD   @ 0147 Blood cx, right: coag negative staph, gram positive cocci, gram negative bacilli \"less than 7 mLs of blood was collected - insufficient quantity may yield false negative results\"   @ 0147 Blood cx, right: aerococcus viridans, gram negative bacilli, coag negative staph, \"less than 7 mLs of blood was collected - insufficient quantity may yield false negative results\"    Vitals/Labs  Ht:  ; Wt: 94.1 kg (207 lb 7.3 oz)   Temp (24hrs), Av.1 °F (36.7 °C), Min:97.9 °F (36.6 °C), Max:98.2 °F (36.8 °C)   Estimated Creatinine Clearance: 122.3 mL/min (by C-G formula based on SCr of 0.78 mg/dL).     Results from last 7 days   Lab Units 25  1046 25  0553 25  0541 25  0530 01/10/25  1104   VANCOMYCIN RM mcg/mL  --   --   --   --  16.64   VANCOMYCIN TR mcg/mL 18.12  --   --   --   --    CREATININE mg/dL  --  0.78 0.72* 0.73*  --    WBC 10*3/mm3  --  4.83 5.40 5.01  --      Assessment/Plan    Current Vancomycin Dose:  1250 mg IV every 12 hours; which provides the following predicted parameters:  AUC24,ss: 520 mg/L.hr  Probability of AUC24 > 400: 99 %  Ctrough,ss: 16.2 mg/L  Probability of Ctrough,ss > 20: 9 %  Probability of nephrotoxicity (Lodise TERRELL ): 12 %  No levels ordered at this time  We will continue to monitor patient changes and renal function     Thank you for involving pharmacy in this patient's care. Please contact pharmacy with any questions or concerns.    Marcos Boland MARIO  Clinical Pharmacist    "

## 2025-01-13 NOTE — PROGRESS NOTES
[facility]   Hospitalist Progress Note  Date: 2025  Patient Name: Brian Mehta  : 1955  MRN: [id number]  Date of admission: 2025  Room/Bed: 426/1      Subjective   Subjective     Chief Complaint:   Generalized weakness    Summary:  Brian Mehta is a 69 y.o. male with medical history of hypertension, hyperlipidemia, CHF, COPD, type 2 diabetes, tobacco use and history of CVA with residual right-sided weakness presented to the ER with weakness and some hip pain.  Patient is a poor historian and history obtained by discussion with ER staff.  Appears patient for the last couple days have some right hip pain after a fall.  It has been accompanied by inability care for himself and generalized weakness, prompting a call to EMS to bring him in for evaluation.  Upon arrival they found his house to be in a poor unkempt state as well as the patient.  He was found on the ground with complaints of pain and was brought to the ER for further evaluation     Upon arrival he was tachycardic and afebrile otherwise stable.  Lab workup revealed pyuria with positive nitrites and bacteriuria and low magnesium.  Patient had x-ray of the pelvis negative for any fracture or dislocation and x-ray chest was also unremarkable.  Patient was given Rocephin for UTI and the hospitalist then contacted for admission of the patient was unable to ambulate well in the ER and unable to care for himself.  Social work is involved due to patient having poor living situation at home when EMS felt his house was unkempt and he was unable to care for himself.  Patient's blood cultures returned positive, gram-positive cocci in pairs and chains, gram-negative cocci, 2 out of 2, gram-negative bacilli 1 out of 2.  Patient has vancomycin added to ceftriaxone.  Imaging has been ordered of entire spine given his generalized weakness as well as abdominal CT.  Patient originally refusing scans, however patient did perform some of the imaging .   Patient did have CT scan of abdomen, showed retained left ureteral stent with fractured pigtail component within the lumen of the urinary bladder, granulation tissue surrounding the pigtail portion which appears unchanged.  Left renal hydronephrosis and proximal hydroureter with periureteric stranding.  Given the concern this could be site of infection, urology consulted, appreciate assistance.  MRIs completed without contrast, patient could not stay in scanner long enough for contrasted portion.  Lumbar scan with multilevel degenerative changes.  Limited due to motion artifact.  Cervical component shows absence of normal right vertebral artery flow void with follow-up CTA recommended.  Thoracic spine shows active osseous edema involving posterior right 10th rib near costo vertebral junction which on prior exams appears to represent healed fractures however given the edema and adjacent soft tissue nodularity underlying osseous lesion cannot be excluded.  Recommendations for follow-up examination with contrast.  Following discussion with patient, at this time not interested in having additional imaging done      Interval Followup:   Infectious disease updated on current case, also concerned about urinary source old stent in place with CT changes concerning of pyelonephritis.  Recommendations are to continue current antibiotics, recommendations to have stent removed.  No acute issues overnight, no complaints currently on rounds.  Patient remains anxious to discharge as soon as possible however understands workup required    Objective   Objective     Vitals:   Temp:  [97.9 °F (36.6 °C)-98.1 °F (36.7 °C)] 98.1 °F (36.7 °C)  Heart Rate:  [] 104  Resp:  [18-20] 18  BP: (122-146)/(69-85) 124/82    Physical Exam               Constitutional: Awake, alert, no acute distress, disheveled              Eyes: Pupils equal, sclerae anicteric, no conjunctival injection              HENT: NCAT, mucous membranes dry               Neck: Supple, no thyromegaly, no lymphadenopathy, trachea midline              Respiratory: Clear to auscultation bilaterally              Cardiovascular: RRR, no murmurs, rubs, or gallops, palpable pedal pulses bilaterally              Gastrointestinal: Positive bowel sounds, soft, nontender, nondistended              Musculoskeletal: No bilateral ankle edema, no clubbing or cyanosis to extremities.  No pain with palpation of spine              Neurologic: Oriented x 3, mild residual weakness on the right side 4 out of 5 strength.  Normal reflexes in bilateral lower extremities.  Normal sensation in bilateral lower extremities      Result Review    Result Review:  I have personally reviewed these results:  [x]  Laboratory      Lab 01/13/25  0553 01/12/25  0541 01/11/25  0530 01/08/25  1256 01/08/25  0147   WBC 4.83 5.40 5.01   < >  --    HEMOGLOBIN 12.9* 13.2 12.3*   < >  --    HEMATOCRIT 40.2 40.7 38.9   < >  --    PLATELETS 267 244 244   < >  --    NEUTROS ABS 1.81 1.82 1.74   < >  --    IMMATURE GRANS (ABS) 0.01 0.01 0.01   < >  --    LYMPHS ABS 2.44 2.97 2.73   < >  --    MONOS ABS 0.45 0.46 0.37   < >  --    EOS ABS 0.09 0.12 0.12   < >  --    MCV 80.1 80.4 80.5   < >  --    LACTATE  --   --   --   --  1.1    < > = values in this interval not displayed.         Lab 01/13/25  0553 01/12/25  0541 01/11/25  0530 01/10/25  0347 01/09/25  0444 01/08/25  1256 01/07/25  2156   SODIUM 136 136 135* 137 136   < > 130*   POTASSIUM 4.2 4.0 4.0 4.2 4.0   < > 4.4   CHLORIDE 104 103 104 104 102   < > 98   CO2 24.5 25.2 23.7 24.5 25.2   < > 22.6   ANION GAP 7.5 7.8 7.3 8.5 8.8   < > 9.4   BUN 16 11 11 15 17   < > 14   CREATININE 0.78 0.72* 0.73* 0.79 0.89   < > 0.77   EGFR 97.7 100.1 99.7 97.4 93.9   < > 98.1   GLUCOSE 148* 118* 124* 163* 136*   < > 264*   CALCIUM 8.9 8.6 8.5* 8.6 8.5*   < > 8.7   MAGNESIUM 1.8 1.9 1.8 1.8 1.9   < > 1.5*   PHOSPHORUS  --   --  3.0 2.8 3.0  --   --    HEMOGLOBIN A1C  --   --   --   --   --    --  8.80*    < > = values in this interval not displayed.         Lab 01/10/25  0347 01/09/25  0444 01/07/25 2156   TOTAL PROTEIN 6.4 6.6 7.4   ALBUMIN 2.7* 2.6* 2.9*   GLOBULIN 3.7 4.0 4.5   ALT (SGPT) <5 <5 5   AST (SGOT) 9 11 10   BILIRUBIN 0.3 0.3 0.5   ALK PHOS 88 87 100         Lab 01/07/25  2156   HSTROP T 9                 Brief Urine Lab Results  (Last result in the past 365 days)        Color   Clarity   Blood   Leuk Est   Nitrite   Protein   CREAT   Urine HCG        01/09/25 1428 Yellow   Cloudy   Small (1+)   Large (3+)   Positive   30 mg/dL (1+)                 [x]  Microbiology   Microbiology Results (last 10 days)       Procedure Component Value - Date/Time    Urine Culture - Urine, Urine, Clean Catch [[id number]]  (Normal) Collected: 01/09/25 1428    Lab Status: Final result Specimen: Urine, Clean Catch Updated: 01/10/25 0936     Urine Culture No growth    Blood Culture - Blood, Arm, Right [[id number]]  (Normal) Collected: 01/08/25 2303    Lab Status: Preliminary result Specimen: Blood from Arm, Right Updated: 01/12/25 2330     Blood Culture No growth at 4 days    Narrative:      Less than seven (7) mL's of blood was collected.  Insufficient quantity may yield false negative results.    Blood Culture - Blood, Arm, Left [[id number]]  (Normal) Collected: 01/08/25 2303    Lab Status: Preliminary result Specimen: Blood from Arm, Left Updated: 01/12/25 2330     Blood Culture No growth at 4 days    Narrative:      Less than seven (7) mL's of blood was collected.  Insufficient quantity may yield false negative results.    Blood Culture - Blood, Arm, Right [[id number]]  (Abnormal)  (Susceptibility) Collected: 01/08/25 0147    Lab Status: Preliminary result Specimen: Blood from Arm, Right Updated: 01/13/25 0629     Blood Culture Aerococcus viridans     Comment:   Aerococcus viridans is usually susceptible to vancomycin. Resistance has been reported to the fluoroquinolones and beta-lactams. Please call lab to  request further workup.        Isolated from Aerobic Bottle     Blood Culture Gram Negative Bacilli     Comment: Sending to [facility] for ID/MICs 01/13/25        Isolated from Aerobic Bottle     Blood Culture Staphylococcus lentus     Isolated from Aerobic Bottle     Blood Culture Streptococcus, Alpha Hemolytic     Isolated from Anaerobic Bottle     Gram Stain Aerobic Bottle Gram positive cocci in pairs, chains and clusters      Aerobic Bottle Gram negative bacilli      Anaerobic Bottle Gram positive cocci in pairs, chains and clusters    Narrative:      Streptococcus, Alpha Hemolytic: Probable contaminant requires clinical correlation, susceptibility not performed unless requested by physician.    Less than seven (7) mL's of blood was collected.  Insufficient quantity may yield false negative results.      Susceptibility        Staphylococcus lentus      GILBERT      Oxacillin Resistant      Vancomycin Susceptible                           Blood Culture - Blood, Arm, Right [[id number]]  (Abnormal)  (Susceptibility) Collected: 01/08/25 0147    Lab Status: Preliminary result Specimen: Blood from Arm, Right Updated: 01/13/25 0629     Blood Culture Aerococcus viridans     Comment:   Aerococcus viridans is usually susceptible to vancomycin. Resistance has been reported to the fluoroquinolones and beta-lactams. Please call lab to request further workup.        Isolated from Aerobic Bottle     Blood Culture Gram Negative Bacilli     Isolated from Aerobic Bottle     Blood Culture Staphylococcus lentus     Isolated from Aerobic Bottle     Gram Stain Aerobic Bottle Gram positive cocci in pairs and chains      Aerobic Bottle Gram positive cocci in clusters    Narrative:      For GNB - Refer to previous blood culture collected on 01/08/2025 0147 for MICs     Less than seven (7) mL's of blood was collected.  Insufficient quantity may yield false negative results.    Susceptibility        Staphylococcus lentus      GILBERT      Oxacillin  Susceptible      Vancomycin Susceptible                           Blood Culture ID, PCR - Blood, Arm, Right [[id number]] Collected: 01/08/25 0147    Lab Status: Final result Specimen: Blood from Arm, Right Updated: 01/08/25 2210     BCID, PCR Negative by BCID PCR. Culture to Follow.     BOTTLE TYPE Aerobic Bottle          [x]  Radiology  MRI Lumbar Spine Without Contrast    Result Date: 1/12/2025  Impression: Mild multilevel degenerative changes in the lumbar spine. The exam is limited due to patient motion artifact. The patient had difficulty tolerating the exam and intravenous contrast could not be administered. Electronically Signed: Grisel Gaines MD  1/12/2025 11:43 AM EST  Workstation ID: [id number]    MRI Thoracic Spine Without Contrast    Result Date: 1/12/2025  1. Active osseous edema involving the posterior right 10th rib near the costovertebral junction which on prior exams appeared to represent healed fractures however given the edema and adjacent soft tissue nodularity underlying osseous lesion cannot be excluded. A follow-up repeat examination with contrast administration is recommended.. Electronically Signed: Grisel Gaines MD  1/12/2025 11:35 AM EST  Workstation ID: [id number]    MRI Cervical Spine Without Contrast    Result Date: 1/12/2025  Impression: 1. Absence of the normal right vertebral artery flow-void with a follow-up CTA recommended. These findings were sent as a staff message. 2. Mild multilevel degenerative changes noted in the cervical spine. Electronically Signed: Grisel Gaines MD  1/12/2025 11:19 AM EST  Workstation ID: [id number]    CT Abdomen Pelvis With Contrast    Result Date: 1/12/2025  Impression: 1. Retained left ureteral stent with a fractured pigtail component within the lumen of the urinary bladder. There is granulation tissue surrounding the pigtail portion which appears unchanged. 2. Left renal hydronephrosis and proximal hydroureter with periureteric stranding. There is  some mucosal enhancement of the left renal collecting system and recommend correlation with urinalysis to exclude infection. 3. Diverticulosis. Electronically Signed: Grisel Gaines MD  1/12/2025 9:11 AM EST  Workstation ID: [id number]    XR Hip With or Without Pelvis 2 - 3 View Right    Result Date: 1/7/2025  1.No acute fracture or dislocation. 2.Degenerative changes in both hips. 3.Fractured distal left ureteral stent, unchanged. Electronically Signed: Durga Zurita MD  1/7/2025 11:48 PM EST  Workstation ID: [id number]    XR Chest 1 View    Result Date: 1/7/2025  Impression: No acute cardiopulmonary disease. Electronically Signed: Jm Bran MD  1/7/2025 10:11 PM EST  Workstation ID: [id number]   []  EKG/Telemetry   []  Cardiology/Vascular   []  Pathology  []  Old records  []  Other:    Assessment & Plan   Assessment / Plan     Assessment:  Bacteremia  Urinary tract infection  Retained left ureteral stent with a fractured pigtail component within the lumen of the urinary bladder  Type 2 diabetes  Hypertension  History of CVA with residual right-sided deficits  Heart failure, low ejection fraction  COPD not acute exacerbation  Active tobacco abuse  Generalized weakness    Plan:  Patient remains admitted to hospital for further care and management  Patient's blood cultures have returned positive for gram-positive and gram-negative.  Continue ceftriaxone vancomycin  Therapeutic drug monitoring while on vancomycin  Repeat blood cultures remain no growth to date at 3 days  CT of abdomen shows retained left ureteral stent with fractured pigtail component within the lumen of the bladder.  Discussed with urology, appreciate assistance.  Concerned this could be source of patient's infection. Patient will need cystolitholapaxy with left ureteral stent removal possible laser and stent replacement.  Review of records indicate patient was to have this procedure done as an outpatient however secondary to transportation issues  patient has never arrived at the hospital for this procedure.  Continue to monitor renal function given contrast load  PT OT, wound care.  Case management consulted.  Continue to work on disposition, patient most interested in discharging home once medical workup complete.  Patient would likely be most appropriate for discharging to rehab facility, will continue to discuss with patient daily     Discussed with RN.  Discussed with infectious disease stewardship    Addendum: Later spoke with infectious disease.  Patient started developing rash, felt secondary to beta-lactam, ceftriaxone.  Holding further doses of ceftriaxone.  Recommendation to start patient on aztreonam      VTE Prophylaxis:  Pharmacologic VTE prophylaxis orders are present.        CODE STATUS:   Code Status (Patient has no pulse and is not breathing): CPR (Attempt to Resuscitate)  Medical Interventions (Patient has pulse or is breathing): Full Support      Electronically signed by Arcenio Mosquera MD, 1/13/2025, 16:02 EST.

## 2025-01-14 LAB
ANION GAP SERPL CALCULATED.3IONS-SCNC: 8.6 MMOL/L (ref 5–15)
ASCENDING AORTA: 3.7 CM
AV MEAN PRESS GRAD SYS DOP V1V2: 2 MMHG
AV VMAX SYS DOP: 103 CM/SEC
BASOPHILS # BLD AUTO: 0.01 10*3/MM3 (ref 0–0.2)
BASOPHILS NFR BLD AUTO: 0.2 % (ref 0–1.5)
BH CV ECHO MEAS - AO MAX PG: 4.2 MMHG
BH CV ECHO MEAS - AO ROOT DIAM: 3.7 CM
BH CV ECHO MEAS - AO V2 VTI: 14.1 CM
BH CV ECHO MEAS - AVA(I,D): 3.4 CM2
BH CV ECHO MEAS - EDV(CUBED): 110.6 ML
BH CV ECHO MEAS - EDV(MOD-SP2): 173 ML
BH CV ECHO MEAS - EDV(MOD-SP4): 129 ML
BH CV ECHO MEAS - EF(MOD-SP2): 36.4 %
BH CV ECHO MEAS - EF(MOD-SP4): 32.2 %
BH CV ECHO MEAS - ESV(CUBED): 27 ML
BH CV ECHO MEAS - ESV(MOD-SP2): 110 ML
BH CV ECHO MEAS - ESV(MOD-SP4): 87.4 ML
BH CV ECHO MEAS - FS: 37.5 %
BH CV ECHO MEAS - IVS/LVPW: 0.91 CM
BH CV ECHO MEAS - IVSD: 1 CM
BH CV ECHO MEAS - LA DIMENSION: 3 CM
BH CV ECHO MEAS - LAT PEAK E' VEL: 8.8 CM/SEC
BH CV ECHO MEAS - LV MASS(C)D: 181.9 GRAMS
BH CV ECHO MEAS - LV MAX PG: 2.8 MMHG
BH CV ECHO MEAS - LV MEAN PG: 1 MMHG
BH CV ECHO MEAS - LV V1 MAX: 83.8 CM/SEC
BH CV ECHO MEAS - LV V1 VTI: 10.6 CM
BH CV ECHO MEAS - LVIDD: 4.8 CM
BH CV ECHO MEAS - LVIDS: 3 CM
BH CV ECHO MEAS - LVOT AREA: 4.5 CM2
BH CV ECHO MEAS - LVOT DIAM: 2.4 CM
BH CV ECHO MEAS - LVPWD: 1.1 CM
BH CV ECHO MEAS - MED PEAK E' VEL: 11 CM/SEC
BH CV ECHO MEAS - PA V2 MAX: 93.4 CM/SEC
BH CV ECHO MEAS - RV MAX PG: 1.32 MMHG
BH CV ECHO MEAS - RV V1 MAX: 57.5 CM/SEC
BH CV ECHO MEAS - RV V1 VTI: 7.9 CM
BH CV ECHO MEAS - SV(LVOT): 48 ML
BH CV ECHO MEAS - SV(MOD-SP2): 63 ML
BH CV ECHO MEAS - SV(MOD-SP4): 41.6 ML
BH CV ECHO MEAS - TAPSE (>1.6): 1.26 CM
BH CV XLRA - TDI S': 9.6 CM/SEC
BUN SERPL-MCNC: 16 MG/DL (ref 8–23)
BUN/CREAT SERPL: 19.3 (ref 7–25)
CALCIUM SPEC-SCNC: 8.9 MG/DL (ref 8.6–10.5)
CHLORIDE SERPL-SCNC: 103 MMOL/L (ref 98–107)
CO2 SERPL-SCNC: 24.4 MMOL/L (ref 22–29)
CREAT SERPL-MCNC: 0.83 MG/DL (ref 0.76–1.27)
DEPRECATED RDW RBC AUTO: 44.3 FL (ref 37–54)
EGFRCR SERPLBLD CKD-EPI 2021: 95.9 ML/MIN/1.73
EOSINOPHIL # BLD AUTO: 0.33 10*3/MM3 (ref 0–0.4)
EOSINOPHIL NFR BLD AUTO: 5.8 % (ref 0.3–6.2)
ERYTHROCYTE [DISTWIDTH] IN BLOOD BY AUTOMATED COUNT: 15.4 % (ref 12.3–15.4)
GLUCOSE BLDC GLUCOMTR-MCNC: 187 MG/DL (ref 70–99)
GLUCOSE BLDC GLUCOMTR-MCNC: 276 MG/DL (ref 70–99)
GLUCOSE SERPL-MCNC: 136 MG/DL (ref 65–99)
HCT VFR BLD AUTO: 43.6 % (ref 37.5–51)
HGB BLD-MCNC: 13.9 G/DL (ref 13–17.7)
IMM GRANULOCYTES # BLD AUTO: 0.01 10*3/MM3 (ref 0–0.05)
IMM GRANULOCYTES NFR BLD AUTO: 0.2 % (ref 0–0.5)
IVRT: 61 MS
LV EF 2D ECHO EST: 40 %
LV EF BIPLANE MOD: 33.1 %
LYMPHOCYTES # BLD AUTO: 2.86 10*3/MM3 (ref 0.7–3.1)
LYMPHOCYTES NFR BLD AUTO: 50.3 % (ref 19.6–45.3)
MAGNESIUM SERPL-MCNC: 1.8 MG/DL (ref 1.6–2.4)
MCH RBC QN AUTO: 25.9 PG (ref 26.6–33)
MCHC RBC AUTO-ENTMCNC: 31.9 G/DL (ref 31.5–35.7)
MCV RBC AUTO: 81.3 FL (ref 79–97)
MONOCYTES # BLD AUTO: 0.52 10*3/MM3 (ref 0.1–0.9)
MONOCYTES NFR BLD AUTO: 9.1 % (ref 5–12)
NEUTROPHILS NFR BLD AUTO: 1.96 10*3/MM3 (ref 1.7–7)
NEUTROPHILS NFR BLD AUTO: 34.4 % (ref 42.7–76)
NRBC BLD AUTO-RTO: 0 /100 WBC (ref 0–0.2)
PLATELET # BLD AUTO: 277 10*3/MM3 (ref 140–450)
PMV BLD AUTO: 9.6 FL (ref 6–12)
POTASSIUM SERPL-SCNC: 4.2 MMOL/L (ref 3.5–5.2)
RBC # BLD AUTO: 5.36 10*6/MM3 (ref 4.14–5.8)
SODIUM SERPL-SCNC: 136 MMOL/L (ref 136–145)
WBC NRBC COR # BLD AUTO: 5.69 10*3/MM3 (ref 3.4–10.8)

## 2025-01-14 PROCEDURE — 63710000001 INSULIN LISPRO (HUMAN) PER 5 UNITS: Performed by: STUDENT IN AN ORGANIZED HEALTH CARE EDUCATION/TRAINING PROGRAM

## 2025-01-14 PROCEDURE — 80048 BASIC METABOLIC PNL TOTAL CA: CPT | Performed by: INTERNAL MEDICINE

## 2025-01-14 PROCEDURE — 82948 REAGENT STRIP/BLOOD GLUCOSE: CPT

## 2025-01-14 PROCEDURE — 63710000001 INSULIN GLARGINE PER 5 UNITS: Performed by: STUDENT IN AN ORGANIZED HEALTH CARE EDUCATION/TRAINING PROGRAM

## 2025-01-14 PROCEDURE — 99232 SBSQ HOSP IP/OBS MODERATE 35: CPT | Performed by: UROLOGY

## 2025-01-14 PROCEDURE — 99233 SBSQ HOSP IP/OBS HIGH 50: CPT | Performed by: INTERNAL MEDICINE

## 2025-01-14 PROCEDURE — 85025 COMPLETE CBC W/AUTO DIFF WBC: CPT | Performed by: INTERNAL MEDICINE

## 2025-01-14 PROCEDURE — 25010000002 AZTREONAM PER 500 MG: Performed by: INTERNAL MEDICINE

## 2025-01-14 PROCEDURE — 83735 ASSAY OF MAGNESIUM: CPT | Performed by: INTERNAL MEDICINE

## 2025-01-14 PROCEDURE — 25810000003 SODIUM CHLORIDE 0.9 % SOLUTION: Performed by: INTERNAL MEDICINE

## 2025-01-14 PROCEDURE — 25010000002 VANCOMYCIN 5 G RECONSTITUTED SOLUTION: Performed by: INTERNAL MEDICINE

## 2025-01-14 RX ORDER — SACCHAROMYCES BOULARDII 250 MG
250 CAPSULE ORAL 2 TIMES DAILY
Status: DISCONTINUED | OUTPATIENT
Start: 2025-01-14 | End: 2025-01-17 | Stop reason: HOSPADM

## 2025-01-14 RX ADMIN — AZTREONAM 2000 MG: 2 INJECTION, POWDER, LYOPHILIZED, FOR SOLUTION INTRAMUSCULAR; INTRAVENOUS at 06:04

## 2025-01-14 RX ADMIN — INSULIN LISPRO 4 UNITS: 100 INJECTION, SOLUTION INTRAVENOUS; SUBCUTANEOUS at 13:30

## 2025-01-14 RX ADMIN — Medication 250 MG: at 10:19

## 2025-01-14 RX ADMIN — INSULIN LISPRO 5 UNITS: 100 INJECTION, SOLUTION INTRAVENOUS; SUBCUTANEOUS at 22:00

## 2025-01-14 RX ADMIN — DICLOFENAC SODIUM 2 G: 10 GEL TOPICAL at 15:44

## 2025-01-14 RX ADMIN — Medication 10 ML: at 10:44

## 2025-01-14 RX ADMIN — DICLOFENAC SODIUM 2 G: 10 GEL TOPICAL at 17:05

## 2025-01-14 RX ADMIN — HYDROCORTISONE 1 APPLICATION: 1 OINTMENT TOPICAL at 10:44

## 2025-01-14 RX ADMIN — VANCOMYCIN HYDROCHLORIDE 1250 MG: 5 INJECTION, POWDER, LYOPHILIZED, FOR SOLUTION INTRAVENOUS at 15:40

## 2025-01-14 RX ADMIN — HYDROCORTISONE 1 APPLICATION: 1 OINTMENT TOPICAL at 17:04

## 2025-01-14 RX ADMIN — HYDROCORTISONE 1 APPLICATION: 1 OINTMENT TOPICAL at 22:01

## 2025-01-14 RX ADMIN — INSULIN GLARGINE 10 UNITS: 100 INJECTION, SOLUTION SUBCUTANEOUS at 10:19

## 2025-01-14 RX ADMIN — ACETAMINOPHEN 650 MG: 325 TABLET ORAL at 10:45

## 2025-01-14 RX ADMIN — AZTREONAM 2000 MG: 2 INJECTION, POWDER, LYOPHILIZED, FOR SOLUTION INTRAMUSCULAR; INTRAVENOUS at 19:40

## 2025-01-14 RX ADMIN — HYDROCORTISONE 1 APPLICATION: 1 OINTMENT TOPICAL at 03:23

## 2025-01-14 RX ADMIN — DICLOFENAC SODIUM 2 G: 10 GEL TOPICAL at 10:43

## 2025-01-14 RX ADMIN — Medication 1 APPLICATION: at 10:21

## 2025-01-14 RX ADMIN — Medication 10 ML: at 22:01

## 2025-01-14 RX ADMIN — Medication 250 MG: at 22:00

## 2025-01-14 NOTE — PROGRESS NOTES
[facility]   Hospitalist Progress Note    Date of admission: 1/7/2025  Patient Name: Brian Mehta  1955  Date: 1/14/2025      Subjective     Chief Complaint   Patient presents with    Weakness - Generalized    Wellness Check       Interval Followup: Tired, very weak, feels like his rash is not any worse and evaluated better.  Its mainly focused about those upset with his meal and that is boost drink is not cold.  Denies fevers.      Objective     Vitals:   Temp:  [97.9 °F (36.6 °C)-99.1 °F (37.3 °C)] 97.9 °F (36.6 °C)  Heart Rate:  [] 90  Resp:  [18-20] 20  BP: (107-139)/(65-77) 125/73    Physical Exam  Awake conversant appears disheveled and notably older than stated age  Poor dentition, starting  CTAB on room air  Elevated rate regular rhythm no lower extremity pitting edema  Rash arms pink/erythematous and confluent, no raised areas appreciated no discharge, still somewhat itchy      Result Review:  Vital signs, labs and recent relevant imaging reviewed.      CBC          1/12/2025    05:41 1/13/2025    05:53 1/14/2025    05:50   CBC   WBC 5.40  4.83  5.69    RBC 5.06  5.02  5.36    Hemoglobin 13.2  12.9  13.9    Hematocrit 40.7  40.2  43.6    MCV 80.4  80.1  81.3    MCH 26.1  25.7  25.9    MCHC 32.4  32.1  31.9    RDW 14.9  15.1  15.4    Platelets 244  267  277      CMP          1/12/2025    05:41 1/13/2025    05:53 1/14/2025    05:50   CMP   Glucose 118  148  136    BUN 11  16  16    Creatinine 0.72  0.78  0.83    EGFR 100.1  97.7  95.9    Sodium 136  136  136    Potassium 4.0  4.2  4.2    Chloride 103  104  103    Calcium 8.6  8.9  8.9    BUN/Creatinine Ratio 15.3  20.5  19.3    Anion Gap 7.8  7.5  8.6          acetaminophen    aluminum-magnesium hydroxide-simethicone    senna-docusate sodium **AND** polyethylene glycol **AND** bisacodyl **AND** bisacodyl    dextrose    dextrose    glucagon (human recombinant)    hydrOXYzine    influenza vaccine    melatonin    Pharmacy to dose vancomycin     sodium chloride    sodium chloride    [Held by provider] aspirin, 81 mg, Oral, Daily  aztreonam, 2,000 mg, Intravenous, Q8H  Diclofenac Sodium, 2 g, Topical, 4x Daily  dimethicone, 1 Application, Topical, Daily  enoxaparin, 40 mg, Subcutaneous, Daily  gadobenate dimeglumine, 20 mL, Intravenous, Once in imaging  hydrocortisone-bacitracin-zinc oxide-nystatin, 1 Application, Topical, 3 times per day  insulin glargine, 10 Units, Subcutaneous, Daily  insulin lispro, 2-7 Units, Subcutaneous, 4x Daily AC & at Bedtime  saccharomyces boulardii, 250 mg, Oral, BID  sodium chloride, 10 mL, Intravenous, Q12H  vancomycin, 1,250 mg, Intravenous, Q12H        MRI Lumbar Spine Without Contrast    Result Date: 1/12/2025  Impression: Mild multilevel degenerative changes in the lumbar spine. The exam is limited due to patient motion artifact. The patient had difficulty tolerating the exam and intravenous contrast could not be administered. Electronically Signed: Grisel Gaines MD  1/12/2025 11:43 AM EST  Workstation ID: [id number]    MRI Thoracic Spine Without Contrast    Result Date: 1/12/2025  1. Active osseous edema involving the posterior right 10th rib near the costovertebral junction which on prior exams appeared to represent healed fractures however given the edema and adjacent soft tissue nodularity underlying osseous lesion cannot be excluded. A follow-up repeat examination with contrast administration is recommended.. Electronically Signed: Grisel Gaines MD  1/12/2025 11:35 AM EST  Workstation ID: [id number]    MRI Cervical Spine Without Contrast    Result Date: 1/12/2025  Impression: 1. Absence of the normal right vertebral artery flow-void with a follow-up CTA recommended. These findings were sent as a staff message. 2. Mild multilevel degenerative changes noted in the cervical spine. Electronically Signed: Grisel Gaines MD  1/12/2025 11:19 AM EST  Workstation ID: [id number]    CT Abdomen Pelvis With Contrast    Result  Date: 1/12/2025  Impression: 1. Retained left ureteral stent with a fractured pigtail component within the lumen of the urinary bladder. There is granulation tissue surrounding the pigtail portion which appears unchanged. 2. Left renal hydronephrosis and proximal hydroureter with periureteric stranding. There is some mucosal enhancement of the left renal collecting system and recommend correlation with urinalysis to exclude infection. 3. Diverticulosis. Electronically Signed: Grisel Gaines MD  1/12/2025 9:11 AM EST  Workstation ID: [id number]    XR Hip With or Without Pelvis 2 - 3 View Right    Result Date: 1/7/2025  1.No acute fracture or dislocation. 2.Degenerative changes in both hips. 3.Fractured distal left ureteral stent, unchanged. Electronically Signed: Durga Zurita MD  1/7/2025 11:48 PM EST  Workstation ID: [id number]    XR Chest 1 View    Result Date: 1/7/2025  Impression: No acute cardiopulmonary disease. Electronically Signed: Jm Bran MD  1/7/2025 10:11 PM EST  Workstation ID: [id number]     Assessment / Plan     Summary: 69 y.o. COPD on room air, CHF, DM2, tobacco abuse, history of CVA with residual right-sided weakness presented with weakness/hip pain, history had to be supplemented from staff, was found unkempt at home dialysis initial pain complaints.  Found to have UTI, ultimately findings of bacteremia in setting of prior left ureteral stent that has been retained in apparently never had follow-up to have this removed as previously scheduled.  Social work assisting given the note of multiple social issues patient has intermittently declined some aspects of care and imaging.      Patient originally refusing scans, however patient did perform some of the imaging .  Patient did have CT scan of abdomen, showed retained left ureteral stent with fractured pigtail component within the lumen of the urinary bladder, granulation tissue surrounding the pigtail portion which appears unchanged.  Left renal  hydronephrosis and proximal hydroureter with periureteric stranding.  Urology consulted plan for surgery on Thursday.    MRIs completed without contrast, patient could not stay in scanner long enough for contrasted portion.  Lumbar scan with multilevel degenerative changes.  Limited due to motion artifact.  Cervical component shows absence of normal right vertebral artery flow void with follow-up CTA recommended.  Thoracic spine shows active osseous edema involving posterior right 10th rib near costo vertebral junction which on prior exams appears to represent healed fractures however given the edema and adjacent soft tissue nodularity underlying osseous lesion cannot be excluded.  Recommendations for follow-up examination with contrast.  Following discussion with patient, at this time not interested in having additional imaging done     Assessment/Plan (clinically significant if listed here)  Polymicrobial bacteremia from suspected UTI with retained left ureteral stent   Retained left ureteral stent with a fractured pigtail component within the lumen of the urinary bladder  Urinary tract infection complicated in setting of above  Type 2 diabetes  Hypertension  History of CVA with residual right-sided deficits  Heart failure, low ejection fraction  COPD not acute exacerbation  Active tobacco abuse  Generalized weakness/debility and inability to adequately care for himself/poor social situation    Tele - sinus tachy    Echo -     The study is technically difficult for diagnosis.    Left ventricular systolic function is mildly decreased. Estimated left ventricular EF = 40%     Echo pending to evaluate valvular function -Limited study grossly no acute vegetation noted  Repeat blood cultures negative, initial cultures still prelim and have been changing somewhat follow-up final cultures  Continues with IV Vancomycin, monitor renal fxn/toxicities and 1/13 aztreonam for now,  suspected possible rash to ceftriaxone this  was discontinued yesterday. Cont to monitor rash, prn antihistamines   Urology - surgical plans pending for Thursday. Will adjust antibiotic duration accordingly based on this/continued response to treatment  Patient will need cystolitholapaxy with left ureteral stent removal possible laser and stent replacement. Review of records indicate patient was to have this procedure done as an outpatient however secondary to transportation issues patient has never arrived at the hospital for this procedure.   Aspirin on hold perioperatively  Continue insulin titrate further as needed  Continue wound care  Continue barrier cream  PT/OT  Check a.m. CBC, BMP, magnesium, phosphorus, vancomycin levels  Continue hospitalization at current level of care    Dispo: Would benefit from rehab placement and possible long-term placement but seems like he wants to go home at time of discharge, once medically stable.   D/w SW    VTE Prophylaxis:  Mechanical VTE prophylaxis orders are signed & held. Pharmacologic VTE prophylaxis orders are present.      Code Status (Patient has no pulse and is not breathing): CPR (Attempt to Resuscitate)  Medical Interventions (Patient has pulse or is breathing): Full Support

## 2025-01-14 NOTE — PROGRESS NOTES
[facility]   Urology Progress Note    Patient Name: Brian Mehat  : 1955  MRN: [id number]  Primary Care Physician:  Sophie Capps APRN  Date of admission: 2025    Subjective   Subjective       No events        Objective   Objective     Vitals:   Temp:  [98.1 °F (36.7 °C)-99.1 °F (37.3 °C)] 99.1 °F (37.3 °C)  Heart Rate:  [] 108  Resp:  [18] 18  BP: (107-139)/(65-82) 139/75  Physical Exam     Alert and oriented x3  No acute distress  Unlabored respirations  Nontender/nondistended       Result Review    Result Review:  I have personally reviewed the results from the time of this admission to 2025 05:54 EST and agree with these findings:  []  Laboratory  []  Microbiology  []  Radiology  []  EKG/Telemetry   []  Cardiology/Vascular   []  Pathology  []  Old records  []  Other:      Assessment & Plan   Assessment / Plan     Brief Patient Summary:  Brian Mehta is a 69 y.o. male     Active Hospital Problems:  Active Hospital Problems    Diagnosis     **Acute UTI     Bacteremia        Retained left ureteral stent  UTI  Bacteremia  History of CVA            left ureteral stent in for undetermined amount of time.        At this time we will plan on cystolitholapaxy with left ureteral stent removal possible laser and stent replacement on Thursday     Risks and benefits were discussed including bleeding, infection and damage to the urinary system.  We also discussed the risk of anesthesia up to and including death.  Patient voiced understanding and would like to proceed.     Discussed with the patient the stone is calcified in  and, we will have to see how hard it is to get out.  May take multiple procedures    He will need a stent afterwards for a few days which is problematic if he gets discharged from the hospital as he does not follow-up.        will follow        Electronically signed by Ector Kulkarni MD, 25, 5:54 AM EST.

## 2025-01-14 NOTE — SIGNIFICANT NOTE
Patient appears somewhat agitated today, did not want to be moved to assess today, primary RN in room and stated that areas of erosion and buttocks were resolved and only this rash remains. Groin fold redness improved.   Rash is covering abdomen but original concerns seem to be resolved.   Buttocks not assessed per patient , RN reports buttocks is just rash as well.

## 2025-01-14 NOTE — PROGRESS NOTES
"[facility] Clinical Pharmacy Services: Vancomycin Monitoring Note    Brian Mehta is a 69 y.o. male who is on day 6 of pharmacy to dose vancomycin for Bacteremia.    Previous Vancomycin Dose:   1250 mg IV every  12  hours  Imaging Reviewed?: Yes   Updated Cultures and Sensitivities:    Urine cx: NGTD   @ 2303 Blood cx, right: NGTD   @ 2303 Blood cx, left: NGTD   @ 0147 Blood cx, right: coag negative staph, gram positive cocci, gram negative bacilli \"less than 7 mLs of blood was collected - insufficient quantity may yield false negative results\"   @ 0147 Blood cx, right: aerococcus viridans, gram negative bacilli, coag negative staph, \"less than 7 mLs of blood was collected - insufficient quantity may yield false negative results\"    Vitals/Labs  Ht:  ; Wt: 94.1 kg (207 lb 7.3 oz)   Temp (24hrs), Av.4 °F (36.9 °C), Min:98.1 °F (36.7 °C), Max:99.1 °F (37.3 °C)   Estimated Creatinine Clearance: 114.9 mL/min (by C-G formula based on SCr of 0.83 mg/dL).     Results from last 7 days   Lab Units 25  0550 25  1046 25  0553 25  0541 25  0530 01/10/25  1104   VANCOMYCIN RM mcg/mL  --   --   --   --   --  16.64   VANCOMYCIN TR mcg/mL  --  18.12  --   --   --   --    CREATININE mg/dL 0.83  --  0.78 0.72*   < >  --    WBC 10*3/mm3 5.69  --  4.83 5.40   < >  --     < > = values in this interval not displayed.     Assessment/Plan    Current Vancomycin Dose:  1250 mg IV every 12 hours; which provides the following predicted parameters:  AUC24,ss: 540 mg/L.hr  Probability of AUC24 > 400: 100 %  Ctrough,ss: 17 mg/L  Probability of Ctrough,ss > 20: 14 %  Probability of nephrotoxicity (Lodise TERRELL ): 13 %  No levels ordered at this time  We will continue to monitor patient changes and renal function     Thank you for involving pharmacy in this patient's care. Please contact pharmacy with any questions or concerns.    Marcos Boland [facility]  Clinical Pharmacist      "

## 2025-01-15 ENCOUNTER — ANESTHESIA EVENT (OUTPATIENT)
Dept: PERIOP | Facility: HOSPITAL | Age: 70
End: 2025-01-15
Payer: MEDICARE

## 2025-01-15 ENCOUNTER — APPOINTMENT (OUTPATIENT)
Dept: GENERAL RADIOLOGY | Facility: HOSPITAL | Age: 70
DRG: 660 | End: 2025-01-15
Payer: MEDICARE

## 2025-01-15 LAB
ANION GAP SERPL CALCULATED.3IONS-SCNC: 9.2 MMOL/L (ref 5–15)
BASOPHILS # BLD AUTO: 0.02 10*3/MM3 (ref 0–0.2)
BASOPHILS NFR BLD AUTO: 0.4 % (ref 0–1.5)
BUN SERPL-MCNC: 19 MG/DL (ref 8–23)
BUN/CREAT SERPL: 25 (ref 7–25)
CALCIUM SPEC-SCNC: 8.4 MG/DL (ref 8.6–10.5)
CHLORIDE SERPL-SCNC: 105 MMOL/L (ref 98–107)
CO2 SERPL-SCNC: 22.8 MMOL/L (ref 22–29)
CREAT SERPL-MCNC: 0.76 MG/DL (ref 0.76–1.27)
DEPRECATED RDW RBC AUTO: 45.2 FL (ref 37–54)
EGFRCR SERPLBLD CKD-EPI 2021: 98.5 ML/MIN/1.73
EOSINOPHIL # BLD AUTO: 0.43 10*3/MM3 (ref 0–0.4)
EOSINOPHIL NFR BLD AUTO: 7.7 % (ref 0.3–6.2)
ERYTHROCYTE [DISTWIDTH] IN BLOOD BY AUTOMATED COUNT: 15.2 % (ref 12.3–15.4)
GLUCOSE BLDC GLUCOMTR-MCNC: 132 MG/DL (ref 70–99)
GLUCOSE BLDC GLUCOMTR-MCNC: 211 MG/DL (ref 70–99)
GLUCOSE BLDC GLUCOMTR-MCNC: 215 MG/DL (ref 70–99)
GLUCOSE BLDC GLUCOMTR-MCNC: 300 MG/DL (ref 70–99)
GLUCOSE BLDC GLUCOMTR-MCNC: 302 MG/DL (ref 70–99)
GLUCOSE SERPL-MCNC: 121 MG/DL (ref 65–99)
HCT VFR BLD AUTO: 42 % (ref 37.5–51)
HGB BLD-MCNC: 13.1 G/DL (ref 13–17.7)
IMM GRANULOCYTES # BLD AUTO: 0.01 10*3/MM3 (ref 0–0.05)
IMM GRANULOCYTES NFR BLD AUTO: 0.2 % (ref 0–0.5)
LYMPHOCYTES # BLD AUTO: 2.65 10*3/MM3 (ref 0.7–3.1)
LYMPHOCYTES NFR BLD AUTO: 47.6 % (ref 19.6–45.3)
MAGNESIUM SERPL-MCNC: 1.8 MG/DL (ref 1.6–2.4)
MCH RBC QN AUTO: 25.5 PG (ref 26.6–33)
MCHC RBC AUTO-ENTMCNC: 31.2 G/DL (ref 31.5–35.7)
MCV RBC AUTO: 81.7 FL (ref 79–97)
MONOCYTES # BLD AUTO: 0.57 10*3/MM3 (ref 0.1–0.9)
MONOCYTES NFR BLD AUTO: 10.2 % (ref 5–12)
NEUTROPHILS NFR BLD AUTO: 1.89 10*3/MM3 (ref 1.7–7)
NEUTROPHILS NFR BLD AUTO: 33.9 % (ref 42.7–76)
NRBC BLD AUTO-RTO: 0 /100 WBC (ref 0–0.2)
PHOSPHATE SERPL-MCNC: 3.2 MG/DL (ref 2.5–4.5)
PLATELET # BLD AUTO: 249 10*3/MM3 (ref 140–450)
PMV BLD AUTO: 9.6 FL (ref 6–12)
POTASSIUM SERPL-SCNC: 4.1 MMOL/L (ref 3.5–5.2)
RBC # BLD AUTO: 5.14 10*6/MM3 (ref 4.14–5.8)
SODIUM SERPL-SCNC: 137 MMOL/L (ref 136–145)
WBC NRBC COR # BLD AUTO: 5.57 10*3/MM3 (ref 3.4–10.8)

## 2025-01-15 PROCEDURE — 80048 BASIC METABOLIC PNL TOTAL CA: CPT | Performed by: INTERNAL MEDICINE

## 2025-01-15 PROCEDURE — 83735 ASSAY OF MAGNESIUM: CPT | Performed by: INTERNAL MEDICINE

## 2025-01-15 PROCEDURE — 82948 REAGENT STRIP/BLOOD GLUCOSE: CPT | Performed by: STUDENT IN AN ORGANIZED HEALTH CARE EDUCATION/TRAINING PROGRAM

## 2025-01-15 PROCEDURE — 99233 SBSQ HOSP IP/OBS HIGH 50: CPT | Performed by: INTERNAL MEDICINE

## 2025-01-15 PROCEDURE — 25810000003 SODIUM CHLORIDE 0.9 % SOLUTION: Performed by: INTERNAL MEDICINE

## 2025-01-15 PROCEDURE — 99232 SBSQ HOSP IP/OBS MODERATE 35: CPT | Performed by: UROLOGY

## 2025-01-15 PROCEDURE — 25010000002 VANCOMYCIN 5 G RECONSTITUTED SOLUTION: Performed by: INTERNAL MEDICINE

## 2025-01-15 PROCEDURE — 63710000001 INSULIN LISPRO (HUMAN) PER 5 UNITS: Performed by: STUDENT IN AN ORGANIZED HEALTH CARE EDUCATION/TRAINING PROGRAM

## 2025-01-15 PROCEDURE — 85025 COMPLETE CBC W/AUTO DIFF WBC: CPT | Performed by: INTERNAL MEDICINE

## 2025-01-15 PROCEDURE — 63710000001 INSULIN GLARGINE PER 5 UNITS: Performed by: STUDENT IN AN ORGANIZED HEALTH CARE EDUCATION/TRAINING PROGRAM

## 2025-01-15 PROCEDURE — 73030 X-RAY EXAM OF SHOULDER: CPT

## 2025-01-15 PROCEDURE — 25010000002 AZTREONAM PER 500 MG: Performed by: INTERNAL MEDICINE

## 2025-01-15 PROCEDURE — 84100 ASSAY OF PHOSPHORUS: CPT | Performed by: INTERNAL MEDICINE

## 2025-01-15 PROCEDURE — 82948 REAGENT STRIP/BLOOD GLUCOSE: CPT

## 2025-01-15 RX ORDER — BETAMETHASONE DIPROPIONATE 0.05 %
1 OINTMENT (GRAM) TOPICAL EVERY 12 HOURS SCHEDULED
Status: DISCONTINUED | OUTPATIENT
Start: 2025-01-15 | End: 2025-01-17 | Stop reason: HOSPADM

## 2025-01-15 RX ORDER — BETAMETHASONE DIPROPIONATE 0.5 MG/G
1 CREAM TOPICAL EVERY 12 HOURS SCHEDULED
Status: DISCONTINUED | OUTPATIENT
Start: 2025-01-15 | End: 2025-01-15

## 2025-01-15 RX ORDER — CETIRIZINE HYDROCHLORIDE 10 MG/1
10 TABLET ORAL DAILY
Status: DISCONTINUED | OUTPATIENT
Start: 2025-01-15 | End: 2025-01-17 | Stop reason: HOSPADM

## 2025-01-15 RX ADMIN — HYDROCORTISONE 1 APPLICATION: 1 OINTMENT TOPICAL at 17:16

## 2025-01-15 RX ADMIN — Medication 10 ML: at 09:15

## 2025-01-15 RX ADMIN — AZTREONAM 2000 MG: 2 INJECTION, POWDER, LYOPHILIZED, FOR SOLUTION INTRAMUSCULAR; INTRAVENOUS at 04:35

## 2025-01-15 RX ADMIN — DICLOFENAC SODIUM 2 G: 10 GEL TOPICAL at 17:16

## 2025-01-15 RX ADMIN — Medication 250 MG: at 09:15

## 2025-01-15 RX ADMIN — DICLOFENAC SODIUM 2 G: 10 GEL TOPICAL at 12:01

## 2025-01-15 RX ADMIN — CETIRIZINE HYDROCHLORIDE 10 MG: 10 TABLET, FILM COATED ORAL at 09:15

## 2025-01-15 RX ADMIN — Medication 10 ML: at 20:37

## 2025-01-15 RX ADMIN — AZTREONAM 2000 MG: 2 INJECTION, POWDER, LYOPHILIZED, FOR SOLUTION INTRAMUSCULAR; INTRAVENOUS at 12:01

## 2025-01-15 RX ADMIN — INSULIN LISPRO 5 UNITS: 100 INJECTION, SOLUTION INTRAVENOUS; SUBCUTANEOUS at 17:16

## 2025-01-15 RX ADMIN — INSULIN LISPRO 3 UNITS: 100 INJECTION, SOLUTION INTRAVENOUS; SUBCUTANEOUS at 20:36

## 2025-01-15 RX ADMIN — HYDROCORTISONE 1 APPLICATION: 1 OINTMENT TOPICAL at 09:16

## 2025-01-15 RX ADMIN — DICLOFENAC SODIUM 2 G: 10 GEL TOPICAL at 09:16

## 2025-01-15 RX ADMIN — INSULIN GLARGINE 10 UNITS: 100 INJECTION, SOLUTION SUBCUTANEOUS at 09:15

## 2025-01-15 RX ADMIN — INSULIN LISPRO 3 UNITS: 100 INJECTION, SOLUTION INTRAVENOUS; SUBCUTANEOUS at 12:00

## 2025-01-15 RX ADMIN — BETAMETHASONE DIPROPIONATE 1 APPLICATION: 0.5 OINTMENT TOPICAL at 12:01

## 2025-01-15 RX ADMIN — VANCOMYCIN HYDROCHLORIDE 1250 MG: 5 INJECTION, POWDER, LYOPHILIZED, FOR SOLUTION INTRAVENOUS at 00:26

## 2025-01-15 RX ADMIN — BETAMETHASONE DIPROPIONATE 1 APPLICATION: 0.5 OINTMENT TOPICAL at 20:37

## 2025-01-15 RX ADMIN — VANCOMYCIN HYDROCHLORIDE 1250 MG: 5 INJECTION, POWDER, LYOPHILIZED, FOR SOLUTION INTRAVENOUS at 14:26

## 2025-01-15 RX ADMIN — Medication 1 APPLICATION: at 09:16

## 2025-01-15 RX ADMIN — Medication 250 MG: at 20:36

## 2025-01-15 RX ADMIN — AZTREONAM 2000 MG: 2 INJECTION, POWDER, LYOPHILIZED, FOR SOLUTION INTRAMUSCULAR; INTRAVENOUS at 20:36

## 2025-01-15 RX ADMIN — HYDROCORTISONE 1 APPLICATION: 1 OINTMENT TOPICAL at 20:37

## 2025-01-15 NOTE — PROGRESS NOTES
"[facility] Clinical Pharmacy Services: Vancomycin Monitoring Note    Brian Mehta is a 69 y.o. male who is on day 7 of pharmacy to dose vancomycin for Bacteremia.    Previous Vancomycin Dose:   1250 mg IV every  12  hours  Imaging Reviewed?: Yes   Updated Cultures and Sensitivities:    Urine cx: NGTD   @ 2303 Blood cx, right: NGTD   @ 2303 Blood cx, left: NGTD   @ 0147 Blood cx, right: coag negative staph, gram positive cocci, gram negative bacilli \"less than 7 mLs of blood was collected - insufficient quantity may yield false negative results\"   @ 0147 Blood cx, right: aerococcus viridans, gram negative bacilli, coag negative staph, \"less than 7 mLs of blood was collected - insufficient quantity may yield false negative results\"    Vitals/Labs  Ht:  ; Wt: 94.1 kg (207 lb 7.3 oz)   Temp (24hrs), Av.1 °F (36.7 °C), Min:97.9 °F (36.6 °C), Max:98.3 °F (36.8 °C)   Estimated Creatinine Clearance: 125.5 mL/min (by C-G formula based on SCr of 0.76 mg/dL).     Results from last 7 days   Lab Units 01/15/25  0516 25  0550 25  1046 25  0553 25  0530 01/10/25  1104   VANCOMYCIN RM mcg/mL  --   --   --   --   --  16.64   VANCOMYCIN TR mcg/mL  --   --  18.12  --   --   --    CREATININE mg/dL 0.76 0.83  --  0.78   < >  --    WBC 10*3/mm3 5.57 5.69  --  4.83   < >  --     < > = values in this interval not displayed.     Assessment/Plan    Current Vancomycin Dose:  1250 mg IV every 12 hours; which provides the following predicted parameters:  AUC24,ss: 509 mg/L.hr  Probability of AUC24 > 400: 98 %  Ctrough,ss: 15.7 mg/L  Probability of Ctrough,ss > 20: 6 %  Probability of nephrotoxicity (Lodise TERRELL ): 11 %  No levels ordered at this time, renal function stable  We will continue to monitor patient changes and renal function     Thank you for involving pharmacy in this patient's care. Please contact pharmacy with any questions or concerns.    Marcos Boland RPH  Clinical " Pharmacist

## 2025-01-15 NOTE — PLAN OF CARE
Goal Outcome Evaluation:           Progress: no change  Outcome Evaluation: Pt a&o x4. IV abx per MAR. Pt noncompliant with hygiene care, refuses to allow staff to perform incontinence care and refuses assistance with turning. Pt was combative at beginning of shift when staff requested to assist Pt with getting cleaned up after incontinence.

## 2025-01-15 NOTE — SIGNIFICANT NOTE
01/15/25 0841   OTHER   Discipline occupational therapist   Rehab Time/Intention   Session Not Performed patient unavailable for treatment  (with PCA)

## 2025-01-15 NOTE — PLAN OF CARE
Goal Outcome Evaluation:  Plan of Care Reviewed With: patient        Progress: improving  Outcome Evaluation: Patient Alert and oriented but confused to time at times. Patient has been compliant with care this shift, was hesistant about getting bath and incontinence care but allowed us to do it. Patient will be NPO at midnight for procedure tomorrow. Continuing plan of care.

## 2025-01-15 NOTE — PROGRESS NOTES
[facility]   Urology Progress Note    Patient Name: Brian Mehta  : 1955  MRN: [id number]  Primary Care Physician:  Sophie Capps APRN  Date of admission: 2025    Subjective   Subjective       No events        Objective   Objective     Vitals:   Temp:  [97.9 °F (36.6 °C)-98.3 °F (36.8 °C)] 98.2 °F (36.8 °C)  Heart Rate:  [90-99] 97  Resp:  [18-20] 18  BP: (118-145)/(73-86) 145/86  Physical Exam     Alert and oriented x3  No acute distress  Unlabored respirations  Nontender/nondistended       Result Review    Result Review:  I have personally reviewed the results from the time of this admission to 1/15/2025 06:35 EST and agree with these findings:  []  Laboratory  []  Microbiology  []  Radiology  []  EKG/Telemetry   []  Cardiology/Vascular   []  Pathology  []  Old records  []  Other:      Assessment & Plan   Assessment / Plan     Brief Patient Summary:  Brian Mehta is a 69 y.o. male     Active Hospital Problems:  Active Hospital Problems    Diagnosis     **Acute UTI     Bacteremia     Retained ureteral stent     Bladder stone        Retained left ureteral stent  UTI  Bacteremia  History of CVA           Left ureteral stent in for 3 years    We will plan on surgery tomorrow    cystolitholapaxy with left ureteral stent removal possible laser and stent replacement      NPO after midnight    Risks and benefits were discussed including bleeding, infection and damage to the urinary system.  We also discussed the risk of anesthesia up to and including death.  Patient voiced understanding and would like to proceed.     Discussed again with the patient the stent has been in so long that is very difficult to know how hard it will be to get out.  He is definitely calcified.  May need multiple procedures

## 2025-01-15 NOTE — PROGRESS NOTES
[facility]   Hospitalist Progress Note    Date of admission: 1/7/2025  Patient Name: Brian Mehta  1955  Date: 1/15/2025      Subjective     Chief Complaint   Patient presents with    Weakness - Generalized    Wellness Check       Interval Followup: States he is not doing well when asking what is bothering him and particularly is mainly upset that his wife apparently cannot find where he currently is in the hospital and seems like she is over on [address].  Instead I will try to help reach out and get her to redirected.  Medically ultimately states that he is having right shoulder pain and decreased range of motion seems like this is chronic and related to prior stroke partially but has history on this changes.  She is willing to have a shoulder x-ray to evaluate.  Does not feel like his rash is any growth, does itch some, areas been demarcated and looking a little better today.  Discussed antihistamines and steroids        Objective     Vitals:   Temp:  [97.3 °F (36.3 °C)-98.6 °F (37 °C)] 98.6 °F (37 °C)  Heart Rate:  [] 100  Resp:  [18-20] 18  BP: (122-145)/(71-86) 135/78    Physical Exam  Awake conversant appears older than stated age, answering basic questions appropriately  Poor dentition  CTAB on room air  Elevated rate regular rhythm no lower extremity pitting edema  Rash on arms/chest, with xanthomatous drug rash appearance/maculopapular, less spread compared to yesterday/area demarcated from earlier today and already showing some improvement from that    Result Review:  Vital signs, labs and recent relevant imaging reviewed.      CBC          1/13/2025    05:53 1/14/2025    05:50 1/15/2025    05:16   CBC   WBC 4.83  5.69  5.57    RBC 5.02  5.36  5.14    Hemoglobin 12.9  13.9  13.1    Hematocrit 40.2  43.6  42.0    MCV 80.1  81.3  81.7    MCH 25.7  25.9  25.5    MCHC 32.1  31.9  31.2    RDW 15.1  15.4  15.2    Platelets 267  277  249      CMP          1/13/2025    05:53 1/14/2025    05:50  1/15/2025    05:16   CMP   Glucose 148  136  121    BUN 16  16  19    Creatinine 0.78  0.83  0.76    EGFR 97.7  95.9  98.5    Sodium 136  136  137    Potassium 4.2  4.2  4.1    Chloride 104  103  105    Calcium 8.9  8.9  8.4    BUN/Creatinine Ratio 20.5  19.3  25.0    Anion Gap 7.5  8.6  9.2          acetaminophen    aluminum-magnesium hydroxide-simethicone    senna-docusate sodium **AND** polyethylene glycol **AND** bisacodyl **AND** bisacodyl    dextrose    dextrose    glucagon (human recombinant)    hydrOXYzine    influenza vaccine    [facility]    Pharmacy to dose vancomycin    sodium chloride    sodium chloride    [Held by provider] aspirin, 81 mg, Oral, Daily  aztreonam, 2,000 mg, Intravenous, Q8H  betamethasone dipropionate, 1 Application, Topical, Q12H  cetirizine, 10 mg, Oral, Daily  Diclofenac Sodium, 2 g, Topical, 4x Daily  dimethicone, 1 Application, Topical, Daily  [Held by provider] enoxaparin, 40 mg, Subcutaneous, Daily  gadobenate dimeglumine, 20 mL, Intravenous, Once in imaging  hydrocortisone-bacitracin-zinc oxide-nystatin, 1 Application, Topical, 3 times per day  insulin glargine, 10 Units, Subcutaneous, Daily  insulin lispro, 2-7 Units, Subcutaneous, 4x Daily AC & at Bedtime  saccharomyces boulardii, 250 mg, Oral, BID  sodium chloride, 10 mL, Intravenous, Q12H  vancomycin, 1,250 mg, Intravenous, Q12H        MRI Lumbar Spine Without Contrast    Result Date: 1/12/2025  Impression: Mild multilevel degenerative changes in the lumbar spine. The exam is limited due to patient motion artifact. The patient had difficulty tolerating the exam and intravenous contrast could not be administered. Electronically Signed: Grisel Gaines MD  1/12/2025 11:43 AM EST  Workstation ID: [id number]    MRI Thoracic Spine Without Contrast    Result Date: 1/12/2025  1. Active osseous edema involving the posterior right 10th rib near the costovertebral junction which on prior exams appeared to represent healed fractures  however given the edema and adjacent soft tissue nodularity underlying osseous lesion cannot be excluded. A follow-up repeat examination with contrast administration is recommended.. Electronically Signed: Grisel Gaines MD  1/12/2025 11:35 AM EST  Workstation ID: [id number]    MRI Cervical Spine Without Contrast    Result Date: 1/12/2025  Impression: 1. Absence of the normal right vertebral artery flow-void with a follow-up CTA recommended. These findings were sent as a staff message. 2. Mild multilevel degenerative changes noted in the cervical spine. Electronically Signed: Grisel Gaines MD  1/12/2025 11:19 AM EST  Workstation ID: [id number]    CT Abdomen Pelvis With Contrast    Result Date: 1/12/2025  Impression: 1. Retained left ureteral stent with a fractured pigtail component within the lumen of the urinary bladder. There is granulation tissue surrounding the pigtail portion which appears unchanged. 2. Left renal hydronephrosis and proximal hydroureter with periureteric stranding. There is some mucosal enhancement of the left renal collecting system and recommend correlation with urinalysis to exclude infection. 3. Diverticulosis. Electronically Signed: Grisel Gaines MD  1/12/2025 9:11 AM EST  Workstation ID: [id number]    XR Hip With or Without Pelvis 2 - 3 View Right    Result Date: 1/7/2025  1.No acute fracture or dislocation. 2.Degenerative changes in both hips. 3.Fractured distal left ureteral stent, unchanged. Electronically Signed: Durga Zurita MD  1/7/2025 11:48 PM EST  Workstation ID: [id number]    XR Chest 1 View    Result Date: 1/7/2025  Impression: No acute cardiopulmonary disease. Electronically Signed: mJ Bran MD  1/7/2025 10:11 PM EST  Workstation ID: [id number]     Assessment / Plan     Summary: 69 y.o. COPD on room air, CHF, DM2, tobacco abuse, history of CVA with residual right-sided weakness presented with weakness/hip pain, history had to be supplemented from staff, was found unkempt  at home dialysis initial pain complaints.  Found to have UTI, ultimately findings of bacteremia in setting of prior left ureteral stent that has been retained in apparently never had follow-up to have this removed as previously scheduled.  Social work assisting given the note of multiple social issues patient has intermittently declined some aspects of care and imaging.      Patient originally refusing scans, however patient did perform some of the imaging .  Patient did have CT scan of abdomen, showed retained left ureteral stent with fractured pigtail component within the lumen of the urinary bladder, granulation tissue surrounding the pigtail portion which appears unchanged.  Left renal hydronephrosis and proximal hydroureter with periureteric stranding.  Urology consulted plan for surgery on Thursday.    MRIs completed without contrast, patient could not stay in scanner long enough for contrasted portion.  Lumbar scan with multilevel degenerative changes.  Limited due to motion artifact.  Cervical component shows absence of normal right vertebral artery flow void with follow-up CTA recommended.  Thoracic spine shows active osseous edema involving posterior right 10th rib near costo vertebral junction which on prior exams appears to represent healed fractures however given the edema and adjacent soft tissue nodularity underlying osseous lesion cannot be excluded.  Recommendations for follow-up examination with contrast.  Following discussion with patient, at this time not interested in having additional imaging done     Assessment/Plan (clinically significant if listed here)  Polymicrobial bacteremia from suspected UTI with retained left ureteral stent   Retained left ureteral stent with a fractured pigtail component within the lumen of the urinary bladder  Urinary tract infection complicated in setting of above  Exanthematous drug rash suspect 2/2 ceftriaxone  Type 2 diabetes  Hypertension  History of CVA with  residual right-sided deficits  Systolic CHF not acutely exacerbated EF 40% per 1/2025 echo  COPD not acute exacerbation  Active tobacco abuse  Generalized weakness/debility and inability to adequately care for himself/poor social situation    Tele - sinus tachy    Echo -     The study is technically difficult for diagnosis.    Left ventricular systolic function is mildly decreased. Estimated left ventricular EF = 40%     Echo pending to evaluate valvular function -Limited study grossly no acute vegetation noted  Repeat blood cultures negative, initial cultures still prelim and have been changing somewhat follow-up final cultures  Continue IV vancomycin monitor renal function toxicities, switch to 1/13 aztreonam from ceftriaxone given suspected drug rash from this.  Using cetirizine and starting topical steroid cream, have listed ceftriaxone as an allergy for patient.  Ultimately will need a PICC line for antibiotics at the facility, will defer until after surgery tomorrow placed on Friday if otherwise doing well  Appreciate urology assistance plan for surgery tomorrow n.p.o. after midnight holding DVT prophylaxis   Aspirin has been on hold perioperatively  Check right shoulder x-ray given shoulder pain and limited range of motion  Is willing for right shoulder x-ray for evaluation of shoulder pain and limited rom, difficult to get adequate hx but may all be chronic.   Has mulutilevel degenerative changes on imaging,probably posterior right 10th rib old fracture - possibly malignancy not ruled out - again pt declines f/u right now, can f/u outpt with contrast study if willing at that time   Initial imaging notable for absence of the normal right vertebral artery flow-void with a follow-up CTA recommended - patient declines follow up evaluation for this.  Neurologic apparently is at baseline for outpatient.  Could follow this up outpatient if desires in the future but he has been very resistant to many aspects of care  and declining certain evaluations.  Continue insulin titrate further as needed  Continue wound care  Continue barrier cream  PT/OT  Check a.m. CBC, BMP, magnesium, phosphorus, vancomycin levels  Continue hospitalization at current level of care    Dispo: Would benefit from rehab placement and possible long-term placement - will need iv abx course/picc line set up for discharge.  Will be several days at least likely.   D/w SW    VTE Prophylaxis:  Mechanical VTE prophylaxis orders are signed & held. Pharmacologic VTE prophylaxis orders are present.      Code Status (Patient has no pulse and is not breathing): CPR (Attempt to Resuscitate)  Medical Interventions (Patient has pulse or is breathing): Full Support

## 2025-01-16 ENCOUNTER — APPOINTMENT (OUTPATIENT)
Dept: GENERAL RADIOLOGY | Facility: HOSPITAL | Age: 70
DRG: 660 | End: 2025-01-16
Payer: MEDICARE

## 2025-01-16 ENCOUNTER — ANESTHESIA (OUTPATIENT)
Dept: PERIOP | Facility: HOSPITAL | Age: 70
End: 2025-01-16
Payer: MEDICARE

## 2025-01-16 LAB
ANION GAP SERPL CALCULATED.3IONS-SCNC: 7.5 MMOL/L (ref 5–15)
BACTERIA SPEC AEROBE CULT: ABNORMAL
BASOPHILS # BLD AUTO: 0.02 10*3/MM3 (ref 0–0.2)
BASOPHILS NFR BLD AUTO: 0.4 % (ref 0–1.5)
BUN SERPL-MCNC: 15 MG/DL (ref 8–23)
BUN/CREAT SERPL: 21.1 (ref 7–25)
CALCIUM SPEC-SCNC: 8.1 MG/DL (ref 8.6–10.5)
CHLORIDE SERPL-SCNC: 105 MMOL/L (ref 98–107)
CO2 SERPL-SCNC: 21.5 MMOL/L (ref 22–29)
CREAT SERPL-MCNC: 0.71 MG/DL (ref 0.76–1.27)
DEPRECATED RDW RBC AUTO: 46 FL (ref 37–54)
EGFRCR SERPLBLD CKD-EPI 2021: 100.6 ML/MIN/1.73
EOSINOPHIL # BLD AUTO: 0.43 10*3/MM3 (ref 0–0.4)
EOSINOPHIL NFR BLD AUTO: 8.2 % (ref 0.3–6.2)
ERYTHROCYTE [DISTWIDTH] IN BLOOD BY AUTOMATED COUNT: 15.3 % (ref 12.3–15.4)
GLUCOSE BLDC GLUCOMTR-MCNC: 110 MG/DL (ref 70–99)
GLUCOSE BLDC GLUCOMTR-MCNC: 138 MG/DL (ref 70–99)
GLUCOSE BLDC GLUCOMTR-MCNC: 214 MG/DL (ref 70–99)
GLUCOSE BLDC GLUCOMTR-MCNC: 284 MG/DL (ref 70–99)
GLUCOSE SERPL-MCNC: 106 MG/DL (ref 65–99)
GRAM STN SPEC: ABNORMAL
GRAM STN SPEC: ABNORMAL
HCT VFR BLD AUTO: 41.4 % (ref 37.5–51)
HGB BLD-MCNC: 13 G/DL (ref 13–17.7)
IMM GRANULOCYTES # BLD AUTO: 0.01 10*3/MM3 (ref 0–0.05)
IMM GRANULOCYTES NFR BLD AUTO: 0.2 % (ref 0–0.5)
ISOLATED FROM: ABNORMAL
LYMPHOCYTES # BLD AUTO: 2.68 10*3/MM3 (ref 0.7–3.1)
LYMPHOCYTES NFR BLD AUTO: 51.2 % (ref 19.6–45.3)
MAGNESIUM SERPL-MCNC: 1.7 MG/DL (ref 1.6–2.4)
MCH RBC QN AUTO: 25.9 PG (ref 26.6–33)
MCHC RBC AUTO-ENTMCNC: 31.4 G/DL (ref 31.5–35.7)
MCV RBC AUTO: 82.5 FL (ref 79–97)
MONOCYTES # BLD AUTO: 0.52 10*3/MM3 (ref 0.1–0.9)
MONOCYTES NFR BLD AUTO: 9.9 % (ref 5–12)
NEUTROPHILS NFR BLD AUTO: 1.57 10*3/MM3 (ref 1.7–7)
NEUTROPHILS NFR BLD AUTO: 30.1 % (ref 42.7–76)
NRBC BLD AUTO-RTO: 0 /100 WBC (ref 0–0.2)
PHOSPHATE SERPL-MCNC: 2.5 MG/DL (ref 2.5–4.5)
PLATELET # BLD AUTO: 249 10*3/MM3 (ref 140–450)
PMV BLD AUTO: 9.6 FL (ref 6–12)
POTASSIUM SERPL-SCNC: 3.9 MMOL/L (ref 3.5–5.2)
RBC # BLD AUTO: 5.02 10*6/MM3 (ref 4.14–5.8)
SODIUM SERPL-SCNC: 134 MMOL/L (ref 136–145)
VANCOMYCIN TROUGH SERPL-MCNC: 18.34 MCG/ML (ref 5–20)
WBC NRBC COR # BLD AUTO: 5.23 10*3/MM3 (ref 3.4–10.8)

## 2025-01-16 PROCEDURE — C2617 STENT, NON-COR, TEM W/O DEL: HCPCS | Performed by: UROLOGY

## 2025-01-16 PROCEDURE — 25010000002 LIDOCAINE PF 2% 2 % SOLUTION: Performed by: NURSE ANESTHETIST, CERTIFIED REGISTERED

## 2025-01-16 PROCEDURE — 25010000002 FENTANYL CITRATE (PF) 50 MCG/ML SOLUTION: Performed by: NURSE ANESTHETIST, CERTIFIED REGISTERED

## 2025-01-16 PROCEDURE — 80048 BASIC METABOLIC PNL TOTAL CA: CPT | Performed by: INTERNAL MEDICINE

## 2025-01-16 PROCEDURE — 84100 ASSAY OF PHOSPHORUS: CPT | Performed by: INTERNAL MEDICINE

## 2025-01-16 PROCEDURE — 25010000002 PROPOFOL 10 MG/ML EMULSION: Performed by: NURSE ANESTHETIST, CERTIFIED REGISTERED

## 2025-01-16 PROCEDURE — C1758 CATHETER, URETERAL: HCPCS | Performed by: UROLOGY

## 2025-01-16 PROCEDURE — C1751 CATH, INF, PER/CENT/MIDLINE: HCPCS

## 2025-01-16 PROCEDURE — 25010000002 AZTREONAM PER 500 MG: Performed by: INTERNAL MEDICINE

## 2025-01-16 PROCEDURE — 25010000002 VANCOMYCIN 5 G RECONSTITUTED SOLUTION: Performed by: INTERNAL MEDICINE

## 2025-01-16 PROCEDURE — 99232 SBSQ HOSP IP/OBS MODERATE 35: CPT | Performed by: UROLOGY

## 2025-01-16 PROCEDURE — 25010000002 SUGAMMADEX 200 MG/2ML SOLUTION: Performed by: NURSE ANESTHETIST, CERTIFIED REGISTERED

## 2025-01-16 PROCEDURE — C1769 GUIDE WIRE: HCPCS | Performed by: UROLOGY

## 2025-01-16 PROCEDURE — 99233 SBSQ HOSP IP/OBS HIGH 50: CPT | Performed by: INTERNAL MEDICINE

## 2025-01-16 PROCEDURE — 82948 REAGENT STRIP/BLOOD GLUCOSE: CPT

## 2025-01-16 PROCEDURE — 25810000003 SODIUM CHLORIDE 0.9 % SOLUTION: Performed by: UROLOGY

## 2025-01-16 PROCEDURE — 80202 ASSAY OF VANCOMYCIN: CPT | Performed by: INTERNAL MEDICINE

## 2025-01-16 PROCEDURE — 83735 ASSAY OF MAGNESIUM: CPT | Performed by: INTERNAL MEDICINE

## 2025-01-16 PROCEDURE — 36410 VNPNXR 3YR/> PHY/QHP DX/THER: CPT

## 2025-01-16 PROCEDURE — 25010000002 DEXAMETHASONE PER 1 MG: Performed by: NURSE ANESTHETIST, CERTIFIED REGISTERED

## 2025-01-16 PROCEDURE — 82948 REAGENT STRIP/BLOOD GLUCOSE: CPT | Performed by: NURSE ANESTHETIST, CERTIFIED REGISTERED

## 2025-01-16 PROCEDURE — 25810000003 LACTATED RINGERS PER 1000 ML: Performed by: ANESTHESIOLOGY

## 2025-01-16 PROCEDURE — 76000 FLUOROSCOPY <1 HR PHYS/QHP: CPT

## 2025-01-16 PROCEDURE — 82948 REAGENT STRIP/BLOOD GLUCOSE: CPT | Performed by: STUDENT IN AN ORGANIZED HEALTH CARE EDUCATION/TRAINING PROGRAM

## 2025-01-16 PROCEDURE — 82365 CALCULUS SPECTROSCOPY: CPT | Performed by: UROLOGY

## 2025-01-16 PROCEDURE — 63710000001 INSULIN LISPRO (HUMAN) PER 5 UNITS: Performed by: UROLOGY

## 2025-01-16 PROCEDURE — 52318 REMOVE BLADDER STONE: CPT | Performed by: UROLOGY

## 2025-01-16 PROCEDURE — 25010000002 AZTREONAM PER 500 MG: Performed by: UROLOGY

## 2025-01-16 PROCEDURE — 63710000001 INSULIN GLARGINE PER 5 UNITS: Performed by: UROLOGY

## 2025-01-16 PROCEDURE — C1894 INTRO/SHEATH, NON-LASER: HCPCS | Performed by: UROLOGY

## 2025-01-16 PROCEDURE — 25010000002 VANCOMYCIN 5 G RECONSTITUTED SOLUTION: Performed by: UROLOGY

## 2025-01-16 PROCEDURE — 25010000002 ONDANSETRON PER 1 MG: Performed by: NURSE ANESTHETIST, CERTIFIED REGISTERED

## 2025-01-16 PROCEDURE — 0TCB8ZZ EXTIRPATION OF MATTER FROM BLADDER, VIA NATURAL OR ARTIFICIAL OPENING ENDOSCOPIC: ICD-10-PCS | Performed by: UROLOGY

## 2025-01-16 PROCEDURE — 0T778DZ DILATION OF LEFT URETER WITH INTRALUMINAL DEVICE, VIA NATURAL OR ARTIFICIAL OPENING ENDOSCOPIC: ICD-10-PCS | Performed by: UROLOGY

## 2025-01-16 PROCEDURE — 25810000003 SODIUM CHLORIDE 0.9 % SOLUTION: Performed by: INTERNAL MEDICINE

## 2025-01-16 PROCEDURE — 88300 SURGICAL PATH GROSS: CPT | Performed by: UROLOGY

## 2025-01-16 PROCEDURE — 52356 CYSTO/URETERO W/LITHOTRIPSY: CPT | Performed by: UROLOGY

## 2025-01-16 PROCEDURE — 25010000002 MIDAZOLAM PER 1MG: Performed by: ANESTHESIOLOGY

## 2025-01-16 PROCEDURE — 85025 COMPLETE CBC W/AUTO DIFF WBC: CPT | Performed by: INTERNAL MEDICINE

## 2025-01-16 PROCEDURE — 25010000002 MAGNESIUM SULFATE 2 GM/50ML SOLUTION: Performed by: INTERNAL MEDICINE

## 2025-01-16 DEVICE — IMPLANTABLE DEVICE: Type: IMPLANTABLE DEVICE | Site: URETER | Status: FUNCTIONAL

## 2025-01-16 RX ORDER — SODIUM CHLORIDE 0.9 % (FLUSH) 0.9 %
10 SYRINGE (ML) INJECTION AS NEEDED
Status: DISCONTINUED | OUTPATIENT
Start: 2025-01-16 | End: 2025-01-17 | Stop reason: HOSPADM

## 2025-01-16 RX ORDER — SODIUM CHLORIDE, SODIUM LACTATE, POTASSIUM CHLORIDE, CALCIUM CHLORIDE 600; 310; 30; 20 MG/100ML; MG/100ML; MG/100ML; MG/100ML
20 INJECTION, SOLUTION INTRAVENOUS CONTINUOUS PRN
Status: DISCONTINUED | OUTPATIENT
Start: 2025-01-16 | End: 2025-01-17

## 2025-01-16 RX ORDER — PROPOFOL 10 MG/ML
VIAL (ML) INTRAVENOUS AS NEEDED
Status: DISCONTINUED | OUTPATIENT
Start: 2025-01-16 | End: 2025-01-16 | Stop reason: SURG

## 2025-01-16 RX ORDER — SODIUM CHLORIDE 0.9 % (FLUSH) 0.9 %
10 SYRINGE (ML) INJECTION EVERY 12 HOURS SCHEDULED
Status: DISCONTINUED | OUTPATIENT
Start: 2025-01-16 | End: 2025-01-17 | Stop reason: HOSPADM

## 2025-01-16 RX ORDER — ACETAMINOPHEN 500 MG
1000 TABLET ORAL ONCE
Status: COMPLETED | OUTPATIENT
Start: 2025-01-16 | End: 2025-01-16

## 2025-01-16 RX ORDER — MAGNESIUM HYDROXIDE 1200 MG/15ML
LIQUID ORAL AS NEEDED
Status: DISCONTINUED | OUTPATIENT
Start: 2025-01-16 | End: 2025-01-16 | Stop reason: HOSPADM

## 2025-01-16 RX ORDER — MAGNESIUM SULFATE HEPTAHYDRATE 40 MG/ML
2 INJECTION, SOLUTION INTRAVENOUS ONCE
Status: COMPLETED | OUTPATIENT
Start: 2025-01-16 | End: 2025-01-16

## 2025-01-16 RX ORDER — OXYCODONE HYDROCHLORIDE 5 MG/1
5 TABLET ORAL
Status: DISCONTINUED | OUTPATIENT
Start: 2025-01-16 | End: 2025-01-16 | Stop reason: HOSPADM

## 2025-01-16 RX ORDER — PHENYLEPHRINE HCL IN 0.9% NACL 1 MG/10 ML
SYRINGE (ML) INTRAVENOUS AS NEEDED
Status: DISCONTINUED | OUTPATIENT
Start: 2025-01-16 | End: 2025-01-16 | Stop reason: SURG

## 2025-01-16 RX ORDER — ONDANSETRON 2 MG/ML
INJECTION INTRAMUSCULAR; INTRAVENOUS AS NEEDED
Status: DISCONTINUED | OUTPATIENT
Start: 2025-01-16 | End: 2025-01-16 | Stop reason: SURG

## 2025-01-16 RX ORDER — ONDANSETRON 2 MG/ML
4 INJECTION INTRAMUSCULAR; INTRAVENOUS ONCE AS NEEDED
Status: DISCONTINUED | OUTPATIENT
Start: 2025-01-16 | End: 2025-01-16 | Stop reason: HOSPADM

## 2025-01-16 RX ORDER — DEXAMETHASONE SODIUM PHOSPHATE 4 MG/ML
INJECTION, SOLUTION INTRA-ARTICULAR; INTRALESIONAL; INTRAMUSCULAR; INTRAVENOUS; SOFT TISSUE AS NEEDED
Status: DISCONTINUED | OUTPATIENT
Start: 2025-01-16 | End: 2025-01-16 | Stop reason: SURG

## 2025-01-16 RX ORDER — ROCURONIUM BROMIDE 10 MG/ML
INJECTION, SOLUTION INTRAVENOUS AS NEEDED
Status: DISCONTINUED | OUTPATIENT
Start: 2025-01-16 | End: 2025-01-16 | Stop reason: SURG

## 2025-01-16 RX ORDER — LIDOCAINE HYDROCHLORIDE 20 MG/ML
INJECTION, SOLUTION EPIDURAL; INFILTRATION; INTRACAUDAL; PERINEURAL AS NEEDED
Status: DISCONTINUED | OUTPATIENT
Start: 2025-01-16 | End: 2025-01-16 | Stop reason: SURG

## 2025-01-16 RX ORDER — MIDAZOLAM HYDROCHLORIDE 2 MG/2ML
2 INJECTION, SOLUTION INTRAMUSCULAR; INTRAVENOUS ONCE
Status: COMPLETED | OUTPATIENT
Start: 2025-01-16 | End: 2025-01-16

## 2025-01-16 RX ORDER — PROMETHAZINE HYDROCHLORIDE 12.5 MG/1
25 TABLET ORAL ONCE AS NEEDED
Status: DISCONTINUED | OUTPATIENT
Start: 2025-01-16 | End: 2025-01-16 | Stop reason: HOSPADM

## 2025-01-16 RX ORDER — DEXMEDETOMIDINE HYDROCHLORIDE 100 UG/ML
INJECTION, SOLUTION INTRAVENOUS AS NEEDED
Status: DISCONTINUED | OUTPATIENT
Start: 2025-01-16 | End: 2025-01-16 | Stop reason: SURG

## 2025-01-16 RX ORDER — KETAMINE HYDROCHLORIDE 10 MG/ML
INJECTION, SOLUTION INTRAMUSCULAR; INTRAVENOUS AS NEEDED
Status: DISCONTINUED | OUTPATIENT
Start: 2025-01-16 | End: 2025-01-16 | Stop reason: SURG

## 2025-01-16 RX ORDER — FENTANYL CITRATE 50 UG/ML
INJECTION, SOLUTION INTRAMUSCULAR; INTRAVENOUS AS NEEDED
Status: DISCONTINUED | OUTPATIENT
Start: 2025-01-16 | End: 2025-01-16 | Stop reason: SURG

## 2025-01-16 RX ORDER — PROMETHAZINE HYDROCHLORIDE 25 MG/1
25 SUPPOSITORY RECTAL ONCE AS NEEDED
Status: DISCONTINUED | OUTPATIENT
Start: 2025-01-16 | End: 2025-01-16 | Stop reason: HOSPADM

## 2025-01-16 RX ADMIN — MIDAZOLAM HYDROCHLORIDE 2 MG: 1 INJECTION, SOLUTION INTRAMUSCULAR; INTRAVENOUS at 12:09

## 2025-01-16 RX ADMIN — HYDROCORTISONE 1 APPLICATION: 1 OINTMENT TOPICAL at 10:24

## 2025-01-16 RX ADMIN — BETAMETHASONE DIPROPIONATE 1 APPLICATION: 0.5 OINTMENT TOPICAL at 10:24

## 2025-01-16 RX ADMIN — ACETAMINOPHEN 1000 MG: 500 TABLET ORAL at 12:08

## 2025-01-16 RX ADMIN — Medication 10 ML: at 10:24

## 2025-01-16 RX ADMIN — Medication 100 MCG: at 13:10

## 2025-01-16 RX ADMIN — PROPOFOL 120 MG: 10 INJECTION, EMULSION INTRAVENOUS at 12:56

## 2025-01-16 RX ADMIN — Medication 100 MCG: at 13:25

## 2025-01-16 RX ADMIN — Medication 100 MCG: at 13:33

## 2025-01-16 RX ADMIN — KETAMINE HYDROCHLORIDE 20 MG: 10 INJECTION INTRAMUSCULAR; INTRAVENOUS at 13:22

## 2025-01-16 RX ADMIN — BETAMETHASONE DIPROPIONATE 1 APPLICATION: 0.5 OINTMENT TOPICAL at 21:31

## 2025-01-16 RX ADMIN — DICLOFENAC SODIUM 2 G: 10 GEL TOPICAL at 21:30

## 2025-01-16 RX ADMIN — Medication 1 APPLICATION: at 10:24

## 2025-01-16 RX ADMIN — AZTREONAM 2000 MG: 2 INJECTION, POWDER, LYOPHILIZED, FOR SOLUTION INTRAMUSCULAR; INTRAVENOUS at 13:00

## 2025-01-16 RX ADMIN — Medication 250 MG: at 21:28

## 2025-01-16 RX ADMIN — HYDROCORTISONE 1 APPLICATION: 1 OINTMENT TOPICAL at 17:00

## 2025-01-16 RX ADMIN — FENTANYL CITRATE 50 MCG: 50 INJECTION, SOLUTION INTRAMUSCULAR; INTRAVENOUS at 12:56

## 2025-01-16 RX ADMIN — Medication 1250 MG: at 13:24

## 2025-01-16 RX ADMIN — SODIUM CHLORIDE, POTASSIUM CHLORIDE, SODIUM LACTATE AND CALCIUM CHLORIDE: 600; 310; 30; 20 INJECTION, SOLUTION INTRAVENOUS at 12:45

## 2025-01-16 RX ADMIN — INSULIN GLARGINE 10 UNITS: 100 INJECTION, SOLUTION SUBCUTANEOUS at 15:43

## 2025-01-16 RX ADMIN — FENTANYL CITRATE 50 MCG: 50 INJECTION, SOLUTION INTRAMUSCULAR; INTRAVENOUS at 13:10

## 2025-01-16 RX ADMIN — ROCURONIUM BROMIDE 50 MG: 50 INJECTION INTRAVENOUS at 12:56

## 2025-01-16 RX ADMIN — DICLOFENAC SODIUM 2 G: 10 GEL TOPICAL at 10:24

## 2025-01-16 RX ADMIN — ONDANSETRON 4 MG: 2 INJECTION INTRAMUSCULAR; INTRAVENOUS at 12:57

## 2025-01-16 RX ADMIN — DEXAMETHASONE SODIUM PHOSPHATE 4 MG: 4 INJECTION, SOLUTION INTRAMUSCULAR; INTRAVENOUS at 12:57

## 2025-01-16 RX ADMIN — CETIRIZINE HYDROCHLORIDE 10 MG: 10 TABLET, FILM COATED ORAL at 15:44

## 2025-01-16 RX ADMIN — DEXMEDETOMIDINE HYDROCHLORIDE 8 MCG: 100 INJECTION, SOLUTION, CONCENTRATE INTRAVENOUS at 13:20

## 2025-01-16 RX ADMIN — MAGNESIUM SULFATE HEPTAHYDRATE 2 G: 2 INJECTION, SOLUTION INTRAVENOUS at 10:22

## 2025-01-16 RX ADMIN — AZTREONAM 2000 MG: 2 INJECTION, POWDER, LYOPHILIZED, FOR SOLUTION INTRAMUSCULAR; INTRAVENOUS at 21:28

## 2025-01-16 RX ADMIN — Medication 200 MCG: at 13:38

## 2025-01-16 RX ADMIN — DICLOFENAC SODIUM 2 G: 10 GEL TOPICAL at 17:00

## 2025-01-16 RX ADMIN — LIDOCAINE HYDROCHLORIDE 60 MG: 20 INJECTION, SOLUTION INTRAVENOUS at 12:56

## 2025-01-16 RX ADMIN — VANCOMYCIN HYDROCHLORIDE 1250 MG: 5 INJECTION, POWDER, LYOPHILIZED, FOR SOLUTION INTRAVENOUS at 01:19

## 2025-01-16 RX ADMIN — SUGAMMADEX 200 MG: 100 INJECTION, SOLUTION INTRAVENOUS at 14:04

## 2025-01-16 RX ADMIN — INSULIN LISPRO 3 UNITS: 100 INJECTION, SOLUTION INTRAVENOUS; SUBCUTANEOUS at 16:56

## 2025-01-16 RX ADMIN — DICLOFENAC SODIUM 2 G: 10 GEL TOPICAL at 15:44

## 2025-01-16 RX ADMIN — AZTREONAM 2000 MG: 2 INJECTION, POWDER, LYOPHILIZED, FOR SOLUTION INTRAMUSCULAR; INTRAVENOUS at 04:14

## 2025-01-16 RX ADMIN — Medication 250 MG: at 15:44

## 2025-01-16 RX ADMIN — Medication 10 ML: at 21:31

## 2025-01-16 RX ADMIN — INSULIN LISPRO 4 UNITS: 100 INJECTION, SOLUTION INTRAVENOUS; SUBCUTANEOUS at 21:28

## 2025-01-16 NOTE — SIGNIFICANT NOTE
01/16/25 1000   Coping/Psychosocial   Observed Emotional State calm;cooperative;other (see comments)  (hungry)   Verbalized Emotional State acceptance;other (see comments)  (anxious for his surgery.)   Trust Relationship/Rapport empathic listening provided   Involvement in Care interacting with patient   Additional Documentation Spiritual Care (Group)   Spiritual Care   Use of Spiritual Resources non-[place] use of spiritual care   Spiritual Care Source  initiative   Spiritual Care Follow-Up follow-up, none required as presently assessed   Response to Spiritual Care receptive of support   Spiritual Care Interventions supportive conversation provided   Spiritual Care Visit Type initial   Receptivity to Spiritual Care visit welcomed     Duration of visit: 15 min.

## 2025-01-16 NOTE — SIGNIFICANT NOTE
01/16/25 0907   OTHER   Discipline occupational therapist   Rehab Time/Intention   Session Not Performed patient/family declined treatment

## 2025-01-16 NOTE — CONSULTS
Midline Line Insertion Procedure Note    Procedure: Insertion of Midline Catheter    Indications:  Home IV therapy, Poor Access, IV antibiotics until 01/20/25    Procedure Details     Patient's pertinent medical history was reviewed.    Ultrasound used to identify an appropriate vein.Sterile technique was used including antiseptics, cap, gloves, hand hygiene, mask, and sheet.    #20G/10CM PowerGlide inserted to the L Basilic vein per hospital protocol.   Blood return:  yes    Findings:  Catheter inserted to 10 cm, with 0 cm. Exposed.   Catheter was flushed with 20 cc NS. Patient did tolerate procedure well.    LOT: [id number]  Expiration date:10/2025    Recommendations:  Midline Brochure given to patient with teaching instruction.       Nahum Bar RN

## 2025-01-16 NOTE — ANESTHESIA POSTPROCEDURE EVALUATION
Patient: Brian Mehta    Procedure Summary       Date: 01/16/25 Room / Location: [facility] OR 02 / [facility] MAIN OR    Anesthesia Start: 1245 Anesthesia Stop: 1421    Procedure: CYSTOLITHOLAPAXY, left ureteroscopy with laser basket stone extraction and left ureteral stent exchange.  Looking bladder, laser stone off stent exchange retained stent if possible (Left: Ureter) Diagnosis:       Retained ureteral stent      Bladder stone      (Retained ureteral stent [Z96.0])      (Bladder stone [N21.0])    Surgeons: Ector Kulkarni MD Provider: Syed Reynoso MD    Anesthesia Type: general ASA Status: 3            Anesthesia Type: general    Vitals  Vitals Value Taken Time   BP 97/62 01/16/25 1422   Temp     Pulse 90 01/16/25 1426   Resp     SpO2 98 % 01/16/25 1426   Vitals shown include unfiled device data.        Post Anesthesia Care and Evaluation    Patient location during evaluation: bedside  Patient participation: complete - patient participated  Level of consciousness: awake    Airway patency: patent  PONV Status: none  Cardiovascular status: acceptable  Respiratory status: acceptable  Hydration status: acceptable

## 2025-01-16 NOTE — PLAN OF CARE
Goal Outcome Evaluation:  Plan of Care Reviewed With: patient, spouse        Progress: improving  Outcome Evaluation: Patient Alert and oriented before procedure, after procedure patient has been disoriented to place. PICC placed today. Magnesium replaced, abx infused. Glucose monitored. Patient was agreeable to rehab placement at discharge. Patient has uretal stent with string in place. Continuing plan of care.

## 2025-01-16 NOTE — PROGRESS NOTES
[facility] Clinical Pharmacy Services: Vancomycin Monitoring Note    Brian Mehta is a 69 y.o. male who is on day 8 of pharmacy to dose vancomycin for Bacteremia.    Imaging Reviewed?: Yes   Updated Cultures and Sensitivities:    Urine cx: Negative   @ 2303 Blood cx, right: NGTD   @ 2303 Blood cx, left: NGTD   @ 0147 Blood cx, right: Aerococcus viridans,GNB,staph lentus   @ 0147 Blood cx, right: Aerococcus viridans,GNB,staph lentus, strep (alpha hemolytic)    Vitals/Labs  Ht:  ; Wt: 94.1 kg (207 lb 7.3 oz)   Temp (24hrs), Av.1 °F (36.7 °C), Min:98.1 °F (36.7 °C), Max:98.2 °F (36.8 °C)   Estimated Creatinine Clearance: 134.4 mL/min (A) (by C-G formula based on SCr of 0.71 mg/dL (L)).     Results from last 7 days   Lab Units 25  1051 25  0516 01/15/25  0516 25  0550 25  1046 25  0530 01/10/25  1104   VANCOMYCIN RM mcg/mL  --   --   --   --   --   --  16.64   VANCOMYCIN TR mcg/mL 18.34  --   --   --  18.12  --   --    CREATININE mg/dL  --  0.71* 0.76 0.83  --    < >  --    WBC 10*3/mm3  --  5.23 5.57 5.69  --    < >  --     < > = values in this interval not displayed.     Assessment/Plan    Current Vancomycin Dose:  1250 mg IV every 12 hours; which provides the following predicted parameters:  AUC24,ss: 500 mg/L.hr  Probability of AUC24 > 400: 98 %  Ctrough,ss: 15.5 mg/L  Probability of Ctrough,ss > 20: 2 %  Probability of nephrotoxicity (Lodise TERRELL ): 11 %    Continue with current regimen.  We will continue to monitor patient changes and renal function     Thank you for involving pharmacy in this patient's care. Please contact pharmacy with any questions or concerns.    Carrie Luther  Clinical Pharmacist

## 2025-01-16 NOTE — PROGRESS NOTES
[facility]   Urology Progress Note    Patient Name: Brian Mehta  : 1955  MRN: [id number]  Primary Care Physician:  Sophie Capps APRN  Date of admission: 2025    Subjective   Subjective       No events    Has been n.p.o. since midnight        Objective   Objective     Vitals:   Temp:  [97.3 °F (36.3 °C)-98.6 °F (37 °C)] 98.2 °F (36.8 °C)  Heart Rate:  [] 98  Resp:  [18-20] 20  BP: (122-135)/(66-78) 122/66  Physical Exam     Alert and oriented x3  No acute distress  Unlabored respirations  Nontender/nondistended       Result Review    Result Review:  I have personally reviewed the results from the time of this admission to 2025 06:08 EST and agree with these findings:  []  Laboratory  []  Microbiology  []  Radiology  []  EKG/Telemetry   []  Cardiology/Vascular   []  Pathology  []  Old records  []  Other:      Assessment & Plan   Assessment / Plan     Brief Patient Summary:  Brian Mehta is a 69 y.o. male     Active Hospital Problems:  Active Hospital Problems    Diagnosis     **Acute UTI     Bacteremia     Retained ureteral stent     Bladder stone        Retained left ureteral stent  UTI  Bacteremia  History of CVA           Left ureteral stent in for 3 years    Surgery this afternoon    cystolitholapaxy with left ureteral stent removal possible laser and stent replacement      Continue n.p.o.    Risks and benefits were discussed including bleeding, infection and damage to the urinary system.  We also discussed the risk of anesthesia up to and including death.  Patient voiced understanding and would like to proceed.     Discussed again with the patient the stent has been in so long that is very difficult to know how hard it will be to get out.  He is definitely calcified.  May need multiple procedures

## 2025-01-16 NOTE — SIGNIFICANT NOTE
01/16/25 1000   Physical Therapy Time and Intention   Session Not Performed patient/family declined, not feeling well  (Fatigue, not feeling well. Declined tx today)

## 2025-01-16 NOTE — CONSULTS
Nutrition Services    Patient Name: Brian Mehta  YOB: 1955  MRN: [id number]  Admission date: 1/7/2025      CLINICAL NUTRITION ASSESSMENT      Reason for Assessment  LOS     H&P:  Past Medical History:   Diagnosis Date    CHF (congestive heart failure)     SEE'S DR [place] NO CURRENT S/S    COPD (chronic obstructive pulmonary disease)     INHALER    Diabetes mellitus     DOESN'T CHECK BG OFTEN AT HOME    Hyperlipidemia     Hypertension     Sepsis 09/14/2023    Stroke 2017    RIGHT SIDE WEAKNESS        Current Problems:   Active Hospital Problems    Diagnosis     **Acute UTI     Bacteremia     Retained ureteral stent     Bladder stone         Nutrition/Diet History         Narrative   NPO for procedure. Good appetite, intake 60-75% of meals, Boost supplement ~2/day; frequency decreased. Continue Boost plus, per pt preference. Glucose appears well controlled.  No GI complaints. Skin- MISTY arms/Lt thoracic spine rash  RD to follow per protocol. Resume diet with ONS.     Anthropometrics        Current Height, Weight    Weight: 94.1 kg (207 lb 7.3 oz)   Current BMI Body mass index is 25.93 kg/m².   BMI Classification Overweight   % %, IBW 89 kg   Adjusted Body Weight (ABW)    Weight Hx  Wt Readings from Last 30 Encounters:   01/08/25 0505 94.1 kg (207 lb 7.3 oz)   10/27/24 0409 94.6 kg (208 lb 8.9 oz)   10/26/24 1834 98.8 kg (217 lb 13 oz)   08/19/24 0516 101 kg (221 lb 9 oz)   08/14/24 1323 100 kg (220 lb 14.4 oz)   07/08/24 1308 98.4 kg (217 lb)   06/17/24 1421 98.4 kg (217 lb)   03/11/24 0150 99.2 kg (218 lb 11.1 oz)   03/10/24 2305 98 kg (216 lb)   12/07/23 1314 98 kg (216 lb)   11/21/23 1136 110 kg (242 lb 4.6 oz)   09/13/23 2144 101 kg (222 lb 0.1 oz)   08/08/23 1351 93.4 kg (206 lb)   07/18/23 1347 99.8 kg (220 lb)   02/21/19 0000 102 kg (225 lb)   07/17/18 0000 102 kg (225 lb)   03/20/18 0000 110 kg (243 lb 2 oz)          Wt Change Observation Recent wt stable; wt down slightly from UBW, pt not  concerned.     Estimated/Assessed Needs  Estimated Needs based on: Current Body Weight 94 kg       Energy Requirements 22-25 kcal/kg    EST Needs (kcal/day) 3040-6503 kcal       Protein Requirements 1.0 g/kg   EST Daily Needs (g/day) 94 g       Fluid Requirements 25-30 ml/kg    Estimated Needs (mL/day) 0533-6859 mL     Labs/Medications         Pertinent Labs Reviewed.   Results from last 7 days   Lab Units 01/16/25  0516 01/15/25  0516 01/14/25  0550 01/11/25  0530 01/10/25  0347   SODIUM mmol/L 134* 137 136   < > 137   POTASSIUM mmol/L 3.9 4.1 4.2   < > 4.2   CHLORIDE mmol/L 105 105 103   < > 104   CO2 mmol/L 21.5* 22.8 24.4   < > 24.5   BUN mg/dL 15 19 16   < > 15   CREATININE mg/dL 0.71* 0.76 0.83   < > 0.79   CALCIUM mg/dL 8.1* 8.4* 8.9   < > 8.6   BILIRUBIN mg/dL  --   --   --   --  0.3   ALK PHOS U/L  --   --   --   --  88   ALT (SGPT) U/L  --   --   --   --  <5   AST (SGOT) U/L  --   --   --   --  9   GLUCOSE mg/dL 106* 121* 136*   < > 163*    < > = values in this interval not displayed.     Results from last 7 days   Lab Units 01/16/25  0516 01/15/25  0516 01/14/25  0550   MAGNESIUM mg/dL 1.7 1.8 1.8   PHOSPHORUS mg/dL 2.5 3.2  --    HEMOGLOBIN g/dL 13.0 13.1 13.9   HEMATOCRIT % 41.4 42.0 43.6     COVID19   Date Value Ref Range Status   08/19/2024 Detected (C) Not Detected - Ref. Range Final     Lab Results   Component Value Date    HGBA1C 8.80 (H) 01/07/2025         Pertinent Medications Reviewed.     Malnutrition Severity Assessment              Nutrition Diagnosis         Nutrition Dx Problem 1 Decreased nutrient needs related to cardiovascular and endocrine dysfunction as evidenced by need for carb controlled, heart healthy diet 2/2  DM, CHF, HTN/HLD.     Nutrition Intervention           Current Nutrition Orders & Evaluation of Intake       Current PO Diet NPO Diet NPO Type: Strict NPO   Supplement Orders Placed This Encounter      Dietary Nutrition Supplements Boost Plus (Ensure Plus); chocolate            Nutrition Intervention/Prescription        As medically feasible, resume Carb controlled, Heart healthy diet.  Boost plus BID        Medical Nutrition Therapy/Nutrition Education          Learner     Readiness Patient  Acceptance     Method     Response Explanation  Verbalizes understanding     Monitor/Evaluation        Monitor PO intake, Supplement intake, Pertinent labs, Diet advancement     Nutrition Discharge Plan         Recommend to continue oral nutrition supplements on discharge.      Electronically signed by:  Leonila Sanabria RD  01/16/25 07:30 EST

## 2025-01-16 NOTE — PROGRESS NOTES
[facility]   Hospitalist Progress Note    Date of admission: 1/7/2025  Patient Name: Brian Mehta  1955  Date: 1/16/2025      Subjective     Chief Complaint   Patient presents with    Weakness - Generalized    Wellness Check       Interval Followup: no aeon.  Rash feeling like its improving.  No significant itching.  Had ext discussion of rehab needs - is willing for rehab placement given debility and need for continued iv abx.      Objective     Vitals:   Temp:  [97.5 °F (36.4 °C)-98.5 °F (36.9 °C)] 98.1 °F (36.7 °C)  Heart Rate:  [84-98] 86  Resp:  [13-20] 13  BP: ()/(45-91) 147/84  Flow (L/min) (Oxygen Therapy):  [2-6] 2    Physical Exam  Awake conversant appears older than stated age, answering basic questions appropriately, poor insight   Poor dentition  CTAB on room air  Rrr  Rash on arms/chest stable/improving compared to yesterday, no new areas appreciated    Result Review:  Vital signs, labs and recent relevant imaging reviewed.      CBC          1/14/2025    05:50 1/15/2025    05:16 1/16/2025    05:16   CBC   WBC 5.69  5.57  5.23    RBC 5.36  5.14  5.02    Hemoglobin 13.9  13.1  13.0    Hematocrit 43.6  42.0  41.4    MCV 81.3  81.7  82.5    MCH 25.9  25.5  25.9    MCHC 31.9  31.2  31.4    RDW 15.4  15.2  15.3    Platelets 277  249  249      CMP          1/14/2025    05:50 1/15/2025    05:16 1/16/2025    05:16   CMP   Glucose 136  121  106    BUN 16  19  15    Creatinine 0.83  0.76  0.71    EGFR 95.9  98.5  100.6    Sodium 136  137  134    Potassium 4.2  4.1  3.9    Chloride 103  105  105    Calcium 8.9  8.4  8.1    BUN/Creatinine Ratio 19.3  25.0  21.1    Anion Gap 8.6  9.2  7.5          acetaminophen    aluminum-magnesium hydroxide-simethicone    senna-docusate sodium **AND** polyethylene glycol **AND** bisacodyl **AND** bisacodyl    dextrose    dextrose    glucagon (human recombinant)    hydrOXYzine    influenza vaccine    lactated ringers    melatonin    Pharmacy to dose vancomycin     sodium chloride    sodium chloride    sodium chloride    [Held by provider] aspirin, 81 mg, Oral, Daily  aztreonam, 2,000 mg, Intravenous, Q8H  betamethasone dipropionate, 1 Application, Topical, Q12H  cetirizine, 10 mg, Oral, Daily  Diclofenac Sodium, 2 g, Topical, 4x Daily  dimethicone, 1 Application, Topical, Daily  [Held by provider] enoxaparin, 40 mg, Subcutaneous, Daily  hydrocortisone-bacitracin-zinc oxide-nystatin, 1 Application, Topical, 3 times per day  insulin glargine, 10 Units, Subcutaneous, Daily  insulin lispro, 2-7 Units, Subcutaneous, 4x Daily AC & at Bedtime  saccharomyces boulardii, 250 mg, Oral, BID  sodium chloride, 10 mL, Intravenous, Q12H  sodium chloride, 10 mL, Intravenous, Q12H  [START ON 1/17/2025] vancomycin, 1,250 mg, Intravenous, Q12H        XR Shoulder 2+ View Right    Result Date: 1/15/2025  No acute fracture or acute malalignment is identified. Moderate degenerative changes involve the right shoulder. Please see above comments for further detail.    Portions of this note were completed with a voice recognition program. Electronically Signed: Jm Quezada MD  1/15/2025 9:41 PM EST  Workstation ID: [id number]    MRI Lumbar Spine Without Contrast    Result Date: 1/12/2025  Impression: Mild multilevel degenerative changes in the lumbar spine. The exam is limited due to patient motion artifact. The patient had difficulty tolerating the exam and intravenous contrast could not be administered. Electronically Signed: Grisel Gaines MD  1/12/2025 11:43 AM EST  Workstation ID: [id number]    MRI Thoracic Spine Without Contrast    Result Date: 1/12/2025  1. Active osseous edema involving the posterior right 10th rib near the costovertebral junction which on prior exams appeared to represent healed fractures however given the edema and adjacent soft tissue nodularity underlying osseous lesion cannot be excluded. A follow-up repeat examination with contrast administration is recommended..  Electronically Signed: Grisel Gaines MD  1/12/2025 11:35 AM EST  Workstation ID: [id number]    MRI Cervical Spine Without Contrast    Result Date: 1/12/2025  Impression: 1. Absence of the normal right vertebral artery flow-void with a follow-up CTA recommended. These findings were sent as a staff message. 2. Mild multilevel degenerative changes noted in the cervical spine. Electronically Signed: Grisel Gaines MD  1/12/2025 11:19 AM EST  Workstation ID: [id number]    CT Abdomen Pelvis With Contrast    Result Date: 1/12/2025  Impression: 1. Retained left ureteral stent with a fractured pigtail component within the lumen of the urinary bladder. There is granulation tissue surrounding the pigtail portion which appears unchanged. 2. Left renal hydronephrosis and proximal hydroureter with periureteric stranding. There is some mucosal enhancement of the left renal collecting system and recommend correlation with urinalysis to exclude infection. 3. Diverticulosis. Electronically Signed: Grisel Gaines MD  1/12/2025 9:11 AM EST  Workstation ID: [id number]     Assessment / Plan     Summary: 69 y.o. COPD on room air, CHF, DM2, tobacco abuse, history of CVA with residual right-sided weakness presented with weakness/hip pain, history had to be supplemented from staff, was found unkempt at home dialysis initial pain complaints.  Found to have UTI, ultimately findings of bacteremia in setting of prior left ureteral stent that has been retained in apparently never had follow-up to have this removed as previously scheduled.  Social work assisting given the note of multiple social issues patient has intermittently declined some aspects of care and imaging.      Patient originally refusing scans, however patient did perform some of the imaging .  Patient did have CT scan of abdomen, showed retained left ureteral stent with fractured pigtail component within the lumen of the urinary bladder, granulation tissue surrounding the pigtail  portion which appears unchanged.  Left renal hydronephrosis and proximal hydroureter with periureteric stranding.  Urology consulted plan for surgery on Thursday.    MRIs completed without contrast, patient could not stay in scanner long enough for contrasted portion.  Lumbar scan with multilevel degenerative changes.  Limited due to motion artifact.  Cervical component shows absence of normal right vertebral artery flow void with follow-up CTA recommended.  Thoracic spine shows active osseous edema involving posterior right 10th rib near costo vertebral junction which on prior exams appears to represent healed fractures however given the edema and adjacent soft tissue nodularity underlying osseous lesion cannot be excluded.  Recommendations for follow-up examination with contrast.  Following discussion with patient, at this time not interested in having additional imaging done     1/16 underwent - Laser CYSTOLITHOLAPAXY   2.1 x 3.6 cm bladder stone, left ureteroscopy with laser basket stone extraction, left ureteral stent exchange.  6 x 28 black silicone, string left on       Assessment/Plan (clinically significant if listed here)  Polymicrobial bacteremia from suspected UTI with retained left ureteral stent in setting of noncompliance with surgical f/u for prior stent removal   Retained left ureteral stent with a fractured pigtail component within the lumen of the urinary bladder  Urinary tract infection complicated in setting of above  Exanthematous drug rash suspect 2/2 ceftriaxone  Type 2 diabetes  Hypertension  History of CVA with residual right-sided deficits  Systolic CHF not acutely exacerbated EF 40% per 1/2025 echo  COPD not acute exacerbation  Active tobacco abuse  Generalized weakness/debility and inability to adequately care for himself/poor social situation    Echo -     The study is technically difficult for diagnosis.    Left ventricular systolic function is mildly decreased. Estimated left  ventricular EF = 40%     Echo technically difficult study/limited, grossly no acute vegetation noted  Repeat blood cultures remain negative, f/u final sens/speciation from initial culture  Picc line consult  Continue IV vancomycin monitor renal function toxicities, switched to 1/13 aztreonam from ceftriaxone given suspected drug rash from this.  Will discuss with ID further regarding regimen for discharge.   Continue cetirizine and topical steroid cream for drug rash suspected 2/2 ceftriaxone  Surgery today per urology as above, appreciate assistance  Importance of outpt f/u with urology stressed to pt   Aspirin has been on hold perioperatively  Right shoulder xray no fracture, cont prn / topical treatment.  Scans have shown mulutilevel degenerative changes on imaging,probably posterior right 10th rib old fracture - possibly malignancy not ruled out - again pt declines f/u formal imaging right now, can f/u outpt with contrast study if willing at that time.    Initial imaging notable for absence of the normal right vertebral artery flow-void with a follow-up CTA recommended - patient declines follow up evaluation for this. Seems to be at baseline neurological status/at baseline - can f/u outpt with imaging if he changes his mind.  He has been very resistant to many aspects of care and declining certain evaluations despite counseling importance/reasoning for these tests.    Continue insulin titrate further as needed  Replace magnesium  Continue wound care  Continue barrier cream  PT/OT  Check a.m. CBC, BMP, magnesium, phosphorus, vancomycin levels  Continue hospitalization at current level of care    Dispo: [facility] can accept pt once medically stable  D/w SW, d/w urology plan of care     VTE Prophylaxis:  Pharmacologic & mechanical VTE prophylaxis orders are present.      Code Status (Patient has no pulse and is not breathing): CPR (Attempt to Resuscitate)  Medical Interventions (Patient has pulse or is breathing):  Full Support

## 2025-01-16 NOTE — SIGNIFICANT NOTE
Rash to trunk and arms continue     No breakdown to buttocks noted, right arm red but no blistering or open wounds noted

## 2025-01-16 NOTE — ANESTHESIA PREPROCEDURE EVALUATION
Anesthesia Evaluation     Patient summary reviewed and Nursing notes reviewed   no history of anesthetic complications:   NPO Solid Status: > 8 hours  NPO Liquid Status: > 2 hours           Airway   Mallampati: II  TM distance: >3 FB  Neck ROM: full  No difficulty expected  Dental    (+) edentulous    Pulmonary - normal exam    breath sounds clear to auscultation  (+) COPD mild,  Cardiovascular - normal exam  Exercise tolerance: poor (<4 METS)    Rhythm: regular  Rate: normal    (+) hypertension, CHF , hyperlipidemia    ROS comment: 1/25 TTE:  Echocardiogram Findings    Left Ventricle Left ventricle not well visualized. Left ventricular systolic function is mildly decreased. Estimated left ventricular EF = 40%     Normal left ventricular cavity size noted. Unable to assess left atrial pressure.  Right Ventricle Right ventricle not well visualized. Normal right ventricular cavity size noted.  Left Atrium Left atrium not well visualized. Normal left atrial cavity size noted.  Right Atrium Right atrium not well visualized. Normal right atrial cavity size noted. The diameter of the inferior vena cava is 1.7 cm.  Aortic Valve The aortic valve is not well visualized. The aortic valve is grossly normal in structure.  Mitral Valve Mitral valve is not well visualized. The mitral valve is grossly normal in structure.  Tricuspid Valve Tricuspid valve not well visualized.  Pulmonic Valve The pulmonic valve is not well visualized.  Greater Vessels No dilation of the aortic root is present.  Pericardium The pericardium is normal. There is no evidence of pericardial effusion. .    12/23 stress:  Interpretation Summary       ·  Left ventricular ejection fraction is mildly reduced (Calculated EF = 42%).  ·  Findings consistent with a normal ECG stress test.  ·  Mild inferior apical defect which is mild to moderate in size partially fixed but with some ischemia seen on the stress portion         Neuro/Psych  (+) CVA   GI/Hepatic/Renal/Endo    (+) renal disease- stones, diabetes mellitus type 2    Musculoskeletal (-) negative ROS    Abdominal    Substance History - negative use     OB/GYN negative ob/gyn ROS         Other - negative ROS       ROS/Med Hx Other: PAT Nursing Notes unavailable.                 Anesthesia Plan    ASA 3     general     (Patient understands anesthesia not responsible for dental damage.)  intravenous induction     Anesthetic plan, risks, benefits, and alternatives have been provided, discussed and informed consent has been obtained with: patient.  Pre-procedure education provided  Plan discussed with CRNA.      CODE STATUS:    Code Status (Patient has no pulse and is not breathing): CPR (Attempt to Resuscitate)  Medical Interventions (Patient has pulse or is breathing): Full Support

## 2025-01-16 NOTE — PLAN OF CARE
Goal Outcome Evaluation:           Progress: no change  Outcome Evaluation: Pt NPO for procedure later today. Consent signed and in chart. IV abx per MAR.

## 2025-01-17 ENCOUNTER — PATIENT OUTREACH (OUTPATIENT)
Age: 70
End: 2025-01-17
Payer: MEDICARE

## 2025-01-17 VITALS
RESPIRATION RATE: 16 BRPM | DIASTOLIC BLOOD PRESSURE: 69 MMHG | BODY MASS INDEX: 25.93 KG/M2 | TEMPERATURE: 97.9 F | OXYGEN SATURATION: 95 % | WEIGHT: 207.45 LBS | SYSTOLIC BLOOD PRESSURE: 133 MMHG | HEART RATE: 98 BPM

## 2025-01-17 LAB
ANION GAP SERPL CALCULATED.3IONS-SCNC: 9.6 MMOL/L (ref 5–15)
BASOPHILS # BLD AUTO: 0.02 10*3/MM3 (ref 0–0.2)
BASOPHILS NFR BLD AUTO: 0.3 % (ref 0–1.5)
BUN SERPL-MCNC: 15 MG/DL (ref 8–23)
BUN/CREAT SERPL: 19.2 (ref 7–25)
CALCIUM SPEC-SCNC: 8 MG/DL (ref 8.6–10.5)
CHLORIDE SERPL-SCNC: 102 MMOL/L (ref 98–107)
CO2 SERPL-SCNC: 20.4 MMOL/L (ref 22–29)
CREAT SERPL-MCNC: 0.78 MG/DL (ref 0.76–1.27)
DEPRECATED RDW RBC AUTO: 45.1 FL (ref 37–54)
EGFRCR SERPLBLD CKD-EPI 2021: 97.7 ML/MIN/1.73
EOSINOPHIL # BLD AUTO: 0 10*3/MM3 (ref 0–0.4)
EOSINOPHIL NFR BLD AUTO: 0 % (ref 0.3–6.2)
ERYTHROCYTE [DISTWIDTH] IN BLOOD BY AUTOMATED COUNT: 15.2 % (ref 12.3–15.4)
GLUCOSE BLDC GLUCOMTR-MCNC: 238 MG/DL (ref 70–99)
GLUCOSE BLDC GLUCOMTR-MCNC: 273 MG/DL (ref 70–99)
GLUCOSE BLDC GLUCOMTR-MCNC: 362 MG/DL (ref 70–99)
GLUCOSE SERPL-MCNC: 160 MG/DL (ref 65–99)
HCT VFR BLD AUTO: 41.7 % (ref 37.5–51)
HGB BLD-MCNC: 13 G/DL (ref 13–17.7)
IMM GRANULOCYTES # BLD AUTO: 0.02 10*3/MM3 (ref 0–0.05)
IMM GRANULOCYTES NFR BLD AUTO: 0.3 % (ref 0–0.5)
LYMPHOCYTES # BLD AUTO: 1.36 10*3/MM3 (ref 0.7–3.1)
LYMPHOCYTES NFR BLD AUTO: 21.2 % (ref 19.6–45.3)
MAGNESIUM SERPL-MCNC: 1.9 MG/DL (ref 1.6–2.4)
MCH RBC QN AUTO: 25.4 PG (ref 26.6–33)
MCHC RBC AUTO-ENTMCNC: 31.2 G/DL (ref 31.5–35.7)
MCV RBC AUTO: 81.6 FL (ref 79–97)
MONOCYTES # BLD AUTO: 0.45 10*3/MM3 (ref 0.1–0.9)
MONOCYTES NFR BLD AUTO: 7 % (ref 5–12)
NEUTROPHILS NFR BLD AUTO: 4.58 10*3/MM3 (ref 1.7–7)
NEUTROPHILS NFR BLD AUTO: 71.2 % (ref 42.7–76)
NRBC BLD AUTO-RTO: 0 /100 WBC (ref 0–0.2)
PHOSPHATE SERPL-MCNC: 2.7 MG/DL (ref 2.5–4.5)
PLATELET # BLD AUTO: 241 10*3/MM3 (ref 140–450)
PMV BLD AUTO: 9.6 FL (ref 6–12)
POTASSIUM SERPL-SCNC: 4.2 MMOL/L (ref 3.5–5.2)
RBC # BLD AUTO: 5.11 10*6/MM3 (ref 4.14–5.8)
SODIUM SERPL-SCNC: 132 MMOL/L (ref 136–145)
WBC NRBC COR # BLD AUTO: 6.43 10*3/MM3 (ref 3.4–10.8)

## 2025-01-17 PROCEDURE — 99232 SBSQ HOSP IP/OBS MODERATE 35: CPT | Performed by: UROLOGY

## 2025-01-17 PROCEDURE — 84100 ASSAY OF PHOSPHORUS: CPT | Performed by: UROLOGY

## 2025-01-17 PROCEDURE — 82948 REAGENT STRIP/BLOOD GLUCOSE: CPT

## 2025-01-17 PROCEDURE — 25010000002 AZTREONAM PER 500 MG: Performed by: UROLOGY

## 2025-01-17 PROCEDURE — 83735 ASSAY OF MAGNESIUM: CPT | Performed by: UROLOGY

## 2025-01-17 PROCEDURE — 85025 COMPLETE CBC W/AUTO DIFF WBC: CPT | Performed by: UROLOGY

## 2025-01-17 PROCEDURE — 63710000001 INSULIN GLARGINE PER 5 UNITS: Performed by: UROLOGY

## 2025-01-17 PROCEDURE — 25810000003 SODIUM CHLORIDE 0.9 % SOLUTION: Performed by: UROLOGY

## 2025-01-17 PROCEDURE — 63710000001 INSULIN LISPRO (HUMAN) PER 5 UNITS: Performed by: UROLOGY

## 2025-01-17 PROCEDURE — 94761 N-INVAS EAR/PLS OXIMETRY MLT: CPT

## 2025-01-17 PROCEDURE — 94799 UNLISTED PULMONARY SVC/PX: CPT

## 2025-01-17 PROCEDURE — 80048 BASIC METABOLIC PNL TOTAL CA: CPT | Performed by: UROLOGY

## 2025-01-17 PROCEDURE — 99239 HOSP IP/OBS DSCHRG MGMT >30: CPT | Performed by: INTERNAL MEDICINE

## 2025-01-17 PROCEDURE — 25010000002 VANCOMYCIN 5 G RECONSTITUTED SOLUTION: Performed by: UROLOGY

## 2025-01-17 RX ORDER — FUROSEMIDE 20 MG/1
20 TABLET ORAL DAILY
Start: 2025-01-17

## 2025-01-17 RX ORDER — BETAMETHASONE DIPROPIONATE 0.05 %
1 OINTMENT (GRAM) TOPICAL EVERY 12 HOURS SCHEDULED
Qty: 10 G | Refills: 0 | Status: SHIPPED | OUTPATIENT
Start: 2025-01-17 | End: 2025-01-17

## 2025-01-17 RX ORDER — BETAMETHASONE DIPROPIONATE 0.05 %
1 OINTMENT (GRAM) TOPICAL EVERY 12 HOURS SCHEDULED
Qty: 10 G | Refills: 0 | Status: SHIPPED | OUTPATIENT
Start: 2025-01-17 | End: 2025-01-22

## 2025-01-17 RX ORDER — AMOXICILLIN 250 MG
1 CAPSULE ORAL DAILY
Qty: 30 TABLET | Refills: 0 | Status: SHIPPED | OUTPATIENT
Start: 2025-01-17 | End: 2025-01-17

## 2025-01-17 RX ORDER — FUROSEMIDE 20 MG/1
20 TABLET ORAL DAILY
Start: 2025-01-17 | End: 2025-01-17

## 2025-01-17 RX ORDER — POTASSIUM CHLORIDE 750 MG/1
10 TABLET, EXTENDED RELEASE ORAL DAILY
Qty: 30 TABLET | Refills: 0 | Status: SHIPPED | OUTPATIENT
Start: 2025-01-17 | End: 2025-02-16

## 2025-01-17 RX ORDER — CETIRIZINE HYDROCHLORIDE 10 MG/1
10 TABLET ORAL DAILY
Qty: 14 TABLET | Refills: 0 | Status: SHIPPED | OUTPATIENT
Start: 2025-01-18 | End: 2025-02-01

## 2025-01-17 RX ORDER — AMOXICILLIN 250 MG
1 CAPSULE ORAL DAILY
Qty: 30 TABLET | Refills: 0 | Status: SHIPPED | OUTPATIENT
Start: 2025-01-17 | End: 2025-02-16

## 2025-01-17 RX ORDER — POTASSIUM CHLORIDE 750 MG/1
10 TABLET, EXTENDED RELEASE ORAL DAILY
Qty: 30 TABLET | Refills: 0 | Status: SHIPPED | OUTPATIENT
Start: 2025-01-17 | End: 2025-01-17

## 2025-01-17 RX ORDER — CETIRIZINE HYDROCHLORIDE 10 MG/1
10 TABLET ORAL DAILY
Qty: 14 TABLET | Refills: 0 | Status: SHIPPED | OUTPATIENT
Start: 2025-01-18 | End: 2025-01-17

## 2025-01-17 RX ADMIN — INSULIN LISPRO 3 UNITS: 100 INJECTION, SOLUTION INTRAVENOUS; SUBCUTANEOUS at 07:58

## 2025-01-17 RX ADMIN — Medication 250 MG: at 08:56

## 2025-01-17 RX ADMIN — INSULIN LISPRO 6 UNITS: 100 INJECTION, SOLUTION INTRAVENOUS; SUBCUTANEOUS at 17:01

## 2025-01-17 RX ADMIN — DICLOFENAC SODIUM 2 G: 10 GEL TOPICAL at 17:01

## 2025-01-17 RX ADMIN — VANCOMYCIN HYDROCHLORIDE 1250 MG: 5 INJECTION, POWDER, LYOPHILIZED, FOR SOLUTION INTRAVENOUS at 12:35

## 2025-01-17 RX ADMIN — Medication 10 ML: at 09:00

## 2025-01-17 RX ADMIN — DICLOFENAC SODIUM 2 G: 10 GEL TOPICAL at 11:29

## 2025-01-17 RX ADMIN — HYDROCORTISONE 1 APPLICATION: 1 OINTMENT TOPICAL at 09:10

## 2025-01-17 RX ADMIN — BETAMETHASONE DIPROPIONATE 1 APPLICATION: 0.5 OINTMENT TOPICAL at 08:56

## 2025-01-17 RX ADMIN — Medication 1 APPLICATION: at 09:03

## 2025-01-17 RX ADMIN — CETIRIZINE HYDROCHLORIDE 10 MG: 10 TABLET, FILM COATED ORAL at 08:56

## 2025-01-17 RX ADMIN — AZTREONAM 2000 MG: 2 INJECTION, POWDER, LYOPHILIZED, FOR SOLUTION INTRAMUSCULAR; INTRAVENOUS at 11:41

## 2025-01-17 RX ADMIN — AZTREONAM 2000 MG: 2 INJECTION, POWDER, LYOPHILIZED, FOR SOLUTION INTRAMUSCULAR; INTRAVENOUS at 04:05

## 2025-01-17 RX ADMIN — INSULIN LISPRO 4 UNITS: 100 INJECTION, SOLUTION INTRAVENOUS; SUBCUTANEOUS at 11:52

## 2025-01-17 RX ADMIN — ACETAMINOPHEN 650 MG: 325 TABLET ORAL at 14:24

## 2025-01-17 RX ADMIN — VANCOMYCIN HYDROCHLORIDE 1250 MG: 5 INJECTION, POWDER, LYOPHILIZED, FOR SOLUTION INTRAVENOUS at 01:19

## 2025-01-17 RX ADMIN — Medication 10 ML: at 08:56

## 2025-01-17 RX ADMIN — HYDROCORTISONE 1 APPLICATION: 1 OINTMENT TOPICAL at 01:19

## 2025-01-17 RX ADMIN — INSULIN GLARGINE 10 UNITS: 100 INJECTION, SOLUTION SUBCUTANEOUS at 08:56

## 2025-01-17 RX ADMIN — HYDROCORTISONE 1 APPLICATION: 1 OINTMENT TOPICAL at 17:01

## 2025-01-17 RX ADMIN — DICLOFENAC SODIUM 2 G: 10 GEL TOPICAL at 09:00

## 2025-01-17 NOTE — PROGRESS NOTES
[facility] Clinical Pharmacy Services: Vancomycin Monitoring Note    Brian Mehta is a 69 y.o. male who is on day 9 of pharmacy to dose vancomycin for Bacteremia.    Imaging Reviewed?: Yes   Updated Cultures and Sensitivities:    Urine cx: Negative   @ 2303 Blood cx, right: NGTD   @ 2303 Blood cx, left: NGTD   @ 0147 Blood cx, right: Aerococcus viridans,GNB,staph lentus   @ 0147 Blood cx, right: Aerococcus viridans,GNB,staph lentus, strep (alpha hemolytic)    Vitals/Labs  Ht:  ; Wt: 94.1 kg (207 lb 7.3 oz)   Temp (24hrs), Av.2 °F (36.8 °C), Min:97.5 °F (36.4 °C), Max:98.6 °F (37 °C)   Estimated Creatinine Clearance: 122.3 mL/min (by C-G formula based on SCr of 0.78 mg/dL).     Results from last 7 days   Lab Units 25  0500 25  1051 25  0516 01/15/25  0516 25  0550 25  1046 25  0530 01/10/25  1104   VANCOMYCIN RM mcg/mL  --   --   --   --   --   --   --  16.64   VANCOMYCIN TR mcg/mL  --  18.34  --   --   --  18.12  --   --    CREATININE mg/dL 0.78  --  0.71* 0.76   < >  --    < >  --    WBC 10*3/mm3 6.43  --  5.23 5.57   < >  --    < >  --     < > = values in this interval not displayed.     Assessment/Plan    Current Vancomycin Dose:  1250 mg IV every 12 hours; which provides the following predicted parameters:  AUC24,ss: 529 mg/L.hr  Probability of AUC24 > 400: 99 %  Ctrough,ss: 16.7 mg/L  Probability of Ctrough,ss > 20: 5 %  Probability of nephrotoxicity (Lodise TERRELL ): 12 %  Continue with current regimen.  We will continue to monitor patient changes and renal function     Thank you for involving pharmacy in this patient's care. Please contact pharmacy with any questions or concerns.    Marcos Boland [facility]  Clinical Pharmacist

## 2025-01-17 NOTE — OUTREACH NOTE
Care Coordination    MSW completed chart review from [facility] admission. Patient discharged to [facility] in [place] on this day.    Luisana LIU -   Ambulatory Case Management    1/17/2025, 14:07 EST

## 2025-01-17 NOTE — DISCHARGE SUMMARY
[facility]         HOSPITALIST  DISCHARGE SUMMARY    Patient Name: Brian Mehta    : 1955    MRN: [id number]    Date of Admission: 2025  Date of Discharge:  25  Primary Care Physician: Sophie Capps APRN    Consults       Date and Time Order Name Status Description    2025 11:48 AM Inpatient Urology Consult      2025  1:42 AM Inpatient Hospitalist Consult              Final Diagnosis:  Polymicrobial bacteremia from suspected UTI with retained left ureteral stent in setting of noncompliance with surgical f/u for prior stent removal   Staph lentus, alpha hemolytic strep, aerococcus bacteremia, Ignatzschineria bacteremia in setting of above  Retained left ureteral stent with a fractured pigtail component within the lumen of the urinary bladder  Exanthematous drug rash suspect 2/2 ceftriaxone (on arms/torso)   Type 2 diabetes  Hypertension  History of CVA with residual right-sided deficits  Systolic CHF not acutely exacerbated EF 40% per 2025 echo  COPD not acute exacerbation  Active tobacco abuse  Generalized weakness/debility and inability to adequately care for himself/poor social situation  Abnormal right vertebral artery flow on CT imaging  Right 10th posterior probably old healed fractures but underlying osseous lesion not excluded, follow-up outpatient with CT contrast imaging if patient willing in the future        Hospital Course     Hospital Course:   69 y.o. COPD on room air, CHF, DM2, tobacco abuse, history of CVA with residual right-sided weakness presented with weakness/hip pain, history had to be supplemented from staff, was found unkempt at home dialysis initial pain complaints.  Found to have UTI, ultimately findings of bacteremia in setting of prior left ureteral stent that has been retained in apparently never had follow-up to have this removed as previously scheduled.  Social work assisting given the note of multiple social issues patient has  intermittently declined some aspects of care and imaging.       Patient originally refusing scans, however patient did perform some of the imaging .  Patient did have CT scan of abdomen, showed retained left ureteral stent with fractured pigtail component within the lumen of the urinary bladder, granulation tissue surrounding the pigtail portion which appears unchanged.  Left renal hydronephrosis and proximal hydroureter with periureteric stranding.  Urology consulted plan for surgery on Thursday.     MRIs completed without contrast, patient could not stay in scanner long enough for contrasted portion.  Lumbar scan with multilevel degenerative changes.  Limited due to motion artifact.  Cervical component shows absence of normal right vertebral artery flow void with follow-up CTA recommended.  Thoracic spine shows active osseous edema involving posterior right 10th rib near costo vertebral junction which on prior exams appears to represent healed fractures however given the edema and adjacent soft tissue nodularity underlying osseous lesion cannot be excluded.  Recommendations for follow-up examination with contrast.  Following discussion with patient, at this time not interested in having additional imaging done      1/16 underwent - Laser CYSTOLITHOLAPAXY   2.1 x 3.6 cm bladder stone, left ureteroscopy with laser basket stone extraction, left ureteral stent exchange.  6 x 28 black silicone, string left on     Patient ended up pulling out his stent overnight no additional stent placement needed for urology and stable for discharge from their perspective and can follow-up as needed outpatient    Patient discharging with midline for remaining course of vancomycin and aztreonam, discussed with ID can finish dosing tomorrow.  Will have patient complete full day of treatment on 9/18 and then can move his midline after that final dose.    Patient has persistent rash still suspected from his initial ceftriaxone, seems to  be slowly resolving at this time, he is not having any respiratory issues, symptoms are well-controlled from the standpoint treating with cetirizine and topical steroids for affected area.  Monitor for continued resolution.    Of note patient with several CT abnormalities that he did not currently want additional workup for including an area near right 10th rib fracture that underlying malignancy not ruled out reassess with PCP outpatient if desired for formal CT imaging.  CTA was also recommended for initial abnormal right vertebral artery flow but declined evaluation for this as well.  He is at his neurological baseline so may be chronic.  He was intermittently resistant to any aspects of his care despite encouragement of indications for further evaluation.      Patient discharged in stable condition with close outpatient follow up. Return precautions and follow up discussed and patient voiced agreement and understanding of treatment plan.     DISCHARGE Follow Up Recommendations for labs and diagnostics:   As above    CODE STATUS:  Code Status and Medical Interventions: CPR (Attempt to Resuscitate); Full Support   Ordered at: 01/08/25 0308     Code Status (Patient has no pulse and is not breathing):    CPR (Attempt to Resuscitate)     Medical Interventions (Patient has pulse or is breathing):    Full Support           Day of Discharge     Vital Signs:  Temp:  [97.5 °F (36.4 °C)-98.6 °F (37 °C)] 98.1 °F (36.7 °C)  Heart Rate:  [] 95  Resp:  [13-18] 14  BP: ()/(45-98) 160/85  Flow (L/min) (Oxygen Therapy):  [2-6] 2    Physical Exam    Gen: awake, resting in bed, conversant, appears older than stated age, poor dentition,  Rash on chest/torso and arms stable from prior no discharge or weeping and skin does not appear to be acutely irritating/itchy the patient  Resp: breathing comfortably on room air   CV: RRR, no LE pitting edema      Discharge Details        Discharge Medications        New Medications         Instructions Start Date   aztreonam (AZACTAM) 2000 mg IVPB in 100 mL NS (VTB)   2,000 mg, Intravenous, Every 8 Hours, Complete after finishing a full day of treatment on 1/18 and can remove midline after the final dose      betamethasone dipropionate 0.05 % ointment  Commonly known as: DIPROLENE   1 Application, Topical, Every 12 Hours Scheduled, Apply to rash      cetirizine 10 MG tablet  Commonly known as: zyrTEC   10 mg, Oral, Daily   Start Date: January 18, 2025     Diclofenac Sodium 1 % gel gel  Commonly known as: VOLTAREN   2 g, Topical, 4 Times Daily      potassium chloride 10 MEQ CR tablet   10 mEq, Oral, Daily, Take with lasix. Do not take if you skip a dose of lasix      sennosides-docusate 8.6-50 MG per tablet  Commonly known as: PERICOLACE   1 tablet, Oral, Daily      vancomycin   1,250 mg, Intravenous, Every 12 Hours, Complete after finishing a full day of treatment on 1/18 and can remove midline after the final dose             Changes to Medications        Instructions Start Date   furosemide 20 MG tablet  Commonly known as: Lasix  What changed: when to take this   20 mg, Oral, Daily             Continue These Medications        Instructions Start Date   Aspirin Low Dose 81 MG chewable tablet  Generic drug: aspirin   81 mg, Oral, Daily      atorvastatin 20 MG tablet  Commonly known as: LIPITOR   20 mg, Oral, Nightly      Combivent Respimat  MCG/ACT inhaler  Generic drug: ipratropium-albuterol   1 puff, Inhalation, 4 Times Daily - RT      famotidine 40 MG tablet  Commonly known as: PEPCID   40 mg, Oral, Nightly PRN      Jardiance 25 MG tablet tablet  Generic drug: empagliflozin   25 mg, Oral, Daily      Lantus SoloStar 100 UNIT/ML injection pen  Generic drug: Insulin Glargine   INJECT 10 UNITS SUBCUTANEOUSLY INTO THE APPROPRIATE AREA EVERY NIGHT AS DIRECTED      losartan 25 MG tablet  Commonly known as: COZAAR   25 mg, Oral, Daily      nicotine 21 MG/24HR patch  Commonly known as: NICODERM  CQ   1 patch, Transdermal, Every 24 Hours Scheduled      Symbicort 160-4.5 MCG/ACT inhaler  Generic drug: budesonide-formoterol   2 puffs, Inhalation, 2 Times Daily - RT             Stop These Medications      hydroCHLOROthiazide 25 MG tablet                Discharge Disposition:  Rehab Facility or Unit (DC - External)    Diet: continue on diet / dietary restrictions from hospitalization     Discharge Activity: advance as tolerated          Pertinent  and/or Most Recent Results       LAB RESULTS:      Lab 01/17/25  0500 01/16/25  0516 01/15/25  0516 01/14/25  0550 01/13/25  0553   WBC 6.43 5.23 5.57 5.69 4.83   HEMOGLOBIN 13.0 13.0 13.1 13.9 12.9*   HEMATOCRIT 41.7 41.4 42.0 43.6 40.2   PLATELETS 241 249 249 277 267   NEUTROS ABS 4.58 1.57* 1.89 1.96 1.81   IMMATURE GRANS (ABS) 0.02 0.01 0.01 0.01 0.01   LYMPHS ABS 1.36 2.68 2.65 2.86 2.44   MONOS ABS 0.45 0.52 0.57 0.52 0.45   EOS ABS 0.00 0.43* 0.43* 0.33 0.09   MCV 81.6 82.5 81.7 81.3 80.1         Lab 01/17/25  0500 01/16/25  0516 01/15/25  0516 01/14/25  0550 01/13/25  0553 01/12/25  0541 01/11/25  0530   SODIUM 132* 134* 137 136 136   < > 135*   POTASSIUM 4.2 3.9 4.1 4.2 4.2   < > 4.0   CHLORIDE 102 105 105 103 104   < > 104   CO2 20.4* 21.5* 22.8 24.4 24.5   < > 23.7   ANION GAP 9.6 7.5 9.2 8.6 7.5   < > 7.3   BUN 15 15 19 16 16   < > 11   CREATININE 0.78 0.71* 0.76 0.83 0.78   < > 0.73*   EGFR 97.7 100.6 98.5 95.9 97.7   < > 99.7   GLUCOSE 160* 106* 121* 136* 148*   < > 124*   CALCIUM 8.0* 8.1* 8.4* 8.9 8.9   < > 8.5*   MAGNESIUM 1.9 1.7 1.8 1.8 1.8   < > 1.8   PHOSPHORUS 2.7 2.5 3.2  --   --   --  3.0    < > = values in this interval not displayed.                         Brief Urine Lab Results  (Last result in the past 365 days)        Color   Clarity   Blood   Leuk Est   Nitrite   Protein   CREAT   Urine HCG        01/09/25 1428 Yellow   Cloudy   Small (1+)   Large (3+)   Positive   30 mg/dL (1+)                 Microbiology Results (last 10 days)        Procedure Component Value - Date/Time    Urine Culture - Urine, Urine, Clean Catch [[id number]]  (Normal) Collected: 01/09/25 1428    Lab Status: Final result Specimen: Urine, Clean Catch Updated: 01/10/25 0936     Urine Culture No growth    Blood Culture - Blood, Arm, Right [[id number]]  (Normal) Collected: 01/08/25 2303    Lab Status: Final result Specimen: Blood from Arm, Right Updated: 01/13/25 2330     Blood Culture No growth at 5 days    Narrative:      Less than seven (7) mL's of blood was collected.  Insufficient quantity may yield false negative results.    Blood Culture - Blood, Arm, Left [[id number]]  (Normal) Collected: 01/08/25 2303    Lab Status: Final result Specimen: Blood from Arm, Left Updated: 01/13/25 2330     Blood Culture No growth at 5 days    Narrative:      Less than seven (7) mL's of blood was collected.  Insufficient quantity may yield false negative results.    Blood Culture - Blood, Arm, Right [[id number]]  (Abnormal)  (Susceptibility) Collected: 01/08/25 0147    Lab Status: Preliminary result Specimen: Blood from Arm, Right Updated: 01/16/25 1319     Blood Culture Aerococcus viridans     Comment:   Aerococcus viridans is usually susceptible to vancomycin. Resistance has been reported to the fluoroquinolones and beta-lactams. Please call lab to request further workup.        Isolated from Aerobic Bottle     Blood Culture Gram Negative Bacilli     Comment: Ignatzschineria indica    ID performed at [facility] reference laboratory, see scanned report. MICs to follow.           Isolated from Aerobic Bottle     Blood Culture Staphylococcus lentus     Isolated from Aerobic Bottle     Blood Culture Streptococcus, Alpha Hemolytic     Isolated from Anaerobic Bottle     Gram Stain Aerobic Bottle Gram positive cocci in pairs, chains and clusters      Aerobic Bottle Gram negative bacilli      Anaerobic Bottle Gram positive cocci in pairs, chains and clusters    Narrative:      Streptococcus, Alpha Hemolytic:  Probable contaminant requires clinical correlation, susceptibility not performed unless requested by physician.    Less than seven (7) mL's of blood was collected.  Insufficient quantity may yield false negative results.      Susceptibility        Staphylococcus lentus      GILBERT      Oxacillin Resistant      Vancomycin Susceptible                           Blood Culture - Blood, Arm, Right [[id number]]  (Abnormal)  (Susceptibility) Collected: 01/08/25 0147    Lab Status: Final result Specimen: Blood from Arm, Right Updated: 01/16/25 1350     Blood Culture Aerococcus viridans     Comment:   Aerococcus viridans is usually susceptible to vancomycin. Resistance has been reported to the fluoroquinolones and beta-lactams. Please call lab to request further workup.        Isolated from Aerobic Bottle     Blood Culture Gram Negative Bacilli     Comment: Ignatzschineria indica  For ID/GILBERT [facility] report, Refer to previous blood culture collected on 01/08/2025 0147.          Isolated from Aerobic Bottle     Blood Culture Staphylococcus lentus     Isolated from Aerobic Bottle     Gram Stain Aerobic Bottle Gram positive cocci in pairs and chains      Aerobic Bottle Gram positive cocci in clusters    Narrative:      For GNB - Refer to previous blood culture collected on 01/08/2025 0147 for MICs     Less than seven (7) mL's of blood was collected.  Insufficient quantity may yield false negative results.    Susceptibility        Staphylococcus lentus      GILBERT      Oxacillin Susceptible      Vancomycin Susceptible                           Blood Culture ID, PCR - Blood, Arm, Right [[id number]] Collected: 01/08/25 0147    Lab Status: Final result Specimen: Blood from Arm, Right Updated: 01/08/25 2210     BCID, PCR Negative by BCID PCR. Culture to Follow.     BOTTLE TYPE Aerobic Bottle            RADIOLOGY:    XR Shoulder 2+ View Right    Result Date: 1/15/2025  Impression: No acute fracture or acute malalignment is identified. Moderate  degenerative changes involve the right shoulder. Please see above comments for further detail.    Portions of this note were completed with a voice recognition program. Electronically Signed: Jm Quezada MD  1/15/2025 9:41 PM EST  Workstation ID: [id number]    MRI Lumbar Spine Without Contrast    Result Date: 1/12/2025  Impression: Impression: Mild multilevel degenerative changes in the lumbar spine. The exam is limited due to patient motion artifact. The patient had difficulty tolerating the exam and intravenous contrast could not be administered. Electronically Signed: Grisel Gaines MD  1/12/2025 11:43 AM EST  Workstation ID: [id number]    MRI Thoracic Spine Without Contrast    Result Date: 1/12/2025  Impression: 1. Active osseous edema involving the posterior right 10th rib near the costovertebral junction which on prior exams appeared to represent healed fractures however given the edema and adjacent soft tissue nodularity underlying osseous lesion cannot be excluded. A follow-up repeat examination with contrast administration is recommended.. Electronically Signed: Grisel Gaines MD  1/12/2025 11:35 AM EST  Workstation ID: [id number]    MRI Cervical Spine Without Contrast    Result Date: 1/12/2025  Impression: Impression: 1. Absence of the normal right vertebral artery flow-void with a follow-up CTA recommended. These findings were sent as a staff message. 2. Mild multilevel degenerative changes noted in the cervical spine. Electronically Signed: Grisel Gaines MD  1/12/2025 11:19 AM EST  Workstation ID: [id number]    CT Abdomen Pelvis With Contrast    Result Date: 1/12/2025  Impression: Impression: 1. Retained left ureteral stent with a fractured pigtail component within the lumen of the urinary bladder. There is granulation tissue surrounding the pigtail portion which appears unchanged. 2. Left renal hydronephrosis and proximal hydroureter with periureteric stranding. There is some mucosal enhancement of  the left renal collecting system and recommend correlation with urinalysis to exclude infection. 3. Diverticulosis. Electronically Signed: Grisel Gaines MD  1/12/2025 9:11 AM EST  Workstation ID: [id number]    XR Hip With or Without Pelvis 2 - 3 View Right    Result Date: 1/7/2025  Impression: 1.No acute fracture or dislocation. 2.Degenerative changes in both hips. 3.Fractured distal left ureteral stent, unchanged. Electronically Signed: Durga Zurita MD  1/7/2025 11:48 PM EST  Workstation ID: [id number]    XR Chest 1 View    Result Date: 1/7/2025  Impression: Impression: No acute cardiopulmonary disease. Electronically Signed: Jm Bran MD  1/7/2025 10:11 PM EST  Workstation ID: [id number]     Results for orders placed during the hospital encounter of 08/19/24    Duplex Venous Lower Extremity - Bilateral CV-READ    Interpretation Summary  •  Patient refused left common femoral vein imaging, otherwise this was a normal left lower extremity venous duplex scan.  •  Patient refused right lower extremity venous evaluation.      Results for orders placed during the hospital encounter of 08/19/24    Duplex Venous Lower Extremity - Bilateral CV-READ    Interpretation Summary  •  Patient refused left common femoral vein imaging, otherwise this was a normal left lower extremity venous duplex scan.  •  Patient refused right lower extremity venous evaluation.      Results for orders placed during the hospital encounter of 01/07/25    Adult Transthoracic Echo Complete W/ Cont if Necessary Per Protocol    Interpretation Summary  •  The study is technically difficult for diagnosis.  •  Left ventricular systolic function is mildly decreased. Estimated left ventricular EF = 40%      Labs Pending at Discharge:  Pending Labs       Order Current Status    STONE ANALYSIS - Calculus, Urinary Bladder In process    Tissue Pathology Exam In process    Blood Culture - Blood, Arm, Right Preliminary result              Time spent on  Discharge including face to face service:  >40 minutes

## 2025-01-17 NOTE — PLAN OF CARE
Goal Outcome Evaluation:  Plan of Care Reviewed With: patient        Progress: improving  Outcome Evaluation: Pt disoriented to time, place and situation. PICC placed today. Pt to d/c to rehab. Pt removed ureteral stent. MD notified. Continue plan of care.

## 2025-01-17 NOTE — PLAN OF CARE
Goal Outcome Evaluation:  Plan of Care Reviewed With: patient        Progress: improving  Outcome Evaluation: Patient is disorietned to time and place. Patient is currently on room air, no signs of respiratory distress noted. Blood glucose monitored, treated per MAR. Skin care completed per orders. No complaints of pain or discomfort at this time. No acute changes throughout shift. VSS. Continue with current plan of care. Plan to discharge to rehab today.                              The patient has been re-examined and I agree with the above assessment or I updated with my findings.

## 2025-01-20 LAB
BACTERIA SPEC AEROBE CULT: ABNORMAL
GRAM STN SPEC: ABNORMAL
ISOLATED FROM: ABNORMAL
LAB AP CASE REPORT: NORMAL
LAB AP CLINICAL INFORMATION: NORMAL
PATH REPORT.FINAL DX SPEC: NORMAL
PATH REPORT.GROSS SPEC: NORMAL

## 2025-01-25 LAB
COLOR STONE: NORMAL
COM MFR STONE: 20 %
COMPN STONE: NORMAL
LABORATORY COMMENT REPORT: NORMAL
LABORATORY COMMENT REPORT: NORMAL
Lab: NORMAL
Lab: NORMAL
PHOTO: NORMAL
SIZE STONE: NORMAL MM
SPEC SOURCE SUBJ: NORMAL
URATE MFR STONE: 80 %
WT STONE: 1738 MG

## 2025-01-28 ENCOUNTER — PATIENT OUTREACH (OUTPATIENT)
Dept: CASE MANAGEMENT | Facility: OTHER | Age: 70
End: 2025-01-28
Payer: MEDICARE

## 2025-01-28 ENCOUNTER — TELEPHONE (OUTPATIENT)
Dept: CASE MANAGEMENT | Facility: OTHER | Age: 70
End: 2025-01-28
Payer: MEDICARE

## 2025-01-28 DIAGNOSIS — I50.22 CHRONIC HFREF (HEART FAILURE WITH REDUCED EJECTION FRACTION): ICD-10-CM

## 2025-01-28 DIAGNOSIS — I63.9 CEREBROVASCULAR ACCIDENT (CVA), UNSPECIFIED MECHANISM: ICD-10-CM

## 2025-01-28 DIAGNOSIS — R53.1 WEAKNESS: Primary | ICD-10-CM

## 2025-01-28 NOTE — OUTREACH NOTE
"AMBULATORY CASE MANAGEMENT NOTE    Names and Relationships of Patient/Support Persons: Contact: FERDINAND CATALAN; Relationship: Emergency Contact -     [facility] Interim Update    Returned call to patients spouse. She states that patient left [facility] AMA and now he is home and it is freezing in their house because they do not have heat and she is worried about him and \"does not know what to do anymore\". States that they have 2 space heaters, but if she plugs both in, then the breaker flips. She has patients space heater on, but then she has no heat for herself. States she has tried calling the [employer] but they need to know how much it would be to repair their furnace and she doesn't know how much it would be. I advised patient that she would likely need to find somebody that would be willing to come out to give a free estimate and then get that to the [employer]. She states she also \"called the fire department and they won't come out and do anything\". They are using blankets and gloves in their home to try to stay warm.    She states that she has never heard back from the [employer] authority. I asked if she has tried to call them again and she states that she tried to call them once last month but has not attempted again since then.    Spouse wants patient to go to different rehab facility that will let him smoke. I asked to speak to patient and he states that he does not want to go to a different rehab. He would like a trapeze bar for his hospital bed though.     Advised spouse and patient that I would call Pavithra with [employer] to see if she can assist them with resources for heating and that I would ask PCP for a trapeze bar order. Both verbalized understanding.    Care Coordination    Called and spoke with Pavithra. She states that she does have resources for them, but they will not call her back. She is going to try to call them now to see if they can discuss resources but states if I talk to them again and they have " not talked to her, to have them call her.    They also need to call [facility] back as well.    [facility] Interim Update    Called and spoke with patients wife. She has not spoken to Pavithra. I advised her to return call to Pavithra because she stated they had resources available. Jumana verbalizes understanding and I provided phone number. Jumana confirmed number with read back.    Care Coordination    Faxed trapeze order to [employer].        Education Documentation  No documentation found.        YANDEL GONZALEZ  Ambulatory Case Management    1/28/2025, 13:26 EST

## 2025-01-28 NOTE — TELEPHONE ENCOUNTER
Sophie,  Patient requesting trapeze bar for hospital bed.    If agreeable, please sign order and I will fax to [facility].     Thank you!  MYA Arias

## 2025-01-31 ENCOUNTER — PATIENT OUTREACH (OUTPATIENT)
Dept: CASE MANAGEMENT | Facility: OTHER | Age: 70
End: 2025-01-31
Payer: MEDICARE

## 2025-01-31 DIAGNOSIS — R53.1 WEAKNESS: Primary | ICD-10-CM

## 2025-01-31 DIAGNOSIS — I50.22 CHRONIC HFREF (HEART FAILURE WITH REDUCED EJECTION FRACTION): ICD-10-CM

## 2025-01-31 NOTE — OUTREACH NOTE
[facility] End of Month Documentation    This Chronic Medical Management Care Plan for Brian Mehta, 69 y.o. male, has been monitored and managed; reviewed and a new plan of care implemented for the month of January.  A cumulative time of 66  minutes was spent on this patient record this month, including phone call with relative; chart review; phone call with other providers.    Regarding the patient's problems: has CVA (cerebral vascular accident); Essential hypertension; High cholesterol; Hip pain; Vitamin B12 deficiency; Kidney disorder; Chronic pain of both knees; Chronic HFrEF (heart failure with reduced ejection fraction); LVH (left ventricular hypertrophy); COPD (chronic obstructive pulmonary disease); DM2 (diabetes mellitus, type 2); Urinary tract infection without hematuria; Abnormal nuclear stress test; UTI (urinary tract infection); Fall; Failure to thrive in adult; Nephrolithiasis; Bladder stone; Retained ureteral stent; Acute UTI; and Bacteremia on their problem list., the following items were addressed: medical records; medications; changes to medical care and any changes can be found within the plan section of the note.  A detailed listing of time spent for chronic care management is tracked within each outreach encounter.  Current medications include:  has a current medication list which includes the following prescription(s): aspirin low dose, atorvastatin, budesonide-formoterol, cetirizine, diclofenac sodium, jardiance, famotidine, furosemide, combivent respimat, lantus solostar, losartan, nicotine, potassium chloride, and sennosides-docusate. and the patient is reported to be patient is compliant with medication protocol,  Medications are reported to be effective.  Regarding these diagnoses, referrals were made to the following provider(s):  n/a.  All notes on chart for PCP to review.    The patient was monitored remotely for activity level; medications; blood glucose.    The patient's physical needs  include:  DME supplies; physical healthcare; needs assistance with ADLs; resources for disability needs.     The patient's mental support needs include:  continued support    The patient's cognitive support needs include:  needs assistance with ADLs; requires supervision; household care; health care; personal care    The patient's psychosocial support needs include:  continued support    The patient's functional needs include: DME; physical healthcare; needs assistance for ADLs    The patient's environmental needs include:  an unsafe living environment    Care Plan overall comments:  No data recorded    Refer to previous outreach notes for more information on the areas listed above.    Monthly Billing Diagnoses  (R53.1) Weakness    (I50.22) Chronic HFrEF (heart failure with reduced ejection fraction)    Medications   Medications have been reconciled    Care Plan progress this month:      Recently Modified Care Plans Updates made since 12/31/2024 12:00 AM      No recently modified care plans.               Instructions   Patient was provided an electronic copy of care plan  CCM services were explained and offered and patient has accepted these services.  Patient has given their written consent to receive CCM services and understands that this includes the authorization of electronic communication of medical information with the other treating providers.  Patient understands that they may stop CCM services at any time and these changes will be effective at the end of the calendar month and will effectively revocate the agreement of CCM services.  Patient understands that only one practitioner can furnish and be paid for CCM services during one calendar month.  Patient also understands that there may be co-payment or deductible fees in association with CCM services.  Patient will continue with at least monthly follow-up calls with the Ambulatory .    YANDEL GONZALEZ  Ambulatory Case Management    1/31/2025, 10:34  EST

## 2025-02-03 ENCOUNTER — HOSPITAL ENCOUNTER (INPATIENT)
Facility: HOSPITAL | Age: 70
LOS: 3 days | Discharge: NURSING, REHAB OR LONG-TERM CARE | DRG: 641 | End: 2025-02-07
Attending: EMERGENCY MEDICINE | Admitting: FAMILY MEDICINE
Payer: MEDICARE

## 2025-02-03 DIAGNOSIS — N39.0 URINARY TRACT INFECTION WITHOUT HEMATURIA, SITE UNSPECIFIED: ICD-10-CM

## 2025-02-03 DIAGNOSIS — E78.00 HIGH CHOLESTEROL: ICD-10-CM

## 2025-02-03 DIAGNOSIS — I10 ESSENTIAL HYPERTENSION: ICD-10-CM

## 2025-02-03 DIAGNOSIS — Z78.9 DECREASED ACTIVITIES OF DAILY LIVING (ADL): ICD-10-CM

## 2025-02-03 DIAGNOSIS — E11.9 TYPE 2 DIABETES MELLITUS WITHOUT COMPLICATION, WITHOUT LONG-TERM CURRENT USE OF INSULIN: ICD-10-CM

## 2025-02-03 DIAGNOSIS — R53.1 WEAKNESS GENERALIZED: Primary | ICD-10-CM

## 2025-02-03 DIAGNOSIS — R26.2 DIFFICULTY WALKING: ICD-10-CM
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
## 2025-02-03 PROCEDURE — 36415 COLL VENOUS BLD VENIPUNCTURE: CPT

## 2025-02-03 PROCEDURE — 99291 CRITICAL CARE FIRST HOUR: CPT

## 2025-02-04 ENCOUNTER — APPOINTMENT (OUTPATIENT)
Dept: GENERAL RADIOLOGY | Facility: HOSPITAL | Age: 70
DRG: 641 | End: 2025-02-04
Payer: MEDICARE

## 2025-02-04 ENCOUNTER — TELEPHONE (OUTPATIENT)
Dept: FAMILY MEDICINE CLINIC | Facility: CLINIC | Age: 70
End: 2025-02-04
Payer: MEDICARE

## 2025-02-04 ENCOUNTER — APPOINTMENT (OUTPATIENT)
Dept: CT IMAGING | Facility: HOSPITAL | Age: 70
DRG: 641 | End: 2025-02-04
Payer: MEDICARE

## 2025-02-04 ENCOUNTER — PATIENT OUTREACH (OUTPATIENT)
Dept: CASE MANAGEMENT | Facility: OTHER | Age: 70
End: 2025-02-04
Payer: MEDICARE

## 2025-02-04 DIAGNOSIS — I50.22 CHRONIC HFREF (HEART FAILURE WITH REDUCED EJECTION FRACTION): ICD-10-CM

## 2025-02-04 DIAGNOSIS — R53.1 WEAKNESS: Primary | ICD-10-CM

## 2025-02-04 LAB
027 TOXIN: ABNORMAL
ALBUMIN SERPL-MCNC: 3.2 G/DL (ref 3.5–5.2)
ALBUMIN/GLOB SERPL: 0.7 G/DL
ALP SERPL-CCNC: 104 U/L (ref 39–117)
ALT SERPL W P-5'-P-CCNC: 8 U/L (ref 1–41)
ANION GAP SERPL CALCULATED.3IONS-SCNC: 10.4 MMOL/L (ref 5–15)
AST SERPL-CCNC: 13 U/L (ref 1–40)
BACTERIA UR QL AUTO: ABNORMAL /HPF
BASOPHILS # BLD AUTO: 0.04 10*3/MM3 (ref 0–0.2)
BASOPHILS NFR BLD AUTO: 0.5 % (ref 0–1.5)
BILIRUB SERPL-MCNC: 0.4 MG/DL (ref 0–1.2)
BILIRUB UR QL STRIP: ABNORMAL
BUN SERPL-MCNC: 12 MG/DL (ref 8–23)
BUN/CREAT SERPL: 14.3 (ref 7–25)
C DIFF GDH + TOXINS A+B STL QL IA.RAPID: NEGATIVE
C DIFF TOX GENS STL QL NAA+PROBE: POSITIVE
CALCIUM SPEC-SCNC: 9.1 MG/DL (ref 8.6–10.5)
CHLORIDE SERPL-SCNC: 99 MMOL/L (ref 98–107)
CLARITY UR: CLEAR
CO2 SERPL-SCNC: 23.6 MMOL/L (ref 22–29)
COLOR UR: ABNORMAL
CREAT SERPL-MCNC: 0.84 MG/DL (ref 0.76–1.27)
D-LACTATE SERPL-SCNC: 1.1 MMOL/L (ref 0.5–2)
DEPRECATED RDW RBC AUTO: 44.4 FL (ref 37–54)
EGFRCR SERPLBLD CKD-EPI 2021: 95.6 ML/MIN/1.73
EOSINOPHIL # BLD AUTO: 0.07 10*3/MM3 (ref 0–0.4)
EOSINOPHIL NFR BLD AUTO: 0.8 % (ref 0.3–6.2)
ERYTHROCYTE [DISTWIDTH] IN BLOOD BY AUTOMATED COUNT: 15.8 % (ref 12.3–15.4)
GEN 5 1HR TROPONIN T REFLEX: 11 NG/L
GLOBULIN UR ELPH-MCNC: 4.3 GM/DL
GLUCOSE BLDC GLUCOMTR-MCNC: 217 MG/DL (ref 70–99)
GLUCOSE BLDC GLUCOMTR-MCNC: 240 MG/DL (ref 70–99)
GLUCOSE BLDC GLUCOMTR-MCNC: 253 MG/DL (ref 70–99)
GLUCOSE SERPL-MCNC: 221 MG/DL (ref 65–99)
GLUCOSE UR STRIP-MCNC: ABNORMAL MG/DL
HCT VFR BLD AUTO: 43 % (ref 37.5–51)
HGB BLD-MCNC: 14.2 G/DL (ref 13–17.7)
HGB UR QL STRIP.AUTO: ABNORMAL
HOLD SPECIMEN: NORMAL
HOLD SPECIMEN: NORMAL
HYALINE CASTS UR QL AUTO: ABNORMAL /LPF
IMM GRANULOCYTES # BLD AUTO: 0.02 10*3/MM3 (ref 0–0.05)
IMM GRANULOCYTES NFR BLD AUTO: 0.2 % (ref 0–0.5)
KETONES UR QL STRIP: NEGATIVE
LEUKOCYTE ESTERASE UR QL STRIP.AUTO: ABNORMAL
LYMPHOCYTES # BLD AUTO: 3.32 10*3/MM3 (ref 0.7–3.1)
LYMPHOCYTES NFR BLD AUTO: 38.4 % (ref 19.6–45.3)
MAGNESIUM SERPL-MCNC: 1.6 MG/DL (ref 1.6–2.4)
MCH RBC QN AUTO: 26 PG (ref 26.6–33)
MCHC RBC AUTO-ENTMCNC: 33 G/DL (ref 31.5–35.7)
MCV RBC AUTO: 78.6 FL (ref 79–97)
MONOCYTES # BLD AUTO: 0.8 10*3/MM3 (ref 0.1–0.9)
MONOCYTES NFR BLD AUTO: 9.2 % (ref 5–12)
NEUTROPHILS NFR BLD AUTO: 4.4 10*3/MM3 (ref 1.7–7)
NEUTROPHILS NFR BLD AUTO: 50.9 % (ref 42.7–76)
NITRITE UR QL STRIP: NEGATIVE
NRBC BLD AUTO-RTO: 0 /100 WBC (ref 0–0.2)
PH UR STRIP.AUTO: 5.5 [PH] (ref 5–8)
PLATELET # BLD AUTO: 242 10*3/MM3 (ref 140–450)
PMV BLD AUTO: 9.7 FL (ref 6–12)
POTASSIUM SERPL-SCNC: 4.1 MMOL/L (ref 3.5–5.2)
PROT SERPL-MCNC: 7.5 G/DL (ref 6–8.5)
PROT UR QL STRIP: ABNORMAL
QT INTERVAL: 319 MS
QTC INTERVAL: 459 MS
RBC # BLD AUTO: 5.47 10*6/MM3 (ref 4.14–5.8)
RBC # UR STRIP: ABNORMAL /HPF
REF LAB TEST METHOD: ABNORMAL
SODIUM SERPL-SCNC: 133 MMOL/L (ref 136–145)
SP GR UR STRIP: 1.02 (ref 1–1.03)
SQUAMOUS #/AREA URNS HPF: ABNORMAL /HPF
TROPONIN T NUMERIC DELTA: -1 NG/L
TROPONIN T SERPL HS-MCNC: 12 NG/L
UROBILINOGEN UR QL STRIP: ABNORMAL
WBC # UR STRIP: ABNORMAL /HPF
WBC NRBC COR # BLD AUTO: 8.65 10*3/MM3 (ref 3.4–10.8)
WHOLE BLOOD HOLD COAG: NORMAL
WHOLE BLOOD HOLD SPECIMEN: NORMAL

## 2025-02-04 PROCEDURE — 25010000002 PIPERACILLIN SOD-TAZOBACTAM PER 1 G: Performed by: EMERGENCY MEDICINE

## 2025-02-04 PROCEDURE — 87040 BLOOD CULTURE FOR BACTERIA: CPT | Performed by: EMERGENCY MEDICINE

## 2025-02-04 PROCEDURE — 83735 ASSAY OF MAGNESIUM: CPT | Performed by: EMERGENCY MEDICINE

## 2025-02-04 PROCEDURE — 25810000003 SODIUM CHLORIDE 0.9 % SOLUTION: Performed by: EMERGENCY MEDICINE

## 2025-02-04 PROCEDURE — 87086 URINE CULTURE/COLONY COUNT: CPT | Performed by: EMERGENCY MEDICINE

## 2025-02-04 PROCEDURE — 25010000002 HEPARIN (PORCINE) PER 1000 UNITS: Performed by: FAMILY MEDICINE

## 2025-02-04 PROCEDURE — 25010000002 PIPERACILLIN SOD-TAZOBACTAM PER 1 G: Performed by: INTERNAL MEDICINE

## 2025-02-04 PROCEDURE — 82948 REAGENT STRIP/BLOOD GLUCOSE: CPT

## 2025-02-04 PROCEDURE — 83605 ASSAY OF LACTIC ACID: CPT | Performed by: EMERGENCY MEDICINE

## 2025-02-04 PROCEDURE — 87449 NOS EACH ORGANISM AG IA: CPT | Performed by: INTERNAL MEDICINE

## 2025-02-04 PROCEDURE — 25010000002 METHYLPREDNISOLONE PER 40 MG: Performed by: FAMILY MEDICINE

## 2025-02-04 PROCEDURE — 63710000001 INSULIN LISPRO (HUMAN) PER 5 UNITS: Performed by: FAMILY MEDICINE

## 2025-02-04 PROCEDURE — 87493 C DIFF AMPLIFIED PROBE: CPT | Performed by: INTERNAL MEDICINE

## 2025-02-04 PROCEDURE — 71045 X-RAY EXAM CHEST 1 VIEW: CPT

## 2025-02-04 PROCEDURE — 80053 COMPREHEN METABOLIC PANEL: CPT | Performed by: EMERGENCY MEDICINE

## 2025-02-04 PROCEDURE — 99223 1ST HOSP IP/OBS HIGH 75: CPT | Performed by: FAMILY MEDICINE

## 2025-02-04 PROCEDURE — 84484 ASSAY OF TROPONIN QUANT: CPT | Performed by: EMERGENCY MEDICINE

## 2025-02-04 PROCEDURE — 94799 UNLISTED PULMONARY SVC/PX: CPT

## 2025-02-04 PROCEDURE — 94760 N-INVAS EAR/PLS OXIMETRY 1: CPT

## 2025-02-04 PROCEDURE — 70450 CT HEAD/BRAIN W/O DYE: CPT

## 2025-02-04 PROCEDURE — 93005 ELECTROCARDIOGRAM TRACING: CPT | Performed by: EMERGENCY MEDICINE

## 2025-02-04 PROCEDURE — 82948 REAGENT STRIP/BLOOD GLUCOSE: CPT | Performed by: FAMILY MEDICINE

## 2025-02-04 PROCEDURE — 85025 COMPLETE CBC W/AUTO DIFF WBC: CPT | Performed by: EMERGENCY MEDICINE

## 2025-02-04 PROCEDURE — 81001 URINALYSIS AUTO W/SCOPE: CPT | Performed by: EMERGENCY MEDICINE

## 2025-02-04 RX ORDER — NICOTINE POLACRILEX 4 MG
15 LOZENGE BUCCAL
Status: DISCONTINUED | OUTPATIENT
Start: 2025-02-04 | End: 2025-02-07 | Stop reason: HOSPADM

## 2025-02-04 RX ORDER — AMOXICILLIN 250 MG
2 CAPSULE ORAL 2 TIMES DAILY PRN
Status: DISCONTINUED | OUTPATIENT
Start: 2025-02-04 | End: 2025-02-07 | Stop reason: HOSPADM

## 2025-02-04 RX ORDER — IPRATROPIUM BROMIDE AND ALBUTEROL SULFATE 2.5; .5 MG/3ML; MG/3ML
3 SOLUTION RESPIRATORY (INHALATION) EVERY 4 HOURS PRN
Status: DISCONTINUED | OUTPATIENT
Start: 2025-02-04 | End: 2025-02-07 | Stop reason: HOSPADM

## 2025-02-04 RX ORDER — HEPARIN SODIUM 5000 [USP'U]/ML
5000 INJECTION, SOLUTION INTRAVENOUS; SUBCUTANEOUS EVERY 12 HOURS SCHEDULED
Status: DISCONTINUED | OUTPATIENT
Start: 2025-02-04 | End: 2025-02-07 | Stop reason: HOSPADM

## 2025-02-04 RX ORDER — FAMOTIDINE 20 MG/1
40 TABLET, FILM COATED ORAL DAILY
Status: DISCONTINUED | OUTPATIENT
Start: 2025-02-04 | End: 2025-02-07 | Stop reason: HOSPADM

## 2025-02-04 RX ORDER — GUAIFENESIN/DEXTROMETHORPHAN 100-10MG/5
5 SYRUP ORAL EVERY 4 HOURS PRN
Status: DISCONTINUED | OUTPATIENT
Start: 2025-02-04 | End: 2025-02-07 | Stop reason: HOSPADM

## 2025-02-04 RX ORDER — ATORVASTATIN CALCIUM 20 MG/1
20 TABLET, FILM COATED ORAL NIGHTLY
Qty: 30 TABLET | Refills: 10 | OUTPATIENT
Start: 2025-02-04

## 2025-02-04 RX ORDER — EMPAGLIFLOZIN 25 MG/1
25 TABLET, FILM COATED ORAL DAILY
Qty: 30 TABLET | Refills: 10 | OUTPATIENT
Start: 2025-02-04

## 2025-02-04 RX ORDER — IPRATROPIUM BROMIDE AND ALBUTEROL SULFATE 2.5; .5 MG/3ML; MG/3ML
3 SOLUTION RESPIRATORY (INHALATION) 3 TIMES DAILY
Status: DISCONTINUED | OUTPATIENT
Start: 2025-02-04 | End: 2025-02-04

## 2025-02-04 RX ORDER — LOSARTAN POTASSIUM 25 MG/1
25 TABLET ORAL DAILY
Qty: 30 TABLET | Refills: 10 | OUTPATIENT
Start: 2025-02-04

## 2025-02-04 RX ORDER — IPRATROPIUM BROMIDE AND ALBUTEROL SULFATE 2.5; .5 MG/3ML; MG/3ML
3 SOLUTION RESPIRATORY (INHALATION)
Status: DISCONTINUED | OUTPATIENT
Start: 2025-02-04 | End: 2025-02-06

## 2025-02-04 RX ORDER — DEXTROSE MONOHYDRATE 25 G/50ML
25 INJECTION, SOLUTION INTRAVENOUS
Status: DISCONTINUED | OUTPATIENT
Start: 2025-02-04 | End: 2025-02-07 | Stop reason: HOSPADM

## 2025-02-04 RX ORDER — HYDROCHLOROTHIAZIDE 25 MG/1
25 TABLET ORAL DAILY
COMMUNITY
Start: 2025-02-03 | End: 2025-02-07 | Stop reason: HOSPADM

## 2025-02-04 RX ORDER — NICOTINE 21 MG/24HR
1 PATCH, TRANSDERMAL 24 HOURS TRANSDERMAL
Status: DISCONTINUED | OUTPATIENT
Start: 2025-02-04 | End: 2025-02-07 | Stop reason: HOSPADM

## 2025-02-04 RX ORDER — ASPIRIN 81 MG/1
81 TABLET, CHEWABLE ORAL DAILY
Status: DISCONTINUED | OUTPATIENT
Start: 2025-02-04 | End: 2025-02-07 | Stop reason: HOSPADM

## 2025-02-04 RX ORDER — IBUPROFEN 600 MG/1
1 TABLET ORAL
Status: DISCONTINUED | OUTPATIENT
Start: 2025-02-04 | End: 2025-02-07 | Stop reason: HOSPADM

## 2025-02-04 RX ORDER — SODIUM CHLORIDE, SODIUM LACTATE, POTASSIUM CHLORIDE, CALCIUM CHLORIDE 600; 310; 30; 20 MG/100ML; MG/100ML; MG/100ML; MG/100ML
100 INJECTION, SOLUTION INTRAVENOUS CONTINUOUS
Status: CANCELLED | OUTPATIENT
Start: 2025-02-04 | End: 2025-02-04

## 2025-02-04 RX ORDER — BISACODYL 10 MG
10 SUPPOSITORY, RECTAL RECTAL DAILY PRN
Status: DISCONTINUED | OUTPATIENT
Start: 2025-02-04 | End: 2025-02-07 | Stop reason: HOSPADM

## 2025-02-04 RX ORDER — SODIUM CHLORIDE 0.9 % (FLUSH) 0.9 %
10 SYRINGE (ML) INJECTION AS NEEDED
Status: DISCONTINUED | OUTPATIENT
Start: 2025-02-04 | End: 2025-02-07 | Stop reason: HOSPADM

## 2025-02-04 RX ORDER — INSULIN LISPRO 100 [IU]/ML
2-9 INJECTION, SOLUTION INTRAVENOUS; SUBCUTANEOUS
Status: DISCONTINUED | OUTPATIENT
Start: 2025-02-04 | End: 2025-02-07 | Stop reason: HOSPADM

## 2025-02-04 RX ORDER — SODIUM CHLORIDE 9 MG/ML
40 INJECTION, SOLUTION INTRAVENOUS AS NEEDED
Status: DISCONTINUED | OUTPATIENT
Start: 2025-02-04 | End: 2025-02-07 | Stop reason: HOSPADM

## 2025-02-04 RX ORDER — HYDROCHLOROTHIAZIDE 25 MG/1
25 TABLET ORAL DAILY
Qty: 30 TABLET | Refills: 10 | OUTPATIENT
Start: 2025-02-04

## 2025-02-04 RX ORDER — BUDESONIDE AND FORMOTEROL FUMARATE DIHYDRATE 160; 4.5 UG/1; UG/1
2 AEROSOL RESPIRATORY (INHALATION)
Status: DISCONTINUED | OUTPATIENT
Start: 2025-02-04 | End: 2025-02-07 | Stop reason: HOSPADM

## 2025-02-04 RX ORDER — SODIUM CHLORIDE 0.9 % (FLUSH) 0.9 %
10 SYRINGE (ML) INJECTION EVERY 12 HOURS SCHEDULED
Status: DISCONTINUED | OUTPATIENT
Start: 2025-02-04 | End: 2025-02-07 | Stop reason: HOSPADM

## 2025-02-04 RX ORDER — ATORVASTATIN CALCIUM 10 MG/1
20 TABLET, FILM COATED ORAL NIGHTLY
Status: DISCONTINUED | OUTPATIENT
Start: 2025-02-04 | End: 2025-02-07 | Stop reason: HOSPADM

## 2025-02-04 RX ORDER — GUAIFENESIN 600 MG/1
600 TABLET, EXTENDED RELEASE ORAL EVERY 12 HOURS SCHEDULED
Status: DISCONTINUED | OUTPATIENT
Start: 2025-02-04 | End: 2025-02-07 | Stop reason: HOSPADM

## 2025-02-04 RX ORDER — ONDANSETRON 2 MG/ML
4 INJECTION INTRAMUSCULAR; INTRAVENOUS EVERY 6 HOURS PRN
Status: DISCONTINUED | OUTPATIENT
Start: 2025-02-04 | End: 2025-02-07 | Stop reason: HOSPADM

## 2025-02-04 RX ORDER — POLYETHYLENE GLYCOL 3350 17 G/17G
17 POWDER, FOR SOLUTION ORAL DAILY PRN
Status: DISCONTINUED | OUTPATIENT
Start: 2025-02-04 | End: 2025-02-07 | Stop reason: HOSPADM

## 2025-02-04 RX ORDER — AMOXICILLIN 250 MG
1 CAPSULE ORAL DAILY
Status: DISCONTINUED | OUTPATIENT
Start: 2025-02-04 | End: 2025-02-07 | Stop reason: HOSPADM

## 2025-02-04 RX ORDER — BISACODYL 5 MG/1
5 TABLET, DELAYED RELEASE ORAL DAILY PRN
Status: DISCONTINUED | OUTPATIENT
Start: 2025-02-04 | End: 2025-02-07 | Stop reason: HOSPADM

## 2025-02-04 RX ADMIN — PIPERACILLIN AND TAZOBACTAM 3.38 G: 3; .375 INJECTION, POWDER, FOR SOLUTION INTRAVENOUS at 14:38

## 2025-02-04 RX ADMIN — EMPAGLIFLOZIN 25 MG: 25 TABLET, FILM COATED ORAL at 11:23

## 2025-02-04 RX ADMIN — PIPERACILLIN AND TAZOBACTAM 3.38 G: 3; .375 INJECTION, POWDER, FOR SOLUTION INTRAVENOUS at 21:47

## 2025-02-04 RX ADMIN — NICOTINE 1 PATCH: 21 PATCH, EXTENDED RELEASE TRANSDERMAL at 11:23

## 2025-02-04 RX ADMIN — WATER 40 MG: 1 INJECTION INTRAMUSCULAR; INTRAVENOUS; SUBCUTANEOUS at 09:12

## 2025-02-04 RX ADMIN — FAMOTIDINE 40 MG: 20 TABLET ORAL at 11:23

## 2025-02-04 RX ADMIN — Medication 10 ML: at 21:48

## 2025-02-04 RX ADMIN — INSULIN LISPRO 6 UNITS: 100 INJECTION, SOLUTION INTRAVENOUS; SUBCUTANEOUS at 21:46

## 2025-02-04 RX ADMIN — SODIUM CHLORIDE 1000 ML: 9 INJECTION, SOLUTION INTRAVENOUS at 03:42

## 2025-02-04 RX ADMIN — Medication 10 ML: at 09:12

## 2025-02-04 RX ADMIN — INSULIN LISPRO 4 UNITS: 100 INJECTION, SOLUTION INTRAVENOUS; SUBCUTANEOUS at 09:12

## 2025-02-04 RX ADMIN — HEPARIN SODIUM 5000 UNITS: 5000 INJECTION INTRAVENOUS; SUBCUTANEOUS at 09:13

## 2025-02-04 RX ADMIN — INSULIN LISPRO 4 UNITS: 100 INJECTION, SOLUTION INTRAVENOUS; SUBCUTANEOUS at 17:36

## 2025-02-04 RX ADMIN — HEPARIN SODIUM 5000 UNITS: 5000 INJECTION INTRAVENOUS; SUBCUTANEOUS at 21:46

## 2025-02-04 RX ADMIN — INSULIN LISPRO 4 UNITS: 100 INJECTION, SOLUTION INTRAVENOUS; SUBCUTANEOUS at 12:25

## 2025-02-04 RX ADMIN — ATORVASTATIN CALCIUM 20 MG: 10 TABLET, FILM COATED ORAL at 21:46

## 2025-02-04 RX ADMIN — PIPERACILLIN AND TAZOBACTAM 3.38 G: 3; .375 INJECTION, POWDER, FOR SOLUTION INTRAVENOUS at 05:08

## 2025-02-04 RX ADMIN — GUAIFENESIN 600 MG: 600 TABLET ORAL at 09:12

## 2025-02-04 RX ADMIN — GUAIFENESIN 600 MG: 600 TABLET ORAL at 21:46

## 2025-02-04 RX ADMIN — ASPIRIN 81 MG: 81 TABLET, CHEWABLE ORAL at 11:23

## 2025-02-04 NOTE — H&P
Psychiatric   HISTORY AND PHYSICAL    Patient Name: Brian Mehta  : 1955  MRN: [id number]  Primary Care Physician:  Sophie Capps APRN  Date of admission: 2/3/2025    Subjective   Subjective     Chief Complaint: Generalized weakness    HPI:    Brian Mehta is a 69 y.o. male with past medical history of hypertension, hyperlipidemia, prior CVA with right-sided deficits, COPD, CHF, and GERD presented to the ED via EMS for evaluation of weakness.  Patient stated for the last few days he has been significantly weak where he could barely get out of bed.  Due to his worsening weakness patient called EMS to take him to the ED for further evaluation.  Of note patient is very unkempt laying in his own stool and urine and his house was filthy when EMS arrived.  In the ED patient was tachycardic on arrival with remaining vitals being within normal limits.  UA showed that he had a UTI with some dehydration with remaining labs being relatively unremarkable given his chronic conditions including a normal lactic acid and negative troponin.  CT of the head was negative for any acute findings and status post COPD findings with nothing acute.  When seen patient was resting comfortably although easily agitated.  Although a poor story when asked he denied any recent fevers, headaches, focal weakness, chest pain, palpitation, nausea, vomiting, diarrhea, constipation, dysuria, hematuria, hematochezia, melena, or anxiety.  Patient admitted for further evaluation and treatment.    Review of Systems   All systems were reviewed and negative except for: As per HPI    Personal History     Past Medical History:   Diagnosis Date    CHF (congestive heart failure)     SEE'S DR [place] NO CURRENT S/S    COPD (chronic obstructive pulmonary disease)     INHALER    Diabetes mellitus     DOESN'T CHECK BG OFTEN AT HOME    Hyperlipidemia     Hypertension     Sepsis 2023    Stroke 2017    RIGHT SIDE WEAKNESS       Past Surgical History:  Your surgery scheduled for August 24th at approximately 1:00 p.m. at [facility] on [address].      Please stop taking your aspirin and coenzyme Q today.      Please take all your regular morning medications with a sip of water.      No solid food after midnight on the 23rd but you may have clear liquids up to 3 hours before arriving at the hospital.      The hospital call you the night before with a time of arrival.      If you have any questions or concerns please call the office    Our office phone numbers are  [phone] and        Procedure Laterality Date    APPENDECTOMY      CYSTOLITHALOPAXY PERCUTANEOUS Left 1/16/2025    Procedure: CYSTOLITHOLAPAXY, left ureteroscopy with laser basket stone extraction and left ureteral stent exchange.  Looking bladder, laser stone off stent exchange retained stent if possible;  Surgeon: Ector Kulkarni MD;  Location: [facility] OR;  Service: Urology;  Laterality: Left;    EYE SURGERY Bilateral     MISTY CATARACT       Family History: family history includes No Known Problems in his father and mother. Otherwise pertinent FHx was reviewed and not pertinent to current issue.    Social History:  reports that he has been smoking cigarettes. He started smoking about 53 years ago. He has a 132.7 pack-year smoking history. He has been exposed to tobacco smoke. He has never used smokeless tobacco. He reports current alcohol use. He reports that he does not use drugs.    Home Medications:  Diclofenac Sodium, Insulin Glargine, aspirin, atorvastatin, budesonide-formoterol, cetirizine, empagliflozin, famotidine, furosemide, ipratropium-albuterol, losartan, nicotine, potassium chloride, and sennosides-docusate      Allergies:  Allergies   Allergen Reactions    Ceftriaxone Rash     Had fairly extensive diffuse chest/arms rash after administration 1/2025 hospitalization.  Switched to aztreonam at that time.         Objective   Objective     Vitals:   Temp:  [98.4 °F (36.9 °C)-99.6 °F (37.6 °C)] 98.4 °F (36.9 °C)  Heart Rate:  [107-133] 109  Resp:  [17-25] 17  BP: (125-143)/(75-94) 125/80  Physical Exam    Constitutional: Awake, unkempt, lethargic, weak, ill-appearing   Eyes: PERRLA, sclerae anicteric, no conjunctival injection   HENT: NCAT, mucous membranes dry, poor dentition   Neck: Supple, no thyromegaly, no lymphadenopathy, trachea midline   Respiratory: Decreased breath sounds bilaterally, nonlabored respirations    Cardiovascular: RRR, no murmurs, rubs, or gallops, palpable pedal pulses  bilaterally   Gastrointestinal: Positive bowel sounds, soft, nontender, nondistended   Musculoskeletal: No bilateral ankle edema, no clubbing or cyanosis to extremities   Psychiatric: Appropriate affect, cooperative   Neurologic: Oriented x 3, weakness, Cranial Nerves grossly intact to confrontation, speech clear   Skin: No rashes     Result Review    Result Review:  I have personally reviewed the results from the time of this admission to 2/4/2025 06:13 EST and agree with these findings:  [x]  Laboratory list / accordion  []  Microbiology  [x]  Radiology  [x]  EKG/Telemetry   []  Cardiology/Vascular   []  Pathology  []  Old records  []  Other:  Most notable findings include: UA showed that he had a UTI with some dehydration with remaining labs being relatively unremarkable given his chronic conditions including a normal lactic acid and negative troponin.  CT of the head was negative for any acute findings and status post COPD findings with nothing acute      Assessment & Plan   Assessment / Plan     Brief Patient Summary:  Brian Mehta is a 69 y.o. male with past medical history of hypertension, hyperlipidemia, prior CVA with right-sided deficits, COPD, CHF, and GERD presented to the ED via EMS for evaluation of weakness    Active Hospital Problems:  Active Hospital Problems    Diagnosis     **Generalized weakness     UTI (urinary tract infection)     DM2 (diabetes mellitus, type 2)     COPD (chronic obstructive pulmonary disease)     Chronic HFrEF (heart failure with reduced ejection fraction)     Essential hypertension      Plan:     Generalized weakness  -Admit to telemetry  -Secondary to UTI and dehydration, will treat  -Gentle IVF  -Fall precautions  -PT OT  -Placement if needed  -Supportive care    UTI  -UA reviewed  -Empiric antibiotics  -Urine, blood cultures  -IVF  -Supportive care    COPD Exacerbation  -Admit to telemetry  -Imaging reviewed  -Supplemental oxygen as needed, wean down as tolerated  -IV  Solu-Medrol  -DuoNeb 3 times daily and as needed  -Mucinex, Tessalon Perles  -Incentive spirometry    HTN  -Currently well controlled  -PRN BP meds  -Resume home meds when available  -Titrate if needed    Diabetes  -Insulin sliding scale  -Titrate as needed    CHF    GI ppx  DVT ppx    VTE Prophylaxis:  Pharmacologic VTE prophylaxis orders are signed & held.          CODE STATUS:    Level Of Support Discussed With: Patient  Code Status (Patient has no pulse and is not breathing): CPR (Attempt to Resuscitate)  Medical Interventions (Patient has pulse or is breathing): Full Support    Admission Status:  I believe this patient meets inpatient status.      Electronically signed by Robbie Maya MD, 02/04/25, 6:13 AM EST.

## 2025-02-04 NOTE — TELEPHONE ENCOUNTER
Caller: FERDINAND CATALAN    Relationship: Emergency Contact    Best call back number: [phone]      Who are you requesting to speak with (clinical staff, provider,  specific staff member): YANDEL AND JESSICA      What was the call regarding: PATIENT HAD FELL OUT OF BED LAST NIGHT ON HIS FACE AND CHEST..  FERDINAND REPORTS THAT PATIENT IS NOW ADMITTED TO [facility]    EFRDINAND WANTS YANDEL TO CALL HER BACK

## 2025-02-04 NOTE — PLAN OF CARE
Goal Outcome Evaluation:  Plan of Care Reviewed With: patient           Outcome Evaluation: PT IS NEW IN THE UNIT

## 2025-02-04 NOTE — ED PROVIDER NOTES
Time: 3:47 AM EST  Date of encounter:  2/3/2025  Independent Historian/Clinical History and Information was obtained by:   Patient and EMS    History is limited by: Altered Mental Status    Chief Complaint: fall      History of Present Illness:  Patient is a 69 y.o. year old male who presents to the emergency department for evaluation of Fall.  Per report patient fell from a sitting position from the bed to the floor.  Family is unable to get him back into bed.  Per EMS house was infested with roaches rats and bedbugs.  History very limited at this time.      Patient Care Team  Primary Care Provider: Sophie Capps APRN    Past Medical History:     Allergies   Allergen Reactions    Ceftriaxone Rash     Had fairly extensive diffuse chest/arms rash after administration 1/2025 hospitalization.  Switched to aztreonam at that time.       Past Medical History:   Diagnosis Date    CHF (congestive heart failure)     SEE'S DR [place]ICK[place] NO CURRENT S/S    COPD (chronic obstructive pulmonary disease)     INHALER    Diabetes mellitus     DOESN'T CHECK BG OFTEN AT HOME    Hyperlipidemia     Hypertension     Sepsis 09/14/2023    Stroke 2017    RIGHT SIDE WEAKNESS     Past Surgical History:   Procedure Laterality Date    APPENDECTOMY      CYSTOLITHALOPAXY PERCUTANEOUS Left 1/16/2025    Procedure: CYSTOLITHOLAPAXY, left ureteroscopy with laser basket stone extraction and left ureteral stent exchange.  Looking bladder, laser stone off stent exchange retained stent if possible;  Surgeon: Ector Kulkarni MD;  Location: [facility] MAIN OR;  Service: Urology;  Laterality: Left;    EYE SURGERY Bilateral     MISTY CATARACT     Family History   Problem Relation Age of Onset    No Known Problems Mother     No Known Problems Father     Malig Hyperthermia Neg Hx        Home Medications:  Prior to Admission medications    Medication Sig Start Date End Date Taking? Authorizing Provider   Aspirin Low Dose 81 MG chewable tablet CHEW AND SWALLOW 1  TABLET BY MOUTH DAILY 11/2/24   Sophie Capps APRN   atorvastatin (LIPITOR) 20 MG tablet TAKE 1 TABLET BY MOUTH NIGHTLY 1/6/25   Sophie Cpaps APRN   budesonide-formoterol (Symbicort) 160-4.5 MCG/ACT inhaler Inhale 2 puffs 2 (Two) Times a Day.    ProviderHenrry MD   cetirizine (zyrTEC) 10 MG tablet Take 1 tablet by mouth Daily for 14 days. 1/18/25 2/1/25  Lico Langley MD   Diclofenac Sodium (VOLTAREN) 1 % gel gel Apply 2 g topically to the appropriate area as directed 4 (Four) Times a Day. 1/17/25   Lico Langley MD   empagliflozin (Jardiance) 25 MG tablet tablet TAKE 1 TABLET BY MOUTH DAILY 1/6/25   Sophie Capps APRN   famotidine (PEPCID) 40 MG tablet Take 1 tablet by mouth At Night As Needed.    Henrry Camarillo MD   furosemide (Lasix) 20 MG tablet Take 1 tablet by mouth Daily. 1/17/25   Lico Langley MD   ipratropium-albuterol (Combivent Respimat)  MCG/ACT inhaler Inhale 1 puff 4 (Four) Times a Day.    Henrry Camarillo MD Lantus SoloStar 100 UNIT/ML injection pen INJECT 10 UNITS SUBCUTANEOUSLY INTO THE APPROPRIATE AREA EVERY NIGHT AS DIRECTED 1/7/25   Sophie Capps APRN   losartan (COZAAR) 25 MG tablet TAKE 1 TABLET BY MOUTH DAILY 1/6/25   Sophie Capps APRN   nicotine (NICODERM CQ) 21 MG/24HR patch Place 1 patch on the skin as directed by provider Daily. 8/22/24   Arcenio Mosquera MD   potassium chloride 10 MEQ CR tablet Take 1 tablet by mouth Daily for 30 days. Take with lasix. Do not take if you skip a dose of lasix 1/17/25 2/16/25  Lico Langley MD   sennosides-docusate (senna-docusate sodium) 8.6-50 MG per tablet Take 1 tablet by mouth Daily for 30 days. 1/17/25 2/16/25  Lico Langley MD        Social History:   Social History     Tobacco Use    Smoking status: Every Day     Current packs/day: 2.50     Average packs/day: 2.5 packs/day for 53.1 years (132.7 ttl pk-yrs)     Types: Cigarettes     Start date: 1972     Passive exposure: Current    Smokeless  "tobacco: Never    Tobacco comments:     INSTRUCTED NO SMOKING 24 HOUR PRIOR TO SURGERY    Vaping Use    Vaping status: Never Used   Substance Use Topics    Alcohol use: Yes    Drug use: Never         Review of Systems:  Review of Systems   Unable to perform ROS: Mental status change        Physical Exam:  /87 (BP Location: Left arm, Patient Position: Lying)   Pulse 102   Temp 99.1 °F (37.3 °C) (Oral)   Resp 16   Ht 190.5 cm (75\")   Wt 94 kg (207 lb 3.7 oz)   SpO2 97%   BMI 25.90 kg/m²     Physical Exam  HENT:      Head: Normocephalic and atraumatic.      Nose: Nose normal.      Mouth/Throat:      Mouth: Mucous membranes are moist.   Eyes:      Extraocular Movements: Extraocular movements intact.      Conjunctiva/sclera: Conjunctivae normal.      Pupils: Pupils are equal, round, and reactive to light.   Cardiovascular:      Rate and Rhythm: Regular rhythm. Tachycardia present.      Pulses: Normal pulses.      Heart sounds: Normal heart sounds.   Pulmonary:      Effort: Pulmonary effort is normal.      Breath sounds: Normal breath sounds.   Abdominal:      General: There is no distension.      Palpations: Abdomen is soft.      Tenderness: There is no abdominal tenderness.   Musculoskeletal:         General: Normal range of motion.      Cervical back: Normal range of motion.   Skin:     General: Skin is warm and dry.      Capillary Refill: Capillary refill takes less than 2 seconds.      Comments: Erythematous rash with skin breakdown over buttocks and groin   Neurological:      Mental Status: He is alert.      Comments: Patient appears confused.  He is moving all extremities.   Psychiatric:         Mood and Affect: Mood normal.         Behavior: Behavior normal.                    Medical Decision Making:      Comorbidities that affect care:    CHF, COPD, Diabetes, Hypertension    External Notes reviewed:    Hospital Discharge Summary: Patient was admitted on 1/7/2025 and discharged on 1/70/25 for a " urinary tract infection.      The following orders were placed and all results were independently analyzed by me:  Orders Placed This Encounter   Procedures    Blood Culture - Blood,    Blood Culture - Blood,    Urine Culture - Urine, Urine, Clean Catch    Clostridioides difficile Toxin - Stool, Per Rectum    Clostridioides difficile Toxin, PCR - Stool, Per Rectum    Clostridioides difficile toxin Ag, Reflex - Stool,    XR Chest 1 View    CT Head Without Contrast    [place] Draw    Comprehensive Metabolic Panel    High Sensitivity Troponin T    Magnesium    Urinalysis With Microscopic If Indicated (No Culture) - Urine, Clean Catch    CBC Auto Differential    High Sensitivity Troponin T 1Hr    Lactic Acid, Plasma    Urinalysis, Microscopic Only - Urine, Clean Catch    CBC (No Diff)    Basic Metabolic Panel    Magnesium    Diet: Diabetic; Consistent Carbohydrate; Fluid Consistency: Thin (IDDSI 0)    Undress & Gown    Continuous Pulse Oximetry    Vital Signs    Orthostatic Blood Pressure    Straight Cath    Vital Signs    Intake & Output    Weigh Patient    Oral Care    Saline Lock & Maintain IV Access    Activity - Ad Itzel    Code Status and Medical Interventions: CPR (Attempt to Resuscitate); Full Support    Inpatient Hospitalist Consult    Inpatient Nutrition Consult    OT Consult: Eval & Treat ADL Performance Below Baseline, Discharge Placement Assessment    PT Consult: Eval & Treat Discharge Placement Assessment, Functional Mobility Below Baseline    Oxygen Therapy- Nasal Cannula; Titrate 1-6 LPM Per SpO2; 90 - 95%    Incentive Spirometry    POC Glucose Once    POC Glucose 4x Daily Before Meals & at Bedtime    POC Glucose Once    POC Glucose Once    ECG 12 Lead ED Triage Standing Order; Weak / Dizzy / AMS    Wound Ostomy Eval & Treat    Insert Peripheral IV    Insert Peripheral IV    Inpatient Admission    Fall Precautions    Fall Precautions    CBC & Differential    Green Top (Gel)    Lavender Top    Gold Top -  SST    Light Blue Top       Medications Given in the Emergency Department:  Medications   sodium chloride 0.9 % flush 10 mL (has no administration in time range)   piperacillin-tazobactam (ZOSYN) IVPB 3.375 g IVPB in 100 mL NS (VTB) (3.375 g Intravenous New Bag 2/4/25 1438)   dextrose (GLUTOSE) oral gel 15 g (has no administration in time range)   dextrose (D50W) (25 g/50 mL) IV injection 25 g (has no administration in time range)   glucagon (GLUCAGEN) injection 1 mg (has no administration in time range)   Insulin Lispro (humaLOG) injection 2-9 Units (4 Units Subcutaneous Given 2/4/25 1736)   sodium chloride 0.9 % flush 10 mL (10 mL Intravenous Given 2/4/25 0912)   sodium chloride 0.9 % flush 10 mL (has no administration in time range)   sodium chloride 0.9 % infusion 40 mL (has no administration in time range)   heparin (porcine) 5000 UNIT/ML injection 5,000 Units (5,000 Units Subcutaneous Given 2/4/25 0913)   sennosides-docusate (PERICOLACE) 8.6-50 MG per tablet 2 tablet (has no administration in time range)     And   polyethylene glycol (MIRALAX) packet 17 g (has no administration in time range)     And   bisacodyl (DULCOLAX) EC tablet 5 mg (has no administration in time range)     And   bisacodyl (DULCOLAX) suppository 10 mg (has no administration in time range)   ondansetron (ZOFRAN) injection 4 mg (has no administration in time range)   guaiFENesin (MUCINEX) 12 hr tablet 600 mg (600 mg Oral Given 2/4/25 0912)   guaiFENesin-dextromethorphan (ROBITUSSIN DM) 100-10 MG/5ML syrup 5 mL (has no administration in time range)   ipratropium-albuterol (DUO-NEB) nebulizer solution 3 mL (has no administration in time range)   methylPREDNISolone sodium succinate (SOLU-Medrol) 40 mg in sterile water (preservative free) 1 mL (40 mg Intravenous Given 2/4/25 0912)   lactated ringers bolus 500 mL (500 mL Intravenous Not Given 2/4/25 0809)   influenza vac split high-dose (FLUZONE HIGH DOSE) injection 0.5 mL (has no administration  in time range)   aspirin chewable tablet 81 mg (81 mg Oral Given 2/4/25 1123)   atorvastatin (LIPITOR) tablet 20 mg (has no administration in time range)   budesonide-formoterol (SYMBICORT) 160-4.5 MCG/ACT inhaler 2 puff (2 puffs Inhalation Not Given 2/4/25 2033)   empagliflozin (JARDIANCE) tablet 25 mg (25 mg Oral Given 2/4/25 1123)   famotidine (PEPCID) tablet 40 mg (40 mg Oral Given 2/4/25 1123)   ipratropium-albuterol (DUO-NEB) nebulizer solution 3 mL (3 mL Nebulization Not Given 2/4/25 2033)   nicotine (NICODERM CQ) 21 MG/24HR patch 1 patch (1 patch Transdermal Medication Applied 2/4/25 1123)   sennosides-docusate (PERICOLACE) 8.6-50 MG per tablet 1 tablet (1 tablet Oral Not Given 2/4/25 1124)   sodium chloride 0.9 % bolus 1,000 mL (0 mL Intravenous Stopped 2/4/25 0452)   piperacillin-tazobactam (ZOSYN) IVPB 3.375 g IVPB in 100 mL NS (VTB) (0 g Intravenous Stopped 2/4/25 0555)        ED Course:    ED Course as of 02/04/25 2113 Tue Feb 04, 2025 2113 ECG 12 Lead ED Triage Standing Order; Weak / Dizzy / AMS  Sinus tachycardia with rate of 124.  No acute ST elevation.  Normal IL and QTc.  EKG interpreted by me [LD]      ED Course User Index  [LD] Nita Dukes MD       Labs:    Lab Results (last 24 hours)       Procedure Component Value Units Date/Time    CBC & Differential [[id number]]  (Abnormal) Collected: 02/04/25 0225    Specimen: Blood Updated: 02/04/25 0233    Narrative:      The following orders were created for panel order CBC & Differential.  Procedure                               Abnormality         Status                     ---------                               -----------         ------                     CBC Auto Differential[[id number]]        Abnormal            Final result                 Please view results for these tests on the individual orders.    Comprehensive Metabolic Panel [[id number]]  (Abnormal) Collected: 02/04/25 0225    Specimen: Blood Updated: 02/04/25 0252     Glucose  221 mg/dL      BUN 12 mg/dL      Creatinine 0.84 mg/dL      Sodium 133 mmol/L      Potassium 4.1 mmol/L      Chloride 99 mmol/L      CO2 23.6 mmol/L      Calcium 9.1 mg/dL      Total Protein 7.5 g/dL      Albumin 3.2 g/dL      ALT (SGPT) 8 U/L      AST (SGOT) 13 U/L      Alkaline Phosphatase 104 U/L      Total Bilirubin 0.4 mg/dL      Globulin 4.3 gm/dL      A/G Ratio 0.7 g/dL      BUN/Creatinine Ratio 14.3     Anion Gap 10.4 mmol/L      eGFR 95.6 mL/min/1.73     Narrative:      GFR Categories in Chronic Kidney Disease (CKD)      GFR Category          GFR (mL/min/1.73)    Interpretation  G1                     90 or greater         Normal or high (1)  G2                      60-89                Mild decrease (1)  G3a                   45-59                Mild to moderate decrease  G3b                   30-44                Moderate to severe decrease  G4                    15-29                Severe decrease  G5                    14 or less           Kidney failure          (1)In the absence of evidence of kidney disease, neither GFR category G1 or G2 fulfill the criteria for CKD.    eGFR calculation 2021 CKD-EPI creatinine equation, which does not include race as a factor    High Sensitivity Troponin T [[id number]]  (Normal) Collected: 02/04/25 0225    Specimen: Blood Updated: 02/04/25 0252     HS Troponin T 12 ng/L     Narrative:      High Sensitive Troponin T Reference Range:  <14.0 ng/L- Negative Female for AMI  <22.0 ng/L- Negative Male for AMI  >=14 - Abnormal Female indicating possible myocardial injury.  >=22 - Abnormal Male indicating possible myocardial injury.   Clinicians would have to utilize clinical acumen, EKG, Troponin, and serial changes to determine if it is an Acute Myocardial Infarction or myocardial injury due to an underlying chronic condition.         Magnesium [[id number]]  (Normal) Collected: 02/04/25 0225    Specimen: Blood Updated: 02/04/25 0252     Magnesium 1.6 mg/dL     CBC Auto  Differential [[id number]]  (Abnormal) Collected: 02/04/25 0225    Specimen: Blood Updated: 02/04/25 0233     WBC 8.65 10*3/mm3      RBC 5.47 10*6/mm3      Hemoglobin 14.2 g/dL      Hematocrit 43.0 %      MCV 78.6 fL      MCH 26.0 pg      MCHC 33.0 g/dL      RDW 15.8 %      RDW-SD 44.4 fl      MPV 9.7 fL      Platelets 242 10*3/mm3      Neutrophil % 50.9 %      Lymphocyte % 38.4 %      Monocyte % 9.2 %      Eosinophil % 0.8 %      Basophil % 0.5 %      Immature Grans % 0.2 %      Neutrophils, Absolute 4.40 10*3/mm3      Lymphocytes, Absolute 3.32 10*3/mm3      Monocytes, Absolute 0.80 10*3/mm3      Eosinophils, Absolute 0.07 10*3/mm3      Basophils, Absolute 0.04 10*3/mm3      Immature Grans, Absolute 0.02 10*3/mm3      nRBC 0.0 /100 WBC     High Sensitivity Troponin T 1Hr [[id number]]  (Normal) Collected: 02/04/25 0343    Specimen: Blood Updated: 02/04/25 0409     HS Troponin T 11 ng/L      Troponin T Numeric Delta -1 ng/L     Narrative:      High Sensitive Troponin T Reference Range:  <14.0 ng/L- Negative Female for AMI  <22.0 ng/L- Negative Male for AMI  >=14 - Abnormal Female indicating possible myocardial injury.  >=22 - Abnormal Male indicating possible myocardial injury.   Clinicians would have to utilize clinical acumen, EKG, Troponin, and serial changes to determine if it is an Acute Myocardial Infarction or myocardial injury due to an underlying chronic condition.         Lactic Acid, Plasma [[id number]]  (Normal) Collected: 02/04/25 0405    Specimen: Blood from Arm, Right Updated: 02/04/25 0427     Lactate 1.1 mmol/L     Blood Culture - Blood, Arm, Left [[id number]] Collected: 02/04/25 0405    Specimen: Blood from Arm, Left Updated: 02/04/25 0410    Blood Culture - Blood, Arm, Right [[id number]] Collected: 02/04/25 0405    Specimen: Blood from Arm, Right Updated: 02/04/25 0411    Urinalysis With Microscopic If Indicated (No Culture) - Urine, Clean Catch [[id number]]  (Abnormal) Collected: 02/04/25 0417     Specimen: Urine, Clean Catch Updated: 02/04/25 0429     Color, UA Dark Yellow     Appearance, UA Clear     pH, UA 5.5     Specific Gravity, UA 1.023     Glucose,  mg/dL (1+)     Ketones, UA Negative     Bilirubin, UA Small (1+)     Blood, UA Trace     Protein, UA 30 mg/dL (1+)     Leuk Esterase, UA Moderate (2+)     Nitrite, UA Negative     Urobilinogen, UA 1.0 E.U./dL    Urinalysis, Microscopic Only - Urine, Clean Catch [[id number]]  (Abnormal) Collected: 02/04/25 0417    Specimen: Urine, Clean Catch Updated: 02/04/25 0451     RBC, UA 6-10 /HPF      WBC, UA 21-50 /HPF      Bacteria, UA None Seen /HPF      Squamous Epithelial Cells, UA 3-6 /HPF      Hyaline Casts, UA 7-12 /LPF      Methodology Manual Light Microscopy    Urine Culture - Urine, Urine, Clean Catch [[id number]] Collected: 02/04/25 0417    Specimen: Urine, Clean Catch Updated: 02/04/25 0514    POC Glucose 4x Daily Before Meals & at Bedtime [[id number]]  (Abnormal) Collected: 02/04/25 1127    Specimen: Blood Updated: 02/04/25 1129     Glucose 217 mg/dL      Comment: Serial Number: [id number]:  [id number]       Clostridioides difficile Toxin - Stool, Per Rectum [[id number]]  (Abnormal) Collected: 02/04/25 1311    Specimen: Stool from Per Rectum Updated: 02/04/25 1541    Narrative:      The following orders were created for panel order Clostridioides difficile Toxin - Stool, Per Rectum.  Procedure                               Abnormality         Status                     ---------                               -----------         ------                     Clostridioides difficile...[[id number]]  Abnormal            Final result                 Please view results for these tests on the individual orders.    Clostridioides difficile Toxin, PCR - Stool, Per Rectum [[id number]]  (Abnormal) Collected: 02/04/25 1311    Specimen: Stool from Per Rectum Updated: 02/04/25 1541     Toxigenic C. difficile by PCR Positive     027 Toxin Presumptive Negative     Narrative:      DNA from a toxigenic strain of C.difficile has been detected. Antigen testing for the presence of free C.difficile toxin is currently in progress, to help determine the clinical significance of this PCR result.     Clostridioides difficile toxin Ag, Reflex - Stool, Per Rectum [[id number]]  (Normal) Collected: 02/04/25 1311    Specimen: Stool from Per Rectum Updated: 02/04/25 1553     C.diff Toxin Ag Negative    Narrative:      DNA from a toxigenic strain of C.difficile was detected, although the free toxin itself was not detected. These findings are consistent with C.difficile colonization and may not reflect actual C.difficile infection. Clinical correlation needed.    POC Glucose Once [[id number]]  (Abnormal) Collected: 02/04/25 1727    Specimen: Blood Updated: 02/04/25 1808     Glucose 240 mg/dL      Comment: Serial Number: [id number]:  [id number]       POC Glucose Once [[id number]]  (Abnormal) Collected: 02/04/25 2106    Specimen: Blood Updated: 02/04/25 2108     Glucose 253 mg/dL      Comment: Serial Number: [id number]:  [id number]                Imaging:    CT Head Without Contrast    Result Date: 2/4/2025  CT HEAD WO CONTRAST HISTORY: Mental status change and headache. TECHNIQUE: Axial unenhanced head CT with multiplanar reformats. Radiation dose reduction techniques included automated exposure control or exposure modulation based on body size. COMPARISON: 12/8/2015 FINDINGS: Ventricular size and configuration are normal. No acute infarct or hemorrhage is identified. There are no masses. There is no skull fracture. There is atrophy with chronic small vessel ischemic disease in the white matter. Old left thalamic and basal ganglia lacunar infarcts are noted. Atherosclerotic calcifications are noted in the distal vertebral arteries, the basilar artery, and the carotid siphons.     Senescent changes without acute abnormality. Electronically Signed: Durga Zurita MD  2/4/2025 4:39 AM  EST  Workstation ID: [id number]    XR Chest 1 View    Result Date: 2/4/2025  XR CHEST 1 VW Date of Exam: 2/4/2025 2:00 AM EST Indication: Weak/Dizzy/AMS triage protocol Comparison: 1/7/2025 Findings: Cardiac and mediastinal contours are within normal limits. COPD is present. Granulomatous calcification noted at the right lung base. There is chronic scarring in both lung bases. No acute infiltrates or pneumothorax. Pulmonary vascularity is normal.     COPD with chronic scarring in both lung bases. No acute findings in the chest. Electronically Signed: Durga Zurita MD  2/4/2025 2:10 AM EST  Workstation ID: [id number]       Differential Diagnosis and Discussion:    Altered Mental Status: Based on the patient's signs and symptoms, differential diagnosis includes but is not limited to meningitis, stroke, sepsis, subarachnoid hemorrhage, intracranial bleeding, encephalitis, and metabolic encephalopathy.  Weakness: Based on the patient's history, signs, and symptoms, the diffential diagnosis includes but is not limited to meningitis, stroke, sepsis, subarachnoid hemorrhage, intracranial bleeding, encephalitis, acute uti, dehydration, MS, myasthenia gravis, Guillan Curtis, migraine variant, neuromuscular disorders vertigo, electrolyte imbalance, and metabolic disorders.    PROCEDURES:    Labs were collected in the emergency department and all labs were reviewed and interpreted by me.  X-ray were performed in the emergency department and all X-ray impressions were independently interpreted by me.  An EKG was performed and the EKG was interpreted by me.  CT scan was performed in the emergency department and the CT scan radiology impression was interpreted by me.    ECG 12 Lead ED Triage Standing Order; Weak / Dizzy / AMS   Final Result   HEART IZQQ=936  bpm   RR Lwrnoveu=110  ms   SC Enrlzxnn=178  ms   P Horizontal Axis=-45  deg   P Front Axis=88  deg   QRSD Interval=79  ms   QT Sbdicxhk=903  ms   ILuQ=553  ms   QRS Axis=39   deg   T Wave Axis=29  deg   - OTHERWISE NORMAL ECG -   Sinus tachycardia   Minimal ST elevation, lateral leads   When compared with ECG of 07-Jan-2025 22:19:05,   Significant repolarization change   Electronically Signed By: Scot Hill ([facility]) 2025-02-04 18:41:14   Date and Time of Study:2025-02-04 01:58:44          Procedures    MDM  Number of Diagnoses or Management Options  Diagnosis management comments: On arrival patient is confused.  Per report patient fell out of bed.  Patient has some skin breakdown over his buttocks and groin.  UA concerning for possible urinary tract infection.  CT of his head did not show acute findings.  Discussed patient with hospitalist and will admit for further care.       Amount and/or Complexity of Data Reviewed  Clinical lab tests: reviewed  Tests in the radiology section of CPT®: reviewed  Review and summarize past medical records: yes  Independent visualization of images, tracings, or specimens: yes    Risk of Complications, Morbidity, and/or Mortality  Presenting problems: moderate  Management options: moderate             Total Critical Care time of 40 minutes. Total critical care time documented does not include time spent on separately billed procedures for services of nurses or physician assistants. I personally saw and examined the patient. I have reviewed all diagnostic interpretations and treatment plans as written. I was present for the key portions of any procedures performed and the inclusive time noted in any critical care statement. Critical care time includes patient management by me, time spent at the patients bedside,  time to review lab and imaging results, discussing patient care, documentation in the medical record, and time spent with family or caregiver.          Patient Care Considerations:    SEPSIS was considered but is NOT present in the emergency department as SIRS criteria is not present.      Consultants/Shared Management Plan:    Hospitalist: I have  discussed the case with Dr Melgoza who agrees to accept the patient for admission.    Social Determinants of Health:    Patient is unable to carry out activities of daily life. Escalation of care is necessary.       Disposition and Care Coordination:    Admit:   Through independent evaluation of the patient's history, physical, and imperical data, the patient meets criteria for inpatient admission to the hospital.        Final diagnoses:   Weakness generalized   Urinary tract infection without hematuria, site unspecified   Fall, initial encounter        ED Disposition       ED Disposition   Decision to Admit    Condition   --    Comment   Level of Care: Remote Telemetry [26]   Diagnosis: Generalized weakness [432941]   Admitting Physician: MORGAN MELGOZA [[id number]]   Certification: I Certify That Inpatient Hospital Services Are Medically Necessary For Greater Than 2 Midnights                 This medical record created using voice recognition software.             Nita Dukes MD  02/04/25 6616

## 2025-02-04 NOTE — PLAN OF CARE
Goal Outcome Evaluation:  Plan of Care Reviewed With: patient        Progress: no change  Outcome Evaluation: c.diff results came with positive DNA and nagetive for TOXIN.infection prevention notified.will continue to monitor.

## 2025-02-04 NOTE — OUTREACH NOTE
"AMBULATORY CASE MANAGEMENT NOTE    Names and Relationships of Patient/Support Persons: Contact: HOSSEINFERDINAND; Relationship: Emergency Contact -     [facility] Interim Update    Received call from patients spouse wanting me to know that he is back in the hospital. She expresses frustration over the situation because patient continues to sign himself out of facilities AMA, and then goes back in shortly after. She states that he fell while she was trying to help him in to the bed and hit his face and chest. She reports that he said he was \"scared\" this time because he is in such bad health. She is hoping this will be a wake up call for him and that he will remain in the hospital and rehab facility until he is medically cleared to leave.    Patients spouse reports that police came out with EMS last night and are apparently conducting some sort of investigation as they spoke to patient, spouse, and patients sister.     Spouse states that patient has urinated and had bowel movements on blankets, but she is unable to wash them because their water is off. She is working with Pavithra DE LEON on this. She has not heard back from Pavithra on whether or not [employer] will pay over $500 for their water to be turned back on.    She states that her internet has also been turned off and she is working to get that turned back on.     Spouse would like for me to start working on a new inpatient facility to get patient in to, as he does not wish to return to [facility] [place]. I reminded her that while he is inpatient, the inpatient case management team will work to get him rehab placement if the hospital doctors order this. I advised her to let the CM or nurse know that he does not wish to return to [facility] [place] upon discharge. She verbalizes understanding, stating she will talk to the nurse when she goes up to the hospital tonight or call them if she is unable to go to the hospital.     I reiterated to patient spouse to encourage " patient to stay to get full treatment in hospital and rehab so that he has a better chance at full recovery and remaining at home.        Education Documentation  No documentation found.        YANDEL GONZALEZ  Ambulatory Case Management    2/4/2025, 16:02 EST

## 2025-02-05 LAB
ANION GAP SERPL CALCULATED.3IONS-SCNC: 8.9 MMOL/L (ref 5–15)
BACTERIA SPEC AEROBE CULT: NO GROWTH
BUN SERPL-MCNC: 20 MG/DL (ref 8–23)
BUN/CREAT SERPL: 20.4 (ref 7–25)
CALCIUM SPEC-SCNC: 8.9 MG/DL (ref 8.6–10.5)
CHLORIDE SERPL-SCNC: 104 MMOL/L (ref 98–107)
CO2 SERPL-SCNC: 25.1 MMOL/L (ref 22–29)
CREAT SERPL-MCNC: 0.98 MG/DL (ref 0.76–1.27)
EGFRCR SERPLBLD CKD-EPI 2021: 84.5 ML/MIN/1.73
GLUCOSE BLDC GLUCOMTR-MCNC: 172 MG/DL (ref 70–99)
GLUCOSE BLDC GLUCOMTR-MCNC: 200 MG/DL (ref 70–99)
GLUCOSE BLDC GLUCOMTR-MCNC: 245 MG/DL (ref 70–99)
GLUCOSE BLDC GLUCOMTR-MCNC: 268 MG/DL (ref 70–99)
GLUCOSE SERPL-MCNC: 181 MG/DL (ref 65–99)
MAGNESIUM SERPL-MCNC: 2.1 MG/DL (ref 1.6–2.4)
POTASSIUM SERPL-SCNC: 4 MMOL/L (ref 3.5–5.2)
SODIUM SERPL-SCNC: 138 MMOL/L (ref 136–145)
WHOLE BLOOD HOLD SPECIMEN: NORMAL

## 2025-02-05 PROCEDURE — 94799 UNLISTED PULMONARY SVC/PX: CPT

## 2025-02-05 PROCEDURE — 99232 SBSQ HOSP IP/OBS MODERATE 35: CPT | Performed by: INTERNAL MEDICINE

## 2025-02-05 PROCEDURE — 36415 COLL VENOUS BLD VENIPUNCTURE: CPT | Performed by: FAMILY MEDICINE

## 2025-02-05 PROCEDURE — 25010000002 PIPERACILLIN SOD-TAZOBACTAM PER 1 G: Performed by: INTERNAL MEDICINE

## 2025-02-05 PROCEDURE — 83735 ASSAY OF MAGNESIUM: CPT | Performed by: INTERNAL MEDICINE

## 2025-02-05 PROCEDURE — 82948 REAGENT STRIP/BLOOD GLUCOSE: CPT | Performed by: FAMILY MEDICINE

## 2025-02-05 PROCEDURE — 94664 DEMO&/EVAL PT USE INHALER: CPT

## 2025-02-05 PROCEDURE — 97161 PT EVAL LOW COMPLEX 20 MIN: CPT

## 2025-02-05 PROCEDURE — 94640 AIRWAY INHALATION TREATMENT: CPT

## 2025-02-05 PROCEDURE — 80048 BASIC METABOLIC PNL TOTAL CA: CPT | Performed by: FAMILY MEDICINE

## 2025-02-05 PROCEDURE — 82948 REAGENT STRIP/BLOOD GLUCOSE: CPT

## 2025-02-05 PROCEDURE — 25010000002 HEPARIN (PORCINE) PER 1000 UNITS: Performed by: FAMILY MEDICINE

## 2025-02-05 PROCEDURE — 63710000001 INSULIN LISPRO (HUMAN) PER 5 UNITS: Performed by: FAMILY MEDICINE

## 2025-02-05 PROCEDURE — 25010000002 METHYLPREDNISOLONE PER 40 MG: Performed by: FAMILY MEDICINE

## 2025-02-05 RX ORDER — MINERAL OIL/HYDROPHIL PETROLAT
1 OINTMENT (GRAM) TOPICAL
Status: DISCONTINUED | OUTPATIENT
Start: 2025-02-05 | End: 2025-02-07 | Stop reason: HOSPADM

## 2025-02-05 RX ORDER — POLYETHYLENE GLYCOL 3350 17 G
2 POWDER IN PACKET (EA) ORAL
Status: DISCONTINUED | OUTPATIENT
Start: 2025-02-05 | End: 2025-02-07 | Stop reason: HOSPADM

## 2025-02-05 RX ADMIN — HEPARIN SODIUM 5000 UNITS: 5000 INJECTION INTRAVENOUS; SUBCUTANEOUS at 08:10

## 2025-02-05 RX ADMIN — EMPAGLIFLOZIN 25 MG: 25 TABLET, FILM COATED ORAL at 08:09

## 2025-02-05 RX ADMIN — WHITE PETROLATUM 1 APPLICATION: 1.75 OINTMENT TOPICAL at 13:58

## 2025-02-05 RX ADMIN — Medication 10 ML: at 20:54

## 2025-02-05 RX ADMIN — IPRATROPIUM BROMIDE AND ALBUTEROL SULFATE 3 ML: .5; 3 SOLUTION RESPIRATORY (INHALATION) at 20:12

## 2025-02-05 RX ADMIN — ATORVASTATIN CALCIUM 20 MG: 10 TABLET, FILM COATED ORAL at 20:53

## 2025-02-05 RX ADMIN — BUDESONIDE AND FORMOTEROL FUMARATE DIHYDRATE 2 PUFF: 160; 4.5 AEROSOL RESPIRATORY (INHALATION) at 20:12

## 2025-02-05 RX ADMIN — INSULIN LISPRO 2 UNITS: 100 INJECTION, SOLUTION INTRAVENOUS; SUBCUTANEOUS at 08:10

## 2025-02-05 RX ADMIN — WATER 40 MG: 1 INJECTION INTRAMUSCULAR; INTRAVENOUS; SUBCUTANEOUS at 08:10

## 2025-02-05 RX ADMIN — INSULIN LISPRO 4 UNITS: 100 INJECTION, SOLUTION INTRAVENOUS; SUBCUTANEOUS at 17:46

## 2025-02-05 RX ADMIN — FAMOTIDINE 40 MG: 20 TABLET ORAL at 08:10

## 2025-02-05 RX ADMIN — HEPARIN SODIUM 5000 UNITS: 5000 INJECTION INTRAVENOUS; SUBCUTANEOUS at 20:53

## 2025-02-05 RX ADMIN — GUAIFENESIN 600 MG: 600 TABLET ORAL at 08:10

## 2025-02-05 RX ADMIN — PIPERACILLIN AND TAZOBACTAM 3.38 G: 3; .375 INJECTION, POWDER, FOR SOLUTION INTRAVENOUS at 05:43

## 2025-02-05 RX ADMIN — IPRATROPIUM BROMIDE AND ALBUTEROL SULFATE 3 ML: .5; 3 SOLUTION RESPIRATORY (INHALATION) at 15:25

## 2025-02-05 RX ADMIN — HYDROCORTISONE 1 APPLICATION: 1 OINTMENT TOPICAL at 17:47

## 2025-02-05 RX ADMIN — INSULIN LISPRO 4 UNITS: 100 INJECTION, SOLUTION INTRAVENOUS; SUBCUTANEOUS at 11:55

## 2025-02-05 RX ADMIN — INSULIN LISPRO 6 UNITS: 100 INJECTION, SOLUTION INTRAVENOUS; SUBCUTANEOUS at 20:53

## 2025-02-05 RX ADMIN — ASPIRIN 81 MG: 81 TABLET, CHEWABLE ORAL at 08:10

## 2025-02-05 RX ADMIN — WHITE PETROLATUM 1 APPLICATION: 1.75 OINTMENT TOPICAL at 20:54

## 2025-02-05 RX ADMIN — Medication 10 ML: at 08:11

## 2025-02-05 RX ADMIN — PIPERACILLIN AND TAZOBACTAM 3.38 G: 3; .375 INJECTION, POWDER, FOR SOLUTION INTRAVENOUS at 13:58

## 2025-02-05 NOTE — PROGRESS NOTES
[facility]   Hospitalist Progress Note  Date: 2025  Patient Name: Brian Mehta  : 1955  MRN: [id number]  Date of admission: 2/3/2025  Room/Bed: 314/1      Subjective   Subjective     Chief Complaint: weakness     Summary:Brian Mehta is a 69 y.o. male  with past medical history of hypertension, hyperlipidemia, prior CVA with right-sided deficits, COPD, CHF, and GERD presented to the ED via EMS for evaluation of weakness.  Patient stated for the last few days he has been significantly weak where he could barely get out of bed.  Due to his worsening weakness patient called EMS to take him to the ED for further evaluation.  Of note patient is very unkempt laying in his own stool and urine and his house was filthy when EMS arrived.  In the ED patient was tachycardic on arrival with remaining vitals being within normal limits.  UA showed that he had a UTI with some dehydration with remaining labs being relatively unremarkable given his chronic conditions including a normal lactic acid and negative troponin.  CT of the head was negative for any acute findings and status post COPD findings with nothing acute.  Per notes it appears that patient was recently at a rehab facility however signed himself out around  and since has been at home unable to care for himself and dilapidated condition with no utilities as well as with rats, insects and bedbugs in his home.    Interval Followup:  Patient is weak  No diarrhea just one episode on . C. Diff with positive DNA and negative for toxin   Patient is tearful and upset regarding his discharge plan and home situation.  Patient also has a dog that he is concerned about  Patient's urine culture has shown no growth.  Vitals remained stable    Review of Systems    All systems reviewed and negative except for what is outlined above.      Objective   Objective     Vitals:   Temp:  [97.2 °F (36.2 °C)-99.1 °F (37.3 °C)] 97.2 °F (36.2 °C)  Heart Rate:  []  81  Resp:  [16-18] 18  BP: (125-137)/(62-87) 136/62    Physical Exam   General: Awake, alert, NAD.  Resting in bed in no acute distress  HENT: NCAT, MMM  Eyes: pupils equal, no scleral icterus  Cardiovascular: RRR, no murmurs   Pulmonary: CTA bilaterally; no wheezes; no conversational dyspnea  Gastrointestinal: S/ND/NT, +BS  Musculoskeletal: No gross deformities  Skin: No jaundice, no rash on exposed skin appreciated  Neuro: CN II through XII grossly intact; speech clear; no tremor  Psych: Mood and affect appropriate  : No Dill catheter; no suprapubic tenderness    Result Review    Result Review:  I have personally reviewed these results:  [x]  Laboratory      Lab 02/04/25  0405 02/04/25 0225   WBC  --  8.65   HEMOGLOBIN  --  14.2   HEMATOCRIT  --  43.0   PLATELETS  --  242   NEUTROS ABS  --  4.40   IMMATURE GRANS (ABS)  --  0.02   LYMPHS ABS  --  3.32*   MONOS ABS  --  0.80   EOS ABS  --  0.07   MCV  --  78.6*   LACTATE 1.1  --          Lab 02/05/25  0416 02/04/25 0225   SODIUM 138 133*   POTASSIUM 4.0 4.1   CHLORIDE 104 99   CO2 25.1 23.6   ANION GAP 8.9 10.4   BUN 20 12   CREATININE 0.98 0.84   EGFR 84.5 95.6   GLUCOSE 181* 221*   CALCIUM 8.9 9.1   MAGNESIUM 2.1 1.6         Lab 02/04/25 0225   TOTAL PROTEIN 7.5   ALBUMIN 3.2*   GLOBULIN 4.3   ALT (SGPT) 8   AST (SGOT) 13   BILIRUBIN 0.4   ALK PHOS 104         Lab 02/04/25  0343 02/04/25 0225   HSTROP T 11 12                 Brief Urine Lab Results  (Last result in the past 365 days)        Color   Clarity   Blood   Leuk Est   Nitrite   Protein   CREAT   Urine HCG        02/04/25 0417 Dark Yellow   Clear   Trace   Moderate (2+)   Negative   30 mg/dL (1+)                 [x]  Microbiology   Microbiology Results (last 10 days)       Procedure Component Value - Date/Time    Clostridioides difficile Toxin - Stool, Per Rectum [[id number]]  (Abnormal) Collected: 02/04/25 1311    Lab Status: Final result Specimen: Stool from Per Rectum Updated: 02/04/25 7171     Narrative:      The following orders were created for panel order Clostridioides difficile Toxin - Stool, Per Rectum.  Procedure                               Abnormality         Status                     ---------                               -----------         ------                     Clostridioides difficile...[[id number]]  Abnormal            Final result                 Please view results for these tests on the individual orders.    Clostridioides difficile Toxin, PCR - Stool, Per Rectum [[id number]]  (Abnormal) Collected: 02/04/25 1311    Lab Status: Final result Specimen: Stool from Per Rectum Updated: 02/04/25 1541     Toxigenic C. difficile by PCR Positive     027 Toxin Presumptive Negative    Narrative:      DNA from a toxigenic strain of C.difficile has been detected. Antigen testing for the presence of free C.difficile toxin is currently in progress, to help determine the clinical significance of this PCR result.     Clostridioides difficile toxin Ag, Reflex - Stool, Per Rectum [[id number]]  (Normal) Collected: 02/04/25 1311    Lab Status: Final result Specimen: Stool from Per Rectum Updated: 02/04/25 1553     C.diff Toxin Ag Negative    Narrative:      DNA from a toxigenic strain of C.difficile was detected, although the free toxin itself was not detected. These findings are consistent with C.difficile colonization and may not reflect actual C.difficile infection. Clinical correlation needed.    Blood Culture - Blood, Arm, Left [[id number]]  (Normal) Collected: 02/04/25 0405    Lab Status: Preliminary result Specimen: Blood from Arm, Left Updated: 02/05/25 0415     Blood Culture No growth at 24 hours    Blood Culture - Blood, Arm, Right [[id number]]  (Normal) Collected: 02/04/25 0405    Lab Status: Preliminary result Specimen: Blood from Arm, Right Updated: 02/05/25 0415     Blood Culture No growth at 24 hours          [x]  Radiology  CT Head Without Contrast    Result Date: 2/4/2025  Senescent changes  without acute abnormality. Electronically Signed: Durga Zurita MD  2/4/2025 4:39 AM EST  Workstation ID: [id number]    XR Chest 1 View    Result Date: 2/4/2025  COPD with chronic scarring in both lung bases. No acute findings in the chest. Electronically Signed: Durga Zurita MD  2/4/2025 2:10 AM EST  Workstation ID: [id number]   []  EKG/Telemetry   []  Cardiology/Vascular   []  Pathology  []  Old records  []  Other:    Assessment & Plan   Assessment / Plan     Assessment:  COPD without exacerbation  Diabetes mellitus type 2  Recent discharge from the hospital on January 17 after he was hospitalized for urinary tract infection and bacteremia  Hypertension  History of CVA with right sided deficits  Systolic congestive heart failure  Generalized weakness and debility  Poor social situation  Right 10th posterior rib fracture  C. Diff toxin negative but DNA is positive. 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 Patient has declined to have additional workup        Plan:  Patient remains admitted to the medicine service  At this time we will discontinue antibiotics as his cultures have all been negative  PT/OT as patient will need placement as he does not have a safe social situation  At this time we will discontinue steroids as patient is not having any wheezing or COPD exacerbation  Resume patient's home medication  Continue sliding scale insulin for diabetes  Discussed plan with patient  APS is also involved        Discussed with RN.    VTE Prophylaxis:  Pharmacologic VTE prophylaxis orders are present.        CODE STATUS:   Level Of Support Discussed With: Patient  Code Status (Patient has no pulse and is not breathing): CPR (Attempt to Resuscitate)  Medical Interventions (Patient has pulse or is breathing): Full Support      Electronically signed by Meliton Monroy DO, 2/5/2025, 10:32 EST.

## 2025-02-05 NOTE — PLAN OF CARE
Goal Outcome Evaluation:   Vitals stable overnight. Patient incontinent of urine several times requiring bed changes, no diarrhea. Did have intermittent confusion throughout the night. Wound care consult ordered for patients groin area, powder and moisture barrier cream placed on areas during incontinence care. CH RN

## 2025-02-05 NOTE — THERAPY EVALUATION
Acute Care - Physical Therapy Initial Evaluation   [place]     Patient Name: Brian Mehta  : 1955  MRN: [id number]  Today's Date: 2025      Visit Dx:     ICD-10-CM ICD-9-CM   1. Weakness generalized  R53.1 780.79   2. Urinary tract infection without hematuria, site unspecified  N39.0 599.0   3. Fall, initial encounter  W19.XXXA E888.9   4. Difficulty walking  R26.2 719.7     Patient Active Problem List   Diagnosis    CVA (cerebral vascular accident)    Essential hypertension    High cholesterol    Hip pain    Vitamin B12 deficiency    Kidney disorder    Chronic pain of both knees    Chronic HFrEF (heart failure with reduced ejection fraction)    LVH (left ventricular hypertrophy)    COPD (chronic obstructive pulmonary disease)    DM2 (diabetes mellitus, type 2)    Urinary tract infection without hematuria    Abnormal nuclear stress test    UTI (urinary tract infection)    Fall    Failure to thrive in adult    Nephrolithiasis    Bladder stone    Retained ureteral stent    Acute UTI    Bacteremia    Generalized weakness     Past Medical History:   Diagnosis Date    CHF (congestive heart failure)     SEE'S DR [place] NO CURRENT S/S    COPD (chronic obstructive pulmonary disease)     INHALER    Diabetes mellitus     DOESN'T CHECK BG OFTEN AT HOME    Hyperlipidemia     Hypertension     Sepsis 2023    Stroke 2017    RIGHT SIDE WEAKNESS     Past Surgical History:   Procedure Laterality Date    APPENDECTOMY      CYSTOLITHALOPAXY PERCUTANEOUS Left 2025    Procedure: CYSTOLITHOLAPAXY, left ureteroscopy with laser basket stone extraction and left ureteral stent exchange.  Looking bladder, laser stone off stent exchange retained stent if possible;  Surgeon: Ector Kulkarni MD;  Location: [facility] MAIN OR;  Service: Urology;  Laterality: Left;    EYE SURGERY Bilateral     MISTY CATARACT     PT Assessment (Last 12 Hours)       PT Evaluation and Treatment       Row Name 25 1800          Physical Therapy  Time and Intention    Subjective Information complains of;weakness;fatigue  -CS     Document Type evaluation  -CS     Mode of Treatment individual therapy;physical therapy  -CS     Patient Effort good  -CS     Symptoms Noted During/After Treatment none  -CS       Row Name 02/05/25 1800          General Information    Patient Profile Reviewed yes  -CS     Patient Observations alert;cooperative;agree to therapy  -CS     Prior Level of Function independent:;bed mobility;min assist:;gait;transfer;ADL's  wife helps with all mobility and ADLs; pt has to take TACK for transportation  -CS     Equipment Currently Used at Home walker, rolling;wheelchair  pt reports using rolling walker when he can but at times has to use the wheelchair; only sponge bathes currently  -CS     Existing Precautions/Restrictions fall  -CS     Limitations/Impairments safety/cognitive  -CS     Barriers to Rehab environmental barriers  house with rodents and bed bugs; pt reports furnace recently quit working as well  -CS       Row Name 02/05/25 1800          Living Environment    Current Living Arrangements home  -CS     Home Accessibility stairs to enter home;stairs within home  -CS     People in Home spouse  -CS     Primary Care Provided by self  -CS       Row Name 02/05/25 1800          Home Main Entrance    Number of Stairs, Main Entrance three  -CS     Stair Railings, Main Entrance railings safe and in good condition  -CS       Row Name 02/05/25 1800          Stairs Within Home, Primary    Number of Stairs, Within Home, Primary none  -CS       Row Name 02/05/25 1800          Pain    Pretreatment Pain Rating 3/10  -CS     Posttreatment Pain Rating 3/10  -CS     Pain Side/Orientation generalized  -CS       Row Name 02/05/25 1800          Cognition    Orientation Status (Cognition) oriented to;person;place  -CS       Row Name 02/05/25 1800          Range of Motion (ROM)    Range of Motion left lower extremity ROM detail;right lower extremity ROM  detail  -CS     Left Lower Extremity (ROM) left LE ROM is WFL  -CS     Right Lower Extremity (ROM) --  Limited active range of motion due to previous stroke  -       Row Name 02/05/25 1800          Strength Comprehensive (MMT)    General Manual Muscle Testing (MMT) Assessment lower extremity strength deficits identified  -CS     Comment, General Manual Muscle Testing (MMT) Assessment Left lower extremity assessed at 3+/5 ; right lower extremity assessed at 3 -/5  -       Row Name 02/05/25 1800          Bed Mobility    Bed Mobility supine-sit-supine  -CS     Supine-Sit-Supine [place] (Bed Mobility) verbal cues;moderate assist (50% patient effort);maximum assist (25% patient effort)  -     Bed Mobility, Safety Issues decreased use of legs for bridging/pushing;decreased use of arms for pushing/pulling  -     Assistive Device (Bed Mobility) bed rails;head of bed elevated  -       Row Name 02/05/25 1800          Transfers    Comment, (Transfers) pt reporting increased stiffness and pain with movement and did not want to stand or move from EOB  -       Row Name 02/05/25 1800          Gait/Stairs (Locomotion)    [place] Level (Gait) not tested  -CS     Patient was able to Ambulate no, other medical factors prevent ambulation  -CS     Reason Patient was unable to Ambulate Excessive Weakness  -       Row Name 02/05/25 1800          Safety Issues/Impairments Affecting Functional Mobility    Impairments Affecting Function (Mobility) balance;strength;pain;coordination;endurance/activity tolerance;postural/trunk control  -       Row Name 02/05/25 1800          Balance    Balance Assessment sitting dynamic balance  -     Dynamic Sitting Balance verbal cues;minimal assist  -     Position, Sitting Balance sitting edge of bed  -       Row Name 02/05/25 1800          Plan of Care Review    Plan of Care Reviewed With patient  -CS     Progress no change  -CS     Outcome Evaluation Pt presents with  limitations that impede their ability to safely and independently transfer and ambulate. The skills of a therapist will be required to safely and effectively implement the following treatment plan to restore maximal level of function.  -       Row Name 02/05/25 1800          Positioning and Restraints    Pre-Treatment Position in bed  -CS     Post Treatment Position bed  -CS     In Bed supine;call light within reach;encouraged to call for assist;exit alarm on  -CS       Row Name 02/05/25 1800          Therapy Assessment/Plan (PT)    Rehab Potential (PT) good  -CS     Criteria for Skilled Interventions Met (PT) yes;skilled treatment is necessary  -CS     Therapy Frequency (PT) daily  -CS     Predicted Duration of Therapy Intervention (PT) 10 days  -CS     Problem List (PT) problems related to;balance;cognition;mobility;coordination;strength;pain  -CS     Activity Limitations Related to Problem List (PT) unable to ambulate safely;unable to transfer safely  -CS       Row Name 02/05/25 1800          PT Evaluation Complexity    History, PT Evaluation Complexity 1-2 personal factors and/or comorbidities  -CS     Examination of Body Systems (PT Eval Complexity) total of 4 or more elements  -CS     Clinical Presentation (PT Evaluation Complexity) stable  -CS     Clinical Decision Making (PT Evaluation Complexity) low complexity  -CS     Overall Complexity (PT Evaluation Complexity) low complexity  -       Row Name 02/05/25 1800          Therapy Plan Review/Discharge Plan (PT)    Therapy Plan Review (PT) evaluation/treatment results reviewed;patient  -       Row Name 02/05/25 1800          Physical Therapy Goals    Bed Mobility Goal Selection (PT) bed mobility, PT goal 1  -CS     Transfer Goal Selection (PT) transfer, PT goal 1  -CS     Gait Training Goal Selection (PT) gait training, PT goal 1  -       Row Name 02/05/25 1800          Bed Mobility Goal 1 (PT)    Activity/Assistive Device (Bed Mobility Goal 1, PT) bed  mobility activities, all  -CS     [place] Level/Cues Needed (Bed Mobility Goal 1, PT) contact guard required  -CS     Time Frame (Bed Mobility Goal 1, PT) long term goal (LTG);10 days  -CS       Row Name 02/05/25 1800          Transfer Goal 1 (PT)    Activity/Assistive Device (Transfer Goal 1, PT) sit-to-stand/stand-to-sit;bed-to-chair/chair-to-bed;walker, rolling  -CS     [place] Level/Cues Needed (Transfer Goal 1, PT) contact guard required  -CS     Time Frame (Transfer Goal 1, PT) long term goal (LTG);10 days  -CS       Row Name 02/05/25 1800          Gait Training Goal 1 (PT)    Activity/Assistive Device (Gait Training Goal 1, PT) gait (walking locomotion);assistive device use;walker, rolling  -CS     [place] Level (Gait Training Goal 1, PT) contact guard required  -CS     Distance (Gait Training Goal 1, PT) 50  -CS     Time Frame (Gait Training Goal 1, PT) long term goal (LTG);10 days  -CS               User Key  (r) = Recorded By, (t) = Taken By, (c) = Cosigned By      Initials Name Provider Type    CS Rola Montgomery, PT Physical Therapist                    Physical Therapy Education        No education to display                  PT Recommendation and Plan  Anticipated Discharge Disposition (PT): sub acute care setting  Planned Therapy Interventions (PT): balance training, bed mobility training, gait training, strengthening, transfer training, neuromuscular re-education  Therapy Frequency (PT): daily  Plan of Care Reviewed With: patient  Progress: no change  Outcome Evaluation: Pt presents with limitations that impede their ability to safely and independently transfer and ambulate. The skills of a therapist will be required to safely and effectively implement the following treatment plan to restore maximal level of function.   Outcome Measures       Row Name 02/05/25 1800             How much help from another person do you currently need...    Turning from your back to your side while in  flat bed without using bedrails? 2  -CS      Moving from lying on back to sitting on the side of a flat bed without bedrails? 2  -CS      Moving to and from a bed to a chair (including a wheelchair)? 2  -CS      Standing up from a chair using your arms (e.g., wheelchair, bedside chair)? 2  -CS      Climbing 3-5 steps with a railing? 1  -CS      To walk in hospital room? 2  -CS      AM-PAC 6 Clicks Score (PT) 11  -CS         Functional Assessment    Outcome Measure Options AM-PAC 6 Clicks Basic Mobility (PT)  -CS                User Key  (r) = Recorded By, (t) = Taken By, (c) = Cosigned By      Initials Name Provider Type    Rola Holloway PT Physical Therapist                     Time Calculation:    PT Charges       Row Name 02/05/25 1823             Time Calculation    PT Received On 02/05/25  -CS      PT Goal Re-Cert Due Date 02/14/25  -CS         Untimed Charges    PT Eval/Re-eval Minutes 32  -CS         Total Minutes    Untimed Charges Total Minutes 32  -CS       Total Minutes 32  -CS                User Key  (r) = Recorded By, (t) = Taken By, (c) = Cosigned By      Initials Name Provider Type    Rola Holloway, PT Physical Therapist                  Therapy Charges for Today       Code Description Service Date Service Provider Modifiers Qty    [id number] HC PT EVAL LOW COMPLEXITY 3 2/5/2025 Rola Montgomery, PT GP 1            PT G-Codes  Outcome Measure Options: AM-PAC 6 Clicks Basic Mobility (PT)  AM-PAC 6 Clicks Score (PT): 11    Rola Montgomery, PT  2/5/2025

## 2025-02-05 NOTE — PLAN OF CARE
Goal Outcome Evaluation:  Plan of Care Reviewed With: patient        Progress: improving  Outcome Evaluation: Patient to remain in C diff isolation until 48 hours free of diarrhea; last episode 2/4 @ 1300. Wound care nurse evaluated patient today and ordered appropriate skin care treatments. Patient turned Q2 to prevent breakdown. Incontinence care performed frequently. Patient able to use urinal once with assistance and once independently.

## 2025-02-05 NOTE — CONSULTS
"Nutrition Services    Patient Name: Brian Mehta  YOB: 1955  MRN: [id number]  Admission date: 2/3/2025      CLINICAL NUTRITION ASSESSMENT      Reason for Assessment  Identified at Risk by Screening Criteria      H&P:  Past Medical History:   Diagnosis Date    CHF (congestive heart failure)     SEE'S DR [place] NO CURRENT S/S    COPD (chronic obstructive pulmonary disease)     INHALER    Diabetes mellitus     DOESN'T CHECK BG OFTEN AT HOME    Hyperlipidemia     Hypertension     Sepsis 09/14/2023    Stroke 2017    RIGHT SIDE WEAKNESS        Current Problems:   Active Hospital Problems    Diagnosis     **Generalized weakness     UTI (urinary tract infection)     DM2 (diabetes mellitus, type 2)     COPD (chronic obstructive pulmonary disease)     Chronic HFrEF (heart failure with reduced ejection fraction)     Essential hypertension         Nutrition/Diet History         Narrative   Pt reported good appetite and intake. 100% of meals x 2 days. Agreeable to ONS (variety). Supplied pt with coupons for Ensure supplement. Wound care RN consult, pending; r/t areas of concern.  RD to follow per protocol. Diabetic diet with ONS BID.     Anthropometrics        Current Height, Weight Height: 190.5 cm (75\")  Weight: 94 kg (207 lb 3.7 oz)   Current BMI Body mass index is 25.9 kg/m².   BMI Classification Overweight   % %, IBW 89 kg    Adjusted Body Weight (ABW)    Weight Hx  Wt Readings from Last 30 Encounters:   02/04/25 0015 94 kg (207 lb 3.7 oz)   01/08/25 0505 94.1 kg (207 lb 7.3 oz)   10/27/24 0409 94.6 kg (208 lb 8.9 oz)   10/26/24 1834 98.8 kg (217 lb 13 oz)   08/19/24 0516 101 kg (221 lb 9 oz)   08/14/24 1323 100 kg (220 lb 14.4 oz)   07/08/24 1308 98.4 kg (217 lb)   06/17/24 1421 98.4 kg (217 lb)   03/11/24 0150 99.2 kg (218 lb 11.1 oz)   03/10/24 2305 98 kg (216 lb)   12/07/23 1314 98 kg (216 lb)   11/21/23 1136 110 kg (242 lb 4.6 oz)   09/13/23 2144 101 kg (222 lb 0.1 oz)   08/08/23 1351 93.4 kg (206 " lb)   07/18/23 1347 99.8 kg (220 lb)   02/21/19 0000 102 kg (225 lb)   07/17/18 0000 102 kg (225 lb)   03/20/18 0000 110 kg (243 lb 2 oz)          Wt Change Observation Wt stable     Estimated/Assessed Needs  Estimated Needs based on: Current Body Weight 94 g       Energy Requirements 22-25 kcal/kg    EST Needs (kcal/day) 1036-5661 kcal        Protein Requirements 1.2-1.3 g/kg   EST Daily Needs (g/day) 113-122 g       Fluid Requirements 20-25 ml/kg    Estimated Needs (mL/day) 1383-4864 mL     Labs/Medications         Pertinent Labs Reviewed.   Results from last 7 days   Lab Units 02/05/25 0416 02/04/25 0225   SODIUM mmol/L 138 133*   POTASSIUM mmol/L 4.0 4.1   CHLORIDE mmol/L 104 99   CO2 mmol/L 25.1 23.6   BUN mg/dL 20 12   CREATININE mg/dL 0.98 0.84   CALCIUM mg/dL 8.9 9.1   BILIRUBIN mg/dL  --  0.4   ALK PHOS U/L  --  104   ALT (SGPT) U/L  --  8   AST (SGOT) U/L  --  13   GLUCOSE mg/dL 181* 221*     Results from last 7 days   Lab Units 02/05/25 0416 02/04/25 0225   MAGNESIUM mg/dL 2.1 1.6   HEMOGLOBIN g/dL  --  14.2   HEMATOCRIT %  --  43.0     COVID19   Date Value Ref Range Status   08/19/2024 Detected (C) Not Detected - Ref. Range Final     Lab Results   Component Value Date    HGBA1C 8.80 (H) 01/07/2025         Pertinent Medications Reviewed.     Malnutrition Severity Assessment              Nutrition Diagnosis         Nutrition Dx Problem 1 Decreased nutrient needs related to cardiovascular and endocrine dysfunction as evidenced by need for carb controlled, heart healthy diet 2/2  DM, CHF, HTN/HLD.      Nutrition Intervention           Current Nutrition Orders & Evaluation of Intake       Current PO Diet Diet: Diabetic; Consistent Carbohydrate; Fluid Consistency: Thin (IDDSI 0)   Supplement No active supplement orders           Nutrition Intervention/Prescription        Boost OG BID  Carb controlled diet (monitor need for heart healthy restriction)        Medical Nutrition Therapy/Nutrition Education           Learner     Readiness Patient  Acceptance     Method     Response Explanation  Verbalizes understanding     Monitor/Evaluation        Monitor Per protocol, PO intake, Supplement intake, Pertinent labs     Nutrition Discharge Plan         Recommend to continue oral nutrition supplements on discharge.      Electronically signed by:  Leonila Sanabria RD  02/05/25 08:43 EST

## 2025-02-05 NOTE — PLAN OF CARE
Goal Outcome Evaluation:  Plan of Care Reviewed With: patient        Progress: no change  Outcome Evaluation: Pt presents with limitations that impede their ability to safely and independently transfer and ambulate. The skills of a therapist will be required to safely and effectively implement the following treatment plan to restore maximal level of function.    Anticipated Discharge Disposition (PT): sub acute care setting

## 2025-02-05 NOTE — NURSING NOTE
Call from wife for update on patient condition. Jumana, patient's wife, stated she did not want him to go back to [facility] at discharge. Requested wife keep her phone near her tomorrow for case management or  to get ahold of her. Jumana stated she could be reached at her cell number or the home phone number.

## 2025-02-05 NOTE — SIGNIFICANT NOTE
02/05/25 0951   Personal Safety   Personal Safety Comments APS report was made this date. Report #[id number]

## 2025-02-06 LAB
ANION GAP SERPL CALCULATED.3IONS-SCNC: 7.4 MMOL/L (ref 5–15)
BUN SERPL-MCNC: 18 MG/DL (ref 8–23)
BUN/CREAT SERPL: 18.2 (ref 7–25)
CALCIUM SPEC-SCNC: 8.5 MG/DL (ref 8.6–10.5)
CHLORIDE SERPL-SCNC: 104 MMOL/L (ref 98–107)
CO2 SERPL-SCNC: 24.6 MMOL/L (ref 22–29)
CREAT SERPL-MCNC: 0.99 MG/DL (ref 0.76–1.27)
DEPRECATED RDW RBC AUTO: 45.3 FL (ref 37–54)
EGFRCR SERPLBLD CKD-EPI 2021: 83.5 ML/MIN/1.73
ERYTHROCYTE [DISTWIDTH] IN BLOOD BY AUTOMATED COUNT: 15.9 % (ref 12.3–15.4)
GLUCOSE BLDC GLUCOMTR-MCNC: 132 MG/DL (ref 70–99)
GLUCOSE BLDC GLUCOMTR-MCNC: 167 MG/DL (ref 70–99)
GLUCOSE BLDC GLUCOMTR-MCNC: 262 MG/DL (ref 70–99)
GLUCOSE BLDC GLUCOMTR-MCNC: 308 MG/DL (ref 70–99)
GLUCOSE SERPL-MCNC: 131 MG/DL (ref 65–99)
HCT VFR BLD AUTO: 38.9 % (ref 37.5–51)
HGB BLD-MCNC: 12.3 G/DL (ref 13–17.7)
MAGNESIUM SERPL-MCNC: 2 MG/DL (ref 1.6–2.4)
MCH RBC QN AUTO: 25.3 PG (ref 26.6–33)
MCHC RBC AUTO-ENTMCNC: 31.6 G/DL (ref 31.5–35.7)
MCV RBC AUTO: 79.9 FL (ref 79–97)
PLATELET # BLD AUTO: 255 10*3/MM3 (ref 140–450)
PMV BLD AUTO: 9.5 FL (ref 6–12)
POTASSIUM SERPL-SCNC: 3.9 MMOL/L (ref 3.5–5.2)
RBC # BLD AUTO: 4.87 10*6/MM3 (ref 4.14–5.8)
SODIUM SERPL-SCNC: 136 MMOL/L (ref 136–145)
WBC NRBC COR # BLD AUTO: 5.73 10*3/MM3 (ref 3.4–10.8)

## 2025-02-06 PROCEDURE — 99232 SBSQ HOSP IP/OBS MODERATE 35: CPT | Performed by: INTERNAL MEDICINE

## 2025-02-06 PROCEDURE — 94799 UNLISTED PULMONARY SVC/PX: CPT

## 2025-02-06 PROCEDURE — 83735 ASSAY OF MAGNESIUM: CPT | Performed by: INTERNAL MEDICINE

## 2025-02-06 PROCEDURE — 63710000001 INSULIN LISPRO (HUMAN) PER 5 UNITS: Performed by: FAMILY MEDICINE

## 2025-02-06 PROCEDURE — 82948 REAGENT STRIP/BLOOD GLUCOSE: CPT

## 2025-02-06 PROCEDURE — 36415 COLL VENOUS BLD VENIPUNCTURE: CPT | Performed by: FAMILY MEDICINE

## 2025-02-06 PROCEDURE — 97165 OT EVAL LOW COMPLEX 30 MIN: CPT

## 2025-02-06 PROCEDURE — 82948 REAGENT STRIP/BLOOD GLUCOSE: CPT | Performed by: FAMILY MEDICINE

## 2025-02-06 PROCEDURE — 85027 COMPLETE CBC AUTOMATED: CPT | Performed by: FAMILY MEDICINE

## 2025-02-06 PROCEDURE — 80048 BASIC METABOLIC PNL TOTAL CA: CPT | Performed by: INTERNAL MEDICINE

## 2025-02-06 PROCEDURE — 25010000002 HEPARIN (PORCINE) PER 1000 UNITS: Performed by: FAMILY MEDICINE

## 2025-02-06 RX ORDER — ACETAMINOPHEN 325 MG/1
650 TABLET ORAL EVERY 6 HOURS PRN
Status: DISCONTINUED | OUTPATIENT
Start: 2025-02-06 | End: 2025-02-07 | Stop reason: HOSPADM

## 2025-02-06 RX ADMIN — WHITE PETROLATUM 1 APPLICATION: 1.75 OINTMENT TOPICAL at 05:50

## 2025-02-06 RX ADMIN — HYDROCORTISONE 1 APPLICATION: 1 OINTMENT TOPICAL at 17:57

## 2025-02-06 RX ADMIN — HEPARIN SODIUM 5000 UNITS: 5000 INJECTION INTRAVENOUS; SUBCUTANEOUS at 08:27

## 2025-02-06 RX ADMIN — Medication 10 ML: at 20:59

## 2025-02-06 RX ADMIN — HEPARIN SODIUM 5000 UNITS: 5000 INJECTION INTRAVENOUS; SUBCUTANEOUS at 20:59

## 2025-02-06 RX ADMIN — ATORVASTATIN CALCIUM 20 MG: 10 TABLET, FILM COATED ORAL at 20:59

## 2025-02-06 RX ADMIN — GUAIFENESIN 600 MG: 600 TABLET ORAL at 20:59

## 2025-02-06 RX ADMIN — SENNOSIDES AND DOCUSATE SODIUM 1 TABLET: 50; 8.6 TABLET ORAL at 08:27

## 2025-02-06 RX ADMIN — HYDROCORTISONE 1 APPLICATION: 1 OINTMENT TOPICAL at 10:01

## 2025-02-06 RX ADMIN — GUAIFENESIN 600 MG: 600 TABLET ORAL at 08:27

## 2025-02-06 RX ADMIN — WHITE PETROLATUM 1 APPLICATION: 1.75 OINTMENT TOPICAL at 21:00

## 2025-02-06 RX ADMIN — NICOTINE 1 PATCH: 21 PATCH, EXTENDED RELEASE TRANSDERMAL at 08:25

## 2025-02-06 RX ADMIN — HYDROCORTISONE 1 APPLICATION: 1 OINTMENT TOPICAL at 01:28

## 2025-02-06 RX ADMIN — IPRATROPIUM BROMIDE AND ALBUTEROL SULFATE 3 ML: .5; 3 SOLUTION RESPIRATORY (INHALATION) at 01:11

## 2025-02-06 RX ADMIN — FAMOTIDINE 40 MG: 20 TABLET ORAL at 08:27

## 2025-02-06 RX ADMIN — INSULIN LISPRO 4 UNITS: 100 INJECTION, SOLUTION INTRAVENOUS; SUBCUTANEOUS at 16:37

## 2025-02-06 RX ADMIN — ACETAMINOPHEN 650 MG: 325 TABLET ORAL at 11:00

## 2025-02-06 RX ADMIN — Medication 10 ML: at 08:27

## 2025-02-06 RX ADMIN — INSULIN LISPRO 2 UNITS: 100 INJECTION, SOLUTION INTRAVENOUS; SUBCUTANEOUS at 11:20

## 2025-02-06 RX ADMIN — EMPAGLIFLOZIN 25 MG: 25 TABLET, FILM COATED ORAL at 08:28

## 2025-02-06 RX ADMIN — WHITE PETROLATUM 1 APPLICATION: 1.75 OINTMENT TOPICAL at 15:02

## 2025-02-06 RX ADMIN — INSULIN LISPRO 7 UNITS: 100 INJECTION, SOLUTION INTRAVENOUS; SUBCUTANEOUS at 20:59

## 2025-02-06 RX ADMIN — ASPIRIN 81 MG: 81 TABLET, CHEWABLE ORAL at 08:27

## 2025-02-06 NOTE — PROGRESS NOTES
[facility]   Hospitalist Progress Note  Date: 2025  Patient Name: Brian Mehta  : 1955  MRN: [id number]  Date of admission: 2/3/2025  Room/Bed: 314/1      Subjective   Subjective     Chief Complaint: weakness     Summary:Brian Mehta is a 69 y.o. male  with past medical history of hypertension, hyperlipidemia, prior CVA with right-sided deficits, COPD, CHF, and GERD presented to the ED via EMS for evaluation of weakness.  Patient stated for the last few days he has been significantly weak where he could barely get out of bed.  Due to his worsening weakness patient called EMS to take him to the ED for further evaluation.  Of note patient is very unkempt laying in his own stool and urine and his house was filthy when EMS arrived.  In the ED patient was tachycardic on arrival with remaining vitals being within normal limits.  UA showed that he had a UTI with some dehydration with remaining labs being relatively unremarkable given his chronic conditions including a normal lactic acid and negative troponin.  CT of the head was negative for any acute findings and status post COPD findings with nothing acute.  Per notes it appears that patient was recently at a rehab facility however signed himself out around  and since has been at home unable to care for himself and dilapidated condition with no utilities as well as with rats, insects and bedbugs in his home.    Interval Followup:  Patient is weak  No diarrhea just one episode on . C. Diff with positive DNA and negative for toxin   Patient is tearful and upset regarding his discharge plan and home situation.  Patient also has a dog that he is concerned about  Patient's urine culture has shown no growth.  Vitals remained stable    Review of Systems    All systems reviewed and negative except for what is outlined above.      Objective   Objective     Vitals:   Temp:  [97.7 °F (36.5 °C)-98.8 °F (37.1 °C)] 97.7 °F (36.5 °C)  Heart Rate:  [78-99]  78  Resp:  [16-18] 16  BP: (127-155)/(63-88) 154/79    Physical Exam   General: Awake, alert, NAD.  Resting in bed in no acute distress  HENT: NCAT, MMM  Eyes: pupils equal, no scleral icterus  Cardiovascular: RRR, no murmurs   Pulmonary: CTA bilaterally; no wheezes; no conversational dyspnea  Gastrointestinal: S/ND/NT, +BS  Musculoskeletal: No gross deformities  Skin: No jaundice, no rash on exposed skin appreciated  Neuro: CN II through XII grossly intact; speech clear; no tremor  Psych: Mood and affect appropriate  : No Dill catheter; no suprapubic tenderness    Result Review    Result Review:  I have personally reviewed these results:  [x]  Laboratory      Lab 02/06/25  0509 02/04/25 0405 02/04/25 0225   WBC 5.73  --  8.65   HEMOGLOBIN 12.3*  --  14.2   HEMATOCRIT 38.9  --  43.0   PLATELETS 255  --  242   NEUTROS ABS  --   --  4.40   IMMATURE GRANS (ABS)  --   --  0.02   LYMPHS ABS  --   --  3.32*   MONOS ABS  --   --  0.80   EOS ABS  --   --  0.07   MCV 79.9  --  78.6*   LACTATE  --  1.1  --          Lab 02/06/25  0509 02/05/25  0416 02/04/25 0225   SODIUM 136 138 133*   POTASSIUM 3.9 4.0 4.1   CHLORIDE 104 104 99   CO2 24.6 25.1 23.6   ANION GAP 7.4 8.9 10.4   BUN 18 20 12   CREATININE 0.99 0.98 0.84   EGFR 83.5 84.5 95.6   GLUCOSE 131* 181* 221*   CALCIUM 8.5* 8.9 9.1   MAGNESIUM 2.0 2.1 1.6         Lab 02/04/25 0225   TOTAL PROTEIN 7.5   ALBUMIN 3.2*   GLOBULIN 4.3   ALT (SGPT) 8   AST (SGOT) 13   BILIRUBIN 0.4   ALK PHOS 104         Lab 02/04/25  0343 02/04/25 0225   HSTROP T 11 12                 Brief Urine Lab Results  (Last result in the past 365 days)        Color   Clarity   Blood   Leuk Est   Nitrite   Protein   CREAT   Urine HCG        02/04/25 0417 Dark Yellow   Clear   Trace   Moderate (2+)   Negative   30 mg/dL (1+)                 [x]  Microbiology   Microbiology Results (last 10 days)       Procedure Component Value - Date/Time    Clostridioides difficile Toxin - Stool, Per Rectum  [[id number]]  (Abnormal) Collected: 02/04/25 1311    Lab Status: Final result Specimen: Stool from Per Rectum Updated: 02/04/25 1541    Narrative:      The following orders were created for panel order Clostridioides difficile Toxin - Stool, Per Rectum.  Procedure                               Abnormality         Status                     ---------                               -----------         ------                     Clostridioides difficile...[[id number]]  Abnormal            Final result                 Please view results for these tests on the individual orders.    Clostridioides difficile Toxin, PCR - Stool, Per Rectum [[id number]]  (Abnormal) Collected: 02/04/25 1311    Lab Status: Final result Specimen: Stool from Per Rectum Updated: 02/04/25 1541     Toxigenic C. difficile by PCR Positive     027 Toxin Presumptive Negative    Narrative:      DNA from a toxigenic strain of C.difficile has been detected. Antigen testing for the presence of free C.difficile toxin is currently in progress, to help determine the clinical significance of this PCR result.     Clostridioides difficile toxin Ag, Reflex - Stool, Per Rectum [[id number]]  (Normal) Collected: 02/04/25 1311    Lab Status: Final result Specimen: Stool from Per Rectum Updated: 02/04/25 1553     C.diff Toxin Ag Negative    Narrative:      DNA from a toxigenic strain of C.difficile was detected, although the free toxin itself was not detected. These findings are consistent with C.difficile colonization and may not reflect actual C.difficile infection. Clinical correlation needed.    Urine Culture - Urine, Urine, Clean Catch [[id number]]  (Normal) Collected: 02/04/25 0417    Lab Status: Final result Specimen: Urine, Clean Catch Updated: 02/05/25 1140     Urine Culture No growth    Blood Culture - Blood, Arm, Left [[id number]]  (Normal) Collected: 02/04/25 0405    Lab Status: Preliminary result Specimen: Blood from Arm, Left Updated: 02/06/25 0415     Blood  Culture No growth at 2 days    Blood Culture - Blood, Arm, Right [[id number]]  (Normal) Collected: 02/04/25 0405    Lab Status: Preliminary result Specimen: Blood from Arm, Right Updated: 02/06/25 0415     Blood Culture No growth at 2 days          [x]  Radiology  CT Head Without Contrast    Result Date: 2/4/2025  Senescent changes without acute abnormality. Electronically Signed: Durga Zurita MD  2/4/2025 4:39 AM EST  Workstation ID: [id number]    XR Chest 1 View    Result Date: 2/4/2025  COPD with chronic scarring in both lung bases. No acute findings in the chest. Electronically Signed: Durga Zurita MD  2/4/2025 2:10 AM EST  Workstation ID: [id number]   []  EKG/Telemetry   []  Cardiology/Vascular   []  Pathology  []  Old records  []  Other:    Assessment & Plan   Assessment / Plan     Assessment:  COPD without exacerbation  Diabetes mellitus type 2  Recent discharge from the hospital on January 17 after he was hospitalized for urinary tract infection and bacteremia  Hypertension  History of CVA with right sided deficits  Systolic congestive heart failure  Generalized weakness and debility  Poor social situation  Right 10th posterior rib fracture  C. Diff toxin negative but DNA is positive. 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 Patient has declined to have additional workup        Plan:  Patient remains admitted to the medicine service  Antibiotics have been discontinued  PT/OT as patient will need placement as he does not have a safe social situation  Patient does have a bed offer at [facility].  Resume patient's home medication  Continue as needed nicotine lozenge or's  Continue sliding scale insulin for diabetes  Discussed plan with patient  APS is also involved        Discussed with RN.    VTE Prophylaxis:  Pharmacologic VTE prophylaxis orders are present.        CODE STATUS:   Level Of Support Discussed  With: Patient  Code Status (Patient has no pulse and is not breathing): CPR (Attempt to Resuscitate)  Medical Interventions (Patient has pulse or is breathing): Full Support      Electronically signed by Meliton Monroy DO, 2/6/2025, 12:02 EST.

## 2025-02-06 NOTE — CONSULTS
02/06/25 1327   Coping/Psychosocial   Additional Documentation Spiritual Care (Group)   Spiritual Care   Spiritual Care Source patient request (describe)   Spiritual Care Follow-Up follow-up planned regularly for general support   Response to Spiritual Care thanks expressed   Spiritual Care Interventions supportive conversation provided;resource assistance provided  (asssited the pt in calling his wife on the phone.   updated by bedside nurse.  reached out to MT for patient.)   Spiritual Care Visit Type initial   Spiritual Care Request spiritual/moral support;mood/anxiety support   Receptivity to Spiritual Care visit welcomed

## 2025-02-06 NOTE — PLAN OF CARE
Goal Outcome Evaluation:  Plan of Care Reviewed With: patient           Outcome Evaluation: Patient alert but confused to situation and time, generally irritated at being in the hospital and verbally insulting to staff. 1 incontinent and 1 continent urine episode, no BM's during shift.

## 2025-02-06 NOTE — THERAPY EVALUATION
Patient Name: Brian Mehta  : 1955    MRN: [id number]                              Today's Date: 2025       Admit Date: 2/3/2025    Visit Dx:     ICD-10-CM ICD-9-CM   1. Weakness generalized  R53.1 780.79   2. Urinary tract infection without hematuria, site unspecified  N39.0 599.0   3. Fall, initial encounter  W19.XXXA E888.9   4. Difficulty walking  R26.2 719.7   5. Decreased activities of daily living (ADL)  Z78.9 V49.89     Patient Active Problem List   Diagnosis    CVA (cerebral vascular accident)    Essential hypertension    High cholesterol    Hip pain    Vitamin B12 deficiency    Kidney disorder    Chronic pain of both knees    Chronic HFrEF (heart failure with reduced ejection fraction)    LVH (left ventricular hypertrophy)    COPD (chronic obstructive pulmonary disease)    DM2 (diabetes mellitus, type 2)    Urinary tract infection without hematuria    Abnormal nuclear stress test    UTI (urinary tract infection)    Fall    Failure to thrive in adult    Nephrolithiasis    Bladder stone    Retained ureteral stent    Acute UTI    Bacteremia    Generalized weakness     Past Medical History:   Diagnosis Date    CHF (congestive heart failure)     SEE'S DR [place] NO CURRENT S/S    COPD (chronic obstructive pulmonary disease)     INHALER    Diabetes mellitus     DOESN'T CHECK BG OFTEN AT HOME    Hyperlipidemia     Hypertension     Sepsis 2023    Stroke 2017    RIGHT SIDE WEAKNESS     Past Surgical History:   Procedure Laterality Date    APPENDECTOMY      CYSTOLITHALOPAXY PERCUTANEOUS Left 2025    Procedure: CYSTOLITHOLAPAXY, left ureteroscopy with laser basket stone extraction and left ureteral stent exchange.  Looking bladder, laser stone off stent exchange retained stent if possible;  Surgeon: Ector Kulkarni MD;  Location: [facility] MAIN OR;  Service: Urology;  Laterality: Left;    EYE SURGERY Bilateral     MISTY CATARACT      General Information       Row Name 25 0836          OT Time  and Intention    Document Type evaluation  -AV     Mode of Treatment individual therapy;occupational therapy  -AV       Row Name 02/06/25 0836          General Information    Patient Profile Reviewed yes  -AV     Prior Level of Function independent:;min assist:;ADL's;transfer  Primarily independent at baseline with assist from wife PRN. takes sponge baths. stands at sink to groom. ambulates with RW. no home O2. poor home conditions/ personal hygiene per EMR review.  -AV     Existing Precautions/Restrictions fall  enteric isolation  -AV     Barriers to Rehab environmental barriers  -AV       Row Name 02/06/25 0836          Occupational Profile    Reason for Services/Referral (Occupational Profile) Patient is a 69 year old male admitted to [facility] on 2/3/25 with weakness/ fall. He is currently on MC3/ room air. OT consulted due to recent decline in ADL/ transfer independence. No previous OT services for current condition.  -AV       Row Name 02/06/25 0836          Living Environment    People in Home spouse  -AV       Row Name 02/06/25 0836          Home Main Entrance    Number of Stairs, Main Entrance three  -AV       Row Name 02/06/25 0836          Stairs Within Home, Primary    Number of Stairs, Within Home, Primary none  -AV       Row Name 02/06/25 0836          Cognition    Orientation Status (Cognition) --  alert, pleasant and cooperative. able to follow commands. questionable insight/ judgment.  -AV       Row Name 02/06/25 0836          Safety Issues/Impairments Affecting Functional Mobility    Impairments Affecting Function (Mobility) balance;cognition;endurance/activity tolerance  -AV               User Key  (r) = Recorded By, (t) = Taken By, (c) = Cosigned By      Initials Name Provider Type    AV Robert Urrutia OT Occupational Therapist                     Mobility/ADL's       Row Name 02/06/25 0839          Transfers    Comment, (Transfers) mod assist supine to sit per PT. pt pleasantly  declined transfers this session.  -AV       Row Name 02/06/25 0839          Activities of Daily Living    BADL Assessment/Intervention --  Independent feeding with setup. Mod assist grooming (no teeth/ dentures). mod assist bathing/dressing. incontinent bladder.  -AV               User Key  (r) = Recorded By, (t) = Taken By, (c) = Cosigned By      Initials Name Provider Type    AV Robert Urrutia, OT Occupational Therapist                   Obj/Interventions       Row Name 02/06/25 0840          Sensory Assessment (Somatosensory)    Sensory Assessment (Somatosensory) UE sensation intact  -AV     Sensory Assessment sensory awareness to light touch  -AV       Row Name 02/06/25 0840          Vision Assessment/Intervention    Visual Impairment/Limitations WFL;corrective lenses for reading  -AV       Row Name 02/06/25 0840          Range of Motion Comprehensive    General Range of Motion bilateral upper extremity ROM WFL  -AV     Comment, General Range of Motion AROM  -AV       Row Name 02/06/25 0840          Strength Comprehensive (MMT)    Comment, General Manual Muscle Testing (MMT) Assessment Left biceps, triceps and  4/5. Right biceps, triceps and  4+/5.  -AV       Row Name 02/06/25 0840          Motor Skills    Motor Skills coordination;functional endurance  -AV     Coordination --  left dominant. diminished prior to onset.  -AV       Row Name 02/06/25 0840          Balance    Comment, Balance declined testing. impaired as evidenced by fall at home.  -AV               User Key  (r) = Recorded By, (t) = Taken By, (c) = Cosigned By      Initials Name Provider Type    AV Robert Urrutia OT Occupational Therapist                   Goals/Plan       Row Name 02/06/25 0843          Transfer Goal 1 (OT)    Activity/Assistive Device (Transfer Goal 1, OT) transfers, all  -AV     [place] Level/Cues Needed (Transfer Goal 1, OT) modified independence  -AV     Time Frame (Transfer Goal 1, OT) long term goal (LTG);10  days  -AV       Row Name 02/06/25 0843          Bathing Goal 1 (OT)    Activity/Device (Bathing Goal 1, OT) bathing skills, all  -AV     [place] Level/Cues Needed (Bathing Goal 1, OT) modified independence  -AV     Time Frame (Bathing Goal 1, OT) long term goal (LTG);10 days  -AV       Row Name 02/06/25 0843          Dressing Goal 1 (OT)    Activity/Device (Dressing Goal 1, OT) dressing skills, all  -AV     [place]/Cues Needed (Dressing Goal 1, OT) modified independence  -AV     Time Frame (Dressing Goal 1, OT) long term goal (LTG);10 days  -AV       Row Name 02/06/25 0843          Toileting Goal 1 (OT)    Activity/Device (Toileting Goal 1, OT) toileting skills, all;raised toilet seat  -AV     [place] Level/Cues Needed (Toileting Goal 1, OT) modified independence  -AV     Time Frame (Toileting Goal 1, OT) long term goal (LTG);10 days  -AV       Row Name 02/06/25 0843          Grooming Goal 1 (OT)    Activity/Device (Grooming Goal 1, OT) grooming skills, all  standing at sink  -AV     [place] (Grooming Goal 1, OT) modified independence  -AV     Time Frame (Grooming Goal 1, OT) long term goal (LTG);10 days  -AV       Row Name 02/06/25 0843          Strength Goal 1 (OT)    Strength Goal 1 (OT) Patient will demonstrate 4+/5 bilateral biceps, triceps and  to increase ADL/ transfer independence.  -AV     Time Frame (Strength Goal 1, OT) long term goal (LTG);10 days  -AV       Row Name 02/06/25 0843          Problem Specific Goal 1 (OT)    Problem Specific Goal 1 (OT) Patient will demonstrate fair endurance/activity tolerance needed to support ADLs.  -AV     Time Frame (Problem Specific Goal 1, OT) long term goal (LTG);10 days  -AV       Row Name 02/06/25 0843          Therapy Assessment/Plan (OT)    Planned Therapy Interventions (OT) activity tolerance training;BADL retraining;functional balance retraining;occupation/activity based interventions;patient/caregiver education/training;strengthening  exercise;transfer/mobility retraining  -AV               User Key  (r) = Recorded By, (t) = Taken By, (c) = Cosigned By      Initials Name Provider Type    AV Robert Urrutia, OT Occupational Therapist                   Clinical Impression       Row Name 02/06/25 0842          Pain Assessment    Additional Documentation Pain Scale: FACES Pre/Post-Treatment (Group)  -AV       Row Name 02/06/25 0842          Pain Scale: FACES Pre/Post-Treatment    Pain: FACES Scale, Pretreatment 2-->hurts little bit  -AV     Posttreatment Pain Rating 2-->hurts little bit  -AV       Row Name 02/06/25 0842          Plan of Care Review    Plan of Care Reviewed With patient  -AV     Progress no change  first session for evaluation  -AV     Outcome Evaluation Patient presents with limitations of balance, strength, endurance/activity tolerance which are impacting ADL/ transfer independence. Skilled OT services are indicated to remediate/ compensate for deficits to maximize independence and safety with functional ADL tasks.  -AV       Row Name 02/06/25 0842          Therapy Assessment/Plan (OT)    Patient/Family Therapy Goal Statement (OT) none stated  -AV     Rehab Potential (OT) good  -AV     Criteria for Skilled Therapeutic Interventions Met (OT) yes;meets criteria;skilled treatment is necessary  -AV     Therapy Frequency (OT) 5 times/wk  -AV       Row Name 02/06/25 0842          Therapy Plan Review/Discharge Plan (OT)    Equipment Needs Upon Discharge (OT) walker, rolling;commode chair  -AV     Anticipated Discharge Disposition (OT) sub acute care setting  -AV       Row Name 02/06/25 0842          Vital Signs    O2 Delivery Pre Treatment room air  -AV     O2 Delivery Intra Treatment room air  -AV     O2 Delivery Post Treatment room air  -AV       Row Name 02/06/25 0842          Positioning and Restraints    Pre-Treatment Position in bed  -AV     Post Treatment Position bed  -AV     In Bed call light within reach;encouraged to call for  assist  -AV               User Key  (r) = Recorded By, (t) = Taken By, (c) = Cosigned By      Initials Name Provider Type    Robert Ramirez OT Occupational Therapist                   Outcome Measures       Row Name 02/06/25 0845          How much help from another is currently needed...    Putting on and taking off regular lower body clothing? 2  -AV     Bathing (including washing, rinsing, and drying) 2  -AV     Toileting (which includes using toilet bed pan or urinal) 1  -AV     Putting on and taking off regular upper body clothing 2  -AV     Taking care of personal grooming (such as brushing teeth) 2  -AV     Eating meals 4  -AV     AM-PAC 6 Clicks Score (OT) 13  -AV       Row Name 02/06/25 0845          Functional Assessment    Outcome Measure Options AM-PAC 6 Clicks Daily Activity (OT);Optimal Instrument  -AV       Row Name 02/06/25 0845          Optimal Instrument    Optimal Instrument Optimal - 3  -AV     Bending/Stooping 3  -AV     Standing 5  -AV     Reaching 1  -AV     From the list, choose the 3 activities you would most like to be able to do without any difficulty Bending/stooping;Standing;Reaching  -AV     Total Score Optimal - 3 9  -AV               User Key  (r) = Recorded By, (t) = Taken By, (c) = Cosigned By      Initials Name Provider Type    Robert Ramirez OT Occupational Therapist                    Occupational Therapy Education       Title: PT OT SLP Therapies (Done)       Topic: Occupational Therapy (Done)       Point: ADL training (Done)       Description:   Instruct learner(s) on proper safety adaptation and remediation techniques during self care or transfers.   Instruct in proper use of assistive devices.                  Learning Progress Summary            Patient Acceptance, E, VU by ZULEMA at 2/6/2025 0847                      Point: Home exercise program (Done)       Description:   Instruct learner(s) on appropriate technique for monitoring, assisting and/or progressing  therapeutic exercises/activities.                  Learning Progress Summary            Patient Acceptance, E, VU by AV at 2/6/2025 0847                      Point: Precautions (Done)       Description:   Instruct learner(s) on prescribed precautions during self-care and functional transfers.                  Learning Progress Summary            Patient Acceptance, E, VU by AV at 2/6/2025 0847                      Point: Body mechanics (Done)       Description:   Instruct learner(s) on proper positioning and spine alignment during self-care, functional mobility activities and/or exercises.                  Learning Progress Summary            Patient Acceptance, E, VU by AV at 2/6/2025 0847                                      User Key       Initials Effective Dates Name Provider Type Discipline     06/16/21 -  Robert Urrutia, OT Occupational Therapist OT                  OT Recommendation and Plan  Planned Therapy Interventions (OT): activity tolerance training, BADL retraining, functional balance retraining, occupation/activity based interventions, patient/caregiver education/training, strengthening exercise, transfer/mobility retraining  Therapy Frequency (OT): 5 times/wk  Plan of Care Review  Plan of Care Reviewed With: patient  Progress: no change (first session for evaluation)  Outcome Evaluation: Patient presents with limitations of balance, strength, endurance/activity tolerance which are impacting ADL/ transfer independence. Skilled OT services are indicated to remediate/ compensate for deficits to maximize independence and safety with functional ADL tasks.     Time Calculation:   Evaluation Complexity (OT)  Review Occupational Profile/Medical/Therapy History Complexity: expanded/moderate complexity  Assessment, Occupational Performance/Identification of Deficit Complexity: 1-3 performance deficits  Clinical Decision Making Complexity (OT): problem focused assessment/low complexity  Overall Complexity of  Evaluation (OT): low complexity     Time Calculation- OT       Row Name 02/06/25 0849             Time Calculation- OT    OT Received On 02/06/25  -AV      OT Goal Re-Cert Due Date 02/15/25  -AV         Untimed Charges    OT Eval/Re-eval Minutes 35  -AV         Total Minutes    Untimed Charges Total Minutes 35  -AV       Total Minutes 35  -AV                User Key  (r) = Recorded By, (t) = Taken By, (c) = Cosigned By      Initials Name Provider Type    AV Robert Urrutia OT Occupational Therapist                  Therapy Charges for Today       Code Description Service Date Service Provider Modifiers Qty    03917144301 HC OT EVAL LOW COMPLEXITY 3 2/6/2025 Robert Urrutia OT GO 1                 Robert Urrutia OT  2/6/2025

## 2025-02-06 NOTE — PLAN OF CARE
Goal Outcome Evaluation:              Outcome Evaluation: Pt. q2hr turn this shift, a little noncompliant at times, refuses to use Incentive spirometer. Skilled faciltiy for poss. d/c tomorrow

## 2025-02-06 NOTE — SIGNIFICANT NOTE
02/06/25 1524   Post Acute Pre-Cert Documentation   Request Submitted by Facility - Type: Hospital   Post-Acute Authorization Type Submitted: SNF   Date Post Acute Pre-Cert Inititated per Facility 02/06/25   Verification from Payer Yes   Date Post Acute Pre-Cert Completed 02/06/25   Accepting Facility Signature of [facility] Discharge Date Requested 02/06/25   All Clinicals Submitted? Yes   Had Accepting Facility at Time of Submission Yes   Response Received from Insurance? Approval   Response Communicated to: ;Accepting Facility Liaison   Authorization Number: [id number]   Post Acute Pre-Cert Initiated Comment Pt is approved from 02/06 until 02/12.   New Pre-Cert Needed? No

## 2025-02-06 NOTE — PLAN OF CARE
Goal Outcome Evaluation:  Plan of Care Reviewed With: patient        Progress: no change (first session for evaluation)  Outcome Evaluation: Patient presents with limitations of balance, strength, endurance/activity tolerance which are impacting ADL/ transfer independence. Skilled OT services are indicated to remediate/ compensate for deficits to maximize independence and safety with functional ADL tasks.    Anticipated Discharge Disposition (OT): sub acute care setting

## 2025-02-06 NOTE — SIGNIFICANT NOTE
Wound Eval / Progress Noted     Dee [facility]     Patient Name: Brian Mehta  : 1955  MRN: [id number]  Today's Date: 2025                 Admit Date: 2/3/2025    Visit Dx:    ICD-10-CM ICD-9-CM   1. Weakness generalized  R53.1 780.79   2. Urinary tract infection without hematuria, site unspecified  N39.0 599.0   3. Fall, initial encounter  W19.XXXA E888.9   4. Difficulty walking  R26.2 719.7         Generalized weakness    Essential hypertension    Chronic HFrEF (heart failure with reduced ejection fraction)    COPD (chronic obstructive pulmonary disease)    DM2 (diabetes mellitus, type 2)    UTI (urinary tract infection)        Past Medical History:   Diagnosis Date    CHF (congestive heart failure)     SEE'S DR [place] NO CURRENT S/S    COPD (chronic obstructive pulmonary disease)     INHALER    Diabetes mellitus     DOESN'T CHECK BG OFTEN AT HOME    Hyperlipidemia     Hypertension     Sepsis 2023    Stroke 2017    RIGHT SIDE WEAKNESS        Past Surgical History:   Procedure Laterality Date    APPENDECTOMY      CYSTOLITHALOPAXY PERCUTANEOUS Left 2025    Procedure: CYSTOLITHOLAPAXY, left ureteroscopy with laser basket stone extraction and left ureteral stent exchange.  Looking bladder, laser stone off stent exchange retained stent if possible;  Surgeon: Ector Kulkarni MD;  Location: [facility];  Service: Urology;  Laterality: Left;    EYE SURGERY Bilateral     MISTY CATARACT         Physical Assessment:  Rash 25 1130 other (see comments) torso macular (Active)   Wound Image    25 1130   Color red;pink 25 1130   Configuration/Shape asymmetric 25 1130   Borders diffuse;irregular 25 1130   Characteristics dry;flat 25 1130   Lesion Size less than 0.5 cm 25 1130   Care, Rash cleansed with;sterile normal saline;open to air 25 1130       Wound 10/31/24 1234 Bilateral gluteal (Active)   Wound Image      25 1130   Dressing Appearance open to air  02/05/25 1130   Closure None 02/05/25 1130   Base moist;red;pink;yellow;brown;slough;dry 02/05/25 1130   Periwound dry;pink;redness 02/05/25 1130   Periwound Temperature warm 02/05/25 1130   Periwound Skin Turgor soft 02/05/25 1130   Edges open 02/05/25 1130   Drainage Characteristics/Odor serosanguineous 02/05/25 1130   Drainage Amount scant 02/05/25 1130   Care, Wound cleansed with;sterile normal saline 02/05/25 1130   Dressing Care open to air;skin barrier agent applied 02/05/25 1130   Periwound Care barrier ointment applied 02/05/25 1130       Wound 02/05/25 1130 Bilateral groin MASD (moisture associated skin damage) (Active)   Wound Image    02/05/25 1130   Dressing Appearance open to air 02/05/25 1130   Closure None 02/05/25 1130   Base moist;red 02/05/25 1130   Red (%), Wound Tissue Color 100 02/05/25 1130   Periwound dry;pink;redness 02/05/25 1130   Periwound Temperature warm 02/05/25 1130   Periwound Skin Turgor soft 02/05/25 1130   Edges open 02/05/25 1130   Drainage Characteristics/Odor serosanguineous 02/05/25 1130   Drainage Amount scant 02/05/25 1130   Care, Wound cleansed with;water 02/05/25 1130   Dressing Care open to air 02/05/25 1130   Periwound Care dry periwound area maintained 02/05/25 1130    Right Arm     Wound Check / Follow-up: Patient seen today for wound consult. Patient is awake, alert, and oriented. Patient was noted to have been incontinent of urine prior to assessment. Incontinence care and linen change provided with assistance per primary RN. Patient was tearful upon entry into room as he fears being placed in long term care placement and losing his dog. Per 's note, patient with a current APS worker involved with attempts to improve patient's living conditions. Patient reports he lives with his wife. Discharge planning appears to be rehab versus long term care. Nutritional supplements have been ordered for this patient.     Incontinence associated dermatitis with areas  of erosion and a fungal presentation noted to bilateral gluteal aspects, left posterior thigh, and bilateral aspect of groin, cannot rule out pressure as a contributing factor. Areas of erosion to bilateral gluteal aspect primarily present with moist pink and red tissue. There is one area of erosion to the right gluteal aspect with moist yellow and brown slough. Left proximal posterior thigh presents with an area of dry red tissue within an area of blanchable pink scar tissue. There is linear blanchable redness noted to left distal posterior thigh. Areas of erosion to bilateral aspects of groin are moist with redness. Intact tissue is blanchable with pink/redness and a fungal presentation. Cleansed groin with water, blotted dry. Cleansed gluteal aspects and left thigh with normal saline and gauze, blotted dry. Recommending quality skin care and hygiene with application of hydrocortisone-bacitracin-zinc oxide-nystatin (MAGIC BARRIER) ointment TID, alternating with Aquaphor TID and blue top moisture barrier PRN incontinence. Implement every 2 hour turns, and offload at all times.  Keep patient clean, dry, and free from all moisture. Discussed findings with Attending MD and orders obtained.     Left aspect of abdomen and posterior trunk present with a diffuse red rash. There is a consolidated area of dry redness with a fungal presentation to left abdominal crease. Periwound tissue beyond area of rash is dry. Cleansed with normal saline and gauze, blotted dry. Recommending quality skin care and hygiene to all areas with Aquaphor TID, alternating with hydrocortisone-bacitracin-zinc oxide-nystatin (MAGIC BARRIER) ointment to area of redness to left abdominal crease TID. Keep patient clean, dry, and free from all moisture. Discussed findings with Attending MD and orders obtained.    Scattered areas of ecchymosis noted to right arm. Recommending quality skin care and hygiene.    Blanchable redness to heels. Recommending  quality skin care and hygiene with application of Aquaphor three times a day. Offload heels at all times.      Impression: IAD with areas of erosion and a fungal presentation to bilateral gluteal aspects, left posterior thigh, and bilateral aspects of groin. Fungal presentation to left abdominal crease. Rash to left aspect of trunk and posterior trunk.     Short term goals:  Regain skin integrity, skin protection, moisture prevention, pressure reduction, quality skin care and hygiene, topical treatment.    Beckie Abrams RN    2/5/2025    19:27 EST

## 2025-02-07 VITALS
RESPIRATION RATE: 16 BRPM | OXYGEN SATURATION: 96 % | HEART RATE: 95 BPM | WEIGHT: 207.23 LBS | TEMPERATURE: 98.7 F | SYSTOLIC BLOOD PRESSURE: 139 MMHG | BODY MASS INDEX: 25.77 KG/M2 | DIASTOLIC BLOOD PRESSURE: 74 MMHG | HEIGHT: 75 IN

## 2025-02-07 LAB
ANION GAP SERPL CALCULATED.3IONS-SCNC: 7.8 MMOL/L (ref 5–15)
BUN SERPL-MCNC: 14 MG/DL (ref 8–23)
BUN/CREAT SERPL: 17.5 (ref 7–25)
CALCIUM SPEC-SCNC: 8.6 MG/DL (ref 8.6–10.5)
CHLORIDE SERPL-SCNC: 106 MMOL/L (ref 98–107)
CO2 SERPL-SCNC: 24.2 MMOL/L (ref 22–29)
CREAT SERPL-MCNC: 0.8 MG/DL (ref 0.76–1.27)
DEPRECATED RDW RBC AUTO: 46.5 FL (ref 37–54)
EGFRCR SERPLBLD CKD-EPI 2021: 97 ML/MIN/1.73
ERYTHROCYTE [DISTWIDTH] IN BLOOD BY AUTOMATED COUNT: 15.5 % (ref 12.3–15.4)
GLUCOSE BLDC GLUCOMTR-MCNC: 156 MG/DL (ref 70–99)
GLUCOSE BLDC GLUCOMTR-MCNC: 288 MG/DL (ref 70–99)
GLUCOSE SERPL-MCNC: 152 MG/DL (ref 65–99)
HCT VFR BLD AUTO: 41.7 % (ref 37.5–51)
HGB BLD-MCNC: 13.2 G/DL (ref 13–17.7)
MAGNESIUM SERPL-MCNC: 2 MG/DL (ref 1.6–2.4)
MCH RBC QN AUTO: 25.8 PG (ref 26.6–33)
MCHC RBC AUTO-ENTMCNC: 31.7 G/DL (ref 31.5–35.7)
MCV RBC AUTO: 81.6 FL (ref 79–97)
PLATELET # BLD AUTO: 254 10*3/MM3 (ref 140–450)
PMV BLD AUTO: 9.7 FL (ref 6–12)
POTASSIUM SERPL-SCNC: 4.2 MMOL/L (ref 3.5–5.2)
RBC # BLD AUTO: 5.11 10*6/MM3 (ref 4.14–5.8)
SODIUM SERPL-SCNC: 138 MMOL/L (ref 136–145)
WBC NRBC COR # BLD AUTO: 4.41 10*3/MM3 (ref 3.4–10.8)

## 2025-02-07 PROCEDURE — 99239 HOSP IP/OBS DSCHRG MGMT >30: CPT | Performed by: INTERNAL MEDICINE

## 2025-02-07 PROCEDURE — 82948 REAGENT STRIP/BLOOD GLUCOSE: CPT | Performed by: FAMILY MEDICINE

## 2025-02-07 PROCEDURE — 83735 ASSAY OF MAGNESIUM: CPT | Performed by: INTERNAL MEDICINE

## 2025-02-07 PROCEDURE — 82948 REAGENT STRIP/BLOOD GLUCOSE: CPT

## 2025-02-07 PROCEDURE — 85027 COMPLETE CBC AUTOMATED: CPT | Performed by: INTERNAL MEDICINE

## 2025-02-07 PROCEDURE — 25010000002 HEPARIN (PORCINE) PER 1000 UNITS: Performed by: FAMILY MEDICINE

## 2025-02-07 PROCEDURE — 94799 UNLISTED PULMONARY SVC/PX: CPT

## 2025-02-07 PROCEDURE — 63710000001 INSULIN LISPRO (HUMAN) PER 5 UNITS: Performed by: FAMILY MEDICINE

## 2025-02-07 PROCEDURE — 80048 BASIC METABOLIC PNL TOTAL CA: CPT | Performed by: INTERNAL MEDICINE

## 2025-02-07 RX ORDER — BUDESONIDE AND FORMOTEROL FUMARATE DIHYDRATE 160; 4.5 UG/1; UG/1
2 AEROSOL RESPIRATORY (INHALATION)
Start: 2025-02-07

## 2025-02-07 RX ORDER — NICOTINE 21 MG/24HR
1 PATCH, TRANSDERMAL 24 HOURS TRANSDERMAL
Qty: 28 EACH | Refills: 1 | Status: SHIPPED | OUTPATIENT
Start: 2025-02-07

## 2025-02-07 RX ADMIN — WHITE PETROLATUM 1 APPLICATION: 1.75 OINTMENT TOPICAL at 05:32

## 2025-02-07 RX ADMIN — GUAIFENESIN 600 MG: 600 TABLET ORAL at 08:39

## 2025-02-07 RX ADMIN — HYDROCORTISONE 1 APPLICATION: 1 OINTMENT TOPICAL at 10:35

## 2025-02-07 RX ADMIN — NICOTINE 1 PATCH: 21 PATCH, EXTENDED RELEASE TRANSDERMAL at 09:00

## 2025-02-07 RX ADMIN — HEPARIN SODIUM 5000 UNITS: 5000 INJECTION INTRAVENOUS; SUBCUTANEOUS at 08:39

## 2025-02-07 RX ADMIN — SENNOSIDES AND DOCUSATE SODIUM 2 TABLET: 50; 8.6 TABLET ORAL at 08:39

## 2025-02-07 RX ADMIN — HYDROCORTISONE 1 APPLICATION: 1 OINTMENT TOPICAL at 02:50

## 2025-02-07 RX ADMIN — INSULIN LISPRO 6 UNITS: 100 INJECTION, SOLUTION INTRAVENOUS; SUBCUTANEOUS at 11:21

## 2025-02-07 RX ADMIN — EMPAGLIFLOZIN 25 MG: 25 TABLET, FILM COATED ORAL at 08:39

## 2025-02-07 RX ADMIN — ASPIRIN 81 MG: 81 TABLET, CHEWABLE ORAL at 08:39

## 2025-02-07 RX ADMIN — SENNOSIDES AND DOCUSATE SODIUM 1 TABLET: 50; 8.6 TABLET ORAL at 08:45

## 2025-02-07 RX ADMIN — Medication 10 ML: at 08:40

## 2025-02-07 RX ADMIN — INSULIN LISPRO 2 UNITS: 100 INJECTION, SOLUTION INTRAVENOUS; SUBCUTANEOUS at 07:53

## 2025-02-07 RX ADMIN — FAMOTIDINE 40 MG: 20 TABLET ORAL at 08:39

## 2025-02-07 NOTE — PLAN OF CARE
Goal Outcome Evaluation:              Outcome Evaluation: Pt. agreeable to discharge plan to [facility], Sister and wife notified of transfer. He will be transfered via stretcher

## 2025-02-07 NOTE — SIGNIFICANT NOTE
02/07/25 0940   Physical Therapy Time and Intention   Session Not Performed other (see comments)  (Discharge in progress)

## 2025-02-07 NOTE — PLAN OF CARE
Goal Outcome Evaluation:            Patient AAOx2, to self and place. Patient vital signs within normal limits and stable. Patient incontinent of bowel and bladder. Multiple bed changes this shift. AC/HS glucose checks. Takes medications whole with fluids. Covered in rash from bed bug bites on his back, bottom, and groin. Groin excoriated- applying magic barrier and aquaphor. No complaints of pain at this time. Patient possible being discharged to SNF today for rehab before returning home.Continue plan of care.

## 2025-02-07 NOTE — SIGNIFICANT NOTE
02/07/25 1001   OTHER   Discipline occupational therapist   Rehab Time/Intention   Session Not Performed other (see comments)  (discharge in progress)   Therapy Assessment/Plan (PT)   Criteria for Skilled Interventions Met (PT) yes;skilled treatment is necessary

## 2025-02-07 NOTE — CONSULTS
[facility] Music Therapy    General:    Referral Source: Pastoral Care    Reason for Referral: Emotional/Social Support    Length of Session: 20 minutes    Session Type: Inpatient individual    Previous Music Therapy Contact: No    Patient Music History: Listener    Meeting Location: Bedside    Music Preferences: Other: 70s pop folk    Participant(s): Patient    Others Present: No    Assessment:     Patient Positioning Prior to Session: In Bed    Initial Patient Affect: Flat    Initial Patient Behavior State: Agreeable, Alert, Calm, and Pleasant    Initial Family/Caregiver Affect: no family present at bedside    Treatment Objectives: Enhance quality of life and Modify/enhance mood    Co-Treatment: no    Interventions: Cognitive/Emotional processing, Rapport building, and Receptive music    Post Session Assessment:     Ending Patient Affect: Positive     Ending Patient Behavior State: Alert, Calm, Engaged, and Pleasant     Tolerance to Treatment:  Tolerated treatment well     Patient engagement: Music Listening, Expression of thoughts and feelings, and Increased facial expression     Session Progress: Exhibited stabilized/improved emotional state and Exhibited stabilized/improved coping    Intervention Plan: Continue to follow up

## 2025-02-07 NOTE — DISCHARGE SUMMARY
[facility]         HOSPITALIST  DISCHARGE SUMMARY    Patient Name: Brian Mehta  : 1955  MRN: [id number]    Date of Admission: 2/3/2025  Date of Discharge:  2025    Primary Care Physician: Sophie Capps APRN    Consults       Date and Time Order Name Status Description    2025  5:03 AM Inpatient Hospitalist Consult              Active and Resolved Hospital Problems:  Active Hospital Problems    Diagnosis POA    **Generalized weakness [R53.1] Yes    UTI (urinary tract infection) [N39.0] Yes    DM2 (diabetes mellitus, type 2) [E11.9] Yes    COPD (chronic obstructive pulmonary disease) [J44.9] Yes    Chronic HFrEF (heart failure with reduced ejection fraction) [I50.22] Yes    Essential hypertension [I10] Yes      Resolved Hospital Problems   No resolved problems to display.       Hospital Course     Hospital Course:  Brian Mehta is a 69 y.o. male with past medical history of hypertension, hyperlipidemia, prior CVA with right-sided deficits, COPD, CHF, and GERD presented to the ED via EMS for evaluation of weakness.  Patient stated for the last few days he has been significantly weak where he could barely get out of bed.  Due to his worsening weakness patient called EMS to take him to the ED for further evaluation.  Of note patient is very unkempt laying in his own stool and urine and his house was filthy when EMS arrived.  In the ED patient was tachycardic on arrival with remaining vitals being within normal limits.  UA showed that he had a UTI with some dehydration with remaining labs being relatively unremarkable given his chronic conditions including a normal lactic acid and negative troponin.  CT of the head was negative for any acute findings and status post COPD findings with nothing acute.  Per notes it appears that patient was recently at a rehab facility and since has been at home unable to care for himself and dilapidated condition with no utilities as well as with rats,  insects and bedbugs in his home.  Patient was initially treated with antibiotics however his urine culture showed no growth therefore antibiotics were discontinued.  Patient had 1 episode of diarrhea.  His C. difficile was positive for the DNA and negative for toxin.  This was not felt to be true infection.  Patient overall needs long-term care/nursing home placement as he is unable to care for himself and is living in dilapidated condition.  Patient was agreeable to go to a rehab facility.  Patient will be discharged there today in stable condition.     DISCHARGE Follow Up Recommendations for labs and diagnostics:   Follow-up with primary care once patient gets discharged from rehab facility      Day of Discharge     Vital Signs:  Temp:  [97.9 °F (36.6 °C)-98.8 °F (37.1 °C)] 98.7 °F (37.1 °C)  Heart Rate:  [90-98] 91  Resp:  [16-18] 16  BP: (133-163)/(74-95) 158/94  Physical Exam:   General: Awake, alert, NAD.  Resting in bed in no acute distress  HENT: NCAT, MMM  Eyes: pupils equal, no scleral icterus  Cardiovascular: RRR, no murmurs   Pulmonary: CTA bilaterally; no wheezes; no conversational dyspnea  Gastrointestinal: S/ND/NT, +BS  Musculoskeletal: No gross deformities  Skin: No jaundice, no rash on exposed skin appreciated  Neuro: CN II through XII grossly intact; speech clear; no tremor  Psych: Mood and affect appropriate  : No Dill catheter; no suprapubic tenderness      Discharge Details        Discharge Medications        Changes to Medications        Instructions Start Date   Lantus SoloStar 100 UNIT/ML injection pen  Generic drug: Insulin Glargine  What changed: See the new instructions.   INJECT 10 UNITS SUBCUTANEOUSLY INTO THE APPROPRIATE AREA EVERY NIGHT AS DIRECTED             Continue These Medications        Instructions Start Date   Aspirin Low Dose 81 MG chewable tablet  Generic drug: aspirin   81 mg, Oral, Daily      atorvastatin 20 MG tablet  Commonly known as: LIPITOR   20 mg, Oral, Nightly       budesonide-formoterol 160-4.5 MCG/ACT inhaler  Commonly known as: Symbicort   2 puffs, Inhalation, 2 Times Daily - RT      cetirizine 10 MG tablet  Commonly known as: zyrTEC   10 mg, Oral, Daily      Diclofenac Sodium 1 % gel gel  Commonly known as: VOLTAREN   2 g, Topical, 4 Times Daily      famotidine 40 MG tablet  Commonly known as: PEPCID   40 mg, Nightly PRN      Jardiance 25 MG tablet tablet  Generic drug: empagliflozin   25 mg, Oral, Daily      losartan 25 MG tablet  Commonly known as: COZAAR   25 mg, Oral, Daily      nicotine 21 MG/24HR patch  Commonly known as: NICODERM CQ   1 patch, Transdermal, Every 24 Hours Scheduled      sennosides-docusate 8.6-50 MG per tablet  Commonly known as: PERICOLACE   1 tablet, Oral, Daily             Stop These Medications      hydroCHLOROthiazide 25 MG tablet     potassium chloride 10 MEQ CR tablet              Allergies   Allergen Reactions    Ceftriaxone Rash     Had fairly extensive diffuse chest/arms rash after administration 1/2025 hospitalization.  Switched to aztreonam at that time.         Discharge Disposition:  Rehab Facility or Unit (DC - External)    Diet:  Hospital:  Diet Order   Procedures    Diet: Diabetic; Consistent Carbohydrate; Fluid Consistency: Thin (IDDSI 0)       Discharge Activity:       CODE STATUS:  Code Status and Medical Interventions: CPR (Attempt to Resuscitate); Full Support   Ordered at: 02/04/25 0612     Level Of Support Discussed With:    Patient     Code Status (Patient has no pulse and is not breathing):    CPR (Attempt to Resuscitate)     Medical Interventions (Patient has pulse or is breathing):    Full Support         No future appointments.        Pertinent  and/or Most Recent Results     PROCEDURES:   None     LAB RESULTS:      Lab 02/07/25  0453 02/06/25  0509 02/04/25  0405 02/04/25  0225   WBC 4.41 5.73  --  8.65   HEMOGLOBIN 13.2 12.3*  --  14.2   HEMATOCRIT 41.7 38.9  --  43.0   PLATELETS 254 255  --  242   NEUTROS ABS  --    --   --  4.40   IMMATURE GRANS (ABS)  --   --   --  0.02   LYMPHS ABS  --   --   --  3.32*   MONOS ABS  --   --   --  0.80   EOS ABS  --   --   --  0.07   MCV 81.6 79.9  --  78.6*   LACTATE  --   --  1.1  --          Lab 02/07/25  0453 02/06/25  0509 02/05/25  0416 02/04/25  0225   SODIUM 138 136 138 133*   POTASSIUM 4.2 3.9 4.0 4.1   CHLORIDE 106 104 104 99   CO2 24.2 24.6 25.1 23.6   ANION GAP 7.8 7.4 8.9 10.4   BUN 14 18 20 12   CREATININE 0.80 0.99 0.98 0.84   EGFR 97.0 83.5 84.5 95.6   GLUCOSE 152* 131* 181* 221*   CALCIUM 8.6 8.5* 8.9 9.1   MAGNESIUM 2.0 2.0 2.1 1.6         Lab 02/04/25  0225   TOTAL PROTEIN 7.5   ALBUMIN 3.2*   GLOBULIN 4.3   ALT (SGPT) 8   AST (SGOT) 13   BILIRUBIN 0.4   ALK PHOS 104         Lab 02/04/25  0343 02/04/25  0225   HSTROP T 11 12                 Brief Urine Lab Results  (Last result in the past 365 days)        Color   Clarity   Blood   Leuk Est   Nitrite   Protein   CREAT   Urine HCG        02/04/25 0417 Dark Yellow   Clear   Trace   Moderate (2+)   Negative   30 mg/dL (1+)                 Microbiology Results (last 10 days)       Procedure Component Value - Date/Time    Clostridioides difficile Toxin - Stool, Per Rectum [[id number]]  (Abnormal) Collected: 02/04/25 1311    Lab Status: Final result Specimen: Stool from Per Rectum Updated: 02/04/25 5721    Narrative:      The following orders were created for panel order Clostridioides difficile Toxin - Stool, Per Rectum.  Procedure                               Abnormality         Status                     ---------                               -----------         ------                     Clostridioides difficile...[[id number]]  Abnormal            Final result                 Please view results for these tests on the individual orders.    Clostridioides difficile Toxin, PCR - Stool, Per Rectum [[id number]]  (Abnormal) Collected: 02/04/25 1311    Lab Status: Final result Specimen: Stool from Per Rectum Updated: 02/04/25 1766      Toxigenic C. difficile by PCR Positive     027 Toxin Presumptive Negative    Narrative:      DNA from a toxigenic strain of C.difficile has been detected. Antigen testing for the presence of free C.difficile toxin is currently in progress, to help determine the clinical significance of this PCR result.     Clostridioides difficile toxin Ag, Reflex - Stool, Per Rectum [[id number]]  (Normal) Collected: 02/04/25 1311    Lab Status: Final result Specimen: Stool from Per Rectum Updated: 02/04/25 1553     C.diff Toxin Ag Negative    Narrative:      DNA from a toxigenic strain of C.difficile was detected, although the free toxin itself was not detected. These findings are consistent with C.difficile colonization and may not reflect actual C.difficile infection. Clinical correlation needed.    Urine Culture - Urine, Urine, Clean Catch [[id number]]  (Normal) Collected: 02/04/25 0417    Lab Status: Final result Specimen: Urine, Clean Catch Updated: 02/05/25 1140     Urine Culture No growth    Blood Culture - Blood, Arm, Left [[id number]]  (Normal) Collected: 02/04/25 0405    Lab Status: Preliminary result Specimen: Blood from Arm, Left Updated: 02/07/25 0415     Blood Culture No growth at 3 days    Blood Culture - Blood, Arm, Right [[id number]]  (Normal) Collected: 02/04/25 0405    Lab Status: Preliminary result Specimen: Blood from Arm, Right Updated: 02/07/25 0415     Blood Culture No growth at 3 days            CT Head Without Contrast    Result Date: 2/4/2025  Senescent changes without acute abnormality. Electronically Signed: Durga Zurita MD  2/4/2025 4:39 AM EST  Workstation ID: [id number]    XR Chest 1 View    Result Date: 2/4/2025  COPD with chronic scarring in both lung bases. No acute findings in the chest. Electronically Signed: Durga Zurita MD  2/4/2025 2:10 AM EST  Workstation ID: [id number]              Results for orders placed during the hospital encounter of 01/07/25    Adult Transthoracic Echo Complete W/  Cont if Necessary Per Protocol    Interpretation Summary    The study is technically difficult for diagnosis.    Left ventricular systolic function is mildly decreased. Estimated left ventricular EF = 40%      Labs Pending at Discharge:  Pending Labs       Order Current Status    Blood Culture - Blood, Arm, Left Preliminary result    Blood Culture - Blood, Arm, Right Preliminary result              Time spent on Discharge including face to face service:  more than 35 minutes    Electronically signed by Meliton Monroy DO, 02/07/25, 11:01 AM EST.

## 2025-02-07 NOTE — SIGNIFICANT NOTE
02/07/25 1011   Personal Safety   Personal Safety Comments APS report #[id number] does not meet acceptance criteria for further assessment and will not be assigned to a /staff.

## 2025-02-09 LAB
BACTERIA SPEC AEROBE CULT: NORMAL
BACTERIA SPEC AEROBE CULT: NORMAL

## 2025-02-11 ENCOUNTER — PATIENT OUTREACH (OUTPATIENT)
Dept: CASE MANAGEMENT | Facility: OTHER | Age: 70
End: 2025-02-11
Payer: MEDICARE

## 2025-02-11 DIAGNOSIS — R53.1 WEAKNESS: Primary | ICD-10-CM

## 2025-02-11 DIAGNOSIS — I50.22 CHRONIC HFREF (HEART FAILURE WITH REDUCED EJECTION FRACTION): ICD-10-CM

## 2025-02-11 NOTE — OUTREACH NOTE
"AMBULATORY CASE MANAGEMENT NOTE    Names and Relationships of Patient/Support Persons: Contact: HOSSEINFERDINAND; Relationship: Emergency Contact -     [facility] Interim Update    Received voicemail from patient spouse requesting urgent phone call back.    Returned call. She states that she got a call from the  at [facility] in [facility] stating that patient will be released on Feb 27th and that \"their excuse is that insurance will only pay for 20 day stay\". Advised patient that length of stay is determined by insurance. She can ask for facility to appeal decision if they feel like patient has not progressed as expected closer to time of discharge. She states that patient cannot come home on the 27th. I inquired with her as to why and she explained that patient cannot come home because she cannot take care of him and he cannot care for himself. Advised her that while he is inpatient, they will be working on his ADLs with him, but that he would not be able to stay at that facility past time that insurance says he has to be discharged. I explained to patients spouse that he will either have to discharge home, or to LTC from the rehab facility. She states \"well if they can just give us a little bit more time to figure it out\". Advised her that in order to get more time in rehab, the facility would need to appeal the length of stay decision with insurance. She verbalizes understanding and will discuss this with case management team at conference call at 1:30 today.        Education Documentation  No documentation found.        YANDEL GONZALEZ  Ambulatory Case Management    2/11/2025, 11:54 EST  "

## 2025-02-28 ENCOUNTER — PATIENT OUTREACH (OUTPATIENT)
Dept: CASE MANAGEMENT | Facility: OTHER | Age: 70
End: 2025-02-28
Payer: MEDICARE

## 2025-02-28 DIAGNOSIS — I50.22 CHRONIC HFREF (HEART FAILURE WITH REDUCED EJECTION FRACTION): ICD-10-CM

## 2025-02-28 DIAGNOSIS — R53.1 WEAKNESS: Primary | ICD-10-CM

## 2025-02-28 NOTE — OUTREACH NOTE
[facility] End of Month Documentation    This Chronic Medical Management Care Plan for Brian Mehta, 69 y.o. male, has been monitored and managed; reviewed and a new plan of care implemented for the month of February.  A cumulative time of 38  minutes was spent on this patient record this month, including phone call with relative; chart review.    Regarding the patient's problems: has CVA (cerebral vascular accident); Essential hypertension; High cholesterol; Hip pain; Vitamin B12 deficiency; Kidney disorder; Chronic pain of both knees; Systolic CHF, chronic; LVH (left ventricular hypertrophy); COPD (chronic obstructive pulmonary disease); DM2 (diabetes mellitus, type 2); Urinary tract infection without hematuria; Abnormal nuclear stress test; UTI (urinary tract infection); Fall; Failure to thrive in adult; Nephrolithiasis; Bladder stone; Retained ureteral stent; Acute UTI; Bacteremia; and Generalized weakness on their problem list., the following items were addressed: medical records; medications; changes to medical care and any changes can be found within the plan section of the note.  A detailed listing of time spent for chronic care management is tracked within each outreach encounter.  Current medications include:  has a current medication list which includes the following prescription(s): aspirin low dose, atorvastatin, budesonide-formoterol, cetirizine, diclofenac sodium, jardiance, famotidine, lantus solostar, losartan, and nicotine. and the patient is reported to be patient is compliant with medication protocol,  Medications are reported to be effective.  Regarding these diagnoses, referrals were made to the following provider(s):  n/a.  All notes on chart for PCP to review.    The patient was monitored remotely for activity level; medications; blood glucose.    The patient's physical needs include:  DME supplies; physical healthcare; needs assistance with ADLs; resources for disability needs.     The patient's  mental support needs include:  continued support    The patient's cognitive support needs include:  needs assistance with ADLs; requires supervision; household care; health care; personal care    The patient's psychosocial support needs include:  continued support    The patient's functional needs include: DME; physical healthcare; needs assistance for ADLs    The patient's environmental needs include:  an unsafe living environment    Care Plan overall comments:  No data recorded    Refer to previous outreach notes for more information on the areas listed above.    Monthly Billing Diagnoses  (R53.1) Weakness    (I50.22) Chronic HFrEF (heart failure with reduced ejection fraction)    Medications   Medications have been reconciled    Care Plan progress this month:      Recently Modified Care Plans Updates made since 1/28/2025 12:00 AM      No recently modified care plans.               Instructions   Patient was provided an electronic copy of care plan  CCM services were explained and offered and patient has accepted these services.  Patient has given their written consent to receive CCM services and understands that this includes the authorization of electronic communication of medical information with the other treating providers.  Patient understands that they may stop CCM services at any time and these changes will be effective at the end of the calendar month and will effectively revocate the agreement of CCM services.  Patient understands that only one practitioner can furnish and be paid for CCM services during one calendar month.  Patient also understands that there may be co-payment or deductible fees in association with CCM services.  Patient will continue with at least monthly follow-up calls with the Ambulatory .    YANDEL GONZALEZ  Ambulatory Case Management    2/28/2025, 10:51 EST

## 2025-03-03 ENCOUNTER — HOSPITAL ENCOUNTER (INPATIENT)
Facility: HOSPITAL | Age: 70
LOS: 9 days | Discharge: NURSING, REHAB OR LONG-TERM CARE | End: 2025-03-12
Attending: EMERGENCY MEDICINE | Admitting: STUDENT IN AN ORGANIZED HEALTH CARE EDUCATION/TRAINING PROGRAM
Payer: MEDICARE

## 2025-03-03 ENCOUNTER — APPOINTMENT (OUTPATIENT)
Dept: GENERAL RADIOLOGY | Facility: HOSPITAL | Age: 70
End: 2025-03-03
Payer: MEDICARE

## 2025-03-03 ENCOUNTER — APPOINTMENT (OUTPATIENT)
Dept: CT IMAGING | Facility: HOSPITAL | Age: 70
End: 2025-03-03
Payer: MEDICARE

## 2025-03-03 ENCOUNTER — TELEPHONE (OUTPATIENT)
Dept: FAMILY MEDICINE CLINIC | Facility: CLINIC | Age: 70
End: 2025-03-03
Payer: MEDICARE

## 2025-03-03 DIAGNOSIS — R26.2 DIFFICULTY IN WALKING: ICD-10-CM

## 2025-03-03 DIAGNOSIS — K92.2 UPPER GI BLEED: ICD-10-CM

## 2025-03-03 DIAGNOSIS — Z78.9 DECREASED ACTIVITIES OF DAILY LIVING (ADL): ICD-10-CM

## 2025-03-03 DIAGNOSIS — K20.90 ACUTE ESOPHAGITIS: Primary | ICD-10-CM

## 2025-03-03 DIAGNOSIS — K92.0 COFFEE GROUND EMESIS: ICD-10-CM

## 2025-03-03 LAB
ALBUMIN SERPL-MCNC: 3.8 G/DL (ref 3.5–5.2)
ALBUMIN/GLOB SERPL: 0.8 G/DL
ALP SERPL-CCNC: 118 U/L (ref 39–117)
ALT SERPL W P-5'-P-CCNC: 14 U/L (ref 1–41)
ANION GAP SERPL CALCULATED.3IONS-SCNC: 12.1 MMOL/L (ref 5–15)
AST SERPL-CCNC: 12 U/L (ref 1–40)
BACTERIA UR QL AUTO: ABNORMAL /HPF
BASOPHILS # BLD AUTO: 0.02 10*3/MM3 (ref 0–0.2)
BASOPHILS NFR BLD AUTO: 0.3 % (ref 0–1.5)
BILIRUB SERPL-MCNC: 0.6 MG/DL (ref 0–1.2)
BILIRUB UR QL STRIP: ABNORMAL
BUN SERPL-MCNC: 22 MG/DL (ref 8–23)
BUN/CREAT SERPL: 21.6 (ref 7–25)
CALCIUM SPEC-SCNC: 9.4 MG/DL (ref 8.6–10.5)
CHLORIDE SERPL-SCNC: 100 MMOL/L (ref 98–107)
CLARITY UR: CLEAR
CO2 SERPL-SCNC: 24.9 MMOL/L (ref 22–29)
COLOR UR: ABNORMAL
CREAT SERPL-MCNC: 1.02 MG/DL (ref 0.76–1.27)
D-LACTATE SERPL-SCNC: 1.8 MMOL/L (ref 0.5–2)
DEPRECATED RDW RBC AUTO: 45.5 FL (ref 37–54)
EGFRCR SERPLBLD CKD-EPI 2021: 80.6 ML/MIN/1.73
EOSINOPHIL # BLD AUTO: 0.02 10*3/MM3 (ref 0–0.4)
EOSINOPHIL NFR BLD AUTO: 0.3 % (ref 0.3–6.2)
ERYTHROCYTE [DISTWIDTH] IN BLOOD BY AUTOMATED COUNT: 17.6 % (ref 12.3–15.4)
ETHANOL BLD-MCNC: <10 MG/DL (ref 0–10)
ETHANOL UR QL: <0.01 %
GEN 5 1HR TROPONIN T REFLEX: 9 NG/L
GLOBULIN UR ELPH-MCNC: 4.8 GM/DL
GLUCOSE SERPL-MCNC: 210 MG/DL (ref 65–99)
GLUCOSE UR STRIP-MCNC: NEGATIVE MG/DL
HCT VFR BLD AUTO: 51.3 % (ref 37.5–51)
HGB BLD-MCNC: 16.4 G/DL (ref 13–17.7)
HGB UR QL STRIP.AUTO: NEGATIVE
HOLD SPECIMEN: NORMAL
HOLD SPECIMEN: NORMAL
HYALINE CASTS UR QL AUTO: ABNORMAL /LPF
IMM GRANULOCYTES # BLD AUTO: 0.04 10*3/MM3 (ref 0–0.05)
IMM GRANULOCYTES NFR BLD AUTO: 0.5 % (ref 0–0.5)
INR PPP: 1.07 (ref 0.86–1.15)
KETONES UR QL STRIP: ABNORMAL
LEUKOCYTE ESTERASE UR QL STRIP.AUTO: ABNORMAL
LYMPHOCYTES # BLD AUTO: 1.18 10*3/MM3 (ref 0.7–3.1)
LYMPHOCYTES NFR BLD AUTO: 16.1 % (ref 19.6–45.3)
MAGNESIUM SERPL-MCNC: 1.9 MG/DL (ref 1.6–2.4)
MCH RBC QN AUTO: 25.2 PG (ref 26.6–33)
MCHC RBC AUTO-ENTMCNC: 32 G/DL (ref 31.5–35.7)
MCV RBC AUTO: 78.9 FL (ref 79–97)
MONOCYTES # BLD AUTO: 0.63 10*3/MM3 (ref 0.1–0.9)
MONOCYTES NFR BLD AUTO: 8.6 % (ref 5–12)
NEUTROPHILS NFR BLD AUTO: 5.44 10*3/MM3 (ref 1.7–7)
NEUTROPHILS NFR BLD AUTO: 74.2 % (ref 42.7–76)
NITRITE UR QL STRIP: NEGATIVE
NRBC BLD AUTO-RTO: 0 /100 WBC (ref 0–0.2)
PH UR STRIP.AUTO: 6 [PH] (ref 5–8)
PLATELET # BLD AUTO: 244 10*3/MM3 (ref 140–450)
PMV BLD AUTO: 10.2 FL (ref 6–12)
POTASSIUM SERPL-SCNC: 4 MMOL/L (ref 3.5–5.2)
PROT SERPL-MCNC: 8.6 G/DL (ref 6–8.5)
PROT UR QL STRIP: ABNORMAL
PROTHROMBIN TIME: 14.3 SECONDS (ref 11.8–14.9)
QT INTERVAL: 341 MS
QTC INTERVAL: 473 MS
RBC # BLD AUTO: 6.5 10*6/MM3 (ref 4.14–5.8)
RBC # UR STRIP: ABNORMAL /HPF
REF LAB TEST METHOD: ABNORMAL
SODIUM SERPL-SCNC: 137 MMOL/L (ref 136–145)
SP GR UR STRIP: >=1.03 (ref 1–1.03)
SQUAMOUS #/AREA URNS HPF: ABNORMAL /HPF
TROPONIN T NUMERIC DELTA: 0 NG/L
TROPONIN T SERPL HS-MCNC: 9 NG/L
UROBILINOGEN UR QL STRIP: ABNORMAL
WBC # UR STRIP: ABNORMAL /HPF
WBC NRBC COR # BLD AUTO: 7.33 10*3/MM3 (ref 3.4–10.8)
WHOLE BLOOD HOLD COAG: NORMAL
WHOLE BLOOD HOLD SPECIMEN: NORMAL

## 2025-03-03 PROCEDURE — 80307 DRUG TEST PRSMV CHEM ANLYZR: CPT | Performed by: EMERGENCY MEDICINE

## 2025-03-03 PROCEDURE — 74177 CT ABD & PELVIS W/CONTRAST: CPT

## 2025-03-03 PROCEDURE — 83605 ASSAY OF LACTIC ACID: CPT

## 2025-03-03 PROCEDURE — 84484 ASSAY OF TROPONIN QUANT: CPT | Performed by: EMERGENCY MEDICINE

## 2025-03-03 PROCEDURE — 25010000002 ONDANSETRON PER 1 MG: Performed by: EMERGENCY MEDICINE

## 2025-03-03 PROCEDURE — 99222 1ST HOSP IP/OBS MODERATE 55: CPT | Performed by: STUDENT IN AN ORGANIZED HEALTH CARE EDUCATION/TRAINING PROGRAM

## 2025-03-03 PROCEDURE — 25810000003 SODIUM CHLORIDE 0.9 % SOLUTION: Performed by: EMERGENCY MEDICINE

## 2025-03-03 PROCEDURE — 99285 EMERGENCY DEPT VISIT HI MDM: CPT

## 2025-03-03 PROCEDURE — 71045 X-RAY EXAM CHEST 1 VIEW: CPT

## 2025-03-03 PROCEDURE — 81001 URINALYSIS AUTO W/SCOPE: CPT | Performed by: EMERGENCY MEDICINE

## 2025-03-03 PROCEDURE — 87086 URINE CULTURE/COLONY COUNT: CPT | Performed by: EMERGENCY MEDICINE

## 2025-03-03 PROCEDURE — 94799 UNLISTED PULMONARY SVC/PX: CPT

## 2025-03-03 PROCEDURE — 93005 ELECTROCARDIOGRAM TRACING: CPT

## 2025-03-03 PROCEDURE — 93005 ELECTROCARDIOGRAM TRACING: CPT | Performed by: EMERGENCY MEDICINE

## 2025-03-03 PROCEDURE — 36415 COLL VENOUS BLD VENIPUNCTURE: CPT | Performed by: EMERGENCY MEDICINE

## 2025-03-03 PROCEDURE — 83735 ASSAY OF MAGNESIUM: CPT | Performed by: EMERGENCY MEDICINE

## 2025-03-03 PROCEDURE — P9612 CATHETERIZE FOR URINE SPEC: HCPCS

## 2025-03-03 PROCEDURE — 25510000001 IOPAMIDOL PER 1 ML: Performed by: EMERGENCY MEDICINE

## 2025-03-03 PROCEDURE — 85025 COMPLETE CBC W/AUTO DIFF WBC: CPT | Performed by: EMERGENCY MEDICINE

## 2025-03-03 PROCEDURE — 85610 PROTHROMBIN TIME: CPT | Performed by: EMERGENCY MEDICINE

## 2025-03-03 PROCEDURE — 80053 COMPREHEN METABOLIC PANEL: CPT | Performed by: EMERGENCY MEDICINE

## 2025-03-03 PROCEDURE — 82077 ASSAY SPEC XCP UR&BREATH IA: CPT | Performed by: EMERGENCY MEDICINE

## 2025-03-03 PROCEDURE — 94640 AIRWAY INHALATION TREATMENT: CPT

## 2025-03-03 RX ORDER — ATORVASTATIN CALCIUM 40 MG/1
1 TABLET, FILM COATED ORAL DAILY
COMMUNITY
Start: 2025-02-27

## 2025-03-03 RX ORDER — SERTRALINE HYDROCHLORIDE 25 MG/1
1 TABLET, FILM COATED ORAL DAILY
COMMUNITY
Start: 2025-02-27

## 2025-03-03 RX ORDER — IPRATROPIUM BROMIDE AND ALBUTEROL SULFATE 2.5; .5 MG/3ML; MG/3ML
3 SOLUTION RESPIRATORY (INHALATION) ONCE
Status: COMPLETED | OUTPATIENT
Start: 2025-03-03 | End: 2025-03-03

## 2025-03-03 RX ORDER — PANTOPRAZOLE SODIUM 40 MG/10ML
80 INJECTION, POWDER, LYOPHILIZED, FOR SOLUTION INTRAVENOUS ONCE
Status: COMPLETED | OUTPATIENT
Start: 2025-03-03 | End: 2025-03-03

## 2025-03-03 RX ORDER — SODIUM CHLORIDE 0.9 % (FLUSH) 0.9 %
10 SYRINGE (ML) INJECTION AS NEEDED
Status: DISCONTINUED | OUTPATIENT
Start: 2025-03-03 | End: 2025-03-12 | Stop reason: HOSPADM

## 2025-03-03 RX ORDER — ONDANSETRON 2 MG/ML
4 INJECTION INTRAMUSCULAR; INTRAVENOUS ONCE
Status: COMPLETED | OUTPATIENT
Start: 2025-03-03 | End: 2025-03-03

## 2025-03-03 RX ORDER — IOPAMIDOL 755 MG/ML
100 INJECTION, SOLUTION INTRAVASCULAR
Status: COMPLETED | OUTPATIENT
Start: 2025-03-03 | End: 2025-03-03

## 2025-03-03 RX ADMIN — IOPAMIDOL 100 ML: 755 INJECTION, SOLUTION INTRAVENOUS at 20:59

## 2025-03-03 RX ADMIN — ONDANSETRON 4 MG: 2 INJECTION INTRAMUSCULAR; INTRAVENOUS at 21:40

## 2025-03-03 RX ADMIN — SODIUM CHLORIDE 1000 ML: 9 INJECTION, SOLUTION INTRAVENOUS at 21:40

## 2025-03-03 RX ADMIN — IPRATROPIUM BROMIDE AND ALBUTEROL SULFATE 3 ML: .5; 3 SOLUTION RESPIRATORY (INHALATION) at 16:44

## 2025-03-03 RX ADMIN — PANTOPRAZOLE SODIUM 80 MG: 40 INJECTION, POWDER, FOR SOLUTION INTRAVENOUS at 21:40

## 2025-03-03 NOTE — ED TRIAGE NOTES
Patient to ED via [facility] from home with generalized weakness.   EMS states patient was in the bed upon their arrival to patients home. Patient is non ambulatory, does not remember the last time he had been up out of the bed, does not remember the last time he had food or drink.  Upon arrival to ED, patient seen to have dark coffee ground emesis in the corners of his mouth.   Patient presents with urine/feces soaked clothing.   Patient was 94kg 3 weeks ago when d/c'd from inpatient stay (for generalized weakness, UTI), patient presents today @ 80.1kg

## 2025-03-03 NOTE — ED PROVIDER NOTES
Time: 4:25 PM EST  Date of encounter:  3/3/2025  Independent Historian/Clinical History and Information was obtained by:   Patient, Family, and EMS    History is limited by: N/A    Chief Complaint   Patient presents with    Weakness - Generalized    Vomiting and coffee-ground emesis         History of Present Illness:  Patient is a 69 y.o. year old male who presents to the emergency department for evaluation of weakness.  Patient is a poor historian.  According to the family, the patient has had persistent vomiting and generalized weakness.  He started vomiting up coffee-ground material earlier in the day.  He has had no fever chills or cough.  He does not state why he called EMS to bring him to the hospital today.  He has been having increased weakness according to EMS.   Patient denies any pain; however he has had persistent vomiting with coffee-ground emesis.   Patient is noted to have rash along the buttocks and around the rectum.  (Provider in triage, Mike Carias PA-C)    Patient Care Team  Primary Care Provider: Sophie Capps APRN    Past Medical History:     Allergies   Allergen Reactions    Ceftriaxone Rash     Had fairly extensive diffuse chest/arms rash after administration 1/2025 hospitalization.  Switched to aztreonam at that time.       Past Medical History:   Diagnosis Date    CHF (congestive heart failure)     SEE'S DR [place]ICKLAND NO CURRENT S/S    COPD (chronic obstructive pulmonary disease)     INHALER    Diabetes mellitus     DOESN'T CHECK BG OFTEN AT HOME    Hyperlipidemia     Hypertension     Sepsis 09/14/2023    Stroke 2017    RIGHT SIDE WEAKNESS     Past Surgical History:   Procedure Laterality Date    APPENDECTOMY      CYSTOLITHALOPAXY PERCUTANEOUS Left 1/16/2025    Procedure: CYSTOLITHOLAPAXY, left ureteroscopy with laser basket stone extraction and left ureteral stent exchange.  Looking bladder, laser stone off stent exchange retained stent if possible;  Surgeon: Ector Kulkarni MD;   Location: [facility] MAIN OR;  Service: Urology;  Laterality: Left;    EYE SURGERY Bilateral     MISTY CATARACT     Family History   Problem Relation Age of Onset    No Known Problems Mother     No Known Problems Father     Malig Hyperthermia Neg Hx        Home Medications:  Prior to Admission medications    Medication Sig Start Date End Date Taking? Authorizing Provider   Aspirin Low Dose 81 MG chewable tablet CHEW AND SWALLOW 1 TABLET BY MOUTH DAILY 11/2/24   Sophie Capps APRN   atorvastatin (LIPITOR) 20 MG tablet TAKE 1 TABLET BY MOUTH NIGHTLY 1/6/25   Sophie Capps APRN   budesonide-formoterol (Symbicort) 160-4.5 MCG/ACT inhaler Inhale 2 puffs 2 (Two) Times a Day. 2/7/25   Meliton Monroy DO   cetirizine (zyrTEC) 10 MG tablet Take 1 tablet by mouth Daily for 14 days. 1/18/25 2/4/25  Lico Langley MD   Diclofenac Sodium (VOLTAREN) 1 % gel gel Apply 2 g topically to the appropriate area as directed 4 (Four) Times a Day. 1/17/25   Lico Langley MD   empagliflozin (Jardiance) 25 MG tablet tablet TAKE 1 TABLET BY MOUTH DAILY 1/6/25   Sophie Capps APRN   famotidine (PEPCID) 40 MG tablet Take 1 tablet by mouth At Night As Needed.    Provider, MD Curtis Sales 100 UNIT/ML injection pen INJECT 10 UNITS SUBCUTANEOUSLY INTO THE APPROPRIATE AREA EVERY NIGHT AS DIRECTED  Patient taking differently: Inject 10 Units under the skin into the appropriate area as directed Every Night. 1/7/25   Sophie Capps APRN   losartan (COZAAR) 25 MG tablet TAKE 1 TABLET BY MOUTH DAILY 1/6/25   Sophie Capps APRN   nicotine (NICODERM CQ) 21 MG/24HR patch Place 1 patch on the skin as directed by provider Daily. 2/7/25   Meliton Monroy DO        Social History:   Social History     Tobacco Use    Smoking status: Every Day     Current packs/day: 2.50     Average packs/day: 2.5 packs/day for 53.2 years (132.9 ttl pk-yrs)     Types: Cigarettes     Start date: 1972     Passive exposure: Current    Smokeless  "tobacco: Never    Tobacco comments:     INSTRUCTED NO SMOKING 24 HOUR PRIOR TO SURGERY    Vaping Use    Vaping status: Never Used   Substance Use Topics    Alcohol use: Yes    Drug use: Never         Review of Systems:  Review of Systems   Constitutional:  Negative for chills and fever.   HENT:  Negative for congestion, ear pain and sore throat.    Eyes:  Negative for pain.   Respiratory:  Negative for cough, chest tightness and shortness of breath.    Cardiovascular:  Negative for chest pain.   Gastrointestinal:  Positive for nausea and vomiting. Negative for abdominal pain and diarrhea.   Genitourinary:  Negative for flank pain and hematuria.   Musculoskeletal:  Negative for joint swelling.   Skin:  Negative for pallor.   Neurological:  Positive for weakness. Negative for seizures and headaches.   All other systems reviewed and are negative.       Physical Exam:  /98   Pulse 109   Temp 97.8 °F (36.6 °C) (Oral)   Resp 18   Ht 190.5 cm (75\")   Wt 80.1 kg (176 lb 9.4 oz)   SpO2 95%   BMI 22.07 kg/m²         Physical Exam  Vitals and nursing note reviewed.   Constitutional:       General: He is not in acute distress.     Appearance: He is ill-appearing. He is not toxic-appearing.   HENT:      Head: Normocephalic and atraumatic.      Right Ear: External ear normal.      Left Ear: External ear normal.      Nose: Nose normal.      Mouth/Throat:      Mouth: Mucous membranes are moist.      Comments: Coffee-ground material in the mouth.  Eyes:      General: No scleral icterus.     Extraocular Movements: Extraocular movements intact.      Conjunctiva/sclera: Conjunctivae normal.   Cardiovascular:      Rate and Rhythm: Normal rate and regular rhythm.      Pulses: Normal pulses.      Heart sounds: Normal heart sounds.   Pulmonary:      Effort: Pulmonary effort is normal. No respiratory distress.      Breath sounds: Normal breath sounds.   Abdominal:      General: Abdomen is flat. There is no distension.      " Palpations: Abdomen is soft.      Tenderness: There is abdominal tenderness. There is no guarding or rebound.   Musculoskeletal:         General: Normal range of motion.      Cervical back: Normal range of motion and neck supple.   Skin:     General: Skin is warm and dry.      Capillary Refill: Capillary refill takes less than 2 seconds.      Coloration: Skin is not cyanotic.   Neurological:      Mental Status: He is alert and oriented to person, place, and time. Mental status is at baseline.      Motor: Weakness present.   Psychiatric:         Attention and Perception: Attention and perception normal.         Mood and Affect: Mood normal.                            Medical Decision Making:      Comorbidities that affect care:    Diabetes, Hypertension    External Notes reviewed:    Previous Admission Note: Prior admission for generalized weakness.      The following orders were placed and all results were independently analyzed by me:  Orders Placed This Encounter   Procedures    XR Chest 1 View    CT Abdomen Pelvis With Contrast    [place] Draw    Comprehensive Metabolic Panel    High Sensitivity Troponin T    Magnesium    Urinalysis With Microscopic If Indicated (No Culture) - Urine, Clean Catch    CBC Auto Differential    Lactic Acid, Plasma    High Sensitivity Troponin T 1Hr    Urinalysis With Culture If Indicated -    Protime-INR    Ethanol    Urine Drug Screen - Urine, Clean Catch    NPO Diet NPO Type: Strict NPO    Undress & Gown    Continuous Pulse Oximetry    Vital Signs    Orthostatic Blood Pressure    Hospitalist (on-call MD unless specified)    Oxygen Therapy- Nasal Cannula; Titrate 1-6 LPM Per SpO2; 90 - 95%    POC Glucose Once    ECG 12 Lead ED Triage Standing Order; Weak / Dizzy / AMS    Insert Peripheral IV    Fall Precautions    CBC & Differential    Green Top (Gel)    Lavender Top    Gold Top - SST    Light Blue Top       Medications Given in the Emergency Department:  Medications   sodium  chloride 0.9 % flush 10 mL (has no administration in time range)   ipratropium-albuterol (DUO-NEB) nebulizer solution 3 mL (3 mL Nebulization Given 3/3/25 1644)   sodium chloride 0.9 % bolus 1,000 mL (1,000 mL Intravenous New Bag 3/3/25 2140)   ondansetron (ZOFRAN) injection 4 mg (4 mg Intravenous Given 3/3/25 2140)   pantoprazole (PROTONIX) injection 80 mg (80 mg Intravenous Given 3/3/25 2140)   iopamidol (ISOVUE-370) 76 % injection 100 mL (100 mL Intravenous Given 3/3/25 2059)        ED Course:    The patient was initially evaluated in the triage area where orders were placed. The patient was later dispositioned by Ronnell Gan DO.      The patient was advised to stay for completion of workup which includes but is not limited to communication of labs and radiological results, reassessment and plan. The patient was advised that leaving prior to disposition by a provider could result in critical findings that are not communicated to the patient.     ED Course as of 03/03/25 2218   Mon Mar 03, 2025   1625 PROVIDER IN TRIAGE  Patient was evaluated by me in triage, Mike Carias PA-C.  Orders were placed and patient is currently awaiting final results and disposition.  [MD]      ED Course User Index  [MD] Mike Carias PA-C       EKG: Atrial flutter with a rate of 115 beats per  No acute ischemia    Labs:    Lab Results (last 24 hours)       Procedure Component Value Units Date/Time    CBC & Differential [[id number]]  (Abnormal) Collected: 03/03/25 1650    Specimen: Blood from Arm, Left Updated: 03/03/25 1659    Narrative:      The following orders were created for panel order CBC & Differential.  Procedure                               Abnormality         Status                     ---------                               -----------         ------                     CBC Auto Differential[[id number]]        Abnormal            Final result                 Please view results for these tests on the  individual orders.    Comprehensive Metabolic Panel [[id number]]  (Abnormal) Collected: 03/03/25 1650    Specimen: Blood from Arm, Left Updated: 03/03/25 1717     Glucose 210 mg/dL      BUN 22 mg/dL      Creatinine 1.02 mg/dL      Sodium 137 mmol/L      Potassium 4.0 mmol/L      Chloride 100 mmol/L      CO2 24.9 mmol/L      Calcium 9.4 mg/dL      Total Protein 8.6 g/dL      Albumin 3.8 g/dL      ALT (SGPT) 14 U/L      AST (SGOT) 12 U/L      Alkaline Phosphatase 118 U/L      Total Bilirubin 0.6 mg/dL      Globulin 4.8 gm/dL      A/G Ratio 0.8 g/dL      BUN/Creatinine Ratio 21.6     Anion Gap 12.1 mmol/L      eGFR 80.6 mL/min/1.73     Narrative:      GFR Categories in Chronic Kidney Disease (CKD)      GFR Category          GFR (mL/min/1.73)    Interpretation  G1                     90 or greater         Normal or high (1)  G2                      60-89                Mild decrease (1)  G3a                   45-59                Mild to moderate decrease  G3b                   30-44                Moderate to severe decrease  G4                    15-29                Severe decrease  G5                    14 or less           Kidney failure          (1)In the absence of evidence of kidney disease, neither GFR category G1 or G2 fulfill the criteria for CKD.    eGFR calculation 2021 CKD-EPI creatinine equation, which does not include race as a factor    High Sensitivity Troponin T [[id number]]  (Normal) Collected: 03/03/25 1650    Specimen: Blood from Arm, Left Updated: 03/03/25 1717     HS Troponin T 9 ng/L     Narrative:      High Sensitive Troponin T Reference Range:  <14.0 ng/L- Negative Female for AMI  <22.0 ng/L- Negative Male for AMI  >=14 - Abnormal Female indicating possible myocardial injury.  >=22 - Abnormal Male indicating possible myocardial injury.   Clinicians would have to utilize clinical acumen, EKG, Troponin, and serial changes to determine if it is an Acute Myocardial Infarction or myocardial injury  due to an underlying chronic condition.         Magnesium [[id number]]  (Normal) Collected: 03/03/25 1650    Specimen: Blood from Arm, Left Updated: 03/03/25 1717     Magnesium 1.9 mg/dL     CBC Auto Differential [[id number]]  (Abnormal) Collected: 03/03/25 1650    Specimen: Blood from Arm, Left Updated: 03/03/25 1659     WBC 7.33 10*3/mm3      RBC 6.50 10*6/mm3      Hemoglobin 16.4 g/dL      Hematocrit 51.3 %      MCV 78.9 fL      MCH 25.2 pg      MCHC 32.0 g/dL      RDW 17.6 %      RDW-SD 45.5 fl      MPV 10.2 fL      Platelets 244 10*3/mm3      Neutrophil % 74.2 %      Lymphocyte % 16.1 %      Monocyte % 8.6 %      Eosinophil % 0.3 %      Basophil % 0.3 %      Immature Grans % 0.5 %      Neutrophils, Absolute 5.44 10*3/mm3      Lymphocytes, Absolute 1.18 10*3/mm3      Monocytes, Absolute 0.63 10*3/mm3      Eosinophils, Absolute 0.02 10*3/mm3      Basophils, Absolute 0.02 10*3/mm3      Immature Grans, Absolute 0.04 10*3/mm3      nRBC 0.0 /100 WBC     Lactic Acid, Plasma [[id number]]  (Normal) Collected: 03/03/25 1650    Specimen: Blood from Arm, Left Updated: 03/03/25 1714     Lactate 1.8 mmol/L     High Sensitivity Troponin T 1Hr [[id number]]  (Normal) Collected: 03/03/25 1911    Specimen: Blood from Arm, Right Updated: 03/03/25 1943     HS Troponin T 9 ng/L      Troponin T Numeric Delta 0 ng/L     Narrative:      High Sensitive Troponin T Reference Range:  <14.0 ng/L- Negative Female for AMI  <22.0 ng/L- Negative Male for AMI  >=14 - Abnormal Female indicating possible myocardial injury.  >=22 - Abnormal Male indicating possible myocardial injury.   Clinicians would have to utilize clinical acumen, EKG, Troponin, and serial changes to determine if it is an Acute Myocardial Infarction or myocardial injury due to an underlying chronic condition.                  Imaging:    CT Abdomen Pelvis With Contrast    Result Date: 3/3/2025  CT ABDOMEN PELVIS W CONTRAST Date of Exam: 3/3/2025 8:53 PM EST Indication: Abdominal  pain and vomiting. Comparison: 1/12/2025 Technique: Axial CT images were obtained of the abdomen and pelvis after the uneventful intravenous administration of iodinated contrast. Reconstructed coronal and sagittal images were also obtained. Automated exposure control and iterative construction methods were used. Findings: Granulomatous calcification noted in the right lower lobe. There is mild scarring/atelectasis in both lung bases. There is a scant amount of of right pleural fluid. There is atherosclerotic disease. There is ectasia of the infrarenal aorta measuring 3.0 cm, unchanged. Consider follow-up in 1 year. The gallbladder has an unusual somewhat beaded appearance, and gallbladder wall thickening and/or gallstones are not excluded. Consider nonemergent ultrasound for follow-up. No biliary obstruction. Spleen remains enlarged measuring at least 13.5 cm in length. There are right renal cortical cysts. No follow-up is needed. There is cortical scarring and atrophy in the left kidney that is unchanged. Previously identified left ureteral stent has been removed. There are a few small nonobstructing stones in the left kidney measuring up to about 3 mm in size. No ureteral stones or hydronephrosis on either side. Mild hepatic steatosis is present. Solid abdominal organs are otherwise normal. There is some questionable tiny dependent stones or stone fragments in the urinary bladder. Urinary bladder is otherwise normal in appearance. Prostate gland unremarkable. Small fat-containing umbilical hernia is present. There is some chronic fat stranding in the small bowel mesentery that is not significantly changed. This is benign and present since at least 4/7/2019. The appendix is surgically absent. There is colonic diverticulosis without diverticulitis or acute colitis. No small bowel obstruction is identified. There is some mild thickening of the lower esophagus that may reflect esophagitis. The stomach appears normal.  No adenopathy or free fluid is seen.     1.There is some mild thickening of the lower esophagus that may reflect esophagitis. 2.Unusual appearance of the gallbladder, and chronic gallbladder wall thickening and/or gallstones are not excluded. Consider nonemergent ultrasound for follow-up. No biliary obstruction. 3.Splenomegaly. 4.Nonobstructing left renal stones. 5.Questionable tiny dependent stones or stone fragments in the urinary bladder. 6.Colonic diverticulosis without diverticulitis. Appendectomy. No bowel obstruction. 7.Additional chronic findings as described above. Electronically Signed: Durga Zurita MD  3/3/2025 9:16 PM EST  Workstation ID: [id number]    XR Chest 1 View    Result Date: 3/3/2025  XR CHEST 1 VW Date of Exam: 3/3/2025 3:15 PM EST Indication: Weak/Dizzy/AMS triage protocol Comparison: Chest radiograph 2/4/2025 Findings: Mediastinum: Cardiac silhouette appears unchanged and normal in size Lungs: Emphysema. Background chronic scarring in both lung bases. No focal consolidation is appreciated. Pleura: No pleural effusion or pneumothorax. Bones and soft tissues: No acute, displaced fracture seen.     Impression: No radiographic evidence of acute cardiopulmonary abnormality. Electronically Signed: Praveen Santos  3/3/2025 3:29 PM EST  Workstation ID: [id number]       Differential Diagnosis and Discussion:      Weakness: Based on the patient's history, signs, and symptoms, the diffential diagnosis includes but is not limited to meningitis, stroke, sepsis, subarachnoid hemorrhage, intracranial bleeding, encephalitis, acute uti, dehydration, MS, myasthenia gravis, Gu[place], migraine variant, neuromuscular disorders vertigo, electrolyte imbalance, and metabolic disorders.    PROCEDURES:    Labs were collected in the emergency department and all labs were reviewed and interpreted by me.  X-ray were performed in the emergency department and all X-ray impressions were independently interpreted by  me.  An EKG was performed and the EKG was interpreted by me.    ECG 12 Lead ED Triage Standing Order; Weak / Dizzy / AMS   Preliminary Result   HEART DCBO=224  bpm   RR Iuayrbdy=937  ms   UT Interval=  ms   P Horizontal Axis=  deg   P Front Axis=  deg   QRSD Interval=91  ms   QT Onrdxqpk=837  ms   MGnM=078  ms   QRS Axis=52  deg   T Wave Axis=55  deg   - ABNORMAL ECG -   Atrial flutter with varied AV block,   Abnormal R-wave progression, late transition   Abnormal lateral Q waves   Date and Time of Study:2025-03-03 15:47:22           Procedures    MDM     Amount and/or Complexity of Data Reviewed  Clinical lab tests: reviewed  Tests in the radiology section of CPT®: reviewed  Tests in the medicine section of CPT®: reviewed                     Patient Care Considerations:    CT CHEST: I considered ordering a CT scan of the chest, however the patient has no signs of pulmonary embolism      Consultants/Shared Management Plan:    Hospitalist: I have discussed the case with hospitalist who agrees to accept the patient for admission.    Social Determinants of Health:    Patient is unable to carry out activities of daily life. Escalation of care is necessary.       Disposition and Care Coordination:    Admit:   Through independent evaluation of the patient's history, physical, and imperical data, the patient meets criteria for inpatient admission to the hospital.        Final diagnoses:   Acute esophagitis   Upper GI bleed        ED Disposition       ED Disposition   Decision to Admit    Condition   --    Comment   --               This medical record created using voice recognition software.             Ronnell Gan,   03/03/25 2216       Ronnell Gan,   03/03/25 2218

## 2025-03-03 NOTE — ED NOTES
Patient to ED via [facility] from home with generalized weakness.   EMS states patient was in the bed upon their arrival to patients home. Patient is non ambulatory, does not remember the last time he had been up out of the bed, does not remember the last time he had food or drink.  Upon arrival to ED, patient seen to have dark coffee ground emesis in the corners of his mouth.   Patient presents with urine/feces soaked clothing.

## 2025-03-03 NOTE — TELEPHONE ENCOUNTER
[facility] CALLED AND DECLINED TAKING PATIENT FOR HOME HEALTH DUE TO NON COMPLIANT. I WENT TO CHART AND NO ORDER WAS CURRENT FOR HOMEHEALTH. CAN YOU REVIEW CHART THANKS.

## 2025-03-04 ENCOUNTER — ANESTHESIA (OUTPATIENT)
Dept: GASTROENTEROLOGY | Facility: HOSPITAL | Age: 70
End: 2025-03-04
Payer: MEDICARE

## 2025-03-04 ENCOUNTER — ANESTHESIA EVENT (OUTPATIENT)
Dept: GASTROENTEROLOGY | Facility: HOSPITAL | Age: 70
End: 2025-03-04
Payer: MEDICARE

## 2025-03-04 LAB
ALBUMIN SERPL-MCNC: 3.2 G/DL (ref 3.5–5.2)
ALBUMIN/GLOB SERPL: 0.8 G/DL
ALP SERPL-CCNC: 99 U/L (ref 39–117)
ALT SERPL W P-5'-P-CCNC: 9 U/L (ref 1–41)
AMPHET+METHAMPHET UR QL: NEGATIVE
AMPHETAMINES UR QL: NEGATIVE
ANION GAP SERPL CALCULATED.3IONS-SCNC: 9.5 MMOL/L (ref 5–15)
AST SERPL-CCNC: 10 U/L (ref 1–40)
BARBITURATES UR QL SCN: NEGATIVE
BASOPHILS # BLD AUTO: 0.03 10*3/MM3 (ref 0–0.2)
BASOPHILS NFR BLD AUTO: 0.4 % (ref 0–1.5)
BENZODIAZ UR QL SCN: NEGATIVE
BILIRUB SERPL-MCNC: 0.5 MG/DL (ref 0–1.2)
BUN SERPL-MCNC: 20 MG/DL (ref 8–23)
BUN/CREAT SERPL: 21.3 (ref 7–25)
BUPRENORPHINE SERPL-MCNC: NEGATIVE NG/ML
CALCIUM SPEC-SCNC: 8.7 MG/DL (ref 8.6–10.5)
CANNABINOIDS SERPL QL: NEGATIVE
CHLORIDE SERPL-SCNC: 108 MMOL/L (ref 98–107)
CO2 SERPL-SCNC: 19.5 MMOL/L (ref 22–29)
COCAINE UR QL: NEGATIVE
CREAT SERPL-MCNC: 0.94 MG/DL (ref 0.76–1.27)
DEPRECATED RDW RBC AUTO: 49.1 FL (ref 37–54)
EGFRCR SERPLBLD CKD-EPI 2021: 88.9 ML/MIN/1.73
EOSINOPHIL # BLD AUTO: 0.02 10*3/MM3 (ref 0–0.4)
EOSINOPHIL NFR BLD AUTO: 0.3 % (ref 0.3–6.2)
ERYTHROCYTE [DISTWIDTH] IN BLOOD BY AUTOMATED COUNT: 16.6 % (ref 12.3–15.4)
FENTANYL UR-MCNC: NEGATIVE NG/ML
GLOBULIN UR ELPH-MCNC: 4.2 GM/DL
GLUCOSE BLDC GLUCOMTR-MCNC: 157 MG/DL (ref 70–99)
GLUCOSE BLDC GLUCOMTR-MCNC: 163 MG/DL (ref 70–99)
GLUCOSE BLDC GLUCOMTR-MCNC: 167 MG/DL (ref 70–99)
GLUCOSE BLDC GLUCOMTR-MCNC: 169 MG/DL (ref 70–99)
GLUCOSE SERPL-MCNC: 171 MG/DL (ref 65–99)
HCT VFR BLD AUTO: 41.7 % (ref 37.5–51)
HCT VFR BLD AUTO: 47.5 % (ref 37.5–51)
HGB BLD-MCNC: 13.4 G/DL (ref 13–17.7)
HGB BLD-MCNC: 14.8 G/DL (ref 13–17.7)
IMM GRANULOCYTES # BLD AUTO: 0.03 10*3/MM3 (ref 0–0.05)
IMM GRANULOCYTES NFR BLD AUTO: 0.4 % (ref 0–0.5)
LYMPHOCYTES # BLD AUTO: 1.64 10*3/MM3 (ref 0.7–3.1)
LYMPHOCYTES NFR BLD AUTO: 22.5 % (ref 19.6–45.3)
MAGNESIUM SERPL-MCNC: 1.7 MG/DL (ref 1.6–2.4)
MCH RBC QN AUTO: 25.9 PG (ref 26.6–33)
MCHC RBC AUTO-ENTMCNC: 31.2 G/DL (ref 31.5–35.7)
MCV RBC AUTO: 83 FL (ref 79–97)
METHADONE UR QL SCN: NEGATIVE
MONOCYTES # BLD AUTO: 0.65 10*3/MM3 (ref 0.1–0.9)
MONOCYTES NFR BLD AUTO: 8.9 % (ref 5–12)
NEUTROPHILS NFR BLD AUTO: 4.93 10*3/MM3 (ref 1.7–7)
NEUTROPHILS NFR BLD AUTO: 67.5 % (ref 42.7–76)
NRBC BLD AUTO-RTO: 0 /100 WBC (ref 0–0.2)
OPIATES UR QL: NEGATIVE
OXYCODONE UR QL SCN: NEGATIVE
PCP UR QL SCN: NEGATIVE
PHOSPHATE SERPL-MCNC: 2.8 MG/DL (ref 2.5–4.5)
PLATELET # BLD AUTO: 215 10*3/MM3 (ref 140–450)
PMV BLD AUTO: 10 FL (ref 6–12)
POTASSIUM SERPL-SCNC: 3.6 MMOL/L (ref 3.5–5.2)
PROT SERPL-MCNC: 7.4 G/DL (ref 6–8.5)
RBC # BLD AUTO: 5.72 10*6/MM3 (ref 4.14–5.8)
SODIUM SERPL-SCNC: 137 MMOL/L (ref 136–145)
TRICYCLICS UR QL SCN: NEGATIVE
WBC NRBC COR # BLD AUTO: 7.3 10*3/MM3 (ref 3.4–10.8)

## 2025-03-04 PROCEDURE — 36415 COLL VENOUS BLD VENIPUNCTURE: CPT | Performed by: INTERNAL MEDICINE

## 2025-03-04 PROCEDURE — 83735 ASSAY OF MAGNESIUM: CPT | Performed by: STUDENT IN AN ORGANIZED HEALTH CARE EDUCATION/TRAINING PROGRAM

## 2025-03-04 PROCEDURE — 25810000003 LACTATED RINGERS PER 1000 ML

## 2025-03-04 PROCEDURE — 85014 HEMATOCRIT: CPT | Performed by: INTERNAL MEDICINE

## 2025-03-04 PROCEDURE — 94799 UNLISTED PULMONARY SVC/PX: CPT

## 2025-03-04 PROCEDURE — 94761 N-INVAS EAR/PLS OXIMETRY MLT: CPT

## 2025-03-04 PROCEDURE — 99222 1ST HOSP IP/OBS MODERATE 55: CPT | Performed by: INTERNAL MEDICINE

## 2025-03-04 PROCEDURE — 94760 N-INVAS EAR/PLS OXIMETRY 1: CPT

## 2025-03-04 PROCEDURE — 85018 HEMOGLOBIN: CPT | Performed by: INTERNAL MEDICINE

## 2025-03-04 PROCEDURE — 82948 REAGENT STRIP/BLOOD GLUCOSE: CPT | Performed by: STUDENT IN AN ORGANIZED HEALTH CARE EDUCATION/TRAINING PROGRAM

## 2025-03-04 PROCEDURE — 88305 TISSUE EXAM BY PATHOLOGIST: CPT | Performed by: INTERNAL MEDICINE

## 2025-03-04 PROCEDURE — 80053 COMPREHEN METABOLIC PANEL: CPT | Performed by: STUDENT IN AN ORGANIZED HEALTH CARE EDUCATION/TRAINING PROGRAM

## 2025-03-04 PROCEDURE — 0DB38ZX EXCISION OF LOWER ESOPHAGUS, VIA NATURAL OR ARTIFICIAL OPENING ENDOSCOPIC, DIAGNOSTIC: ICD-10-PCS | Performed by: INTERNAL MEDICINE

## 2025-03-04 PROCEDURE — 85025 COMPLETE CBC W/AUTO DIFF WBC: CPT | Performed by: STUDENT IN AN ORGANIZED HEALTH CARE EDUCATION/TRAINING PROGRAM

## 2025-03-04 PROCEDURE — 82948 REAGENT STRIP/BLOOD GLUCOSE: CPT

## 2025-03-04 PROCEDURE — 94664 DEMO&/EVAL PT USE INHALER: CPT

## 2025-03-04 PROCEDURE — 25010000002 LIDOCAINE PF 2% 2 % SOLUTION: Performed by: NURSE ANESTHETIST, CERTIFIED REGISTERED

## 2025-03-04 PROCEDURE — 84100 ASSAY OF PHOSPHORUS: CPT | Performed by: STUDENT IN AN ORGANIZED HEALTH CARE EDUCATION/TRAINING PROGRAM

## 2025-03-04 PROCEDURE — 88312 SPECIAL STAINS GROUP 1: CPT | Performed by: INTERNAL MEDICINE

## 2025-03-04 PROCEDURE — 25010000002 PROPOFOL 10 MG/ML EMULSION: Performed by: NURSE ANESTHETIST, CERTIFIED REGISTERED

## 2025-03-04 PROCEDURE — 43239 EGD BIOPSY SINGLE/MULTIPLE: CPT | Performed by: INTERNAL MEDICINE

## 2025-03-04 PROCEDURE — 99232 SBSQ HOSP IP/OBS MODERATE 35: CPT | Performed by: FAMILY MEDICINE

## 2025-03-04 PROCEDURE — 97165 OT EVAL LOW COMPLEX 30 MIN: CPT

## 2025-03-04 RX ORDER — FLUCONAZOLE 100 MG/1
100 TABLET ORAL DAILY
Status: DISCONTINUED | OUTPATIENT
Start: 2025-03-04 | End: 2025-03-12 | Stop reason: HOSPADM

## 2025-03-04 RX ORDER — BUDESONIDE 0.5 MG/2ML
0.5 INHALANT ORAL
Status: DISCONTINUED | OUTPATIENT
Start: 2025-03-04 | End: 2025-03-06

## 2025-03-04 RX ORDER — SODIUM CHLORIDE 9 MG/ML
40 INJECTION, SOLUTION INTRAVENOUS AS NEEDED
Status: DISCONTINUED | OUTPATIENT
Start: 2025-03-04 | End: 2025-03-12 | Stop reason: HOSPADM

## 2025-03-04 RX ORDER — ARFORMOTEROL TARTRATE 15 UG/2ML
15 SOLUTION RESPIRATORY (INHALATION)
Status: DISCONTINUED | OUTPATIENT
Start: 2025-03-04 | End: 2025-03-06

## 2025-03-04 RX ORDER — PANTOPRAZOLE SODIUM 40 MG/10ML
40 INJECTION, POWDER, LYOPHILIZED, FOR SOLUTION INTRAVENOUS
Status: DISCONTINUED | OUTPATIENT
Start: 2025-03-04 | End: 2025-03-07

## 2025-03-04 RX ORDER — PROPOFOL 10 MG/ML
VIAL (ML) INTRAVENOUS AS NEEDED
Status: DISCONTINUED | OUTPATIENT
Start: 2025-03-04 | End: 2025-03-04 | Stop reason: SURG

## 2025-03-04 RX ORDER — DEXTROSE MONOHYDRATE 25 G/50ML
25 INJECTION, SOLUTION INTRAVENOUS
Status: DISCONTINUED | OUTPATIENT
Start: 2025-03-04 | End: 2025-03-12 | Stop reason: HOSPADM

## 2025-03-04 RX ORDER — NICOTINE POLACRILEX 4 MG
15 LOZENGE BUCCAL
Status: DISCONTINUED | OUTPATIENT
Start: 2025-03-04 | End: 2025-03-05 | Stop reason: SDUPTHER

## 2025-03-04 RX ORDER — POLYETHYLENE GLYCOL 3350 17 G/17G
17 POWDER, FOR SOLUTION ORAL DAILY PRN
Status: DISCONTINUED | OUTPATIENT
Start: 2025-03-04 | End: 2025-03-12 | Stop reason: HOSPADM

## 2025-03-04 RX ORDER — LIDOCAINE HYDROCHLORIDE 20 MG/ML
INJECTION, SOLUTION EPIDURAL; INFILTRATION; INTRACAUDAL; PERINEURAL AS NEEDED
Status: DISCONTINUED | OUTPATIENT
Start: 2025-03-04 | End: 2025-03-04 | Stop reason: SURG

## 2025-03-04 RX ORDER — IPRATROPIUM BROMIDE AND ALBUTEROL SULFATE 2.5; .5 MG/3ML; MG/3ML
3 SOLUTION RESPIRATORY (INHALATION)
Status: DISCONTINUED | OUTPATIENT
Start: 2025-03-04 | End: 2025-03-07

## 2025-03-04 RX ORDER — SODIUM CHLORIDE 0.9 % (FLUSH) 0.9 %
10 SYRINGE (ML) INJECTION EVERY 12 HOURS SCHEDULED
Status: DISCONTINUED | OUTPATIENT
Start: 2025-03-04 | End: 2025-03-12 | Stop reason: HOSPADM

## 2025-03-04 RX ORDER — NITROGLYCERIN 0.4 MG/1
0.4 TABLET SUBLINGUAL
Status: DISCONTINUED | OUTPATIENT
Start: 2025-03-04 | End: 2025-03-12 | Stop reason: HOSPADM

## 2025-03-04 RX ORDER — SODIUM CHLORIDE, SODIUM LACTATE, POTASSIUM CHLORIDE, CALCIUM CHLORIDE 600; 310; 30; 20 MG/100ML; MG/100ML; MG/100ML; MG/100ML
30 INJECTION, SOLUTION INTRAVENOUS CONTINUOUS
Status: DISCONTINUED | OUTPATIENT
Start: 2025-03-04 | End: 2025-03-07

## 2025-03-04 RX ORDER — BISACODYL 5 MG/1
5 TABLET, DELAYED RELEASE ORAL DAILY PRN
Status: DISCONTINUED | OUTPATIENT
Start: 2025-03-04 | End: 2025-03-12 | Stop reason: HOSPADM

## 2025-03-04 RX ORDER — PHENYLEPHRINE HCL IN 0.9% NACL 1 MG/10 ML
SYRINGE (ML) INTRAVENOUS AS NEEDED
Status: DISCONTINUED | OUTPATIENT
Start: 2025-03-04 | End: 2025-03-04 | Stop reason: SURG

## 2025-03-04 RX ORDER — IBUPROFEN 600 MG/1
1 TABLET ORAL
Status: DISCONTINUED | OUTPATIENT
Start: 2025-03-04 | End: 2025-03-12 | Stop reason: HOSPADM

## 2025-03-04 RX ORDER — SODIUM CHLORIDE 0.9 % (FLUSH) 0.9 %
10 SYRINGE (ML) INJECTION AS NEEDED
Status: DISCONTINUED | OUTPATIENT
Start: 2025-03-04 | End: 2025-03-12 | Stop reason: HOSPADM

## 2025-03-04 RX ORDER — AMOXICILLIN 250 MG
2 CAPSULE ORAL 2 TIMES DAILY PRN
Status: DISCONTINUED | OUTPATIENT
Start: 2025-03-04 | End: 2025-03-12 | Stop reason: HOSPADM

## 2025-03-04 RX ORDER — BISACODYL 10 MG
10 SUPPOSITORY, RECTAL RECTAL DAILY PRN
Status: DISCONTINUED | OUTPATIENT
Start: 2025-03-04 | End: 2025-03-12 | Stop reason: HOSPADM

## 2025-03-04 RX ADMIN — IPRATROPIUM BROMIDE AND ALBUTEROL SULFATE 3 ML: .5; 3 SOLUTION RESPIRATORY (INHALATION) at 07:13

## 2025-03-04 RX ADMIN — FLUCONAZOLE 100 MG: 100 TABLET ORAL at 20:25

## 2025-03-04 RX ADMIN — Medication 10 ML: at 20:26

## 2025-03-04 RX ADMIN — SODIUM CHLORIDE, SODIUM LACTATE, POTASSIUM CHLORIDE, AND CALCIUM CHLORIDE 30 ML/HR: .6; .31; .03; .02 INJECTION, SOLUTION INTRAVENOUS at 15:12

## 2025-03-04 RX ADMIN — PROPOFOL 40 MG: 10 INJECTION, EMULSION INTRAVENOUS at 16:09

## 2025-03-04 RX ADMIN — BUDESONIDE 0.5 MG: 0.5 INHALANT RESPIRATORY (INHALATION) at 07:14

## 2025-03-04 RX ADMIN — IPRATROPIUM BROMIDE AND ALBUTEROL SULFATE 3 ML: .5; 3 SOLUTION RESPIRATORY (INHALATION) at 11:39

## 2025-03-04 RX ADMIN — BUDESONIDE 0.5 MG: 0.5 INHALANT RESPIRATORY (INHALATION) at 00:31

## 2025-03-04 RX ADMIN — IPRATROPIUM BROMIDE AND ALBUTEROL SULFATE 3 ML: .5; 3 SOLUTION RESPIRATORY (INHALATION) at 00:31

## 2025-03-04 RX ADMIN — Medication 100 MCG: at 16:18

## 2025-03-04 RX ADMIN — LIDOCAINE HYDROCHLORIDE 80 MG: 20 INJECTION, SOLUTION INTRAVENOUS at 16:09

## 2025-03-04 RX ADMIN — Medication 10 ML: at 09:00

## 2025-03-04 RX ADMIN — ARFORMOTEROL TARTRATE 15 MCG: 15 SOLUTION RESPIRATORY (INHALATION) at 00:31

## 2025-03-04 RX ADMIN — Medication 10 ML: at 04:02

## 2025-03-04 RX ADMIN — BUDESONIDE 0.5 MG: 0.5 INHALANT RESPIRATORY (INHALATION) at 18:13

## 2025-03-04 RX ADMIN — PROPOFOL 175 MCG/KG/MIN: 10 INJECTION, EMULSION INTRAVENOUS at 16:11

## 2025-03-04 RX ADMIN — IPRATROPIUM BROMIDE AND ALBUTEROL SULFATE 3 ML: .5; 3 SOLUTION RESPIRATORY (INHALATION) at 18:13

## 2025-03-04 RX ADMIN — PROPOFOL 20 MG: 10 INJECTION, EMULSION INTRAVENOUS at 16:11

## 2025-03-04 RX ADMIN — ARFORMOTEROL TARTRATE 15 MCG: 15 SOLUTION RESPIRATORY (INHALATION) at 07:14

## 2025-03-04 RX ADMIN — ARFORMOTEROL TARTRATE 15 MCG: 15 SOLUTION RESPIRATORY (INHALATION) at 18:13

## 2025-03-04 RX ADMIN — Medication 10 ML: at 15:11

## 2025-03-04 RX ADMIN — PANTOPRAZOLE SODIUM 40 MG: 40 INJECTION, POWDER, FOR SOLUTION INTRAVENOUS at 18:30

## 2025-03-04 RX ADMIN — Medication 10 ML: at 04:01

## 2025-03-04 NOTE — PROGRESS NOTES
[facility]   Hospitalist Progress Note  Date: 3/4/2025  Patient Name: Brian Mehta  : 1955  MRN: [id number]  Date of admission: 3/3/2025      Subjective   Subjective     Summary: 69-year-old male with a history of hypertension, hyperlipidemia, COPD, HFrEF, GERD, prior CVA with residual right-sided deficits, and diabetes mellitus presented with generalized weakness and coffee-ground emesis. He was recently discharged from rehab four days ago and has had episodes of vomiting since yesterday. EMS reported dry coffee-ground emesis in the corners of his mouth and incontinence upon arrival. He was alert but unable to answer questions. Initial labs were unremarkable, with stable hemoglobin (16.4 from 13.2 three weeks ago). CT abdomen/pelvis showed mild esophagitis, chronic gallbladder wall thickening, splenomegaly, nonobstructing left renal stones, and colonic diverticulosis without diverticulitis. He received IV fluids and Protonix in the ED and was admitted for further evaluation and management. GI consultation is planned.    Interval Followup:   Patient admitted overnight   Awaiting EGD  NPO at this time  Has confluent macular rash over back and buttocks      Objective   Objective     Vitals:   Temp:  [97.6 °F (36.4 °C)-98.3 °F (36.8 °C)] 98.3 °F (36.8 °C)  Heart Rate:  [] 111  Resp:  [15-18] 18  BP: (105-143)/(66-98) 105/66  Physical Exam    Constitutional: Awake, alert, no acute distress   Respiratory: Clear to auscultation bilaterally, nonlabored respirations    Cardiovascular: tachycardic   Gastrointestinal: Positive bowel sounds, soft, nontender, nondistended   Psychiatric: Appropriate affect, cooperative   Neurologic: Oriented x 3, speech clear          Assessment & Plan   Assessment / Plan     Assessment/Plan:  Generalized weakness  Coffee-ground emesis  Esophagitis  ?  Chronic gallbladder wall thickening on CT  Splenomegaly  Hypertension  Hyperlipidemia  COPD  HFrEF  GERD  Prior CVA with  residual right sided deficits  Rash - extensive stage 1 pressure wounds     Plan  Admit to inpatient, telemetry  Consult GI. Discussed with Dr Bello  IV Protonix 40 mg twice daily  N.p.o.  Monitor hemoglobin levels with a.m. labs.    Consult gastroenterology in the a.m.  POC glucose every 6 hours  Hypoglycemic protocol  CT findings of chronic gallbladder wall thickening, patient does not have any abdominal tenderness.  Consider ultrasound abdomen if warranted  Brov[place], Pulmicort  Bronchodilator protocol  DuoNebs every 6 hours as needed  Continuous pulse ox  Fall precautions  PT OT consult  Resume other appropriate home medications once records   Discussed plan with RN.    VTE Prophylaxis:  Mechanical VTE prophylaxis orders are present.  CODE STATUS:   Level Of Support Discussed With: Health Care Surrogate  Code Status (Patient has no pulse and is not breathing): CPR (Attempt to Resuscitate)  Medical Interventions (Patient has pulse or is breathing): Full Support

## 2025-03-04 NOTE — H&P
[facility]  Date: 3[facility]/2025   Patient Name: Brian Mehta  : 1955  MRN: [id number]  Primary Care Physician:  Sophie Capps APRN  Date of admission: 3/3/2025    Subjective   Subjective     Chief Complaint: Generalized weakness    HPI:    Brian Mehta is a 69 y.o. male with a past medical history of hypertension, hyperlipidemia, COPD, HFrEF GERD, prior CVA with residual right-sided deficits, diabetes mellitus presented to the ED for evaluation of generalized weakness.  As per the patient's wife at the bedside, patient was in the rehab and was discharged home 4 days ago, since yesterday patient has been having episodes of vomiting.  Patient's wife was not able to tell if he had any episodes of black-colored emesis/blood as there is no electricity at home. As per the reports from EMS, call received for generalized weakness.  Patient noted to have some dry coffee-ground emesis in the corners of his mouth and patient was soaked in his urine and feces.  Patient was able to tell his name, unable to answer any other questions.  As per the review of records, patient was bedbound chronically.    Upon arrival, vital signs temperature 97.8, pulse 120, respiratory rate 18, blood pressure 113/78 on room air saturating around 94%.  Troponin x 2 within normal limits, CMP with no significant findings, normal lactic acid, WBC 7.33, hemoglobin 16.4, 3 weeks ago was 13.2, platelets 244.  Chest x-ray showed no acute abnormality.  CT abdomen pelvis with contrast showed mild thickening of the lower esophagus that may reflect esophagitis.  Unusual appearance of the gallbladder and chronic gallbladder wall thickening and/or gallstones are not excluded.  Consider nonemergent ultrasound for follow-up.  No biliary obstruction noted.  Splenomegaly.  Nonobstructing left renal stones.  Colonic diverticulosis without diverticulitis.  No bowel obstruction.  Receiving IV fluids in the ED.  Received IV  Protonix.  Patient has been admitted for further evaluation and management of generalized weakness, coffee-ground emesis      Personal History     Past Medical History:   Diagnosis Date   • CHF (congestive heart failure)     SEE'S DR STRICKLAND NO CURRENT S/S   • COPD (chronic obstructive pulmonary disease)     INHALER   • Diabetes mellitus     DOESN'T CHECK BG OFTEN AT HOME   • Hyperlipidemia    • Hypertension    • Sepsis 09/14/2023   • Stroke 2017    RIGHT SIDE WEAKNESS         Past Surgical History:   Procedure Laterality Date   • APPENDECTOMY     • CYSTOLITHALOPAXY PERCUTANEOUS Left 1/16/2025    Procedure: CYSTOLITHOLAPAXY, left ureteroscopy with laser basket stone extraction and left ureteral stent exchange.  Looking bladder, laser stone off stent exchange retained stent if possible;  Surgeon: Ector Kulkarni MD;  Location: [facility] MAIN OR;  Service: Urology;  Laterality: Left;   • EYE SURGERY Bilateral     MISTY CATARACT         Family History   Problem Relation Age of Onset   • No Known Problems Mother    • No Known Problems Father    • Malig Hyperthermia Neg Hx          Social History     Socioeconomic History   • Marital status:    Tobacco Use   • Smoking status: Every Day     Current packs/day: 2.50     Average packs/day: 2.5 packs/day for 53.2 years (132.9 ttl pk-yrs)     Types: Cigarettes     Start date: 1972     Passive exposure: Current   • Smokeless tobacco: Never   • Tobacco comments:     INSTRUCTED NO SMOKING 24 HOUR PRIOR TO SURGERY    Vaping Use   • Vaping status: Never Used   Substance and Sexual Activity   • Alcohol use: Yes   • Drug use: Never   • Sexual activity: Defer         Home Medications:  Diclofenac Sodium, Insulin Glargine, aspirin, atorvastatin, budesonide-formoterol, cetirizine, empagliflozin, famotidine, losartan, nicotine, and sertraline    Allergies:  Allergies   Allergen Reactions   • Ceftriaxone Rash     Had fairly extensive diffuse chest/arms rash after administration 1/2025  hospitalization.  Switched to aztreonam at that time.           Objective   Objective     Vitals:   Temp:  [97.8 °F (36.6 °C)] 97.8 °F (36.6 °C)  Heart Rate:  [106-120] 106  Resp:  [17-18] 17  BP: (113-139)/(78-98) 139/86    Physical Exam    Constitutional: Awake, alert, no acute distress   Eyes: Pupils equal, sclerae anicteric, no conjunctival injection   HENT: NCAT, mucous membranes dry   Respiratory: Clear to auscultation bilaterally, nonlabored respirations    Cardiovascular: RRR, no murmurs   Gastrointestinal: soft, nontender, nondistended   Musculoskeletal: No bilateral ankle edema, no clubbing or cyanosis to extremities   Neurologic: Oriented x 1 to self, unable to answer any questions or follow commands    Result Review    Result Review:  I have personally reviewed the results from the time of this admission to 3/3/2025 23:47 EST and agree with these findings:  [x]  Laboratory  []  Microbiology  [x]  Radiology  [x]  EKG/Telemetry   []  Cardiology/Vascular   []  Pathology  []  Old records  []  Other:        Imaging Results (Last 24 Hours)       Procedure Component Value Units Date/Time    CT Abdomen Pelvis With Contrast [[id number]] Collected: 03/03/25 2103     Updated: 03/03/25 2118    Narrative:      CT ABDOMEN PELVIS W CONTRAST    Date of Exam: 3/3/2025 8:53 PM EST    Indication: Abdominal pain and vomiting.    Comparison: 1/12/2025    Technique: Axial CT images were obtained of the abdomen and pelvis after the uneventful intravenous administration of iodinated contrast. Reconstructed coronal and sagittal images were also obtained. Automated exposure control and iterative construction   methods were used.    Findings:  Granulomatous calcification noted in the right lower lobe. There is mild scarring/atelectasis in both lung bases. There is a scant amount of of right pleural fluid. There is atherosclerotic disease. There is ectasia of the infrarenal aorta measuring 3.0   cm, unchanged. Consider follow-up in  1 year. The gallbladder has an unusual somewhat beaded appearance, and gallbladder wall thickening and/or gallstones are not excluded. Consider nonemergent ultrasound for follow-up. No biliary obstruction. Spleen   remains enlarged measuring at least 13.5 cm in length. There are right renal cortical cysts. No follow-up is needed. There is cortical scarring and atrophy in the left kidney that is unchanged. Previously identified left ureteral stent has been removed.   There are a few small nonobstructing stones in the left kidney measuring up to about 3 mm in size. No ureteral stones or hydronephrosis on either side. Mild hepatic steatosis is present. Solid abdominal organs are otherwise normal.    There is some questionable tiny dependent stones or stone fragments in the urinary bladder. Urinary bladder is otherwise normal in appearance. Prostate gland unremarkable. Small fat-containing umbilical hernia is present. There is some chronic fat   stranding in the small bowel mesentery that is not significantly changed. This is benign and present since at least 4/7/2019. The appendix is surgically absent. There is colonic diverticulosis without diverticulitis or acute colitis. No small bowel   obstruction is identified. There is some mild thickening of the lower esophagus that may reflect esophagitis. The stomach appears normal. No adenopathy or free fluid is seen.      Impression:      1.There is some mild thickening of the lower esophagus that may reflect esophagitis.  2.Unusual appearance of the gallbladder, and chronic gallbladder wall thickening and/or gallstones are not excluded. Consider nonemergent ultrasound for follow-up. No biliary obstruction.  3.Splenomegaly.  4.Nonobstructing left renal stones.  5.Questionable tiny dependent stones or stone fragments in the urinary bladder.  6.Colonic diverticulosis without diverticulitis. Appendectomy. No bowel obstruction.  7.Additional chronic findings as described  above.        Electronically Signed: Durga Zurita MD    3/3/2025 9:16 PM EST    Workstation ID: [id number]    XR Chest 1 View [[id number]] Collected: 03/03/25 1528     Updated: 03/03/25 1531    Narrative:      XR CHEST 1 VW    Date of Exam: 3/3/2025 3:15 PM EST    Indication: Weak/Dizzy/AMS triage protocol    Comparison: Chest radiograph 2/4/2025    Findings:      Mediastinum: Cardiac silhouette appears unchanged and normal in size    Lungs: Emphysema. Background chronic scarring in both lung bases. No focal consolidation is appreciated.    Pleura: No pleural effusion or pneumothorax.    Bones and soft tissues: No acute, displaced fracture seen.        Impression:      Impression:  No radiographic evidence of acute cardiopulmonary abnormality.        Electronically Signed: Praveen Santos    3/3/2025 3:29 PM EST    Workstation ID: [id number]                   Assessment & Plan   Assessment / Plan     Assessment/Plan:   Generalized weakness  Coffee-ground emesis  Esophagitis  ?  Chronic gallbladder wall thickening on CT  Splenomegaly  Hypertension  Hyperlipidemia  COPD  HFrEF  GERD  Prior CVA with residual right sided deficits    Plan  Admit to inpatient, telemetry  IV Protonix 40 mg twice daily  N.p.o.  Monitor hemoglobin levels with a.m. labs.    Consult gastroenterology in the a.m.  POC glucose every 6 hours  Hypoglycemic protocol  CT findings of chronic gallbladder wall thickening, patient does not have any abdominal tenderness.  Consider ultrasound abdomen if warranted  Brovana Pulmicort  Bronchodilator protocol  DuoNebs every 6 hours as needed  Continuous pulse ox  Fall precautions  PT OT consult  Resume other appropriate home medications once records      VTE Prophylaxis:  Mechanical VTE prophylaxis orders are signed & held.      CODE STATUS:    Level Of Support Discussed With: Health Care Surrogate  Code Status (Patient has no pulse and is not breathing): CPR (Attempt to Resuscitate)  Medical Interventions  (Patient has pulse or is breathing): Full Support      Admission Status:  I believe this patient meets inpatient status.    Part of this note may be an electronic transcription/translation of spoken language to printed text using the Dragon Dictation System    Makeda Atkinson MD

## 2025-03-04 NOTE — CONSULTS
Chief Complaint  Weakness - Generalized and Failure To Thrive    History of Present Illness  Brian Mehta is a 69 y.o. male with history of CHF, COPD, diabetes, high cholesterol, hypertension, stroke who presents to [facility] on referral from No ref. provider found for a gastroenterology evaluation of hematemesis.  Patient presented to the ER with hematemesis.  No abdominal pain, nausea vomiting, heartburn, blood in the stool or black tarry stools.  No blood thinners.        Labs Result Review Imaging    Past Medical History:   Diagnosis Date    CHF (congestive heart failure)     SEE'S DR STRICKLAND NO CURRENT S/S    COPD (chronic obstructive pulmonary disease)     INHALER    Diabetes mellitus     DOESN'T CHECK BG OFTEN AT HOME    Hyperlipidemia     Hypertension     Sepsis 09/14/2023    Stroke 2017    RIGHT SIDE WEAKNESS       Past Surgical History:   Procedure Laterality Date    APPENDECTOMY      CYSTOLITHALOPAXY PERCUTANEOUS Left 1/16/2025    Procedure: CYSTOLITHOLAPAXY, left ureteroscopy with laser basket stone extraction and left ureteral stent exchange.  Looking bladder, laser stone off stent exchange retained stent if possible;  Surgeon: Ector Kulkarni MD;  Location: [facility] OR;  Service: Urology;  Laterality: Left;    EYE SURGERY Bilateral     MISTY CATARACT         Current Facility-Administered Medications:     arformoterol (BROVANA) nebulizer solution 15 mcg, 15 mcg, Nebulization, BID - RT, Makeda Atkinson MD, 15 mcg at 03/04/25 0714    sennosides-docusate (PERICOLACE) 8.6-50 MG per tablet 2 tablet, 2 tablet, Oral, BID PRN **AND** polyethylene glycol (MIRALAX) packet 17 g, 17 g, Oral, Daily PRN **AND** bisacodyl (DULCOLAX) EC tablet 5 mg, 5 mg, Oral, Daily PRN **AND** bisacodyl (DULCOLAX) suppository 10 mg, 10 mg, Rectal, Daily PRN, Makeda Atkinson MD    budesonide (PULMICORT) nebulizer solution 0.5 mg, 0.5 mg, Nebulization, BID - RT, Makeda Atkinson MD, 0.5 mg at  03/04/25 0714    dextrose (D50W) (25 g/50 mL) IV injection 25 g, 25 g, Intravenous, Q15 Min PRN, Makeda Atkinson MD    dextrose (GLUTOSE) oral gel 15 g, 15 g, Oral, Q15 Min PRN, Makeda Atkinson MD    glucagon (GLUCAGEN) injection 1 mg, 1 mg, Intramuscular, Q15 Min PRN, Makeda Atkinson MD    hydrocortisone-bacitracin-zinc oxide-nystatin (MAGIC BARRIER) ointment 1 Application, 1 Application, Topical, BID PRN, Makeda Atkinson MD    ipratropium-albuterol (DUO-NEB) nebulizer solution 3 mL, 3 mL, Nebulization, Q4H - RT, Makeda Atkinson MD, 3 mL at 03/04/25 1139    lactated ringers infusion, 30 mL/hr, Intravenous, Continuous, Dahlia Hernandez CRNA, Last Rate: 30 mL/hr at 03/04/25 1512, 30 mL/hr at 03/04/25 1512    nitroglycerin (NITROSTAT) SL tablet 0.4 mg, 0.4 mg, Sublingual, Q5 Min PRN, Makeda Atkinson MD    pantoprazole (PROTONIX) injection 40 mg, 40 mg, Intravenous, BID AC, Makeda Atkinson MD    sodium chloride 0.9 % flush 10 mL, 10 mL, Intravenous, PRN, Makeda Atkinson MD    sodium chloride 0.9 % flush 10 mL, 10 mL, Intravenous, Q12H, Makeda Atkinson MD, 10 mL at 03/04/25 0900    sodium chloride 0.9 % flush 10 mL, 10 mL, Intravenous, PRN, Makeda Atkinson MD, 10 mL at 03/04/25 1511    sodium chloride 0.9 % infusion 40 mL, 40 mL, Intravenous, PRN, Makeda Atkinson MD     Allergies   Allergen Reactions    Ceftriaxone Rash     Had fairly extensive diffuse chest/arms rash after administration 1/2025 hospitalization.  Switched to aztreonam at that time.         Family History   Problem Relation Age of Onset    No Known Problems Mother     No Known Problems Father     Malig Hyperthermia Neg Hx         Social History     Social History Narrative    Not on file   Social history negative for smoking, alcohol use, drug    Immunization:  Immunization History   Administered Date(s) Administered    COVID-19 (MODERNA) 1st,2nd,3rd Dose Monovalent 11/05/2021    COVID-19 (UNSPECIFIED)  "10/20/2022    Flu Vaccine Split Quad 10/10/2016    Fluzone (or Fluarix & Flulaval for VFC) >6mos 10/10/2016    Fluzone High-Dose 65+yrs 11/21/2023    Influenza Injectable Mdck Pf Quad 11/12/2018, 01/24/2020, 10/18/2021    Influenza Seasonal Injectable 11/06/2017    Pneumococcal Conjugate 13-Valent (PCV13) 11/05/2021    Pneumococcal Conjugate 20-Valent (PCV20) 11/21/2023    Tdap 01/11/2017        Objective     Review of Systems and system review is negative except what is mention in HPI    Vital Signs:   /54 (BP Location: Right arm, Patient Position: Lying)   Pulse 103   Temp 98.2 °F (36.8 °C) (Temporal)   Resp 20   Ht 190.5 cm (75\")   Wt 80.1 kg (176 lb 9.4 oz)   SpO2 93%   BMI 22.07 kg/m²       Physical Exam  Constitutional:       General: He is awake. He is not in acute distress.     Appearance: Normal appearance. He is well-developed and well-groomed.   HENT:      Head: Normocephalic and atraumatic.      Mouth/Throat:      Mouth: Mucous membranes are moist.      Comments: Sybil dental hygiene is good  Eyes:      General: Lids are normal.      Conjunctiva/sclera: Conjunctivae normal.      Pupils: Pupils are equal, round, and reactive to light.   Neck:      Thyroid: No thyroid mass.      Trachea: Trachea normal.   Cardiovascular:      Rate and Rhythm: Normal rate and regular rhythm.      Heart sounds: Normal heart sounds.   Pulmonary:      Effort: Pulmonary effort is normal.      Breath sounds: Normal breath sounds and air entry.   Abdominal:      General: Abdomen is flat. Bowel sounds are normal. There is no distension.      Palpations: Abdomen is soft. There is no mass.      Tenderness: There is no abdominal tenderness. There is no guarding.   Musculoskeletal:      Cervical back: Neck supple.      Right lower leg: No edema.      Left lower leg: No edema.   Skin:     General: Skin is warm and moist.      Coloration: Skin is not cyanotic, jaundiced or pale.      Findings: No rash.      Nails: There is no " clubbing.   Neurological:      Mental Status: He is alert and oriented to person, place, and time.   Psychiatric:         Attention and Perception: Attention normal.         Mood and Affect: Mood and affect normal.         Speech: Speech normal.         Labs:  Results from last 7 days   Lab Units 03/04/25  0549 03/03/25  1650   WBC 10*3/mm3 7.30 7.33   HEMOGLOBIN g/dL 14.8 16.4   HEMATOCRIT % 47.5 51.3*   PLATELETS 10*3/mm3 215 244      Results from last 7 days   Lab Units 03/04/25  0549 03/03/25  1650   SODIUM mmol/L 137 137   POTASSIUM mmol/L 3.6 4.0   CHLORIDE mmol/L 108* 100   CO2 mmol/L 19.5* 24.9   BUN mg/dL 20 22   CREATININE mg/dL 0.94 1.02   CALCIUM mg/dL 8.7 9.4   BILIRUBIN mg/dL 0.5 0.6   ALK PHOS U/L 99 118*   ALT (SGPT) U/L 9 14   AST (SGOT) U/L 10 12   GLUCOSE mg/dL 171* 210*      Results from last 7 days   Lab Units 03/03/25  1650   INR  1.07        Assessment & Plan:  Principal Problem:    Coffee ground emesis  And do EGD today.  Risk and benefits thoroughly discussed with the patient    Patient was given instructions and counseling regarding his condition or for health maintenance advice. Please see specific information pulled into the AVS if appropriate.        Signed:  Grace Bello MD  03/04/25  15:42 EST

## 2025-03-04 NOTE — THERAPY EVALUATION
Patient Name: Brian Mehta  : 1955    MRN: [id number]                              Today's Date: 3/4/2025       Admit Date: 3/3/2025    Visit Dx:     ICD-10-CM ICD-9-CM   1. Acute esophagitis  K20.90 530.12   2. Upper GI bleed  K92.2 578.9   3. Decreased activities of daily living (ADL)  Z78.9 V49.89     Patient Active Problem List   Diagnosis    CVA (cerebral vascular accident)    Essential hypertension    High cholesterol    Hip pain    Vitamin B12 deficiency    Kidney disorder    Chronic pain of both knees    Systolic CHF, chronic    LVH (left ventricular hypertrophy)    COPD (chronic obstructive pulmonary disease)    DM2 (diabetes mellitus, type 2)    Urinary tract infection without hematuria    Abnormal nuclear stress test    UTI (urinary tract infection)    Fall    Failure to thrive in adult    Nephrolithiasis    Bladder stone    Retained ureteral stent    Acute UTI    Bacteremia    Generalized weakness    Coffee ground emesis     Past Medical History:   Diagnosis Date    CHF (congestive heart failure)     SEE'S DR [place] NO CURRENT S/S    COPD (chronic obstructive pulmonary disease)     INHALER    Diabetes mellitus     DOESN'T CHECK BG OFTEN AT HOME    Hyperlipidemia     Hypertension     Sepsis 2023    Stroke 2017    RIGHT SIDE WEAKNESS     Past Surgical History:   Procedure Laterality Date    APPENDECTOMY      CYSTOLITHALOPAXY PERCUTANEOUS Left 2025    Procedure: CYSTOLITHOLAPAXY, left ureteroscopy with laser basket stone extraction and left ureteral stent exchange.  Looking bladder, laser stone off stent exchange retained stent if possible;  Surgeon: Ector Kulkarni MD;  Location: [facility];  Service: Urology;  Laterality: Left;    EYE SURGERY Bilateral     MISTY CATARACT      General Information       Row Name 25 1012          OT Time and Intention    Document Type evaluation  -AV     Mode of Treatment individual therapy;occupational therapy  -AV       Row Name 25 1012           General Information    Patient Profile Reviewed yes  -AV     Prior Level of Function --  (I) baseline prior to Feb hospitalization/ wife assists PRN. sponge bathes. RW baseline. since return home from rehab: non-ambulatory & did not get out of bed. voided in bed w/ dependence for hygiene. poor home conditions/ personal hygiene.  -AV     Existing Precautions/Restrictions fall;NPO  -AV     Barriers to Rehab previous functional deficit;cognitive status  -AV       Row Name 03/04/25 1012          Occupational Profile    Reason for Services/Referral (Occupational Profile) Patient is a 69 year old male admitted to [facility] on 3/3/25 with weakness. He is currently on MC3/ on room air. OT consulted due to impaired ADL/ transfer independence. No previous OT services for current conditions.  -AV       Row Name 03/04/25 1012          Living Environment    People in Home spouse  -AV       Row Name 03/04/25 1012          Home Main Entrance    Number of Stairs, Main Entrance three  -AV       Row Name 03/04/25 1012          Stairs Within Home, Primary    Number of Stairs, Within Home, Primary none  -AV       Row Name 03/04/25 1012          Cognition    Orientation Status (Cognition) --  decreased cooperation. impaired safety awareness.  -AV       Row Name 03/04/25 1012          Safety Issues/Impairments Affecting Functional Mobility    Impairments Affecting Function (Mobility) cognition;balance;endurance/activity tolerance;strength  -AV               User Key  (r) = Recorded By, (t) = Taken By, (c) = Cosigned By      Initials Name Provider Type    AV Robert Urrutia, OT Occupational Therapist                     Mobility/ADL's       Row Name 03/04/25 1019          Transfers    Comment, (Transfers) max assist bed mobility w/ encouragement for any movement  -AV       Row Name 03/04/25 1019          Activities of Daily Living    BADL Assessment/Intervention --  NPO. dependent ADLs. purewick catheter in place.  -AV                User Key  (r) = Recorded By, (t) = Taken By, (c) = Cosigned By      Initials Name Provider Type    Robert Ramirez OT Occupational Therapist                   Obj/Interventions       [facility] Name 03/04/25 1020          Sensory Assessment (Somatosensory)    Sensory Assessment (Somatosensory) unable/difficult to assess  -AV     Sensory Assessment decreased cooperation  -AV       Row Name 03/04/25 1020          Vision Assessment/Intervention    Visual Impairment/Limitations WFL;corrective lenses for reading  -AV       [facility] Name 03/04/25 1020          Range of Motion Comprehensive    General Range of Motion bilateral upper extremity ROM WFL  -AV     Comment, General Range of Motion AAROM with consideration of decreased cooperation  -AV       Row Name 03/04/25 1020          Strength Comprehensive (MMT)    Comment, General Manual Muscle Testing (MMT) Assessment unable to MMT due to poor cooperation  -AV       [facility] Name 03/04/25 1020          Motor Skills    Motor Skills coordination;functional endurance  -AV     Coordination --  left dominant- impaired prior to onset  -AV     Functional Endurance poor  -AV       [facility] Name 03/04/25 1020          Balance    Comment, Balance unable to assess due to poor cooperation. likely impaired.  -AV               User Key  (r) = Recorded By, (t) = Taken By, (c) = Cosigned By      Initials Name Provider Type    Robert Ramirez OT Occupational Therapist                   Goals/Plan       [facility] Name 03/04/25 1024          Transfer Goal 1 (OT)    Activity/Assistive Device (Transfer Goal 1, OT) transfers, all;walker, rolling  -AV     [place] Level/Cues Needed (Transfer Goal 1, OT) minimum assist (75% or more patient effort);verbal cues required  -AV     Time Frame (Transfer Goal 1, OT) long term goal (LTG);10 days  -AV       [facility] Name 03/04/25 1024          Bathing Goal 1 (OT)    Activity/Device (Bathing Goal 1, OT) bathing skills, all  -AV     [place] Level/Cues Needed  (Bathing Goal 1, OT) minimum assist (75% or more patient effort);set-up required;verbal cues required  -AV     Time Frame (Bathing Goal 1, OT) long term goal (LTG);10 days  -AV       Row Name 03/04/25 1024          Dressing Goal 1 (OT)    Activity/Device (Dressing Goal 1, OT) dressing skills, all  -AV     [place]/Cues Needed (Dressing Goal 1, OT) minimum assist (75% or more patient effort);set-up required;verbal cues required  -AV     Time Frame (Dressing Goal 1, OT) long term goal (LTG);10 days  -AV       Row Name 03/04/25 1024          Toileting Goal 1 (OT)    Activity/Device (Toileting Goal 1, OT) toileting skills, all  -AV     [place] Level/Cues Needed (Toileting Goal 1, OT) minimum assist (75% or more patient effort);set-up required;verbal cues required  -AV     Time Frame (Toileting Goal 1, OT) long term goal (LTG);10 days  -AV       Row Name 03/04/25 1024          Grooming Goal 1 (OT)    Activity/Device (Grooming Goal 1, OT) grooming skills, all  -AV     [place] (Grooming Goal 1, OT) minimum assist (75% or more patient effort);set-up required;verbal cues required  -AV     Time Frame (Grooming Goal 1, OT) long term goal (LTG);10 days  -AV       Row Name 03/04/25 1024          Strength Goal 1 (OT)    Strength Goal 1 (OT) Patient will demonstrate 4/5 bilateral biceps, triceps and  to increase ADL/ transfer independence.  -AV     Time Frame (Strength Goal 1, OT) long term goal (LTG);10 days  -AV       Row Name 03/04/25 1024          Problem Specific Goal 1 (OT)    Problem Specific Goal 1 (OT) Patient will demonstrate fair endurance/activity tolerance needed to support ADLs.  -AV     Time Frame (Problem Specific Goal 1, OT) long term goal (LTG);10 days  -AV       Row Name 03/04/25 1024          Therapy Assessment/Plan (OT)    Planned Therapy Interventions (OT) activity tolerance training;BADL retraining;functional balance retraining;occupation/activity based interventions;patient/caregiver  education/training;ROM/therapeutic exercise;strengthening exercise;transfer/mobility retraining  -AV               User Key  (r) = Recorded By, (t) = Taken By, (c) = Cosigned By      Initials Name Provider Type    AV Robert Urrutia, OT Occupational Therapist                   Clinical Impression       Row Name 03/04/25 1022          Pain Assessment    Additional Documentation Pain Scale: FACES Pre/Post-Treatment (Group)  -AV       Row Name 03/04/25 1022          Pain Scale: FACES Pre/Post-Treatment    Pain: FACES Scale, Pretreatment 0-->no hurt  -AV     Posttreatment Pain Rating 0-->no hurt  -AV       Row Name 03/04/25 1022          Plan of Care Review    Plan of Care Reviewed With patient;spouse  -AV     Progress no change  first session for evaluation  -AV     Outcome Evaluation Patient presents with limitations of cognition/ safety awareness, strength, balance and endurance/activity tolerance which are impacting ADL/ transfer independence. Skilled OT services are indicated to remediate/ compensate for deficits to maximize independence and safety with functional ADL tasks.  -AV       Row Name 03/04/25 1022          Therapy Assessment/Plan (OT)    Patient/Family Therapy Goal Statement (OT) none stated  -AV     Rehab Potential (OT) fair  -AV     Criteria for Skilled Therapeutic Interventions Met (OT) yes;meets criteria;skilled treatment is necessary  -AV     Therapy Frequency (OT) 5 times/wk  -AV       Row Name 03/04/25 1022          Therapy Plan Review/Discharge Plan (OT)    Equipment Needs Upon Discharge (OT) walker, rolling;commode chair  -AV     Anticipated Discharge Disposition (OT) sub acute care setting  -AV       Row Name 03/04/25 1022          Vital Signs    O2 Delivery Pre Treatment room air  -AV     O2 Delivery Intra Treatment room air  -AV     O2 Delivery Post Treatment room air  -AV       Row Name 03/04/25 1022          Positioning and Restraints    Pre-Treatment Position in bed  -AV     Post Treatment  Position bed  -AV     In Bed call light within reach;encouraged to call for assist;exit alarm on;with family/caregiver  -AV               User Key  (r) = Recorded By, (t) = Taken By, (c) = Cosigned By      Initials Name Provider Type    Robert Ramirez, OT Occupational Therapist                   Outcome Measures       Row Name 03/04/25 1027          How much help from another is currently needed...    Putting on and taking off regular lower body clothing? 1  -AV     Bathing (including washing, rinsing, and drying) 1  -AV     Toileting (which includes using toilet bed pan or urinal) 1  -AV     Putting on and taking off regular upper body clothing 1  -AV     Taking care of personal grooming (such as brushing teeth) 1  -AV     Eating meals 1  NPO  -AV     AM-PAC 6 Clicks Score (OT) 6  -AV       Row Name 03/04/25 0824 03/04/25 0229       How much help from another person do you currently need...    Turning from your back to your side while in flat bed without using bedrails? 2  -PRAKASH 3  -RM    Moving from lying on back to sitting on the side of a flat bed without bedrails? 2  -PRAKASH 2  -RM    Moving to and from a bed to a chair (including a wheelchair)? 2  -PRAKASH 2  -RM    Standing up from a chair using your arms (e.g., wheelchair, bedside chair)? 1  -PRAKASH 2  -RM    Climbing 3-5 steps with a railing? 1  -PRAKASH 1  -RM    To walk in hospital room? 1  -PRAKASH 1  -RM    AM-PAC 6 Clicks Score (PT) 9  -PRAKASH 11  -RM      Row Name 03/04/25 0215          How much help from another person do you currently need...    Turning from your back to your side while in flat bed without using bedrails? 2  -MT     Moving from lying on back to sitting on the side of a flat bed without bedrails? 2  -MT     Moving to and from a bed to a chair (including a wheelchair)? 1  -MT     Standing up from a chair using your arms (e.g., wheelchair, bedside chair)? 2  -MT     Climbing 3-5 steps with a railing? 1  -MT     To walk in hospital room? 1  -MT     AM-PAC 6  Clicks Score (PT) 9  -MT       Row Name 03/04/25 1027          Functional Assessment    Outcome Measure Options AM-PAC 6 Clicks Daily Activity (OT);Optimal Instrument  -AV       Row Name 03/04/25 1027          Optimal Instrument    Optimal Instrument Optimal - 3  -AV     Bending/Stooping 5  -AV     Standing 5  -AV     Reaching 3  -AV     From the list, choose the 3 activities you would most like to be able to do without any difficulty Bending/stooping;Standing;Reaching  -AV     Total Score Optimal - 3 13  -AV               User Key  (r) = Recorded By, (t) = Taken By, (c) = Cosigned By      Initials Name Provider Type    RM Rian Velasquez, RN Registered Nurse    MT Rylee Bautista LPN Licensed Nurse    Robert Ramirez OT Occupational Therapist    Radha Watt, MYA Registered Nurse                    Occupational Therapy Education       Title: PT OT SLP Therapies (Done)       Topic: Occupational Therapy (Done)       Point: ADL training (Done)       Description:   Instruct learner(s) on proper safety adaptation and remediation techniques during self care or transfers.   Instruct in proper use of assistive devices.                  Learning Progress Summary            Patient Acceptance, E, VU by AV at 3/4/2025 1027                      Point: Home exercise program (Done)       Description:   Instruct learner(s) on appropriate technique for monitoring, assisting and/or progressing therapeutic exercises/activities.                  Learning Progress Summary            Patient Acceptance, E, VU by AV at 3/4/2025 1027                      Point: Precautions (Done)       Description:   Instruct learner(s) on prescribed precautions during self-care and functional transfers.                  Learning Progress Summary            Patient Acceptance, E, VU by AV at 3/4/2025 1027                      Point: Body mechanics (Done)       Description:   Instruct learner(s) on proper positioning and spine alignment during  self-care, functional mobility activities and/or exercises.                  Learning Progress Summary            Patient Acceptance, E, VU by AV at 3/4/2025 1027                                      User Key       Initials Effective Dates Name Provider Type [facility] 06/16/21 -  Robert Urrutia OT Occupational Therapist OT                  OT Recommendation and Plan  Planned Therapy Interventions (OT): activity tolerance training, BADL retraining, functional balance retraining, occupation/activity based interventions, patient/caregiver education/training, ROM/therapeutic exercise, strengthening exercise, transfer/mobility retraining  Therapy Frequency (OT): 5 times/wk  Plan of Care Review  Plan of Care Reviewed With: patient, spouse  Progress: no change (first session for evaluation)  Outcome Evaluation: Patient presents with limitations of cognition/ safety awareness, strength, balance and endurance/activity tolerance which are impacting ADL/ transfer independence. Skilled OT services are indicated to remediate/ compensate for deficits to maximize independence and safety with functional ADL tasks.     Time Calculation:   Evaluation Complexity (OT)  Review Occupational Profile/Medical/Therapy History Complexity: expanded/moderate complexity  Assessment, Occupational Performance/Identification of Deficit Complexity: 3-5 performance deficits  Clinical Decision Making Complexity (OT): problem focused assessment/low complexity  Overall Complexity of Evaluation (OT): low complexity     Time Calculation- OT       Row Name 03/04/25 1028             Time Calculation- OT    OT Received On 03/04/25  -AV      OT Goal Re-Cert Due Date 03/13/25  -AV         Untimed Charges    OT Eval/Re-eval Minutes 35  -AV         Total Minutes    Untimed Charges Total Minutes 35  -AV       Total Minutes 35  -AV                User Key  (r) = Recorded By, (t) = Taken By, (c) = Cosigned By      Initials Name Provider Type    ZULEMA Urrutia  Robert OT Occupational Therapist                  Therapy Charges for Today       Code Description Service Date Service Provider Modifiers Qty    [id number] HC OT EVAL LOW COMPLEXITY 3 3/4/2025 Robert Urrutia OT GO 1                 Robert Urrutia OT  3/4/2025

## 2025-03-04 NOTE — NURSING NOTE
"PT's wife reports that there is no electricity or heat in their home and is unable to pay the electric bill. She also indicates that the home has vermin and \"bugs\".  EMS reported that the home was deplorable and the patient was covered in urine and feces. The wife reports that they are unable to afford food and have free meals delivered once a week. Wife reports that she had not eaten since yesterday, this nurse provided her with a meal and drink. Wife reports that she is the care provider for the patient and is not able to provide adequate care or personal hygiene. She reports that the patient is non-ambulatory and cannot remember the last time he was able to get out of the bed.   "

## 2025-03-04 NOTE — PLAN OF CARE
Goal Outcome Evaluation:  Plan of Care Reviewed With: patient, spouse        Progress: no change (first session for evaluation)  Outcome Evaluation: Patient presents with limitations of cognition/ safety awareness, strength, balance and endurance/activity tolerance which are impacting ADL/ transfer independence. Skilled OT services are indicated to remediate/ compensate for deficits to maximize independence and safety with functional ADL tasks.    Anticipated Discharge Disposition (OT): sub acute care setting

## 2025-03-04 NOTE — PLAN OF CARE
Goal Outcome Evaluation:  Plan of Care Reviewed With: spouse, patient        Progress: no change  Outcome Evaluation: ED admit this shift, PT is AAOx2, VSS,  NST, RA, no c/o pain, SOA and no emesis since arrival to ED, remains NPO, q6h BG, q2h turns, (R) side deficit r/t CVA hx, purewick intact, wife at bedside, bed in low/locked position, alarm audible, call light within reach, continue with current POC at this time.

## 2025-03-04 NOTE — PLAN OF CARE
Goal Outcome Evaluation:           Progress: improving  Outcome Evaluation: Patient resting comfortable in bed recovering well from endo, A+Ox4 now, less agitated than this am, he is now able to eat and drink, diet ordered for patient, wife at bedside.  v/s stable for patient.  No complaints at this time

## 2025-03-04 NOTE — ANESTHESIA PREPROCEDURE EVALUATION
Anesthesia Evaluation     Nursing notes reviewed   NPO Solid Status: > 8 hours  NPO Liquid Status: > 4 hours           Airway   Mallampati: III  TM distance: <3 FB  Neck ROM: limited  Small opening and Possible difficult intubation  Dental    (+) edentulous    Pulmonary    (+) a smoker Current, Abstained day of surgery, COPD,decreased breath sounds  Cardiovascular - normal exam  Exercise tolerance: poor (<4 METS)    (+) hypertension, CHF , hyperlipidemia      Neuro/Psych  (+) CVA (stroke in 2018), tremors, weakness  GI/Hepatic/Renal/Endo    (+) GI bleeding upper , renal disease-, diabetes mellitus    Musculoskeletal     (+) neck stiffness  Abdominal     Abdomen: tender.   Substance History      OB/GYN          Other        ROS/Med Hx Other: Pt presented to ER on 3/History of Present Illness:  Patient is a 69 y.o. year old male who presents to the emergency department for evaluation of weakness.  Patient is a poor historian.  According to the family, the patient has had persistent vomiting and generalized weakness.  He started vomiting up coffee-ground material earlier in the day.  He has had no fever chills or cough.  He does not state why he called EMS to bring him to the hospital today.  He has been having increased weakness according to EMS.   Patient denies any pain; however he has had persistent vomiting with coffee-ground emesis.   Patient is noted to have rash along the buttocks and around the rectum.     03/04/25 05:49  WBC: 7.30  RBC: 5.72  Hemoglobin: 14.8  Hematocrit: 47.5  Platelets: 215  RDW: 16.6 (H)  MCV: 83.0  MCH: 25.9 (L)  MCHC: 31.2 (L)  MPV: 10.0  RDW-SD: 49.1    Echo 1/13/25:   The study is technically difficult for diagnosis.  ·  Left ventricular systolic function is mildly decreased. Estimated left ventricular EF = 40%                          Anesthesia Plan    ASA 3 - emergent     general   total IV anesthesia  (Total IV Anesthesia    Patient understands anesthesia not responsible for  dental damage.    Spoke to pt wife for consent. Pt is confused and unable to answer questions. )  intravenous induction     Anesthetic plan, risks, benefits, and alternatives have been provided, discussed and informed consent has been obtained with: spouse/significant other.    Use of blood products discussed with spouse/significant other .    Plan discussed with CRNA.        CODE STATUS:    Level Of Support Discussed With: Health Care Surrogate  Code Status (Patient has no pulse and is not breathing): CPR (Attempt to Resuscitate)  Medical Interventions (Patient has pulse or is breathing): Full Support

## 2025-03-05 LAB
BACTERIA SPEC AEROBE CULT: NO GROWTH
GEN 5 1HR TROPONIN T REFLEX: 9 NG/L
GLUCOSE BLDC GLUCOMTR-MCNC: 115 MG/DL (ref 70–99)
GLUCOSE BLDC GLUCOMTR-MCNC: 134 MG/DL (ref 70–99)
GLUCOSE BLDC GLUCOMTR-MCNC: 150 MG/DL (ref 70–99)
GLUCOSE BLDC GLUCOMTR-MCNC: 183 MG/DL (ref 70–99)
GLUCOSE BLDC GLUCOMTR-MCNC: 188 MG/DL (ref 70–99)
HCT VFR BLD AUTO: 40.1 % (ref 37.5–51)
HCT VFR BLD AUTO: 43.6 % (ref 37.5–51)
HGB BLD-MCNC: 12.6 G/DL (ref 13–17.7)
HGB BLD-MCNC: 13.7 G/DL (ref 13–17.7)
TROPONIN T NUMERIC DELTA: 0 NG/L
TROPONIN T SERPL HS-MCNC: 9 NG/L

## 2025-03-05 PROCEDURE — 93005 ELECTROCARDIOGRAM TRACING: CPT | Performed by: FAMILY MEDICINE

## 2025-03-05 PROCEDURE — 82948 REAGENT STRIP/BLOOD GLUCOSE: CPT

## 2025-03-05 PROCEDURE — 85018 HEMOGLOBIN: CPT | Performed by: INTERNAL MEDICINE

## 2025-03-05 PROCEDURE — 97162 PT EVAL MOD COMPLEX 30 MIN: CPT | Performed by: PHYSICAL THERAPIST

## 2025-03-05 PROCEDURE — 99232 SBSQ HOSP IP/OBS MODERATE 35: CPT | Performed by: FAMILY MEDICINE

## 2025-03-05 PROCEDURE — 94799 UNLISTED PULMONARY SVC/PX: CPT

## 2025-03-05 PROCEDURE — 93010 ELECTROCARDIOGRAM REPORT: CPT | Performed by: SPECIALIST

## 2025-03-05 PROCEDURE — 94664 DEMO&/EVAL PT USE INHALER: CPT

## 2025-03-05 PROCEDURE — 84484 ASSAY OF TROPONIN QUANT: CPT | Performed by: FAMILY MEDICINE

## 2025-03-05 PROCEDURE — 82948 REAGENT STRIP/BLOOD GLUCOSE: CPT | Performed by: INTERNAL MEDICINE

## 2025-03-05 PROCEDURE — 63710000001 INSULIN LISPRO (HUMAN) PER 5 UNITS: Performed by: FAMILY MEDICINE

## 2025-03-05 PROCEDURE — 85014 HEMATOCRIT: CPT | Performed by: INTERNAL MEDICINE

## 2025-03-05 PROCEDURE — 93010 ELECTROCARDIOGRAM REPORT: CPT | Performed by: INTERNAL MEDICINE

## 2025-03-05 RX ORDER — INSULIN LISPRO 100 [IU]/ML
2-7 INJECTION, SOLUTION INTRAVENOUS; SUBCUTANEOUS
Status: DISCONTINUED | OUTPATIENT
Start: 2025-03-05 | End: 2025-03-12 | Stop reason: HOSPADM

## 2025-03-05 RX ORDER — DEXTROSE MONOHYDRATE 25 G/50ML
25 INJECTION, SOLUTION INTRAVENOUS
Status: DISCONTINUED | OUTPATIENT
Start: 2025-03-05 | End: 2025-03-12 | Stop reason: HOSPADM

## 2025-03-05 RX ORDER — ALUMINA, MAGNESIA, AND SIMETHICONE 2400; 2400; 240 MG/30ML; MG/30ML; MG/30ML
15 SUSPENSION ORAL EVERY 6 HOURS PRN
Status: DISCONTINUED | OUTPATIENT
Start: 2025-03-05 | End: 2025-03-12 | Stop reason: HOSPADM

## 2025-03-05 RX ORDER — NICOTINE POLACRILEX 4 MG
15 LOZENGE BUCCAL
Status: DISCONTINUED | OUTPATIENT
Start: 2025-03-05 | End: 2025-03-12 | Stop reason: HOSPADM

## 2025-03-05 RX ORDER — MINERAL OIL/HYDROPHIL PETROLAT
1 OINTMENT (GRAM) TOPICAL
Status: DISCONTINUED | OUTPATIENT
Start: 2025-03-05 | End: 2025-03-12 | Stop reason: HOSPADM

## 2025-03-05 RX ADMIN — BUDESONIDE 0.5 MG: 0.5 INHALANT RESPIRATORY (INHALATION) at 20:01

## 2025-03-05 RX ADMIN — IPRATROPIUM BROMIDE AND ALBUTEROL SULFATE 3 ML: .5; 3 SOLUTION RESPIRATORY (INHALATION) at 03:44

## 2025-03-05 RX ADMIN — ARFORMOTEROL TARTRATE 15 MCG: 15 SOLUTION RESPIRATORY (INHALATION) at 06:25

## 2025-03-05 RX ADMIN — BUDESONIDE 0.5 MG: 0.5 INHALANT RESPIRATORY (INHALATION) at 06:26

## 2025-03-05 RX ADMIN — PANTOPRAZOLE SODIUM 40 MG: 40 INJECTION, POWDER, FOR SOLUTION INTRAVENOUS at 17:33

## 2025-03-05 RX ADMIN — INSULIN LISPRO 2 UNITS: 100 INJECTION, SOLUTION INTRAVENOUS; SUBCUTANEOUS at 17:33

## 2025-03-05 RX ADMIN — Medication 10 ML: at 21:39

## 2025-03-05 RX ADMIN — ARFORMOTEROL TARTRATE 15 MCG: 15 SOLUTION RESPIRATORY (INHALATION) at 20:01

## 2025-03-05 RX ADMIN — WHITE PETROLATUM 1 APPLICATION: 1.75 OINTMENT TOPICAL at 22:40

## 2025-03-05 RX ADMIN — PANTOPRAZOLE SODIUM 40 MG: 40 INJECTION, POWDER, FOR SOLUTION INTRAVENOUS at 08:16

## 2025-03-05 RX ADMIN — IPRATROPIUM BROMIDE AND ALBUTEROL SULFATE 3 ML: .5; 3 SOLUTION RESPIRATORY (INHALATION) at 00:26

## 2025-03-05 RX ADMIN — IPRATROPIUM BROMIDE AND ALBUTEROL SULFATE 3 ML: .5; 3 SOLUTION RESPIRATORY (INHALATION) at 06:25

## 2025-03-05 RX ADMIN — Medication 10 ML: at 08:16

## 2025-03-05 RX ADMIN — ALUMINUM HYDROXIDE, MAGNESIUM HYDROXIDE, AND DIMETHICONE 15 ML: 400; 400; 40 SUSPENSION ORAL at 18:31

## 2025-03-05 RX ADMIN — FLUCONAZOLE 100 MG: 100 TABLET ORAL at 08:16

## 2025-03-05 RX ADMIN — IPRATROPIUM BROMIDE AND ALBUTEROL SULFATE 3 ML: .5; 3 SOLUTION RESPIRATORY (INHALATION) at 20:01

## 2025-03-05 NOTE — ANESTHESIA POSTPROCEDURE EVALUATION
Patient: Brian Mehta    Procedure Summary       Date: 03/04/25 Room / Location: [facility] ENDOSCOPY 2 / [facility] ENDOSCOPY    Anesthesia Start: 1603 Anesthesia Stop: 1627    Procedure: ESOPHAGOGASTRODUODENOSCOPY with biopsies Diagnosis:       Coffee ground emesis      (Coffee ground emesis [K92.0])    Surgeons: Mikael Bello MD Provider: Ale Moses CRNA    Anesthesia Type: general ASA Status: 3 - Emergent            Anesthesia Type: general    Vitals  Vitals Value Taken Time   /68 03/04/25 1640   Temp 36.9 °C (98.4 °F) 03/04/25 1625   Pulse 96 03/04/25 1640   Resp 20 03/04/25 1640   SpO2 97 % 03/04/25 1640           Post Anesthesia Care and Evaluation    Post-procedure mental status: acceptable.  Pain management: satisfactory to patient    Airway patency: patent  Anesthetic complications: No anesthetic complications    Cardiovascular status: acceptable  Respiratory status: acceptable    Comments: Per chart review

## 2025-03-05 NOTE — THERAPY EVALUATION
Acute Care - Physical Therapy Initial Evaluation   [place]     Patient Name: Brian Mehta  : 1955  MRN: [id number]  Today's Date: 3/5/2025      Visit Dx:     ICD-10-CM ICD-9-CM   1. Acute esophagitis  K20.90 530.12   2. Upper GI bleed  K92.2 578.9   3. Decreased activities of daily living (ADL)  Z78.9 V49.89   4. Coffee ground emesis  K92.0 578.0   5. Difficulty in walking  R26.2 719.7     Patient Active Problem List   Diagnosis    CVA (cerebral vascular accident)    Essential hypertension    High cholesterol    Hip pain    Vitamin B12 deficiency    Kidney disorder    Chronic pain of both knees    Systolic CHF, chronic    LVH (left ventricular hypertrophy)    COPD (chronic obstructive pulmonary disease)    DM2 (diabetes mellitus, type 2)    Urinary tract infection without hematuria    Abnormal nuclear stress test    UTI (urinary tract infection)    Fall    Failure to thrive in adult    Nephrolithiasis    Bladder stone    Retained ureteral stent    Acute UTI    Bacteremia    Generalized weakness    Coffee ground emesis     Past Medical History:   Diagnosis Date    CHF (congestive heart failure)     SEE'S DR [place]ICK[place] NO CURRENT S/S    COPD (chronic obstructive pulmonary disease)     INHALER    Diabetes mellitus     DOESN'T CHECK BG OFTEN AT HOME    Hyperlipidemia     Hypertension     Sepsis 2023    Stroke 2017    RIGHT SIDE WEAKNESS     Past Surgical History:   Procedure Laterality Date    APPENDECTOMY      CYSTOLITHALOPAXY PERCUTANEOUS Left 2025    Procedure: CYSTOLITHOLAPAXY, left ureteroscopy with laser basket stone extraction and left ureteral stent exchange.  Looking bladder, laser stone off stent exchange retained stent if possible;  Surgeon: Ector Kulkarni MD;  Location: [facility] MAIN OR;  Service: Urology;  Laterality: Left;    ENDOSCOPY N/A 3/4/2025    Procedure: ESOPHAGOGASTRODUODENOSCOPY with biopsies;  Surgeon: Mikael Bello MD;  Location: [facility] ENDOSCOPY;  Service:  Gastroenterology;  Laterality: N/A;  reflux esophagitis    EYE SURGERY Bilateral     MISTY CATARACT     PT Assessment (Last 12 Hours)       PT Evaluation and Treatment       Row Name 03/05/25 1400          Physical Therapy Time and Intention    Subjective Information no complaints  -TC     Document Type evaluation  -TC     Mode of Treatment individual therapy;physical therapy  -TC     Patient Effort good  -TC     Symptoms Noted During/After Treatment fatigue  -TC       Row Name 03/05/25 1400          General Information    Patient Profile Reviewed yes  -TC     Patient Observations alert;cooperative  -TC     Patient/Family/Caregiver Comments/Observations Pt wife states pt has been staying at [facility] for therapy services and was recently discharged home. Wife reports that she is unable to take care of him at home and that she does not have any electricity at her home. Wife states she would like [facility].  -TC     Prior Level of Function independent:;gait;transfer;bed mobility;ADL's  -TC     Equipment Currently Used at Home walker, rolling;wheelchair  -TC     Existing Precautions/Restrictions fall  -TC     Barriers to Rehab none identified  -TC       Row Name 03/05/25 1400          Living Environment    Current Living Arrangements home  -TC     Home Accessibility stairs to enter home  -TC     People in Home spouse  -TC     Primary Care Provided by spouse/significant other  -TC       Row Name 03/05/25 1400          Home Main Entrance    Number of Stairs, Main Entrance three  -TC       Row Name 03/05/25 1400          Home Use of Assistive/Adaptive Equipment    Equipment Currently Used at Home walker, rolling  -TC       Row Name 03/05/25 1400          Range of Motion (ROM)    Range of Motion bilateral lower extremities;ROM is WFL  -TC       Row Name 03/05/25 1400          Range of Motion Comprehensive    General Range of Motion no range of motion deficits identified  -TC        Row Name 03/05/25 1400          Strength Comprehensive (MMT)    General Manual Muscle Testing (MMT) Assessment lower extremity strength deficits identified  -     Comment, General Manual Muscle Testing (MMT) Assessment BLE: 3+/5  -[facility] Name 03/05/25 1400          Bed Mobility    Bed Mobility supine-sit-supine  -TC     Supine-Sit-Supine [place] (Bed Mobility) minimum assist (75% patient effort)  -     Bed Mobility, Safety Issues decreased use of arms for pushing/pulling;decreased use of legs for bridging/pushing  -[facility] Name 03/05/25 1400          Transfers    Transfers sit-stand transfer  -[facility] Name 03/05/25 1400          Sit-Stand Transfer    Sit-Stand [place] (Transfers) minimum assist (75% patient effort)  -     Assistive Device (Sit-Stand Transfers) walker, front-wheeled  -     Comment, (Sit-Stand Transfer) Manual assist for placement of R UE on walker.  -[facility] Name 03/05/25 1400          Gait/Stairs (Locomotion)    Gait/Stairs Locomotion gait/ambulation assistive device  -     [place] Level (Gait) moderate assist (50% patient effort)  -     Assistive Device (Gait) walker, front-wheeled  -TC     Patient was able to Ambulate yes  -TC     Distance in Feet (Gait) 4  sidesteps toward HOB  -TC     Gait Assessment/Intervention R LE inc knee flexion observed with advanment of L LE = inc risk of R knee buckling. Pt c/o inc fatigue and needing to sit back down after sidestepping toward HOB x 4 steps.  -       Row Name 03/05/25 1400          Balance    Balance Assessment sitting static balance;sitting dynamic balance;standing static balance;standing dynamic balance  -TC     Static Sitting Balance minimal assist  -TC     Dynamic Sitting Balance minimal assist  -TC     Position, Sitting Balance supported  -TC     Static Standing Balance minimal assist  -TC     Dynamic Standing Balance moderate assist  -TC     Position/Device Used, Standing Balance walker,  front-wheeled  -TC       Row Name             Wound 02/05/25 1130 Bilateral groin MASD (moisture associated skin damage)    Wound - Properties Group Placement Date: 02/05/25  -KE Placement Time: 1130  -KE Side: Bilateral  -KE Location: groin  -KE Primary Wound Type: MASD  -KE Type: MASD (moisture associated skin damage)  -KE    Retired Wound - Properties Group Placement Date: 02/05/25  -KE Placement Time: 1130  -KE Side: Bilateral  -KE Location: groin  -KE Primary Wound Type: MASD  -KE Type: MASD (moisture associated skin damage)  -KE    Retired Wound - Properties Group Placement Date: 02/05/25  -KE Placement Time: 1130  -KE Side: Bilateral  -KE Location: groin  -KE Primary Wound Type: MASD  -KE Type: MASD (moisture associated skin damage)  -KE    Retired Wound - Properties Group Date first assessed: 02/05/25  -KE Time first assessed: 1130  -KE Side: Bilateral  -KE Location: groin  -KE Primary Wound Type: MASD  -KE Type: MASD (moisture associated skin damage)  -KE      Row Name             Wound 03/04/25 0302 Left anterior foot other (see comments)    Wound - Properties Group Placement Date: 03/04/25  -MT Placement Time: 0302  -MT Side: Left  -MT Orientation: anterior  -MT Location: foot  -MT Primary Wound Type: Other  -MT Type: other (see comments)  -MT, Dry, flaky  Present on Original Admission: Y  -MT    Retired Wound - Properties Group Placement Date: 03/04/25  -MT Placement Time: 0302  -MT Present on Original Admission: Y  -MT Side: Left  -MT Orientation: anterior  -MT Location: foot  -MT Primary Wound Type: Other  -MT Type: other (see comments)  -MT, Dry, flaky     Retired Wound - Properties Group Placement Date: 03/04/25  -MT Placement Time: 0302  -MT Present on Original Admission: Y  -MT Side: Left  -MT Orientation: anterior  -MT Location: foot  -MT Primary Wound Type: Other  -MT Type: other (see comments)  -MT, Dry, flaky     Retired Wound - Properties Group Date first assessed: 03/04/25  -MT Time first  assessed: 0302  -MT Present on Original Admission: Y  -MT Side: Left  -MT Location: foot  -MT Primary Wound Type: Other  -MT Type: other (see comments)  -MT, Dry, flaky       Row Name             Wound 10/31/24 1234 Bilateral gluteal    Wound - Properties Group Placement Date: 10/31/24  -NH Placement Time: 1234  -NH Side: Bilateral  -NH Location: gluteal  -NH Primary Wound Type: MASD  -NH    Retired Wound - Properties Group Placement Date: 10/31/24  -NH Placement Time: 1234  -NH Side: Bilateral  -NH Location: gluteal  -NH Primary Wound Type: MASD  -NH    Retired Wound - Properties Group Placement Date: 10/31/24  -NH Placement Time: 1234  -NH Side: Bilateral  -NH Location: gluteal  -NH Primary Wound Type: MASD  -NH    Retired Wound - Properties Group Date first assessed: 10/31/24  -NH Time first assessed: 1234  -NH Side: Bilateral  -NH Location: gluteal  -NH Primary Wound Type: MASD  -NH      Row Name 03/05/25 1400          Plan of Care Review    Plan of Care Reviewed With patient  -TC     Outcome Evaluation Pt presents with decreased strength, transfers and functional mobility. Pt will benefit from inpatient PT services and continued PT services upon discharge.  -TC       Row Name 03/05/25 1400          Positioning and Restraints    Pre-Treatment Position in bed  -TC     Post Treatment Position bed  -TC     In Bed supine;call light within reach;exit alarm on  -TC       Row Name 03/05/25 1400          Therapy Assessment/Plan (PT)    Criteria for Skilled Interventions Met (PT) yes;meets criteria  -TC     Therapy Frequency (PT) daily  -TC     Predicted Duration of Therapy Intervention (PT) 10 days  -TC     Problem List (PT) problems related to;mobility  -TC     Activity Limitations Related to Problem List (PT) unable to transfer safely;unable to ambulate safely  -TC       Row Name 03/05/25 1400          PT Evaluation Complexity    History, PT Evaluation Complexity 1-2 personal factors and/or comorbidities  -TC      Examination of Body Systems (PT Eval Complexity) total of 4 or more elements  -TC     Clinical Presentation (PT Evaluation Complexity) stable  -TC     Clinical Decision Making (PT Evaluation Complexity) moderate complexity  -TC     Overall Complexity (PT Evaluation Complexity) low complexity  -TC       Row Name 03/05/25 1400          Physical Therapy Goals    Transfer Goal Selection (PT) transfer, PT goal 1  -TC     Gait Training Goal Selection (PT) gait training, PT goal 1  -TC       Row Name 03/05/25 1400          Transfer Goal 1 (PT)    Activity/Assistive Device (Transfer Goal 1, PT) sit-to-stand/stand-to-sit  -TC     [place] Level/Cues Needed (Transfer Goal 1, PT) standby assist  -TC     Time Frame (Transfer Goal 1, PT) 10 days  -TC       Row Name 03/05/25 1400          Gait Training Goal 1 (PT)    Activity/Assistive Device (Gait Training Goal 1, PT) assistive device use;walker, rolling  -TC     [place] Level (Gait Training Goal 1, PT) minimum assist (75% or more patient effort)  -TC     Distance (Gait Training Goal 1, PT) 15  -TC     Time Frame (Gait Training Goal 1, PT) 10 days  -TC               User Key  (r) = Recorded By, (t) = Taken By, (c) = Cosigned By      Initials Name Provider Type    Beckie Vidal, RN Registered Nurse    Alanna Hernandez RN Registered Nurse    Rylee Becerra LPN Licensed Nurse    Suzy Ludwig, PT Physical Therapist                    Physical Therapy Education       Title: PT OT SLP Therapies (Done)       Topic: Physical Therapy (Done)       Point: Mobility training (Done)       Learning Progress Summary            Patient Acceptance, E, VU by TC at 3/5/2025 1455                      Point: Home exercise program (Done)       Learning Progress Summary            Patient Acceptance, E, VU by TC at 3/5/2025 1455                      Point: Body mechanics (Done)       Learning Progress Summary            Patient Acceptance, E, VU by TC at 3/5/2025 1455                       Point: Precautions (Done)       Learning Progress Summary            Patient Acceptance, E, VU by TC at 3/5/2025 1459                                      User Key       Initials Effective Dates Name Provider Type Discipline    TC 06/18/24 -  Suzy Tse, PT Physical Therapist PT                  PT Recommendation and Plan  Anticipated Discharge Disposition (PT): inpatient rehabilitation facility  Planned Therapy Interventions (PT): balance training, bed mobility training, gait training, strengthening, transfer training  Therapy Frequency (PT): daily  Plan of Care Reviewed With: patient  Outcome Evaluation: Pt presents with decreased strength, transfers and functional mobility. Pt will benefit from inpatient PT services and continued PT services upon discharge.   Outcome Measures       Row Name 03/05/25 1400             How much help from another person do you currently need...    Turning from your back to your side while in flat bed without using bedrails? 3  -TC      Moving from lying on back to sitting on the side of a flat bed without bedrails? 3  -TC      Moving to and from a bed to a chair (including a wheelchair)? 2  -TC      Standing up from a chair using your arms (e.g., wheelchair, bedside chair)? 3  -TC      Climbing 3-5 steps with a railing? 2  -TC      To walk in hospital room? 2  -TC      AM-PAC 6 Clicks Score (PT) 15  -TC         Functional Assessment    Outcome Measure Options AM-PAC 6 Clicks Basic Mobility (PT)  -TC                User Key  (r) = Recorded By, (t) = Taken By, (c) = Cosigned By      Initials Name Provider Type    TC Suzy Tse, PT Physical Therapist                     Time Calculation:    PT Charges       Row Name 03/05/25 1456             Time Calculation    PT Received On 03/05/25  -TC      PT Goal Re-Cert Due Date 03/14/25  -TC         Untimed Charges    PT Eval/Re-eval Minutes 55  -TC         Total Minutes    Untimed Charges Total Minutes 55  -TC        Total Minutes 55  -TC                User Key  (r) = Recorded By, (t) = Taken By, (c) = Cosigned By      Initials Name Provider Type    TC Suzy Tse, PT Physical Therapist                  Therapy Charges for Today       Code Description Service Date Service Provider Modifiers Qty    67001309145 HC PT EVAL MOD COMPLEXITY 4 3/5/2025 Suzy Tse, PT GP 1            PT G-Codes  Outcome Measure Options: AM-PAC 6 Clicks Basic Mobility (PT)  AM-PAC 6 Clicks Score (PT): 15  AM-PAC 6 Clicks Score (OT): 6    Suzy Tse PT  3/5/2025

## 2025-03-05 NOTE — PROGRESS NOTES
[facility]   Hospitalist Progress Note  Date: 3/5/2025  Patient Name: Brian Mehta  : 1955  MRN: [id number]  Date of admission: 3/3/2025      Subjective   Subjective     Summary: 69-year-old male with a history of hypertension, hyperlipidemia, COPD, HFrEF, GERD, prior CVA with residual right-sided deficits, and diabetes mellitus presented with generalized weakness and coffee-ground emesis. He was recently discharged from rehab four days ago and has had episodes of vomiting since yesterday. EMS reported dry coffee-ground emesis in the corners of his mouth and incontinence upon arrival. He was alert but unable to answer questions. Initial labs were unremarkable, with stable hemoglobin (16.4 from 13.2 three weeks ago). CT abdomen/pelvis showed mild esophagitis, chronic gallbladder wall thickening, splenomegaly, nonobstructing left renal stones, and colonic diverticulosis without diverticulitis. He received IV fluids and Protonix in the ED and was admitted for further evaluation and management. GI consultation is planned.    Interval Followup:   Patient admitted overnight   EGD showed severe Candidiasis esophagitis  Has confluent macular rash over back and buttocks  Reporting chest pain when he swallows      Objective   Objective     Vitals:   Temp:  [98.2 °F (36.8 °C)-98.6 °F (37 °C)] 98.4 °F (36.9 °C)  Heart Rate:  [] 101  Resp:  [14-20] 20  BP: (102-139)/(45-86) 121/80  Physical Exam    Constitutional: Awake, alert, no acute distress   Respiratory: Clear to auscultation bilaterally, nonlabored respirations    Cardiovascular: tachycardic   Gastrointestinal: Positive bowel sounds, soft, nontender, nondistended   Psychiatric: Appropriate affect, cooperative   Neurologic: Oriented x 3, speech clear          Assessment & Plan   Assessment / Plan     Assessment/Plan:  Generalized weakness  Coffee-ground emesis  Candidiasis esophagitis  Chronic gallbladder wall thickening on  CT  Splenomegaly  Hypertension  Hyperlipidemia  COPD  HFrEF  GERD  Prior CVA with residual right sided deficits  Rash - extensive stage 1 pressure wounds     Plan  Admit to inpatient, telemetry  Diflucan 100 PO BID  Protonix 40 mg twice daily. Switch to PO  N.p.o.  Monitor hemoglobin levels with a.m. labs.    SSI with ACHS accuchecks  Hypoglycemic protocol  Brovana, Pulmicort  Bronchodilator protocol  Avoid systemic corticosteroids  DuoNebs every 6 hours as needed  His chest pain is consistent with esophagitis.  Will check troponin and EKG just to be safe  Continuous pulse ox  Fall precautions  PT OT consult  Resume other appropriate home medications once records   Discussed plan with RN.    VTE Prophylaxis:  Mechanical VTE prophylaxis orders are present.  CODE STATUS:   Level Of Support Discussed With: Health Care Surrogate  Code Status (Patient has no pulse and is not breathing): CPR (Attempt to Resuscitate)  Medical Interventions (Patient has pulse or is breathing): Full Support

## 2025-03-05 NOTE — PLAN OF CARE
Goal Outcome Evaluation:   Patient AAOx2,  patient becomes very agitated when turning and changing him, provided incontinence care and applied creams, Patient voided 150 ml of malodorous urine , Dr. Carpenter  notified,  order received to get UA .  Patient wife is at bedside.

## 2025-03-05 NOTE — PLAN OF CARE
Goal Outcome Evaluation:  Plan of Care Reviewed With: patient           Outcome Evaluation: Pt presents with decreased strength, transfers and functional mobility. Pt will benefit from inpatient PT services and continued PT services upon discharge.    Anticipated Discharge Disposition (PT): inpatient rehabilitation facility

## 2025-03-06 LAB
ALBUMIN SERPL-MCNC: 2.9 G/DL (ref 3.5–5.2)
ALBUMIN/GLOB SERPL: 0.8 G/DL
ALP SERPL-CCNC: 83 U/L (ref 39–117)
ALT SERPL W P-5'-P-CCNC: 6 U/L (ref 1–41)
ANION GAP SERPL CALCULATED.3IONS-SCNC: 8.9 MMOL/L (ref 5–15)
AST SERPL-CCNC: 10 U/L (ref 1–40)
BASOPHILS # BLD AUTO: 0.01 10*3/MM3 (ref 0–0.2)
BASOPHILS NFR BLD AUTO: 0.2 % (ref 0–1.5)
BILIRUB SERPL-MCNC: 0.5 MG/DL (ref 0–1.2)
BUN SERPL-MCNC: 15 MG/DL (ref 8–23)
BUN/CREAT SERPL: 19 (ref 7–25)
CALCIUM SPEC-SCNC: 8.4 MG/DL (ref 8.6–10.5)
CHLORIDE SERPL-SCNC: 105 MMOL/L (ref 98–107)
CO2 SERPL-SCNC: 23.1 MMOL/L (ref 22–29)
CREAT SERPL-MCNC: 0.79 MG/DL (ref 0.76–1.27)
CYTO UR: NORMAL
DEPRECATED RDW RBC AUTO: 45.7 FL (ref 37–54)
EGFRCR SERPLBLD CKD-EPI 2021: 97.4 ML/MIN/1.73
EOSINOPHIL # BLD AUTO: 0.3 10*3/MM3 (ref 0–0.4)
EOSINOPHIL NFR BLD AUTO: 5.8 % (ref 0.3–6.2)
ERYTHROCYTE [DISTWIDTH] IN BLOOD BY AUTOMATED COUNT: 15.9 % (ref 12.3–15.4)
GLOBULIN UR ELPH-MCNC: 3.5 GM/DL
GLUCOSE BLDC GLUCOMTR-MCNC: 143 MG/DL (ref 70–99)
GLUCOSE BLDC GLUCOMTR-MCNC: 149 MG/DL (ref 70–99)
GLUCOSE BLDC GLUCOMTR-MCNC: 183 MG/DL (ref 70–99)
GLUCOSE BLDC GLUCOMTR-MCNC: 190 MG/DL (ref 70–99)
GLUCOSE SERPL-MCNC: 138 MG/DL (ref 65–99)
HCT VFR BLD AUTO: 39.5 % (ref 37.5–51)
HCT VFR BLD AUTO: 39.5 % (ref 37.5–51)
HGB BLD-MCNC: 12.8 G/DL (ref 13–17.7)
HGB BLD-MCNC: 13 G/DL (ref 13–17.7)
IMM GRANULOCYTES # BLD AUTO: 0.01 10*3/MM3 (ref 0–0.05)
IMM GRANULOCYTES NFR BLD AUTO: 0.2 % (ref 0–0.5)
LAB AP CASE REPORT: NORMAL
LAB AP CLINICAL INFORMATION: NORMAL
LAB AP SPECIAL STAINS: NORMAL
LYMPHOCYTES # BLD AUTO: 1.51 10*3/MM3 (ref 0.7–3.1)
LYMPHOCYTES NFR BLD AUTO: 29 % (ref 19.6–45.3)
MCH RBC QN AUTO: 25.8 PG (ref 26.6–33)
MCHC RBC AUTO-ENTMCNC: 32.4 G/DL (ref 31.5–35.7)
MCV RBC AUTO: 79.6 FL (ref 79–97)
MONOCYTES # BLD AUTO: 0.42 10*3/MM3 (ref 0.1–0.9)
MONOCYTES NFR BLD AUTO: 8.1 % (ref 5–12)
NEUTROPHILS NFR BLD AUTO: 2.95 10*3/MM3 (ref 1.7–7)
NEUTROPHILS NFR BLD AUTO: 56.7 % (ref 42.7–76)
NRBC BLD AUTO-RTO: 0 /100 WBC (ref 0–0.2)
PATH REPORT.FINAL DX SPEC: NORMAL
PATH REPORT.GROSS SPEC: NORMAL
PLATELET # BLD AUTO: 226 10*3/MM3 (ref 140–450)
PMV BLD AUTO: 10.2 FL (ref 6–12)
POTASSIUM SERPL-SCNC: 3.6 MMOL/L (ref 3.5–5.2)
PROT SERPL-MCNC: 6.4 G/DL (ref 6–8.5)
QT INTERVAL: 362 MS
QT INTERVAL: 365 MS
QT INTERVAL: 416 MS
QTC INTERVAL: 458 MS
QTC INTERVAL: 459 MS
QTC INTERVAL: 471 MS
RBC # BLD AUTO: 4.96 10*6/MM3 (ref 4.14–5.8)
SODIUM SERPL-SCNC: 137 MMOL/L (ref 136–145)
WBC NRBC COR # BLD AUTO: 5.2 10*3/MM3 (ref 3.4–10.8)

## 2025-03-06 PROCEDURE — 99232 SBSQ HOSP IP/OBS MODERATE 35: CPT | Performed by: FAMILY MEDICINE

## 2025-03-06 PROCEDURE — 85018 HEMOGLOBIN: CPT | Performed by: INTERNAL MEDICINE

## 2025-03-06 PROCEDURE — 94799 UNLISTED PULMONARY SVC/PX: CPT

## 2025-03-06 PROCEDURE — 85025 COMPLETE CBC W/AUTO DIFF WBC: CPT | Performed by: FAMILY MEDICINE

## 2025-03-06 PROCEDURE — 80053 COMPREHEN METABOLIC PANEL: CPT | Performed by: FAMILY MEDICINE

## 2025-03-06 PROCEDURE — 82948 REAGENT STRIP/BLOOD GLUCOSE: CPT

## 2025-03-06 PROCEDURE — 85014 HEMATOCRIT: CPT | Performed by: INTERNAL MEDICINE

## 2025-03-06 PROCEDURE — 94664 DEMO&/EVAL PT USE INHALER: CPT

## 2025-03-06 PROCEDURE — 63710000001 INSULIN LISPRO (HUMAN) PER 5 UNITS: Performed by: FAMILY MEDICINE

## 2025-03-06 PROCEDURE — 82948 REAGENT STRIP/BLOOD GLUCOSE: CPT | Performed by: FAMILY MEDICINE

## 2025-03-06 PROCEDURE — 94760 N-INVAS EAR/PLS OXIMETRY 1: CPT

## 2025-03-06 PROCEDURE — 36415 COLL VENOUS BLD VENIPUNCTURE: CPT | Performed by: INTERNAL MEDICINE

## 2025-03-06 RX ORDER — BUDESONIDE AND FORMOTEROL FUMARATE DIHYDRATE 160; 4.5 UG/1; UG/1
2 AEROSOL RESPIRATORY (INHALATION)
Status: DISCONTINUED | OUTPATIENT
Start: 2025-03-06 | End: 2025-03-12 | Stop reason: HOSPADM

## 2025-03-06 RX ORDER — ASPIRIN 81 MG/1
81 TABLET, CHEWABLE ORAL DAILY
Status: DISCONTINUED | OUTPATIENT
Start: 2025-03-06 | End: 2025-03-12 | Stop reason: HOSPADM

## 2025-03-06 RX ORDER — CETIRIZINE HYDROCHLORIDE 10 MG/1
10 TABLET ORAL DAILY
Status: DISCONTINUED | OUTPATIENT
Start: 2025-03-06 | End: 2025-03-06

## 2025-03-06 RX ORDER — ATORVASTATIN CALCIUM 40 MG/1
40 TABLET, FILM COATED ORAL NIGHTLY
Status: DISCONTINUED | OUTPATIENT
Start: 2025-03-06 | End: 2025-03-12 | Stop reason: HOSPADM

## 2025-03-06 RX ORDER — NYSTATIN 100000 [USP'U]/ML
5 SUSPENSION ORAL 4 TIMES DAILY
Status: DISCONTINUED | OUTPATIENT
Start: 2025-03-06 | End: 2025-03-12 | Stop reason: HOSPADM

## 2025-03-06 RX ORDER — LOSARTAN POTASSIUM 50 MG/1
25 TABLET ORAL DAILY
Status: DISCONTINUED | OUTPATIENT
Start: 2025-03-06 | End: 2025-03-12 | Stop reason: HOSPADM

## 2025-03-06 RX ORDER — SERTRALINE HYDROCHLORIDE 25 MG/1
25 TABLET, FILM COATED ORAL DAILY
Status: DISCONTINUED | OUTPATIENT
Start: 2025-03-06 | End: 2025-03-12 | Stop reason: HOSPADM

## 2025-03-06 RX ADMIN — SERTRALINE HYDROCHLORIDE 25 MG: 25 TABLET ORAL at 18:00

## 2025-03-06 RX ADMIN — IPRATROPIUM BROMIDE AND ALBUTEROL SULFATE 3 ML: .5; 3 SOLUTION RESPIRATORY (INHALATION) at 07:28

## 2025-03-06 RX ADMIN — INSULIN LISPRO 2 UNITS: 100 INJECTION, SOLUTION INTRAVENOUS; SUBCUTANEOUS at 12:43

## 2025-03-06 RX ADMIN — Medication 10 ML: at 09:29

## 2025-03-06 RX ADMIN — ARFORMOTEROL TARTRATE 15 MCG: 15 SOLUTION RESPIRATORY (INHALATION) at 07:28

## 2025-03-06 RX ADMIN — IPRATROPIUM BROMIDE AND ALBUTEROL SULFATE 3 ML: .5; 3 SOLUTION RESPIRATORY (INHALATION) at 11:38

## 2025-03-06 RX ADMIN — INSULIN LISPRO 2 UNITS: 100 INJECTION, SOLUTION INTRAVENOUS; SUBCUTANEOUS at 18:00

## 2025-03-06 RX ADMIN — FLUCONAZOLE 100 MG: 100 TABLET ORAL at 09:29

## 2025-03-06 RX ADMIN — ASPIRIN 81 MG: 81 TABLET, CHEWABLE ORAL at 18:00

## 2025-03-06 RX ADMIN — BUDESONIDE 0.5 MG: 0.5 INHALANT RESPIRATORY (INHALATION) at 07:28

## 2025-03-06 RX ADMIN — PANTOPRAZOLE SODIUM 40 MG: 40 INJECTION, POWDER, FOR SOLUTION INTRAVENOUS at 09:29

## 2025-03-06 RX ADMIN — WHITE PETROLATUM 1 APPLICATION: 1.75 OINTMENT TOPICAL at 21:22

## 2025-03-06 RX ADMIN — ATORVASTATIN CALCIUM 40 MG: 40 TABLET, FILM COATED ORAL at 21:25

## 2025-03-06 RX ADMIN — BUDESONIDE AND FORMOTEROL FUMARATE DIHYDRATE 2 PUFF: 160; 4.5 AEROSOL RESPIRATORY (INHALATION) at 18:41

## 2025-03-06 RX ADMIN — EMPAGLIFLOZIN 25 MG: 25 TABLET, FILM COATED ORAL at 18:00

## 2025-03-06 RX ADMIN — WHITE PETROLATUM 1 APPLICATION: 1.75 OINTMENT TOPICAL at 18:01

## 2025-03-06 RX ADMIN — IPRATROPIUM BROMIDE AND ALBUTEROL SULFATE 3 ML: .5; 3 SOLUTION RESPIRATORY (INHALATION) at 18:41

## 2025-03-06 RX ADMIN — IPRATROPIUM BROMIDE AND ALBUTEROL SULFATE 3 ML: .5; 3 SOLUTION RESPIRATORY (INHALATION) at 15:50

## 2025-03-06 RX ADMIN — NYSTATIN 500000 UNITS: 100000 SUSPENSION ORAL at 18:00

## 2025-03-06 RX ADMIN — LOSARTAN POTASSIUM 25 MG: 50 TABLET, FILM COATED ORAL at 18:00

## 2025-03-06 RX ADMIN — PANTOPRAZOLE SODIUM 40 MG: 40 INJECTION, POWDER, FOR SOLUTION INTRAVENOUS at 18:00

## 2025-03-06 NOTE — PROGRESS NOTES
[facility]   Hospitalist Progress Note  Date: 3/6/2025  Patient Name: Brian Mehta  : 1955  MRN: [id number]  Date of admission: 3/3/2025      Subjective   Subjective     Summary: 69-year-old male with a history of hypertension, hyperlipidemia, COPD, HFrEF, GERD, prior CVA with residual right-sided deficits, and diabetes mellitus presented with generalized weakness and coffee-ground emesis. He was recently discharged from rehab four days ago and has had episodes of vomiting since yesterday. EMS reported dry coffee-ground emesis in the corners of his mouth and incontinence upon arrival. He was alert but unable to answer questions. Initial labs were unremarkable, with stable hemoglobin (16.4 from 13.2 three weeks ago). CT abdomen/pelvis showed mild esophagitis, chronic gallbladder wall thickening, splenomegaly, nonobstructing left renal stones, and colonic diverticulosis without diverticulitis. He received IV fluids and Protonix in the ED and was admitted for further evaluation and management. GI consultation is planned. Has confluent macular rash over back and buttocks secondary to pressure injury from lying in bed.  Underwent EGD which showed severe Candidiasis esophagitis.   Started on diflucan.     Interval Followup:  Reporting pain when he swallows  No acute events overnight  Awaiting rehab placement      Objective   Objective     Vitals:   Temp:  [98.1 °F (36.7 °C)-98.8 °F (37.1 °C)] 98.6 °F (37 °C)  Heart Rate:  [75-96] 82  Resp:  [16-20] 16  BP: (110-147)/(43-79) 115/61  Physical Exam    Constitutional: Awake, alert, no acute distress   Respiratory: Clear to auscultation bilaterally, nonlabored respirations    Cardiovascular: tachycardic   Gastrointestinal: Positive bowel sounds, soft, nontender, nondistended   Psychiatric: Appropriate affect, cooperative   Neurologic: Oriented x 3, speech clear          Assessment & Plan   Assessment / Plan     Assessment/Plan:  Generalized weakness  Coffee-ground  emesis  Candidiasis esophagitis  Chronic gallbladder wall thickening on CT  Splenomegaly  Hypertension  Hyperlipidemia  COPD  HFrEF  GERD  Prior CVA with residual right sided deficits  Rash - extensive stage 1 pressure wounds     Plan  Admit to inpatient, telemetry  Diflucan 100 PO BID  Add Nystatin swish and swallow  Protonix 40 mg twice daily PO  Monitor hemoglobin levels with a.m. labs.    SSI with ACHS accuchecks  Resume home Jardiance and Lantus  Hypoglycemic protocol  Resume home Symbicort  Bronchodilator protocol  Avoiding systemic corticosteroids  DuoNebs every 6 hours as needed  His chest pain is consistent with esophagitis.  Troponin and EKG normal  Wound care  Fall precautions  PT OT consult  Resume home medications    Discussed plan with RN.    Disposition: Awaiting rehab    VTE Prophylaxis:  Mechanical VTE prophylaxis orders are present.  CODE STATUS:   Level Of Support Discussed With: Health Care Surrogate  Code Status (Patient has no pulse and is not breathing): CPR (Attempt to Resuscitate)  Medical Interventions (Patient has pulse or is breathing): Full Support

## 2025-03-06 NOTE — SIGNIFICANT NOTE
Wound Eval / Progress Noted    [facility]     Patient Name: Brian Mehta  : 1955  MRN: [id number]  Today's Date: 3/5/2025                 Admit Date: 3/3/2025    Visit Dx:    ICD-10-CM ICD-9-CM   1. Acute esophagitis  K20.90 530.12   2. Upper GI bleed  K92.2 578.9   3. Decreased activities of daily living (ADL)  Z78.9 V49.89   4. Coffee ground emesis  K92.0 578.0   5. Difficulty in walking  R26.2 719.7         Coffee ground emesis        Past Medical History:   Diagnosis Date    CHF (congestive heart failure)     SEE'S DR STRICKLAND NO CURRENT S/S    COPD (chronic obstructive pulmonary disease)     INHALER    Diabetes mellitus     DOESN'T CHECK BG OFTEN AT HOME    Hyperlipidemia     Hypertension     Sepsis 2023    Stroke 2017    RIGHT SIDE WEAKNESS        Past Surgical History:   Procedure Laterality Date    APPENDECTOMY      CYSTOLITHALOPAXY PERCUTANEOUS Left 2025    Procedure: CYSTOLITHOLAPAXY, left ureteroscopy with laser basket stone extraction and left ureteral stent exchange.  Looking bladder, laser stone off stent exchange retained stent if possible;  Surgeon: Ector Kulkarni MD;  Location: [facility] MAIN OR;  Service: Urology;  Laterality: Left;    ENDOSCOPY N/A 3/4/2025    Procedure: ESOPHAGOGASTRODUODENOSCOPY with biopsies;  Surgeon: Mikael Bello MD;  Location: [facility] ENDOSCOPY;  Service: Gastroenterology;  Laterality: N/A;  reflux esophagitis    EYE SURGERY Bilateral     MISTY CATARACT         Physical Assessment:  Rash 25 1130 other (see comments) torso macular (Active)   Wound Image   25 1511   Distribution generalized 25 1511   Color red 25 1511   Configuration/Shape asymmetric 25 1511   Borders irregular 25 1511   Characteristics dry;itching;flat 25 1511   Care, Rash open to air 25 1511       Wound 10/31/24 1234 Bilateral gluteal (Active)   Wound Image   25 1511   Dressing Appearance open to air 25 1511   Closure None  03/05/25 1511   Base moist;pink;red;blanchable 03/05/25 1511   Periwound dry;redness;other (see comments) 03/05/25 1511   Periwound Temperature warm 03/05/25 1511   Periwound Skin Turgor soft 03/05/25 1511   Edges open 03/05/25 1511   Drainage Characteristics/Odor serosanguineous 03/05/25 1511   Drainage Amount scant 03/05/25 1511   Care, Wound cleansed with;sterile normal saline 03/05/25 1511   Dressing Care open to air 03/05/25 1511   Periwound Care dry periwound area maintained 03/05/25 1511      Abbreviated note  Wound Check / Follow-up:  Patient seen today for wound consult. Patient is awake and alert, but not answering questions from staff. Patient's wife is present at bedside and reports following patient's recent inpatient hospitalization patient discharged to [facility], where he stayed for rehab until February 27.  Patient's wife reports patient was home for approximately 4 days before she brought him back to the emergency department due to pain.  Patient's wife reports rash to patient's torso and arms has been present since return from rehab and that he has reported his skin is tender to touch.  Attending MD aware of rash per note review.  Nutritional supplements have been added for patient.    Bilateral gluteal aspects present with moisture associated skin damage and scattered areas of erosion.  All tissue is noted to be blanchable.  Please see assessment above.  Recommending quality skin care and hygiene with application of Aquaphor twice a day, alternating with application of blue top moisture barrier twice a day and as needed for incontinence.  Implement every 2 hour turns and offload at all times.  Keep patient clean, dry, and free from all moisture.    Generalized rash noted to patient's torso and bilateral arms.  Please see assessment above.  Recommending quality skin care and hygiene with application of Aquaphor twice a day.    Blanchable redness noted heels.  Recommend quality skin  care and hygiene with application of Aquaphor twice a day, alternate with application of blue top moisture barrier twice a day.  Keep heels floated at all times.    Impression: MASD with erosion to gluteal aspects. Rash to torso and bilateral arms.    Short term goals:  Regain skin integrity, skin protection, moisture prevention, pressure reduction, quality skin care and hygiene    Beckie Abrams RN    3/5/2025    20:17 EST

## 2025-03-06 NOTE — PLAN OF CARE
Goal Outcome Evaluation:  Plan of Care Reviewed With: patient        Progress: no change  Outcome Evaluation: PT A&O able to voice some needs and wants T&R q2hrs and PRN pt cont with redness all over body tx applied as ordered, cont POC

## 2025-03-06 NOTE — SIGNIFICANT NOTE
03/06/25 1223   Plan   Plan  notified by primary RN that patient spouse is asking to speak with me. Spoke with spouse and she indicates that she cannot pay her bills. and spouse discussed that she told me she had gotten two checks recently and has money to pay bills with on her husbands card. Spouse confirms this; however, she states that she cannot pay her electric and her house payment. Gave spouse to contact for [facility].  asked spouse what has happened with her income that came in. She states that she had to pay other bills but does not contribute much more information. Rehab referrals are pending at this time for patient.        03/06/25 1223   Plan   Plan  notified by primary RN that patient spouse is asking to speak with me. Spoke with spouse and she indicates that she cannot pay her bills. and spouse discussed that she told me she had gotten two checks recently and has money to pay bills with on her husbands card. Spouse confirms this; however, she states that she cannot pay her electric and her house payment. Gave spouse to contact for [facility] at [facility].  asked spouse what has happened with her income that came in. She states that she had to pay other bills but does not contribute much more information. Rehab referrals are pending at this time for patient.

## 2025-03-06 NOTE — CONSULTS
Consult placed per Blood Glucose > 200. Met with patient and wife at bedside. Patient was unengaged in my questions; however, wife discussed with me. Discussed most recent A1c of 8% with an estimated average glucose of 183 mg/dl. Wife states that patient takes Lantus daily at home and it was recently filled at [facility]. Wife states that he has a Dexcom G6 at home but she does not know how it works. I explained that if she can have someone bring it in, I can show them how to use it. She was unable to tell me if they had the Dexcom sensor and , or just one. She states that they do not have a back up glucometer at home. Will attempt to send one to the pharmacy.     Explained that if they are unable to bring Dexcom supplies into the hospital to receive education, they need to bring supplies to their follow up appointment with their PCP since that is who ordered the Dexcom for them.    Wife also states that they cannot afford their electric bill and their rent. Per my discussion with SW earlier, patient's wife just received a check in the mail to which she was going to pay her bills with. When bringing this up to patient's wife she states that she still cannot pay the bills. She also states that they need to find a new place to live since they cannot afford the $400/month rent. I asked her what she specifically needed assistance with and she states that she wants resources for available apartments and how much the applications will cost. Will discuss again with DAVID.    Recommend on discharge:   Rx for glucometer, test strips, lancets (pended)

## 2025-03-06 NOTE — SIGNIFICANT NOTE
03/04/25 0164   Plan   Plan  met with spouse at the bedside to discuss discharge planning. Patient is a readmit. He discharged from our hospital to [facility]. He has since then been discharged back to the home. Spouse is frustrated because she has not been able to get records from rehab facility regarding the patients stay there. She states that patient did not have any physical therapy when he was there and is now more weak. Spouse indicating that she does not have electric in their home. She brought her [employer] electric bill statement to hospital which shows a delinquent amount due of $430.71. She states that this balance was to paid by 02/24 and she did not receive her check until March 1st. Spouse states that payments go in on the 1st and 3rd of the month. Additionally, her  has a card with another $800 on it for funding.  and spouse discussed that today is the 4th and money should now be in her account to pay bills. Spouse confirms. She reports that she will call [employer] and get electric turned back on. Spouse also presented her rent statement to me that is for $475. Indicated to spouse that rosie is unable to pay the bills and with money being in her account, she will need to make payments on her bills as they are due. Spouse agrees. She states that APS clif Molina is still involved with spouse. Tracy reportedly assisted with helping get their water turned back on last month. Discharge needs are currently uncertain. Will follow up as indicated.

## 2025-03-07 LAB
ALBUMIN SERPL-MCNC: 3 G/DL (ref 3.5–5.2)
ALBUMIN/GLOB SERPL: 0.8 G/DL
ALP SERPL-CCNC: 87 U/L (ref 39–117)
ALT SERPL W P-5'-P-CCNC: 8 U/L (ref 1–41)
ANION GAP SERPL CALCULATED.3IONS-SCNC: 10.8 MMOL/L (ref 5–15)
AST SERPL-CCNC: 13 U/L (ref 1–40)
BASOPHILS # BLD AUTO: 0.02 10*3/MM3 (ref 0–0.2)
BASOPHILS NFR BLD AUTO: 0.4 % (ref 0–1.5)
BILIRUB SERPL-MCNC: 0.5 MG/DL (ref 0–1.2)
BUN SERPL-MCNC: 12 MG/DL (ref 8–23)
BUN/CREAT SERPL: 14.6 (ref 7–25)
CALCIUM SPEC-SCNC: 8.5 MG/DL (ref 8.6–10.5)
CHLORIDE SERPL-SCNC: 105 MMOL/L (ref 98–107)
CO2 SERPL-SCNC: 24.2 MMOL/L (ref 22–29)
CREAT SERPL-MCNC: 0.82 MG/DL (ref 0.76–1.27)
DEPRECATED RDW RBC AUTO: 46.3 FL (ref 37–54)
EGFRCR SERPLBLD CKD-EPI 2021: 96.3 ML/MIN/1.73
EOSINOPHIL # BLD AUTO: 0.27 10*3/MM3 (ref 0–0.4)
EOSINOPHIL NFR BLD AUTO: 5.5 % (ref 0.3–6.2)
ERYTHROCYTE [DISTWIDTH] IN BLOOD BY AUTOMATED COUNT: 15.8 % (ref 12.3–15.4)
GLOBULIN UR ELPH-MCNC: 3.6 GM/DL
GLUCOSE BLDC GLUCOMTR-MCNC: 125 MG/DL (ref 70–99)
GLUCOSE BLDC GLUCOMTR-MCNC: 135 MG/DL (ref 70–99)
GLUCOSE BLDC GLUCOMTR-MCNC: 170 MG/DL (ref 70–99)
GLUCOSE BLDC GLUCOMTR-MCNC: 175 MG/DL (ref 70–99)
GLUCOSE SERPL-MCNC: 140 MG/DL (ref 65–99)
HCT VFR BLD AUTO: 41.9 % (ref 37.5–51)
HGB BLD-MCNC: 13.5 G/DL (ref 13–17.7)
IMM GRANULOCYTES # BLD AUTO: 0.02 10*3/MM3 (ref 0–0.05)
IMM GRANULOCYTES NFR BLD AUTO: 0.4 % (ref 0–0.5)
LYMPHOCYTES # BLD AUTO: 1.56 10*3/MM3 (ref 0.7–3.1)
LYMPHOCYTES NFR BLD AUTO: 31.8 % (ref 19.6–45.3)
MCH RBC QN AUTO: 26.2 PG (ref 26.6–33)
MCHC RBC AUTO-ENTMCNC: 32.2 G/DL (ref 31.5–35.7)
MCV RBC AUTO: 81.4 FL (ref 79–97)
MONOCYTES # BLD AUTO: 0.47 10*3/MM3 (ref 0.1–0.9)
MONOCYTES NFR BLD AUTO: 9.6 % (ref 5–12)
NEUTROPHILS NFR BLD AUTO: 2.56 10*3/MM3 (ref 1.7–7)
NEUTROPHILS NFR BLD AUTO: 52.3 % (ref 42.7–76)
NRBC BLD AUTO-RTO: 0 /100 WBC (ref 0–0.2)
PLATELET # BLD AUTO: 248 10*3/MM3 (ref 140–450)
PMV BLD AUTO: 9.9 FL (ref 6–12)
POTASSIUM SERPL-SCNC: 3.7 MMOL/L (ref 3.5–5.2)
PROT SERPL-MCNC: 6.6 G/DL (ref 6–8.5)
RBC # BLD AUTO: 5.15 10*6/MM3 (ref 4.14–5.8)
SODIUM SERPL-SCNC: 140 MMOL/L (ref 136–145)
WBC NRBC COR # BLD AUTO: 4.9 10*3/MM3 (ref 3.4–10.8)

## 2025-03-07 PROCEDURE — 85025 COMPLETE CBC W/AUTO DIFF WBC: CPT | Performed by: FAMILY MEDICINE

## 2025-03-07 PROCEDURE — 94664 DEMO&/EVAL PT USE INHALER: CPT

## 2025-03-07 PROCEDURE — 94760 N-INVAS EAR/PLS OXIMETRY 1: CPT

## 2025-03-07 PROCEDURE — 82948 REAGENT STRIP/BLOOD GLUCOSE: CPT

## 2025-03-07 PROCEDURE — 97530 THERAPEUTIC ACTIVITIES: CPT

## 2025-03-07 PROCEDURE — 97116 GAIT TRAINING THERAPY: CPT

## 2025-03-07 PROCEDURE — 94799 UNLISTED PULMONARY SVC/PX: CPT

## 2025-03-07 PROCEDURE — 80053 COMPREHEN METABOLIC PANEL: CPT | Performed by: FAMILY MEDICINE

## 2025-03-07 PROCEDURE — 82948 REAGENT STRIP/BLOOD GLUCOSE: CPT | Performed by: FAMILY MEDICINE

## 2025-03-07 PROCEDURE — 63710000001 INSULIN GLARGINE PER 5 UNITS: Performed by: FAMILY MEDICINE

## 2025-03-07 PROCEDURE — 99232 SBSQ HOSP IP/OBS MODERATE 35: CPT | Performed by: INTERNAL MEDICINE

## 2025-03-07 PROCEDURE — 63710000001 INSULIN LISPRO (HUMAN) PER 5 UNITS: Performed by: FAMILY MEDICINE

## 2025-03-07 RX ORDER — IPRATROPIUM BROMIDE AND ALBUTEROL SULFATE 2.5; .5 MG/3ML; MG/3ML
3 SOLUTION RESPIRATORY (INHALATION) EVERY 6 HOURS PRN
Status: DISCONTINUED | OUTPATIENT
Start: 2025-03-07 | End: 2025-03-12 | Stop reason: HOSPADM

## 2025-03-07 RX ORDER — PANTOPRAZOLE SODIUM 40 MG/1
40 TABLET, DELAYED RELEASE ORAL
Status: DISCONTINUED | OUTPATIENT
Start: 2025-03-07 | End: 2025-03-12 | Stop reason: HOSPADM

## 2025-03-07 RX ADMIN — ASPIRIN 81 MG: 81 TABLET, CHEWABLE ORAL at 08:17

## 2025-03-07 RX ADMIN — EMPAGLIFLOZIN 25 MG: 25 TABLET, FILM COATED ORAL at 08:17

## 2025-03-07 RX ADMIN — ATORVASTATIN CALCIUM 40 MG: 40 TABLET, FILM COATED ORAL at 21:16

## 2025-03-07 RX ADMIN — INSULIN GLARGINE 10 UNITS: 100 INJECTION, SOLUTION SUBCUTANEOUS at 21:16

## 2025-03-07 RX ADMIN — INSULIN LISPRO 2 UNITS: 100 INJECTION, SOLUTION INTRAVENOUS; SUBCUTANEOUS at 12:09

## 2025-03-07 RX ADMIN — INSULIN LISPRO 2 UNITS: 100 INJECTION, SOLUTION INTRAVENOUS; SUBCUTANEOUS at 16:58

## 2025-03-07 RX ADMIN — BUDESONIDE AND FORMOTEROL FUMARATE DIHYDRATE 2 PUFF: 160; 4.5 AEROSOL RESPIRATORY (INHALATION) at 07:14

## 2025-03-07 RX ADMIN — WHITE PETROLATUM 1 APPLICATION: 1.75 OINTMENT TOPICAL at 21:16

## 2025-03-07 RX ADMIN — SERTRALINE HYDROCHLORIDE 25 MG: 25 TABLET ORAL at 08:17

## 2025-03-07 RX ADMIN — FLUCONAZOLE 100 MG: 100 TABLET ORAL at 08:17

## 2025-03-07 RX ADMIN — LOSARTAN POTASSIUM 25 MG: 50 TABLET, FILM COATED ORAL at 08:17

## 2025-03-07 RX ADMIN — IPRATROPIUM BROMIDE AND ALBUTEROL SULFATE 3 ML: .5; 3 SOLUTION RESPIRATORY (INHALATION) at 10:54

## 2025-03-07 RX ADMIN — IPRATROPIUM BROMIDE AND ALBUTEROL SULFATE 3 ML: .5; 3 SOLUTION RESPIRATORY (INHALATION) at 07:11

## 2025-03-07 NOTE — THERAPY TREATMENT NOTE
Acute Care - Physical Therapy Progress Note   [facility]     Patient Name: Brian Mehta  : 1955  MRN: [id number]  Today's Date: 3/7/2025      Visit Dx:     ICD-10-CM ICD-9-CM   1. Acute esophagitis  K20.90 530.12   2. Upper GI bleed  K92.2 578.9   3. Decreased activities of daily living (ADL)  Z78.9 V49.89   4. Coffee ground emesis  K92.0 578.0   5. Difficulty in walking  R26.2 719.7     Patient Active Problem List   Diagnosis    CVA (cerebral vascular accident)    Essential hypertension    High cholesterol    Hip pain    Vitamin B12 deficiency    Kidney disorder    Chronic pain of both knees    Systolic CHF, chronic    LVH (left ventricular hypertrophy)    COPD (chronic obstructive pulmonary disease)    DM2 (diabetes mellitus, type 2)    Urinary tract infection without hematuria    Abnormal nuclear stress test    UTI (urinary tract infection)    Fall    Failure to thrive in adult    Nephrolithiasis    Bladder stone    Retained ureteral stent    Acute UTI    Bacteremia    Generalized weakness    Coffee ground emesis     Past Medical History:   Diagnosis Date    CHF (congestive heart failure)     SEE'S DR STRICKLAND NO CURRENT S/S    COPD (chronic obstructive pulmonary disease)     INHALER    Diabetes mellitus     DOESN'T CHECK BG OFTEN AT HOME    Hyperlipidemia     Hypertension     Sepsis 2023    Stroke 2017    RIGHT SIDE WEAKNESS     Past Surgical History:   Procedure Laterality Date    APPENDECTOMY      CYSTOLITHALOPAXY PERCUTANEOUS Left 2025    Procedure: CYSTOLITHOLAPAXY, left ureteroscopy with laser basket stone extraction and left ureteral stent exchange.  Looking bladder, laser stone off stent exchange retained stent if possible;  Surgeon: Ector Kulkarni MD;  Location: [facility] MAIN OR;  Service: Urology;  Laterality: Left;    ENDOSCOPY N/A 3/4/2025    Procedure: ESOPHAGOGASTRODUODENOSCOPY with biopsies;  Surgeon: Mikael Bello MD;  Location: [facility] ENDOSCOPY;  Service: Gastroenterology;   Laterality: N/A;  reflux esophagitis    EYE SURGERY Bilateral     MISTY CATARACT     PT Assessment (Last 12 Hours)       PT Evaluation and Treatment       Row Name 03/07/25 1600          Physical Therapy Time and Intention    Subjective Information no complaints  -CS     Document Type therapy note (daily note)  -CS     Mode of Treatment individual therapy;physical therapy  -CS     Patient Effort good  -CS     Symptoms Noted During/After Treatment fatigue  -CS       Row Name 03/07/25 1600          Pain    Pretreatment Pain Rating 0/10 - no pain  -CS     Posttreatment Pain Rating 5/10  -CS     Pain Location knee  -CS     Pain Side/Orientation right;generalized  -CS     Pain Management Interventions nursing notified  -CS       Row Name 03/07/25 1600          Bed Mobility    Bed Mobility supine-sit;sit-supine  -CS     Supine-Sit Grundy (Bed Mobility) verbal cues;minimum assist (75% patient effort);1 person assist  -CS     Sit-Supine Grundy (Bed Mobility) verbal cues;minimum assist (75% patient effort);1 person assist  -CS     Assistive Device (Bed Mobility) bed rails  -CS       Row Name 03/07/25 1600          Transfers    Transfers sit-stand transfer;stand-sit transfer  -CS       Row Name 03/07/25 1600          Sit-Stand Transfer    Sit-Stand Grundy (Transfers) verbal cues;minimum assist (75% patient effort);1 person assist  -CS     Assistive Device (Sit-Stand Transfers) walker, front-wheeled  -CS       Row Name 03/07/25 1600          Stand-Sit Transfer    Stand-Sit Grundy (Transfers) verbal cues;minimum assist (75% patient effort);1 person assist  -CS     Assistive Device (Stand-Sit Transfers) walker, front-wheeled  -       Row Name 03/07/25 1600          Gait/Stairs (Locomotion)    Grundy Level (Gait) verbal cues;contact guard;1 person assist  -CS     Assistive Device (Gait) walker, front-wheeled  -CS     Distance in Feet (Gait) 45  -CS     Pattern (Gait) 4-point;step-to  -CS      Deviations/Abnormal Patterns (Gait) base of support, narrow;chirag decreased;festinating/shuffling;gait speed decreased;stride length decreased;weight shifting decreased  -CS     Bilateral Gait Deviations forward flexed posture  -CS       Row Name             Wound 10/31/24 1234 Bilateral gluteal    Wound - Properties Group Placement Date: 10/31/24  -NH Placement Time: 1234  -NH Side: Bilateral  -NH Location: gluteal  -NH Primary Wound Type: MASD  -NH    Retired Wound - Properties Group Placement Date: 10/31/24  -NH Placement Time: 1234  -NH Side: Bilateral  -NH Location: gluteal  -NH Primary Wound Type: MASD  -NH    Retired Wound - Properties Group Placement Date: 10/31/24  -NH Placement Time: 1234  -NH Side: Bilateral  -NH Location: gluteal  -NH Primary Wound Type: MASD  -NH    Retired Wound - Properties Group Date first assessed: 10/31/24  -NH Time first assessed: 1234  -NH Side: Bilateral  -NH Location: gluteal  -NH Primary Wound Type: MASD  -NH      Row Name 03/07/25 1600          Positioning and Restraints    Pre-Treatment Position in bed  -CS     Post Treatment Position bed  -CS     In Bed fowlers;call light within reach;encouraged to call for assist;exit alarm on;with nsg;legs elevated;heels elevated  -CS       Row Name 03/07/25 1600          Progress Summary (PT)    Progress Toward Functional Goals (PT) progress toward functional goals is fair  -CS               User Key  (r) = Recorded By, (t) = Taken By, (c) = Cosigned By      Initials Name Provider Type    Alanna Hernandez, RN Registered Nurse    Logan Fiore PTA Physical Therapist Assistant                    Physical Therapy Education       Title: PT OT SLP Therapies (Done)       Topic: Physical Therapy (Done)       Point: Mobility training (Done)       Learning Progress Summary            Patient Acceptance, E, VU by TC at 3/5/2025 6735                      Point: Home exercise program (Done)       Learning Progress Summary             Patient Acceptance, E, VU by TC at 3/5/2025 1455                      Point: Body mechanics (Done)       Learning Progress Summary            Patient Acceptance, E, VU by TC at 3/5/2025 1455                      Point: Precautions (Done)       Learning Progress Summary            Patient Acceptance, E, VU by TC at 3/5/2025 1455                                      User Key       Initials Effective Dates Name Provider Type Discipline    TC 06/18/24 -  Suzy Tse, PT Physical Therapist PT                  PT Recommendation and Plan     Progress Summary (PT)  Progress Toward Functional Goals (PT): progress toward functional goals is fair   Outcome Measures       Row Name 03/07/25 1600 03/05/25 1400          How much help from another person do you currently need...    Turning from your back to your side while in flat bed without using bedrails? 3  -CS 3  -TC     Moving from lying on back to sitting on the side of a flat bed without bedrails? 2  -CS 3  -TC     Moving to and from a bed to a chair (including a wheelchair)? 3  -CS 2  -TC     Standing up from a chair using your arms (e.g., wheelchair, bedside chair)? 3  -CS 3  -TC     Climbing 3-5 steps with a railing? 2  -CS 2  -TC     To walk in hospital room? 3  -CS 2  -TC     AM-PAC 6 Clicks Score (PT) 16  -CS 15  -TC        Functional Assessment    Outcome Measure Options AM-PAC 6 Clicks Basic Mobility (PT)  -CS AM-PAC 6 Clicks Basic Mobility (PT)  -TC               User Key  (r) = Recorded By, (t) = Taken By, (c) = Cosigned By      Initials Name Provider Type    CS Logan Mcdonald PTA Physical Therapist Assistant    TC Suzy Tse, PT Physical Therapist                     Time Calculation:    PT Charges       Row Name 03/07/25 1614             Time Calculation    Start Time 1511  -CS      PT Received On 03/07/25  -CS         Timed Charges    26244 - Gait Training Minutes  16  -CS      06875 - PT Therapeutic Activity Minutes 10  -CS         Total  Minutes    Timed Charges Total Minutes 26  -CS       Total Minutes 26  -CS                User Key  (r) = Recorded By, (t) = Taken By, (c) = Cosigned By      Initials Name Provider Type    CS Logan Mcdonald PTA Physical Therapist Assistant                  Therapy Charges for Today       Code Description Service Date Service Provider Modifiers Qty    70660266083 HC GAIT TRAINING EA 15 MIN 3/7/2025 Logan Mcdonald PTA GP 1    12827615423 HC PT THERAPEUTIC ACT EA 15 MIN 3/7/2025 Logan Mcdonald PTA GP 1            PT G-Codes  Outcome Measure Options: AM-PAC 6 Clicks Basic Mobility (PT)  AM-PAC 6 Clicks Score (PT): 16  AM-PAC 6 Clicks Score (OT): 6    Logan Mcdonald PTA  3/7/2025

## 2025-03-07 NOTE — PLAN OF CARE
Goal Outcome Evaluation:  Plan of Care Reviewed With: patient, spouse        Progress: improving  Outcome Evaluation: pt aox4 on room air. VSS. BG monitored. pt refused skin care to be performed. turning in bed on own. refusing bed alarm, wife remains at bedside. purwick changed. up with PT this shift, x1 with walker. call light in reach.

## 2025-03-07 NOTE — PLAN OF CARE
Goal Outcome Evaluation:  Plan of Care Reviewed With: patient, spouse        Progress: no change  Outcome Evaluation: PT A&O able to voice some needs PT trying to hit staff cursing and yelling not wanting to be changed after much encouranging pt calm down allowing staff to change him Cont POC PT pulled IV out

## 2025-03-07 NOTE — CONSULTS
"Nutrition Services    Patient Name: Brian Mehta  YOB: 1955  MRN: [id number]  Admission date: 3/3/2025      CLINICAL NUTRITION ASSESSMENT      Reason for Assessment  MST Score 2+   H&P:  Past Medical History:   Diagnosis Date    CHF (congestive heart failure)     SEE'S DR STRICKLAND NO CURRENT S/S    COPD (chronic obstructive pulmonary disease)     INHALER    Diabetes mellitus     DOESN'T CHECK BG OFTEN AT HOME    Hyperlipidemia     Hypertension     Sepsis 09/14/2023    Stroke 2017    RIGHT SIDE WEAKNESS        Current Problems:   Active Hospital Problems    Diagnosis     **Coffee ground emesis         Nutrition/Diet History         Narrative   Patient presented to ED via EMS for evaluation of weakness. Pt non ambulatory. Found in his bed in urine and feces, and with persistent vomiting of coffee-ground emesis. Currently receiving house diet with soft to chew/ground meat and thin liquids. Chart graphics reveal 0-25% meal intake. Patient with significant weight loss over the past month. Patient is not cooperating with care and has been cussing at and trying to hit staff today. Nutrition interview not appropriate at this time. Receiving nutritional supplement. Will continue current intervention and follow up per protocol.      Anthropometrics        Current Height, Weight Height: 190.5 cm (75\")  Weight: 80.1 kg (176 lb 9.4 oz)   Current BMI Body mass index is 22.07 kg/m².   BMI Classification Underweight   % IBW 95%       Weight Hx  Wt Readings from Last 30 Encounters:   03/04/25 0245 80.1 kg (176 lb 9.4 oz)   03/03/25 1503 80.1 kg (176 lb 9.4 oz)   02/04/25 0015 94 kg (207 lb 3.7 oz)   01/08/25 0505 94.1 kg (207 lb 7.3 oz)   10/27/24 0409 94.6 kg (208 lb 8.9 oz)   10/26/24 1834 98.8 kg (217 lb 13 oz)   08/19/24 0516 101 kg (221 lb 9 oz)   08/14/24 1323 100 kg (220 lb 14.4 oz)   07/08/24 1308 98.4 kg (217 lb)   06/17/24 1421 98.4 kg (217 lb)   03/11/24 0150 99.2 kg (218 lb 11.1 oz)   03/10/24 2305 98 kg (216 " lb)   12/07/23 1314 98 kg (216 lb)   11/21/23 1136 110 kg (242 lb 4.6 oz)   09/13/23 2144 101 kg (222 lb 0.1 oz)   08/08/23 1351 93.4 kg (206 lb)   07/18/23 1347 99.8 kg (220 lb)   02/21/19 0000 102 kg (225 lb)   07/17/18 0000 102 kg (225 lb)   03/20/18 0000 110 kg (243 lb 2 oz)          Wt Change Observation -14.9% x 1 month, significant  -18.5% x 1 year, borderline severe      Estimated/Assessed Needs  Estimated Needs based on: Current Body Weight       Energy Requirements 30-35 kcal/kg    EST Needs (kcal/day) 0062-0266       Protein Requirements 1.0-1.2 g/kg   EST Daily Needs (g/day) 80-96       Fluid Requirements 25-30 ml/kg    Estimated Needs (mL/day) 9416-9969     Labs/Medications         Pertinent Labs Reviewed.   Results from last 7 days   Lab Units 03/07/25  0820 03/06/25 0524 03/04/25  0549   SODIUM mmol/L 140 137 137   POTASSIUM mmol/L 3.7 3.6 3.6   CHLORIDE mmol/L 105 105 108*   CO2 mmol/L 24.2 23.1 19.5*   BUN mg/dL 12 15 20   CREATININE mg/dL 0.82 0.79 0.94   CALCIUM mg/dL 8.5* 8.4* 8.7   BILIRUBIN mg/dL 0.5 0.5 0.5   ALK PHOS U/L 87 83 99   ALT (SGPT) U/L 8 6 9   AST (SGOT) U/L 13 10 10   GLUCOSE mg/dL 140* 138* 171*     Results from last 7 days   Lab Units 03/07/25  0820 03/04/25 2027 03/04/25  0549 03/03/25  1650   MAGNESIUM mg/dL  --   --  1.7 1.9   PHOSPHORUS mg/dL  --   --  2.8  --    HEMOGLOBIN g/dL 13.5   < > 14.8 16.4   HEMATOCRIT % 41.9   < > 47.5 51.3*    < > = values in this interval not displayed.     COVID19   Date Value Ref Range Status   08/19/2024 Detected (C) Not Detected - Ref. Range Final     Lab Results   Component Value Date    HGBA1C 8.80 (H) 01/07/2025         Pertinent Medications Reviewed.     Malnutrition Severity Assessment              Nutrition Diagnosis         Nutrition Dx Problem 1 Underweight related to decreased ability to consume sufficient energy as evidenced by inadequate energy intake. and unintended weight change.     Nutrition Intervention           Current  Nutrition Orders & Evaluation of Intake       Current PO Diet Diet: Regular/House; Texture: Soft to Chew (NDD 3); Soft to Chew: Ground Meat; Fluid Consistency: Thin (IDDSI 0)   Supplement Orders Placed This Encounter      Dietary Nutrition Supplements Boost Glucose Control (Glucerna Shake); chocolate           Nutrition Intervention/Prescription        Boost GC TID (+190 kcal, 16 g pro each)        Medical Nutrition Therapy/Nutrition Education          Learner     Readiness Patient  Education not appropriate at this time     Method     Response N/A  N/A     Monitor/Evaluation        Monitor Per protocol, PO intake, Supplement intake, Pertinent labs, Weight, GI status, Symptoms, POC/GOC, Swallow function     Nutrition Discharge Plan         Recommend to continue oral nutrition supplements on discharge.      Electronically signed by:  Yasmeen Mena RD  03/07/25 12:28 EST

## 2025-03-07 NOTE — PROGRESS NOTES
[facility]   Hospitalist Progress Note  Date: 3/7/2025  Patient Name: Brian Mehta  : 1955  MRN: [id number]  Date of admission: 3/3/2025      Subjective   Subjective     Summary: 69-year-old male with a history of hypertension, hyperlipidemia, COPD, HFrEF, GERD, prior CVA with residual right-sided deficits, and diabetes mellitus presented with generalized weakness and coffee-ground emesis. He was recently discharged from rehab four days ago and has had episodes of vomiting since yesterday. EMS reported dry coffee-ground emesis in the corners of his mouth and incontinence upon arrival. He was alert but unable to answer questions. Initial labs were unremarkable, with stable hemoglobin (16.4 from 13.2 three weeks ago). CT abdomen/pelvis showed mild esophagitis, chronic gallbladder wall thickening, splenomegaly, nonobstructing left renal stones, and colonic diverticulosis without diverticulitis. He received IV fluids and Protonix in the ED and was admitted for further evaluation and management. GI consultation is planned. Has confluent macular rash over back and buttocks secondary to pressure injury from lying in bed.  Underwent EGD which showed severe Candidiasis esophagitis.   Started on diflucan.     Interval Followup: Continues to complain of pain on swallowing.  He denies any further hematemesis.  No other issues reported at this time.  Nursing staff reports patient did pull out his IV overnight and has removed 3 in the past 24 hours.  She also reports that today initially the patient was refusing to allow staff to change his linens after an episode of urinary incontinence.  Were finally able to convince the patient to allow his to be changed however.  Patient's wife is at bedside    Objective   Objective     Vitals:   Temp:  [97.6 °F (36.4 °C)-99.9 °F (37.7 °C)] 98.4 °F (36.9 °C)  Heart Rate:  [74-96] 93  Resp:  [16-20] 16  BP: (101-136)/(45-73) 101/60  Physical Exam    Constitutional: Awake, alert, no  acute distress   Respiratory: Clear to auscultation bilaterally, nonlabored respirations    Cardiovascular: S1-S2 no appreciable murmurs   Gastrointestinal: positive bowel sounds, soft, nontender, nondistended   Psychiatric: Appropriate affect, cooperative   Neurologic: Oriented x 3, speech clear          Assessment & Plan   Assessment / Plan     Assessment/Plan:  Generalized weakness  Coffee-ground emesis  Candidiasis esophagitis  Chronic gallbladder wall thickening on CT  Splenomegaly  Hypertension  Hyperlipidemia  COPD  HFrEF  GERD  Prior CVA with residual right sided deficits  Rash - extensive stage 1 pressure wounds     Plan  Continue to monitor on hospitalist service   Diflucan 100 PO BID  Add Nystatin swish and swallow  Protonix 40 mg twice daily PO  H&H is stable  SSI with ACHS accuchecks  Resume home Jardiance and Lantus  Hypoglycemic protocol  Resumed home Symbicort  Bronchodilator protocol  Avoiding systemic corticosteroids  DuoNebs every 6 hours as needed  His chest pain is consistent with esophagitis.  Troponin and EKG normal  Wound care  Fall precautions  PT OT consult  Home medications reviewed and resumed as clinically indicated.  Discussed plan with RN.    Disposition: Awaiting rehab    VTE Prophylaxis:  Mechanical VTE prophylaxis orders are present.  CODE STATUS:   Level Of Support Discussed With: Health Care Surrogate  Code Status (Patient has no pulse and is not breathing): CPR (Attempt to Resuscitate)  Medical Interventions (Patient has pulse or is breathing): Full Support

## 2025-03-07 NOTE — SIGNIFICANT NOTE
03/07/25 0813   OTHER   Discipline occupational therapist   Rehab Time/Intention   Session Not Performed   (poor cooperation (yelling/cussing) pulled IV out earlier this am per nursing)

## 2025-03-08 LAB
GLUCOSE BLDC GLUCOMTR-MCNC: 125 MG/DL (ref 70–99)
GLUCOSE BLDC GLUCOMTR-MCNC: 135 MG/DL (ref 70–99)
GLUCOSE BLDC GLUCOMTR-MCNC: 163 MG/DL (ref 70–99)
GLUCOSE BLDC GLUCOMTR-MCNC: 250 MG/DL (ref 70–99)

## 2025-03-08 PROCEDURE — 99232 SBSQ HOSP IP/OBS MODERATE 35: CPT | Performed by: INTERNAL MEDICINE

## 2025-03-08 PROCEDURE — 63710000001 INSULIN GLARGINE PER 5 UNITS: Performed by: FAMILY MEDICINE

## 2025-03-08 PROCEDURE — 82948 REAGENT STRIP/BLOOD GLUCOSE: CPT | Performed by: FAMILY MEDICINE

## 2025-03-08 PROCEDURE — 63710000001 INSULIN LISPRO (HUMAN) PER 5 UNITS: Performed by: FAMILY MEDICINE

## 2025-03-08 PROCEDURE — 82948 REAGENT STRIP/BLOOD GLUCOSE: CPT

## 2025-03-08 RX ADMIN — ALUMINUM HYDROXIDE, MAGNESIUM HYDROXIDE, AND DIMETHICONE 15 ML: 400; 400; 40 SUSPENSION ORAL at 19:57

## 2025-03-08 RX ADMIN — INSULIN LISPRO 2 UNITS: 100 INJECTION, SOLUTION INTRAVENOUS; SUBCUTANEOUS at 17:23

## 2025-03-08 RX ADMIN — PANTOPRAZOLE SODIUM 40 MG: 40 TABLET, DELAYED RELEASE ORAL at 08:37

## 2025-03-08 RX ADMIN — INSULIN LISPRO 4 UNITS: 100 INJECTION, SOLUTION INTRAVENOUS; SUBCUTANEOUS at 20:09

## 2025-03-08 RX ADMIN — PANTOPRAZOLE SODIUM 40 MG: 40 TABLET, DELAYED RELEASE ORAL at 17:23

## 2025-03-08 RX ADMIN — WHITE PETROLATUM 1 APPLICATION: 1.75 OINTMENT TOPICAL at 20:10

## 2025-03-08 RX ADMIN — SERTRALINE HYDROCHLORIDE 25 MG: 25 TABLET ORAL at 08:37

## 2025-03-08 RX ADMIN — EMPAGLIFLOZIN 25 MG: 25 TABLET, FILM COATED ORAL at 08:38

## 2025-03-08 RX ADMIN — LOSARTAN POTASSIUM 25 MG: 50 TABLET, FILM COATED ORAL at 08:38

## 2025-03-08 RX ADMIN — FLUCONAZOLE 100 MG: 100 TABLET ORAL at 08:38

## 2025-03-08 RX ADMIN — INSULIN GLARGINE 10 UNITS: 100 INJECTION, SOLUTION SUBCUTANEOUS at 20:10

## 2025-03-08 RX ADMIN — ASPIRIN 81 MG: 81 TABLET, CHEWABLE ORAL at 08:37

## 2025-03-08 RX ADMIN — ATORVASTATIN CALCIUM 40 MG: 40 TABLET, FILM COATED ORAL at 20:10

## 2025-03-08 NOTE — PLAN OF CARE
Goal Outcome Evaluation:              Outcome Evaluation: t repeated refusal of medication for oral candida. C/o chest pain, refuses to get out of bed. MD order Maalox for indigestion, pt refuses to take. Waiting on rehab options Monday. Pt is irritable, pulled back fist to punch nurse when hair accidentally pulled while removing soiled gown.

## 2025-03-08 NOTE — PROGRESS NOTES
[facility]   Hospitalist Progress Note  Date: 3/8/2025  Patient Name: Brian Mehta  : 1955  MRN: [id number]  Date of admission: 3/3/2025      Subjective   Subjective     Summary: 69-year-old male with a history of hypertension, hyperlipidemia, COPD, HFrEF, GERD, prior CVA with residual right-sided deficits, and diabetes mellitus presented with generalized weakness and coffee-ground emesis. He was recently discharged from rehab four days ago and has had episodes of vomiting since yesterday. EMS reported dry coffee-ground emesis in the corners of his mouth and incontinence upon arrival. He was alert but unable to answer questions. Initial labs were unremarkable, with stable hemoglobin (16.4 from 13.2 three weeks ago). CT abdomen/pelvis showed mild esophagitis, chronic gallbladder wall thickening, splenomegaly, nonobstructing left renal stones, and colonic diverticulosis without diverticulitis. He received IV fluids and Protonix in the ED and was admitted for further evaluation and management. GI consultation is planned. Has confluent macular rash over back and buttocks secondary to pressure injury from lying in bed.  Underwent EGD which showed severe Candidiasis esophagitis.   Started on diflucan.     Interval Followup: Continues to complain of pain on swallowing.  He denies any further hematemesis.  No issues reported overnight per patient RN.  Patient's wife is at bedside    Objective   Objective     Vitals:   Temp:  [97.3 °F (36.3 °C)-97.8 °F (36.6 °C)] 97.8 °F (36.6 °C)  Heart Rate:  [] 97  Resp:  [16-18] 18  BP: (105-112)/(63-69) 112/69  Physical Exam    Constitutional: Awake, alert, no acute distress   Respiratory: Clear to auscultation bilaterally, nonlabored respirations    Cardiovascular: S1-S2 no appreciable murmurs   Gastrointestinal: positive bowel sounds, soft, nontender, nondistended   Psychiatric: Appropriate affect, cooperative   Neurologic: Oriented x 3, speech  clear          Assessment & Plan   Assessment / Plan     Assessment/Plan:  Generalized weakness  Coffee-ground emesis  Candidiasis esophagitis  Chronic gallbladder wall thickening on CT  Splenomegaly  Hypertension  Hyperlipidemia  COPD  HFrEF  GERD  Prior CVA with residual right sided deficits  Extensive stage 1 pressure wounds     Plan  Continue to monitor on hospitalist service   Diflucan 100 PO BID  Continue nystatin swish and swallow  Protonix 40 mg twice daily PO  Continue as needed Maalox  H&H is stable  SSI with ACHS accuchecks  Resumed home Jardiance and Lantus  Hypoglycemic protocol  Resumed home Symbicort  Bronchodilator protocol  Avoiding systemic corticosteroids  DuoNebs every 6 hours as needed  His chest pain is consistent with esophagitis.  Troponin and EKG normal  Wound care  Fall precautions  PT OT consult  Home medications reviewed and resumed as clinically indicated.  Discussed plan with RN.    Disposition: Awaiting rehab    VTE Prophylaxis:  Mechanical VTE prophylaxis orders are present.  CODE STATUS:   Code Status (Patient has no pulse and is not breathing): CPR (Attempt to Resuscitate)  Medical Interventions (Patient has pulse or is breathing): Full Support  Level Of Support Discussed With: Health Care Surrogate

## 2025-03-08 NOTE — PLAN OF CARE
Goal Outcome Evaluation:      Pt has been non compliant with meds and skin care. Pt gets aggravated when trying to clean him up. Pt has not rested much throughout the night. Wife has been at bedside in chair. VSS

## 2025-03-09 LAB
GLUCOSE BLDC GLUCOMTR-MCNC: 124 MG/DL (ref 70–99)
GLUCOSE BLDC GLUCOMTR-MCNC: 150 MG/DL (ref 70–99)
GLUCOSE BLDC GLUCOMTR-MCNC: 156 MG/DL (ref 70–99)
GLUCOSE BLDC GLUCOMTR-MCNC: 175 MG/DL (ref 70–99)

## 2025-03-09 PROCEDURE — 63710000001 INSULIN LISPRO (HUMAN) PER 5 UNITS: Performed by: FAMILY MEDICINE

## 2025-03-09 PROCEDURE — 63710000001 INSULIN GLARGINE PER 5 UNITS: Performed by: FAMILY MEDICINE

## 2025-03-09 PROCEDURE — 82948 REAGENT STRIP/BLOOD GLUCOSE: CPT | Performed by: FAMILY MEDICINE

## 2025-03-09 PROCEDURE — 99232 SBSQ HOSP IP/OBS MODERATE 35: CPT | Performed by: INTERNAL MEDICINE

## 2025-03-09 PROCEDURE — 82948 REAGENT STRIP/BLOOD GLUCOSE: CPT

## 2025-03-09 RX ADMIN — EMPAGLIFLOZIN 25 MG: 25 TABLET, FILM COATED ORAL at 08:10

## 2025-03-09 RX ADMIN — INSULIN LISPRO 2 UNITS: 100 INJECTION, SOLUTION INTRAVENOUS; SUBCUTANEOUS at 20:39

## 2025-03-09 RX ADMIN — ASPIRIN 81 MG: 81 TABLET, CHEWABLE ORAL at 08:10

## 2025-03-09 RX ADMIN — PANTOPRAZOLE SODIUM 40 MG: 40 TABLET, DELAYED RELEASE ORAL at 08:10

## 2025-03-09 RX ADMIN — SERTRALINE HYDROCHLORIDE 25 MG: 25 TABLET ORAL at 08:10

## 2025-03-09 RX ADMIN — INSULIN LISPRO 2 UNITS: 100 INJECTION, SOLUTION INTRAVENOUS; SUBCUTANEOUS at 11:24

## 2025-03-09 RX ADMIN — ATORVASTATIN CALCIUM 40 MG: 40 TABLET, FILM COATED ORAL at 20:39

## 2025-03-09 RX ADMIN — INSULIN LISPRO 2 UNITS: 100 INJECTION, SOLUTION INTRAVENOUS; SUBCUTANEOUS at 16:44

## 2025-03-09 RX ADMIN — INSULIN GLARGINE 10 UNITS: 100 INJECTION, SOLUTION SUBCUTANEOUS at 20:39

## 2025-03-09 RX ADMIN — FLUCONAZOLE 100 MG: 100 TABLET ORAL at 08:10

## 2025-03-09 RX ADMIN — LOSARTAN POTASSIUM 25 MG: 50 TABLET, FILM COATED ORAL at 08:10

## 2025-03-09 RX ADMIN — WHITE PETROLATUM 1 APPLICATION: 1.75 OINTMENT TOPICAL at 10:17

## 2025-03-09 RX ADMIN — NYSTATIN 500000 UNITS: 100000 SUSPENSION ORAL at 12:41

## 2025-03-09 NOTE — PLAN OF CARE
Goal Outcome Evaluation:              Outcome Evaluation: Refues most meds and skin care. Becomes aggravated easily. Wife at bedside.

## 2025-03-09 NOTE — PLAN OF CARE
Goal Outcome Evaluation:  Plan of Care Reviewed With: patient, spouse   Vital signs stable, no changes

## 2025-03-09 NOTE — PROGRESS NOTES
[facility]   Hospitalist Progress Note  Date: 3/9/2025  Patient Name: Brian Mehta  : 1955  MRN: [id number]  Date of admission: 3/3/2025      Subjective   Subjective     Summary: 69-year-old male with a history of hypertension, hyperlipidemia, COPD, HFrEF, GERD, prior CVA with residual right-sided deficits, and diabetes mellitus presented with generalized weakness and coffee-ground emesis. He was recently discharged from rehab four days ago and has had episodes of vomiting since yesterday. EMS reported dry coffee-ground emesis in the corners of his mouth and incontinence upon arrival. He was alert but unable to answer questions. Initial labs were unremarkable, with stable hemoglobin (16.4 from 13.2 three weeks ago). CT abdomen/pelvis showed mild esophagitis, chronic gallbladder wall thickening, splenomegaly, nonobstructing left renal stones, and colonic diverticulosis without diverticulitis. He received IV fluids and Protonix in the ED and was admitted for further evaluation and management. GI consultation is planned. Has confluent macular rash over back and buttocks secondary to pressure injury from lying in bed.  Underwent EGD which showed severe Candidiasis esophagitis.   Started on diflucan.     Interval Followup: No complaints at the time of evaluation.  Nursing staff reports patient has been refusing medications especially his nystatin although he did take his noontime dose.    Objective   Objective     Vitals:   Temp:  [98.5 °F (36.9 °C)-99.3 °F (37.4 °C)] 98.5 °F (36.9 °C)  Heart Rate:  [] 106  Resp:  [16-20] 18  BP: (108-137)/(60-76) 108/60  Physical Exam    Constitutional: Awake, alert, no acute distress   Respiratory: Clear to auscultation bilaterally, nonlabored respirations    Cardiovascular: Regular S1-S2 no appreciable murmurs   Gastrointestinal: positive bowel sounds, soft, nontender, nondistended   Psychiatric: Appropriate affect, cooperative   Neurologic: Oriented x 3, speech  clear          Assessment & Plan   Assessment / Plan     Assessment/Plan:  Generalized weakness  Coffee-ground emesis.  Resolved  Candidiasis esophagitis  Chronic gallbladder wall thickening on CT  Splenomegaly  Hypertension  Hyperlipidemia  COPD  HFrEF  GERD  Prior CVA with residual right sided deficits  Extensive stage 1 pressure wounds     Plan  Continue to monitor on hospitalist service   Continue Diflucan 100 PO BID  Continue nystatin swish and swallow  Protonix 40 mg twice daily PO  Continue as needed Maalox  H&H is stable  SSI with ACHS accuchecks  Resumed home Jardiance and Lantus  Hypoglycemic protocol  Resumed home Symbicort  Bronchodilator protocol  Avoiding systemic corticosteroids  DuoNebs every 6 hours as needed  His chest pain is consistent with esophagitis.  Troponin and EKG normal  Wound care  Fall precautions  PT OT consult  Home medications reviewed and resumed as clinically indicated.  Discussed plan with RN.    Disposition: Awaiting rehab    VTE Prophylaxis:  Mechanical VTE prophylaxis orders are present.  CODE STATUS:   Code Status (Patient has no pulse and is not breathing): CPR (Attempt to Resuscitate)  Medical Interventions (Patient has pulse or is breathing): Full Support  Level Of Support Discussed With: Health Care Surrogate

## 2025-03-10 ENCOUNTER — RESULTS FOLLOW-UP (OUTPATIENT)
Dept: GASTROENTEROLOGY | Facility: HOSPITAL | Age: 70
End: 2025-03-10
Payer: MEDICARE

## 2025-03-10 LAB
ALBUMIN SERPL-MCNC: 3 G/DL (ref 3.5–5.2)
ANION GAP SERPL CALCULATED.3IONS-SCNC: 10.2 MMOL/L (ref 5–15)
BUN SERPL-MCNC: 18 MG/DL (ref 8–23)
BUN/CREAT SERPL: 21.4 (ref 7–25)
CALCIUM SPEC-SCNC: 8.3 MG/DL (ref 8.6–10.5)
CHLORIDE SERPL-SCNC: 105 MMOL/L (ref 98–107)
CO2 SERPL-SCNC: 23.8 MMOL/L (ref 22–29)
CREAT SERPL-MCNC: 0.84 MG/DL (ref 0.76–1.27)
DEPRECATED RDW RBC AUTO: 47.1 FL (ref 37–54)
EGFRCR SERPLBLD CKD-EPI 2021: 95 ML/MIN/1.73
ERYTHROCYTE [DISTWIDTH] IN BLOOD BY AUTOMATED COUNT: 15.9 % (ref 12.3–15.4)
GLUCOSE BLDC GLUCOMTR-MCNC: 120 MG/DL (ref 70–99)
GLUCOSE BLDC GLUCOMTR-MCNC: 178 MG/DL (ref 70–99)
GLUCOSE BLDC GLUCOMTR-MCNC: 213 MG/DL (ref 70–99)
GLUCOSE BLDC GLUCOMTR-MCNC: 214 MG/DL (ref 70–99)
GLUCOSE SERPL-MCNC: 183 MG/DL (ref 65–99)
HCT VFR BLD AUTO: 42 % (ref 37.5–51)
HGB BLD-MCNC: 13.5 G/DL (ref 13–17.7)
MCH RBC QN AUTO: 26.3 PG (ref 26.6–33)
MCHC RBC AUTO-ENTMCNC: 32.1 G/DL (ref 31.5–35.7)
MCV RBC AUTO: 81.7 FL (ref 79–97)
PHOSPHATE SERPL-MCNC: 2.9 MG/DL (ref 2.5–4.5)
PLATELET # BLD AUTO: 205 10*3/MM3 (ref 140–450)
PMV BLD AUTO: 10.2 FL (ref 6–12)
POTASSIUM SERPL-SCNC: 3.5 MMOL/L (ref 3.5–5.2)
RBC # BLD AUTO: 5.14 10*6/MM3 (ref 4.14–5.8)
SODIUM SERPL-SCNC: 139 MMOL/L (ref 136–145)
WBC NRBC COR # BLD AUTO: 4.91 10*3/MM3 (ref 3.4–10.8)

## 2025-03-10 PROCEDURE — 63710000001 INSULIN GLARGINE PER 5 UNITS: Performed by: FAMILY MEDICINE

## 2025-03-10 PROCEDURE — 80069 RENAL FUNCTION PANEL: CPT | Performed by: INTERNAL MEDICINE

## 2025-03-10 PROCEDURE — 82948 REAGENT STRIP/BLOOD GLUCOSE: CPT

## 2025-03-10 PROCEDURE — 82948 REAGENT STRIP/BLOOD GLUCOSE: CPT | Performed by: FAMILY MEDICINE

## 2025-03-10 PROCEDURE — 85027 COMPLETE CBC AUTOMATED: CPT | Performed by: INTERNAL MEDICINE

## 2025-03-10 PROCEDURE — 63710000001 INSULIN LISPRO (HUMAN) PER 5 UNITS: Performed by: FAMILY MEDICINE

## 2025-03-10 PROCEDURE — 99232 SBSQ HOSP IP/OBS MODERATE 35: CPT | Performed by: INTERNAL MEDICINE

## 2025-03-10 RX ORDER — ACETAMINOPHEN 325 MG/1
650 TABLET ORAL EVERY 6 HOURS PRN
Status: DISCONTINUED | OUTPATIENT
Start: 2025-03-10 | End: 2025-03-12 | Stop reason: HOSPADM

## 2025-03-10 RX ADMIN — ACETAMINOPHEN 650 MG: 325 TABLET ORAL at 09:37

## 2025-03-10 RX ADMIN — EMPAGLIFLOZIN 25 MG: 25 TABLET, FILM COATED ORAL at 08:43

## 2025-03-10 RX ADMIN — INSULIN LISPRO 3 UNITS: 100 INJECTION, SOLUTION INTRAVENOUS; SUBCUTANEOUS at 17:59

## 2025-03-10 RX ADMIN — PANTOPRAZOLE SODIUM 40 MG: 40 TABLET, DELAYED RELEASE ORAL at 17:58

## 2025-03-10 RX ADMIN — NYSTATIN 500000 UNITS: 100000 SUSPENSION ORAL at 08:44

## 2025-03-10 RX ADMIN — INSULIN GLARGINE 10 UNITS: 100 INJECTION, SOLUTION SUBCUTANEOUS at 20:36

## 2025-03-10 RX ADMIN — INSULIN LISPRO 3 UNITS: 100 INJECTION, SOLUTION INTRAVENOUS; SUBCUTANEOUS at 12:24

## 2025-03-10 RX ADMIN — Medication 10 ML: at 20:35

## 2025-03-10 RX ADMIN — PANTOPRAZOLE SODIUM 40 MG: 40 TABLET, DELAYED RELEASE ORAL at 08:43

## 2025-03-10 RX ADMIN — WHITE PETROLATUM 1 APPLICATION: 1.75 OINTMENT TOPICAL at 21:32

## 2025-03-10 RX ADMIN — LOSARTAN POTASSIUM 25 MG: 50 TABLET, FILM COATED ORAL at 08:44

## 2025-03-10 RX ADMIN — FLUCONAZOLE 100 MG: 100 TABLET ORAL at 08:44

## 2025-03-10 RX ADMIN — ATORVASTATIN CALCIUM 40 MG: 40 TABLET, FILM COATED ORAL at 20:35

## 2025-03-10 RX ADMIN — INSULIN LISPRO 2 UNITS: 100 INJECTION, SOLUTION INTRAVENOUS; SUBCUTANEOUS at 20:35

## 2025-03-10 RX ADMIN — NYSTATIN 500000 UNITS: 100000 SUSPENSION ORAL at 20:35

## 2025-03-10 RX ADMIN — NYSTATIN 500000 UNITS: 100000 SUSPENSION ORAL at 17:59

## 2025-03-10 RX ADMIN — ASPIRIN 81 MG: 81 TABLET, CHEWABLE ORAL at 08:43

## 2025-03-10 RX ADMIN — NYSTATIN 500000 UNITS: 100000 SUSPENSION ORAL at 12:24

## 2025-03-10 RX ADMIN — WHITE PETROLATUM 1 APPLICATION: 1.75 OINTMENT TOPICAL at 08:50

## 2025-03-10 RX ADMIN — SERTRALINE HYDROCHLORIDE 25 MG: 25 TABLET ORAL at 08:44

## 2025-03-10 NOTE — CONSULTS
"Nutrition Services    Patient Name: Brian Mehta  YOB: 1955  MRN: [id number]  Admission date: 3/3/2025      CLINICAL NUTRITION ASSESSMENT      Reason for Assessment  F/U   H&P:  Past Medical History:   Diagnosis Date    CHF (congestive heart failure)     SEE'S DR [place] NO CURRENT S/S    COPD (chronic obstructive pulmonary disease)     INHALER    Diabetes mellitus     DOESN'T CHECK BG OFTEN AT HOME    Hyperlipidemia     Hypertension     Sepsis 09/14/2023    Stroke 2017    RIGHT SIDE WEAKNESS        Current Problems:   Active Hospital Problems    Diagnosis     **Coffee ground emesis         Nutrition/Diet History         Narrative   3/10: Pt cont to refuse meds & skin care frequently. Has taken Nystatin today. Pt has severe Candidasis Esophagitis & is on Diflucan  Wound Care following & MASD w/ erosion per wound care notes  Intake appears to be improving and is drinking Boost GC w/ meals  +BM on 3/9    3/7: Patient presented to ED via EMS for evaluation of weakness. Pt non ambulatory. Found in his bed in urine and feces, and with persistent vomiting of coffee-ground emesis. Currently receiving house diet with soft to chew/ground meat and thin liquids. Chart graphics reveal 0-25% meal intake. Patient with significant weight loss over the past month. Patient is not cooperating with care and has been cussing at and trying to hit staff today. Nutrition interview not appropriate at this time. Receiving nutritional supplement. Will continue current intervention and follow up per protocol.      Anthropometrics        Current Height, Weight Height: 190.5 cm (75\")  Weight: 80.1 kg (176 lb 9.4 oz)   Current BMI Body mass index is 22.07 kg/m².   BMI Classification Underweight   % IBW 95%       Weight Hx  Wt Readings from Last 30 Encounters:   03/04/25 0245 80.1 kg (176 lb 9.4 oz)   03/03/25 1503 80.1 kg (176 lb 9.4 oz)   02/04/25 0015 94 kg (207 lb 3.7 oz)   01/08/25 0505 94.1 kg (207 lb 7.3 oz)   10/27/24 0409 94.6 " kg (208 lb 8.9 oz)   10/26/24 1834 98.8 kg (217 lb 13 oz)   08/19/24 0516 101 kg (221 lb 9 oz)   08/14/24 1323 100 kg (220 lb 14.4 oz)   07/08/24 1308 98.4 kg (217 lb)   06/17/24 1421 98.4 kg (217 lb)   03/11/24 0150 99.2 kg (218 lb 11.1 oz)   03/10/24 2305 98 kg (216 lb)   12/07/23 1314 98 kg (216 lb)   11/21/23 1136 110 kg (242 lb 4.6 oz)   09/13/23 2144 101 kg (222 lb 0.1 oz)   08/08/23 1351 93.4 kg (206 lb)   07/18/23 1347 99.8 kg (220 lb)   02/21/19 0000 102 kg (225 lb)   07/17/18 0000 102 kg (225 lb)   03/20/18 0000 110 kg (243 lb 2 oz)          Wt Change Observation -14.9% x 1 month, significant  -18.5% x 1 year, borderline severe      Estimated/Assessed Needs  Estimated Needs based on: Current Body Weight       Energy Requirements 30-35 kcal/kg    EST Needs (kcal/day) 0493-1818       Protein Requirements 1.0-1.2 g/kg   EST Daily Needs (g/day) 80-96       Fluid Requirements 25-30 ml/kg    Estimated Needs (mL/day) 1127-1011     Labs/Medications         Pertinent Labs Reviewed.   Results from last 7 days   Lab Units 03/10/25  1003 03/07/25  0820 03/06/25  0524 03/04/25  0549   SODIUM mmol/L 139 140 137 137   POTASSIUM mmol/L 3.5 3.7 3.6 3.6   CHLORIDE mmol/L 105 105 105 108*   CO2 mmol/L 23.8 24.2 23.1 19.5*   BUN mg/dL 18 12 15 20   CREATININE mg/dL 0.84 0.82 0.79 0.94   CALCIUM mg/dL 8.3* 8.5* 8.4* 8.7   BILIRUBIN mg/dL  --  0.5 0.5 0.5   ALK PHOS U/L  --  87 83 99   ALT (SGPT) U/L  --  8 6 9   AST (SGOT) U/L  --  13 10 10   GLUCOSE mg/dL 183* 140* 138* 171*     Results from last 7 days   Lab Units 03/10/25  1003 03/04/25 2027 03/04/25  0549 03/03/25  1650 03/03/25  1650   MAGNESIUM mg/dL  --   --  1.7  --  1.9   PHOSPHORUS mg/dL 2.9  --  2.8   < >  --    HEMOGLOBIN g/dL 13.5   < > 14.8  --  16.4   HEMATOCRIT % 42.0   < > 47.5  --  51.3*    < > = values in this interval not displayed.     COVID19   Date Value Ref Range Status   08/19/2024 Detected (C) Not Detected - Ref. Range Final     Lab Results    Component Value Date    HGBA1C 8.80 (H) 01/07/2025         Pertinent Medications Reviewed. Diflucan, Nystatin, Jardiance, Insulin     Malnutrition Severity Assessment              Nutrition Diagnosis         Nutrition Dx Problem 1 Underweight related to decreased ability to consume sufficient energy as evidenced by inadequate energy intake. and unintended weight change.     Nutrition Intervention           Current Nutrition Orders & Evaluation of Intake       Current PO Diet Diet: Regular/House; Texture: Soft to Chew (NDD 3); Soft to Chew: Ground Meat; Fluid Consistency: Thin (IDDSI 0)   Supplement Orders Placed This Encounter      Dietary Nutrition Supplements Boost Glucose Control (Glucerna Shake); chocolate      DIET MESSAGE Guest tray needed, messaged left on dietary line      Average po intake ~% which provides ~1150 - 2300kcals,   56 -112gms prot   Meal intake + Boost GC ONS should be adequate in mtg est nutrient needs     Nutrition Intervention/Prescription        Cont Regular Diet w/ Modified texture of Soft to Chew Ground Meat  Cont Boost GC TID (+190 kcal, 16 g pro each)        Medical Nutrition Therapy/Nutrition Education          Learner     Readiness Patient  Education not appropriate at this time     Method     Response N/A  N/A     Monitor/Evaluation        Monitor Per protocol, PO intake, Supplement intake, Pertinent labs, Weight, GI status, Symptoms, POC/GOC, Swallow function     Nutrition Discharge Plan         Recommend to continue oral nutrition supplements on discharge.      Electronically signed by:  Radha Oscar RD  03/10/25 15:21 EDT

## 2025-03-10 NOTE — PLAN OF CARE
Goal Outcome Evaluation:  Plan of Care Reviewed With: patient           Outcome Evaluation: able to get patient to take nystatin today, alert and oriented, vital signs stable

## 2025-03-10 NOTE — PROGRESS NOTES
[facility]   Hospitalist Progress Note  Date: 3/10/2025  Patient Name: Brian Mehta  : 1955  MRN: [id number]  Date of admission: 3/3/2025      Subjective   Subjective     Summary: 69-year-old male with a history of hypertension, hyperlipidemia, COPD, HFrEF, GERD, prior CVA with residual right-sided deficits, and diabetes mellitus presented with generalized weakness and coffee-ground emesis. He was recently discharged from rehab four days ago and has had episodes of vomiting since yesterday. EMS reported dry coffee-ground emesis in the corners of his mouth and incontinence upon arrival. He was alert but unable to answer questions. Initial labs were unremarkable, with stable hemoglobin (16.4 from 13.2 three weeks ago). CT abdomen/pelvis showed mild esophagitis, chronic gallbladder wall thickening, splenomegaly, nonobstructing left renal stones, and colonic diverticulosis without diverticulitis. He received IV fluids and Protonix in the ED and was admitted for further evaluation and management. GI consultation is planned. Has confluent macular rash over back and buttocks secondary to pressure injury from lying in bed.  Underwent EGD which showed severe Candidiasis esophagitis.   Started on diflucan.     Interval Followup:complains of some mild leg pain today; complains of continued pain from his esophagitis but refuses his nystatin frequently.     Objective   Objective     Vitals:   Temp:  [97.2 °F (36.2 °C)-98.5 °F (36.9 °C)] 98 °F (36.7 °C)  Heart Rate:  [81-97] 81  Resp:  [16-22] 20  BP: (100-128)/(56-73) 113/72  Physical Exam    Constitutional: Awake, alert, no acute distress   Respiratory: Clear to auscultation bilaterally, nonlabored respirations    Cardiovascular: reg pulses   Gastrointestinal: positive bowel sounds, soft, nontender, nondistended   Psychiatric: Appropriate affect, cooperative   Neurologic: Oriented x 3, speech clear          Assessment & Plan   Assessment / Plan      Assessment:  Generalized weakness  Coffee-ground emesis.  Resolved  Candidiasis esophagitis  Chronic gallbladder wall thickening on CT  Splenomegaly  Hypertension  Hyperlipidemia  COPD  HFrEF  GERD  Prior CVA with residual right sided deficits  Extensive stage 1 pressure wounds     Plan  Continue to monitor on hospitalist service   Continue Diflucan 100 PO BID  Continue nystatin swish and swallow  Protonix 40 mg twice daily PO  Continue as needed Maalox  H&H is stable  SSI with ACHS accuchecks  Resumed home Jardiance and Lantus  Hypoglycemic protocol  Resumed home Symbicort  Bronchodilator protocol  Avoiding systemic corticosteroids  DuoNebs every 6 hours as needed  His chest pain is consistent with esophagitis.  Troponin and EKG normal  Wound care  Fall precautions  PT OT consult  Home medications reviewed and resumed as clinically indicated.  Check CBC, renal panel today to ensure stability of renal function and cell counts    Disposition: Awaiting rehab    VTE Prophylaxis:  Mechanical VTE prophylaxis orders are present.  CODE STATUS:   Code Status (Patient has no pulse and is not breathing): CPR (Attempt to Resuscitate)  Medical Interventions (Patient has pulse or is breathing): Full Support  Level Of Support Discussed With: Health Care Surrogate    Electronically signed by Rodolfo Perez MD, 03/10/25 09:32 EDT  .

## 2025-03-10 NOTE — SIGNIFICANT NOTE
"   03/10/25 1404   Physical Therapy Time and Intention   Session Not Performed patient/family declined treatment   Comment, Session Not Performed \"I don't feel like it.\" Multiple attempts at encouragement without success.       "

## 2025-03-10 NOTE — PLAN OF CARE
Goal Outcome Evaluation:              Outcome Evaluation: alert and oriented x4, flat and uncooperative. vss on RA. wife at bedside, occasionally encouraging pt to allow care to be performed; however, pt refusing all skin care, turns/repositioning, hygiene, and some medications. pt does occasionally reposition himself in bed. lots of encouragement required to allow incontinence care to be performed. no c/o pain/discomfort this shift.

## 2025-03-11 LAB
GLUCOSE BLDC GLUCOMTR-MCNC: 134 MG/DL (ref 70–99)
GLUCOSE BLDC GLUCOMTR-MCNC: 170 MG/DL (ref 70–99)
GLUCOSE BLDC GLUCOMTR-MCNC: 218 MG/DL (ref 70–99)
GLUCOSE BLDC GLUCOMTR-MCNC: 219 MG/DL (ref 70–99)

## 2025-03-11 PROCEDURE — 63710000001 INSULIN LISPRO (HUMAN) PER 5 UNITS: Performed by: FAMILY MEDICINE

## 2025-03-11 PROCEDURE — 99232 SBSQ HOSP IP/OBS MODERATE 35: CPT | Performed by: INTERNAL MEDICINE

## 2025-03-11 PROCEDURE — 63710000001 INSULIN GLARGINE PER 5 UNITS: Performed by: FAMILY MEDICINE

## 2025-03-11 PROCEDURE — 82948 REAGENT STRIP/BLOOD GLUCOSE: CPT

## 2025-03-11 PROCEDURE — 94799 UNLISTED PULMONARY SVC/PX: CPT

## 2025-03-11 PROCEDURE — 82948 REAGENT STRIP/BLOOD GLUCOSE: CPT | Performed by: FAMILY MEDICINE

## 2025-03-11 RX ADMIN — FLUCONAZOLE 100 MG: 100 TABLET ORAL at 10:41

## 2025-03-11 RX ADMIN — NYSTATIN 500000 UNITS: 100000 SUSPENSION ORAL at 10:41

## 2025-03-11 RX ADMIN — BUDESONIDE AND FORMOTEROL FUMARATE DIHYDRATE 2 PUFF: 160; 4.5 AEROSOL RESPIRATORY (INHALATION) at 19:03

## 2025-03-11 RX ADMIN — PANTOPRAZOLE SODIUM 40 MG: 40 TABLET, DELAYED RELEASE ORAL at 18:48

## 2025-03-11 RX ADMIN — LOSARTAN POTASSIUM 25 MG: 50 TABLET, FILM COATED ORAL at 10:41

## 2025-03-11 RX ADMIN — NYSTATIN 500000 UNITS: 100000 SUSPENSION ORAL at 12:41

## 2025-03-11 RX ADMIN — INSULIN LISPRO 3 UNITS: 100 INJECTION, SOLUTION INTRAVENOUS; SUBCUTANEOUS at 12:41

## 2025-03-11 RX ADMIN — PANTOPRAZOLE SODIUM 40 MG: 40 TABLET, DELAYED RELEASE ORAL at 10:41

## 2025-03-11 RX ADMIN — SERTRALINE HYDROCHLORIDE 25 MG: 25 TABLET ORAL at 10:41

## 2025-03-11 RX ADMIN — INSULIN LISPRO 3 UNITS: 100 INJECTION, SOLUTION INTRAVENOUS; SUBCUTANEOUS at 18:48

## 2025-03-11 RX ADMIN — INSULIN GLARGINE 10 UNITS: 100 INJECTION, SOLUTION SUBCUTANEOUS at 21:50

## 2025-03-11 RX ADMIN — INSULIN LISPRO 2 UNITS: 100 INJECTION, SOLUTION INTRAVENOUS; SUBCUTANEOUS at 21:50

## 2025-03-11 RX ADMIN — ASPIRIN 81 MG: 81 TABLET, CHEWABLE ORAL at 10:41

## 2025-03-11 RX ADMIN — EMPAGLIFLOZIN 25 MG: 25 TABLET, FILM COATED ORAL at 10:41

## 2025-03-11 NOTE — PLAN OF CARE
Goal Outcome Evaluation:      Pt refusing bathing, intermittently refusing care. No changes overnight.

## 2025-03-11 NOTE — PROGRESS NOTES
[facility]   Hospitalist Progress Note  Date: 3/11/2025  Patient Name: Brian Mehta  : 1955  MRN: [id number]  Date of admission: 3/3/2025      Subjective   Subjective     Summary: 69-year-old male with a history of hypertension, hyperlipidemia, COPD, HFrEF, GERD, prior CVA with residual right-sided deficits, and diabetes mellitus presented with generalized weakness and coffee-ground emesis. He was recently discharged from rehab four days ago and has had episodes of vomiting since yesterday. EMS reported dry coffee-ground emesis in the corners of his mouth and incontinence upon arrival. He was alert but unable to answer questions. Initial labs were unremarkable, with stable hemoglobin (16.4 from 13.2 three weeks ago). CT abdomen/pelvis showed mild esophagitis, chronic gallbladder wall thickening, splenomegaly, nonobstructing left renal stones, and colonic diverticulosis without diverticulitis. He received IV fluids and Protonix in the ED and was admitted for further evaluation and management. GI consultation is planned. Has confluent macular rash over back and buttocks secondary to pressure injury from lying in bed.  Underwent EGD which showed severe Candidiasis esophagitis.   Started on diflucan.     Interval Followup: no new issues; has been taking his Nystatin    Objective   Objective     Vitals:   Temp:  [98 °F (36.7 °C)-98.6 °F (37 °C)] 98.6 °F (37 °C)  Heart Rate:  [74-95] 74  Resp:  [16] 16  BP: (105-123)/(49-67) 123/65  Physical Exam    Constitutional: Awake, alert, no acute distress   Respiratory: Clear to auscultation bilaterally, nonlabored respirations    Cardiovascular: reg pulses   Gastrointestinal: positive bowel sounds, soft, nontender, nondistended   Psychiatric: Appropriate affect, cooperative   Neurologic: Oriented x 3, speech clear          Assessment & Plan   Assessment / Plan     Assessment:  Generalized weakness  Coffee-ground emesis.  Resolved  Candidiasis esophagitis  Chronic  gallbladder wall thickening on CT  Splenomegaly  Hypertension  Hyperlipidemia  COPD  HFrEF  GERD  Prior CVA with residual right sided deficits  Extensive stage 1 pressure wounds     Plan  Continue to monitor on hospitalist service   Continue Diflucan 100 PO BID  Continue nystatin swish and swallow  Protonix 40 mg twice daily PO  Continue as needed Maalox  H&H is stable  SSI with ACHS accuchecks  Resumed home Jardiance and Lantus  Hypoglycemic protocol  Resumed home Symbicort  Bronchodilator protocol  Avoiding systemic corticosteroids  DuoNebs every 6 hours as needed  His chest pain is consistent with esophagitis.  Troponin and EKG normal  Wound care  Fall precautions  PT OT consult  Home medications reviewed and resumed as clinically indicated.      Disposition: Awaiting rehab    VTE Prophylaxis:  Mechanical VTE prophylaxis orders are present.  CODE STATUS:   Code Status (Patient has no pulse and is not breathing): CPR (Attempt to Resuscitate)  Medical Interventions (Patient has pulse or is breathing): Full Support  Level Of Support Discussed With: Health Care Surrogate    Electronically signed by Rodolfo Perez MD, 03/11/25 19:50 EDT  .

## 2025-03-12 ENCOUNTER — PATIENT OUTREACH (OUTPATIENT)
Age: 70
End: 2025-03-12
Payer: MEDICARE

## 2025-03-12 VITALS
BODY MASS INDEX: 21.96 KG/M2 | OXYGEN SATURATION: 93 % | HEIGHT: 75 IN | SYSTOLIC BLOOD PRESSURE: 115 MMHG | WEIGHT: 176.59 LBS | RESPIRATION RATE: 18 BRPM | TEMPERATURE: 98.4 F | HEART RATE: 86 BPM | DIASTOLIC BLOOD PRESSURE: 65 MMHG

## 2025-03-12 LAB
ALBUMIN SERPL-MCNC: 2.8 G/DL (ref 3.5–5.2)
ANION GAP SERPL CALCULATED.3IONS-SCNC: 10.5 MMOL/L (ref 5–15)
BASOPHILS # BLD AUTO: 0.03 10*3/MM3 (ref 0–0.2)
BASOPHILS NFR BLD AUTO: 0.5 % (ref 0–1.5)
BUN SERPL-MCNC: 17 MG/DL (ref 8–23)
BUN/CREAT SERPL: 19.3 (ref 7–25)
CALCIUM SPEC-SCNC: 8.6 MG/DL (ref 8.6–10.5)
CHLORIDE SERPL-SCNC: 104 MMOL/L (ref 98–107)
CO2 SERPL-SCNC: 24.5 MMOL/L (ref 22–29)
CREAT SERPL-MCNC: 0.88 MG/DL (ref 0.76–1.27)
DEPRECATED RDW RBC AUTO: 47.3 FL (ref 37–54)
EGFRCR SERPLBLD CKD-EPI 2021: 93.7 ML/MIN/1.73
EOSINOPHIL # BLD AUTO: 0.11 10*3/MM3 (ref 0–0.4)
EOSINOPHIL NFR BLD AUTO: 1.9 % (ref 0.3–6.2)
ERYTHROCYTE [DISTWIDTH] IN BLOOD BY AUTOMATED COUNT: 15.9 % (ref 12.3–15.4)
GLUCOSE BLDC GLUCOMTR-MCNC: 141 MG/DL (ref 70–99)
GLUCOSE BLDC GLUCOMTR-MCNC: 176 MG/DL (ref 70–99)
GLUCOSE SERPL-MCNC: 196 MG/DL (ref 65–99)
HCT VFR BLD AUTO: 41 % (ref 37.5–51)
HGB BLD-MCNC: 13.1 G/DL (ref 13–17.7)
IMM GRANULOCYTES # BLD AUTO: 0.01 10*3/MM3 (ref 0–0.05)
IMM GRANULOCYTES NFR BLD AUTO: 0.2 % (ref 0–0.5)
LYMPHOCYTES # BLD AUTO: 2.35 10*3/MM3 (ref 0.7–3.1)
LYMPHOCYTES NFR BLD AUTO: 40.2 % (ref 19.6–45.3)
MAGNESIUM SERPL-MCNC: 1.5 MG/DL (ref 1.6–2.4)
MCH RBC QN AUTO: 26.4 PG (ref 26.6–33)
MCHC RBC AUTO-ENTMCNC: 32 G/DL (ref 31.5–35.7)
MCV RBC AUTO: 82.7 FL (ref 79–97)
MONOCYTES # BLD AUTO: 0.52 10*3/MM3 (ref 0.1–0.9)
MONOCYTES NFR BLD AUTO: 8.9 % (ref 5–12)
NEUTROPHILS NFR BLD AUTO: 2.83 10*3/MM3 (ref 1.7–7)
NEUTROPHILS NFR BLD AUTO: 48.3 % (ref 42.7–76)
NRBC BLD AUTO-RTO: 0 /100 WBC (ref 0–0.2)
PHOSPHATE SERPL-MCNC: 2.1 MG/DL (ref 2.5–4.5)
PLATELET # BLD AUTO: 223 10*3/MM3 (ref 140–450)
PMV BLD AUTO: 10.7 FL (ref 6–12)
POTASSIUM SERPL-SCNC: 3.6 MMOL/L (ref 3.5–5.2)
RBC # BLD AUTO: 4.96 10*6/MM3 (ref 4.14–5.8)
SODIUM SERPL-SCNC: 139 MMOL/L (ref 136–145)
WBC NRBC COR # BLD AUTO: 5.85 10*3/MM3 (ref 3.4–10.8)

## 2025-03-12 PROCEDURE — 85025 COMPLETE CBC W/AUTO DIFF WBC: CPT | Performed by: INTERNAL MEDICINE

## 2025-03-12 PROCEDURE — 83735 ASSAY OF MAGNESIUM: CPT | Performed by: INTERNAL MEDICINE

## 2025-03-12 PROCEDURE — 63710000001 INSULIN LISPRO (HUMAN) PER 5 UNITS: Performed by: FAMILY MEDICINE

## 2025-03-12 PROCEDURE — 80069 RENAL FUNCTION PANEL: CPT | Performed by: INTERNAL MEDICINE

## 2025-03-12 PROCEDURE — 82948 REAGENT STRIP/BLOOD GLUCOSE: CPT | Performed by: FAMILY MEDICINE

## 2025-03-12 PROCEDURE — 99239 HOSP IP/OBS DSCHRG MGMT >30: CPT | Performed by: INTERNAL MEDICINE

## 2025-03-12 RX ORDER — NYSTATIN 100000 [USP'U]/ML
5 SUSPENSION ORAL 4 TIMES DAILY
Start: 2025-03-12 | End: 2025-03-17

## 2025-03-12 RX ORDER — MINERAL OIL/HYDROPHIL PETROLAT
1 OINTMENT (GRAM) TOPICAL 2 TIMES DAILY
Start: 2025-03-12

## 2025-03-12 RX ORDER — PANTOPRAZOLE SODIUM 40 MG/1
40 TABLET, DELAYED RELEASE ORAL
Start: 2025-03-12

## 2025-03-12 RX ORDER — FLUCONAZOLE 100 MG/1
100 TABLET ORAL DAILY
Qty: 2 TABLET | Refills: 0 | Status: SHIPPED | OUTPATIENT
Start: 2025-03-13 | End: 2025-03-15

## 2025-03-12 RX ORDER — IPRATROPIUM BROMIDE AND ALBUTEROL SULFATE 2.5; .5 MG/3ML; MG/3ML
3 SOLUTION RESPIRATORY (INHALATION) EVERY 6 HOURS PRN
Start: 2025-03-12

## 2025-03-12 RX ORDER — DIPHENHYDRAMINE HYDROCHLORIDE 25 MG/1
1 CAPSULE, LIQUID FILLED ORAL
Qty: 1 EACH | Refills: 0 | Status: SHIPPED | OUTPATIENT
Start: 2025-03-12 | End: 2026-03-12

## 2025-03-12 RX ORDER — LANCETS 33 GAUGE
1 EACH MISCELLANEOUS DAILY
Qty: 30 EACH | Refills: 0 | Status: SHIPPED | OUTPATIENT
Start: 2025-03-12 | End: 2025-04-11

## 2025-03-12 RX ADMIN — SERTRALINE HYDROCHLORIDE 25 MG: 25 TABLET ORAL at 09:41

## 2025-03-12 RX ADMIN — ZINC OXIDE 1 APPLICATION: 200 OINTMENT TOPICAL at 12:36

## 2025-03-12 RX ADMIN — EMPAGLIFLOZIN 25 MG: 25 TABLET, FILM COATED ORAL at 09:41

## 2025-03-12 RX ADMIN — ASPIRIN 81 MG: 81 TABLET, CHEWABLE ORAL at 09:41

## 2025-03-12 RX ADMIN — INSULIN LISPRO 2 UNITS: 100 INJECTION, SOLUTION INTRAVENOUS; SUBCUTANEOUS at 12:34

## 2025-03-12 RX ADMIN — LOSARTAN POTASSIUM 25 MG: 50 TABLET, FILM COATED ORAL at 09:41

## 2025-03-12 RX ADMIN — Medication 10 ML: at 09:42

## 2025-03-12 NOTE — PLAN OF CARE
Goal Outcome Evaluation:              Outcome Evaluation: Alert and Oriented. Rehab placement pending. No significant events this shift. Able to make needs known, with call light within reach. Plan of care ongoing.

## 2025-03-12 NOTE — OUTREACH NOTE
Patient Outreach    MSW spoke with patient's spouse regarding update on living situation. Patient's spouse states that she was able to get $400 assistance through [employer] and electricity is back on. Patient's spouse states that she is wanting to move into different housing. MSW has provided housing list multiple times. Patient given resources for [facility] on this day. MSW available if needed in the future.    Luisana LIU -   Ambulatory Case Management    3/12/2025, 10:38 EDT

## 2025-03-12 NOTE — DISCHARGE SUMMARY
[facility]         HOSPITALIST  DISCHARGE SUMMARY    Patient Name: Brian Mehta  : 1955  MRN: [id number]    Date of Admission: 3/3/2025  Date of Discharge:  3/12/2025 to [facility]  Primary Care Physician: Sophie Capps APRN  Admitting: Medicine  Consultants: GI    Final Diagnoses:  Generalized weakness  Coffee-ground emesis.  Resolved  Candidiasis esophagitis  Chronic gallbladder wall thickening on CT  Splenomegaly  Hypertension  Hyperlipidemia  COPD  HFrEF  GERD  Prior CVA with residual right sided deficits  Extensive stage 1 pressure wounds      Hospital Course     Hospital Course:  69-year-old male with a history of hypertension, hyperlipidemia, COPD, HFrEF, GERD, prior CVA with residual right-sided deficits, and diabetes mellitus presented with generalized weakness and coffee-ground emesis. He was recently discharged from rehab four days ago and has had episodes of vomiting since yesterday. EMS reported dry coffee-ground emesis in the corners of his mouth and incontinence upon arrival. He was alert but unable to answer questions. Initial labs were unremarkable, with stable hemoglobin (16.4 from 13.2 three weeks ago). CT abdomen/pelvis showed mild esophagitis, chronic gallbladder wall thickening, splenomegaly, nonobstructing left renal stones, and colonic diverticulosis without diverticulitis. Started on PPI.  Got EGD showing candida, started on diflucan and nystatin. Stable for DC to rehab today.      DISCHARGE Follow Up Recommendations for labs and diagnostics: f/u with pcp      Day of Discharge     Vital Signs:  Temp:  [97.3 °F (36.3 °C)-98.6 °F (37 °C)] 98.6 °F (37 °C)  Heart Rate:  [74-93] 89  Resp:  [16-18] 16  BP: (102-123)/(61-67) 108/67  Physical Exam: nad, aao      Discharge Details        Discharge Medications        New Medications        Instructions Start Date   Blood Glucose Monitor System w/Device kit   1 each, Not Applicable, Every 3 Years      fluconazole 100 MG  tablet  Commonly known as: DIFLUCAN   100 mg, Oral, Daily   Start Date: March 13, 2025     glucose blood test strip   1 each, Other, Daily, Use as instructed      hydrocortisone-bacitracin-zinc oxide-nystatin  Commonly known as: MAGIC BARRIER   1 Application, Topical, 2 Times Daily PRN      ipratropium-albuterol 0.5-2.5 mg/3 ml nebulizer  Commonly known as: DUO-NEB   3 mL, Nebulization, Every 6 Hours PRN      Lancets 33G misc   1 each, Not Applicable, Daily      mineral oil-hydrophilic petrolatum ointment   1 Application, Topical, 2 Times Daily      nystatin 100,000 unit/mL suspension  Commonly known as: MYCOSTATIN   500,000 Units, Swish & Swallow, 4 Times Daily      pantoprazole 40 MG EC tablet  Commonly known as: PROTONIX   40 mg, Oral, 2 Times Daily Before Meals             Continue These Medications        Instructions Start Date   Aspirin Low Dose 81 MG chewable tablet  Generic drug: aspirin   81 mg, Oral, Daily      atorvastatin 40 MG tablet  Commonly known as: LIPITOR   1 tablet, Daily      budesonide-formoterol 160-4.5 MCG/ACT inhaler  Commonly known as: Symbicort   2 puffs, Inhalation, 2 Times Daily - RT      cetirizine 10 MG tablet  Commonly known as: zyrTEC   10 mg, Oral, Daily      Diclofenac Sodium 1 % gel gel  Commonly known as: VOLTAREN   2 g, Topical, 4 Times Daily      famotidine 40 MG tablet  Commonly known as: PEPCID   40 mg, Nightly PRN      Jardiance 25 MG tablet tablet  Generic drug: empagliflozin   25 mg, Oral, Daily      Lantus SoloStar 100 UNIT/ML injection pen  Generic drug: Insulin Glargine   INJECT 10 UNITS SUBCUTANEOUSLY INTO THE APPROPRIATE AREA EVERY NIGHT AS DIRECTED      losartan 25 MG tablet  Commonly known as: COZAAR   25 mg, Oral, Daily      nicotine 21 MG/24HR patch  Commonly known as: NICODERM CQ   1 patch, Transdermal, Every 24 Hours Scheduled      sertraline 25 MG tablet  Commonly known as: ZOLOFT   1 tablet, Daily               Allergies   Allergen Reactions    Ceftriaxone  Rash     Had fairly extensive diffuse chest/arms rash after administration 1/2025 hospitalization.  Switched to aztreonam at that time.         Discharge Disposition:  Skilled Nursing Facility (DC - External)    Diet:  Hospital:  Diet Order   Procedures    Diet: Regular/House; Texture: Soft to Chew (NDD 3); Soft to Chew: Ground Meat; Fluid Consistency: Thin (IDDSI 0)       Discharge Activity:       CODE STATUS:  Code Status and Medical Interventions: CPR (Attempt to Resuscitate); Full Support   Ordered at: 03/03/25 1933     Code Status (Patient has no pulse and is not breathing):    CPR (Attempt to Resuscitate)     Medical Interventions (Patient has pulse or is breathing):    Full Support     Level Of Support Discussed With:    Health Care Surrogate         No future appointments.        Pertinent  and/or Most Recent Results     PROCEDURES:   egd    LAB RESULTS:      Lab 03/10/25  1003 03/07/25  0820 03/06/25  1744 03/06/25  0524 03/05/25  1748   WBC 4.91 4.90  --  5.20  --    HEMOGLOBIN 13.5 13.5 13.0 12.8* 12.6*   HEMATOCRIT 42.0 41.9 39.5 39.5 40.1   PLATELETS 205 248  --  226  --    NEUTROS ABS  --  2.56  --  2.95  --    IMMATURE GRANS (ABS)  --  0.02  --  0.01  --    LYMPHS ABS  --  1.56  --  1.51  --    MONOS ABS  --  0.47  --  0.42  --    EOS ABS  --  0.27  --  0.30  --    MCV 81.7 81.4  --  79.6  --          Lab 03/10/25  1003 03/07/25  0820 03/06/25 0524   SODIUM 139 140 137   POTASSIUM 3.5 3.7 3.6   CHLORIDE 105 105 105   CO2 23.8 24.2 23.1   ANION GAP 10.2 10.8 8.9   BUN 18 12 15   CREATININE 0.84 0.82 0.79   EGFR 95.0 96.3 97.4   GLUCOSE 183* 140* 138*   CALCIUM 8.3* 8.5* 8.4*   PHOSPHORUS 2.9  --   --          Lab 03/10/25  1003 03/07/25  0820 03/06/25 0524   TOTAL PROTEIN  --  6.6 6.4   ALBUMIN 3.0* 3.0* 2.9*   GLOBULIN  --  3.6 3.5   ALT (SGPT)  --  8 6   AST (SGOT)  --  13 10   BILIRUBIN  --  0.5 0.5   ALK PHOS  --  87 83         Lab 03/05/25  1317 03/05/25  1157   HSTROP T 9 9                  Brief Urine Lab Results  (Last result in the past 365 days)        Color   Clarity   Blood   Leuk Est   Nitrite   Protein   CREAT   Urine HCG        03/03/25 2239 Dark Yellow   Clear   Negative   Small (1+)   Negative   100 mg/dL (2+)                 Microbiology Results (last 10 days)       Procedure Component Value - Date/Time    Urine Culture - Urine, Straight Cath [[id number]]  (Normal) Collected: 03/03/25 2239    Lab Status: Final result Specimen: Urine from Straight Cath Updated: 03/05/25 1011     Urine Culture No growth            No radiology results for the last 7 days     Results for orders placed during the hospital encounter of 08/19/24    Duplex Venous Lower Extremity - Bilateral CV-READ    Interpretation Summary    Patient refused left common femoral vein imaging, otherwise this was a normal left lower extremity venous duplex scan.    Patient refused right lower extremity venous evaluation.      Results for orders placed during the hospital encounter of 08/19/24    Duplex Venous Lower Extremity - Bilateral CV-READ    Interpretation Summary    Patient refused left common femoral vein imaging, otherwise this was a normal left lower extremity venous duplex scan.    Patient refused right lower extremity venous evaluation.      Results for orders placed during the hospital encounter of 01/07/25    Adult Transthoracic Echo Complete W/ Cont if Necessary Per Protocol    Interpretation Summary    The study is technically difficult for diagnosis.    Left ventricular systolic function is mildly decreased. Estimated left ventricular EF = 40%      Labs Pending at Discharge:  Pending Labs       Order Current Status    CBC & Differential In process    CBC Auto Differential In process    Magnesium In process    Renal Function Panel In process              Time spent on Discharge including face to face service:  ~40 minutes    Electronically signed by Rodolfo Perez MD, 03/12/25, 11:43 AM EDT.

## 2025-03-12 NOTE — PLAN OF CARE
Goal Outcome Evaluation:  Plan of Care Reviewed With: patient        Progress: no change  Outcome Evaluation: No acute changes during this shift. Patient refusing skin care, baths, and every 2 hour turns. Patient educated on importance of interventions. VSS.

## 2025-03-12 NOTE — PLAN OF CARE
Goal Outcome Evaluation:  Plan of Care Reviewed With: patient           Outcome Evaluation: Pt is A&O X 4, on room air, and X 2 assist if needed out of the bed, Per pt he is bedbound. Safety checks maintained throughout shift with pt resting in bed, bed in lowest position, wheels to bed locked, and personal items and call light within reach. VSS. Glucose monitored, insulin given per sliding scale.  Pt to transfer to [facility], report called to MYA Arias.

## 2025-03-14 NOTE — TELEPHONE ENCOUNTER
Patient discharged from [facility] on 03.12.25.    Pathology results were discussed prior to discharge (see notes), patient started on diflucan and nystatin.  Patient discharged to Skilled Nursing Facility.    Patient discharged to [facility].

## 2025-03-17 ENCOUNTER — PATIENT OUTREACH (OUTPATIENT)
Dept: CASE MANAGEMENT | Facility: OTHER | Age: 70
End: 2025-03-17
Payer: MEDICARE

## 2025-03-17 DIAGNOSIS — R53.1 WEAKNESS: Primary | ICD-10-CM

## 2025-03-17 DIAGNOSIS — I50.22 CHRONIC HFREF (HEART FAILURE WITH REDUCED EJECTION FRACTION): ICD-10-CM

## 2025-03-17 NOTE — OUTREACH NOTE
"AMBULATORY CASE MANAGEMENT NOTE    Names and Relationships of Patient/Support Persons: Contact: HOSSEINFERDINAND; Relationship: Emergency Contact -     [facility] Interim Update    Received call from patient spouse with message stating urgent call back was needed.    Returned spouse call. She wanted to let me know that patient is trying to sign himself out of rehab.    Spouse thought pattern is quite difficult to follow. But she states that rehab states that patient has bed bugs, but he did not have them when he went to the hospital. There have been numerous reports within the EPIC chart, and from home health, PT agencies that there is a bed bug infestation, as well as from APS. However, patients spouse believes that [facility] rehab called his current rehab and \"told on him\" for having bed bugs. Therefore, patient spouse is not allowed to bring his wheelchair from home.    She also states that he was \"caught with half a cigarette\" and he is \"not allowed to have cigarettes\" in rehab.    Spouse states that patient called her today and wants her to come get him. She states that she told his nurse to \"put a patch [nicotine] on him and I'll be there to get him tomorrow\".    I advised spouse that as long as patient is of sound mind, he is allowed to make that decision to leave rehab. She verbalizes understanding and says \"I just don't know what else to do\".    Encouraged spouse to make PCP apt for patient upon discharge.        Education Documentation  No documentation found.        YANDEL GONZALEZ  Ambulatory Case Management    3/17/2025, 15:48 EDT  "

## 2025-03-18 ENCOUNTER — READMISSION MANAGEMENT (OUTPATIENT)
Dept: CALL CENTER | Facility: HOSPITAL | Age: 70
End: 2025-03-18
Payer: MEDICARE

## 2025-03-18 ENCOUNTER — PATIENT OUTREACH (OUTPATIENT)
Dept: CASE MANAGEMENT | Facility: OTHER | Age: 70
End: 2025-03-18
Payer: MEDICARE

## 2025-03-18 DIAGNOSIS — I50.22 CHRONIC HFREF (HEART FAILURE WITH REDUCED EJECTION FRACTION): Primary | ICD-10-CM

## 2025-03-18 DIAGNOSIS — R53.1 WEAKNESS: ICD-10-CM

## 2025-03-18 NOTE — OUTREACH NOTE
AMBULATORY CASE MANAGEMENT NOTE    Names and Relationships of Patient/Support Persons: Contact: FERDINAND CATALAN; Relationship: Emergency Contact -     [facility] Interim Update    Called and spoke with patient spouse. She states that patient is home now. He did leave [facility] AMA. She states that [facility] is not coming out and [facility] had to send the referral to a different HH agency. She was not sure who.    Advised that I would schedule a follow up to call her later this week to see if any HH agency has contacted her. I can follow up with [facility] if needed.    She states that they did receive trapeze bar that was ordered prior to hospitalization.         SNF Follow-up    Questions/Answers      Flowsheet Row Responses   Acute Facility Discharged From [facility]   Acute Facility Diagnoses Acute esophagitis, Upper GI bleed, decrease ADL's, ground coffee emesis, difficulty walking   Name of the Skilled Nursing Facility? [facility]   Skilled Nursing Discharge Date? 03/18/25   Where was the patient discharged to? Home   Home Health Agency at discharge? Yes   Name of Home Health Agency? [facility]   Does the patient have a follow-up appointment? No            Education Documentation  No documentation found.        YANDEL GONZALEZ  Ambulatory Case Management    3/18/2025, 13:07 EDT

## 2025-03-18 NOTE — OUTREACH NOTE
Prep Survey      Flowsheet Row Responses   [facility] facility patient discharged from? Non-BH   Is LACE score < 7 ? Non-BH Discharge   Eligibility [facility]   Date of Discharge 03/18/25   Discharge Disposition Home-Health Care Svc   Discharge diagnosis Acute esophagitis   Does the patient have one of the following disease processes/diagnoses(primary or secondary)? Other   Does the patient have Home health ordered? Yes   What is the Home health agency?  [facility]   Is there a DME ordered? No   Prep survey completed? Yes            Ale SHOEMAKER - Registered Nurse

## 2025-03-19 ENCOUNTER — TRANSITIONAL CARE MANAGEMENT TELEPHONE ENCOUNTER (OUTPATIENT)
Dept: CALL CENTER | Facility: HOSPITAL | Age: 70
End: 2025-03-19
Payer: MEDICARE

## 2025-03-19 LAB
QT INTERVAL: 341 MS
QTC INTERVAL: 473 MS

## 2025-03-19 NOTE — OUTREACH NOTE
Call Center TCM Note      Flowsheet Row Responses   [facility] facility patient discharged from? Non-BH  [[place]]   Does the patient have one of the following disease processes/diagnoses(primary or secondary)? Other   TCM attempt successful? No   Unsuccessful attempts Attempt 1   Revoked Reason Other  [pt left rehab facility AMA,  not eligible for TCM. Wife has spoke with [facility] regarding HH, meds and f/u.  Pt does appt on 3/25. Aware to return to hospital prn, will montior for any s/s bleeding.  Reports it tool 3 people to get pt into home and bed.]   TCM call completed? Yes            Qian Guillen, RN    3/19/2025, 16:22 EDT

## 2025-03-19 NOTE — OUTREACH NOTE
Call Center TCM Note      Flowsheet Row Responses   [facility] [place] facility patient discharged from? Non-BH  [[place]]   Does the patient have one of the following disease processes/diagnoses(primary or secondary)? Other   TCM attempt successful? No  [vr for Jumana, wife]   Unsuccessful attempts Attempt 1  [atempted pt and wife]   Call Status Voice mail issues            Qian Guillen RN    3/19/2025, 10:45 EDT

## 2025-03-21 ENCOUNTER — PATIENT OUTREACH (OUTPATIENT)
Dept: CASE MANAGEMENT | Facility: OTHER | Age: 70
End: 2025-03-21
Payer: MEDICARE

## 2025-03-21 DIAGNOSIS — I50.22 CHRONIC HFREF (HEART FAILURE WITH REDUCED EJECTION FRACTION): Primary | ICD-10-CM

## 2025-03-21 DIAGNOSIS — R53.1 WEAKNESS: ICD-10-CM

## 2025-03-21 NOTE — OUTREACH NOTE
AMBULATORY CASE MANAGEMENT NOTE    Names and Relationships of Patient/Support Persons: Contact: FERDINAND CATALAN; Relationship: Emergency Contact  Contact: Irina Garrett; Relationship: Nursing Home -     [facility] Interim Update    Called and spoke with patients spouse. She states that they have not heard from home health yet. Advised that I would call [place] to see who they referred to so that I can follow up with that agency.    Care Coordination    Called and left message for  at [place] for agency.         Education Documentation  No documentation found.        YANDEL GONZALEZ  Ambulatory Case Management    3/21/2025, 15:37 EDT

## 2025-03-25 ENCOUNTER — PATIENT OUTREACH (OUTPATIENT)
Dept: CASE MANAGEMENT | Facility: OTHER | Age: 70
End: 2025-03-25
Payer: MEDICARE

## 2025-03-25 ENCOUNTER — TELEPHONE (OUTPATIENT)
Dept: CASE MANAGEMENT | Facility: OTHER | Age: 70
End: 2025-03-25
Payer: MEDICARE

## 2025-03-25 DIAGNOSIS — I50.22 CHRONIC HFREF (HEART FAILURE WITH REDUCED EJECTION FRACTION): Primary | ICD-10-CM

## 2025-03-25 DIAGNOSIS — R53.1 WEAKNESS: ICD-10-CM

## 2025-03-25 DIAGNOSIS — E11.69 TYPE 2 DIABETES MELLITUS WITH OTHER SPECIFIED COMPLICATION, UNSPECIFIED WHETHER LONG TERM INSULIN USE: ICD-10-CM
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
## 2025-03-25 NOTE — TELEPHONE ENCOUNTER
Sophie,  Because patient left AMA from [facility], they did not set up home health for him.    Will you please sign order and I will fax referral to [facility]?    Thank you,  MYA Arias

## 2025-03-25 NOTE — OUTREACH NOTE
AMBULATORY CASE MANAGEMENT NOTE    Names and Relationships of Patient/Support Persons: Contact: FERDINAND CATALAN; Relationship: Emergency Contact -     Care Coordination    Received voicemail from [place] stating that home health was not arranged for patient since he left AMA.    CCM Interim Update    Called and spoke with wife. She verbalizes understanding and is agreeable to referral to [facility].     She states that she is getting ready to get the patient up and ready for his appointment this afternoon with PCP.    Care Coordination    Sent telephone note to PCP for home health orders.        Education Documentation  No documentation found.        YANDEL GONZALEZ  Ambulatory Case Management    3/25/2025, 09:50 EDT

## 2025-03-28 ENCOUNTER — PATIENT OUTREACH (OUTPATIENT)
Dept: CASE MANAGEMENT | Facility: OTHER | Age: 70
End: 2025-03-28
Payer: MEDICARE

## 2025-03-28 DIAGNOSIS — R53.1 WEAKNESS: ICD-10-CM

## 2025-03-28 DIAGNOSIS — I50.22 CHRONIC HFREF (HEART FAILURE WITH REDUCED EJECTION FRACTION): Primary | ICD-10-CM

## 2025-03-28 NOTE — OUTREACH NOTE
AMBULATORY CASE MANAGEMENT NOTE    Names and Relationships of Patient/Support Persons: Contact: [employer]; Relationship: Home Health  Contact: Francine; Relationship: Home Health  Contact: Ana; Relationship: Home Health  Contact: Moni; Relationship: Home Health -     Care Coordination    Received denial from [facility], patient is on no takeback list due to noncompliance.     Faxed referral to [facility], Francine, Ana, and Moni.     Education Documentation  No documentation found.        YANDEL GONZALEZ  Ambulatory Case Management    3/28/2025, 15:42 EDT

## 2025-03-31 NOTE — OUTREACH NOTE
[facility] End of Month Documentation    This Chronic Medical Management Care Plan for Brian Mehta, 70 y.o. male, has been No data recorded and a new plan of care implemented for the month of No data recorded.  A cumulative time of 53  minutes was spent on this patient record this month, including No data recorded.    Regarding the patient's problems: has CVA (cerebral vascular accident); Essential hypertension; High cholesterol; Hip pain; Vitamin B12 deficiency; Kidney disorder; Chronic pain of both knees; Systolic CHF, chronic; LVH (left ventricular hypertrophy); COPD (chronic obstructive pulmonary disease); DM2 (diabetes mellitus, type 2); Urinary tract infection without hematuria; Abnormal nuclear stress test; UTI (urinary tract infection); Fall; Failure to thrive in adult; Nephrolithiasis; Bladder stone; Retained ureteral stent; Acute UTI; Bacteremia; Generalized weakness; and Coffee ground emesis on their problem list., the following items were addressed: No data recorded and any changes can be found within the plan section of the note.  A detailed listing of time spent for chronic care management is tracked within each outreach encounter.  Current medications include:  has a current medication list which includes the following prescription(s): aspirin low dose, atorvastatin, blood glucose monitor system, budesonide-formoterol, cetirizine, diclofenac sodium, jardiance, famotidine, glucose blood, hydrocortisone-bacitracin-zinc oxide-nystatin, ipratropium-albuterol, lancets 33g, lantus solostar, losartan, mineral oil-hydrophilic petrolatum, nicotine, pantoprazole, and sertraline. and the patient is reported to be No data recorded,  Medications are reported to be No data recorded.  Regarding these diagnoses, referrals were made to the following provider(s):  n/a.  All notes on chart for PCP to review.    The patient was monitored remotely for No data recorded.    The patient's physical needs include:  No data  recorded.     The patient's mental support needs include:  No data recorded    The patient's cognitive support needs include:  No data recorded    The patient's psychosocial support needs include:  No data recorded    The patient's functional needs include: No data recorded    The patient's environmental needs include:  No data recorded    Care Plan overall comments:  No data recorded    Refer to previous outreach notes for more information on the areas listed above.    Monthly Billing Diagnoses  (I50.22) Chronic HFrEF (heart failure with reduced ejection fraction)    (R53.1) Weakness    Medications   Medications have been reconciled    Care Plan progress this month:      Recently Modified Care Plans Updates made since 2/28/2025 12:00 AM      No recently modified care plans.               Instructions   Patient was provided an electronic copy of care plan  CCM services were explained and offered and patient has accepted these services.  Patient has given their written consent to receive CCM services and understands that this includes the authorization of electronic communication of medical information with the other treating providers.  Patient understands that they may stop CCM services at any time and these changes will be effective at the end of the calendar month and will effectively revocate the agreement of CCM services.  Patient understands that only one practitioner can furnish and be paid for CCM services during one calendar month.  Patient also understands that there may be co-payment or deductible fees in association with CCM services.  Patient will continue with at least monthly follow-up calls with the Ambulatory .    YANDEL GONZALEZ  Ambulatory Case Management    3/31/2025, 10:58 EDT

## 2025-04-03 ENCOUNTER — PATIENT OUTREACH (OUTPATIENT)
Dept: CASE MANAGEMENT | Facility: OTHER | Age: 70
End: 2025-04-03
Payer: MEDICARE

## 2025-04-03 DIAGNOSIS — I50.22 CHRONIC HFREF (HEART FAILURE WITH REDUCED EJECTION FRACTION): Primary | ICD-10-CM

## 2025-04-03 DIAGNOSIS — R53.1 WEAKNESS: ICD-10-CM

## 2025-04-03 NOTE — OUTREACH NOTE
AMBULATORY CASE MANAGEMENT NOTE    Names and Relationships of Patient/Support Persons: Contact: Planet Sushis; Relationship: Other -     Care Coordination    Called and spoke with [employer]. Director states they will take this case and do an eval on patient. I did fax new demo sheet to [employer] as patient has new preferred contact number.    Attempted to call spouse to inform of above but left message to call back.     Education Documentation  No documentation found.        YANDEL GONZALEZ  Ambulatory Case Management    4/3/2025, 12:01 EDT

## 2025-04-04 ENCOUNTER — PATIENT OUTREACH (OUTPATIENT)
Dept: CASE MANAGEMENT | Facility: OTHER | Age: 70
End: 2025-04-04
Payer: MEDICARE

## 2025-04-04 DIAGNOSIS — R53.1 WEAKNESS: ICD-10-CM

## 2025-04-04 DIAGNOSIS — I50.22 CHRONIC HFREF (HEART FAILURE WITH REDUCED EJECTION FRACTION): Primary | ICD-10-CM

## 2025-04-04 NOTE — OUTREACH NOTE
AMBULATORY CASE MANAGEMENT NOTE    Names and Relationships of Patient/Support Persons: Contact: FERDINAND CATALAN; Relationship: Emergency Contact -     [facility] Interim Update    Called and spoke with patients spouse.     Advised [facility] and they should be reaching out to speak to patient or spouse.    Spouse states that she cannot get patient up and ready for appointments. Encouraged spouse to call and speak with Pavithra about PACE program in [place] that would help patient and spouse with care. She verbalizes understanding and will call Pavithra regarding PACE referral that was sent.        Education Documentation  No documentation found.        YANDEL GONZALEZ  Ambulatory Case Management    4/4/2025, 15:45 EDT

## 2025-04-07 ENCOUNTER — APPOINTMENT (OUTPATIENT)
Dept: GENERAL RADIOLOGY | Facility: HOSPITAL | Age: 70
End: 2025-04-07
Payer: MEDICARE

## 2025-04-07 ENCOUNTER — HOSPITAL ENCOUNTER (EMERGENCY)
Facility: HOSPITAL | Age: 70
Discharge: HOME | End: 2025-04-08
Attending: EMERGENCY MEDICINE | Admitting: EMERGENCY MEDICINE
Payer: MEDICARE

## 2025-04-07 ENCOUNTER — TELEPHONE (OUTPATIENT)
Dept: FAMILY MEDICINE CLINIC | Facility: CLINIC | Age: 70
End: 2025-04-07
Payer: MEDICARE

## 2025-04-07 DIAGNOSIS — M25.562 ACUTE PAIN OF BOTH KNEES: Primary | ICD-10-CM

## 2025-04-07 DIAGNOSIS — M25.561 ACUTE PAIN OF BOTH KNEES: Primary | ICD-10-CM

## 2025-04-07 LAB
ALBUMIN SERPL-MCNC: 3.5 G/DL (ref 3.5–5.2)
ALBUMIN/GLOB SERPL: 0.8 G/DL
ALP SERPL-CCNC: 135 U/L (ref 39–117)
ALT SERPL W P-5'-P-CCNC: 6 U/L (ref 1–41)
ANION GAP SERPL CALCULATED.3IONS-SCNC: 10.2 MMOL/L (ref 5–15)
AST SERPL-CCNC: 10 U/L (ref 1–40)
BASOPHILS # BLD AUTO: 0.02 10*3/MM3 (ref 0–0.2)
BASOPHILS NFR BLD AUTO: 0.3 % (ref 0–1.5)
BILIRUB SERPL-MCNC: 0.4 MG/DL (ref 0–1.2)
BUN SERPL-MCNC: 16 MG/DL (ref 8–23)
BUN/CREAT SERPL: 24.2 (ref 7–25)
CALCIUM SPEC-SCNC: 9.7 MG/DL (ref 8.6–10.5)
CHLORIDE SERPL-SCNC: 101 MMOL/L (ref 98–107)
CO2 SERPL-SCNC: 24.8 MMOL/L (ref 22–29)
CREAT SERPL-MCNC: 0.66 MG/DL (ref 0.76–1.27)
DEPRECATED RDW RBC AUTO: 49.9 FL (ref 37–54)
EGFRCR SERPLBLD CKD-EPI 2021: 102.2 ML/MIN/1.73
EOSINOPHIL # BLD AUTO: 0.09 10*3/MM3 (ref 0–0.4)
EOSINOPHIL NFR BLD AUTO: 1.5 % (ref 0.3–6.2)
ERYTHROCYTE [DISTWIDTH] IN BLOOD BY AUTOMATED COUNT: 16.4 % (ref 12.3–15.4)
GLOBULIN UR ELPH-MCNC: 4.3 GM/DL
GLUCOSE SERPL-MCNC: 141 MG/DL (ref 65–99)
HCT VFR BLD AUTO: 46.7 % (ref 37.5–51)
HGB BLD-MCNC: 14.8 G/DL (ref 13–17.7)
HOLD SPECIMEN: NORMAL
HOLD SPECIMEN: NORMAL
IMM GRANULOCYTES # BLD AUTO: 0.01 10*3/MM3 (ref 0–0.05)
IMM GRANULOCYTES NFR BLD AUTO: 0.2 % (ref 0–0.5)
LYMPHOCYTES # BLD AUTO: 2.36 10*3/MM3 (ref 0.7–3.1)
LYMPHOCYTES NFR BLD AUTO: 40.3 % (ref 19.6–45.3)
MCH RBC QN AUTO: 26.5 PG (ref 26.6–33)
MCHC RBC AUTO-ENTMCNC: 31.7 G/DL (ref 31.5–35.7)
MCV RBC AUTO: 83.7 FL (ref 79–97)
MONOCYTES # BLD AUTO: 0.6 10*3/MM3 (ref 0.1–0.9)
MONOCYTES NFR BLD AUTO: 10.3 % (ref 5–12)
NEUTROPHILS NFR BLD AUTO: 2.77 10*3/MM3 (ref 1.7–7)
NEUTROPHILS NFR BLD AUTO: 47.4 % (ref 42.7–76)
NRBC BLD AUTO-RTO: 0 /100 WBC (ref 0–0.2)
PLATELET # BLD AUTO: 249 10*3/MM3 (ref 140–450)
PMV BLD AUTO: 9.6 FL (ref 6–12)
POTASSIUM SERPL-SCNC: 4.2 MMOL/L (ref 3.5–5.2)
PROT SERPL-MCNC: 7.8 G/DL (ref 6–8.5)
QT INTERVAL: 344 MS
QTC INTERVAL: 473 MS
RBC # BLD AUTO: 5.58 10*6/MM3 (ref 4.14–5.8)
SODIUM SERPL-SCNC: 136 MMOL/L (ref 136–145)
TROPONIN T SERPL HS-MCNC: 7 NG/L
WBC NRBC COR # BLD AUTO: 5.85 10*3/MM3 (ref 3.4–10.8)
WHOLE BLOOD HOLD COAG: NORMAL
WHOLE BLOOD HOLD SPECIMEN: NORMAL

## 2025-04-07 PROCEDURE — 71045 X-RAY EXAM CHEST 1 VIEW: CPT

## 2025-04-07 PROCEDURE — 80053 COMPREHEN METABOLIC PANEL: CPT

## 2025-04-07 PROCEDURE — 73562 X-RAY EXAM OF KNEE 3: CPT

## 2025-04-07 PROCEDURE — 99284 EMERGENCY DEPT VISIT MOD MDM: CPT

## 2025-04-07 PROCEDURE — 93005 ELECTROCARDIOGRAM TRACING: CPT

## 2025-04-07 PROCEDURE — 84484 ASSAY OF TROPONIN QUANT: CPT

## 2025-04-07 PROCEDURE — 85025 COMPLETE CBC W/AUTO DIFF WBC: CPT

## 2025-04-07 RX ORDER — KETOROLAC TROMETHAMINE 15 MG/ML
15 INJECTION, SOLUTION INTRAMUSCULAR; INTRAVENOUS ONCE
Status: DISCONTINUED | OUTPATIENT
Start: 2025-04-07 | End: 2025-04-08 | Stop reason: HOSPADM

## 2025-04-07 NOTE — TELEPHONE ENCOUNTER
Caller: FERDINAND CATALAN    Relationship: Emergency Contact    Best call back number: [phone]     What is the best time to reach you: ANYTIME     Who are you requesting to speak with (clinical staff, provider,  specific staff member): CLINICAL     What was the call regarding: PATIENT SPOUSE IS CALLING IN REGARDS, TO PATIENT REFUSAL FOR SERVICES, HOME HEALTH NURSE HAS ADVISED OF PATIENT NEEDING TO POSSIBLY GO BACK TO REHAB,. SPOUSE HAS STATED CONTACTED YANDEL CATALAN IN REGARDS TO PATIENT WELL BEGIN NOT BE CHANGED OR ABLE TO ASSISTS OR DO ANY ADL'S. PATIENT IS BED BOUND,  NOT BEGIN CHANGED SINCE 04/06/2025 1900, AS STATED BY THE SPOUSE.

## 2025-04-07 NOTE — TELEPHONE ENCOUNTER
LUNA called the office stating that patient's wife is calling to advise he has refused to do anything with home health, she states that he is not being changed, is laying in his own urine, etc. She said home health has advised he needs to go somewhere in patient for rehab most likely but patient not cooperating with them. It looks like Juana has also been involved with patient care possibly and I advised that we would send a message to both of you to try to find out what patient's wife needs to do from here.

## 2025-04-08 ENCOUNTER — PATIENT OUTREACH (OUTPATIENT)
Dept: CASE MANAGEMENT | Facility: OTHER | Age: 70
End: 2025-04-08
Payer: MEDICARE

## 2025-04-08 VITALS
DIASTOLIC BLOOD PRESSURE: 75 MMHG | WEIGHT: 196.21 LBS | TEMPERATURE: 97.6 F | SYSTOLIC BLOOD PRESSURE: 139 MMHG | RESPIRATION RATE: 20 BRPM | HEIGHT: 75 IN | OXYGEN SATURATION: 94 % | HEART RATE: 106 BPM | BODY MASS INDEX: 24.4 KG/M2

## 2025-04-08 DIAGNOSIS — I50.22 CHRONIC HFREF (HEART FAILURE WITH REDUCED EJECTION FRACTION): Primary | ICD-10-CM

## 2025-04-08 DIAGNOSIS — R53.1 WEAKNESS: ICD-10-CM

## 2025-04-08 NOTE — ED PROVIDER NOTES
Time: 11:38 PM EDT  Date of encounter:  4/7/2025  Independent Historian/Clinical History and Information was obtained by:   Patient    History is limited by: N/A    Chief Complaint: Extremity pain      History of Present Illness:  Patient is a 70 y.o. year old male who presents to the emergency department for evaluation of bilateral knee pain and generalized weakness.  Patient reports he was outside his home today when his knee pain became worse and his knees felt weak and he did not feel like he can walk.  Patient reports he contacted EMS who brought him to the hospital.  Denies any chest pain, shortness of breath, injuries, falls.      Patient Care Team  Primary Care Provider: Sophie Capps APRN    Past Medical History:     Allergies   Allergen Reactions    Ceftriaxone Rash     Had fairly extensive diffuse chest/arms rash after administration 1/2025 hospitalization.  Switched to aztreonam at that time.       Past Medical History:   Diagnosis Date    CHF (congestive heart failure)     SEE'S DR STRICKLAND NO CURRENT S/S    COPD (chronic obstructive pulmonary disease)     INHALER    Diabetes mellitus     DOESN'T CHECK BG OFTEN AT HOME    Hyperlipidemia     Hypertension     Sepsis 09/14/2023    Stroke 2017    RIGHT SIDE WEAKNESS     Past Surgical History:   Procedure Laterality Date    APPENDECTOMY      CYSTOLITHALOPAXY PERCUTANEOUS Left 1/16/2025    Procedure: CYSTOLITHOLAPAXY, left ureteroscopy with laser basket stone extraction and left ureteral stent exchange.  Looking bladder, laser stone off stent exchange retained stent if possible;  Surgeon: Ector Kulkarni MD;  Location: [facility] OR;  Service: Urology;  Laterality: Left;    ENDOSCOPY N/A 3/4/2025    Procedure: ESOPHAGOGASTRODUODENOSCOPY with biopsies;  Surgeon: Mikael Bello MD;  Location: [facility] ENDOSCOPY;  Service: Gastroenterology;  Laterality: N/A;  reflux esophagitis    EYE SURGERY Bilateral     MISTY CATARACT     Family History   Problem  Relation Age of Onset    No Known Problems Mother     No Known Problems Father     Malig Hyperthermia Neg Hx        Home Medications:  Prior to Admission medications    Medication Sig Start Date End Date Taking? Authorizing Provider   Aspirin Low Dose 81 MG chewable tablet CHEW AND SWALLOW 1 TABLET BY MOUTH DAILY 11/2/24   Sophie Capps APRN   atorvastatin (LIPITOR) 40 MG tablet Take 1 tablet by mouth Daily. 2/27/25   Henrry Camarillo MD   Blood Glucose Monitoring Suppl (Blood Glucose Monitor System) w/Device kit Use 1 each Every 3 (Three) Years. 3/12/25 3/12/26  Rodolfo Perez MD   budesonide-formoterol (Symbicort) 160-4.5 MCG/ACT inhaler Inhale 2 puffs 2 (Two) Times a Day. 2/7/25   Meliton Monroy DO   cetirizine (zyrTEC) 10 MG tablet Take 1 tablet by mouth Daily for 14 days. 1/18/25 2/4/25  Lico Langley MD   Diclofenac Sodium (VOLTAREN) 1 % gel gel Apply 2 g topically to the appropriate area as directed 4 (Four) Times a Day. 1/17/25   Lico Langley MD   empagliflozin (Jardiance) 25 MG tablet tablet TAKE 1 TABLET BY MOUTH DAILY 1/6/25   Sophie Capps APRN   famotidine (PEPCID) 40 MG tablet Take 1 tablet by mouth At Night As Needed.    ProviderHenrry MD   glucose blood test strip 1 each by Other route Daily for 30 days. Use as instructed 3/12/25 4/11/25  Rodolfo Perez MD   hydrocortisone-bacitracin-zinc oxide-nystatin (MAGIC BARRIER) Apply 1 Application topically to the appropriate area as directed 2 (Two) Times a Day As Needed (skin irritation). 3/12/25   Rodolfo Perez MD   ipratropium-albuterol (DUO-NEB) 0.5-2.5 mg/3 ml nebulizer Take 3 mL by nebulization Every 6 (Six) Hours As Needed for Shortness of Air. 3/12/25   Rodolfo Perez MD   Lancets 33G misc Use 1 each Daily for 30 days. 3/12/25 4/11/25  Perez, Abiel., MD Curtis Alcantara 100 UNIT/ML injection pen INJECT 10 UNITS SUBCUTANEOUSLY INTO THE APPROPRIATE AREA EVERY NIGHT AS DIRECTED  Patient taking differently: Inject 10 Units  under the skin into the appropriate area as directed Every Night. 1/7/25   Sophie Capps APRN   losartan (COZAAR) 25 MG tablet TAKE 1 TABLET BY MOUTH DAILY 1/6/25   Sophie Capps APRN   mineral oil-hydrophilic petrolatum (AQUAPHOR) ointment Apply 1 Application topically to the appropriate area as directed 2 (Two) Times a Day. 3/12/25   Rodolfo Perez MD   nicotine (NICODERM CQ) 21 MG/24HR patch Place 1 patch on the skin as directed by provider Daily. 2/7/25   Meliton Monroy DO   pantoprazole (PROTONIX) 40 MG EC tablet Take 1 tablet by mouth 2 (Two) Times a Day Before Meals. 3/12/25   Rodolfo Perez MD   sertraline (ZOLOFT) 25 MG tablet Take 1 tablet by mouth Daily. 2/27/25   Provider, MD Henrry        Social History:   Social History     Tobacco Use    Smoking status: Every Day     Current packs/day: 2.50     Average packs/day: 2.5 packs/day for 53.3 years (133.2 ttl pk-yrs)     Types: Cigarettes     Start date: 1972     Passive exposure: Current    Smokeless tobacco: Never    Tobacco comments:     INSTRUCTED NO SMOKING 24 HOUR PRIOR TO SURGERY    Vaping Use    Vaping status: Never Used   Substance Use Topics    Alcohol use: Not Currently    Drug use: Never         Review of Systems:  Review of Systems   Constitutional:  Negative for chills, fatigue and fever.   HENT:  Negative for ear pain, rhinorrhea and sore throat.    Eyes:  Negative for visual disturbance.   Respiratory:  Negative for cough and shortness of breath.    Cardiovascular:  Negative for chest pain.   Gastrointestinal:  Negative for abdominal pain, diarrhea and vomiting.   Genitourinary:  Negative for difficulty urinating.   Musculoskeletal:  Positive for arthralgias. Negative for back pain and myalgias.   Skin:  Negative for rash.   Neurological:  Positive for weakness. Negative for light-headedness and headaches.   Hematological:  Negative for adenopathy.   Psychiatric/Behavioral: Negative.          Physical Exam:  /86 (BP  "Location: Left arm, Patient Position: Lying)   Pulse 112   Temp 97.4 °F (36.3 °C) (Oral)   Resp 20   Ht 190.5 cm (75\")   Wt 89 kg (196 lb 3.4 oz)   SpO2 96%   BMI 24.52 kg/m²     Physical Exam  Vitals and nursing note reviewed.   Constitutional:       General: He is not in acute distress.     Appearance: Normal appearance. He is not toxic-appearing.   HENT:      Head: Normocephalic and atraumatic.      Nose: Nose normal.      Mouth/Throat:      Mouth: Mucous membranes are moist.   Eyes:      Conjunctiva/sclera: Conjunctivae normal.   Cardiovascular:      Rate and Rhythm: Normal rate.      Pulses: Normal pulses.      Heart sounds: Normal heart sounds.   Pulmonary:      Effort: Pulmonary effort is normal.      Breath sounds: Normal breath sounds.   Abdominal:      General: Bowel sounds are normal.      Palpations: Abdomen is soft.      Tenderness: There is no abdominal tenderness.   Musculoskeletal:         General: Tenderness (Bilateral anterior and lateral knee) present. Normal range of motion.      Cervical back: Normal range of motion.   Skin:     General: Skin is warm and dry.   Neurological:      General: No focal deficit present.      Mental Status: He is alert and oriented to person, place, and time.   Psychiatric:         Mood and Affect: Mood normal.         Behavior: Behavior normal.         Thought Content: Thought content normal.         Judgment: Judgment normal.                    Medical Decision Making:      Comorbidities that affect care:    Diabetes, Hypertension    External Notes reviewed:    None      The following orders were placed and all results were independently analyzed by me:  Orders Placed This Encounter   Procedures    XR Chest 1 View    XR Knee 3 View Right    XR Knee 3 View Left    Comprehensive Metabolic Panel    Craig Draw    High Sensitivity Troponin T    CBC Auto Differential    ECG 12 Lead Syncope    CBC & Differential    Green Top (Gel)    Lavender Top    Gold Top - SST "    Light Blue Top       Medications Given in the Emergency Department:  Medications   ketorolac (TORADOL) injection 15 mg (15 mg Intravenous Not Given 4/7/25 2249)        ED Course:         Labs:    Lab Results (last 24 hours)       Procedure Component Value Units Date/Time    CBC & Differential [[id number]]  (Abnormal) Collected: 04/07/25 2209    Specimen: Blood Updated: 04/07/25 2222    Narrative:      The following orders were created for panel order CBC & Differential.  Procedure                               Abnormality         Status                     ---------                               -----------         ------                     CBC Auto Differential[[id number]]        Abnormal            Final result                 Please view results for these tests on the individual orders.    Comprehensive Metabolic Panel [[id number]]  (Abnormal) Collected: 04/07/25 2209    Specimen: Blood Updated: 04/07/25 2246     Glucose 141 mg/dL      BUN 16 mg/dL      Creatinine 0.66 mg/dL      Sodium 136 mmol/L      Potassium 4.2 mmol/L      Chloride 101 mmol/L      CO2 24.8 mmol/L      Calcium 9.7 mg/dL      Total Protein 7.8 g/dL      Albumin 3.5 g/dL      ALT (SGPT) 6 U/L      AST (SGOT) 10 U/L      Alkaline Phosphatase 135 U/L      Total Bilirubin 0.4 mg/dL      Globulin 4.3 gm/dL      A/G Ratio 0.8 g/dL      BUN/Creatinine Ratio 24.2     Anion Gap 10.2 mmol/L      eGFR 102.2 mL/min/1.73     Narrative:      GFR Categories in Chronic Kidney Disease (CKD)      GFR Category          GFR (mL/min/1.73)    Interpretation  G1                     90 or greater         Normal or high (1)  G2                      60-89                Mild decrease (1)  G3a                   45-59                Mild to moderate decrease  G3b                   30-44                Moderate to severe decrease  G4                    15-29                Severe decrease  G5                    14 or less           Kidney failure          (1)In the  absence of evidence of kidney disease, neither GFR category G1 or G2 fulfill the criteria for CKD.    eGFR calculation 2021 CKD-EPI creatinine equation, which does not include race as a factor    High Sensitivity Troponin T [[id number]]  (Normal) Collected: 04/07/25 2209    Specimen: Blood Updated: 04/07/25 2246     HS Troponin T 7 ng/L     Narrative:      High Sensitive Troponin T Reference Range:  <14.0 ng/L- Negative Female for AMI  <22.0 ng/L- Negative Male for AMI  >=14 - Abnormal Female indicating possible myocardial injury.  >=22 - Abnormal Male indicating possible myocardial injury.   Clinicians would have to utilize clinical acumen, EKG, Troponin, and serial changes to determine if it is an Acute Myocardial Infarction or myocardial injury due to an underlying chronic condition.         CBC Auto Differential [[id number]]  (Abnormal) Collected: 04/07/25 2209    Specimen: Blood Updated: 04/07/25 2222     WBC 5.85 10*3/mm3      RBC 5.58 10*6/mm3      Hemoglobin 14.8 g/dL      Hematocrit 46.7 %      MCV 83.7 fL      MCH 26.5 pg      MCHC 31.7 g/dL      RDW 16.4 %      RDW-SD 49.9 fl      MPV 9.6 fL      Platelets 249 10*3/mm3      Neutrophil % 47.4 %      Lymphocyte % 40.3 %      Monocyte % 10.3 %      Eosinophil % 1.5 %      Basophil % 0.3 %      Immature Grans % 0.2 %      Neutrophils, Absolute 2.77 10*3/mm3      Lymphocytes, Absolute 2.36 10*3/mm3      Monocytes, Absolute 0.60 10*3/mm3      Eosinophils, Absolute 0.09 10*3/mm3      Basophils, Absolute 0.02 10*3/mm3      Immature Grans, Absolute 0.01 10*3/mm3      nRBC 0.0 /100 WBC              Imaging:    XR Knee 3 View Left  Result Date: 4/7/2025  XR KNEE 3 VW LEFT-  Date of exam: 4/7/2025 10:48 PM.  Comparison: 9/13/2023.  INDICATIONS: 70-year-old male with left knee pain after fall.  FINDINGS: Three (3) nonweightbearing views of the left knee were obtained. No acute fracture or acute malalignment is identified. Mild-to-moderate degenerative and enthesopathic  changes are present. No joint effusion is suggested. No retained radiopaque foreign body. No subcutaneous emphysema. Arterial calcifications are seen. If symptoms or clinical concerns persist, consider imaging follow-up.       No acute fracture or acute malalignment is identified.   Portions of this note were completed with a voice recognition program.  4/7/2025 11:34 PM by Jm Quezada MD on Workstation: [id number]      XR Knee 3 View Right  Result Date: 4/7/2025  XR KNEE 3 VW RIGHT-  Date of exam: 4/7/2025 10:47 PM.  Comparison: None available.  INDICATIONS: 70-year-old male with history of right knee pain after fall.  FINDINGS: Three (3) nonweightbearing views of the right knee were obtained. No acute fracture or acute malalignment is identified. Mild-to-moderate degenerative and enthesopathic changes are noted. Arterial calcifications are present. Loose bodies in the joint space cannot be excluded. No retained radiopaque foreign body. No subcutaneous emphysema. No suprapatellar recess joint effusion is suggested. External artifacts are noted. If symptoms or clinical concerns persist, consider imaging follow-up.       No acute fracture or acute malalignment is identified.   Portions of this note were completed with a voice recognition program.  4/7/2025 11:33 PM by Jm Quezada MD on Workstation: [id number]      XR Chest 1 View  Result Date: 4/7/2025  XR CHEST 1 VW Date of Exam: 4/7/2025 9:41 PM EDT Indication: weakness Comparison: Chest radiograph performed on March 3, 2025 Findings: Mild right basilar atelectasis is visualized. There is no evidence of pneumothorax.  The pulmonary vasculature appears within normal limits.  The cardiac and mediastinal silhouette appear unremarkable.  No acute osseous abnormality identified.     Impression: Mild right basilar atelectasis. Electronically Signed: Que Farah MD  4/7/2025 10:09 PM EDT  Workstation ID: [id number]        Differential Diagnosis and Discussion:    Weakness:  Based on the patient's history, signs, and symptoms, the diffential diagnosis includes but is not limited to meningitis, stroke, sepsis, subarachnoid hemorrhage, intracranial bleeding, encephalitis, acute uti, dehydration, MS, myasthenia gravis, Guillan [place]art, migraine variant, neuromuscular disorders vertigo, electrolyte imbalance, and metabolic disorders.    PROCEDURES:    Labs were collected in the emergency department and all labs were reviewed and interpreted by me.  X-ray were performed in the emergency department and all X-ray impressions were independently interpreted by me.    ECG 12 Lead Syncope   Preliminary Result   HEART NGLP=910  bpm   RR Hxebwtmu=720  ms   SC Wsztdtnc=698  ms   P Horizontal Axis=-1  deg   P Front Axis=66  deg   QRSD Interval=79  ms   QT Oghzpkyu=146  ms   GBqM=442  ms   QRS Axis=34  deg   T Wave Axis=40  deg   - OTHERWISE NORMAL ECG -   Sinus tachycardia   Abnormal R-wave progression, late transition   Date and Time of Study:2025-04-07 22:02:48          Procedures    MDM  Number of Diagnoses or Management Options  Diagnosis management comments: I have explained the patient´s condition, diagnoses and treatment plan based on the information available to me at this time. I have answered questions and addressed any concerns. The patient has a good  understanding of the patient´s diagnosis, condition, and treatment plan as can be expected at this point. The vital signs have been stable. The patient´s condition is stable and appropriate for discharge from the emergency department.      The patient will pursue further outpatient evaluation with the primary care physician or other designated or consulting physician as outlined in the discharge instructions. They are agreeable to this plan of care and follow-up instructions have been explained in detail. The patient has received these instructions in written format and has expressed an understanding of the discharge instructions. The patient  is aware that any significant change in condition or worsening of symptoms should prompt an immediate return to this or the closest emergency department or call to 911.                         Patient Care Considerations:    NARCOTICS: I considered prescribing opiate pain medication as an outpatient, however patient's pain is manageable without these of narcotics in the ED.      Consultants/Shared Management Plan:    None    Social Determinants of Health:    Patient is independent, reliable, and has access to care.       Disposition and Care Coordination:    Discharged: The patient is suitable and stable for discharge with no need for consideration of admission.    I have explained the patient´s condition, diagnoses and treatment plan based on the information available to me at this time. I have answered questions and addressed any concerns. The patient has a good  understanding of the patient´s diagnosis, condition, and treatment plan as can be expected at this point. The vital signs have been stable. The patient´s condition is stable and appropriate for discharge from the emergency department.      The patient will pursue further outpatient evaluation with the primary care physician or other designated or consulting physician as outlined in the discharge instructions. They are agreeable to this plan of care and follow-up instructions have been explained in detail. The patient has received these instructions in written format and has expressed an understanding of the discharge instructions. The patient is aware that any significant change in condition or worsening of symptoms should prompt an immediate return to this or the closest emergency department or call to 911.  I have explained discharge medications and the need for follow up with the patient/caretakers. This was also printed in the discharge instructions. Patient was discharged with the following medications and follow up:      Medication List         Changed      Diclofenac Sodium 1 % gel gel  Commonly known as: VOLTAREN  Apply 4 g topically to the appropriate area as directed 4 (Four) Times a Day As Needed (pain).  What changed:   how much to take  when to take this  reasons to take this     Lantus SoloStar 100 UNIT/ML injection pen  Generic drug: Insulin Glargine  INJECT 10 UNITS SUBCUTANEOUSLY INTO THE APPROPRIATE AREA EVERY NIGHT AS DIRECTED  What changed: See the new instructions.               Where to Get Your Medications        These medications were sent to [facility], [place] - [place], [place] - [address] - [phone]  - [phone] FX  [address] [facility] [address], [place] [address]      Phone: [phone]   Diclofenac Sodium 1 % gel gel      Sophie Capps, APRN  [address] [place] [address] [phone]    Go to   As needed       Final diagnoses:   Acute pain of both knees        ED Disposition       ED Disposition   Discharge    Condition   Stable    Comment   --               This medical record created using voice recognition software.             Coleen North, APRN  04/07/25 2463

## 2025-04-08 NOTE — DISCHARGE INSTRUCTIONS
Please follow with your primary care provider as needed.  Return to the ED for any new or worsening concerning symptoms.  You have been prescribed a topical gel that you can  and begin using for knee pain.

## 2025-04-08 NOTE — OUTREACH NOTE
"AMBULATORY CASE MANAGEMENT NOTE    Names and Relationships of Patient/Support Persons: Contact: HOSSEINFERDINAND; Relationship: Emergency Contact -     [facility] Interim Update    Received telephone notes regarding patient being home but not caring for himself and wife wanting him to go back to rehab.    Called and spoke with wife. She is yelling on the phone that she sent patient back to the hospital yesterday and he came home via EMS last night. She states \"I do not want him in this house. I do not want him to come home. He's not in any shape at all to be here. The supervisor [paramedics] came out and took pictures of the house to say that he is not capable of living here. His sister is going to see what we can do in court to get him admitted somewhere. I'm done playing around with him. I can't take it anymore. I'm just waiting to see if the landlord will evict me because we're not going to have any place to live\". She goes on to say that she has an appointment with a different housing agency on 4/15 but it will take about a year to get in there.    I, again, educated spouse that if patient is lucid and able to make his own decisions, that he will continue to be sent home. Until it is determined that patient can no longer make decisions on his own, he will continue to be released home if and when he refuses medical treatment. Patient does not want to live in a nursing home and his wife does not want him in their home. I encouraged spouse to reach out to APS , Pavithra.     Care Coordination    I also called Pavithra to discuss case. Left message to call back.        Education Documentation  No documentation found.        YANDEL GONZALEZ  Ambulatory Case Management    4/8/2025, 15:40 EDT  "

## 2025-04-08 NOTE — ED NOTES
Pt's wife states pt cannot come home and that she cannot take care of him. Pt's wife reports he should have never come home from rehab.

## 2025-04-15 LAB
QT INTERVAL: 344 MS
QTC INTERVAL: 473 MS

## 2025-04-17 ENCOUNTER — TELEPHONE (OUTPATIENT)
Dept: CASE MANAGEMENT | Facility: OTHER | Age: 70
End: 2025-04-17
Payer: MEDICARE

## 2025-04-17 NOTE — TELEPHONE ENCOUNTER
Received message from Pavithra that she was no longer on this case and she believes Payotn Cuevas is now working on this. Called and requested to speak to April. Transferred to voicemail and left message to call back.

## 2025-04-18 ENCOUNTER — TELEPHONE (OUTPATIENT)
Dept: CASE MANAGEMENT | Facility: OTHER | Age: 70
End: 2025-04-18
Payer: MEDICARE

## 2025-04-22 ENCOUNTER — TELEPHONE (OUTPATIENT)
Dept: CASE MANAGEMENT | Facility: OTHER | Age: 70
End: 2025-04-22
Payer: MEDICARE

## 2025-04-22 NOTE — TELEPHONE ENCOUNTER
Received vm from Payton Cuevas with APS. Returned call at [phone] ext [phone]. Left message to call back.

## 2025-04-29 ENCOUNTER — PATIENT OUTREACH (OUTPATIENT)
Dept: CASE MANAGEMENT | Facility: OTHER | Age: 70
End: 2025-04-29
Payer: MEDICARE

## 2025-04-29 DIAGNOSIS — R53.1 WEAKNESS: ICD-10-CM

## 2025-04-29 DIAGNOSIS — I50.22 CHRONIC HFREF (HEART FAILURE WITH REDUCED EJECTION FRACTION): Primary | ICD-10-CM

## 2025-04-29 NOTE — OUTREACH NOTE
[facility] End of Month Documentation    This Chronic Medical Management Care Plan for Brian Mehta, 70 y.o. male, has been monitored and managed; reviewed and a new plan of care implemented for the month of April.  A cumulative time of 52  minutes was spent on this patient record this month, including phone call with relative; chart review.    Regarding the patient's problems: has CVA (cerebral vascular accident); Essential hypertension; High cholesterol; Hip pain; Vitamin B12 deficiency; Kidney disorder; Chronic pain of both knees; Systolic CHF, chronic; LVH (left ventricular hypertrophy); COPD (chronic obstructive pulmonary disease); DM2 (diabetes mellitus, type 2); Urinary tract infection without hematuria; Abnormal nuclear stress test; UTI (urinary tract infection); Fall; Failure to thrive in adult; Nephrolithiasis; Bladder stone; Retained ureteral stent; Acute UTI; Bacteremia; Generalized weakness; and Coffee ground emesis on their problem list., the following items were addressed: medical records; medications; changes to medical care and any changes can be found within the plan section of the note.  A detailed listing of time spent for chronic care management is tracked within each outreach encounter.  Current medications include:  has a current medication list which includes the following prescription(s): aspirin low dose, atorvastatin, blood glucose monitor system, budesonide-formoterol, cetirizine, diclofenac sodium, jardiance, famotidine, hydrocortisone-bacitracin-zinc oxide-nystatin, ipratropium-albuterol, lantus solostar, losartan, mineral oil-hydrophilic petrolatum, nicotine, pantoprazole, and sertraline. and the patient is reported to be patient is compliant with medication protocol,  Medications are reported to be effective.  Regarding these diagnoses, referrals were made to the following provider(s):  PCP.  All notes on chart for PCP to review.    The patient was monitored remotely for activity level;  medications; blood glucose.    The patient's physical needs include:  DME supplies; physical healthcare; needs assistance with ADLs; resources for disability needs.     The patient's mental support needs include:  continued support    The patient's cognitive support needs include:  needs assistance with ADLs; requires supervision; household care; health care; personal care    The patient's psychosocial support needs include:  continued support    The patient's functional needs include: DME; physical healthcare; needs assistance for ADLs    The patient's environmental needs include:  an unsafe living environment    Care Plan overall comments:  No data recorded    Refer to previous outreach notes for more information on the areas listed above.    Monthly Billing Diagnoses  (I50.22) Chronic HFrEF (heart failure with reduced ejection fraction)    (R53.1) Weakness    Medications   Medications have been reconciled    Care Plan progress this month:      Recently Modified Care Plans Updates made since 3/29/2025 12:00 AM      No recently modified care plans.               Instructions   Patient was provided an electronic copy of care plan  CCM services were explained and offered and patient has accepted these services.  Patient has given their written consent to receive CCM services and understands that this includes the authorization of electronic communication of medical information with the other treating providers.  Patient understands that they may stop CCM services at any time and these changes will be effective at the end of the calendar month and will effectively revocate the agreement of CCM services.  Patient understands that only one practitioner can furnish and be paid for CCM services during one calendar month.  Patient also understands that there may be co-payment or deductible fees in association with CCM services.  Patient will continue with at least monthly follow-up calls with the Ambulatory Case  Manager.    YANDEL GONZALEZ  Ambulatory Case Management    4/29/2025, 08:10 EDT

## 2025-05-02 ENCOUNTER — HOSPITAL ENCOUNTER (EMERGENCY)
Facility: HOSPITAL | Age: 70
Discharge: HOME | End: 2025-05-02
Attending: EMERGENCY MEDICINE
Payer: MEDICARE

## 2025-05-02 ENCOUNTER — APPOINTMENT (OUTPATIENT)
Dept: GENERAL RADIOLOGY | Facility: HOSPITAL | Age: 70
End: 2025-05-02
Payer: MEDICARE

## 2025-05-02 VITALS
BODY MASS INDEX: 24.01 KG/M2 | DIASTOLIC BLOOD PRESSURE: 76 MMHG | HEIGHT: 75 IN | HEART RATE: 84 BPM | TEMPERATURE: 99.5 F | RESPIRATION RATE: 18 BRPM | SYSTOLIC BLOOD PRESSURE: 111 MMHG | WEIGHT: 193.12 LBS | OXYGEN SATURATION: 92 %

## 2025-05-02 DIAGNOSIS — R07.9 CHEST PAIN, UNSPECIFIED TYPE: ICD-10-CM

## 2025-05-02 DIAGNOSIS — R41.82 ALTERED MENTAL STATUS, UNSPECIFIED ALTERED MENTAL STATUS TYPE: Primary | ICD-10-CM

## 2025-05-02 LAB
ALBUMIN SERPL-MCNC: 3.3 G/DL (ref 3.5–5.2)
ALBUMIN/GLOB SERPL: 0.8 G/DL
ALP SERPL-CCNC: 121 U/L (ref 39–117)
ALT SERPL W P-5'-P-CCNC: 6 U/L (ref 1–41)
ANION GAP SERPL CALCULATED.3IONS-SCNC: 10.8 MMOL/L (ref 5–15)
AST SERPL-CCNC: 9 U/L (ref 1–40)
BASOPHILS # BLD AUTO: 0.02 10*3/MM3 (ref 0–0.2)
BASOPHILS NFR BLD AUTO: 0.4 % (ref 0–1.5)
BILIRUB SERPL-MCNC: 0.4 MG/DL (ref 0–1.2)
BUN SERPL-MCNC: 15 MG/DL (ref 8–23)
BUN/CREAT SERPL: 15.8 (ref 7–25)
CALCIUM SPEC-SCNC: 8.7 MG/DL (ref 8.6–10.5)
CHLORIDE SERPL-SCNC: 101 MMOL/L (ref 98–107)
CO2 SERPL-SCNC: 26.2 MMOL/L (ref 22–29)
CREAT SERPL-MCNC: 0.95 MG/DL (ref 0.76–1.27)
DEPRECATED RDW RBC AUTO: 44.5 FL (ref 37–54)
EGFRCR SERPLBLD CKD-EPI 2021: 87.2 ML/MIN/1.73
EOSINOPHIL # BLD AUTO: 0.08 10*3/MM3 (ref 0–0.4)
EOSINOPHIL NFR BLD AUTO: 1.5 % (ref 0.3–6.2)
ERYTHROCYTE [DISTWIDTH] IN BLOOD BY AUTOMATED COUNT: 14.9 % (ref 12.3–15.4)
GEN 5 1HR TROPONIN T REFLEX: 9 NG/L
GLOBULIN UR ELPH-MCNC: 4.1 GM/DL
GLUCOSE SERPL-MCNC: 199 MG/DL (ref 65–99)
HCT VFR BLD AUTO: 45 % (ref 37.5–51)
HGB BLD-MCNC: 14.7 G/DL (ref 13–17.7)
HOLD SPECIMEN: NORMAL
HOLD SPECIMEN: NORMAL
IMM GRANULOCYTES # BLD AUTO: 0.02 10*3/MM3 (ref 0–0.05)
IMM GRANULOCYTES NFR BLD AUTO: 0.4 % (ref 0–0.5)
LYMPHOCYTES # BLD AUTO: 1.63 10*3/MM3 (ref 0.7–3.1)
LYMPHOCYTES NFR BLD AUTO: 29.8 % (ref 19.6–45.3)
MAGNESIUM SERPL-MCNC: 1.8 MG/DL (ref 1.6–2.4)
MCH RBC QN AUTO: 27 PG (ref 26.6–33)
MCHC RBC AUTO-ENTMCNC: 32.7 G/DL (ref 31.5–35.7)
MCV RBC AUTO: 82.6 FL (ref 79–97)
MONOCYTES # BLD AUTO: 0.55 10*3/MM3 (ref 0.1–0.9)
MONOCYTES NFR BLD AUTO: 10.1 % (ref 5–12)
NEUTROPHILS NFR BLD AUTO: 3.17 10*3/MM3 (ref 1.7–7)
NEUTROPHILS NFR BLD AUTO: 57.8 % (ref 42.7–76)
NRBC BLD AUTO-RTO: 0 /100 WBC (ref 0–0.2)
PLATELET # BLD AUTO: 258 10*3/MM3 (ref 140–450)
PMV BLD AUTO: 9.7 FL (ref 6–12)
POTASSIUM SERPL-SCNC: 4.3 MMOL/L (ref 3.5–5.2)
PROT SERPL-MCNC: 7.4 G/DL (ref 6–8.5)
QT INTERVAL: 374 MS
QTC INTERVAL: 517 MS
RBC # BLD AUTO: 5.45 10*6/MM3 (ref 4.14–5.8)
SODIUM SERPL-SCNC: 138 MMOL/L (ref 136–145)
TROPONIN T NUMERIC DELTA: 0 NG/L
TROPONIN T SERPL HS-MCNC: 9 NG/L
WBC NRBC COR # BLD AUTO: 5.47 10*3/MM3 (ref 3.4–10.8)
WHOLE BLOOD HOLD COAG: NORMAL
WHOLE BLOOD HOLD SPECIMEN: NORMAL

## 2025-05-02 PROCEDURE — 80053 COMPREHEN METABOLIC PANEL: CPT | Performed by: EMERGENCY MEDICINE

## 2025-05-02 PROCEDURE — 85025 COMPLETE CBC W/AUTO DIFF WBC: CPT | Performed by: EMERGENCY MEDICINE

## 2025-05-02 PROCEDURE — 84484 ASSAY OF TROPONIN QUANT: CPT | Performed by: EMERGENCY MEDICINE

## 2025-05-02 PROCEDURE — 71045 X-RAY EXAM CHEST 1 VIEW: CPT

## 2025-05-02 PROCEDURE — 36415 COLL VENOUS BLD VENIPUNCTURE: CPT

## 2025-05-02 PROCEDURE — 99284 EMERGENCY DEPT VISIT MOD MDM: CPT

## 2025-05-02 PROCEDURE — 93005 ELECTROCARDIOGRAM TRACING: CPT

## 2025-05-02 PROCEDURE — 25810000003 SODIUM CHLORIDE 0.9 % SOLUTION: Performed by: EMERGENCY MEDICINE

## 2025-05-02 PROCEDURE — 93005 ELECTROCARDIOGRAM TRACING: CPT | Performed by: EMERGENCY MEDICINE

## 2025-05-02 PROCEDURE — 83735 ASSAY OF MAGNESIUM: CPT | Performed by: EMERGENCY MEDICINE

## 2025-05-02 RX ORDER — SODIUM CHLORIDE 0.9 % (FLUSH) 0.9 %
10 SYRINGE (ML) INJECTION AS NEEDED
Status: DISCONTINUED | OUTPATIENT
Start: 2025-05-02 | End: 2025-05-02 | Stop reason: HOSPADM

## 2025-05-02 RX ADMIN — SODIUM CHLORIDE 1000 ML: 9 INJECTION, SOLUTION INTRAVENOUS at 03:40

## 2025-05-02 NOTE — ED PROVIDER NOTES
Time: 2:48 AM EDT  Date of encounter:  5/2/2025  Independent Historian/Clinical History and Information was obtained by:   Patient and EMS    History is limited by: N/A, limited cooperation    Chief Complaint: Altered mental state      History of Present Illness:  Patient is a 70 y.o. year old male who presents to the emergency department for evaluation of altered mental state    EMS reports that the patient's spouse called for lifting assistance but found the patient to be altered.  He states that the house is in disrepair and the patient was soiled with his own feces.  Patient was smoking allowing cigarette ashes simply fall on him.  Patient has no specific complaints and denies any pain.  He is more conversant with nursing staff but will answer limited questions from myself      Patient Care Team  Primary Care Provider: Sophie Capps APRN    Past Medical History:     Allergies   Allergen Reactions    Ceftriaxone Rash     Had fairly extensive diffuse chest/arms rash after administration 1/2025 hospitalization.  Switched to aztreonam at that time.       Past Medical History:   Diagnosis Date    CHF (congestive heart failure)     SEE'S DR [place] NO CURRENT S/S    COPD (chronic obstructive pulmonary disease)     INHALER    Diabetes mellitus     DOESN'T CHECK BG OFTEN AT HOME    Hyperlipidemia     Hypertension     Sepsis 09/14/2023    Stroke 2017    RIGHT SIDE WEAKNESS     Past Surgical History:   Procedure Laterality Date    APPENDECTOMY      CYSTOLITHALOPAXY PERCUTANEOUS Left 1/16/2025    Procedure: CYSTOLITHOLAPAXY, left ureteroscopy with laser basket stone extraction and left ureteral stent exchange.  Looking bladder, laser stone off stent exchange retained stent if possible;  Surgeon: Ector Kulkarni MD;  Location: [facility] MAIN OR;  Service: Urology;  Laterality: Left;    ENDOSCOPY N/A 3/4/2025    Procedure: ESOPHAGOGASTRODUODENOSCOPY with biopsies;  Surgeon: Mikael Bello MD;  Location: [facility]  ENDOSCOPY;  Service: Gastroenterology;  Laterality: N/A;  reflux esophagitis    EYE SURGERY Bilateral     MISTY CATARACT     Family History   Problem Relation Age of Onset    No Known Problems Mother     No Known Problems Father     Malig Hyperthermia Neg Hx        Home Medications:  Prior to Admission medications    Medication Sig Start Date End Date Taking? Authorizing Provider   Aspirin Low Dose 81 MG chewable tablet CHEW AND SWALLOW 1 TABLET BY MOUTH DAILY 11/2/24   Sophie Capsp APRN   atorvastatin (LIPITOR) 40 MG tablet Take 1 tablet by mouth Daily. 2/27/25   Henrry Camarillo MD   Blood Glucose Monitoring Suppl (Blood Glucose Monitor System) w/Device kit Use 1 each Every 3 (Three) Years. 3/12/25 3/12/26  Rodolfo Perez MD   budesonide-formoterol (Symbicort) 160-4.5 MCG/ACT inhaler Inhale 2 puffs 2 (Two) Times a Day. 2/7/25   Meliton Monroy DO   cetirizine (zyrTEC) 10 MG tablet Take 1 tablet by mouth Daily for 14 days. 1/18/25 2/4/25  Lico Langley MD   Diclofenac Sodium (VOLTAREN) 1 % gel gel Apply 4 g topically to the appropriate area as directed 4 (Four) Times a Day As Needed (pain). 4/7/25   Coleen North APRN   empagliflozin (Jardiance) 25 MG tablet tablet TAKE 1 TABLET BY MOUTH DAILY 1/6/25   Sophie Capps APRN   famotidine (PEPCID) 40 MG tablet Take 1 tablet by mouth At Night As Needed.    ProviderHenrry MD   hydrocortisone-bacitracin-zinc oxide-nystatin (MAGIC BARRIER) Apply 1 Application topically to the appropriate area as directed 2 (Two) Times a Day As Needed (skin irritation). 3/12/25   Rodolfo Perez MD   ipratropium-albuterol (DUO-NEB) 0.5-2.5 mg/3 ml nebulizer Take 3 mL by nebulization Every 6 (Six) Hours As Needed for Shortness of Air. 3/12/25   Rodolfo Perez MD   Lantus SoloStar 100 UNIT/ML injection pen INJECT 10 UNITS SUBCUTANEOUSLY INTO THE APPROPRIATE AREA EVERY NIGHT AS DIRECTED  Patient taking differently: Inject 10 Units under the skin into the appropriate area  as directed Every Night. 1/7/25   Sophie Capps APRN   losartan (COZAAR) 25 MG tablet TAKE 1 TABLET BY MOUTH DAILY 1/6/25   Sophie Capps APRN   mineral oil-hydrophilic petrolatum (AQUAPHOR) ointment Apply 1 Application topically to the appropriate area as directed 2 (Two) Times a Day. 3/12/25   Rodolfo Perez MD   nicotine (NICODERM CQ) 21 MG/24HR patch Place 1 patch on the skin as directed by provider Daily. 2/7/25   Meliton Monroy DO   pantoprazole (PROTONIX) 40 MG EC tablet Take 1 tablet by mouth 2 (Two) Times a Day Before Meals. 3/12/25   Rodolfo Perez MD   sertraline (ZOLOFT) 25 MG tablet Take 1 tablet by mouth Daily. 2/27/25   Provider, MD Henrry        Social History:   Social History     Tobacco Use    Smoking status: Every Day     Current packs/day: 2.50     Average packs/day: 2.5 packs/day for 53.3 years (133.3 ttl pk-yrs)     Types: Cigarettes     Start date: 1972     Passive exposure: Current    Smokeless tobacco: Never    Tobacco comments:     INSTRUCTED NO SMOKING 24 HOUR PRIOR TO SURGERY    Vaping Use    Vaping status: Never Used   Substance Use Topics    Alcohol use: Not Currently    Drug use: Never         Review of Systems:  Review of Systems   Constitutional:  Negative for chills and fever.   HENT:  Negative for congestion, ear pain and sore throat.    Eyes:  Negative for pain.   Respiratory:  Negative for cough, chest tightness and shortness of breath.    Cardiovascular:  Negative for chest pain.   Gastrointestinal:  Negative for abdominal pain, diarrhea, nausea and vomiting.   Genitourinary:  Negative for flank pain and hematuria.   Musculoskeletal:  Negative for joint swelling.   Skin:  Negative for pallor.   Neurological:  Positive for weakness. Negative for seizures and headaches.   All other systems reviewed and are negative.       Physical Exam:  /84 (BP Location: Left arm, Patient Position: Lying)   Pulse 106   Temp 99.6 °F (37.6 °C) (Oral)   Resp 15   Ht 190.5 cm  "(75\")   Wt 87.6 kg (193 lb 2 oz)   SpO2 93%   BMI 24.14 kg/m²     Physical Exam  Vitals and nursing note reviewed.   Constitutional:       General: He is not in acute distress.     Appearance: Normal appearance. He is not toxic-appearing.      Comments: Patient's clothing and skin are soiled.  He has plaques that on his face and neck.   HENT:      Head: Normocephalic and atraumatic.      Jaw: There is normal jaw occlusion.   Eyes:      General: Lids are normal.      Extraocular Movements: Extraocular movements intact.      Conjunctiva/sclera: Conjunctivae normal.      Pupils: Pupils are equal, round, and reactive to light.   Cardiovascular:      Rate and Rhythm: Normal rate and regular rhythm.      Pulses: Normal pulses.      Heart sounds: Normal heart sounds.   Pulmonary:      Effort: Pulmonary effort is normal. No respiratory distress.      Breath sounds: Normal breath sounds. No wheezing or rhonchi.   Abdominal:      General: Abdomen is flat.      Palpations: Abdomen is soft.      Tenderness: There is no abdominal tenderness. There is no guarding or rebound.   Musculoskeletal:         General: Normal range of motion.      Cervical back: Normal range of motion and neck supple.      Right lower leg: No edema.      Left lower leg: No edema.   Skin:     General: Skin is warm and dry.   Neurological:      Mental Status: He is alert and oriented to person, place, and time. Mental status is at baseline.   Psychiatric:         Mood and Affect: Mood normal.              Medical Decision Making:      Comorbidities that affect care:    Hyperlipidemia, diabetes, COPD, history of stroke, hypertension, CHF    External Notes reviewed:    Previous Operation Note: Outpatient endoscopy for EGD 3/4/2025      The following orders were placed and all results were independently analyzed by me:  Orders Placed This Encounter   Procedures    XR Chest 1 View    Saffell Draw    Comprehensive Metabolic Panel    High Sensitivity Troponin " T    Magnesium    Urinalysis With Microscopic If Indicated (No Culture) - Urine, Clean Catch    CBC Auto Differential    High Sensitivity Troponin T 1Hr    NPO Diet NPO Type: Strict NPO    Undress & Gown    Continuous Pulse Oximetry    Vital Signs    Orthostatic Blood Pressure    Oxygen Therapy- Nasal Cannula; Titrate 1-6 LPM Per SpO2; 90 - 95%    POC Glucose Once    ECG 12 Lead Altered Mental Status    Insert Peripheral IV    Fall Precautions    CBC & Differential    Green Top (Gel)    Lavender Top    Gold Top - SST    Light Blue Top       Medications Given in the Emergency Department:  Medications   sodium chloride 0.9 % flush 10 mL (has no administration in time range)   sodium chloride 0.9 % bolus 1,000 mL (0 mL Intravenous Stopped 5/2/25 7533)        ED Course:         Labs:    Lab Results (last 24 hours)       Procedure Component Value Units Date/Time    CBC & Differential [[id number]]  (Normal) Collected: 05/02/25 0235    Specimen: Blood Updated: 05/02/25 0304    Narrative:      The following orders were created for panel order CBC & Differential.  Procedure                               Abnormality         Status                     ---------                               -----------         ------                     CBC Auto Differential[[id number]]        Normal              Final result                 Please view results for these tests on the individual orders.    Comprehensive Metabolic Panel [[id number]]  (Abnormal) Collected: 05/02/25 0235    Specimen: Blood Updated: 05/02/25 0322     Glucose 199 mg/dL      BUN 15 mg/dL      Creatinine 0.95 mg/dL      Sodium 138 mmol/L      Potassium 4.3 mmol/L      Chloride 101 mmol/L      CO2 26.2 mmol/L      Calcium 8.7 mg/dL      Total Protein 7.4 g/dL      Albumin 3.3 g/dL      ALT (SGPT) 6 U/L      AST (SGOT) 9 U/L      Alkaline Phosphatase 121 U/L      Total Bilirubin 0.4 mg/dL      Globulin 4.1 gm/dL      A/G Ratio 0.8 g/dL      BUN/Creatinine Ratio 15.8      Anion Gap 10.8 mmol/L      eGFR 87.2 mL/min/1.73     Narrative:      GFR Categories in Chronic Kidney Disease (CKD)              GFR Category          GFR (mL/min/1.73)    Interpretation  G1                    90 or greater        Normal or high (1)  G2                    60-89                Mild decrease (1)  G3a                   45-59                Mild to moderate decrease  G3b                   30-44                Moderate to severe decrease  G4                    15-29                Severe decrease  G5                    14 or less           Kidney failure    (1)In the absence of evidence of kidney disease, neither GFR category G1 or G2 fulfill the criteria for CKD.    eGFR calculation 2021 CKD-EPI creatinine equation, which does not include race as a factor    High Sensitivity Troponin T [[id number]]  (Normal) Collected: 05/02/25 0235    Specimen: Blood Updated: 05/02/25 0322     HS Troponin T 9 ng/L     Narrative:      High Sensitive Troponin T Reference Range:  <14.0 ng/L- Negative Female for AMI  <22.0 ng/L- Negative Male for AMI  >=14 - Abnormal Female indicating possible myocardial injury.  >=22 - Abnormal Male indicating possible myocardial injury.   Clinicians would have to utilize clinical acumen, EKG, Troponin, and serial changes to determine if it is an Acute Myocardial Infarction or myocardial injury due to an underlying chronic condition.         Magnesium [[id number]]  (Normal) Collected: 05/02/25 0235    Specimen: Blood Updated: 05/02/25 0322     Magnesium 1.8 mg/dL     CBC Auto Differential [[id number]]  (Normal) Collected: 05/02/25 0235    Specimen: Blood Updated: 05/02/25 0304     WBC 5.47 10*3/mm3      RBC 5.45 10*6/mm3      Hemoglobin 14.7 g/dL      Hematocrit 45.0 %      MCV 82.6 fL      MCH 27.0 pg      MCHC 32.7 g/dL      RDW 14.9 %      RDW-SD 44.5 fl      MPV 9.7 fL      Platelets 258 10*3/mm3      Neutrophil % 57.8 %      Lymphocyte % 29.8 %      Monocyte % 10.1 %      Eosinophil %  1.5 %      Basophil % 0.4 %      Immature Grans % 0.4 %      Neutrophils, Absolute 3.17 10*3/mm3      Lymphocytes, Absolute 1.63 10*3/mm3      Monocytes, Absolute 0.55 10*3/mm3      Eosinophils, Absolute 0.08 10*3/mm3      Basophils, Absolute 0.02 10*3/mm3      Immature Grans, Absolute 0.02 10*3/mm3      nRBC 0.0 /100 WBC     High Sensitivity Troponin T 1Hr [[id number]]  (Normal) Collected: 05/02/25 0535    Specimen: Blood Updated: 05/02/25 0604     HS Troponin T 9 ng/L      Troponin T Numeric Delta 0 ng/L     Narrative:      High Sensitive Troponin T Reference Range:  <14.0 ng/L- Negative Female for AMI  <22.0 ng/L- Negative Male for AMI  >=14 - Abnormal Female indicating possible myocardial injury.  >=22 - Abnormal Male indicating possible myocardial injury.   Clinicians would have to utilize clinical acumen, EKG, Troponin, and serial changes to determine if it is an Acute Myocardial Infarction or myocardial injury due to an underlying chronic condition.                  Imaging:    XR Chest 1 View  Result Date: 5/2/2025  XR CHEST 1 VW Date of Exam: 5/2/2025 2:45 AM EDT Indication: Weak/Dizzy/AMS triage protocol Comparison: 4/7/2025 Findings: Cardiac and mediastinal contours remain normal. The right lung is clear. There is some mild probable atelectasis at the left base, and there appears to be a tiny left pleural effusion. Pulmonary vascularity is normal. No pneumothorax.     Mild probable atelectasis at the left base with tiny left pleural effusion. Electronically Signed: Durga Zurita MD  5/2/2025 3:03 AM EDT  Workstation ID: [id number]        Differential Diagnosis and Discussion:    Altered Mental Status: Based on the patient's signs and symptoms, differential diagnosis includes but is not limited to meningitis, stroke, sepsis, subarachnoid hemorrhage, intracranial bleeding, encephalitis, and metabolic encephalopathy.    PROCEDURES:    Labs were collected in the emergency department and all labs were reviewed  and interpreted by me.  X-ray were performed in the emergency department and all X-ray impressions were independently interpreted by me.  An EKG was performed and the EKG was interpreted by me.    ECG 12 Lead Altered Mental Status   Preliminary Result   HEART PTKW=859  bpm   RR Ukyzygtw=467  ms   OK Pfspiflu=242  ms   P Horizontal Axis=  deg   P Front Axis=84  deg   QRSD Interval=81  ms   QT Ejhtrflw=031  ms   XGiI=052  ms   QRS Axis=44  deg   T Wave Axis=33  deg   - ABNORMAL ECG -   Sinus tachycardia   Prolonged QT interval   Date and Time of Study:2025-05-02 02:28:58          Procedures    MDM                     Patient Care Considerations:    SEPSIS IS NOT PRESENT IN THE EMERGENCY DEPARTMENT: Patient meets SIRS criteria in the emergency department however the patient does not have a known source of bacterial infection to confirm the diagnosis of sepsis.        Consultants/Shared Management Plan:    None    Social Determinants of Health:    Patient is independent, reliable, and has access to care.       Disposition and Care Coordination:    Discharged: The patient is suitable and stable for discharge with no need for consideration of admission.    I have explained the patient´s condition, diagnoses and treatment plan based on the information available to me at this time. I have answered questions and addressed any concerns. The patient has a good  understanding of the patient´s diagnosis, condition, and treatment plan as can be expected at this point. The vital signs have been stable. The patient´s condition is stable and appropriate for discharge from the emergency department.      The patient will pursue further outpatient evaluation with the primary care physician or other designated or consulting physician as outlined in the discharge instructions. They are agreeable to this plan of care and follow-up instructions have been explained in detail. The patient has received these instructions in written format and  has expressed an understanding of the discharge instructions. The patient is aware that any significant change in condition or worsening of symptoms should prompt an immediate return to this or the closest emergency department or call to 911.  I have explained discharge medications and the need for follow up with the patient/caretakers. This was also printed in the discharge instructions. Patient was discharged with the following medications and follow up:      Medication List        Changed      Lantus SoloStar 100 UNIT/ML injection pen  Generic drug: Insulin Glargine  INJECT 10 UNITS SUBCUTANEOUSLY INTO THE APPROPRIATE AREA EVERY NIGHT AS DIRECTED  What changed: See the new instructions.           Sophie Capps, APRN  [address] [place] [address] [phone]    Schedule an appointment as soon as possible for a visit          Final diagnoses:   Altered mental status, unspecified altered mental status type   Chest pain, unspecified type        ED Disposition       ED Disposition   Discharge    Condition   Stable    Comment   --               This medical record created using voice recognition software.             Can Hatfield MD  05/02/25 0789

## 2025-05-05 ENCOUNTER — PATIENT OUTREACH (OUTPATIENT)
Dept: CASE MANAGEMENT | Facility: OTHER | Age: 70
End: 2025-05-05
Payer: MEDICARE

## 2025-05-05 DIAGNOSIS — R53.1 WEAKNESS: ICD-10-CM

## 2025-05-05 DIAGNOSIS — I50.22 CHRONIC HFREF (HEART FAILURE WITH REDUCED EJECTION FRACTION): Primary | ICD-10-CM

## 2025-05-05 NOTE — OUTREACH NOTE
"AMBULATORY CASE MANAGEMENT NOTE    Names and Relationships of Patient/Support Persons: Contact: [employer] Oliva; Relationship: Other  Contact: FERDINAND CATALAN; Relationship: Emergency Contact -     [facility] Interim Update    Called and spoke with patients spouse. She states that he had an ER visit last week because he was falling out of his bed. She called EMS and they came out and found the patient to be altered. Patient refused to go to ER but \"EMS called the police on him\" per patient spouse and he was made to go to the ER. The next morning, she got a call stating that they were releasing him home, so she had to have his sister come get her and they went to pick him up together. Patient was covered in feces and ashes per ER report.    Recently, the spouse had to go to urgent care because she was bitten by a rat while she slept. States that her hand and pillow were covered in blood from it biting her.     Patient states that there is a place that she can live when they have an opening, but she has to get her spouse there to sign forms before they can be put on the wait list, but she cannot get him there to sign the forms.     Advised patient that I would attempt again to reach out to  to discuss above.    Care Coordination    Called Payton Cuevas. Left message to call back.    Care Coordination    Received call back from Payton Cuevas with [employer]. Shared above information with her.    April is going to call spouse to inquire further on housing assistance they are in process with.    She will also get update on guardianship process.    April will also contact EMS medical records for pictures that supervisors are taking regarding home conditions.     April thanks me for the update and will contact patients spouse today.        Education Documentation  No documentation found.        YANDEL GONZALEZ  Ambulatory Case Management    5/5/2025, 10:28 EDT  "

## 2025-05-11 LAB
QT INTERVAL: 374 MS
QTC INTERVAL: 517 MS

## 2025-05-15 ENCOUNTER — PATIENT OUTREACH (OUTPATIENT)
Dept: CASE MANAGEMENT | Facility: OTHER | Age: 70
End: 2025-05-15
Payer: MEDICARE

## 2025-05-15 DIAGNOSIS — I50.22 CHRONIC HFREF (HEART FAILURE WITH REDUCED EJECTION FRACTION): Primary | ICD-10-CM

## 2025-05-15 DIAGNOSIS — R53.1 WEAKNESS: ICD-10-CM

## 2025-05-15 NOTE — OUTREACH NOTE
"AMBULATORY CASE MANAGEMENT NOTE    Names and Relationships of Patient/Support Persons: Contact: FERDINAND CATALAN; Relationship: Emergency Contact -     [facility] Interim Update    Received message from staff asking that I reach out to patients spouse because they received an eviction notice last night and have 7 days to vacate their residence.     Called and spoke with patient. She confirms the above. She states \"I don't know what to do with Gio, I talked to the landlord and he said that we can't stay there any longer. If we can't find somewhere to live they are going to put Gio in his chair and put him out on the street\".     Advised patients wife that I would reach out to  and have her call them ASAP. Also inquired to see if she has reached out to APS worker. She states that she has left 2 messages for her.     Care Coordination    Spoke with MSW. She is going to gather resources and call patients spouse.        Education Documentation  No documentation found.        YANDEL GONZALEZ  Ambulatory Case Management    5/15/2025, 13:23 EDT  "

## 2025-05-22 ENCOUNTER — PATIENT OUTREACH (OUTPATIENT)
Age: 70
End: 2025-05-22
Payer: MEDICARE

## 2025-05-22 NOTE — OUTREACH NOTE
Patient Outreach    MSW spoke with patient's spouse and they are still at current residence awaiting second eviction notice. Patient's spouse states that they do have paperwork awaiting patient's signature at a local apartment but they have not signed it yet. MSW to continue to assist.    Luisana LIU -   Ambulatory Case Management    5/22/2025, 13:32 EDT

## 2025-05-30 ENCOUNTER — PATIENT OUTREACH (OUTPATIENT)
Dept: CASE MANAGEMENT | Facility: OTHER | Age: 70
End: 2025-05-30
Payer: MEDICARE

## 2025-05-30 DIAGNOSIS — I50.22 CHRONIC HFREF (HEART FAILURE WITH REDUCED EJECTION FRACTION): Primary | ICD-10-CM

## 2025-05-30 DIAGNOSIS — R53.1 WEAKNESS: ICD-10-CM

## 2025-05-30 NOTE — OUTREACH NOTE
[facility] End of Month Documentation    This Chronic Medical Management Care Plan for Brian Mehta, 70 y.o. male, has been monitored and managed; reviewed and a new plan of care implemented for the month of May.  A cumulative time of 44  minutes was spent on this patient record this month, including phone call with relative; chart review; phone call with other providers.    Regarding the patient's problems: has CVA (cerebral vascular accident); Essential hypertension; High cholesterol; Hip pain; Vitamin B12 deficiency; Kidney disorder; Chronic pain of both knees; Systolic CHF, chronic; LVH (left ventricular hypertrophy); COPD (chronic obstructive pulmonary disease); DM2 (diabetes mellitus, type 2); Urinary tract infection without hematuria; Abnormal nuclear stress test; UTI (urinary tract infection); Fall; Failure to thrive in adult; Nephrolithiasis; Bladder stone; Retained ureteral stent; Acute UTI; Bacteremia; Generalized weakness; and Coffee ground emesis on their problem list., the following items were addressed: medical records; medications; changes to medical care and any changes can be found within the plan section of the note.  A detailed listing of time spent for chronic care management is tracked within each outreach encounter.  Current medications include:  has a current medication list which includes the following prescription(s): aspirin low dose, atorvastatin, blood glucose monitor system, budesonide-formoterol, cetirizine, diclofenac sodium, jardiance, famotidine, hydrocortisone-bacitracin-zinc oxide-nystatin, ipratropium-albuterol, lantus solostar, losartan, mineral oil-hydrophilic petrolatum, nicotine, pantoprazole, and sertraline. and the patient is reported to be patient is compliant with medication protocol,  Medications are reported to be effective.  Regarding these diagnoses, referrals were made to the following provider(s):  n/a.  All notes on chart for PCP to review.    The patient was monitored  remotely for activity level; medications; blood glucose.    The patient's physical needs include:  DME supplies; physical healthcare; needs assistance with ADLs; resources for disability needs.     The patient's mental support needs include:  continued support    The patient's cognitive support needs include:  needs assistance with ADLs; requires supervision; household care; health care; personal care    The patient's psychosocial support needs include:  continued support    The patient's functional needs include: DME; physical healthcare; needs assistance for ADLs    The patient's environmental needs include:  an unsafe living environment    Care Plan overall comments:  No data recorded    Refer to previous outreach notes for more information on the areas listed above.    Monthly Billing Diagnoses  (I50.22) Chronic HFrEF (heart failure with reduced ejection fraction)    (R53.1) Weakness    Medications   Medications have been reconciled    Care Plan progress this month:      Recently Modified Care Plans Updates made since 4/29/2025 12:00 AM      No recently modified care plans.               Instructions   Patient was provided an electronic copy of care plan  CCM services were explained and offered and patient has accepted these services.  Patient has given their written consent to receive CCM services and understands that this includes the authorization of electronic communication of medical information with the other treating providers.  Patient understands that they may stop CCM services at any time and these changes will be effective at the end of the calendar month and will effectively revocate the agreement of CCM services.  Patient understands that only one practitioner can furnish and be paid for CCM services during one calendar month.  Patient also understands that there may be co-payment or deductible fees in association with CCM services.  Patient will continue with at least monthly follow-up calls with  the Ambulatory .    YANDEL GONZALEZ  Ambulatory Case Management    5/30/2025, 11:08 EDT

## 2025-06-20 ENCOUNTER — TELEPHONE (OUTPATIENT)
Dept: FAMILY MEDICINE CLINIC | Facility: CLINIC | Age: 70
End: 2025-06-20

## 2025-06-20 NOTE — TELEPHONE ENCOUNTER
aller: Jumana Mehta     Relationship to patient: SPOUSE     Best call back number: [phone].     Patient is needing: PATIENT REQUESTING CALL BACK TO DISCUSS RESULTS FROM LABS DONE ON 6.9.2025. PATIENT STATES LABS WERE COLLECTED BY [facility] THAT CAME TO HER HOME.      CONTACT PATIENT TO ADVISE.

## 2025-06-20 NOTE — TELEPHONE ENCOUNTER
Spoke with patients wife.  She thought that we had ordered the labs and had them come to her home and draw them.  I explained that we do not send people to the homes to draw labs.  I suggested that maybe she call the company back that came to her home and omer the labs.

## 2025-06-30 ENCOUNTER — PATIENT OUTREACH (OUTPATIENT)
Dept: CASE MANAGEMENT | Facility: OTHER | Age: 70
End: 2025-06-30
Payer: MEDICARE

## 2025-06-30 ENCOUNTER — TELEPHONE (OUTPATIENT)
Dept: CASE MANAGEMENT | Facility: OTHER | Age: 70
End: 2025-06-30
Payer: MEDICARE

## 2025-06-30 DIAGNOSIS — I50.22 CHRONIC HFREF (HEART FAILURE WITH REDUCED EJECTION FRACTION): Primary | ICD-10-CM

## 2025-06-30 DIAGNOSIS — R53.1 WEAKNESS: ICD-10-CM

## 2025-06-30 NOTE — OUTREACH NOTE
[facility] End of Month Documentation    This Chronic Medical Management Care Plan for Brian Mehta, 70 y.o. male, has been monitored and managed; reviewed and a new plan of care implemented for the month of June.  A cumulative time of 21  minutes was spent on this patient record this month, including phone call with relative; chart review; phone call with other providers; phone call with patient.    Regarding the patient's problems: has CVA (cerebral vascular accident); Essential hypertension; High cholesterol; Hip pain; Vitamin B12 deficiency; Kidney disorder; Chronic pain of both knees; Systolic CHF, chronic; LVH (left ventricular hypertrophy); COPD (chronic obstructive pulmonary disease); DM2 (diabetes mellitus, type 2); Urinary tract infection without hematuria; Abnormal nuclear stress test; UTI (urinary tract infection); Fall; Failure to thrive in adult; Nephrolithiasis; Bladder stone; Retained ureteral stent; Acute UTI; Bacteremia; Generalized weakness; and Coffee ground emesis on their problem list., the following items were addressed: medical records; medications; changes to medical care and any changes can be found within the plan section of the note.  A detailed listing of time spent for chronic care management is tracked within each outreach encounter.  Current medications include:  has a current medication list which includes the following prescription(s): aspirin low dose, atorvastatin, blood glucose monitor system, budesonide-formoterol, cetirizine, diclofenac sodium, jardiance, famotidine, hydrocortisone-bacitracin-zinc oxide-nystatin, ipratropium-albuterol, lantus solostar, losartan, mineral oil-hydrophilic petrolatum, nicotine, pantoprazole, and sertraline. and the patient is reported to be patient is compliant with medication protocol,  Medications are reported to be effective.  Regarding these diagnoses, referrals were made to the following provider(s):  MSW.  All notes on chart for PCP to  review.    The patient was monitored remotely for activity level; medications; blood glucose.    The patient's physical needs include:  DME supplies; physical healthcare; needs assistance with ADLs; resources for disability needs.     The patient's mental support needs include:  continued support    The patient's cognitive support needs include:  needs assistance with ADLs; requires supervision; household care; health care; personal care    The patient's psychosocial support needs include:  continued support    The patient's functional needs include: DME; physical healthcare; needs assistance for ADLs    The patient's environmental needs include:  an unsafe living environment    Care Plan overall comments:  No data recorded    Refer to previous outreach notes for more information on the areas listed above.    Monthly Billing Diagnoses  (I50.22) Chronic HFrEF (heart failure with reduced ejection fraction)    (R53.1) Weakness    Medications   Medications have been reconciled    Care Plan progress this month:      Recently Modified Care Plans Updates made since 5/30/2025 12:00 AM      No recently modified care plans.                 Instructions   Patient was provided an electronic copy of care plan  CCM services were explained and offered and patient has accepted these services.  Patient has given their written consent to receive CCM services and understands that this includes the authorization of electronic communication of medical information with the other treating providers.  Patient understands that they may stop CCM services at any time and these changes will be effective at the end of the calendar month and will effectively revocate the agreement of CCM services.  Patient understands that only one practitioner can furnish and be paid for CCM services during one calendar month.  Patient also understands that there may be co-payment or deductible fees in association with CCM services.  Patient will continue with  at least monthly follow-up calls with the Ambulatory .    YANDEL GONZALEZ  Ambulatory Case Management    6/30/2025, 16:07 EDT

## 2025-06-30 NOTE — TELEPHONE ENCOUNTER
Luisana,  Are there any additional housing resources you have available?    Patients spouse called and the housing option they were hoping for did not work, and then she states that she put in a $50 application fee down for another place which was a scam.     I am worried about their ability to do this on their own without being scammed again.    I called April with [employer] and she states she closed their case a few weeks ago and is no longer active with them. She states that patient did not want anything to do with her and the spouse only wanted help finding a house, which they do not do other than to point in the right direction.    Do you have any additional resources that have not already been exhausted?    Thanks,  MYA Arias

## 2025-06-30 NOTE — OUTREACH NOTE
"AMBULATORY CASE MANAGEMENT NOTE    Names and Relationships of Patient/Support Persons: Contact: FERDINAND CATALAN; Relationship: Emergency Contact  Contact: Payton Cuevas - [employer]; Relationship: Other -     [facility] Interim Update    Received call from patient spouse and returned call. Spoke to patient briefly. He reports the only thing he needs is a ramp to his house. However, they have received an eviction notice so ramp install will not be able to be complete.    I then spoke to patient spouse who states they desperately need assistance in finding another house. The house they had lined out fell through, and then spouse placed a $50 application fee for another house which turned out to be a scam. She states that she has tried to call Payton Cuevas with [employer] for assistance in finding another house, but has not received a call back.    Advised I would reach out to April and Luisana to see if there were resources that had not been exhausted.    Care Coordination    Called and spoke to April with [employer]. She states that she closed their case a few weeks ago because patient \"didn't want anything to do with me so I was mostly talking to his spouse anyway\" and \"the only thing she wanted me to do was help find them another house, which we don't do. We point in the right direction, but don't help with the paperwork and things like that\".     I am concerned for families ability to proceed with housing applications alone due to the threat of scams and not being able to determine what is legit and what is a scam.    Reached out to [facility] for assistance to see if there are any resources that have not been exhausted.         Education Documentation  No documentation found.        YANDEL GONZALEZ  Ambulatory Case Management    6/30/2025, 16:02 EDT  "

## 2025-07-06 NOTE — PROGRESS NOTES
[facility]   Urology Progress Note    Patient Name: Brian Mehta  : 1955  MRN: [id number]  Primary Care Physician:  Sophie Capps APRN  Date of admission: 2025    Subjective   Subjective       Patient inadvertently pulled new left ureteral stent out last night.    Not complaining of any flank pain  Voiding okay    Complaining of back pain      Objective   Objective     Vitals:   Temp:  [97.5 °F (36.4 °C)-98.6 °F (37 °C)] 97.9 °F (36.6 °C)  Heart Rate:  [] 89  Resp:  [13-18] 14  BP: ()/(45-98) 144/75  Flow (L/min) (Oxygen Therapy):  [2-6] 2  Physical Exam     Alert and oriented x3  No acute distress  Unlabored respirations  Nontender/nondistended       Result Review    Result Review:  I have personally reviewed the results from the time of this admission to 2025 06:25 EST and agree with these findings:  []  Laboratory  []  Microbiology  []  Radiology  []  EKG/Telemetry   []  Cardiology/Vascular   []  Pathology  []  Old records  []  Other:      Assessment & Plan   Assessment / Plan     Brief Patient Summary:  Brian Mehta is a 69 y.o. male     Active Hospital Problems:  Active Hospital Problems    Diagnosis     **Acute UTI     Bacteremia     Retained ureteral stent     Bladder stone        Retained left ureteral stent  UTI  Bacteremia  History of CVA         Postoperative day 1 cystolitholapaxy left retained ureteral stent removal left ureteroscopy with laser and stent placement        New stent is out, patient removed overnight.  No foreign objects as far as urology is concerned in the body at this time      Patient can follow-up with me as needed       Can resume prophylactic anticoagulation as warranted    Call with questions                          No

## 2025-07-18 ENCOUNTER — OFFICE VISIT (OUTPATIENT)
Dept: FAMILY MEDICINE CLINIC | Facility: CLINIC | Age: 70
End: 2025-07-18
Payer: MEDICARE

## 2025-07-18 ENCOUNTER — HOSPITAL ENCOUNTER (EMERGENCY)
Facility: HOSPITAL | Age: 70
Discharge: HOME | End: 2025-07-18
Attending: EMERGENCY MEDICINE
Payer: MEDICARE

## 2025-07-18 VITALS
HEIGHT: 75 IN | HEART RATE: 94 BPM | TEMPERATURE: 98.2 F | BODY MASS INDEX: 22.45 KG/M2 | SYSTOLIC BLOOD PRESSURE: 141 MMHG | WEIGHT: 180.56 LBS | RESPIRATION RATE: 19 BRPM | DIASTOLIC BLOOD PRESSURE: 84 MMHG | OXYGEN SATURATION: 97 %

## 2025-07-18 VITALS
TEMPERATURE: 98.6 F | DIASTOLIC BLOOD PRESSURE: 90 MMHG | HEART RATE: 117 BPM | OXYGEN SATURATION: 96 % | SYSTOLIC BLOOD PRESSURE: 110 MMHG

## 2025-07-18 DIAGNOSIS — I10 ESSENTIAL HYPERTENSION: ICD-10-CM

## 2025-07-18 DIAGNOSIS — M62.81 MUSCLE WEAKNESS: ICD-10-CM

## 2025-07-18 DIAGNOSIS — Z86.73 HISTORY OF STROKE: ICD-10-CM

## 2025-07-18 DIAGNOSIS — T67.5XXA HEAT EXHAUSTION, INITIAL ENCOUNTER: Primary | ICD-10-CM

## 2025-07-18 DIAGNOSIS — Z02.9 ENCOUNTERS FOR ADMINISTRATIVE PURPOSES: Primary | ICD-10-CM

## 2025-07-18 DIAGNOSIS — N39.0 ACUTE UTI: ICD-10-CM

## 2025-07-18 DIAGNOSIS — E11.9 TYPE 2 DIABETES MELLITUS WITHOUT COMPLICATION, WITHOUT LONG-TERM CURRENT USE OF INSULIN: ICD-10-CM

## 2025-07-18 DIAGNOSIS — Z74.09 POOR MOBILITY: ICD-10-CM

## 2025-07-18 DIAGNOSIS — E78.00 HIGH CHOLESTEROL: ICD-10-CM

## 2025-07-18 LAB
ALBUMIN SERPL-MCNC: 3.1 G/DL (ref 3.5–5.2)
ALBUMIN SERPL-MCNC: 3.4 G/DL (ref 3.5–5.2)
ALBUMIN/GLOB SERPL: 0.8 G/DL
ALBUMIN/GLOB SERPL: 0.8 G/DL
ALP SERPL-CCNC: 101 U/L (ref 39–117)
ALP SERPL-CCNC: 119 U/L (ref 39–117)
ALT SERPL W P-5'-P-CCNC: 5 U/L (ref 1–41)
ALT SERPL W P-5'-P-CCNC: 6 U/L (ref 1–41)
ANION GAP SERPL CALCULATED.3IONS-SCNC: 11.6 MMOL/L (ref 5–15)
ANION GAP SERPL CALCULATED.3IONS-SCNC: 13.5 MMOL/L (ref 5–15)
AST SERPL-CCNC: 12 U/L (ref 1–40)
AST SERPL-CCNC: 13 U/L (ref 1–40)
BACTERIA UR QL AUTO: ABNORMAL /HPF
BASOPHILS # BLD AUTO: 0.02 10*3/MM3 (ref 0–0.2)
BASOPHILS # BLD AUTO: 0.03 10*3/MM3 (ref 0–0.2)
BASOPHILS NFR BLD AUTO: 0.4 % (ref 0–1.5)
BASOPHILS NFR BLD AUTO: 0.6 % (ref 0–1.5)
BILIRUB SERPL-MCNC: 0.4 MG/DL (ref 0–1.2)
BILIRUB SERPL-MCNC: 0.4 MG/DL (ref 0–1.2)
BILIRUB UR QL STRIP: NEGATIVE
BUN SERPL-MCNC: 15.9 MG/DL (ref 8–23)
BUN SERPL-MCNC: 16 MG/DL (ref 8–23)
BUN/CREAT SERPL: 18.1 (ref 7–25)
BUN/CREAT SERPL: 19.3 (ref 7–25)
CALCIUM SPEC-SCNC: 9 MG/DL (ref 8.6–10.5)
CALCIUM SPEC-SCNC: 9.2 MG/DL (ref 8.6–10.5)
CHLORIDE SERPL-SCNC: 100 MMOL/L (ref 98–107)
CHLORIDE SERPL-SCNC: 102 MMOL/L (ref 98–107)
CHOLEST SERPL-MCNC: 174 MG/DL (ref 0–200)
CK SERPL-CCNC: 30 U/L (ref 20–200)
CLARITY UR: ABNORMAL
CO2 SERPL-SCNC: 19.5 MMOL/L (ref 22–29)
CO2 SERPL-SCNC: 22.4 MMOL/L (ref 22–29)
COLOR UR: YELLOW
CREAT SERPL-MCNC: 0.83 MG/DL (ref 0.76–1.27)
CREAT SERPL-MCNC: 0.88 MG/DL (ref 0.76–1.27)
DEPRECATED RDW RBC AUTO: 45.8 FL (ref 37–54)
DEPRECATED RDW RBC AUTO: 46.2 FL (ref 37–54)
EGFRCR SERPLBLD CKD-EPI 2021: 93.7 ML/MIN/1.73
EGFRCR SERPLBLD CKD-EPI 2021: 95.3 ML/MIN/1.73
EOSINOPHIL # BLD AUTO: 0.05 10*3/MM3 (ref 0–0.4)
EOSINOPHIL # BLD AUTO: 0.09 10*3/MM3 (ref 0–0.4)
EOSINOPHIL NFR BLD AUTO: 1 % (ref 0.3–6.2)
EOSINOPHIL NFR BLD AUTO: 1.8 % (ref 0.3–6.2)
ERYTHROCYTE [DISTWIDTH] IN BLOOD BY AUTOMATED COUNT: 15.9 % (ref 12.3–15.4)
ERYTHROCYTE [DISTWIDTH] IN BLOOD BY AUTOMATED COUNT: 16.3 % (ref 12.3–15.4)
GLOBULIN UR ELPH-MCNC: 3.7 GM/DL
GLOBULIN UR ELPH-MCNC: 4.5 GM/DL
GLUCOSE SERPL-MCNC: 143 MG/DL (ref 65–99)
GLUCOSE SERPL-MCNC: 160 MG/DL (ref 65–99)
GLUCOSE UR STRIP-MCNC: NEGATIVE MG/DL
HBA1C MFR BLD: 6.7 % (ref 4.8–5.6)
HCT VFR BLD AUTO: 42.6 % (ref 37.5–51)
HCT VFR BLD AUTO: 47.8 % (ref 37.5–51)
HDLC SERPL-MCNC: 27 MG/DL (ref 40–60)
HGB BLD-MCNC: 13.8 G/DL (ref 13–17.7)
HGB BLD-MCNC: 15.4 G/DL (ref 13–17.7)
HGB UR QL STRIP.AUTO: ABNORMAL
HYALINE CASTS UR QL AUTO: ABNORMAL /LPF
IMM GRANULOCYTES # BLD AUTO: 0.01 10*3/MM3 (ref 0–0.05)
IMM GRANULOCYTES # BLD AUTO: 0.01 10*3/MM3 (ref 0–0.05)
IMM GRANULOCYTES NFR BLD AUTO: 0.2 % (ref 0–0.5)
IMM GRANULOCYTES NFR BLD AUTO: 0.2 % (ref 0–0.5)
KETONES UR QL STRIP: ABNORMAL
LDLC SERPL CALC-MCNC: 103 MG/DL (ref 0–100)
LDLC/HDLC SERPL: 3.56 {RATIO}
LEUKOCYTE ESTERASE UR QL STRIP.AUTO: ABNORMAL
LYMPHOCYTES # BLD AUTO: 1.66 10*3/MM3 (ref 0.7–3.1)
LYMPHOCYTES # BLD AUTO: 2.03 10*3/MM3 (ref 0.7–3.1)
LYMPHOCYTES NFR BLD AUTO: 32 % (ref 19.6–45.3)
LYMPHOCYTES NFR BLD AUTO: 40.3 % (ref 19.6–45.3)
MAGNESIUM SERPL-MCNC: 1.9 MG/DL (ref 1.6–2.4)
MCH RBC QN AUTO: 26 PG (ref 26.6–33)
MCH RBC QN AUTO: 26.3 PG (ref 26.6–33)
MCHC RBC AUTO-ENTMCNC: 32.2 G/DL (ref 31.5–35.7)
MCHC RBC AUTO-ENTMCNC: 32.4 G/DL (ref 31.5–35.7)
MCV RBC AUTO: 80.7 FL (ref 79–97)
MCV RBC AUTO: 81.1 FL (ref 79–97)
MONOCYTES # BLD AUTO: 0.53 10*3/MM3 (ref 0.1–0.9)
MONOCYTES # BLD AUTO: 0.56 10*3/MM3 (ref 0.1–0.9)
MONOCYTES NFR BLD AUTO: 10.5 % (ref 5–12)
MONOCYTES NFR BLD AUTO: 10.8 % (ref 5–12)
NEUTROPHILS NFR BLD AUTO: 2.36 10*3/MM3 (ref 1.7–7)
NEUTROPHILS NFR BLD AUTO: 2.87 10*3/MM3 (ref 1.7–7)
NEUTROPHILS NFR BLD AUTO: 46.8 % (ref 42.7–76)
NEUTROPHILS NFR BLD AUTO: 55.4 % (ref 42.7–76)
NITRITE UR QL STRIP: POSITIVE
NRBC BLD AUTO-RTO: 0 /100 WBC (ref 0–0.2)
NRBC BLD AUTO-RTO: 0 /100 WBC (ref 0–0.2)
PH UR STRIP.AUTO: 6.5 [PH] (ref 5–8)
PLATELET # BLD AUTO: 245 10*3/MM3 (ref 140–450)
PLATELET # BLD AUTO: 285 10*3/MM3 (ref 140–450)
PMV BLD AUTO: 9.4 FL (ref 6–12)
PMV BLD AUTO: 9.4 FL (ref 6–12)
POTASSIUM SERPL-SCNC: 4.4 MMOL/L (ref 3.5–5.2)
POTASSIUM SERPL-SCNC: 4.4 MMOL/L (ref 3.5–5.2)
PROT SERPL-MCNC: 6.8 G/DL (ref 6–8.5)
PROT SERPL-MCNC: 7.9 G/DL (ref 6–8.5)
PROT UR QL STRIP: ABNORMAL
RBC # BLD AUTO: 5.25 10*6/MM3 (ref 4.14–5.8)
RBC # BLD AUTO: 5.92 10*6/MM3 (ref 4.14–5.8)
RBC # UR STRIP: ABNORMAL /HPF
REF LAB TEST METHOD: ABNORMAL
SODIUM SERPL-SCNC: 134 MMOL/L (ref 136–145)
SODIUM SERPL-SCNC: 135 MMOL/L (ref 136–145)
SP GR UR STRIP: 1.02 (ref 1–1.03)
SQUAMOUS #/AREA URNS HPF: ABNORMAL /HPF
TRIGL SERPL-MCNC: 254 MG/DL (ref 0–150)
TSH SERPL DL<=0.05 MIU/L-ACNC: 2.19 UIU/ML (ref 0.27–4.2)
UROBILINOGEN UR QL STRIP: ABNORMAL
VLDLC SERPL-MCNC: 44 MG/DL (ref 5–40)
WBC # UR STRIP: ABNORMAL /HPF
WBC NRBC COR # BLD AUTO: 5.04 10*3/MM3 (ref 3.4–10.8)
WBC NRBC COR # BLD AUTO: 5.18 10*3/MM3 (ref 3.4–10.8)

## 2025-07-18 PROCEDURE — 81001 URINALYSIS AUTO W/SCOPE: CPT

## 2025-07-18 PROCEDURE — 25010000002 GENTAMICIN PER 80 MG

## 2025-07-18 PROCEDURE — 80053 COMPREHEN METABOLIC PANEL: CPT | Performed by: EMERGENCY MEDICINE

## 2025-07-18 PROCEDURE — 83036 HEMOGLOBIN GLYCOSYLATED A1C: CPT | Performed by: NURSE PRACTITIONER

## 2025-07-18 PROCEDURE — 82550 ASSAY OF CK (CPK): CPT

## 2025-07-18 PROCEDURE — 80053 COMPREHEN METABOLIC PANEL: CPT | Performed by: NURSE PRACTITIONER

## 2025-07-18 PROCEDURE — 85025 COMPLETE CBC W/AUTO DIFF WBC: CPT | Performed by: EMERGENCY MEDICINE

## 2025-07-18 PROCEDURE — 83735 ASSAY OF MAGNESIUM: CPT | Performed by: NURSE PRACTITIONER

## 2025-07-18 PROCEDURE — 85025 COMPLETE CBC W/AUTO DIFF WBC: CPT | Performed by: NURSE PRACTITIONER

## 2025-07-18 PROCEDURE — 87086 URINE CULTURE/COLONY COUNT: CPT

## 2025-07-18 PROCEDURE — 87186 SC STD MICRODIL/AGAR DIL: CPT

## 2025-07-18 PROCEDURE — 87077 CULTURE AEROBIC IDENTIFY: CPT

## 2025-07-18 PROCEDURE — 80061 LIPID PANEL: CPT | Performed by: NURSE PRACTITIONER

## 2025-07-18 PROCEDURE — 99283 EMERGENCY DEPT VISIT LOW MDM: CPT

## 2025-07-18 PROCEDURE — 84443 ASSAY THYROID STIM HORMONE: CPT | Performed by: NURSE PRACTITIONER

## 2025-07-18 PROCEDURE — 96365 THER/PROPH/DIAG IV INF INIT: CPT

## 2025-07-18 PROCEDURE — 36415 COLL VENOUS BLD VENIPUNCTURE: CPT

## 2025-07-18 RX ORDER — PROCHLORPERAZINE 25 MG/1
SUPPOSITORY RECTAL
COMMUNITY
Start: 2025-07-01

## 2025-07-18 RX ADMIN — GENTAMICIN SULFATE 400 MG: 40 INJECTION, SOLUTION INTRAMUSCULAR; INTRAVENOUS at 19:25

## 2025-07-18 NOTE — DISCHARGE INSTRUCTIONS
Your lab work today shows no concerning abnormalities.  Your urine shows that you do have a UTI and you will be given a one-time dose of IV antibiotics along with fluids during your ED visit.  Follow-up with your PCP and stay hydrated

## 2025-07-18 NOTE — PROGRESS NOTES
Chief Complaint  Administrative Encounter    Little interest or pleasure in doing things? Not at all   Feeling down, depressed, or hopeless? Not at all   PHQ-2 Total Score 0        History of Present Illness  Brian Mehta is a 70 y.o. male who presents to [facility] with a past medical history of  Past Medical History:   Diagnosis Date    CHF (congestive heart failure)     SEE'S DR [place] NO CURRENT S/S    COPD (chronic obstructive pulmonary disease)     INHALER    Diabetes mellitus     DOESN'T CHECK BG OFTEN AT HOME    Hyperlipidemia     Hypertension     Sepsis 09/14/2023    Stroke 2017    RIGHT SIDE WEAKNESS       HPI     The patient is a 70-year-old male who presents for a letter for care.    He is unable to rise from his bed or bathe independently. Today, an ambulance was required to help get him out of bed and into the wheelchair to get into [facility] van as he was unable to stand without falling. He has experienced weight loss but maintains a good appetite, with his sister and her  assisting with meals. He is considering moving to an income-based apartment complex, [place], but is unsure if it offers assistance. His previous stay at a rehab facility was unsatisfactory due to inadequate care. He has developed marks on his back from prolonged lying, which are cleaned regularly. His sister encourages him to use his legs more. He had physical therapy at home, but this has been discontinued. He reports no chest pain. He has visited the ER several times recently but was not admitted. He reports no pain or burning during urination but struggles to reach the bathroom in time. He has a hospital bed at home with a trapeze bar for support. He has a burn asad on his shoulder from smoking in bed. He requests an update on his COVID-19 vaccine. His blood sugar levels have been normal.    Social History:  Tobacco: The patient smokes cigarettes.       Objective   Vital Signs:   Vitals:     07/18/25 1145   BP: 110/90   BP Location: Left arm   Patient Position: Sitting   Pulse: 117   Temp: 98.6 °F (37 °C)   SpO2: 96%     There is no height or weight on file to calculate BMI.    Wt Readings from Last 3 Encounters:   05/02/25 87.6 kg (193 lb 2 oz)   04/07/25 89 kg (196 lb 3.4 oz)   03/04/25 80.1 kg (176 lb 9.4 oz)     BP Readings from Last 3 Encounters:   07/18/25 110/90   05/02/25 111/76   04/08/25 139/75       Health Maintenance   Topic Date Due    DIABETIC EYE EXAM  Never done    URINE MICROALBUMIN-CREATININE RATIO (uACR)  Never done    ZOSTER VACCINE (1 of 2) Never done    LIPID PANEL  07/18/2024    LUNG CANCER SCREENING  08/11/2024    COVID-19 Vaccine (3 - 2024-25 season) 09/01/2024    DIABETIC FOOT EXAM  09/21/2024    ANNUAL WELLNESS VISIT  11/21/2024    COLORECTAL CANCER SCREENING  12/02/2024    HEMOGLOBIN A1C  07/07/2025    INFLUENZA VACCINE  10/01/2025    TDAP/TD VACCINES (2 - Td or Tdap) 01/11/2027    HEPATITIS C SCREENING  Completed    Pneumococcal Vaccine 50+  Completed    AAA SCREEN ONCE  Completed       Physical Exam  Vitals reviewed.   Constitutional:       General: He is not in acute distress.     Appearance: He is underweight.      Comments: Pt was noticeably thinner with dirt in hair, facial hair, and hands and neck line. He is disheveled and unclean/un groomed. Visible dirt under finger nails.    HENT:      Head: Normocephalic and atraumatic.      Right Ear: External ear normal.      Left Ear: External ear normal.   Eyes:      Conjunctiva/sclera: Conjunctivae normal.      Pupils: Pupils are equal, round, and reactive to light.   Cardiovascular:      Rate and Rhythm: Regular rhythm. Tachycardia present.      Pulses:           Radial pulses are 2+ on the right side.      Heart sounds: Normal heart sounds.   Pulmonary:      Effort: Pulmonary effort is normal.      Breath sounds: Normal breath sounds.   Musculoskeletal:      Cervical back: Neck supple.      Right lower leg: No edema.       Left lower leg: No edema.      Comments: Sitting in wheelchair with visible muscle wasting of the legs.    Skin:     General: Skin is warm and dry.   Neurological:      Mental Status: He is alert and oriented to person, place, and time.   Psychiatric:         Mood and Affect: Mood and affect normal.         Behavior: Behavior normal.         Thought Content: Thought content normal.         Judgment: Judgment normal.            Result Review :  The following data was reviewed by: MORRO Sam on 07/18/2025:  Results       Common labs          3/12/2025    10:39 4/7/2025    22:09 5/2/2025    02:35   Common Labs   Glucose 196  141  199    BUN 17  16  15    Creatinine 0.88  0.66  0.95    Sodium 139  136  138    Potassium 3.6  4.2  4.3    Chloride 104  101  101    Calcium 8.6  9.7  8.7    Albumin 2.8  3.5  3.3    Total Bilirubin  0.4  0.4    Alkaline Phosphatase  135  121    AST (SGOT)  10  9    ALT (SGPT)  6  6    WBC 5.85  5.85  5.47    Hemoglobin 13.1  14.8  14.7    Hematocrit 41.0  46.7  45.0    Platelets 223  249  258      TSH          8/19/2024    05:49 10/27/2024    00:12   TSH   TSH 1.080  0.958        Procedures        Assessment and Plan   Diagnoses and all orders for this visit:    1. Encounters for administrative purposes (Primary)    2. Muscle weakness  -     CBC & Differential  -     Comprehensive Metabolic Panel  -     TSH Rfx On Abnormal To Free T4  -     Urinalysis With Culture If Indicated -  -     Magnesium    3. Poor mobility    4. History of stroke    5. High cholesterol  -     Lipid Panel    6. Essential hypertension    7. Type 2 diabetes mellitus without complication, without long-term current use of insulin  -     Hemoglobin A1c         1. Need for care.  - Significant weight loss and muscle mass reduction, particularly in the legs. Skin color appears abnormal, and exhibits weakness.  - Heart rate is slightly elevated, suggesting possible dehydration.  - Blood work and electrolyte levels  will be checked today to rule out severe dehydration. A urine sample will also be collected for further analysis.  - A letter will be provided stating pt requires assistance with daily activity. Pt was recommended to go to a skilled nursing facility where regular bathing and medication administration can be ensured. Advised to maintain adequate hydration by drinking plenty of water. If unable to return home, consider hospital admission for potential transfer to a skilled nursing facility.     BMI is within normal parameters. No other follow-up for BMI required.         FOLLOW UP  Return in about 4 weeks (around 8/15/2025) for Recheck.    Patient was given instructions and counseling regarding his condition or for health maintenance advice. Please see specific information pulled into the AVS if appropriate.       Sophie GOULDSeema Capps, APRN  07/18/25  13:21 EDT    CURRENT & DISCONTINUED MEDICATIONS  Current Outpatient Medications   Medication Instructions    Aspirin Low Dose 81 mg, Oral, Daily    atorvastatin (LIPITOR) 40 MG tablet 1 tablet, Daily    Blood Glucose Monitoring Suppl (Blood Glucose Monitor System) w/Device kit 1 each, Not Applicable, Every 3 Years    budesonide-formoterol (Symbicort) 160-4.5 MCG/ACT inhaler 2 puffs, Inhalation, 2 Times Daily - RT    cetirizine (ZYRTEC) 10 mg, Oral, Daily    Continuous Glucose Sensor (Dexcom G6 Sensor) USE FOR MONITORING BLOOD GLUCOSE. CHANGE SENSOR EVERY 10 DAYS    Diclofenac Sodium (VOLTAREN) 4 g, Topical, 4 Times Daily PRN    famotidine (PEPCID) 40 mg, Nightly PRN    hydrocortisone-bacitracin-zinc oxide-nystatin (MAGIC BARRIER) 1 Application, Topical, 2 Times Daily PRN    ipratropium-albuterol (DUO-NEB) 0.5-2.5 mg/3 ml nebulizer 3 mL, Nebulization, Every 6 Hours PRN    Jardiance 25 mg, Oral, Daily    Lantus SoloStar 100 UNIT/ML injection pen INJECT 10 UNITS SUBCUTANEOUSLY INTO THE APPROPRIATE AREA EVERY NIGHT AS DIRECTED    losartan (COZAAR) 25 mg, Oral, Daily    mineral  oil-hydrophilic petrolatum (AQUAPHOR) ointment 1 Application, Topical, 2 Times Daily    nicotine (NICODERM CQ) 21 MG/24HR patch 1 patch, Transdermal, Every 24 Hours Scheduled    pantoprazole (PROTONIX) 40 mg, Oral, 2 Times Daily Before Meals    sertraline (ZOLOFT) 25 MG tablet 1 tablet, Daily       There are no discontinued medications.     Patient or patient representative verbalized consent for the use of Ambient Listening during the visit with  MORRO Sam for chart documentation. 7/18/2025  11:54 EDT

## 2025-07-18 NOTE — PROGRESS NOTES
Venipuncture Blood Specimen Collection  Venipuncture performed in Lt by Miryam Grier with good hemostasis. Patient tolerated the procedure well without complications.   07/18/25   Ebony Saba CMA/VONNIE

## 2025-07-18 NOTE — Clinical Note
Please schedule a 1 month follow up.   Juana, I saw him in the office and he was very dirty with visible dirt under the nails and likely cigarette ashes on his skin. He has visible weight loss and visible muscle mass loss of the legs. Susanne mentioned that they could not afford water to drink so I'm not sure if their water is still shut off. I am going to write his letter for need for assistance however I'm not sure if [employer] should be involved or if he should be going to SNF.

## 2025-07-18 NOTE — ED PROVIDER NOTES
"SHARED VISIT ATTESTATION:    This visit was performed by myself and an APC.  I personally approved the management plan/medical decision making and take responsibility for the patient management.      SHARED VISIT NOTE:    Patient is 70 y.o. year old male that presents to the ED for evaluation of heat exhaustion.     Physical Exam    ED Course:    /73   Pulse 97   Temp 98 °F (36.7 °C) (Oral)   Resp 18   Ht 190.5 cm (75\")   Wt 81.9 kg (180 lb 8.9 oz)   SpO2 96%   BMI 22.57 kg/m²       The following orders were placed and all results were independently analyzed by me:  Orders Placed This Encounter   Procedures    Urine Culture - Urine,    Comprehensive Metabolic Panel    CBC Auto Differential    CK    Urinalysis With Microscopic If Indicated (No Culture) - Urine, Clean Catch    Urinalysis, Microscopic Only - Urine, Clean Catch    CBC & Differential       Medications Given in the Emergency Department:  Medications   gentamicin (GARAMYCIN) 400 mg in sodium chloride 0.9 % 100 mL IVPB (has no administration in time range)        ED Course:         Labs:    Lab Results (last 24 hours)       Procedure Component Value Units Date/Time    CBC & Differential [[id number]] Collected: 07/18/25 1202    Specimen: Blood Updated: 07/18/25 1202    Narrative:      The following orders were created for panel order CBC & Differential.  Procedure                               Abnormality         Status                     ---------                               -----------         ------                     CBC Auto Differential[[id number]]                            In process                   Please view results for these tests on the individual orders.    Comprehensive Metabolic Panel [[id number]] Collected: 07/18/25 1202    Specimen: Blood Updated: 07/18/25 1202    Lipid Panel [[id number]] Collected: 07/18/25 1202    Specimen: Blood Updated: 07/18/25 1202    TSH Rfx On Abnormal To Free T4 [[id number]] Collected: 07/18/25 1202 "    Specimen: Blood Updated: 07/18/25 1202    Magnesium [[id number]] Collected: 07/18/25 1202    Specimen: Blood Updated: 07/18/25 1202    Hemoglobin A1c [[id number]] Collected: 07/18/25 1202    Specimen: Blood Updated: 07/18/25 1202    CBC Auto Differential [[id number]] Collected: 07/18/25 1202    Specimen: Blood Updated: 07/18/25 1202    CBC & Differential [[id number]]  (Abnormal) Collected: 07/18/25 1633    Specimen: Blood Updated: 07/18/25 1639    Narrative:      The following orders were created for panel order CBC & Differential.  Procedure                               Abnormality         Status                     ---------                               -----------         ------                     CBC Auto Differential[[id number]]        Abnormal            Final result                 Please view results for these tests on the individual orders.    Comprehensive Metabolic Panel [[id number]]  (Abnormal) Collected: 07/18/25 1633    Specimen: Blood Updated: 07/18/25 1710     Glucose 160 mg/dL      BUN 15.9 mg/dL      Creatinine 0.88 mg/dL      Sodium 135 mmol/L      Potassium 4.4 mmol/L      Comment: Slight hemolysis detected by analyzer. Result may be falsely elevated.        Chloride 102 mmol/L      CO2 19.5 mmol/L      Calcium 9.0 mg/dL      Total Protein 6.8 g/dL      Albumin 3.1 g/dL      ALT (SGPT) 5 U/L      AST (SGOT) 13 U/L      Comment: Slight hemolysis detected by analyzer. Result may be falsely elevated.        Alkaline Phosphatase 101 U/L      Total Bilirubin 0.4 mg/dL      Globulin 3.7 gm/dL      A/G Ratio 0.8 g/dL      BUN/Creatinine Ratio 18.1     Anion Gap 13.5 mmol/L      eGFR 93.7 mL/min/1.73     Narrative:      GFR Categories in Chronic Kidney Disease (CKD)              GFR Category          GFR (mL/min/1.73)    Interpretation  G1                    90 or greater        Normal or high (1)  G2                    60-89                Mild decrease (1)  G3a                   45-59                 Mild to moderate decrease  G3b                   30-44                Moderate to severe decrease  G4                    15-29                Severe decrease  G5                    14 or less           Kidney failure    (1)In the absence of evidence of kidney disease, neither GFR category G1 or G2 fulfill the criteria for CKD.    eGFR calculation 2021 CKD-EPI creatinine equation, which does not include race as a factor    CBC Auto Differential [[id number]]  (Abnormal) Collected: 07/18/25 1633    Specimen: Blood Updated: 07/18/25 1639     WBC 5.18 10*3/mm3      RBC 5.25 10*6/mm3      Hemoglobin 13.8 g/dL      Hematocrit 42.6 %      MCV 81.1 fL      MCH 26.3 pg      MCHC 32.4 g/dL      RDW 15.9 %      RDW-SD 46.2 fl      MPV 9.4 fL      Platelets 245 10*3/mm3      Neutrophil % 55.4 %      Lymphocyte % 32.0 %      Monocyte % 10.8 %      Eosinophil % 1.0 %      Basophil % 0.6 %      Immature Grans % 0.2 %      Neutrophils, Absolute 2.87 10*3/mm3      Lymphocytes, Absolute 1.66 10*3/mm3      Monocytes, Absolute 0.56 10*3/mm3      Eosinophils, Absolute 0.05 10*3/mm3      Basophils, Absolute 0.03 10*3/mm3      Immature Grans, Absolute 0.01 10*3/mm3      nRBC 0.0 /100 WBC     CK [[id number]]  (Normal) Collected: 07/18/25 1633    Specimen: Blood Updated: 07/18/25 1659     Creatine Kinase 30 U/L     Urinalysis With Microscopic If Indicated (No Culture) - Urine, Clean Catch [[id number]]  (Abnormal) Collected: 07/18/25 1822    Specimen: Urine, Clean Catch Updated: 07/18/25 1832     Color, UA Yellow     Appearance, UA Cloudy     pH, UA 6.5     Specific Gravity, UA 1.019     Glucose, UA Negative     Ketones, UA Trace     Bilirubin, UA Negative     Blood, UA Trace     Protein, UA 30 mg/dL (1+)     Leuk Esterase, UA Large (3+)     Nitrite, UA Positive     Urobilinogen, UA 1.0 E.U./dL    Urinalysis, Microscopic Only - Urine, Clean Catch [[id number]]  (Abnormal) Collected: 07/18/25 1822    Specimen: Urine, Clean Catch Updated: 07/18/25  1832     RBC, UA 0-2 /HPF      WBC, UA Too Numerous to Count /HPF      Bacteria, UA 2+ /HPF      Squamous Epithelial Cells, UA 0-2 /HPF      Hyaline Casts, UA 7-12 /LPF      Methodology Automated Microscopy    Urine Culture - Urine, Urine, Clean Catch [[id number]] Collected: 07/18/25 1822    Specimen: Urine, Clean Catch Updated: 07/18/25 1842             Imaging:    No Radiology Exams Resulted Within Past 24 Hours    MDM:    Procedures    Labs were collected in the emergency department and all labs were reviewed and interpreted by me.                     Judith Leyva MD  19:03 EDT  07/18/25         Judith Leyva MD  07/18/25 1907

## 2025-07-18 NOTE — ED PROVIDER NOTES
Time: 4:41 PM EDT  Date of encounter:  7/18/2025  Independent Historian/Clinical History and Information was obtained by:   Patient    History is limited by: N/A    Chief Complaint   Patient presents with    Heat Exposure     Pt arrives to the ED via EMS from home . Pt has called EMS [id number] today to move him to different areas of  his yard. 3rd and final call was in relation to heat exposure x3hrs. Denies pain. 18g IV placed in right wrist prior to arrival, with 500ml of LR.          History of Present Illness:  Patient is a 70 y.o. year old male who presents to the emergency department for evaluation of heat exposure.  Patient states he has been out in the heat sitting at his picnic table in his yard for the past couple hours.  States he was working on his patio and is covered in dirt.  Patient states he has been drinking fluids today.  Patient states he lives at home with his wife.  Denies syncope, nausea, vomiting, body aches and cough    Patient Care Team  Primary Care Provider: Sophie Capps APRN    Past Medical History:     Allergies   Allergen Reactions    Ceftriaxone Rash     Had fairly extensive diffuse chest/arms rash after administration 1/2025 hospitalization.  Switched to aztreonam at that time.       Past Medical History:   Diagnosis Date    CHF (congestive heart failure)     SEE'S DR STRICKLAND NO CURRENT S/S    COPD (chronic obstructive pulmonary disease)     INHALER    Diabetes mellitus     DOESN'T CHECK BG OFTEN AT HOME    Hyperlipidemia     Hypertension     Sepsis 09/14/2023    Stroke 2017    RIGHT SIDE WEAKNESS     Past Surgical History:   Procedure Laterality Date    APPENDECTOMY      CYSTOLITHALOPAXY PERCUTANEOUS Left 1/16/2025    Procedure: CYSTOLITHOLAPAXY, left ureteroscopy with laser basket stone extraction and left ureteral stent exchange.  Looking bladder, laser stone off stent exchange retained stent if possible;  Surgeon: Ector Kulkarni MD;  Location: [facility] MAIN OR;  Service:  Urology;  Laterality: Left;    ENDOSCOPY N/A 3/4/2025    Procedure: ESOPHAGOGASTRODUODENOSCOPY with biopsies;  Surgeon: Mikael Bello MD;  Location: [facility] ENDOSCOPY;  Service: Gastroenterology;  Laterality: N/A;  reflux esophagitis    EYE SURGERY Bilateral     MISTY CATARACT     Family History   Problem Relation Age of Onset    No Known Problems Mother     No Known Problems Father     Malig Hyperthermia Neg Hx        Home Medications:  Prior to Admission medications    Medication Sig Start Date End Date Taking? Authorizing Provider   Aspirin Low Dose 81 MG chewable tablet CHEW AND SWALLOW 1 TABLET BY MOUTH DAILY 11/2/24   Sophie Capps APRN   atorvastatin (LIPITOR) 40 MG tablet Take 1 tablet by mouth Daily. 2/27/25   Henrry Camarillo MD   Blood Glucose Monitoring Suppl (Blood Glucose Monitor System) w/Device kit Use 1 each Every 3 (Three) Years. 3/12/25 3/12/26  Rodolfo Perez MD   budesonide-formoterol (Symbicort) 160-4.5 MCG/ACT inhaler Inhale 2 puffs 2 (Two) Times a Day. 2/7/25   Meliton Monroy DO   cetirizine (zyrTEC) 10 MG tablet Take 1 tablet by mouth Daily for 14 days. 1/18/25 7/18/25  Lico Langley MD   Continuous Glucose Sensor (Dexcom G6 Sensor) USE FOR MONITORING BLOOD GLUCOSE. CHANGE SENSOR EVERY 10 DAYS 7/1/25   Henrry Camarillo MD   Diclofenac Sodium (VOLTAREN) 1 % gel gel Apply 4 g topically to the appropriate area as directed 4 (Four) Times a Day As Needed (pain). 4/7/25   Coleen North APRN   empagliflozin (Jardiance) 25 MG tablet tablet TAKE 1 TABLET BY MOUTH DAILY 1/6/25   Sophie Capps APRN   famotidine (PEPCID) 40 MG tablet Take 1 tablet by mouth At Night As Needed.    Henrry Camarillo MD   hydrocortisone-bacitracin-zinc oxide-nystatin (MAGIC BARRIER) Apply 1 Application topically to the appropriate area as directed 2 (Two) Times a Day As Needed (skin irritation). 3/12/25   Rodolfo Perez MD   ipratropium-albuterol (DUO-NEB) 0.5-2.5 mg/3 ml nebulizer Take 3 mL  "by nebulization Every 6 (Six) Hours As Needed for Shortness of Air. 3/12/25   Rodolfo Perez MD   Lantus SoloStar 100 UNIT/ML injection pen INJECT 10 UNITS SUBCUTANEOUSLY INTO THE APPROPRIATE AREA EVERY NIGHT AS DIRECTED  Patient taking differently: Inject 10 Units under the skin into the appropriate area as directed Every Night. 1/7/25   Sophie Capps APRN   losartan (COZAAR) 25 MG tablet TAKE 1 TABLET BY MOUTH DAILY 1/6/25   Sophie Capps APRN   mineral oil-hydrophilic petrolatum (AQUAPHOR) ointment Apply 1 Application topically to the appropriate area as directed 2 (Two) Times a Day. 3/12/25   Rodolfo Perez MD   nicotine (NICODERM CQ) 21 MG/24HR patch Place 1 patch on the skin as directed by provider Daily.  Patient not taking: Reported on 7/18/2025 2/7/25   Meliton Monroy DO   pantoprazole (PROTONIX) 40 MG EC tablet Take 1 tablet by mouth 2 (Two) Times a Day Before Meals. 3/12/25   Rodolfo Perez MD   sertraline (ZOLOFT) 25 MG tablet Take 1 tablet by mouth Daily. 2/27/25   Provider, MD Henrry        Social History:   Social History     Tobacco Use    Smoking status: Every Day     Current packs/day: 2.50     Average packs/day: 2.5 packs/day for 53.5 years (133.9 ttl pk-yrs)     Types: Cigarettes     Start date: 1972     Passive exposure: Current    Smokeless tobacco: Never    Tobacco comments:     INSTRUCTED NO SMOKING 24 HOUR PRIOR TO SURGERY    Vaping Use    Vaping status: Never Used   Substance Use Topics    Alcohol use: Not Currently    Drug use: Never         Review of Systems:  Review of Systems   Respiratory:  Negative for cough.    Gastrointestinal:  Negative for nausea and vomiting.   Musculoskeletal:  Negative for myalgias.        Physical Exam:  /84   Pulse 94   Temp 98.2 °F (36.8 °C) (Oral)   Resp 19   Ht 190.5 cm (75\")   Wt 81.9 kg (180 lb 8.9 oz)   SpO2 97%   BMI 22.57 kg/m²         Physical Exam  Vitals and nursing note reviewed.   Constitutional:       Appearance: Normal " appearance.   HENT:      Head: Normocephalic and atraumatic.      Nose: Nose normal.      Mouth/Throat:      Mouth: Mucous membranes are moist.   Eyes:      Extraocular Movements: Extraocular movements intact.      Conjunctiva/sclera: Conjunctivae normal.      Pupils: Pupils are equal, round, and reactive to light.   Cardiovascular:      Rate and Rhythm: Normal rate and regular rhythm.      Heart sounds: Normal heart sounds.   Pulmonary:      Effort: Pulmonary effort is normal.      Breath sounds: Decreased breath sounds present. No wheezing, rhonchi or rales.   Musculoskeletal:         General: Normal range of motion.      Cervical back: Normal range of motion and neck supple.   Skin:     General: Skin is warm and dry.   Neurological:      General: No focal deficit present.      Mental Status: He is alert and oriented to person, place, and time.   Psychiatric:         Mood and Affect: Mood normal.         Behavior: Behavior normal.                  Medical Decision Making:      Comorbidities that affect care:    Congestive Heart Failure, COPD, Diabetes, Hypertension    External Notes reviewed:    Previous Clinic Note: Office visit with PCP 7/18/2025 for letter with care.       The following orders were placed and all results were independently analyzed by me:  Orders Placed This Encounter   Procedures    Urine Culture - Urine,    Comprehensive Metabolic Panel    CBC Auto Differential    CK    Urinalysis With Microscopic If Indicated (No Culture) - Urine, Clean Catch    Urinalysis, Microscopic Only - Urine, Clean Catch    CBC & Differential       Medications Given in the Emergency Department:  Medications   gentamicin (GARAMYCIN) 400 mg in sodium chloride 0.9 % 100 mL IVPB (0 mg Intravenous Stopped 7/18/25 2000)        ED Course:    The patient was initially evaluated in the triage area where orders were placed. The patient was later dispositioned by Tiffanie Marley PA-C.      The patient was advised to stay for  completion of workup which includes but is not limited to communication of labs and radiological results, reassessment and plan. The patient was advised that leaving prior to disposition by a provider could result in critical findings that are not communicated to the patient.          Labs:    Lab Results (last 24 hours)       Procedure Component Value Units Date/Time    CBC & Differential [[id number]]  (Abnormal) Collected: 07/18/25 1202    Specimen: Blood Updated: 07/18/25 2242    Narrative:      The following orders were created for panel order CBC & Differential.  Procedure                               Abnormality         Status                     ---------                               -----------         ------                     CBC Auto Differential[[id number]]        Abnormal            Final result                 Please view results for these tests on the individual orders.    Comprehensive Metabolic Panel [[id number]] Collected: 07/18/25 1202    Specimen: Blood Updated: 07/18/25 1202    Lipid Panel [[id number]]  (Abnormal) Collected: 07/18/25 1202    Specimen: Blood Updated: 07/18/25 2231     Total Cholesterol 174 mg/dL      Triglycerides 254 mg/dL      HDL Cholesterol 27 mg/dL      LDL Cholesterol  103 mg/dL      VLDL Cholesterol 44 mg/dL      LDL/HDL Ratio 3.56    Narrative:      Cholesterol Reference Ranges  (U.S. Department of Health and Human Services ATP III Classifications)    Desirable          <200 mg/dL  Borderline High    200-239 mg/dL  High Risk          >240 mg/dL      Triglyceride Reference Ranges  (U.S. Department of Health and Human Services ATP III Classifications)    Normal           <150 mg/dL  Borderline High  150-199 mg/dL  High             200-499 mg/dL  Very High        >500 mg/dL    HDL Reference Ranges  (U.S. Department of Health and Human Services ATP III Classifications)    Low     <40 mg/dl (major risk factor for CHD)  High    >60 mg/dl ('negative' risk factor for  CHD)        LDL Reference Ranges  (U.S. Department of Health and Human Services ATP III Classifications)    Optimal          <100 mg/dL  Near Optimal     100-129 mg/dL  Borderline High  130-159 mg/dL  High             160-189 mg/dL  Very High        >189 mg/dL    LDL is calculated using the NIH LDL-C calculation.      TSH Rfx On Abnormal To Free T4 [[id number]]  (Normal) Collected: 07/18/25 1202    Specimen: Blood Updated: 07/18/25 2234     TSH 2.190 uIU/mL     Magnesium [[id number]]  (Normal) Collected: 07/18/25 1202    Specimen: Blood Updated: 07/18/25 2231     Magnesium 1.9 mg/dL     Hemoglobin A1c [[id number]] Collected: 07/18/25 1202    Specimen: Blood Updated: 07/18/25 1202    CBC Auto Differential [[id number]]  (Abnormal) Collected: 07/18/25 1202    Specimen: Blood Updated: 07/18/25 2242     WBC 5.04 10*3/mm3      RBC 5.92 10*6/mm3      Hemoglobin 15.4 g/dL      Hematocrit 47.8 %      MCV 80.7 fL      MCH 26.0 pg      MCHC 32.2 g/dL      RDW 16.3 %      RDW-SD 45.8 fl      MPV 9.4 fL      Platelets 285 10*3/mm3      Neutrophil % 46.8 %      Lymphocyte % 40.3 %      Monocyte % 10.5 %      Eosinophil % 1.8 %      Basophil % 0.4 %      Immature Grans % 0.2 %      Neutrophils, Absolute 2.36 10*3/mm3      Lymphocytes, Absolute 2.03 10*3/mm3      Monocytes, Absolute 0.53 10*3/mm3      Eosinophils, Absolute 0.09 10*3/mm3      Basophils, Absolute 0.02 10*3/mm3      Immature Grans, Absolute 0.01 10*3/mm3      nRBC 0.0 /100 WBC     CBC & Differential [[id number]]  (Abnormal) Collected: 07/18/25 1633    Specimen: Blood Updated: 07/18/25 1639    Narrative:      The following orders were created for panel order CBC & Differential.  Procedure                               Abnormality         Status                     ---------                               -----------         ------                     CBC Auto Differential[[id number]]        Abnormal            Final result                 Please view results for these tests  on the individual orders.    Comprehensive Metabolic Panel [[id number]]  (Abnormal) Collected: 07/18/25 1633    Specimen: Blood Updated: 07/18/25 1710     Glucose 160 mg/dL      BUN 15.9 mg/dL      Creatinine 0.88 mg/dL      Sodium 135 mmol/L      Potassium 4.4 mmol/L      Comment: Slight hemolysis detected by analyzer. Result may be falsely elevated.        Chloride 102 mmol/L      CO2 19.5 mmol/L      Calcium 9.0 mg/dL      Total Protein 6.8 g/dL      Albumin 3.1 g/dL      ALT (SGPT) 5 U/L      AST (SGOT) 13 U/L      Comment: Slight hemolysis detected by analyzer. Result may be falsely elevated.        Alkaline Phosphatase 101 U/L      Total Bilirubin 0.4 mg/dL      Globulin 3.7 gm/dL      A/G Ratio 0.8 g/dL      BUN/Creatinine Ratio 18.1     Anion Gap 13.5 mmol/L      eGFR 93.7 mL/min/1.73     Narrative:      GFR Categories in Chronic Kidney Disease (CKD)              GFR Category          GFR (mL/min/1.73)    Interpretation  G1                    90 or greater        Normal or high (1)  G2                    60-89                Mild decrease (1)  G3a                   45-59                Mild to moderate decrease  G3b                   30-44                Moderate to severe decrease  G4                    15-29                Severe decrease  G5                    14 or less           Kidney failure    (1)In the absence of evidence of kidney disease, neither GFR category G1 or G2 fulfill the criteria for CKD.    eGFR calculation 2021 CKD-EPI creatinine equation, which does not include race as a factor    CBC Auto Differential [[id number]]  (Abnormal) Collected: 07/18/25 1633    Specimen: Blood Updated: 07/18/25 1639     WBC 5.18 10*3/mm3      RBC 5.25 10*6/mm3      Hemoglobin 13.8 g/dL      Hematocrit 42.6 %      MCV 81.1 fL      MCH 26.3 pg      MCHC 32.4 g/dL      RDW 15.9 %      RDW-SD 46.2 fl      MPV 9.4 fL      Platelets 245 10*3/mm3      Neutrophil % 55.4 %      Lymphocyte % 32.0 %      Monocyte % 10.8 %       Eosinophil % 1.0 %      Basophil % 0.6 %      Immature Grans % 0.2 %      Neutrophils, Absolute 2.87 10*3/mm3      Lymphocytes, Absolute 1.66 10*3/mm3      Monocytes, Absolute 0.56 10*3/mm3      Eosinophils, Absolute 0.05 10*3/mm3      Basophils, Absolute 0.03 10*3/mm3      Immature Grans, Absolute 0.01 10*3/mm3      nRBC 0.0 /100 WBC     CK [[id number]]  (Normal) Collected: 07/18/25 1633    Specimen: Blood Updated: 07/18/25 1659     Creatine Kinase 30 U/L     Urinalysis With Microscopic If Indicated (No Culture) - Urine, Clean Catch [[id number]]  (Abnormal) Collected: 07/18/25 1822    Specimen: Urine, Clean Catch Updated: 07/18/25 1832     Color, UA Yellow     Appearance, UA Cloudy     pH, UA 6.5     Specific Gravity, UA 1.019     Glucose, UA Negative     Ketones, UA Trace     Bilirubin, UA Negative     Blood, UA Trace     Protein, UA 30 mg/dL (1+)     Leuk Esterase, UA Large (3+)     Nitrite, UA Positive     Urobilinogen, UA 1.0 E.U./dL    Urinalysis, Microscopic Only - Urine, Clean Catch [[id number]]  (Abnormal) Collected: 07/18/25 1822    Specimen: Urine, Clean Catch Updated: 07/18/25 1832     RBC, UA 0-2 /HPF      WBC, UA Too Numerous to Count /HPF      Bacteria, UA 2+ /HPF      Squamous Epithelial Cells, UA 0-2 /HPF      Hyaline Casts, UA 7-12 /LPF      Methodology Automated Microscopy    Urine Culture - Urine, Urine, Clean Catch [[id number]] Collected: 07/18/25 1822    Specimen: Urine, Clean Catch Updated: 07/18/25 1842             Imaging:    No Radiology Exams Resulted Within Past 24 Hours      Differential Diagnosis and Discussion:      Metabolic: Differential diagnosis includes but is not limited to hypertension, hyperglycemia, hyperkalemia, hypocalcemia, metabolic acidosis, hypokalemia, hypoglycemia, malnutrition, hypothyroidism, hyperthyroidism, and adrenal insufficiency.     PROCEDURES:    Labs were collected in the emergency department and all labs were reviewed and interpreted by me.    No orders to  display        Procedures    MDM     Amount and/or Complexity of Data Reviewed  Clinical lab tests: reviewed  Decide to obtain previous medical records or to obtain history from someone other than the patient: yes                     Patient Care Considerations:    SEPSIS was considered but is NOT present in the emergency department as SIRS criteria is not present. CT HEAD: I considered ordering a noncontrast CT of the head, however alert and oriented      Consultants/Shared Management Plan:    SHARED VISIT: I have discussed the case with my supervising physician, Dr. Arroyo who states he had lab. The substantive portion of the medical decision was made by the attesting physician who made or approve the management plan and will take responsibility for the patient.  Clinical findings were discussed and ultimate disposition was made in consult with supervising physician.    Social Determinants of Health:    Patient is independent, reliable, and has access to care.       Disposition and Care Coordination:    Discharged: I considered escalation of care by admitting this patient to the hospital, however patient was not septic.  Had 1 dose of gentamicin during ED visit    I have explained the patient´s condition, diagnoses and treatment plan based on the information available to me at this time. I have answered questions and addressed any concerns. The patient has a good  understanding of the patient´s diagnosis, condition, and treatment plan as can be expected at this point. The vital signs have been stable. The patient´s condition is stable and appropriate for discharge from the emergency department.      The patient will pursue further outpatient evaluation with the primary care physician or other designated or consulting physician as outlined in the discharge instructions. They are agreeable to this plan of care and follow-up instructions have been explained in detail. The patient has received these instructions in  written format and has expressed an understanding of the discharge instructions. The patient is aware that any significant change in condition or worsening of symptoms should prompt an immediate return to this or the closest emergency department or call to 911.    Final diagnoses:   Heat exhaustion, initial encounter   Acute UTI        ED Disposition       ED Disposition   Discharge    Condition   Stable    Comment   --               This medical record created using voice recognition software.             Tiffanie Marley PA-C  07/18/25 [id number]

## 2025-07-19 NOTE — SIGNIFICANT NOTE
07/18/25 2020   Plan   Plan Comments SW attempted to schedule a ride for patient through [employer]. However, patient reports that he needs help getting in the house. SW explained that [employer] could not help him get into the house. Pt understood and doesn't want ride from [employer].   Final Discharge Disposition Code 01 - home or self-care        (0) independent

## 2025-07-20 LAB — BACTERIA SPEC AEROBE CULT: ABNORMAL

## 2025-07-21 ENCOUNTER — RESULTS FOLLOW-UP (OUTPATIENT)
Dept: FAMILY MEDICINE CLINIC | Facility: CLINIC | Age: 70
End: 2025-07-21
Payer: MEDICARE

## 2025-07-21 NOTE — LETTER
Brian Mehta  [address]  [address]  [place] [id number]    July 21, 2025     Dear Mr. Mehta:    Gio, your A1C is much better at 6.70%; your diabetes is currently well controlled. Your triglycerides have worsened as well as your LDL is high. Follow a heart healthy diet increase your activity. Continue your current medication.     Resulted Orders   Comprehensive Metabolic Panel   Result Value Ref Range    Glucose 143 (H) 65 - 99 mg/dL    BUN 16.0 8.0 - 23.0 mg/dL    Creatinine 0.83 0.76 - 1.27 mg/dL    Sodium 134 (L) 136 - 145 mmol/L    Potassium 4.4 3.5 - 5.2 mmol/L    Chloride 100 98 - 107 mmol/L    CO2 22.4 22.0 - 29.0 mmol/L    Calcium 9.2 8.6 - 10.5 mg/dL    Total Protein 7.9 6.0 - 8.5 g/dL    Albumin 3.4 (L) 3.5 - 5.2 g/dL    ALT (SGPT) 6 1 - 41 U/L    AST (SGOT) 12 1 - 40 U/L    Alkaline Phosphatase 119 (H) 39 - 117 U/L    Total Bilirubin 0.4 0.0 - 1.2 mg/dL    Globulin 4.5 gm/dL    A/G Ratio 0.8 g/dL    BUN/Creatinine Ratio 19.3 7.0 - 25.0    Anion Gap 11.6 5.0 - 15.0 mmol/L    eGFR 95.3 >60.0 mL/min/1.73   Lipid Panel   Result Value Ref Range    Total Cholesterol 174 0 - 200 mg/dL    Triglycerides 254 (H) 0 - 150 mg/dL    HDL Cholesterol 27 (L) 40 - 60 mg/dL    LDL Cholesterol  103 (H) 0 - 100 mg/dL    VLDL Cholesterol 44 (H) 5 - 40 mg/dL    LDL/HDL Ratio 3.56    TSH Rfx On Abnormal To Free T4   Result Value Ref Range    TSH 2.190 0.270 - 4.200 uIU/mL   Magnesium   Result Value Ref Range    Magnesium 1.9 1.6 - 2.4 mg/dL   Hemoglobin A1c   Result Value Ref Range    Hemoglobin A1C 6.70 (H) 4.80 - 5.60 %   CBC Auto Differential   Result Value Ref Range    WBC 5.04 3.40 - 10.80 10*3/mm3    RBC 5.92 (H) 4.14 - 5.80 10*6/mm3    Hemoglobin 15.4 13.0 - 17.7 g/dL    Hematocrit 47.8 37.5 - 51.0 %    MCV 80.7 79.0 - 97.0 fL    MCH 26.0 (L) 26.6 - 33.0 pg    MCHC 32.2 31.5 - 35.7 g/dL    RDW 16.3 (H) 12.3 - 15.4 %    RDW-SD 45.8 37.0 - 54.0 fl    MPV 9.4 6.0 - 12.0 fL    Platelets 285 140 - 450 10*3/mm3    Neutrophil %  46.8 42.7 - 76.0 %    Lymphocyte % 40.3 19.6 - 45.3 %    Monocyte % 10.5 5.0 - 12.0 %    Eosinophil % 1.8 0.3 - 6.2 %    Basophil % 0.4 0.0 - 1.5 %    Immature Grans % 0.2 0.0 - 0.5 %    Neutrophils, Absolute 2.36 1.70 - 7.00 10*3/mm3    Lymphocytes, Absolute 2.03 0.70 - 3.10 10*3/mm3    Monocytes, Absolute 0.53 0.10 - 0.90 10*3/mm3    Eosinophils, Absolute 0.09 0.00 - 0.40 10*3/mm3    Basophils, Absolute 0.02 0.00 - 0.20 10*3/mm3    Immature Grans, Absolute 0.01 0.00 - 0.05 10*3/mm3    nRBC 0.0 0.0 - 0.2 /100 WBC     Sincerely,        Sophie Capps, APRN

## 2025-07-25 ENCOUNTER — PATIENT OUTREACH (OUTPATIENT)
Dept: CASE MANAGEMENT | Facility: OTHER | Age: 70
End: 2025-07-25
Payer: MEDICARE

## 2025-07-25 DIAGNOSIS — R53.1 WEAKNESS: ICD-10-CM

## 2025-07-25 DIAGNOSIS — I50.22 CHRONIC HFREF (HEART FAILURE WITH REDUCED EJECTION FRACTION): Primary | ICD-10-CM

## 2025-07-25 NOTE — OUTREACH NOTE
"AMBULATORY CASE MANAGEMENT NOTE    Names and Relationships of Patient/Support Persons: Contact: FERDINAND CATALAN; Relationship: Emergency Contact -     [facility] Interim Update    Returned call to spouse regarding living situation. She states that patient and herself both received subpoenas to court for 7/29/25 regarding eviction from their home. She states that the landlord has told her that they \"do not actually have to go to court because the  is just going to ask them one question if they are paying rent\" and they are not. I advised spouse that they have received a summons from a legal authority and they should appear at court on the given date and time.     Due to my concern that the landlord could potentially be misleading them, I have placed an APS referral in the hopes that they can receive some support during this process. APS report Web ID number is # [id number].        Education Documentation  No documentation found.        YANDEL GONZALEZ  Ambulatory Case Management    7/25/2025, 15:49 EDT  "

## 2025-08-01 ENCOUNTER — PATIENT OUTREACH (OUTPATIENT)
Age: 70
End: 2025-08-01
Payer: MEDICARE

## 2025-08-01 NOTE — OUTREACH NOTE
Patient Outreach    MSW received new referral for patient regarding on-going social concerns. Per spouse, they will be moving into new place and seeking low cost of free furniture. Patient provided with resources and advised to continue to seek assistance from family members and friends if possible. MSW to continue to assist as needed and APS worker also involved with case.     Luisana LIU -   Ambulatory Case Management    8/1/2025, 15:07 EDT

## (undated) DEVICE — CYSTO PACK: Brand: MEDLINE INDUSTRIES, INC.

## (undated) DEVICE — URINE DRAINAGE BAG,BAG, NEEDLE SAMPLING, DRAIN TUBE: Brand: DOVER

## (undated) DEVICE — LINER SURG CANSTR SXN S/RIGD 1500CC

## (undated) DEVICE — SOL IRR H2O BO 1000ML STRL

## (undated) DEVICE — SOL IRRG H2O PL/BG 1000ML STRL

## (undated) DEVICE — NITINOL STONE RETRIEVAL BASKET: Brand: ESCAPE

## (undated) DEVICE — GW PTFE FIX/CORE STFF STR .038 3X150CM

## (undated) DEVICE — DEFENDO AIR WATER SUCTION AND BIOPSY VALVE KIT FOR  OLYMPUS: Brand: DEFENDO AIR/WATER/SUCTION AND BIOPSY VALVE

## (undated) DEVICE — SINGLE-USE BIOPSY FORCEPS: Brand: RADIAL JAW 4

## (undated) DEVICE — Device

## (undated) DEVICE — BLCK/BITE BLOX WO/DENTL/RIM W/STRAP 54F

## (undated) DEVICE — URETERAL ACCESS SHEATH SET: Brand: NAVIGATOR HD

## (undated) DEVICE — TRY SKINPREP PVP SCRB W PAINT

## (undated) DEVICE — SYS IRR PUMP SGL ACTN VAC SYR 10CC

## (undated) DEVICE — GLOVE,SURG,SENSICARE SLT,LF,PF,8: Brand: MEDLINE

## (undated) DEVICE — NITINOL WIRE WITH HYDROPHILIC TIP: Brand: SENSOR

## (undated) DEVICE — COVER,C-ARM,41X74: Brand: MEDLINE

## (undated) DEVICE — ENDOSCOPIC VALVE WITH ADAPTER.: Brand: SURSEAL® II

## (undated) DEVICE — SET, IRRIGATION CYSTO, Y-TYPE, 81": Brand: MEDLINE

## (undated) DEVICE — TRAY,IRRIGATION,PISTON SYRINGE,60ML: Brand: MEDLINE

## (undated) DEVICE — SOL IRR NACL 0.9PCT 3000ML

## (undated) DEVICE — MAT FLR ABS W/BLU/LINER 56X72IN WHT

## (undated) DEVICE — THE STERILE LIGHT HANDLE COVER IS USED WITH STERIS SURGICAL LIGHTING AND VISUALIZATION SYSTEMS.

## (undated) DEVICE — FIBR LASR HOLMIUM COMPAT 272MH DISP

## (undated) DEVICE — EVAC BLDR ELLIK 1P/U

## (undated) DEVICE — TUBING, SUCTION, 1/4" X 10', STRAIGHT: Brand: MEDLINE

## (undated) DEVICE — CONN JET HYDRA H20 AUXILIARY DISP

## (undated) DEVICE — SOLIDIFIER LIQLOC PLS 1500CC BT

## (undated) DEVICE — CATH 2L URETRL HC 6F 50CM